# Patient Record
Sex: FEMALE | Race: WHITE | NOT HISPANIC OR LATINO | Employment: OTHER | URBAN - METROPOLITAN AREA
[De-identification: names, ages, dates, MRNs, and addresses within clinical notes are randomized per-mention and may not be internally consistent; named-entity substitution may affect disease eponyms.]

---

## 2017-03-29 ENCOUNTER — TELEPHONE (OUTPATIENT)
Dept: SMOKING CESSATION | Facility: CLINIC | Age: 57
End: 2017-03-29

## 2017-04-03 ENCOUNTER — TELEPHONE (OUTPATIENT)
Dept: SMOKING CESSATION | Facility: CLINIC | Age: 57
End: 2017-04-03

## 2017-04-05 ENCOUNTER — TELEPHONE (OUTPATIENT)
Dept: SMOKING CESSATION | Facility: CLINIC | Age: 57
End: 2017-04-05

## 2017-04-21 ENCOUNTER — TELEPHONE (OUTPATIENT)
Dept: SMOKING CESSATION | Facility: CLINIC | Age: 57
End: 2017-04-21

## 2017-10-04 ENCOUNTER — TELEPHONE (OUTPATIENT)
Dept: SMOKING CESSATION | Facility: CLINIC | Age: 57
End: 2017-10-04

## 2017-10-10 ENCOUNTER — TELEPHONE (OUTPATIENT)
Dept: SMOKING CESSATION | Facility: CLINIC | Age: 57
End: 2017-10-10

## 2017-10-11 ENCOUNTER — TELEPHONE (OUTPATIENT)
Dept: SMOKING CESSATION | Facility: CLINIC | Age: 57
End: 2017-10-11

## 2018-03-21 ENCOUNTER — TELEPHONE (OUTPATIENT)
Dept: SMOKING CESSATION | Facility: CLINIC | Age: 58
End: 2018-03-21

## 2018-03-28 ENCOUNTER — TELEPHONE (OUTPATIENT)
Dept: SMOKING CESSATION | Facility: CLINIC | Age: 58
End: 2018-03-28

## 2019-01-14 ENCOUNTER — TELEPHONE (OUTPATIENT)
Dept: ORTHOPEDICS | Facility: CLINIC | Age: 59
End: 2019-01-14

## 2019-01-14 NOTE — TELEPHONE ENCOUNTER
Patient stated she had been in touch with you about an appt with us and has heard nothing back yet. Please call her.

## 2019-01-29 ENCOUNTER — OFFICE VISIT (OUTPATIENT)
Dept: ORTHOPEDICS | Facility: CLINIC | Age: 59
End: 2019-01-29
Payer: MEDICAID

## 2019-01-29 VITALS
HEART RATE: 71 BPM | DIASTOLIC BLOOD PRESSURE: 68 MMHG | SYSTOLIC BLOOD PRESSURE: 104 MMHG | HEIGHT: 67 IN | BODY MASS INDEX: 27.47 KG/M2 | WEIGHT: 175 LBS

## 2019-01-29 DIAGNOSIS — M25.551 RIGHT HIP PAIN: ICD-10-CM

## 2019-01-29 DIAGNOSIS — M25.562 CHRONIC PAIN OF BOTH KNEES: ICD-10-CM

## 2019-01-29 DIAGNOSIS — G89.29 CHRONIC PAIN OF BOTH KNEES: ICD-10-CM

## 2019-01-29 DIAGNOSIS — M17.12 PRIMARY OSTEOARTHRITIS OF LEFT KNEE: ICD-10-CM

## 2019-01-29 DIAGNOSIS — M17.11 PRIMARY OSTEOARTHRITIS OF RIGHT KNEE: ICD-10-CM

## 2019-01-29 DIAGNOSIS — M25.561 CHRONIC PAIN OF BOTH KNEES: ICD-10-CM

## 2019-01-29 DIAGNOSIS — M16.11 PRIMARY OSTEOARTHRITIS OF RIGHT HIP: Primary | ICD-10-CM

## 2019-01-29 PROCEDURE — 73564 PR  X-RAY KNEE 4+ VIEW: ICD-10-PCS | Mod: 50,,, | Performed by: ORTHOPAEDIC SURGERY

## 2019-01-29 PROCEDURE — 73564 X-RAY EXAM KNEE 4 OR MORE: CPT | Mod: 50,,, | Performed by: ORTHOPAEDIC SURGERY

## 2019-01-29 PROCEDURE — 73502 PR X-RAY EXAM HIP UNI 2-3 VIEWS: ICD-10-PCS | Mod: RT,,, | Performed by: ORTHOPAEDIC SURGERY

## 2019-01-29 PROCEDURE — 99204 PR OFFICE/OUTPT VISIT, NEW, LEVL IV, 45-59 MIN: ICD-10-PCS | Mod: 25,,, | Performed by: ORTHOPAEDIC SURGERY

## 2019-01-29 PROCEDURE — 99204 OFFICE O/P NEW MOD 45 MIN: CPT | Mod: 25,,, | Performed by: ORTHOPAEDIC SURGERY

## 2019-01-29 PROCEDURE — 20610 LARGE JOINT ASPIRATION/INJECTION: R KNEE: ICD-10-PCS | Mod: 50,,, | Performed by: ORTHOPAEDIC SURGERY

## 2019-01-29 PROCEDURE — 73502 X-RAY EXAM HIP UNI 2-3 VIEWS: CPT | Mod: RT,,, | Performed by: ORTHOPAEDIC SURGERY

## 2019-01-29 PROCEDURE — 20610 DRAIN/INJ JOINT/BURSA W/O US: CPT | Mod: 50,,, | Performed by: ORTHOPAEDIC SURGERY

## 2019-01-29 RX ORDER — ALLOPURINOL 100 MG/1
100 TABLET ORAL DAILY
Refills: 0 | COMMUNITY
Start: 2019-01-21 | End: 2019-06-11

## 2019-01-29 RX ORDER — DULOXETIN HYDROCHLORIDE 60 MG/1
60 CAPSULE, DELAYED RELEASE ORAL DAILY
Refills: 0 | COMMUNITY
Start: 2019-01-22 | End: 2021-06-17

## 2019-01-29 RX ORDER — RABEPRAZOLE SODIUM 20 MG/1
20 TABLET, DELAYED RELEASE ORAL DAILY
Status: ON HOLD | COMMUNITY
End: 2019-02-26 | Stop reason: HOSPADM

## 2019-01-29 RX ORDER — TRAZODONE HYDROCHLORIDE 50 MG/1
TABLET ORAL
Refills: 5 | COMMUNITY
Start: 2018-12-31 | End: 2019-03-12

## 2019-01-29 RX ORDER — ONDANSETRON 4 MG/1
8 TABLET, FILM COATED ORAL 2 TIMES DAILY
Status: ON HOLD | COMMUNITY
End: 2019-08-21 | Stop reason: SDUPTHER

## 2019-01-29 RX ORDER — TRAMADOL HYDROCHLORIDE 50 MG/1
TABLET ORAL
COMMUNITY
Start: 2019-01-22 | End: 2019-06-11

## 2019-01-29 RX ORDER — ATENOLOL 50 MG/1
TABLET ORAL
Refills: 1 | COMMUNITY
Start: 2019-01-03 | End: 2021-06-17

## 2019-01-29 RX ORDER — METHYLPREDNISOLONE ACETATE 40 MG/ML
40 INJECTION, SUSPENSION INTRA-ARTICULAR; INTRALESIONAL; INTRAMUSCULAR; SOFT TISSUE
Status: DISCONTINUED | OUTPATIENT
Start: 2019-01-29 | End: 2019-01-29 | Stop reason: HOSPADM

## 2019-01-29 RX ORDER — GABAPENTIN 600 MG/1
TABLET ORAL
Refills: 2 | COMMUNITY
Start: 2019-01-21 | End: 2021-06-17

## 2019-01-29 RX ORDER — LISINOPRIL 20 MG/1
TABLET ORAL
Refills: 5 | COMMUNITY
Start: 2019-01-21 | End: 2021-06-18

## 2019-01-29 RX ORDER — CLONAZEPAM 1 MG/1
TABLET ORAL
Refills: 2 | COMMUNITY
Start: 2019-01-03 | End: 2022-10-27

## 2019-01-29 RX ORDER — DOCUSATE SODIUM 100 MG/1
CAPSULE, LIQUID FILLED ORAL
Refills: 0 | Status: ON HOLD | COMMUNITY
Start: 2019-01-16 | End: 2019-08-21 | Stop reason: SDUPTHER

## 2019-01-29 RX ORDER — ARIPIPRAZOLE 15 MG/1
TABLET ORAL
Refills: 5 | COMMUNITY
Start: 2019-01-08 | End: 2021-06-18

## 2019-01-29 RX ORDER — AMLODIPINE BESYLATE 10 MG/1
TABLET ORAL
Refills: 1 | COMMUNITY
Start: 2019-01-21 | End: 2020-03-05

## 2019-01-29 RX ORDER — OXYBUTYNIN CHLORIDE 5 MG/1
TABLET ORAL
Refills: 2 | COMMUNITY
Start: 2018-12-07 | End: 2021-06-18

## 2019-01-29 RX ORDER — PRAVASTATIN SODIUM 20 MG/1
TABLET ORAL
Refills: 1 | COMMUNITY
Start: 2019-01-21 | End: 2021-06-17

## 2019-01-29 RX ADMIN — METHYLPREDNISOLONE ACETATE 40 MG: 40 INJECTION, SUSPENSION INTRA-ARTICULAR; INTRALESIONAL; INTRAMUSCULAR; SOFT TISSUE at 03:01

## 2019-01-29 NOTE — H&P (VIEW-ONLY)
[facility]    Subjective:     Chief Complaint:   Chief Complaint   Patient presents with    Right Knee - Pain     Right knee pain x a while. States that her knee bothers her all the time and states that she had a fall a  Few years ago. Right knee pain is worse then left.     Right Hip - Pain     Right hip pain x a while. States that she does have pain that radiates to the groin and down the leg.     Left Knee - Pain     Left knee pain x a while.  States that she had surgery a while back on that knee and states that it bothers her all the time. States that the pain is worse at night.        Past Medical History:   Diagnosis Date    Cancer ovarian; uterine    GERD (gastroesophageal reflux disease)     Hyperlipidemia     Hypertension        Past Surgical History:   Procedure Laterality Date    COLON SURGERY      COLONOSCOPY      HERNIA REPAIR N/A 2016    HYSTERECTOMY  total    REVISION COLOSTOMY N/A 2012       Current Outpatient Medications   Medication Sig    ALBUTEROL INHL Inhale into the lungs.    allopurinol (ZYLOPRIM) 100 MG tablet Take 100 mg by mouth once daily.    amLODIPine (NORVASC) 10 MG tablet Take 1 tablet (10 mg) by mouth daily    ARIPiprazole (ABILIFY) 15 MG Tab Take 1 tablet (15 mg) by mouth daily    ASPIR-LOW 81 mg EC tablet     atenolol (TENORMIN) 50 MG tablet Take 1 tablet (50 mg) by mouth daily    clonazePAM (KLONOPIN) 1 MG tablet 1 tablet daily as needed for anxiery    docusate sodium (COLACE) 100 MG capsule Take 2 capsules (200 mg) by mouth 2 times per day as needed    DULoxetine (CYMBALTA) 60 MG capsule Take 60 mg by mouth once daily.    gabapentin (NEURONTIN) 600 MG tablet Take 1 tablet (600 mg) by mouth 3 times per day    lisinopril (PRINIVIL,ZESTRIL) 20 MG tablet Take 1 tablet (20 mg) by mouth daily    ondansetron (ZOFRAN) 4 MG tablet Take 8 mg by mouth 2 (two) times daily.    oxybutynin (DITROPAN) 5 MG Tab Take 1 tablet (5 mg) by mouth 2 times per day     pantoprazole (PROTONIX) 20 MG tablet Take 20 mg by mouth once daily.    pravastatin (PRAVACHOL) 20 MG tablet Take 1 tablet (20 mg) by mouth daily    RABEprazole (ACIPHEX) 20 mg tablet Take 20 mg by mouth once daily.    tiotropium Br/olodaterol HCl (STIOLTO RESPIMAT INHL) Inhale into the lungs.    traMADol (ULTRAM) 50 mg tablet     traZODone (DESYREL) 50 MG tablet Take 1 tablet (50 mg) by mouth daily at bedtime    polyethylene glycol (COLYTE) 240-22.72-6.72 gram SolR Take 4,000 mLs by mouth as directed.     No current facility-administered medications for this visit.        Review of patient's allergies indicates:  No Known Allergies    Family History   Problem Relation Age of Onset    Cancer Mother     Hypertension Father     Heart disease Father     Heart disease Sister     Hypertension Sister     Hypertension Brother     Depression Brother        Social History     Socioeconomic History    Marital status:      Spouse name: Not on file    Number of children: Not on file    Years of education: Not on file    Highest education level: Not on file   Social Needs    Financial resource strain: Not on file    Food insecurity - worry: Not on file    Food insecurity - inability: Not on file    Transportation needs - medical: Not on file    Transportation needs - non-medical: Not on file   Occupational History    Not on file   Tobacco Use    Smoking status: Current Every Day Smoker     Packs/day: 0.50     Types: Cigarettes   Substance and Sexual Activity    Alcohol use: No    Drug use: Not on file    Sexual activity: Not on file   Other Topics Concern    Not on file   Social History Narrative    Not on file       History of present illness: Patient comes in today for the right hip and her bilateral knees. Her left knee is much worse than the right. Both bother her. Her biggest complaint is her right hip. The right hip hurts her in the groin. She denies any trauma to the right hip. She has  had an arthroscopy of the left knee. That was about a year ago. The knee is swollen and agree. It really never got better from arthroscopy. She was told after arthroscopy that she needed a knee replacement.      Review of Systems:    Constitution: Negative for chills, fever, and sweats.  Negative for unexplained weight loss.    HENT:  Negative for headaches and blurry vision.    Cardiovascular:Negative for chest pain or irregular heart beat. Negative for hypertension.    Respiratory:  Negative for cough and shortness of breath.    Gastrointestinal: Negative for abdominal pain, heartburn, melena, nausea, and vomitting.    Genitourinary:  Negative bladder incontinence and dysuria.    Musculoskeletal:  See HPI for details.     Neurological: Negative for numbness.    Psychiatric/Behavioral: Negative for depression.  The patient is not nervous/anxious.      Endocrine: Negative for polyuria    Hematologic/Lymphatic: Negative for bleeding problem.  Does not bruise/bleed easily.    Skin: Negative for poor would healing and rash    Objective:      Physical Examination:    Vital Signs:    Vitals:    01/29/19 1329   BP: 104/68   Pulse: 71       Body mass index is 27.41 kg/m².    This a well-developed, well nourished patient in no acute distress.  They are alert and oriented and cooperative to examination.        Patient has severe pain with internal/external rotation of the right hip. She has a hip flexion contracture of 10°. She has range of motion of her bilateral knees from 5-100 degrees. Her knees are stable to varus valgus anterior posterior stresses.  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 The right knee demonstrates moderate arthritis with mild loss of the medial compartment.    AP of the pelvis and AP and lateral of the  right hip demonstrates bone-on-bone arthritis of the right hip.    Assessment/Plan:      Severe arthritis of the left knee. Arthritis of the  right knee. I injected both knees with Depo-Medrol and lidocaine. In terms of the hip she has bone-on-bone arthritis of the right hip. Because the hip is the most symptomatic I've offered her a right hip replacement. Risks and benefits including leg length discrepancy dislocation numbness and tingling and other complications are discussed with her in great detail. She understood and wished to proceed  This note was created using Dragon voice recognition software that occasionally misinterpreted phrases or words.

## 2019-01-29 NOTE — PROCEDURES
Large Joint Aspiration/Injection: R knee  Date/Time: 1/29/2019 3:10 PM  Performed by: Eliseo Vaca MD  Authorized by: Eliseo Vaca MD     Consent Done?:  Yes (Verbal)  Indications:  Pain  Procedure site marked: Yes    Timeout: Prior to procedure the correct patient, procedure, and site was verified      Location:  Knee  Site:  R knee  Prep: Patient was prepped and draped in usual sterile fashion    Needle size:  25 G  Medications:  40 mg methylPREDNISolone acetate 40 mg/mL; 40 mg methylPREDNISolone acetate 40 mg/mL  Patient tolerance:  Patient tolerated the procedure well with no immediate complications    Large Joint Aspiration/Injection: L knee  Date/Time: 1/29/2019 3:10 PM  Performed by: Eliseo Vaca MD  Authorized by: Eliseo Vaca MD     Consent Done?:  Yes (Verbal)  Indications:  Pain  Procedure site marked: Yes    Timeout: Prior to procedure the correct patient, procedure, and site was verified      Location:  Knee  Site:  L knee  Prep: Patient was prepped and draped in usual sterile fashion    Needle size:  25 G  Medications:  40 mg methylPREDNISolone acetate 40 mg/mL; 40 mg methylPREDNISolone acetate 40 mg/mL  Patient tolerance:  Patient tolerated the procedure well with no immediate complications

## 2019-01-29 NOTE — PROGRESS NOTES
[facility]    Subjective:     Chief Complaint:   Chief Complaint   Patient presents with    Right Knee - Pain     Right knee pain x a while. States that her knee bothers her all the time and states that she had a fall a  Few years ago. Right knee pain is worse then left.     Right Hip - Pain     Right hip pain x a while. States that she does have pain that radiates to the groin and down the leg.     Left Knee - Pain     Left knee pain x a while.  States that she had surgery a while back on that knee and states that it bothers her all the time. States that the pain is worse at night.        Past Medical History:   Diagnosis Date    Cancer ovarian; uterine    GERD (gastroesophageal reflux disease)     Hyperlipidemia     Hypertension        Past Surgical History:   Procedure Laterality Date    COLON SURGERY      COLONOSCOPY      HERNIA REPAIR N/A 2016    HYSTERECTOMY  total    REVISION COLOSTOMY N/A 2012       Current Outpatient Medications   Medication Sig    ALBUTEROL INHL Inhale into the lungs.    allopurinol (ZYLOPRIM) 100 MG tablet Take 100 mg by mouth once daily.    amLODIPine (NORVASC) 10 MG tablet Take 1 tablet (10 mg) by mouth daily    ARIPiprazole (ABILIFY) 15 MG Tab Take 1 tablet (15 mg) by mouth daily    ASPIR-LOW 81 mg EC tablet     atenolol (TENORMIN) 50 MG tablet Take 1 tablet (50 mg) by mouth daily    clonazePAM (KLONOPIN) 1 MG tablet 1 tablet daily as needed for anxiery    docusate sodium (COLACE) 100 MG capsule Take 2 capsules (200 mg) by mouth 2 times per day as needed    DULoxetine (CYMBALTA) 60 MG capsule Take 60 mg by mouth once daily.    gabapentin (NEURONTIN) 600 MG tablet Take 1 tablet (600 mg) by mouth 3 times per day    lisinopril (PRINIVIL,ZESTRIL) 20 MG tablet Take 1 tablet (20 mg) by mouth daily    ondansetron (ZOFRAN) 4 MG tablet Take 8 mg by mouth 2 (two) times daily.    oxybutynin (DITROPAN) 5 MG Tab Take 1 tablet (5 mg) by mouth 2 times per day     pantoprazole (PROTONIX) 20 MG tablet Take 20 mg by mouth once daily.    pravastatin (PRAVACHOL) 20 MG tablet Take 1 tablet (20 mg) by mouth daily    RABEprazole (ACIPHEX) 20 mg tablet Take 20 mg by mouth once daily.    tiotropium Br/olodaterol HCl (STIOLTO RESPIMAT INHL) Inhale into the lungs.    traMADol (ULTRAM) 50 mg tablet     traZODone (DESYREL) 50 MG tablet Take 1 tablet (50 mg) by mouth daily at bedtime    polyethylene glycol (COLYTE) 240-22.72-6.72 gram SolR Take 4,000 mLs by mouth as directed.     No current facility-administered medications for this visit.        Review of patient's allergies indicates:  No Known Allergies    Family History   Problem Relation Age of Onset    Cancer Mother     Hypertension Father     Heart disease Father     Heart disease Sister     Hypertension Sister     Hypertension Brother     Depression Brother        Social History     Socioeconomic History    Marital status:      Spouse name: Not on file    Number of children: Not on file    Years of education: Not on file    Highest education level: Not on file   Social Needs    Financial resource strain: Not on file    Food insecurity - worry: Not on file    Food insecurity - inability: Not on file    Transportation needs - medical: Not on file    Transportation needs - non-medical: Not on file   Occupational History    Not on file   Tobacco Use    Smoking status: Current Every Day Smoker     Packs/day: 0.50     Types: Cigarettes   Substance and Sexual Activity    Alcohol use: No    Drug use: Not on file    Sexual activity: Not on file   Other Topics Concern    Not on file   Social History Narrative    Not on file       History of present illness: Patient comes in today for the right hip and her bilateral knees. Her left knee is much worse than the right. Both bother her. Her biggest complaint is her right hip. The right hip hurts her in the groin. She denies any trauma to the right hip. She has  had an arthroscopy of the left knee. That was about a year ago. The knee is swollen and agree. It really never got better from arthroscopy. She was told after arthroscopy that she needed a knee replacement.      Review of Systems:    Constitution: Negative for chills, fever, and sweats.  Negative for unexplained weight loss.    HENT:  Negative for headaches and blurry vision.    Cardiovascular:Negative for chest pain or irregular heart beat. Negative for hypertension.    Respiratory:  Negative for cough and shortness of breath.    Gastrointestinal: Negative for abdominal pain, heartburn, melena, nausea, and vomitting.    Genitourinary:  Negative bladder incontinence and dysuria.    Musculoskeletal:  See HPI for details.     Neurological: Negative for numbness.    Psychiatric/Behavioral: Negative for depression.  The patient is not nervous/anxious.      Endocrine: Negative for polyuria    Hematologic/Lymphatic: Negative for bleeding problem.  Does not bruise/bleed easily.    Skin: Negative for poor would healing and rash    Objective:      Physical Examination:    Vital Signs:    Vitals:    01/29/19 1329   BP: 104/68   Pulse: 71       Body mass index is 27.41 kg/m².    This a well-developed, well nourished patient in no acute distress.  They are alert and oriented and cooperative to examination.        Patient has severe pain with internal/external rotation of the right hip. She has a hip flexion contracture of 10°. She has range of motion of her bilateral knees from 5-100 degrees. Her knees are stable to varus valgus anterior posterior stresses.  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 The right knee demonstrates moderate arthritis with mild loss of the medial compartment.    AP of the pelvis and AP and lateral of the  right hip demonstrates bone-on-bone arthritis of the right hip.    Assessment/Plan:      Severe arthritis of the left knee. Arthritis of the  right knee. I injected both knees with Depo-Medrol and lidocaine. In terms of the hip she has bone-on-bone arthritis of the right hip. Because the hip is the most symptomatic I've offered her a right hip replacement. Risks and benefits including leg length discrepancy dislocation numbness and tingling and other complications are discussed with her in great detail. She understood and wished to proceed  This note was created using Dragon voice recognition software that occasionally misinterpreted phrases or words.

## 2019-01-31 DIAGNOSIS — M16.11 PRIMARY OSTEOARTHRITIS OF RIGHT HIP: Primary | ICD-10-CM

## 2019-02-06 DIAGNOSIS — M16.11 DEGENERATIVE JOINT DISEASE OF RIGHT HIP: ICD-10-CM

## 2019-02-06 DIAGNOSIS — M16.11 PRIMARY OSTEOARTHRITIS OF RIGHT HIP: Primary | ICD-10-CM

## 2019-02-06 RX ORDER — SODIUM CHLORIDE 0.9 % (FLUSH) 0.9 %
3 SYRINGE (ML) INJECTION
Status: CANCELLED | OUTPATIENT
Start: 2019-02-06

## 2019-02-06 RX ORDER — MUPIROCIN 20 MG/G
OINTMENT TOPICAL
Status: CANCELLED | OUTPATIENT
Start: 2019-02-06

## 2019-02-11 ENCOUNTER — TELEPHONE (OUTPATIENT)
Dept: ORTHOPEDICS | Facility: CLINIC | Age: 59
End: 2019-02-11

## 2019-02-11 ENCOUNTER — HOSPITAL ENCOUNTER (OUTPATIENT)
Dept: RADIOLOGY | Facility: HOSPITAL | Age: 59
Discharge: HOME | End: 2019-02-11
Attending: ORTHOPAEDIC SURGERY
Payer: MEDICAID

## 2019-02-11 ENCOUNTER — HOSPITAL ENCOUNTER (OUTPATIENT)
Dept: PREADMISSION TESTING | Facility: HOSPITAL | Age: 59
Discharge: HOME | End: 2019-02-11
Attending: ORTHOPAEDIC SURGERY
Payer: MEDICAID

## 2019-02-11 VITALS — BODY MASS INDEX: 27.15 KG/M2 | WEIGHT: 173 LBS | HEIGHT: 67 IN

## 2019-02-11 DIAGNOSIS — M16.11 PRIMARY OSTEOARTHRITIS OF RIGHT HIP: Primary | ICD-10-CM

## 2019-02-11 LAB
ABO + RH BLD: NORMAL
ALBUMIN SERPL BCP-MCNC: 3.9 G/DL
ALP SERPL-CCNC: 99 U/L
ALT SERPL W/O P-5'-P-CCNC: 24 U/L
ANION GAP SERPL CALC-SCNC: 10 MMOL/L
AST SERPL-CCNC: 16 U/L
BASOPHILS # BLD AUTO: 0 K/UL
BASOPHILS NFR BLD: 0.2 %
BILIRUB SERPL-MCNC: 0.3 MG/DL
BILIRUB UR QL STRIP: NEGATIVE
BLD GP AB SCN CELLS X3 SERPL QL: NORMAL
BUN SERPL-MCNC: 14 MG/DL
CALCIUM SERPL-MCNC: 9.8 MG/DL
CHLORIDE SERPL-SCNC: 101 MMOL/L
CLARITY UR: CLEAR
CO2 SERPL-SCNC: 26 MMOL/L
COLOR UR: YELLOW
CREAT SERPL-MCNC: 0.7 MG/DL
DIFFERENTIAL METHOD: ABNORMAL
EOSINOPHIL # BLD AUTO: 0.1 K/UL
EOSINOPHIL NFR BLD: 1 %
ERYTHROCYTE [DISTWIDTH] IN BLOOD BY AUTOMATED COUNT: 14.3 %
EST. GFR  (AFRICAN AMERICAN): >60 ML/MIN/1.73 M^2
EST. GFR  (NON AFRICAN AMERICAN): >60 ML/MIN/1.73 M^2
GLUCOSE SERPL-MCNC: 92 MG/DL
GLUCOSE UR QL STRIP: NEGATIVE
HCT VFR BLD AUTO: 40.7 %
HGB BLD-MCNC: 13.2 G/DL
HGB UR QL STRIP: NEGATIVE
KETONES UR QL STRIP: NEGATIVE
LEUKOCYTE ESTERASE UR QL STRIP: NEGATIVE
LYMPHOCYTES # BLD AUTO: 2.5 K/UL
LYMPHOCYTES NFR BLD: 19.8 %
MCH RBC QN AUTO: 30.4 PG
MCHC RBC AUTO-ENTMCNC: 32.4 G/DL
MCV RBC AUTO: 94 FL
MONOCYTES # BLD AUTO: 0.8 K/UL
MONOCYTES NFR BLD: 6.1 %
NEUTROPHILS # BLD AUTO: 9.2 K/UL
NEUTROPHILS NFR BLD: 72.9 %
NITRITE UR QL STRIP: NEGATIVE
PH UR STRIP: 6 [PH] (ref 5–8)
PLATELET # BLD AUTO: 215 K/UL
PMV BLD AUTO: 8.4 FL
POTASSIUM SERPL-SCNC: 4 MMOL/L
PROT SERPL-MCNC: 7.4 G/DL
PROT UR QL STRIP: NEGATIVE
RBC # BLD AUTO: 4.34 M/UL
SODIUM SERPL-SCNC: 137 MMOL/L
SP GR UR STRIP: <=1.005 (ref 1–1.03)
URN SPEC COLLECT METH UR: ABNORMAL
UROBILINOGEN UR STRIP-ACNC: NEGATIVE EU/DL
WBC # BLD AUTO: 12.6 K/UL

## 2019-02-11 PROCEDURE — 99900103 DSU ONLY-NO CHARGE-INITIAL HR (STAT)

## 2019-02-11 PROCEDURE — 93010 EKG 12-LEAD: ICD-10-PCS | Mod: ,,, | Performed by: INTERNAL MEDICINE

## 2019-02-11 PROCEDURE — 36415 COLL VENOUS BLD VENIPUNCTURE: CPT

## 2019-02-11 PROCEDURE — 71046 X-RAY EXAM CHEST 2 VIEWS: CPT | Mod: TC,FY

## 2019-02-11 PROCEDURE — 99900104 DSU ONLY-NO CHARGE-EA ADD'L HR (STAT)

## 2019-02-11 PROCEDURE — 93010 ELECTROCARDIOGRAM REPORT: CPT | Mod: ,,, | Performed by: INTERNAL MEDICINE

## 2019-02-11 PROCEDURE — 85025 COMPLETE CBC W/AUTO DIFF WBC: CPT

## 2019-02-11 PROCEDURE — 86850 RBC ANTIBODY SCREEN: CPT

## 2019-02-11 PROCEDURE — 87081 CULTURE SCREEN ONLY: CPT

## 2019-02-11 PROCEDURE — 80053 COMPREHEN METABOLIC PANEL: CPT

## 2019-02-11 PROCEDURE — 93005 ELECTROCARDIOGRAM TRACING: CPT

## 2019-02-11 PROCEDURE — 81003 URINALYSIS AUTO W/O SCOPE: CPT

## 2019-02-11 PROCEDURE — 71046 XR CHEST PA AND LATERAL: ICD-10-PCS | Mod: 26,,, | Performed by: RADIOLOGY

## 2019-02-11 PROCEDURE — 71046 X-RAY EXAM CHEST 2 VIEWS: CPT | Mod: 26,,, | Performed by: RADIOLOGY

## 2019-02-11 NOTE — PRE ADMISSION SCREENING
"               [facility] Risk Assessment Scale    Patient Name: Nimco Caro  YOB: 1960  MRN: [id number]            RIsk Factor Measure Recommendation Patient Data Scale/Score   BMI >40 Reconsider surgery, weight loss   Estimated body mass index is 27.1 kg/m² as calculated from the following:    Height as of this encounter: 5' 7" (1.702 m).    Weight as of this encounter: 78.5 kg (173 lb).   [] 0 = 1 - 24.9  [x] 1 = 25-29.9  [] 2 = 30-34.9  [] 3 = 35-39.9  [] 4 = 40-44.9  [] 5 = 45-99.9   Hemoglobin AIC (if applicable) >9 Delay surgery until DM under control  Refer for:  · Nutrition Therapy  · Exercise   · Medication    No results found for: LABA1C, HGBA1C    Lab Results   Component Value Date    GLU 92 02/11/2019      [] 0 = 4.0-5.6  [] 1 = 5.7-6.4  [] 2 = 6.5-6.9  [] 3 = 7.0-7.9  [] 4 = 8.0-8.9  [] 5 = 9.0-12   Hemoglobin (Anemia) <9 Delay surgery   Correct anemia Lab Results   Component Value Date    HGB 13.2 02/11/2019    [] 20 - <9.0                    Albumin <3 Delay surgery &Workup Lab Results   Component Value Date    ALBUMIN 3.9 02/11/2019    [] 20 - <3.0   Smoking Cessation >4 Weeks Delay Surgery  Refer to OP Cessation Class     [x] 20 - current smoker                                __1___ PPD                    Hx of MI, PE, Arrhythmia, CVA, DVT <30 Days Delay Surgery    N/A [] 20      Infection Variable Delay surgery and re-evaluate   N/A [] 20 - recent/current infection     Depression (PHQ) >10 out of 27 Delay Surgery and re-evaluate  Medication  Counseling              [x] 0     []1     []2     []3      []4      [] 5                    (1-4)      (5-9)  (10-14)  (15-19)   (20-27)     Memory Impairment & Memory loss (Mini-Cog Screening Tool) Advanced dementia and/or Parkinson's Reconsider surgery     [x] 0     []1     []2     []3     []4     [] 5     Physical Conditioning (Modified AM-PAC Per Physical Therapy at [facility]) Unable to ambulate on day of surgery Delay surgery and " re-evaluate  Pre-Rehabilitation   (PT evaluation)       []  0   [x]4       []8     []12        []16     []20       (<20%)   (<40%)   (<60%)   (<80% )    (>80%)     Home Environment/Caregiver support  (Per /Navigator Interview)    Availability of basic services and/or approprate assistance during post-operative period Delay surgery and re-evaluate  Safe home environment  Health   1 week post-surgery  Transportation  availability  Ability to obtain DME/Medications post-op    [] 0     []1     [x]2     []3     []4     [] 5  [x] 0     []1     []2     []3     []4     [] 5  [x] 0     []1     []2     []3     []4     [] 5  [x] 0     []1     []2     []3     []4     [] 5         MD Contact: Dr. Vaca Comments:  Total Score:  27

## 2019-02-11 NOTE — DISCHARGE INSTRUCTIONS
To confirm, Your doctor has instructed you that surgery is scheduled for:     Please report to [facility], Registration the morning of surgery. You must check-in and receive a wristband before going to your procedure.    Pre-Op will call the afternoon prior to surgery between 1:00 and 6:00 PM with the final arrival time.  Phone number: [phone]    PLEASE NOTE:  The surgery schedule has many variables which may affect the time of your surgery case.  Family members should be available if your surgery time changes.  Plan to be here the day of your procedure between 4-6 hours.    MEDICATIONS:  TAKE ONLY THESE MEDICATIONS WITH A SMALL SIP OF WATER THE MORNING OF YOUR PROCEDURE:    Inhalers, Allopurinol, Amlodipine, Abilify, Atenolol, Klonopin, Cymbalta, Neurontin, Oxybutynin, Protonix. Provastatin, Tramadol-if needed  DO NOT TAKE THESE MEDICATIONS 5-7 DAYS PRIOR to your procedure or per your surgeon's request: ASPIRIN, ALEVE, ADVIL, IBUPROFEN, FISH OIL VITAMIN E, HERBALS  (May take Tylenol)                                    Aspirin - last dose 2/6/19                                    No Lisinopril AM of surgery  ONLY if you are prescribed any types of blood thinners such as:  Aspirin, Coumadin, Plavix, Pradaxa, Xarelto, Aggrenox, Effient, Eliquis, Savasya, Brilinta, or any other, ask your surgeon whether you should stop taking them and how long before surgery you should stop.  You may also need to verify with the prescribing physician if it is ok to stop your medication.      INSTRUCTIONS IMPORTANT!!  · Do not eat or drink anything between midnight and the time of your procedure- this includes gum, mints, and candy.  · Do not smoke or drink alcoholic beverages 24 hours prior to your procedure.  · Shower the night before AND the morning of your procedure with a Chlorhexidine wash such as Hibiclens or Dial antibacterial soap from the neck down.  Do not get it on your face or in your eyes.  You  may use your own shampoo and face wash. This helps your skin to be as bacteria free as possible.    · If you wear contact lenses, dentures, hearing aids or glasses, bring a container to put them in during surgery and give to a family member for safe keeping.  Please leave all jewelry, piercing's and valuables at home.   · DO NOT remove hair from the surgery site.  Do not shave the incision site unless you are given specific instructions to do so.    · ONLY if you have been diagnosed with sleep apnea please bring your C-PAP machine.  · ONLY if you wear home oxygen please bring your portable oxygen tank the day of your procedure.  · ONLY if you have a history of OPEN HEART SURGERY you will need a clearance from your Cardiologist per Anesthesia.      · ONLY for patients requiring bowel prep, written instructions will be given by your doctor's office.  · ONLY if you have a neuro stimulator, please bring the controller with you the morning of surgery  · ONLY if a type and screen test is needed before surgery, please return:  · If your doctor has scheduled you for an overnight stay, bring a small overnight bag with any personal items you need.  · Make arrangements in advance for transportation home by a responsible adult.  It is not safe to drive a vehicle during the 24 hours after anesthesia.      · Visiting hours are 10:00AM to 8:30PM.  For the safety of all patients, visitors under the age of 12 are not allowed above the first floor of the hospital.    · All [facility] facilities and properties are tobacco free.  Smoking is NOT allowed.       If you have any questions about these instructions, call Pre-Op Admit  Nursing at [phone] or the Pre-Op Day Surgery Unit at [phone].

## 2019-02-11 NOTE — PRE ADMISSION SCREENING
JOINT CAMP ASSESSMENT    Name Nimco Caro   MRN [id number]    Age/Sex 58 y.o. female    Surgeon Dr. Eliseo Vaca   Joint Camp Date 2/11/2019   Surgery Date 2/25/2019   Procedure Right Hip Arthroplasty   Insurance Payor: MEDICAID / Plan: [place] [facility] CONNECT / Product Type: Managed Medicaid /    Care Team Patient Care Team:  Katy Mason MD as PCP - General (Family Medicine)  Eliseo Vaca MD as Consulting Physician (Orthopedic Surgery)    Pharmacy   [facility] [address]  [place], [place] - [address] [facility]  [address] [place] [address]  Phone: [phone] Fax: [phone]    [address] [address]  Phone: [phone] Fax: [phone]    [facility] [employer] [place], [place] - [address] [place] [address]  Phone: [phone] Fax: [phone]     AM-PAC Score   20   Risk Assessment Score 27     Past Medical History:   Diagnosis Date    Cancer ovarian; uterine    1992    GERD (gastroesophageal reflux disease)     Hyperlipidemia     Hypertension        Past Surgical History:   Procedure Laterality Date    COLON SURGERY      COLONOSCOPY      HERNIA REPAIR N/A 2016    HYSTERECTOMY  total    REVISION COLOSTOMY N/A 2012         Home Enviroment     Living Arrangement: Lives alone  Home Environment: 1-story house/ trailer, number of outside stairs: 3, tub-shower  Home Safety Concerns: Pets in the home: dogs (4).    DISCHARGE CAREGIVER/SUPPORT SYSTEM     Identified post-op caregiver: Patient has lives alone and children / family / friends to help, friend.  Patient's caregiver(s) will be able to provide physical assistance. Patient will have someone to assist overnight.  Patient will be staying with friend after discharge. Shaunna Leonard @ [address] [id number] Ph: [phone]    Caregiver present at pre-op  interview:  Yes      PRE-OPERATIVE FUNCTIONAL STATUS     Employment: Unemployed    Pre-op Functional Status: Patient is independent with mobility/ambulation, transfers, ADL's, IADL's.    Use of assistive device for ambulation: quad cane  ADL: self care  ADL Limitations: difficulty with walking  Medical Restrictions: Decreased range of motions in extremities    POTENTIAL BARRIERS TO DISCHARGE/POTENTIAL POST-OP COMPLICATIONS     Patient is a current everyday smoker which could delay wound healing. Patient to stay with her friend for an unknown amount of time. Encouraged patient to stay until she fully recovers, as she has no other help in [place] where she lives.    DISCHARGE PLAN     Expected LOS of 2 days or less for joint replacement discussed with patient. We also discussed a discharge path of HH for approximately two weeks with a transition to outpatient PT on the third week given no post-op complications.      Patient in agreement with discharge plan: Yes    Discharge to: Discharge home with home health (PT/OT) x2 weeks with transition to outpatient PT     HH:  [facility]     OP PT: [facility]     Home DME: none    Needed DME at D/C: rolling walker, bedside commode, tub transfer bench and assistive device kit     Rx: Per Dr. Vaca at discharge     Meds to Beds: N/A  Patient expected to discharge on Aspirin 325mg by mouth twice daily for DVT prophylaxis.

## 2019-02-11 NOTE — PRE ADMISSION SCREENING
Patient Name: Nimco Caro  YOB: 1960   MRN: [id number]     [facility]   Basic Mobility Inpatient Short Form 6 Clicks         How much difficulty does the patient currently have  Unable  A Lot  A Little  None      1. Turning over in bed (including adjusting bedclothes, sheets and blankets)?     1 []    2 [x]    3 []    4 []        2. Sitting down on and standing up from a chair with arms (e.g., wheelchair, bedside commode, etc.)     1 []  2 []  3 []     4 [x]      3. Moving from lying on back to sitting on the side of the bed?     1 []  2 [x]  3 []    4 []    How much help from another person does the patient currently need  Total  A Lot  A Little  None      4. Moving to and from a bed to a chair (including a wheelchair)?    1 []  2 []  3 []    4 [x]      5. Need to walk in hospital room?    1 []  2 []  3 []    4 [x]      6. Climbing 3-5 steps with a railing?    1 []  2 []  3 []    4 [x]       Raw Score:      20            CMS 0-100% Score:    35.83        %   Standardized Score:    47.67           CMS Modifier:     CJ                                     [facility]   Basic Mobility Inpatient Short Form 6 Clicks Score Conversion Table*         *Use this form to convert -PAC Basic Mobility Inpatient Raw Scores.   -[facility] Inpatient Basic Mobility Short Form Scoring Example   1. Add the number values associated with the response to each item. For example, items totals yield a Raw Score of 21.   2. Match the raw score to the t-Scale scores (t-Scale score = 50.25, SE = 4.69).   3. Find the associated CMS % (CMS % = 28.97%).   4. Locate the correct CMS Functional Modifier Code, or G Code (G code = CJ)     NOTE: Each -PAC Short Form has a separate conversion table. Make sure that you use the correct conversion table.       Instruction Manual - page 45 contains conversion table

## 2019-02-11 NOTE — TELEPHONE ENCOUNTER
Patient stopped in to say she would like to see if you could order her a walker, cane, shower chair, for after surgery care.

## 2019-02-13 LAB — MRSA SPEC QL CULT: NORMAL

## 2019-02-23 ENCOUNTER — ANESTHESIA EVENT (OUTPATIENT)
Dept: SURGERY | Facility: HOSPITAL | Age: 59
DRG: 470 | End: 2019-02-23
Payer: MEDICAID

## 2019-02-25 ENCOUNTER — HOSPITAL ENCOUNTER (INPATIENT)
Facility: HOSPITAL | Age: 59
LOS: 1 days | Discharge: HOME | DRG: 470 | End: 2019-02-26
Attending: ORTHOPAEDIC SURGERY | Admitting: ORTHOPAEDIC SURGERY
Payer: MEDICAID

## 2019-02-25 ENCOUNTER — ANESTHESIA (OUTPATIENT)
Dept: SURGERY | Facility: HOSPITAL | Age: 59
DRG: 470 | End: 2019-02-25
Payer: MEDICAID

## 2019-02-25 DIAGNOSIS — I10 HYPERTENSION, UNSPECIFIED TYPE: ICD-10-CM

## 2019-02-25 DIAGNOSIS — M16.11 PRIMARY OSTEOARTHRITIS OF RIGHT HIP: ICD-10-CM

## 2019-02-25 DIAGNOSIS — M16.11 DEGENERATIVE JOINT DISEASE OF RIGHT HIP: ICD-10-CM

## 2019-02-25 DIAGNOSIS — E78.5 HYPERLIPIDEMIA, UNSPECIFIED HYPERLIPIDEMIA TYPE: ICD-10-CM

## 2019-02-25 DIAGNOSIS — K21.9 GASTROESOPHAGEAL REFLUX DISEASE, ESOPHAGITIS PRESENCE NOT SPECIFIED: Primary | ICD-10-CM

## 2019-02-25 LAB
BASOPHILS # BLD AUTO: 0.1 K/UL
BASOPHILS NFR BLD: 0.3 %
DIFFERENTIAL METHOD: ABNORMAL
EOSINOPHIL # BLD AUTO: 0.1 K/UL
EOSINOPHIL NFR BLD: 0.5 %
ERYTHROCYTE [DISTWIDTH] IN BLOOD BY AUTOMATED COUNT: 14.2 %
HCT VFR BLD AUTO: 37.4 %
HGB BLD-MCNC: 12.3 G/DL
LYMPHOCYTES # BLD AUTO: 1.8 K/UL
LYMPHOCYTES NFR BLD: 11.7 %
MCH RBC QN AUTO: 30.8 PG
MCHC RBC AUTO-ENTMCNC: 32.8 G/DL
MCV RBC AUTO: 94 FL
MONOCYTES # BLD AUTO: 0.6 K/UL
MONOCYTES NFR BLD: 3.9 %
NEUTROPHILS # BLD AUTO: 13.1 K/UL
NEUTROPHILS NFR BLD: 83.6 %
PLATELET # BLD AUTO: 215 K/UL
PMV BLD AUTO: 8.8 FL
RBC # BLD AUTO: 3.98 M/UL
WBC # BLD AUTO: 15.7 K/UL

## 2019-02-25 PROCEDURE — S5010 5% DEXTROSE AND 0.45% SALINE: HCPCS | Performed by: ORTHOPAEDIC SURGERY

## 2019-02-25 PROCEDURE — G8979 MOBILITY GOAL STATUS: HCPCS | Mod: CI | Performed by: PHYSICAL THERAPIST

## 2019-02-25 PROCEDURE — D9220A PRA ANESTHESIA: ICD-10-PCS | Mod: ANES,,, | Performed by: ANESTHESIOLOGY

## 2019-02-25 PROCEDURE — D9220A PRA ANESTHESIA: ICD-10-PCS | Mod: CRNA,,, | Performed by: NURSE ANESTHETIST, CERTIFIED REGISTERED

## 2019-02-25 PROCEDURE — 25000003 PHARM REV CODE 250: Performed by: ORTHOPAEDIC SURGERY

## 2019-02-25 PROCEDURE — 85025 COMPLETE CBC W/AUTO DIFF WBC: CPT

## 2019-02-25 PROCEDURE — 99900104 DSU ONLY-NO CHARGE-EA ADD'L HR (STAT): Performed by: ORTHOPAEDIC SURGERY

## 2019-02-25 PROCEDURE — 63600175 PHARM REV CODE 636 W HCPCS: Performed by: NURSE ANESTHETIST, CERTIFIED REGISTERED

## 2019-02-25 PROCEDURE — 25000003 PHARM REV CODE 250: Performed by: INTERNAL MEDICINE

## 2019-02-25 PROCEDURE — 36000711: Performed by: ORTHOPAEDIC SURGERY

## 2019-02-25 PROCEDURE — 25000003 PHARM REV CODE 250: Performed by: ANESTHESIOLOGY

## 2019-02-25 PROCEDURE — 99900103 DSU ONLY-NO CHARGE-INITIAL HR (STAT): Performed by: ORTHOPAEDIC SURGERY

## 2019-02-25 PROCEDURE — 63600175 PHARM REV CODE 636 W HCPCS: Performed by: ANESTHESIOLOGY

## 2019-02-25 PROCEDURE — 94761 N-INVAS EAR/PLS OXIMETRY MLT: CPT

## 2019-02-25 PROCEDURE — 97110 THERAPEUTIC EXERCISES: CPT | Performed by: PHYSICAL THERAPIST

## 2019-02-25 PROCEDURE — 97161 PT EVAL LOW COMPLEX 20 MIN: CPT | Performed by: PHYSICAL THERAPIST

## 2019-02-25 PROCEDURE — S0020 INJECTION, BUPIVICAINE HYDRO: HCPCS | Performed by: ANESTHESIOLOGY

## 2019-02-25 PROCEDURE — G8978 MOBILITY CURRENT STATUS: HCPCS | Mod: CL | Performed by: PHYSICAL THERAPIST

## 2019-02-25 PROCEDURE — 12000002 HC ACUTE/MED SURGE SEMI-PRIVATE ROOM

## 2019-02-25 PROCEDURE — 63600175 PHARM REV CODE 636 W HCPCS: Performed by: ORTHOPAEDIC SURGERY

## 2019-02-25 PROCEDURE — D9220A PRA ANESTHESIA: Mod: ANES,,, | Performed by: ANESTHESIOLOGY

## 2019-02-25 PROCEDURE — 63600175 PHARM REV CODE 636 W HCPCS: Performed by: INTERNAL MEDICINE

## 2019-02-25 PROCEDURE — 71000039 HC RECOVERY, EACH ADD'L HOUR: Performed by: ORTHOPAEDIC SURGERY

## 2019-02-25 PROCEDURE — 37000008 HC ANESTHESIA 1ST 15 MINUTES: Performed by: ORTHOPAEDIC SURGERY

## 2019-02-25 PROCEDURE — C1776 JOINT DEVICE (IMPLANTABLE): HCPCS | Performed by: ORTHOPAEDIC SURGERY

## 2019-02-25 PROCEDURE — 37000009 HC ANESTHESIA EA ADD 15 MINS: Performed by: ORTHOPAEDIC SURGERY

## 2019-02-25 PROCEDURE — 36415 COLL VENOUS BLD VENIPUNCTURE: CPT

## 2019-02-25 PROCEDURE — 25000003 PHARM REV CODE 250: Performed by: NURSE ANESTHETIST, CERTIFIED REGISTERED

## 2019-02-25 PROCEDURE — 71000033 HC RECOVERY, INTIAL HOUR: Performed by: ORTHOPAEDIC SURGERY

## 2019-02-25 PROCEDURE — 27000221 HC OXYGEN, UP TO 24 HOURS

## 2019-02-25 PROCEDURE — D9220A PRA ANESTHESIA: Mod: CRNA,,, | Performed by: NURSE ANESTHETIST, CERTIFIED REGISTERED

## 2019-02-25 PROCEDURE — 36000710: Performed by: ORTHOPAEDIC SURGERY

## 2019-02-25 PROCEDURE — 97530 THERAPEUTIC ACTIVITIES: CPT | Performed by: PHYSICAL THERAPIST

## 2019-02-25 DEVICE — LINER ACET PNACLE 10 +4 36X56: Type: IMPLANTABLE DEVICE | Site: HIP | Status: FUNCTIONAL

## 2019-02-25 DEVICE — STEM FEM TAPER HIP SZ 6: Type: IMPLANTABLE DEVICE | Site: HIP | Status: FUNCTIONAL

## 2019-02-25 DEVICE — HEAD FEM 12/14 TAPER 1.5X36: Type: IMPLANTABLE DEVICE | Site: HIP | Status: FUNCTIONAL

## 2019-02-25 DEVICE — CUP ACT PINNACLE SECTOR 56 TIT: Type: IMPLANTABLE DEVICE | Site: HIP | Status: FUNCTIONAL

## 2019-02-25 RX ORDER — MUPIROCIN 20 MG/G
1 OINTMENT TOPICAL 2 TIMES DAILY
Status: DISCONTINUED | OUTPATIENT
Start: 2019-02-25 | End: 2019-02-26 | Stop reason: HOSPADM

## 2019-02-25 RX ORDER — SODIUM CHLORIDE 0.9 % (FLUSH) 0.9 %
3 SYRINGE (ML) INJECTION
Status: DISCONTINUED | OUTPATIENT
Start: 2019-02-25 | End: 2019-02-25

## 2019-02-25 RX ORDER — LIDOCAINE HYDROCHLORIDE 10 MG/ML
0.5 INJECTION, SOLUTION EPIDURAL; INFILTRATION; INTRACAUDAL; PERINEURAL ONCE
Status: COMPLETED | OUTPATIENT
Start: 2019-02-25 | End: 2019-02-25

## 2019-02-25 RX ORDER — CEFAZOLIN SODIUM 2 G/50ML
2 SOLUTION INTRAVENOUS
Status: COMPLETED | OUTPATIENT
Start: 2019-02-25 | End: 2019-02-25

## 2019-02-25 RX ORDER — ARIPIPRAZOLE 5 MG/1
15 TABLET ORAL DAILY
Status: DISCONTINUED | OUTPATIENT
Start: 2019-02-25 | End: 2019-02-26 | Stop reason: HOSPADM

## 2019-02-25 RX ORDER — SODIUM CHLORIDE, SODIUM LACTATE, POTASSIUM CHLORIDE, CALCIUM CHLORIDE 600; 310; 30; 20 MG/100ML; MG/100ML; MG/100ML; MG/100ML
INJECTION, SOLUTION INTRAVENOUS CONTINUOUS
Status: DISCONTINUED | OUTPATIENT
Start: 2019-02-25 | End: 2019-02-25

## 2019-02-25 RX ORDER — LISINOPRIL 10 MG/1
20 TABLET ORAL DAILY
Status: DISCONTINUED | OUTPATIENT
Start: 2019-02-25 | End: 2019-02-26 | Stop reason: HOSPADM

## 2019-02-25 RX ORDER — SODIUM CHLORIDE 0.9 % (FLUSH) 0.9 %
3 SYRINGE (ML) INJECTION
Status: DISCONTINUED | OUTPATIENT
Start: 2019-02-25 | End: 2019-02-26 | Stop reason: HOSPADM

## 2019-02-25 RX ORDER — ASPIRIN 325 MG
325 TABLET ORAL 2 TIMES DAILY
Status: DISCONTINUED | OUTPATIENT
Start: 2019-02-25 | End: 2019-02-26 | Stop reason: HOSPADM

## 2019-02-25 RX ORDER — BISACODYL 10 MG
10 SUPPOSITORY, RECTAL RECTAL DAILY
Status: DISCONTINUED | OUTPATIENT
Start: 2019-02-25 | End: 2019-02-26 | Stop reason: HOSPADM

## 2019-02-25 RX ORDER — OXYBUTYNIN CHLORIDE 5 MG/1
5 TABLET ORAL 2 TIMES DAILY
Status: DISCONTINUED | OUTPATIENT
Start: 2019-02-25 | End: 2019-02-26 | Stop reason: HOSPADM

## 2019-02-25 RX ORDER — PROPOFOL 10 MG/ML
VIAL (ML) INTRAVENOUS CONTINUOUS PRN
Status: DISCONTINUED | OUTPATIENT
Start: 2019-02-25 | End: 2019-02-25

## 2019-02-25 RX ORDER — FENTANYL CITRATE 50 UG/ML
INJECTION, SOLUTION INTRAMUSCULAR; INTRAVENOUS
Status: DISCONTINUED | OUTPATIENT
Start: 2019-02-25 | End: 2019-02-25

## 2019-02-25 RX ORDER — PRAVASTATIN SODIUM 10 MG/1
20 TABLET ORAL DAILY
Status: DISCONTINUED | OUTPATIENT
Start: 2019-02-25 | End: 2019-02-26 | Stop reason: HOSPADM

## 2019-02-25 RX ORDER — HYDROCODONE BITARTRATE AND ACETAMINOPHEN 5; 325 MG/1; MG/1
1 TABLET ORAL EVERY 4 HOURS PRN
Status: DISCONTINUED | OUTPATIENT
Start: 2019-02-25 | End: 2019-02-26 | Stop reason: HOSPADM

## 2019-02-25 RX ORDER — HYDROMORPHONE HYDROCHLORIDE 2 MG/ML
0.2 INJECTION, SOLUTION INTRAMUSCULAR; INTRAVENOUS; SUBCUTANEOUS EVERY 5 MIN PRN
Status: DISCONTINUED | OUTPATIENT
Start: 2019-02-25 | End: 2019-02-25 | Stop reason: HOSPADM

## 2019-02-25 RX ORDER — DOCUSATE SODIUM 100 MG/1
100 CAPSULE, LIQUID FILLED ORAL DAILY
Status: DISCONTINUED | OUTPATIENT
Start: 2019-02-25 | End: 2019-02-26 | Stop reason: HOSPADM

## 2019-02-25 RX ORDER — BUPIVACAINE HYDROCHLORIDE 5 MG/ML
INJECTION, SOLUTION EPIDURAL; INTRACAUDAL
Status: DISCONTINUED | OUTPATIENT
Start: 2019-02-25 | End: 2019-02-25

## 2019-02-25 RX ORDER — MIDAZOLAM HYDROCHLORIDE 1 MG/ML
INJECTION, SOLUTION INTRAMUSCULAR; INTRAVENOUS
Status: DISCONTINUED | OUTPATIENT
Start: 2019-02-25 | End: 2019-02-25

## 2019-02-25 RX ORDER — ONDANSETRON 2 MG/ML
4 INJECTION INTRAMUSCULAR; INTRAVENOUS EVERY 6 HOURS PRN
Status: DISCONTINUED | OUTPATIENT
Start: 2019-02-25 | End: 2019-02-26 | Stop reason: HOSPADM

## 2019-02-25 RX ORDER — PHENYLEPHRINE HYDROCHLORIDE 10 MG/ML
INJECTION INTRAVENOUS
Status: DISCONTINUED | OUTPATIENT
Start: 2019-02-25 | End: 2019-02-25

## 2019-02-25 RX ORDER — TRANEXAMIC ACID 100 MG/ML
INJECTION, SOLUTION INTRAVENOUS
Status: DISCONTINUED | OUTPATIENT
Start: 2019-02-25 | End: 2019-02-25

## 2019-02-25 RX ORDER — DULOXETIN HYDROCHLORIDE 30 MG/1
60 CAPSULE, DELAYED RELEASE ORAL DAILY
Status: DISCONTINUED | OUTPATIENT
Start: 2019-02-25 | End: 2019-02-26 | Stop reason: HOSPADM

## 2019-02-25 RX ORDER — FAMOTIDINE 20 MG/1
20 TABLET, FILM COATED ORAL 2 TIMES DAILY
Status: DISCONTINUED | OUTPATIENT
Start: 2019-02-25 | End: 2019-02-25

## 2019-02-25 RX ORDER — PANTOPRAZOLE SODIUM 40 MG/1
40 TABLET, DELAYED RELEASE ORAL DAILY
Status: DISCONTINUED | OUTPATIENT
Start: 2019-02-25 | End: 2019-02-26 | Stop reason: HOSPADM

## 2019-02-25 RX ORDER — CEFAZOLIN SODIUM 1 G/50ML
1 SOLUTION INTRAVENOUS
Status: COMPLETED | OUTPATIENT
Start: 2019-02-25 | End: 2019-02-26

## 2019-02-25 RX ORDER — ONDANSETRON 2 MG/ML
4 INJECTION INTRAMUSCULAR; INTRAVENOUS DAILY PRN
Status: DISCONTINUED | OUTPATIENT
Start: 2019-02-25 | End: 2019-02-25 | Stop reason: HOSPADM

## 2019-02-25 RX ORDER — ALLOPURINOL 100 MG/1
100 TABLET ORAL DAILY
Status: DISCONTINUED | OUTPATIENT
Start: 2019-02-25 | End: 2019-02-26 | Stop reason: HOSPADM

## 2019-02-25 RX ORDER — ONDANSETRON 2 MG/ML
INJECTION INTRAMUSCULAR; INTRAVENOUS
Status: DISCONTINUED | OUTPATIENT
Start: 2019-02-25 | End: 2019-02-25

## 2019-02-25 RX ORDER — AMLODIPINE BESYLATE 5 MG/1
10 TABLET ORAL DAILY
Status: DISCONTINUED | OUTPATIENT
Start: 2019-02-25 | End: 2019-02-26 | Stop reason: HOSPADM

## 2019-02-25 RX ORDER — DEXTROSE MONOHYDRATE AND SODIUM CHLORIDE 5; .45 G/100ML; G/100ML
INJECTION, SOLUTION INTRAVENOUS CONTINUOUS
Status: DISCONTINUED | OUTPATIENT
Start: 2019-02-25 | End: 2019-02-26

## 2019-02-25 RX ORDER — MUPIROCIN 20 MG/G
OINTMENT TOPICAL
Status: DISCONTINUED | OUTPATIENT
Start: 2019-02-25 | End: 2019-02-25

## 2019-02-25 RX ORDER — ZOLPIDEM TARTRATE 5 MG/1
5 TABLET ORAL NIGHTLY PRN
Status: DISCONTINUED | OUTPATIENT
Start: 2019-02-25 | End: 2019-02-26 | Stop reason: HOSPADM

## 2019-02-25 RX ORDER — GABAPENTIN 300 MG/1
600 CAPSULE ORAL 3 TIMES DAILY
Status: DISCONTINUED | OUTPATIENT
Start: 2019-02-25 | End: 2019-02-26 | Stop reason: HOSPADM

## 2019-02-25 RX ORDER — SODIUM CHLORIDE 0.9 % (FLUSH) 0.9 %
5 SYRINGE (ML) INJECTION
Status: DISCONTINUED | OUTPATIENT
Start: 2019-02-25 | End: 2019-02-26 | Stop reason: HOSPADM

## 2019-02-25 RX ADMIN — ONDANSETRON 4 MG: 2 INJECTION, SOLUTION INTRAMUSCULAR; INTRAVENOUS at 08:02

## 2019-02-25 RX ADMIN — PHENYLEPHRINE HYDROCHLORIDE 100 MCG: 10 INJECTION INTRAVENOUS at 09:02

## 2019-02-25 RX ADMIN — FENTANYL CITRATE 25 MCG: 50 INJECTION, SOLUTION INTRAMUSCULAR; INTRAVENOUS at 10:02

## 2019-02-25 RX ADMIN — FENTANYL CITRATE 25 MCG: 50 INJECTION, SOLUTION INTRAMUSCULAR; INTRAVENOUS at 09:02

## 2019-02-25 RX ADMIN — PANTOPRAZOLE SODIUM 40 MG: 40 TABLET, DELAYED RELEASE ORAL at 04:02

## 2019-02-25 RX ADMIN — SODIUM CHLORIDE, SODIUM LACTATE, POTASSIUM CHLORIDE, AND CALCIUM CHLORIDE: .6; .31; .03; .02 INJECTION, SOLUTION INTRAVENOUS at 06:02

## 2019-02-25 RX ADMIN — LIDOCAINE HYDROCHLORIDE 5 MG: 10 INJECTION, SOLUTION EPIDURAL; INFILTRATION; INTRACAUDAL; PERINEURAL at 06:02

## 2019-02-25 RX ADMIN — ONDANSETRON 4 MG: 2 INJECTION INTRAMUSCULAR; INTRAVENOUS at 04:02

## 2019-02-25 RX ADMIN — HYDROMORPHONE HYDROCHLORIDE 0.2 MG: 2 INJECTION INTRAMUSCULAR; INTRAVENOUS; SUBCUTANEOUS at 11:02

## 2019-02-25 RX ADMIN — PHENYLEPHRINE HYDROCHLORIDE 100 MCG: 10 INJECTION INTRAVENOUS at 08:02

## 2019-02-25 RX ADMIN — GABAPENTIN 600 MG: 300 CAPSULE ORAL at 09:02

## 2019-02-25 RX ADMIN — ASPIRIN 325 MG ORAL TABLET 325 MG: 325 PILL ORAL at 09:02

## 2019-02-25 RX ADMIN — MUPIROCIN: 20 OINTMENT TOPICAL at 06:02

## 2019-02-25 RX ADMIN — FAMOTIDINE 20 MG: 20 TABLET, FILM COATED ORAL at 11:02

## 2019-02-25 RX ADMIN — ARIPIPRAZOLE 15 MG: 5 TABLET ORAL at 04:02

## 2019-02-25 RX ADMIN — DOCUSATE SODIUM 100 MG: 100 CAPSULE, LIQUID FILLED ORAL at 04:02

## 2019-02-25 RX ADMIN — HYDROCODONE BITARTRATE AND ACETAMINOPHEN 1 TABLET: 5; 325 TABLET ORAL at 03:02

## 2019-02-25 RX ADMIN — PROPOFOL 75 MCG/KG/MIN: 10 INJECTION, EMULSION INTRAVENOUS at 08:02

## 2019-02-25 RX ADMIN — HYDROCODONE BITARTRATE AND ACETAMINOPHEN 1 TABLET: 5; 325 TABLET ORAL at 07:02

## 2019-02-25 RX ADMIN — CEFAZOLIN SODIUM 2 G: 2 SOLUTION INTRAVENOUS at 09:02

## 2019-02-25 RX ADMIN — DEXTROSE AND SODIUM CHLORIDE: 5; .45 INJECTION, SOLUTION INTRAVENOUS at 11:02

## 2019-02-25 RX ADMIN — ALLOPURINOL 100 MG: 100 TABLET ORAL at 04:02

## 2019-02-25 RX ADMIN — FENTANYL CITRATE 50 MCG: 50 INJECTION, SOLUTION INTRAMUSCULAR; INTRAVENOUS at 08:02

## 2019-02-25 RX ADMIN — PRAVASTATIN SODIUM 20 MG: 10 TABLET ORAL at 04:02

## 2019-02-25 RX ADMIN — BUPIVACAINE HYDROCHLORIDE 2.6 ML: 5 INJECTION, SOLUTION EPIDURAL; INTRACAUDAL; PERINEURAL at 08:02

## 2019-02-25 RX ADMIN — ASPIRIN 325 MG ORAL TABLET 325 MG: 325 PILL ORAL at 12:02

## 2019-02-25 RX ADMIN — MIDAZOLAM 2 MG: 1 INJECTION INTRAMUSCULAR; INTRAVENOUS at 08:02

## 2019-02-25 RX ADMIN — TRANEXAMIC ACID 800 MG: 100 INJECTION, SOLUTION INTRAVENOUS at 08:02

## 2019-02-25 RX ADMIN — DEXTROSE AND SODIUM CHLORIDE: 5; .45 INJECTION, SOLUTION INTRAVENOUS at 09:02

## 2019-02-25 RX ADMIN — DULOXETINE 60 MG: 30 CAPSULE, DELAYED RELEASE ORAL at 04:02

## 2019-02-25 RX ADMIN — MUPIROCIN 1 G: 20 OINTMENT TOPICAL at 09:02

## 2019-02-25 RX ADMIN — OXYBUTYNIN CHLORIDE 5 MG: 5 TABLET ORAL at 09:02

## 2019-02-25 RX ADMIN — CEFAZOLIN SODIUM 1 G: 1 SOLUTION INTRAVENOUS at 06:02

## 2019-02-25 RX ADMIN — SODIUM CHLORIDE, SODIUM LACTATE, POTASSIUM CHLORIDE, AND CALCIUM CHLORIDE: .6; .31; .03; .02 INJECTION, SOLUTION INTRAVENOUS at 09:02

## 2019-02-25 NOTE — PLAN OF CARE
Pt had BLOCK AND SPINAL ABLE TO MOVE TOES AND LEGS A BIT DR Mirza RELEASED FROM PACU  VS WNL DR LOCK SAW PT IN PACU AWARE OF IZZY IVF D5.45 @75  SCD AND PEDI PULSE ON DSG ON R HIP DRY INTACT + PEDAL PULS E PRESENT FRANCE CATH CATH  INTACT DRAINING CLR URINE REPORT TO Glen RN

## 2019-02-25 NOTE — PLAN OF CARE
Problem: Physical Therapy Goal  Goal: Physical Therapy Goal  Goals to be met by: 3/11/19     Patient will increase functional independence with mobility by performin. Supine to sit with [place]  2. Rolling to Left with [place].  3. Sit to stand transfer with Modified [place] using Rolling Walker  4. Gait  x 250 feet with Modified [place] using Rolling Walker.   5. Ascend/Descend 3 inch curb step with Modified [place] using Rolling Walker.  6. Lower extremity exercise program x10-20 reps per handout, with independence    Outcome: Ongoing (interventions implemented as appropriate)  PT goals established, transfer training started

## 2019-02-25 NOTE — BRIEF OP NOTE
[facility]  Brief Operative Note    SUMMARY     Surgery Date: 2/25/2019     Surgeon(s) and Role:     * Eliseo Vaca MD - Primary    Assisting Surgeon: None    Pre-op Diagnosis:  Primary osteoarthritis of right hip [M16.11]    Post-op Diagnosis:  Post-Op Diagnosis Codes:     * Primary osteoarthritis of right hip [M16.11]    Procedure(s) (LRB):  ARTHROPLASTY, HIP (Right)    Anesthesia: General    Description of Procedure: R DANNY    Description of the findings of the procedure: dictated [id number]    Estimated Blood Loss: 100 mL         Specimens:   Specimen (12h ago, onward)    None

## 2019-02-25 NOTE — ANESTHESIA POSTPROCEDURE EVALUATION
"Anesthesia Post Evaluation    Patient: Nimco Caro    Procedure(s) Performed: Procedure(s) (LRB):  ARTHROPLASTY, HIP (Right)    Final Anesthesia Type: spinal  Patient location during evaluation: PACU  Patient participation: Yes- Able to Participate  Level of consciousness: awake and alert  Post-procedure vital signs: reviewed and stable  Pain management: adequate  Airway patency: patent  PONV status at discharge: No PONV  Anesthetic complications: no      Cardiovascular status: blood pressure returned to baseline  Respiratory status: unassisted  Hydration status: euvolemic  Follow-up not needed.        Visit Vitals  BP (!) 111/53   Pulse (!) 46   Temp 36.6 °C (97.9 °F) (Oral)   Resp 16   Ht 5' 7" (1.702 m)   Wt 78.5 kg (173 lb)   SpO2 96%   BMI 27.10 kg/m²       Pain/Cruz Score: Pain Rating Prior to Med Admin: 10 (2/25/2019  3:10 PM)  Pain Rating Post Med Admin: 4 (2/25/2019 11:34 AM)  Cruz Score: 10 (2/25/2019 11:34 AM)        "

## 2019-02-25 NOTE — ANESTHESIA PREPROCEDURE EVALUATION
02/25/2019  Nimco Caro is a 58 y.o., female.    Anesthesia Evaluation    I have reviewed the Patient Summary Reports.    I have reviewed the Nursing Notes.   I have reviewed the Medications.     Review of Systems  Anesthesia Hx:  Denies Family Hx of Anesthesia complications.   Denies Personal Hx of Anesthesia complications.   Social:  Smoker    Cardiovascular:   Hypertension    Hepatic/GI:   GERD    Musculoskeletal:   Arthritis         Physical Exam  General:  Well nourished    Airway/Jaw/Neck:  Airway Findings: Mouth Opening: Normal Tongue: Normal  General Airway Assessment: Adult  Mallampati: III  Improves to II with phonation.  TM Distance: Normal, at least 6 cm  Jaw/Neck Findings:  Neck ROM: Normal ROM      Dental:  Dental Findings: Periodontal disease, Severe   Chest/Lungs:  Chest/Lungs Clear    Heart/Vascular:  Heart Findings: Normal            Anesthesia Plan  Type of Anesthesia, risks & benefits discussed:  Anesthesia Type:  spinal  Patient's Preference:   Intra-op Monitoring Plan:   Intra-op Monitoring Plan Comments:   Post Op Pain Control Plan:   Post Op Pain Control Plan Comments:   Induction:    Beta Blocker:  Patient is on a Beta-Blocker and has received one dose within the past 24 hours (No further documentation required).       Informed Consent: Patient understands risks and agrees with Anesthesia plan.  Questions answered. Anesthesia consent signed with patient.  ASA Score: 2     Day of Surgery Review of History & Physical:    H&P update referred to the surgeon.         Ready For Surgery From Anesthesia Perspective.

## 2019-02-25 NOTE — INTERVAL H&P NOTE
The patient has been examined and the H&P has been reviewed:    I concur with the findings and no changes have occurred since H&P was written.    Anesthesia/Surgery risks, benefits and alternative options discussed and understood by patient/family.          Active Hospital Problems    Diagnosis  POA    Degenerative joint disease of right hip [M16.11]  Yes      Resolved Hospital Problems   No resolved problems to display.

## 2019-02-25 NOTE — OP NOTE
DATE OF PROCEDURE:  02/25/2019.    ATTENDING PHYSICIAN:  Eliseo Vaca M.D.    FIRST ASSISTANT:  Norbert Cedeño.    PREOPERATIVE DIAGNOSIS:  Bone-on-bone osteoarthritis of the right hip.    POSTOPERATIVE DIAGNOSIS:  Bone-on-bone osteoarthritis of the right hip.    PROCEDURE PERFORMED:  Right total hip arthroplasty.    COMPONENTS UTILIZED:  A J [employer] J Arroyo total hip arthroplasty system, size 56 mm   cup, size 36 mm ceramic head and size 6 stem.    ESTIMATED BLOOD LOSS:  250 mL.    INTRAVENOUS FLUID:  Crystalloid.    ANESTHESIA:  General anesthesia.    PROCEDURE IN DETAIL:  The patient was placed on the operating table in supine   position.  The patient was then turned to the lateral decubitus position.  She   was well padded and protected.  She was prepped and draped in the usual sterile   manner for surgery.  Posterolateral approach was undertaken to the hip and   carried down sharply through the skin.  The deep fascia was incised in line with   its fibers.  The sciatic nerve was visualized and protected and the Charnley   retractors were placed.  We now released the short external rotators.  The   capsule was visualized and divided.  The hip was dislocated.  A preliminary head   cut was made.  We now used the cookie cutter and then the canal finder and then   the lateralizer and then we began reaming.  We reamed up to a 6.  That gave us   good chatter when we broached to a 6.  That gave us excellent fit and fill.  We   copiously irrigated.  We turned our attention to the acetabulum.  Retractors   were placed anteriorly and posteriorly.  A blade was used and we took down the   labrum.  We now cleared the foveal contents with the Bovie.  We medialized just   to the fovea and then began expanding our reamers until we got to a 55 mm   reamer.  That gave us good bleeding bone.  We tapped our 56 mm cup into position   and we had an excellent pressfit.  We placed our liner into position.  We pulse   and irrigated and turned  our attention back to the stem.  Our stem was dropped   and a standard 1.5 mm trial was placed.  The construct trialled beautifully with   good leg length symmetry, excellent stability in both full flexion and full   extension.  We pulse and irrigated.  We tapped our actual stem into position and   the construct trialled beautifully.  We removed the Charnley retractors.  The   sciatic nerve was in good condition and position.  We closed the deep fascia   with a running #1 Vicryl.  We irrigated again and closed the subQ with 2-0   Vicryl and the skin with a 4-0 Monocryl.  Sterile dressings were applied and the   patient was noted to be in stable condition.      SF/IN  dd: 02/25/2019 10:07:50 (CST)  td: 02/25/2019 13:18:28 (CST)  Doc ID   #[id number]  Job ID #[id number]    CC:

## 2019-02-25 NOTE — TRANSFER OF CARE
"Anesthesia Transfer of Care Note    Patient: Nimco Caro    Procedure(s) Performed: Procedure(s) (LRB):  ARTHROPLASTY, HIP (Right)    Patient location: PACU    Anesthesia Type: spinal    Transport from OR: Transported from OR on room air with adequate spontaneous ventilation    Post pain: adequate analgesia    Post assessment: no apparent anesthetic complications and tolerated procedure well    Post vital signs: stable    Level of consciousness: awake, alert and oriented    Nausea/Vomiting: no nausea/vomiting    Complications: none    Transfer of care protocol was followed      Last vitals:   Visit Vitals  BP (!) 109/53 (BP Location: Left arm, Patient Position: Sitting)   Pulse 60   Temp 36.5 °C (97.7 °F) (Skin)   Resp 20   Ht 5' 7" (1.702 m)   Wt 78.5 kg (173 lb)   SpO2 96%   BMI 27.10 kg/m²     "

## 2019-02-25 NOTE — H&P
PCP: Katy Mason MD    History & Physical    Chief Complaint: s/p Right total hip arthroplasty    History of Present Illness:  Patient is a 58 y.o. female admitted to Hospitalist Service from Operation Room s/p right total hip arthroplasty performed by Dr. Vaca. Patient reportedly has past medical history significant for hypertension, hyperlipidemia, GERD and OA. Post-operatively, patient denied chest pain, shortness of breath, abdominal pain, nausea, vomiting, headache, vision changes, focal neuro-deficits, cough or fever.    Past Medical History:   Diagnosis Date    Cancer ovarian; uterine    1992    GERD (gastroesophageal reflux disease)     Hyperlipidemia     Hypertension      Past Surgical History:   Procedure Laterality Date    COLON SURGERY      COLONOSCOPY      HERNIA REPAIR N/A 2016    HYSTERECTOMY  total    REVISION COLOSTOMY N/A 2012     Family History   Problem Relation Age of Onset    Cancer Mother     Hypertension Father     Heart disease Father     Heart disease Sister     Hypertension Sister     Hypertension Brother     Depression Brother      Social History     Tobacco Use    Smoking status: Current Every Day Smoker     Packs/day: 1.00     Types: Cigarettes    Smokeless tobacco: Never Used   Substance Use Topics    Alcohol use: Yes     Alcohol/week: 0.6 oz     Types: 1 Glasses of wine per week     Comment: weekly    Drug use: No      Review of patient's allergies indicates:  No Known Allergies  PTA Medications   Medication Sig    ALBUTEROL INHL Inhale into the lungs 4 (four) times daily as needed.     allopurinol (ZYLOPRIM) 100 MG tablet Take 100 mg by mouth once daily.    amLODIPine (NORVASC) 10 MG tablet Take 1 tablet (10 mg) by mouth daily    ARIPiprazole (ABILIFY) 15 MG Tab Take 1 tablet (15 mg) by mouth daily    ASPIR-LOW 81 mg EC tablet     atenolol (TENORMIN) 50 MG tablet Take 1 tablet (50 mg) by mouth daily    clonazePAM (KLONOPIN) 1 MG tablet 1 tablet daily  as needed for anxiery    docusate sodium (COLACE) 100 MG capsule Take 2 capsules (200 mg) by mouth 2 times per day as needed    DULoxetine (CYMBALTA) 60 MG capsule Take 60 mg by mouth once daily.    gabapentin (NEURONTIN) 600 MG tablet Take 1 tablet (600 mg) by mouth 3 times per day    lisinopril (PRINIVIL,ZESTRIL) 20 MG tablet Take 1 tablet (20 mg) by mouth daily    ondansetron (ZOFRAN) 4 MG tablet Take 8 mg by mouth 2 (two) times daily.    oxybutynin (DITROPAN) 5 MG Tab Take 1 tablet (5 mg) by mouth 2 times per day    pantoprazole (PROTONIX) 20 MG tablet Take 20 mg by mouth once daily.    pravastatin (PRAVACHOL) 20 MG tablet Take 1 tablet (20 mg) by mouth daily    RABEprazole (ACIPHEX) 20 mg tablet Take 20 mg by mouth once daily.    tiotropium Br/olodaterol HCl (STIOLTO RESPIMAT INHL) Inhale into the lungs.    traMADol (ULTRAM) 50 mg tablet     traZODone (DESYREL) 50 MG tablet Take 1 tablet (100mg) by mouth daily at bedtime    polyethylene glycol (COLYTE) 240-22.72-6.72 gram SolR Take 4,000 mLs by mouth as directed.     Review of Systems:  Constitutional: no fever or chills  Eyes: no visual changes  Ears, nose, mouth, throat, and face: no nasal congestion or sore throat  Respiratory: no cough or shorness of breath  Cardiovascular: no chest pain or palpitations  Gastrointestinal: no nausea or vomiting, no abdominal pain or change in bowel habits  Genitourinary: no hematuria or dysuria  Integument/breast: no rash or pruritis  Hematologic/lymphatic: no easy bruising or lymphadenopathy  Musculoskeletal: see HPI  Neurological: no seizures or tremors.  Behavioral/Psych: no auditory or visual hallucinations  Endocrine: no heat or cold intolerance     OBJECTIVE:     Vital Signs (Most Recent)  Temp: 97.7 °F (36.5 °C) (02/25/19 0620)  Pulse: 60 (02/25/19 0620)  Resp: 20 (02/25/19 0620)  BP: (!) 109/53 (02/25/19 0620)  SpO2: 96 % (02/25/19 0620)    Physical Exam:  General appearance: well developed, appears  stated age  Head: normocephalic, atraumatic  Eyes:  conjunctivae/corneas clear. PERRL.  Nose: Nares normal. Septum midline.  Throat: lips, mucosa, and tongue normal; teeth and gums normal, no throat erythema.  Neck: supple, symmetrical, trachea midline, no JVD and thyroid not enlarged, symmetric, no tenderness/mass/nodules  Lungs:  clear to auscultation bilaterally and normal respiratory effort  Chest wall: no tenderness  Heart:  Bradycardic, S1, S2 normal, no murmur, click, rub or gallop  Abdomen: soft, non-tender non-distented; bowel sounds normal; no masses,  no organomegaly  Extremities: no cyanosis, clubbing or edema. Right hip dressing C/D/I.  Pulses: 2+ and symmetric  Skin: Skin color, texture, turgor normal. No rashes or lesions.  Lymph nodes: Cervical, supraclavicular, and axillary nodes normal.  Neurologic: Normal strength and tone. No focal numbness or weakness. CNII-XII intact.      Laboratory:   CBC: No results for input(s): WBC, RBC, HGB, HCT, PLT, MCV, MCH, MCHC in the last 168 hours.  CMP: No results for input(s): GLU, CALCIUM, ALBUMIN, PROT, NA, K, CO2, CL, BUN, CREATININE, ALKPHOS, ALT, AST, BILITOT in the last 168 hours.    No results found for: HGBA1C    Diagnostic Results: None    Assessment/Plan:     Active Hospital Problems    Diagnosis  POA    Degenerative joint disease of right hip [M16.11]  Continue to follow Orthopedic recommendations.  Needs aggressive incentive spirometry.  Follow hemoglobin and hematocrit closely.  Pain control with IV narcotics and antiemetics as needed.  Physical therapy as per Orthopedics protocol with fall precautions.    Yes    GERD (gastroesophageal reflux disease) [K21.9]  On Pepcid.    Yes    Hypertension [I10]  Chronic problem. Will continue chronic medications and monitor for any changes, adjusting as needed.    Bradycardia  Tele monitoring.  Hold atenolol.    Yes    Hyperlipidemia [E78.5]  Chronic problem. Will continue chronic medications and monitor for  any changes, adjusting as needed.  Yes     DVT prophylaxis: Use SCD and TEDs. On  mg PO BID as per Dr. Vaca.     Kendrick Diaz MD  Department of Hospital Medicine   [facility]

## 2019-02-25 NOTE — PT/OT/SLP EVAL
Physical Therapy Evaluation    Patient Name:  Nimco Caro   MRN:  [id number]    Recommendations:     Discharge Recommendations:  other (see comments)(HHPT)   Discharge Equipment Recommendations: none   Barriers to discharge: pain    Assessment:     Nimco Caro is a 58 y.o. female admitted with a medical diagnosis of <principal problem not specified>.  She presents with the following impairments/functional limitations:  weakness, impaired endurance, impaired self care skills, impaired functional mobilty, gait instability, impaired balance, decreased lower extremity function, pain. Pt was able to participate in skilled PT today even though she was in a large amount of pain. Pt was able to perform all exercises but had some difficulty due to pain with quad sets. She was able to transfer to EOB with Mj but took a longer time and had difficulty with scooting her hips to get feet on the floor. Pt was able to sit at EOB for ~15 minutes and only reported slight nausea. Pt needed 2 attempts to stand and was ModAx2. While standing, pt tried to sidestep towards HOB and needed constant reminders on how to correctly move her right leg in order to take the step. Pt became tired andshe was able to safely sit on the EOB. She needed assistance moving legs into the bed but was able to control her trunk with minimal assitance.    Rehab Prognosis: Good; patient would benefit from acute skilled PT services to address these deficits and reach maximum level of function.    Recent Surgery: Procedure(s) (LRB):  ARTHROPLASTY, HIP (Right) Day of Surgery    Plan:     During this hospitalization, patient to be seen BID to address the identified rehab impairments via gait training, therapeutic activities, therapeutic exercises and progress toward the following goals:    · Plan of Care Expires:  03/11/19    Subjective     Chief Complaint: pain s/p R DANNY  Patient/Family Comments/goals: Pt and caregiver are concerned about how some of  these activities are going to be performed at home  Pain/Comfort:  · Pain Rating 1: 10/10  · Location - Side 1: Right  · Location - Orientation 1: generalized  · Location 1: hip  · Pain Addressed 1: Pre-medicate for activity, Reposition, Distraction    Patients cultural, spiritual, Scientology conflicts given the current situation: no    Living Environment:  Pt lives in a one story home with 3 stairs to enter but will be staying at her friend's house after discharge who lives in a one story home with one step to get intot he house.  Prior to admission, patients level of function was independent.  Equipment used at home: walker, rolling, bedside commode, hip kit, shower chair.  DME owned (not currently used): rolling walker, bedside commode, shower chair and transfer tub bench.  Upon discharge, patient will have assistance from friend.    Pt stated that she was in a large amount of pain since surgery. Pt also stated her concerns as to how some of the activities and transfers were going to be performed once she was discharged from the hospital. Pt stated that at times she was nauseous during the treatment.    Objective:     Communicated with nurse Sukhwinder prior to session.  Patient found all lines intact, call button in reach and supine in bed with HOB elevated hip abduction pillow, telemetry, SCD, oxygen, crowe catheter, peripheral IV  upon PT entry to room.    General Precautions: Standard, fall   Orthopedic Precautions:RLE posterior precautions, RLE weight bearing as tolerated   Braces: N/A     Exams:  · Cognitive Exam:  Patient is oriented to Person, Place and Situation  · RLE ROM: Pt had ~60 degrees of hip flexion, ankle and knee ROM WFL  · RLE Strength: 3/5 strength for all hip motions. Knee and ankel strength 5/5    Functional Mobility:  · Bed Mobility:     · Rolling Left:  independence and supervision  · Scooting: minimum assistance  · Supine to Sit: minimum assistance  · Sit to Supine: minimum  assistance  · Transfers:     · Sit to Stand:  moderate assistance with rolling walker and x2 people  · Gait: Pt able to take 3 sidesteps to the left to get to HOB with RW and ModAx2      Therapeutic Activities and Exercises:   Pt performed bilateral ankle pumps, quad sets, glute sets, L hip abd/add, L heel slides all x10 reps.  Pt able to sit at EOB for ~15 with close supervision    AM-PAC 6 CLICK MOBILITY  Total Score:13     Patient left supine in bed with HOB elevated with all lines intact, call button in reach and friend and  in the room.    GOALS:   Multidisciplinary Problems     Physical Therapy Goals        Problem: Physical Therapy Goal    Goal Priority Disciplines Outcome Goal Variances Interventions   Physical Therapy Goal     PT, PT/OT Ongoing (interventions implemented as appropriate)     Description:  Goals to be met by: 3/11/19     Patient will increase functional independence with mobility by performin. Supine to sit with [place]  2. Rolling to Left with [place].  3. Sit to stand transfer with Modified [place] using Rolling Walker  4. Gait  x 250 feet with Modified [place] using Rolling Walker.   5. Ascend/Descend 3 inch curb step with Modified [place] using Rolling Walker.  6. Lower extremity exercise program x10-20 reps per handout, with independence                      History:     Past Medical History:   Diagnosis Date    Cancer ovarian; uterine    1992    GERD (gastroesophageal reflux disease)     Hyperlipidemia     Hypertension        Past Surgical History:   Procedure Laterality Date    COLON SURGERY      COLONOSCOPY      HERNIA REPAIR N/A     HYSTERECTOMY  total    REVISION COLOSTOMY N/A        Time Tracking:     PT Received On: 19  PT Start Time: 1521     PT Stop Time: 1602  PT Total Time (min): 41 min     Billable Minutes: Evaluation 12, Therapeutic Activity 15 and Therapeutic Exercise 14      DANY Chin  2019

## 2019-02-26 ENCOUNTER — CLINICAL SUPPORT (OUTPATIENT)
Dept: SMOKING CESSATION | Facility: CLINIC | Age: 59
End: 2019-02-26
Payer: COMMERCIAL

## 2019-02-26 VITALS
HEIGHT: 67 IN | DIASTOLIC BLOOD PRESSURE: 61 MMHG | BODY MASS INDEX: 27.28 KG/M2 | OXYGEN SATURATION: 97 % | HEART RATE: 90 BPM | SYSTOLIC BLOOD PRESSURE: 126 MMHG | RESPIRATION RATE: 18 BRPM | WEIGHT: 173.81 LBS | TEMPERATURE: 100 F

## 2019-02-26 DIAGNOSIS — F17.200 NICOTINE DEPENDENCE: Primary | ICD-10-CM

## 2019-02-26 LAB
ANION GAP SERPL CALC-SCNC: 8 MMOL/L
BASOPHILS # BLD AUTO: 0 K/UL
BASOPHILS NFR BLD: 0.3 %
BUN SERPL-MCNC: 7 MG/DL
CALCIUM SERPL-MCNC: 9 MG/DL
CHLORIDE SERPL-SCNC: 105 MMOL/L
CO2 SERPL-SCNC: 25 MMOL/L
CREAT SERPL-MCNC: 0.7 MG/DL
DIFFERENTIAL METHOD: ABNORMAL
EOSINOPHIL # BLD AUTO: 0.1 K/UL
EOSINOPHIL NFR BLD: 1.2 %
ERYTHROCYTE [DISTWIDTH] IN BLOOD BY AUTOMATED COUNT: 14.1 %
EST. GFR  (AFRICAN AMERICAN): >60 ML/MIN/1.73 M^2
EST. GFR  (NON AFRICAN AMERICAN): >60 ML/MIN/1.73 M^2
GLUCOSE SERPL-MCNC: 129 MG/DL
HCT VFR BLD AUTO: 34.8 %
HGB BLD-MCNC: 11.4 G/DL
LYMPHOCYTES # BLD AUTO: 1.9 K/UL
LYMPHOCYTES NFR BLD: 16.6 %
MCH RBC QN AUTO: 30.6 PG
MCHC RBC AUTO-ENTMCNC: 32.7 G/DL
MCV RBC AUTO: 94 FL
MONOCYTES # BLD AUTO: 0.9 K/UL
MONOCYTES NFR BLD: 7.6 %
NEUTROPHILS # BLD AUTO: 8.4 K/UL
NEUTROPHILS NFR BLD: 74.3 %
PLATELET # BLD AUTO: 190 K/UL
PMV BLD AUTO: 7.8 FL
POTASSIUM SERPL-SCNC: 4.1 MMOL/L
RBC # BLD AUTO: 3.73 M/UL
SODIUM SERPL-SCNC: 138 MMOL/L
WBC # BLD AUTO: 11.3 K/UL

## 2019-02-26 PROCEDURE — 36415 COLL VENOUS BLD VENIPUNCTURE: CPT

## 2019-02-26 PROCEDURE — 99407 PR TOBACCO USE CESSATION INTENSIVE >10 MINUTES: ICD-10-PCS | Mod: S$GLB,,, | Performed by: HOSPITALIST

## 2019-02-26 PROCEDURE — 97116 GAIT TRAINING THERAPY: CPT

## 2019-02-26 PROCEDURE — 97165 OT EVAL LOW COMPLEX 30 MIN: CPT

## 2019-02-26 PROCEDURE — 25000003 PHARM REV CODE 250: Performed by: INTERNAL MEDICINE

## 2019-02-26 PROCEDURE — G8988 SELF CARE GOAL STATUS: HCPCS | Mod: CI

## 2019-02-26 PROCEDURE — 97530 THERAPEUTIC ACTIVITIES: CPT

## 2019-02-26 PROCEDURE — G8987 SELF CARE CURRENT STATUS: HCPCS | Mod: CJ

## 2019-02-26 PROCEDURE — 85025 COMPLETE CBC W/AUTO DIFF WBC: CPT

## 2019-02-26 PROCEDURE — 80048 BASIC METABOLIC PNL TOTAL CA: CPT

## 2019-02-26 PROCEDURE — 99407 BEHAV CHNG SMOKING > 10 MIN: CPT | Mod: S$GLB,,, | Performed by: HOSPITALIST

## 2019-02-26 PROCEDURE — 63600175 PHARM REV CODE 636 W HCPCS: Performed by: ORTHOPAEDIC SURGERY

## 2019-02-26 PROCEDURE — 25000003 PHARM REV CODE 250: Performed by: ORTHOPAEDIC SURGERY

## 2019-02-26 PROCEDURE — 97535 SELF CARE MNGMENT TRAINING: CPT

## 2019-02-26 PROCEDURE — 94799 UNLISTED PULMONARY SVC/PX: CPT

## 2019-02-26 RX ORDER — ASPIRIN 325 MG
325 TABLET ORAL 2 TIMES DAILY
Refills: 0
Start: 2019-02-26 | End: 2019-09-03

## 2019-02-26 RX ADMIN — ARIPIPRAZOLE 15 MG: 5 TABLET ORAL at 09:02

## 2019-02-26 RX ADMIN — PANTOPRAZOLE SODIUM 40 MG: 40 TABLET, DELAYED RELEASE ORAL at 09:02

## 2019-02-26 RX ADMIN — GABAPENTIN 600 MG: 300 CAPSULE ORAL at 03:02

## 2019-02-26 RX ADMIN — HYDROCODONE BITARTRATE AND ACETAMINOPHEN 1 TABLET: 5; 325 TABLET ORAL at 03:02

## 2019-02-26 RX ADMIN — CEFAZOLIN SODIUM 1 G: 1 SOLUTION INTRAVENOUS at 09:02

## 2019-02-26 RX ADMIN — MUPIROCIN 1 G: 20 OINTMENT TOPICAL at 09:02

## 2019-02-26 RX ADMIN — ALLOPURINOL 100 MG: 100 TABLET ORAL at 09:02

## 2019-02-26 RX ADMIN — HYDROCODONE BITARTRATE AND ACETAMINOPHEN 1 TABLET: 5; 325 TABLET ORAL at 01:02

## 2019-02-26 RX ADMIN — OXYBUTYNIN CHLORIDE 5 MG: 5 TABLET ORAL at 09:02

## 2019-02-26 RX ADMIN — ASPIRIN 325 MG ORAL TABLET 325 MG: 325 PILL ORAL at 09:02

## 2019-02-26 RX ADMIN — GABAPENTIN 600 MG: 300 CAPSULE ORAL at 09:02

## 2019-02-26 RX ADMIN — HYDROCODONE BITARTRATE AND ACETAMINOPHEN 1 TABLET: 5; 325 TABLET ORAL at 05:02

## 2019-02-26 RX ADMIN — HYDROCODONE BITARTRATE AND ACETAMINOPHEN 1 TABLET: 5; 325 TABLET ORAL at 10:02

## 2019-02-26 RX ADMIN — DULOXETINE 60 MG: 30 CAPSULE, DELAYED RELEASE ORAL at 09:02

## 2019-02-26 RX ADMIN — CEFAZOLIN SODIUM 1 G: 1 SOLUTION INTRAVENOUS at 01:02

## 2019-02-26 RX ADMIN — PRAVASTATIN SODIUM 20 MG: 10 TABLET ORAL at 09:02

## 2019-02-26 NOTE — PLAN OF CARE
02/25/19 1950   Patient Assessment/Suction   Level of Consciousness (AVPU) alert   Respiratory Effort Unlabored   PRE-TX-O2   O2 Device (Oxygen Therapy) nasal cannula   Flow (L/min) 2   Oxygen Concentration (%) 28   SpO2 99 %   Pulse Oximetry Type Intermittent   Ready to Wean/Extubation Screen   FIO2<=50 (chart decimal) 0.28

## 2019-02-26 NOTE — DISCHARGE SUMMARY
Discharge Summary  Hospital Medicine    Admit Date: 2/25/2019    Date and Time: 2/26/20198:46 AM    Discharge Attending Physician: Kendrick Diaz MD    Primary Care Physician: Katy Mason MD    Diagnoses:  Active Hospital Problems    Diagnosis  POA    *s/p Right total hip arthroplasty  Degenerative joint disease of right hip [M16.11]  Yes    GERD (gastroesophageal reflux disease) [K21.9]  Yes    Hypertension [I10]  Yes    Hyperlipidemia [E78.5]  Yes     Discharged Condition: Good    Hospital Course:   Patient is a 58 y.o. female admitted to Hospitalist Service from Operation Room s/p right total hip arthroplasty performed by Dr. Vaca. Patient reportedly has past medical history significant for hypertension, hyperlipidemia, GERD and OA. Post-operatively, patient denied chest pain, shortness of breath, abdominal pain, nausea, vomiting, headache, vision changes, focal neuro-deficits, cough or fever. Patient was admitted to Hospitalist medicine service. Patient was followed by Orthopedics.Post-operative, patient did well. Pain adequately controlled. Patient participated with physical therapy. Home health and home physical therapy has been arranged. Fall precautions discussed with the patient. Patient to follow up with primary care physician next week and orthopedic doctor in 2 weeks. Post-operative anti-coagulation as per orthopedics recommendations advised. In case of chest pain, shortness of breath, stroke or stroke like symptoms, high grade fever or any signs or symptoms of surgical site wound infection symptoms, patient to return to nearest emergency room as soon as possible.  Patient was counseled to stop smoking and advised to continue maintenance inhaler therapy as prescribed by her pulmonologist.  Patient also advised to use aggressive incentive spirometry once she goes home. Patient was discharged home in stable condition with following discharge plan of care.  Total time with the patient was 30  minutes and greater than 50% was spent in counseling and coordination of care. The assessment and plan have been discussed at length. Physicians' notes reviewed. Labs and procedure reviewed.     Consults: Dr. Vaca    Significant Diagnostic Studies:   Right hip x-ray: Status post right total hip arthroplasty without complication.  Special Treatments/Procedures: as above  Disposition: Home or Self Care    Medications:  Reconciled Home Medications:   Current Discharge Medication List      START taking these medications    Details   aspirin 325 MG tablet Take 1 tablet (325 mg total) by mouth 2 (two) times daily.  Refills: 0         CONTINUE these medications which have NOT CHANGED    Details   ALBUTEROL INHL Inhale into the lungs 4 (four) times daily as needed.       allopurinol (ZYLOPRIM) 100 MG tablet Take 100 mg by mouth once daily.  Refills: 0      amLODIPine (NORVASC) 10 MG tablet Take 1 tablet (10 mg) by mouth daily  Refills: 1      ARIPiprazole (ABILIFY) 15 MG Tab Take 1 tablet (15 mg) by mouth daily  Refills: 5      atenolol (TENORMIN) 50 MG tablet Take 1 tablet (50 mg) by mouth daily  Refills: 1      clonazePAM (KLONOPIN) 1 MG tablet 1 tablet daily as needed for anxiery  Refills: 2      docusate sodium (COLACE) 100 MG capsule Take 2 capsules (200 mg) by mouth 2 times per day as needed  Refills: 0      DULoxetine (CYMBALTA) 60 MG capsule Take 60 mg by mouth once daily.  Refills: 0      gabapentin (NEURONTIN) 600 MG tablet Take 1 tablet (600 mg) by mouth 3 times per day  Refills: 2      lisinopril (PRINIVIL,ZESTRIL) 20 MG tablet Take 1 tablet (20 mg) by mouth daily  Refills: 5      ondansetron (ZOFRAN) 4 MG tablet Take 8 mg by mouth 2 (two) times daily.      oxybutynin (DITROPAN) 5 MG Tab Take 1 tablet (5 mg) by mouth 2 times per day  Refills: 2      pantoprazole (PROTONIX) 20 MG tablet Take 20 mg by mouth once daily.      pravastatin (PRAVACHOL) 20 MG tablet Take 1 tablet (20 mg) by mouth daily  Refills: 1       tiotropium Br/olodaterol HCl (STIOLTO RESPIMAT INHL) Inhale into the lungs.      traMADol (ULTRAM) 50 mg tablet       traZODone (DESYREL) 50 MG tablet Take 1 tablet (100mg) by mouth daily at bedtime  Refills: 5      polyethylene glycol (COLYTE) 240-22.72-6.72 gram SolR Take 4,000 mLs by mouth as directed.  Qty: 1 Bottle, Refills: 0         STOP taking these medications       ASPIR-LOW 81 mg EC tablet Comments:   Reason for Stopping:         RABEprazole (ACIPHEX) 20 mg tablet Comments:   Reason for Stopping:             Discharge Procedure Orders   Diet Cardiac     Other restrictions (specify):   Order Comments: PLEASE OBSERVE FALL PRECAUTIONS.  Hip precautions.     Call MD for:   Order Comments: For worsening symptoms, chest pain, shortness of breath, increased abdominal pain, high grade fever, stroke or stroke like symptoms, immediately go to the nearest Emergency Room or call 911 as soon as possible.     Follow-up Information     Eliseo Vaca MD On 3/12/2019.    Specialties:  Orthopedic Surgery, Surgery, Sports Medicine  Why:  post op followup 3/12 @ 215pm  Contact information:  [address] [address]  [phone]             Katy Mason MD In 1 week.    Specialty:  Family Medicine  Contact information:  [address] [facility]  [facility]  [facility] [address] [phone]             Please follow up.    Contact information:  Take aspirin 325 mg twice daily for 30 days as directed by Dr. Vaca.

## 2019-02-26 NOTE — PLAN OF CARE
Patient lives alone and does not have any living relatives. Patient will be staying with friend, Shaunna Leonard [phone] @ [address] [place] [address].  Patient denies living will or POA. Patient reports independence with ADL's but does admi that at times she is in a lot of pain and can not always do what he needed to do. Pharmacy is [facility], PCP is Dr. Mason. Patient reports having a BSC, rolling walker and shower chair at home. Post op followup added to AVS. Patient is aware that her insurance will not cover physical therapy at home, CM informed her that physical therapy can be set up at an outpatient facility. CM will followup.      02/25/19 5580   Discharge Assessment   Assessment Type Discharge Planning Assessment   Confirmed/corrected address and phone number on facesheet? Yes   Assessment information obtained from? Patient;Caregiver   Communicated expected length of stay with patient/caregiver yes   Prior to hospitilization cognitive status: Alert/Oriented   Prior to hospitalization functional status: Assistive Equipment   Current cognitive status: Alert/Oriented   Current Functional Status: Assistive Equipment   Lives With alone   Able to Return to Prior Arrangements no   Is patient able to care for self after discharge? No   Patient's perception of discharge disposition home or selfcare   Readmission Within the Last 30 Days no previous admission in last 30 days   Patient currently being followed by outpatient case management? No   Patient currently receives any other outside agency services? No   Equipment Currently Used at Home 3-in-1 commode;walker, rolling;shower chair   Do you have any problems affording any of your prescribed medications? No   Is the patient taking medications as prescribed? yes   Does the patient have transportation home? Yes   Transportation Anticipated family or friend will provide   Does the patient receive services at the [facility]? No   Discharge Plan A  Home   DME Needed Upon Discharge  none

## 2019-02-26 NOTE — PLAN OF CARE
Problem: Adult Inpatient Plan of Care  Goal: Plan of Care Review  Outcome: Ongoing (interventions implemented as appropriate)  Plan of care reviewed with patient at the beginning of the shift. Patient verbalized understanding. IV fluids infusing as ordered. IV antibiotics infused as ordered. Pain monitored and treated with PRN pain medication. Gray catheter flowing clear yellow urine. Surgical dressing to left knee in place, CDI. SCD to left leg, plexi pulse to right hip. Pedal pulses +2. Side rails up X2, bed in lowest, locked position, needs attended to , call light within reach will continue to monitor.

## 2019-02-26 NOTE — PLAN OF CARE
Problem: Occupational Therapy Goal  Goal: Occupational Therapy Goal  Goals to be met by: 3/5/2019    Patient will increase functional independence with ADLs by performing:    LE Dressing with Modified [place] and Assistive Devices as needed.  Grooming while standing at sink with Modified [place].  Toileting from toilet with Modified [place] for hygiene and clothing management.   Supine to sit with Supervision.  Toilet transfer to toilet with Supervision.    Outcome: Ongoing (interventions implemented as appropriate)  OT evaluation completed today. Goals & care plan established.    Ac Lu, OT  2/26/2019

## 2019-02-26 NOTE — PLAN OF CARE
02/26/19 1612   Final Note   Assessment Type Final Discharge Note   Anticipated Discharge Disposition Home   Patient has ambulatory referral to [facility] outpatient therapy for PT, added to AVS. Patient and friend aware that they will need to access therapy at an outpatient facility and are aware that the referral was sent to [facility] outpatient therapy and they should receive a call to set up therapy.

## 2019-02-26 NOTE — PROGRESS NOTES
"[facility]  Orthopedics  Progress Note    Patient Name: Nimco Caro  MRN: [id number]  Admission Date: 2/25/2019  Hospital Length of Stay: 1 days  Attending Provider: Kendrick Diaz MD  Primary Care Provider: Katy Mason MD  Follow-up For: Procedure(s) (LRB):  ARTHROPLASTY, HIP (Right)    Post-Operative Day: 1 Day Post-Op  Subjective:     Principal Problem:Degenerative joint disease of right hip    Principal Orthopedic Problem: S/P R DANNY    Interval History: none    Review of patient's allergies indicates:  No Known Allergies    Current Facility-Administered Medications   Medication    allopurinol tablet 100 mg    amLODIPine tablet 10 mg    ARIPiprazole tablet 15 mg    aspirin tablet 325 mg    bisacodyl suppository 10 mg    ceFAZolin (ANCEF) 1 gram in dextrose 5 % 50 mL IVPB (premix)    dextrose 5 % and 0.45 % NaCl infusion    docusate sodium capsule 100 mg    DULoxetine DR capsule 60 mg    gabapentin capsule 600 mg    HYDROcodone-acetaminophen 5-325 mg per tablet 1 tablet    lisinopril tablet 20 mg    mupirocin 2 % ointment 1 g    ondansetron injection 4 mg    oxybutynin tablet 5 mg    pantoprazole EC tablet 40 mg    pravastatin tablet 20 mg    sodium chloride 0.9% flush 3 mL    sodium chloride 0.9% flush 5 mL    zolpidem tablet 5 mg     Objective:     Vital Signs (Most Recent):  Temp: 97.6 °F (36.4 °C) (02/26/19 0520)  Pulse: 64 (02/26/19 0520)  Resp: 18 (02/26/19 0520)  BP: (!) 128/58 (02/26/19 0520)  SpO2: 95 % (02/26/19 0520) Vital Signs (24h Range):  Temp:  [97.5 °F (36.4 °C)-98.5 °F (36.9 °C)] 97.6 °F (36.4 °C)  Pulse:  [46-64] 64  Resp:  [11-20] 18  SpO2:  [93 %-99 %] 95 %  BP: ()/(45-63) 128/58     Weight: 78.5 kg (173 lb)  Height: 5' 7" (170.2 cm)  Body mass index is 27.1 kg/m².      Intake/Output Summary (Last 24 hours) at 2/26/2019 0648  Last data filed at 2/26/2019 0539  Gross per 24 hour   Intake 3412.5 ml   Output 2970 ml   Net 442.5 ml "       General    Nursing note and vitals reviewed.  Constitutional: She is oriented to person, place, and time. She appears well-developed and well-nourished.   Pulmonary/Chest: Effort normal.   Abdominal: Soft.   Neurological: She is alert and oriented to person, place, and time.   Psychiatric: She has a normal mood and affect.             Right Hip Exam     Comments:  Dressing C/D/I. RLE DNVI        Significant Labs:   CBC:   Recent Labs   Lab 02/25/19  1517 02/26/19  0402   WBC 15.70* 11.30   HGB 12.3 11.4*   HCT 37.4 34.8*    190     CMP:   Recent Labs   Lab 02/26/19  0402      K 4.1      CO2 25   *   BUN 7   CREATININE 0.7   CALCIUM 9.0   ANIONGAP 8   EGFRNONAA >60     All pertinent labs within the past 24 hours have been reviewed.    Significant Imaging: X-Ray: I have reviewed all pertinent results/findings and my personal findings are:  A non cemented right total hip arthroplasty is present.  There is no fracture.    Assessment/Plan:     * Degenerative joint disease of right hip    OOB with PT and nursing  Change dressing today @ 1500  Plan for DC home today with CARMELO HALL  Orthopedics  [facility]

## 2019-02-26 NOTE — SUBJECTIVE & OBJECTIVE
"Principal Problem:Degenerative joint disease of right hip    Principal Orthopedic Problem: S/P R DANNY    Interval History: none    Review of patient's allergies indicates:  No Known Allergies    Current Facility-Administered Medications   Medication    allopurinol tablet 100 mg    amLODIPine tablet 10 mg    ARIPiprazole tablet 15 mg    aspirin tablet 325 mg    bisacodyl suppository 10 mg    ceFAZolin (ANCEF) 1 gram in dextrose 5 % 50 mL IVPB (premix)    dextrose 5 % and 0.45 % NaCl infusion    docusate sodium capsule 100 mg    DULoxetine DR capsule 60 mg    gabapentin capsule 600 mg    HYDROcodone-acetaminophen 5-325 mg per tablet 1 tablet    lisinopril tablet 20 mg    mupirocin 2 % ointment 1 g    ondansetron injection 4 mg    oxybutynin tablet 5 mg    pantoprazole EC tablet 40 mg    pravastatin tablet 20 mg    sodium chloride 0.9% flush 3 mL    sodium chloride 0.9% flush 5 mL    zolpidem tablet 5 mg     Objective:     Vital Signs (Most Recent):  Temp: 97.6 °F (36.4 °C) (02/26/19 0520)  Pulse: 64 (02/26/19 0520)  Resp: 18 (02/26/19 0520)  BP: (!) 128/58 (02/26/19 0520)  SpO2: 95 % (02/26/19 0520) Vital Signs (24h Range):  Temp:  [97.5 °F (36.4 °C)-98.5 °F (36.9 °C)] 97.6 °F (36.4 °C)  Pulse:  [46-64] 64  Resp:  [11-20] 18  SpO2:  [93 %-99 %] 95 %  BP: ()/(45-63) 128/58     Weight: 78.5 kg (173 lb)  Height: 5' 7" (170.2 cm)  Body mass index is 27.1 kg/m².      Intake/Output Summary (Last 24 hours) at 2/26/2019 0648  Last data filed at 2/26/2019 0539  Gross per 24 hour   Intake 3412.5 ml   Output 2970 ml   Net 442.5 ml       General    Nursing note and vitals reviewed.  Constitutional: She is oriented to person, place, and time. She appears well-developed and well-nourished.   Pulmonary/Chest: Effort normal.   Abdominal: Soft.   Neurological: She is alert and oriented to person, place, and time.   Psychiatric: She has a normal mood and affect.             Right Hip Exam     Comments:  " Dressing C/D/I. RLE DNVI        Significant Labs:   CBC:   Recent Labs   Lab 02/25/19  1517 02/26/19  0402   WBC 15.70* 11.30   HGB 12.3 11.4*   HCT 37.4 34.8*    190     CMP:   Recent Labs   Lab 02/26/19  0402      K 4.1      CO2 25   *   BUN 7   CREATININE 0.7   CALCIUM 9.0   ANIONGAP 8   EGFRNONAA >60     All pertinent labs within the past 24 hours have been reviewed.    Significant Imaging: X-Ray: I have reviewed all pertinent results/findings and my personal findings are:  A non cemented right total hip arthroplasty is present.  There is no fracture.

## 2019-02-26 NOTE — PLAN OF CARE
Problem: Physical Therapy Goal  Goal: Physical Therapy Goal  Goals to be met by: 3/11/19     Patient will increase functional independence with mobility by performin. Supine to sit with [place]  2. Rolling to Left with [place].  3. Sit to stand transfer with Modified [place] using Rolling Walker  4. Gait  x 250 feet with Modified [place] using Rolling Walker.   5. Ascend/Descend 3 inch curb step with Modified [place] using Rolling Walker.  6. Lower extremity exercise program x10-20 reps per handout, with independence     Outcome: Ongoing (interventions implemented as appropriate)  Ambulated with rw and Min A, WBAT RLE. Reviewed hip precautions during session.

## 2019-02-26 NOTE — PT/OT/SLP PROGRESS
Physical Therapy Treatment    Patient Name:  Nimco Caro   MRN:  [id number]    Recommendations:     Discharge Recommendations:  other (see comments)(HHPT)   Discharge Equipment Recommendations: none   Barriers to discharge: None    Assessment:     Nimco Caro is a 58 y.o. female admitted with a medical diagnosis of Degenerative joint disease of right hip.  She presents with the following impairments/functional limitations:  weakness, impaired endurance, impaired self care skills, impaired functional mobilty, gait instability, pain, decreased lower extremity function .    Rehab Prognosis: Good; patient would benefit from acute skilled PT services to address these deficits and reach maximum level of function.    Recent Surgery: Procedure(s) (LRB):  ARTHROPLASTY, HIP (Right) 1 Day Post-Op    Plan:     During this hospitalization, patient to be seen BID to address the identified rehab impairments via gait training, therapeutic activities, therapeutic exercises and progress toward the following goals:    · Plan of Care Expires:  03/11/19    Subjective     Chief Complaint: pain R hip with movement  Patient/Family Comments/goals: to return home.  Pain/Comfort:  · Pain Rating 1: 6/10  · Location - Side 1: Right  · Location - Orientation 1: generalized  · Location 1: hip  · Pain Addressed 1: Pre-medicate for activity, Reposition, Nurse notified      Objective:     Communicated with nurse Bass prior to session.  Patient found supine in bed with SCD, peripheral IV, telemetry, hip abduction pillow, oxygen, crowe catheter  upon PT entry to room.     General Precautions: Standard, fall   Orthopedic Precautions:RLE weight bearing as tolerated, RLE posterior precautions   Braces:       Functional Mobility:  · Bed Mobility:     · Rolling Right: minimum assistance  · Supine to Sit: moderate assistance  · Transfers:     · Sit to Stand:  moderate assistance with rolling walker  · Gait: 80' with rw and Min A,WBAT RLE.      AM-PAC  6 CLICK MOBILITY          Therapeutic Activities and Exercises:   Transferred to sitting EOB with A. Stood with Mod A. Ambulated slowly in hallway with rw and Min A. WBAT RLE. RT present to assess O2 SATS with activity.     Patient left up in chair with all lines intact, call button in reach, nurse Shelia notified and caregiver present..    GOALS:   Multidisciplinary Problems     Physical Therapy Goals        Problem: Physical Therapy Goal    Goal Priority Disciplines Outcome Goal Variances Interventions   Physical Therapy Goal     PT, PT/OT Ongoing (interventions implemented as appropriate)     Description:  Goals to be met by: 3/11/19     Patient will increase functional independence with mobility by performin. Supine to sit with [place]  2. Rolling to Left with [place].  3. Sit to stand transfer with Modified [place] using Rolling Walker  4. Gait  x 250 feet with Modified [place] using Rolling Walker.   5. Ascend/Descend 3 inch curb step with Modified [place] using Rolling Walker.  6. Lower extremity exercise program x10-20 reps per handout, with independence                      Time Tracking:     PT Received On: 19  PT Start Time: 1000     PT Stop Time: 1020  PT Total Time (min): 20 min     Billable Minutes: Gait Training 12min and Therapeutic Activity 8min    Treatment Type: Treatment  PT/PTA: PTA     PTA Visit Number: 1     Patrizia Jay PTA  2019

## 2019-02-26 NOTE — PROGRESS NOTES
Patient interested in quit smoking patient educated on smoking cessation program patient smokes 1 pack a day states she used program  A couple years ago and wants to quit patient signed up at this time

## 2019-02-26 NOTE — PLAN OF CARE
Problem: Adult Inpatient Plan of Care  Goal: Plan of Care Review  Outcome: Ongoing (interventions implemented as appropriate)  02 saturation 84% on room air.  Patient placed on nc at 3lpm.  Patient started on IS and averaging 1500ml on IS.  Encouraged patient to use IS every hr. While awake.

## 2019-02-26 NOTE — PROGRESS NOTES
02/26/19 1022   Home Oxygen Qualification   Room Air SpO2 At Rest 95 %   Room Air SpO2 on Exertion 92 %

## 2019-02-26 NOTE — PT/OT/SLP PROGRESS
Physical Therapy Treatment    Patient Name:  Nimco Caro   MRN:  [id number]    Recommendations:     Discharge Recommendations:  other (see comments)(HHPT)   Discharge Equipment Recommendations: none   Barriers to discharge: None    Assessment:     Nimco Caro is a 58 y.o. female admitted with a medical diagnosis of Degenerative joint disease of right hip.  She presents with the following impairments/functional limitations:  weakness, impaired endurance, impaired self care skills, orthopedic precautions.    Rehab Prognosis: Good; patient would benefit from acute skilled PT services to address these deficits and reach maximum level of function.    Recent Surgery: Procedure(s) (LRB):  ARTHROPLASTY, HIP (Right) 1 Day Post-Op    Plan:     During this hospitalization, patient to be seen BID to address the identified rehab impairments via therapeutic activities, gait training, therapeutic exercises and progress toward the following goals:    · Plan of Care Expires:  03/11/19    Subjective     Chief Complaint: pain in hip withmovement  Patient/Family Comments/goals: to return home  Pain/Comfort:  · Pain Rating 1: 6/10  · Location - Side 1: Right  · Location - Orientation 1: generalized  · Location 1: hip  · Pain Addressed 1: Pre-medicate for activity, Reposition, Nurse notified      Objective:     Communicated with nurse Bass prior to session.  Patient found seated in chair with telemetry  upon PT entry to room.     General Precautions: Standard, fall   Orthopedic Precautions:RLE weight bearing as tolerated, RLE posterior precautions   Braces: N/A     Functional Mobility:  · Bed Mobility:     · Rolling Left:  minimum assistance  · Sit to Supine: minimum assistance  · Transfers:     · Sit to Stand:  minimum assistance with rolling walker  · Gait: 120' with rw and Min A,WBAT RLE      AM-PAC 6 CLICK MOBILITY          Therapeutic Activities and Exercises:   Transferred BTB with Min A for LE's onto bed and verbal cues for  technique.     Patient left supine with all lines intact, call button in reach, nurse Shelia notified and caregiver present..    GOALS:   Multidisciplinary Problems     Physical Therapy Goals        Problem: Physical Therapy Goal    Goal Priority Disciplines Outcome Goal Variances Interventions   Physical Therapy Goal     PT, PT/OT Ongoing (interventions implemented as appropriate)     Description:  Goals to be met by: 3/11/19     Patient will increase functional independence with mobility by performin. Supine to sit with [place]  2. Rolling to Left with [place].  3. Sit to stand transfer with Modified [place] using Rolling Walker  4. Gait  x 250 feet with Modified [place] using Rolling Walker.   5. Ascend/Descend 3 inch curb step with Modified [place] using Rolling Walker.  6. Lower extremity exercise program x10-20 reps per handout, with independence                      Time Tracking:     PT Received On: 19  PT Start Time: 1339     PT Stop Time: 1349  PT Total Time (min): 10 min     Billable Minutes: Gait Training 10min    Treatment Type: Treatment  PT/PTA: PTA     PTA Visit Number: 1     Patrizia Jay PTA  2019

## 2019-02-26 NOTE — PT/OT/SLP EVAL
"Occupational Therapy   Evaluation    Name: Nimco Caro  MRN: [id number]  Admitting Diagnosis:  Degenerative joint disease of right hip 1 Day Post-Op    Recommendations:     Discharge Recommendations: other (see comments)(HHPT)  Discharge Equipment Recommendations:  none  Barriers to discharge:  None    Assessment:     Nimco Caro is a 58 y.o. female with a medical diagnosis of Degenerative joint disease of right hip.  Performance deficits affecting function: weakness, impaired endurance, impaired self care skills, orthopedic precautions, pain, decreased lower extremity function, impaired functional mobilty, gait instability, decreased ROM.      Rehab Prognosis: Good; patient would benefit from acute skilled OT services to address these deficits and reach maximum level of function.       Plan:     Patient to be seen 3 x/week to address the above listed problems via self-care/home management, therapeutic activities, therapeutic exercises  · Plan of Care Expires: 03/05/19  · Plan of Care Reviewed with: patient, caregiver    Subjective     Chief Complaint: R hip pain  Patient/Family Comments/goals: "To get back to normal"    Occupational Profile:  Living Environment: Pt will be staying with a friend post d/c. Pt's friend home is a [facility] with [address].   Previous level of function: Pt was independent with ADLs and occasionally uses cane for ambulation.   Equipment Used at Home:  walker, rolling, bedside commode, hip kit, shower chair  Assistance upon Discharge: Pt will receive assistance from friend.    Pain/Comfort:  · Pain Rating 1: 7/10  · Location - Side 1: Right  · Location - Orientation 1: generalized  · Location 1: hip  · Pain Addressed 1: Distraction, Reposition, Pre-medicate for activity  · Pain Rating Post-Intervention 1: 7/10    Patients cultural, spiritual, Worship conflicts given the current situation:      Objective:     Communicated with: nurse Bass prior to session.  Patient found up in chair with " telemetry, peripheral IV upon OT entry to room.    General Precautions: Standard, fall   Orthopedic Precautions:RLE weight bearing as tolerated, RLE posterior precautions   Braces: N/A     Occupational Performance:    Bed Mobility:    · Not assessed; pt seated in chair upon arrival. See PT notes.    Functional Mobility/Transfers:  · Patient completed Sit <> Stand Transfer with minimum assistance  with  rolling walker   · Patient completed Toilet Transfer Stand Pivot technique with minimum assistance with  rolling walker  · Functional Mobility: Pt ambulated in room with CGA and RW from chair>sink>bathroom>chair again. No LOB.     Activities of Daily Living:  · Grooming: stand by assistance with oral and facial hygiene while standing at sink.  · Lower Body Dressing: maximal assistance to don/doff socks while seated in chair.    Cognitive/Visual Perceptual:  Cognitive/Psychosocial Skills:     -       Oriented to: x4   -       Follows Commands/attention:Follows multistep  commands  -       Communication: clear/fluent  -       Safety awareness/insight to disability: intact   -       Mood/Affect/Coping skills/emotional control: Appropriate to situation  Visual/Perceptual:      -Intact     Physical Exam:  Postural examination/scapula alignment:    -       Rounded shoulders  -       Forward head  Upper Extremity Range of Motion:     -       Right Upper Extremity: WFL  -       Left Upper Extremity: WFL  Upper Extremity Strength:    -       Right Upper Extremity: WFL  -       Left Upper Extremity: WFL   Strength:    -       Right Upper Extremity: WFL  -       Left Upper Extremity: WFL  Fine Motor Coordination:    -       Intact  Gross motor coordination:   WFL    AMPAC 6 Click ADL:  AMPAC Total Score: 20    Treatment & Education:  OT ed patient on safety with walker use for functional mobility with cues for hand placement & sequencing.   OT ed pt on use of adaptive equipment for LB dressing & safe item retrieval with  reacher with demonstration provided.  OT educated pt on posterior hip precautions with instruction sheet and demonstration provided.    Education:    Patient left up in chair with all lines intact, call button in reach and friend present    GOALS:   Multidisciplinary Problems     Occupational Therapy Goals        Problem: Occupational Therapy Goal    Goal Priority Disciplines Outcome Interventions   Occupational Therapy Goal     OT, PT/OT Ongoing (interventions implemented as appropriate)    Description:  Goals to be met by: 3/5/2019    Patient will increase functional independence with ADLs by performing:    LE Dressing with Modified [place] and Assistive Devices as needed.  Grooming while standing at sink with Modified [place].  Toileting from toilet with Modified [place] for hygiene and clothing management.   Supine to sit with Supervision.  Toilet transfer to toilet with Supervision.                      History:     Past Medical History:   Diagnosis Date    Cancer ovarian; uterine    1992    GERD (gastroesophageal reflux disease)     Hyperlipidemia     Hypertension        Past Surgical History:   Procedure Laterality Date    ARTHROPLASTY, HIP Right 2/25/2019    Performed by El[facility]o Vaca [facility] at [facility] OR    COLON SURGERY      COLONOSCOPY      HERNIA REPAIR N/A 2016    HYSTERECTOMY  total    REVISION COLOSTOMY N/A 2012       Time Tracking:     OT Date of Treatment: 02/26/19  OT Start Time: 1034  OT Stop Time: 1134  OT Total Time (min): 60 min    Billable Minutes:Evaluation 10  Self Care/Home Management 30  Therapeutic Activity 20    Ac Lu, OT  2/26/2019

## 2019-02-27 NOTE — NURSING
Bedside report received from Rebeca. Dressing CDI, ice in place to right hip. Patient denies any pain at this time. Family a bedside.   
Pt able to void post crowe removal.  PIV removed.  Pressure dressing applied.  Discharge instructions given to patient and friend.  Stated understanding.  All belongings, abduction pillow, and paper prescription given to patient.  D/C to personal vehicle via wheelchair.   
no

## 2019-03-03 ENCOUNTER — NURSE TRIAGE (OUTPATIENT)
Dept: ADMINISTRATIVE | Facility: CLINIC | Age: 59
End: 2019-03-03

## 2019-03-03 NOTE — TELEPHONE ENCOUNTER
Reason for Disposition   [1] Caller requesting NON-URGENT health information AND [2] PCP's office is the best resource    Protocols used: ST INFORMATION ONLY CALL-A-AH    Pt calling regarding Physical Therapy.  Pt states she has not heard from anyone to start her therapy.  Care advice given.  Will message Staff and Provider.

## 2019-03-06 ENCOUNTER — CLINICAL SUPPORT (OUTPATIENT)
Dept: URGENT CARE | Facility: CLINIC | Age: 59
End: 2019-03-06
Payer: MEDICAID

## 2019-03-06 VITALS
BODY MASS INDEX: 27.56 KG/M2 | TEMPERATURE: 99 F | SYSTOLIC BLOOD PRESSURE: 110 MMHG | RESPIRATION RATE: 16 BRPM | DIASTOLIC BLOOD PRESSURE: 58 MMHG | HEART RATE: 66 BPM | HEIGHT: 67 IN | OXYGEN SATURATION: 95 % | WEIGHT: 175.63 LBS

## 2019-03-06 DIAGNOSIS — Z20.828 EXPOSURE TO INFLUENZA: ICD-10-CM

## 2019-03-06 DIAGNOSIS — J02.9 SORE THROAT: Primary | ICD-10-CM

## 2019-03-06 DIAGNOSIS — J00 ACUTE NASOPHARYNGITIS: ICD-10-CM

## 2019-03-06 LAB
CTP QC/QA: YES
CTP QC/QA: YES
FLUAV AG NPH QL: NEGATIVE
FLUBV AG NPH QL: NEGATIVE
S PYO RRNA THROAT QL PROBE: NEGATIVE

## 2019-03-06 PROCEDURE — 87880 POCT RAPID STREP A: ICD-10-PCS | Mod: QW,,, | Performed by: NURSE PRACTITIONER

## 2019-03-06 PROCEDURE — 87804 POCT INFLUENZA A/B: ICD-10-PCS | Mod: QW,,, | Performed by: NURSE PRACTITIONER

## 2019-03-06 PROCEDURE — 99204 OFFICE O/P NEW MOD 45 MIN: CPT | Mod: S$GLB,,, | Performed by: NURSE PRACTITIONER

## 2019-03-06 PROCEDURE — 99204 PR OFFICE/OUTPT VISIT, NEW, LEVL IV, 45-59 MIN: ICD-10-PCS | Mod: S$GLB,,, | Performed by: NURSE PRACTITIONER

## 2019-03-06 PROCEDURE — 87804 INFLUENZA ASSAY W/OPTIC: CPT | Mod: QW,,, | Performed by: NURSE PRACTITIONER

## 2019-03-06 PROCEDURE — 87880 STREP A ASSAY W/OPTIC: CPT | Mod: QW,,, | Performed by: NURSE PRACTITIONER

## 2019-03-06 RX ORDER — OSELTAMIVIR PHOSPHATE 75 MG/1
75 CAPSULE ORAL 2 TIMES DAILY
Qty: 10 CAPSULE | Refills: 0 | Status: SHIPPED | OUTPATIENT
Start: 2019-03-06 | End: 2019-03-11

## 2019-03-06 RX ORDER — FLUTICASONE PROPIONATE 50 MCG
2 SPRAY, SUSPENSION (ML) NASAL DAILY
Qty: 15.8 ML | Refills: 0 | Status: SHIPPED | OUTPATIENT
Start: 2019-03-06 | End: 2022-01-29

## 2019-03-06 RX ORDER — CETIRIZINE HYDROCHLORIDE 10 MG/1
10 TABLET ORAL DAILY
Qty: 30 TABLET | Refills: 0 | Status: SHIPPED | OUTPATIENT
Start: 2019-03-06 | End: 2021-06-17

## 2019-03-06 NOTE — PROGRESS NOTES
"Subjective:       Patient ID: Nimco Caro is a 58 y.o. female.    Vitals:  height is 5' 7" (1.702 m) and weight is 79.7 kg (175 lb 9.6 oz). Her temperature is 98.9 °F (37.2 °C). Her blood pressure is 110/58 (abnormal) and her pulse is 66. Her respiration is 16 and oxygen saturation is 95%.     Chief Complaint: Sore Throat    Patient complains of sore throat, cough that began yesterday, worse at night and first thing in the morning. Patient is staying with her friend right now who was diagnosed with Influenza 2 days ago. Denies fever, sob, chest pain, nausea, vomiting, diarrhea. Patient states she had right hip surgery on 2/28/19.       Sore Throat    This is a new problem. The current episode started in the past 7 days. The problem has been unchanged. There has been no fever. Associated symptoms include congestion and coughing. Pertinent negatives include no abdominal pain, diarrhea, headaches, shortness of breath or vomiting.       Constitution: Negative for chills, fatigue and fever.   HENT: Positive for congestion and sore throat.    Neck: Negative for painful lymph nodes.   Cardiovascular: Negative for chest pain and leg swelling.   Eyes: Negative for double vision and blurred vision.   Respiratory: Positive for cough. Negative for sputum production, shortness of breath and wheezing.    Gastrointestinal: Negative for abdominal pain, nausea, vomiting and diarrhea.   Endocrine: negative.   Genitourinary: Negative for dysuria, frequency, urgency and history of kidney stones.   Musculoskeletal: Negative for joint pain, joint swelling, muscle cramps and muscle ache.   Skin: Negative for color change, pale, rash and bruising.   Allergic/Immunologic: Negative for seasonal allergies.   Neurological: Negative for dizziness, history of vertigo, light-headedness, passing out and headaches.   Hematologic/Lymphatic: Negative for swollen lymph nodes.   Psychiatric/Behavioral: Negative for nervous/anxious, sleep " disturbance and depression. The patient is not nervous/anxious.        Objective:      Physical Exam   Constitutional: She is oriented to person, place, and time. Vital signs are normal. She appears well-developed and well-nourished. She is cooperative.  Non-toxic appearance. She does not have a sickly appearance. She does not appear ill. No distress.   HENT:   Head: Normocephalic and atraumatic.   Right Ear: Hearing, tympanic membrane, external ear and ear canal normal.   Left Ear: Hearing, tympanic membrane, external ear and ear canal normal.   Nose: Mucosal edema and rhinorrhea present. No nasal deformity. No epistaxis. Right sinus exhibits maxillary sinus tenderness and frontal sinus tenderness. Left sinus exhibits maxillary sinus tenderness and frontal sinus tenderness.   Mouth/Throat: Uvula is midline and mucous membranes are normal. No trismus in the jaw. Normal dentition. No uvula swelling. Posterior oropharyngeal erythema present. No oropharyngeal exudate or posterior oropharyngeal edema.   Eyes: Conjunctivae and lids are normal. Right eye exhibits no discharge. Left eye exhibits no discharge. No scleral icterus.   Sclera clear bilat   Neck: Trachea normal, normal range of motion, full passive range of motion without pain and phonation normal. Neck supple.   Cardiovascular: Normal rate, regular rhythm, normal heart sounds, intact distal pulses and normal pulses.   Pulmonary/Chest: Effort normal and breath sounds normal. No respiratory distress.   Abdominal: Soft. Normal appearance and bowel sounds are normal. She exhibits no distension, no pulsatile midline mass and no mass. There is no tenderness.   Musculoskeletal: Normal range of motion. She exhibits no edema or deformity.   Neurological: She is alert and oriented to person, place, and time. She exhibits normal muscle tone. Coordination normal. GCS eye subscore is 4. GCS verbal subscore is 5. GCS motor subscore is 6.   Skin: Skin is warm, dry and intact.  No rash noted. She is not diaphoretic. No pallor.   Psychiatric: She has a normal mood and affect. Her speech is normal and behavior is normal. Judgment and thought content normal. Cognition and memory are normal.   Nursing note and vitals reviewed.      Assessment:       1. Sore throat    2. Acute nasopharyngitis    3. Exposure to influenza        Plan:         Rapid strep negative. Influenza negative.     After complete evaluation, including thorough history and physical exam, the patient's symptoms are most likely due to viral upper respiratory infection. There are no concerning features on physical exam to suggest bacterial otitis media/externa, sinusitis, pharyngitis, or peritonsillar abscess. Vital signs do not suggest sepsis. Lung sounds are clear and not consistent with pneumonia. There is no neck pain or limited ROM to suggest retropharyngeal abscess or meningitis. The patient will be treated with supportive care. Will provide RX for Zyrtec and flonase upon D/C.    Will treat as influenza based on history, physical exam, exposure to, and prevalence of influenza within the community. Advised to increase fluids, rest and take tylenol or motrin for fever. Follow up with PCP.         Sore throat  -     POCT rapid strep A  -     POCT Influenza A/B    Acute nasopharyngitis    Exposure to influenza    Other orders  -     cetirizine (ZYRTEC) 10 MG tablet; Take 1 tablet (10 mg total) by mouth once daily.  Dispense: 30 tablet; Refill: 0  -     fluticasone (FLONASE) 50 mcg/actuation nasal spray; 2 sprays (100 mcg total) by Each Nare route once daily.  Dispense: 15.8 mL; Refill: 0  -     dexchlorphen-phenylephrine-DM (POLYTUSSIN DM) 1-5-10 mg/5 mL Syrp; Take 5 mLs by mouth every 4 (four) hours as needed.  Dispense: 120 mL; Refill: 0  -     oseltamivir (TAMIFLU) 75 MG capsule; Take 1 capsule (75 mg total) by mouth 2 (two) times daily. for 5 days  Dispense: 10 capsule; Refill: 0

## 2019-03-08 ENCOUNTER — CLINICAL SUPPORT (OUTPATIENT)
Dept: REHABILITATION | Facility: HOSPITAL | Age: 59
End: 2019-03-08
Attending: INTERNAL MEDICINE
Payer: MEDICAID

## 2019-03-08 DIAGNOSIS — M16.11 OSTEOARTHRITIS OF RIGHT HIP, UNSPECIFIED OSTEOARTHRITIS TYPE: Primary | ICD-10-CM

## 2019-03-08 PROCEDURE — 97161 PT EVAL LOW COMPLEX 20 MIN: CPT | Mod: PN | Performed by: PHYSICAL THERAPIST

## 2019-03-08 PROCEDURE — 97110 THERAPEUTIC EXERCISES: CPT | Mod: PN | Performed by: PHYSICAL THERAPIST

## 2019-03-08 NOTE — PATIENT INSTRUCTIONS
Antiemboli: Isometric  Gluteal SETS        Pull toes toward left knee, tense muscles on front of thigh and simultaneously squeeze buttocks. Keep leg and buttock flat on floor. Hold __3__ seconds.  Repeat __10__ times per set. Do __3__ sets per session. Do __1__ sessions per day.     [email or web address]     Copyright © [employer]. All rights reserved.   Quad Set: Slight Flexion        Tense muscles on top of left thigh. Hold __3__ seconds.  Repeat __10__ times per set. Do __3__ sets per session. Do __1__ sessions per day.     [email or web address]     Copyright © [employer]. All rights reserved.   Strengthening: Hip Adduction - Isometric        With ball or folded pillow between knees, squeeze knees together. Hold __3__ seconds.  Repeat __10__ times per set. Do __3__ sets per session. Do __1__ sessions per day.     [email or web address]     Copyright © [employer]. All rights reserved.   Strengthening: Hip Abductor - Resisted        With band looped around both legs above knees, push thighs apart.  Repeat __10__ times per set. Do __3__ sets per session. Do __1__ sessions per day.     [email or web address]     Copyright © [employer]. All rights reserved.   Bridging        Slowly raise buttocks from floor, keeping stomach tight.  Repeat __10__ times per set. Do __3__ sets per session. Do __1__ sessions per day.     [email or web address]     Copyright © [employer]. All rights reserved.       HIP Precautions:    DO NOT FLEX HIP PAST 90*  DO NOT LET YOUR FOOT CROSS MIDLINE  DO NOT LET YOUR FOOT ROTATE TOWARD MID LINE

## 2019-03-08 NOTE — PLAN OF CARE
[facility]  Physical Therapy Initial Evaluation    Name: Nimco Caro  Clinic Number: [id number]    Therapy Diagnosis:   Encounter Diagnosis   Name Primary?    Osteoarthritis of right hip, unspecified osteoarthritis type Yes     Physician: Kendrick Diaz MD    Physician Orders: PT Eval and Treat   Medical Diagnosis from Referral: Osteoarthritis of right hip, unspecified osteoarthritis type  Evaluation Date: 3/8/2019  Authorization Period Expiration: 12/31/2019  Plan of Care Expiration: 5/3/2019  Visit # / Visits authorized: 1/ 1    Time In: 800  Time Out: 900  Total Billable Time: 60 minutes    Precautions: Hip precations    Subjective   Date of onset: DOS:2/28/2019  History of current condition - Nimco reports: The onset of hip pain occurred after a fall in 2017. She underwent a right DANNY on 2/28/2019. She currently complains of right hip pain and difficulty getting in bed and getting off of the toilet.     Medical History:   Past Medical History:   Diagnosis Date    Cancer ovarian; uterine    1992    GERD (gastroesophageal reflux disease)     Hyperlipidemia     Hypertension        Surgical History:   Nimco Caro  has a past surgical history that includes Hysterectomy (total); Revision Colostomy (N/A, 2012); Hernia repair (N/A, 2016); Colon surgery; Colonoscopy; and Hip Arthroplasty (Right, 2/25/2019).    Medications:   Nimco has a current medication list which includes the following prescription(s): albuterol, allopurinol, amlodipine, aripiprazole, aspirin, atenolol, cetirizine, clonazepam, dexchlorphen-phenylephrine-dm, docusate sodium, duloxetine, fluticasone, gabapentin, lisinopril, ondansetron, oseltamivir, oxybutynin, pantoprazole, polyethylene glycol, pravastatin, tiotropium br/olodaterol hcl, tramadol, and trazodone.    Allergies:   Review of patient's allergies indicates:  No Known Allergies     Imaging, X-rays:     Prior Therapy: PT following left knee  arthroscope   Social History: single lives with a friend  Occupation: not working  Prior Level of Function: Independent  Current Level of Function: Difficulty with performing sit to stand transfers and ambulation    Pain:  Current 7/10, worst 10/10, best 5/10   Location: right hip   Description: Aching  Aggravating Factors: Sitting, Standing, Walking and Getting out of bed/chair  Easing Factors: pain medication    Pts goals: Return to PLOF    Objective     Posture: Decreased lumbar lordosis and forward head in standing  Palpation: Severe point tenderness noted with palpation of the right posterior hip  Sensation: Intact  Range of Motion/Strength:     LE ROM  Left  Right  Hip flexion  112*  90*  Hip abduction  36*  18*  Knee extension 0*  0*  Knee flexion  126*  120*    LE MMT  Left  Right  Hip flexion  4+/5  3/5  Hip abduction  4+/5  3/5  Hip adduction  4+/5  3/5  Hip ER   4/5  3/5   Hip IR   4/5  3/5  Knee extension 4+/5  4/5  Knee flexion  4+/5  4/5    Gait: With AD.  Device Used -  Rolling walker    Other:   LEFS: 17/80:  78.8% impairment      TREATMENT   Treatment Time In: 830  Treatment Time Out: 900  Total Treatment time separate from Evaluation: 30 minutes    Nimco received therapeutic exercises to develop strength, ROM and flexibility for 30 minutes including:    Gluteal sets 3 x 10  Quad sets 3 x 10  Ankle pumps 3 x 10  Heel slides 3 x 10  Bridging 3 x 10  Adductor squeeze 3 x 10  Hook lying hip abduction 3 x 10 GTB     Home Exercises and Patient Education Provided    Education provided:   - Total hip arthroplasty post-operative precautions reviewed. Patient verbalized understanding    Written Home Exercises Provided: yes.  Exercises were reviewed and Nimco was able to demonstrate them prior to the end of the session.  Nimco demonstrated good  understanding of the education provided.     See EMR under Patient Instructions for exercises provided 3/8/2019.    Assessment   Nimco is a 58 y.o. female  referred to outpatient Physical Therapy with a medical diagnosis of Osteoarthritis of right hip, unspecified osteoarthritis type. Pt presents with     1. Right hip pain  2. Decreased right hip strength  3. Soft tissue tenderness  4. Gait abnormality    Pt prognosis is Good.   Pt will benefit from skilled outpatient Physical Therapy to address the deficits stated above and in the chart below, provide pt/family education, and to maximize pt's level of independence.     Plan of care discussed with patient: Yes  Pt's spiritual, cultural and educational needs considered and patient is agreeable to the plan of care and goals as stated below:     Anticipated Barriers for therapy: none    Medical Necessity is demonstrated by the following  History  Co-morbidities and personal factors that may impact the plan of care Co-morbidities:   history of cancer    Personal Factors:   no deficits     low   Examination  Body Structures and Functions, activity limitations and participation restrictions that may impact the plan of care Body Regions:   lower extremities    Body Systems:    ROM  strength  balance  gait  transfers    Participation Restrictions:   none    Activity limitations:   Learning and applying knowledge  no deficits    General Tasks and Commands  no deficits    Communication  no deficits    Mobility  no deficits    Self care  no deficits    Domestic Life  no deficits    Interactions/Relationships  no deficits    Life Areas  no deficits    Community and Social Life  no deficits         low   Clinical Presentation evolving clinical presentation with changing clinical characteristics moderate   Decision Making/ Complexity Score: low     Goals:  Short Term Goals: 3 weeks   1. The patient will begin a written HEP  2. Decrease soft tissue tenderness to moderate  3. Increase hip strength to 4-/5    Long Term Goals: 6 weeks   1. The patient will be independent with a HEP for maintenance  2. The patient will ambulate on a  community level with appropriate assistive device  3. Increase left hip strength to 4+/5  4. Decrease LEFS impairment to <40%   5. Decrease soft tissue tenderness to mild    Plan   Plan of care Certification: 3/8/2019 to 5/3/2019.    Outpatient Physical Therapy 2 times weekly for 6 weeks to include the following interventions: Gait Training, Moist Heat/ Ice, Patient Education, Therapeutic Activites and Therapeutic Exercise.    Thank you for this referral,       Irving Olivares, PT

## 2019-03-12 ENCOUNTER — OFFICE VISIT (OUTPATIENT)
Dept: ORTHOPEDICS | Facility: CLINIC | Age: 59
End: 2019-03-12
Payer: MEDICAID

## 2019-03-12 VITALS
DIASTOLIC BLOOD PRESSURE: 70 MMHG | BODY MASS INDEX: 27.47 KG/M2 | WEIGHT: 175 LBS | HEIGHT: 67 IN | HEART RATE: 53 BPM | SYSTOLIC BLOOD PRESSURE: 100 MMHG

## 2019-03-12 DIAGNOSIS — Z96.641 STATUS POST TOTAL REPLACEMENT OF RIGHT HIP: Primary | ICD-10-CM

## 2019-03-12 PROCEDURE — 99024 PR POST-OP FOLLOW-UP VISIT: ICD-10-PCS | Mod: ,,, | Performed by: ORTHOPAEDIC SURGERY

## 2019-03-12 PROCEDURE — 99024 POSTOP FOLLOW-UP VISIT: CPT | Mod: ,,, | Performed by: ORTHOPAEDIC SURGERY

## 2019-03-12 RX ORDER — PANTOPRAZOLE SODIUM 40 MG/1
TABLET, DELAYED RELEASE ORAL
Refills: 5 | COMMUNITY
Start: 2019-02-17 | End: 2021-06-17

## 2019-03-12 RX ORDER — RABEPRAZOLE SODIUM 20 MG/1
TABLET, DELAYED RELEASE ORAL
COMMUNITY
Start: 2019-03-06 | End: 2019-04-09

## 2019-03-12 RX ORDER — OXYCODONE AND ACETAMINOPHEN 7.5; 325 MG/1; MG/1
TABLET ORAL
Refills: 0 | COMMUNITY
Start: 2019-02-27 | End: 2019-06-11

## 2019-03-12 RX ORDER — OXYCODONE AND ACETAMINOPHEN 7.5; 325 MG/1; MG/1
1 TABLET ORAL EVERY 6 HOURS PRN
Qty: 28 TABLET | Refills: 0 | Status: SHIPPED | OUTPATIENT
Start: 2019-03-12 | End: 2019-03-19

## 2019-03-12 RX ORDER — ALBUTEROL SULFATE 90 UG/1
2 AEROSOL, METERED RESPIRATORY (INHALATION) EVERY 4 HOURS PRN
Refills: 6 | COMMUNITY
Start: 2019-02-14 | End: 2021-06-17

## 2019-03-12 RX ORDER — TRAZODONE HYDROCHLORIDE 100 MG/1
TABLET ORAL
Refills: 1 | COMMUNITY
Start: 2019-02-19 | End: 2021-06-17

## 2019-03-12 RX ORDER — SALMETEROL XINAFOATE 50 UG/1
POWDER, METERED ORAL; RESPIRATORY (INHALATION)
Refills: 2 | COMMUNITY
Start: 2019-02-14 | End: 2021-06-18

## 2019-03-12 NOTE — PROGRESS NOTES
[facility] POST-OP NOTE    Subjective:    Patient returns for the right hip. She is doing very well. She denies any significant discomfort. Her pain is well controlled with her pain medicines which she is taking twice a day. She is also taking her aspirin. She denies any shortness of breath or calf pain       Chief Complaint:   Chief Complaint   Patient presents with    Right Hip - Post-op Evaluation     sutures out: R-DANNY on 2/25/19. She has minor pain.       Past Medical History:   Diagnosis Date    Cancer ovarian; uterine    1992    GERD (gastroesophageal reflux disease)     Hyperlipidemia     Hypertension        Past Surgical History:   Procedure Laterality Date    ARTHROPLASTY, HIP Right 2/25/2019    Performed by [facility]iseo Vaca [facility] at [facility] OR    COLON SURGERY      COLONOSCOPY      HERNIA REPAIR N/A 2016    HYSTERECTOMY  total    REVISION COLOSTOMY N/A 2012       Current Outpatient Medications   Medication Sig    ALBUTEROL INHL Inhale into the lungs 4 (four) times daily as needed.     allopurinol (ZYLOPRIM) 100 MG tablet Take 100 mg by mouth once daily.    amLODIPine (NORVASC) 10 MG tablet Take 1 tablet (10 mg) by mouth daily    ARIPiprazole (ABILIFY) 15 MG Tab Take 1 tablet (15 mg) by mouth daily    aspirin 325 MG tablet Take 1 tablet (325 mg total) by mouth 2 (two) times daily.    atenolol (TENORMIN) 50 MG tablet Take 1 tablet (50 mg) by mouth daily    cetirizine (ZYRTEC) 10 MG tablet Take 1 tablet (10 mg total) by mouth once daily.    clonazePAM (KLONOPIN) 1 MG tablet 1 tablet daily as needed for anxiery    dexchlorphen-phenylephrine-DM (POLYTUSSIN DM) 1-5-10 mg/5 mL Syrp Take 5 mLs by mouth every 4 (four) hours as needed.    docusate sodium (COLACE) 100 MG capsule Take 2 capsules (200 mg) by mouth 2 times per day as needed    DULoxetine (CYMBALTA) 60 MG capsule Take 60 mg by mouth once daily.    fluticasone (FLONASE) 50 mcg/actuation nasal spray 2 sprays (100 mcg total) by  Each Nare route once daily.    gabapentin (NEURONTIN) 600 MG tablet Take 1 tablet (600 mg) by mouth 3 times per day    lisinopril (PRINIVIL,ZESTRIL) 20 MG tablet Take 1 tablet (20 mg) by mouth daily    ondansetron (ZOFRAN) 4 MG tablet Take 8 mg by mouth 2 (two) times daily.    oxybutynin (DITROPAN) 5 MG Tab Take 1 tablet (5 mg) by mouth 2 times per day    oxyCODONE-acetaminophen (PERCOCET) 7.5-325 mg per tablet TK 1 T PO  Q 4 H PRN P    pantoprazole (PROTONIX) 20 MG tablet Take 20 mg by mouth once daily.    pantoprazole (PROTONIX) 40 MG tablet Take 1 tablet (40 mg) by mouth daily    polyethylene glycol (COLYTE) 240-22.72-6.72 gram SolR Take 4,000 mLs by mouth as directed.    pravastatin (PRAVACHOL) 20 MG tablet Take 1 tablet (20 mg) by mouth daily    RABEprazole (ACIPHEX) 20 mg tablet     SEREVENT DISKUS 50 mcg/dose diskus inhaler INHALE 1 PUFF EVERY TWELVE HOURS. RINSE MOUTH AFTER USING    tiotropium Br/olodaterol HCl (STIOLTO RESPIMAT INHL) Inhale into the lungs.    traMADol (ULTRAM) 50 mg tablet     traZODone (DESYREL) 100 MG tablet Take 1 tablet (100 mg) by mouth daily as needed at bedtime    VENTOLIN HFA 90 mcg/actuation inhaler 2 puffs every 4 (four) hours as needed.     No current facility-administered medications for this visit.        Review of patient's allergies indicates:  No Known Allergies    Family History   Problem Relation Age of Onset    Cancer Mother     Hypertension Father     Heart disease Father     Heart disease Sister     Hypertension Sister     Hypertension Brother     Depression Brother        Social History     Socioeconomic History    Marital status:      Spouse name: Not on file    Number of children: Not on file    Years of education: Not on file    Highest education level: Not on file   Social Needs    Financial resource strain: Not on file    Food insecurity - worry: Not on file    Food insecurity - inability: Not on file    Transportation needs -  medical: Not on file    Transportation needs - non-medical: Not on file   Occupational History    Not on file   Tobacco Use    Smoking status: Current Every Day Smoker     Packs/day: 1.00     Types: Cigarettes    Smokeless tobacco: Never Used   Substance and Sexual Activity    Alcohol use: Yes     Alcohol/week: 0.6 oz     Types: 1 Glasses of wine per week     Comment: weekly    Drug use: No    Sexual activity: Not on file   Other Topics Concern    Not on file   Social History Narrative    Not on file       History of present illness: See above      Review of Systems:    Musculoskeletal:  See HPI      Objective:        Physical Examination:    Vital Signs:    Vitals:    03/12/19 1416   BP: 100/70   Pulse: (!) 53       Body mass index is 27.41 kg/m².    This a well-developed, well nourished patient in no acute distress.  They are alert and oriented and cooperative to examination.        Wounds are clean dry and intact. Calf is soft.  Pertinent New Results:    XRAY Report / Interpretation:   No new XRAYS Today.    Assessment/Plan:      Stable following total hip arthroplasty on the right side. Continue physical therapy. Start outpatient. Continue aspirin. Follow-up in 4 weeks    This note was created using Dragon voice recognition software that occasionally misinterpreted phrases or words.

## 2019-03-15 ENCOUNTER — CLINICAL SUPPORT (OUTPATIENT)
Dept: REHABILITATION | Facility: HOSPITAL | Age: 59
End: 2019-03-15
Attending: INTERNAL MEDICINE
Payer: MEDICAID

## 2019-03-15 DIAGNOSIS — M16.11 OSTEOARTHRITIS OF RIGHT HIP, UNSPECIFIED OSTEOARTHRITIS TYPE: ICD-10-CM

## 2019-03-15 PROCEDURE — 97110 THERAPEUTIC EXERCISES: CPT | Mod: PN

## 2019-03-15 NOTE — PROGRESS NOTES
Physical Therapy Daily Treatment Note     Name: Nimco Caro  Clinic Number: [id number]    Therapy Diagnosis:   Encounter Diagnosis   Name Primary?    Osteoarthritis of right hip, unspecified osteoarthritis type      Physician: Kendrick Diaz MD    Visit Date: 3/15/2019  Physician Orders: PT Eval and Treat   Medical Diagnosis from Referral: Osteoarthritis of right hip, unspecified osteoarthritis type  Evaluation Date: 3/8/2019  Authorization Period Expiration: 12/31/2019  Plan of Care Expiration: 5/3/2019  Visit # / Visits authorized: 1/ 12      Time In: 1100  Time Out: 1155  Total Billable Time: 55 minutes    Precautions: Standard, Fall and DANNY    Subjective     Pt reports: pain remains R hip.  She was compliant with home exercise program.  Response to previous treatment: no complaints, first visit after initial evaluation  Functional change:     Pain: 7/10  Location: right hip      Objective     Nimco received therapeutic exercises to develop strength, ROM and flexibility for 55 minutes including:      QS x 30  SLR w/assist x 30  SAQ x 30  Heel slides x 30  GS x 30  Bridge over bolster x 30  Ball squeeze x 30  LAQ x 30  Standing gastroc stretch 3 x 30 sec B  Standing HR x 30 B  Adjusted height of RW up two notches for correct height.    Home Exercises Provided and Patient Education Provided     Education provided:   - instructed pt to take shorter steps with turning, not to let toes turn inward, pt verbalized understanding    Written Home Exercises Provided: Patient instructed to cont prior HEP.  Exercises were reviewed and Nimco was able to demonstrate them prior to the end of the session.  Nimco demonstrated good understanding of the education provided.     See EMR under Patient Instructions for exercises provided prior visit.    Assessment     Pt reported pain 4/10 upon completion of therapy.  Good overall tolerance for activity.  Nimco is progressing well towards her goals.   Pt prognosis is Good.      Pt will continue to benefit from skilled outpatient physical therapy to address the deficits listed in the problem list box on initial evaluation, provide pt/family education and to maximize pt's level of independence in the home and community environment.     Pt's spiritual, cultural and educational needs considered and pt agreeable to plan of care and goals.    Anticipated barriers to physical therapy: none    Short Term Goals: 3 weeks   1. The patient will begin a written HEP  2. Decrease soft tissue tenderness to moderate  3. Increase hip strength to 4-/5     Long Term Goals: 6 weeks   1. The patient will be independent with a HEP for maintenance  2. The patient will ambulate on a community level with appropriate assistive device  3. Increase left hip strength to 4+/5  4. Decrease LEFS impairment to <40%   5. Decrease soft tissue tenderness to mild         Plan     Cont per POC    Nichole Méndez, PTA

## 2019-03-20 ENCOUNTER — CLINICAL SUPPORT (OUTPATIENT)
Dept: REHABILITATION | Facility: HOSPITAL | Age: 59
End: 2019-03-20
Attending: INTERNAL MEDICINE
Payer: MEDICAID

## 2019-03-20 DIAGNOSIS — M16.11 OSTEOARTHRITIS OF RIGHT HIP, UNSPECIFIED OSTEOARTHRITIS TYPE: ICD-10-CM

## 2019-03-20 PROCEDURE — 97110 THERAPEUTIC EXERCISES: CPT | Mod: PN

## 2019-03-20 NOTE — PROGRESS NOTES
"  Physical Therapy Daily Treatment Note     Name: Nimco Caro  Clinic Number: [id number]    Therapy Diagnosis:   Encounter Diagnosis   Name Primary?    Osteoarthritis of right hip, unspecified osteoarthritis type      Physician: Kendrick Diaz MD    Visit Date: 3/20/2019  Physician Orders: PT Eval and Treat   Medical Diagnosis from Referral: Osteoarthritis of right hip, unspecified osteoarthritis type  Evaluation Date: 3/8/2019  Authorization Period Expiration: 12/31/2019  Plan of Care Expiration: 5/3/2019  Visit # / Visits authorized: 3/12      Time In: 1403  Time Out: 1457  Total Billable Time: 54 minutes    Precautions: Standard, Fall and posterior hip precautions    Subjective     Pt reports: she was sore for two days after previous therapy.  Pt arrived utilizing RW.  She was compliant with home exercise program.  Response to previous treatment: soreness  Functional change: decreased assistance needed with SLR    Pain: 6/10  Location: right hip      Objective     Nimco received therapeutic exercises to develop strength and flexibility for 54 minutes including:    SLR 3 x 10  SAQ 3 x 10  Heel sides 3 x 10  QS x 30  GS x 30  Bridge with bolster under knees x 30  Supine hip abd x 30 w/assistance  Ball squeeze x 30  LAQ x 30  Standing gastroc stretch 3 x 30 sec B  HR x 30 B  Step up 4" x 30  Standing hamstring curls x 30     Home Exercises Provided and Patient Education Provided     Education provided:   - importance of HEP    Written Home Exercises Provided: Patient instructed to cont prior HEP.  Exercises were reviewed and Nimco was able to demonstrate them prior to the end of the session.  Nimco demonstrated good  understanding of the education provided.     See EMR under Patient Instructions for exercises provided prior visit.    Assessment     Improvement with SLR and overall strength.  Nimco is progressing well towards her goals.   Pt prognosis is Good.     Pt will continue to benefit from skilled " outpatient physical therapy to address the deficits listed in the problem list box on initial evaluation, provide pt/family education and to maximize pt's level of independence in the home and community environment.     Pt's spiritual, cultural and educational needs considered and pt agreeable to plan of care and goals.    Anticipated barriers to physical therapy: none    Short Term Goals: 3 weeks   1. The patient will begin a written HEP  2. Decrease soft tissue tenderness to moderate  3. Increase hip strength to 4-/5     Long Term Goals: 6 weeks   1. The patient will be independent with a HEP for maintenance  2. The patient will ambulate on a community level with appropriate assistive device  3. Increase left hip strength to 4+/5  4. Decrease LEFS impairment to <40%   5. Decrease soft tissue tenderness to mild         Plan     Cont per POC    Nichole Méndez, PTA

## 2019-03-21 ENCOUNTER — CLINICAL SUPPORT (OUTPATIENT)
Dept: REHABILITATION | Facility: HOSPITAL | Age: 59
End: 2019-03-21
Attending: INTERNAL MEDICINE
Payer: MEDICAID

## 2019-03-21 DIAGNOSIS — M16.11 OSTEOARTHRITIS OF RIGHT HIP, UNSPECIFIED OSTEOARTHRITIS TYPE: ICD-10-CM

## 2019-03-21 PROCEDURE — 97110 THERAPEUTIC EXERCISES: CPT | Mod: PN | Performed by: PHYSICAL THERAPIST

## 2019-03-21 NOTE — PROGRESS NOTES
Physical Therapy Daily Treatment Note     Name: Nimco Caro  Clinic Number: [id number]    Therapy Diagnosis:   Encounter Diagnosis   Name Primary?    Osteoarthritis of right hip, unspecified osteoarthritis type      Physician: Kendrick Diaz MD    Visit Date: 3/21/2019    Physician Orders: PT Eval and Treat   Medical Diagnosis from Referral: Osteoarthritis of right hip, unspecified osteoarthritis type  Evaluation Date: 3/8/2019  Authorization Period Expiration: 12/31/2019  Plan of Care Expiration: 5/3/2019  Visit # / Visits authorized: 4/ 12      Time In: 1600  Time Out: 1700  Total Billable Time: 60 minutes    Precautions: DANNY precautions    Subjective     Pt reports: she was sore for 2 days following a visit last week. .  She was compliant with home exercise program.  Response to previous treatment: Decreased soreness  Functional change: Improved ambulation    Pain: 5/10  Location: right hip      Objective     Nimco received therapeutic exercises to develop strength, endurance, ROM and flexibility for 60 minutes including:    Standing GSS 3 x 30 sec  Quad sets 3 x 10  Gluteal sets 3 x 10  AASLR 3 x 10  TKE 3 x 10 (large roll)  Bridging 3 x 10  Adductor squeeze 3 x 10   Seated LAQ 3 x 10  Heel slides 3 x 10  Hamstring curls 3 x 10  LSU  (4 inch) 3 x 10  HR/TR in //bars 3 x 10  Mini squats 1 x 10  Standing hip abduction 3 x 10    Home Exercises Provided and Patient Education Provided     Education provided:   - DANNY precautions    Written Home Exercises Provided: Patient instructed to cont prior HEP.  Exercises were reviewed and Nimco was able to demonstrate them prior to the end of the session.  Nimco demonstrated good  understanding of the education provided.     See EMR under Patient Instructions for exercises provided prior visit.    Assessment       Nimco is progressing well towards her goals.   Pt prognosis is Good.     Pt will continue to benefit from skilled outpatient physical therapy to address  the deficits listed in the problem list box on initial evaluation, provide pt/family education and to maximize pt's level of independence in the home and community environment.     Pt's spiritual, cultural and educational needs considered and pt agreeable to plan of care and goals.    Anticipated barriers to physical therapy: none    Goals:   Short Term Goals: 3 weeks   1. The patient will begin a written HEP  2. Decrease soft tissue tenderness to moderate  3. Increase hip strength to 4-/5     Long Term Goals: 6 weeks   1. The patient will be independent with a HEP for maintenance  2. The patient will ambulate on a community level with appropriate assistive device  3. Increase left hip strength to 4+/5  4. Decrease LEFS impairment to <40%   5. Decrease soft tissue tenderness to mild      Plan     Continue with physical therapy as per plan of care    Irving Olivares, PT

## 2019-03-25 ENCOUNTER — CLINICAL SUPPORT (OUTPATIENT)
Dept: REHABILITATION | Facility: HOSPITAL | Age: 59
End: 2019-03-25
Attending: INTERNAL MEDICINE
Payer: MEDICAID

## 2019-03-25 DIAGNOSIS — M16.11 OSTEOARTHRITIS OF RIGHT HIP, UNSPECIFIED OSTEOARTHRITIS TYPE: ICD-10-CM

## 2019-03-25 PROCEDURE — 97110 THERAPEUTIC EXERCISES: CPT | Mod: PN

## 2019-03-25 NOTE — PROGRESS NOTES
"  Physical Therapy Daily Treatment Note     Name: Nimco MOHAMUD [facility]  Clinic Number: [id number]    Therapy Diagnosis:   Encounter Diagnosis   Name Primary?    Osteoarthritis of right hip, unspecified osteoarthritis type      Physician: Kendrick Diaz MD    Visit Date: 3/25/2019  Physician Orders: PT Eval and Treat   Medical Diagnosis from Referral: Osteoarthritis of right hip, unspecified osteoarthritis type  Evaluation Date: 3/8/2019  Authorization Period Expiration: 12/31/2019  Plan of Care Expiration: 5/3/2019  Visit # / Visits authorized: 1/ 1      Time In: 1258  Time Out: 1457  Total Billable Time: 59 minutes    Precautions: Fall and DANNY protocol    Subjective     Pt reports: "I just took a pain pill, so I am ok".  She was compliant with home exercise program.  Response to previous treatment: no complaints  Functional change: strength improving, more equal weight distribution during ambuation    Pain: 0/10    Objective     Nimco received therapeutic exercises to develop strength and flexibility for 59 minutes including:    SLR 3 x 10  SAQ 1# 3 x 10  Heel sides 1# 3 x 10  QS x 30  GS x 30  Bridge with bolster under knees x 30  Supine hip abd 1# x 30  Ball squeeze x 30  Clamshell GTB x 30  LAQ 1# x 30  Standing gastroc stretch 3 x 30 sec B  HR/TR x 30 B  LSU 4" x 30  Standing hamstring curls x 30   Standing hip abd x 30    Home Exercises Provided and Patient Education Provided     Education provided:   - importance of HEP    Written Home Exercises Provided: Patient instructed to cont prior HEP.  Exercises were reviewed and Nimco was able to demonstrate them prior to the end of the session.  Nimco demonstrated good  understanding of the education provided.     See EMR under Patient Instructions for exercises provided prior visit.    Assessment     Decreased assistance needed with SLR.  Strength improving.  Nimco is progressing well towards her goals.   Pt prognosis is Good.     Pt will continue to benefit from " skilled outpatient physical therapy to address the deficits listed in the problem list box on initial evaluation, provide pt/family education and to maximize pt's level of independence in the home and community environment.     Pt's spiritual, cultural and educational needs considered and pt agreeable to plan of care and goals.    Anticipated barriers to physical therapy: none    Short Term Goals: 3 weeks   1. The patient will begin a written HEP  2. Decrease soft tissue tenderness to moderate  3. Increase hip strength to 4-/5     Long Term Goals: 6 weeks   1. The patient will be independent with a HEP for maintenance  2. The patient will ambulate on a community level with appropriate assistive device  3. Increase left hip strength to 4+/5  4. Decrease LEFS impairment to <40%   5. Decrease soft tissue tenderness to mild         Plan     Cont per POC    Nichole Méndez, PTA

## 2019-03-27 ENCOUNTER — CLINICAL SUPPORT (OUTPATIENT)
Dept: REHABILITATION | Facility: HOSPITAL | Age: 59
End: 2019-03-27
Attending: INTERNAL MEDICINE
Payer: MEDICAID

## 2019-03-27 DIAGNOSIS — M16.11 OSTEOARTHRITIS OF RIGHT HIP, UNSPECIFIED OSTEOARTHRITIS TYPE: ICD-10-CM

## 2019-03-27 PROCEDURE — 97110 THERAPEUTIC EXERCISES: CPT | Mod: PN

## 2019-03-27 NOTE — PROGRESS NOTES
"  Physical Therapy Daily Treatment Note     Name: Nimco MOHAMUD [facility]  Clinic Number: [id number]    Therapy Diagnosis:   Encounter Diagnosis   Name Primary?    Osteoarthritis of right hip, unspecified osteoarthritis type      Physician: Kendrick Diaz MD    Visit Date: 3/27/2019  Physician Orders: PT Eval and Treat   Medical Diagnosis from Referral: Osteoarthritis of right hip, unspecified osteoarthritis type  Evaluation Date: 3/8/2019  Authorization Period Expiration: 12/31/2019  Plan of Care Expiration: 5/3/2019  Visit # / Visits authorized: 6/ 12      Time In: 1300  Time Out: 1358  Total Billable Time: 58 minutes    Precautions: Standard, Fall and DANNY protocol    Subjective     Pt reports: she cooked red beans yesterday.  "I am ok because I just took a pain pill."  She was compliant with home exercise program.  Response to previous treatment: no complaints  Functional change: strength improving    Pain: 0/10    Objective     Nimco received therapeutic exercises to develop strength and flexibility for 58 minutes including:    SLR 1# 3 x 10  SAQ 1# 3 x 10  Heel sides 1# 3 x 10  QS x 30  Bridge x 30  Supine hip abd 1# x 30  Ball squeeze x 30  Clamshell GTB x 30  LAQ 1# x 30  Standing gastroc stretch 3 x 30 sec B  HR/TR x 30 B  LSU 4" x 30  Up/down 4 steps utilizing B handrails, CGA x 4 trials    Home Exercises Provided and Patient Education Provided     Education provided:   - importance of HEP    Written Home Exercises Provided: Patient instructed to cont prior HEP.  Exercises were reviewed and Nimco was able to demonstrate them prior to the end of the session.  Nimco demonstrated good  understanding of the education provided.     See EMR under Patient Instructions for exercises provided prior visit.    Assessment     Strength is improving.  Increased weight bearing RLE during stance phase.  Nimco is progressing well towards her goals.   Pt prognosis is Good.     Pt will continue to benefit from skilled " outpatient physical therapy to address the deficits listed in the problem list box on initial evaluation, provide pt/family education and to maximize pt's level of independence in the home and community environment.     Pt's spiritual, cultural and educational needs considered and pt agreeable to plan of care and goals.    Anticipated barriers to physical therapy: none    Short Term Goals: 3 weeks   1. The patient will begin a written HEP  2. Decrease soft tissue tenderness to moderate  3. Increase hip strength to 4-/5     Long Term Goals: 6 weeks   1. The patient will be independent with a HEP for maintenance  2. The patient will ambulate on a community level with appropriate assistive device  3. Increase left hip strength to 4+/5  4. Decrease LEFS impairment to <40%   5. Decrease soft tissue tenderness to mild         Plan     Cont per POC    Nichole Méndez, PTA

## 2019-04-01 ENCOUNTER — CLINICAL SUPPORT (OUTPATIENT)
Dept: REHABILITATION | Facility: HOSPITAL | Age: 59
End: 2019-04-01
Attending: INTERNAL MEDICINE
Payer: MEDICAID

## 2019-04-01 DIAGNOSIS — M16.11 OSTEOARTHRITIS OF RIGHT HIP, UNSPECIFIED OSTEOARTHRITIS TYPE: ICD-10-CM

## 2019-04-01 PROCEDURE — 97110 THERAPEUTIC EXERCISES: CPT | Mod: PN

## 2019-04-01 NOTE — PROGRESS NOTES
"  Physical Therapy Daily Treatment Note     Name: Nimco Caro  Clinic Number: [id number]    Therapy Diagnosis:   Encounter Diagnosis   Name Primary?    Osteoarthritis of right hip, unspecified osteoarthritis type      Physician: Kendrick Diaz MD    Visit Date: 4/1/2019  Physician Orders: PT Eval and Treat   Medical Diagnosis from Referral: Osteoarthritis of right hip, unspecified osteoarthritis type  Evaluation Date: 3/8/2019  Authorization Period Expiration: 12/31/2019  Plan of Care Expiration: 5/3/2019  Visit # / Visits authorized: 7/12      Time In: 1255  Time Out: 1355  Total Billable Time: 30 minutes    Precautions: Standard, Fall and DANNY protocol    Subjective     Pt reports: "That thing pulls me down"  Re: RW.  She was compliant with home exercise program.  Response to previous treatment: no complaints  Functional change: strength and gait pattern improved    Pain: 0/10  Location: right leg      Objective     Nimco received therapeutic exercises to develop strength for 60 minutes including:    SLR 1# 3 x 10  SAQ 2# 3 x 10  QS x 30  Bridge x 30  Supine hip abd 1# x 30  Ball squeeze x 30  Clamshell GTB x 30  LAQ 1# x 30  Standing gastroc stretch 3 x 30 sec B  HR/TR on airex x 30 B  Standing HSC x 30  LSU 6" x 30  Up/down 4 steps utilizing L handrails, CGA x 3trials    Home Exercises Provided and Patient Education Provided     Education provided:   - cont HEP    Written Home Exercises Provided: Patient instructed to cont prior HEP.  Exercises were reviewed and Nimco was able to demonstrate them prior to the end of the session.  Nimco demonstrated good  understanding of the education provided.     See EMR under Patient Instructions for exercises provided prior visit.    Assessment     Good technique with ambulation with SBQC.    Nimco is progressing well towards her goals.   Pt prognosis is Good.     Pt will continue to benefit from skilled outpatient physical therapy to address the deficits listed in " the problem list box on initial evaluation, provide pt/family education and to maximize pt's level of independence in the home and community environment.     Pt's spiritual, cultural and educational needs considered and pt agreeable to plan of care and goals.    Anticipated barriers to physical therapy: none    Short Term Goals: 3 weeks   1. The patient will begin a written HEP  2. Decrease soft tissue tenderness to moderate  3. Increase hip strength to 4-/5     Long Term Goals: 6 weeks   1. The patient will be independent with a HEP for maintenance  2. The patient will ambulate on a community level with appropriate assistive device  3. Increase left hip strength to 4+/5  4. Decrease LEFS impairment to <40%   5. Decrease soft tissue tenderness to mild         Plan     Cont per POC    Nichole Méndez, PTA

## 2019-04-03 ENCOUNTER — CLINICAL SUPPORT (OUTPATIENT)
Dept: REHABILITATION | Facility: HOSPITAL | Age: 59
End: 2019-04-03
Attending: INTERNAL MEDICINE
Payer: MEDICAID

## 2019-04-03 DIAGNOSIS — M16.11 OSTEOARTHRITIS OF RIGHT HIP, UNSPECIFIED OSTEOARTHRITIS TYPE: ICD-10-CM

## 2019-04-03 PROCEDURE — 97110 THERAPEUTIC EXERCISES: CPT | Mod: PN

## 2019-04-03 NOTE — PROGRESS NOTES
"  Physical Therapy Daily Treatment Note     Name: Nimco MOHAMUD Caro  Clinic Number: [id number]    Therapy Diagnosis:   Encounter Diagnosis   Name Primary?    Osteoarthritis of right hip, unspecified osteoarthritis type      Physician: Kendrick Diaz MD    Visit Date: 4/3/2019  Physician Orders: PT Eval and Treat   Medical Diagnosis from Referral: Osteoarthritis of right hip, unspecified osteoarthritis type  Evaluation Date: 3/8/2019  Authorization Period Expiration: 12/31/2019  Plan of Care Expiration: 5/3/2019  Visit # / Visits authorized: 8/12      Time In: 1300  Time Out: 1355  Total Billable Time: 3 minutes    Precautions: Standard, Fall and DANNY precautions    Subjective     Pt reports: she went to her house in [place] yesterday.  She was compliant with home exercise program.  Response to previous treatment: no complaints  Functional change: pt ambulating with SBQC    Pain: 0/10    Objective     Nimco received therapeutic exercises to develop strength, ROM and flexibility for 55 minutes including:    SLR 0# 3 x 10  SAQ 2# 3 x 10  QS x 30  Bridge x 30  Supine hip abd 1# x 30  Ball squeeze x 30  Clamshell GTB x 30  LAQ 1# x 30  Standing gastroc stretch 3 x 30 sec B  HR on airex x 30 B  LSU 6" x 30  SLS 3 x 30 sec B  TKE with ball behind knee against wall x 30 B      Home Exercises Provided and Patient Education Provided     Education provided:   - cont HEP    Written Home Exercises Provided: Patient instructed to cont prior HEP.  Exercises were reviewed and Nimco was able to demonstrate them prior to the end of the session.  Nimco demonstrated good  understanding of the education provided.     See EMR under Patient Instructions for exercises provided prior visit.    Assessment     Pt arrived ambulating with SBQC with proper step technique.  Unable to perform SLR with 1# today due to R anterior hip discomfort.  She reported she "took a long car ride" the day before.  Nimco is progressing well towards her " goals.   Pt prognosis is Good.     Pt will continue to benefit from skilled outpatient physical therapy to address the deficits listed in the problem list box on initial evaluation, provide pt/family education and to maximize pt's level of independence in the home and community environment.     Pt's spiritual, cultural and educational needs considered and pt agreeable to plan of care and goals.    Anticipated barriers to physical therapy: none    Short Term Goals: 3 weeks   1. The patient will begin a written HEP  2. Decrease soft tissue tenderness to moderate  3. Increase hip strength to 4-/5     Long Term Goals: 6 weeks   1. The patient will be independent with a HEP for maintenance  2. The patient will ambulate on a community level with appropriate assistive device  3. Increase left hip strength to 4+/5  4. Decrease LEFS impairment to <40%   5. Decrease soft tissue tenderness to mild         Plan     Cont per POC    Nichole Méndez, PTA

## 2019-04-08 ENCOUNTER — CLINICAL SUPPORT (OUTPATIENT)
Dept: REHABILITATION | Facility: HOSPITAL | Age: 59
End: 2019-04-08
Attending: INTERNAL MEDICINE
Payer: MEDICAID

## 2019-04-08 DIAGNOSIS — M16.11 OSTEOARTHRITIS OF RIGHT HIP, UNSPECIFIED OSTEOARTHRITIS TYPE: ICD-10-CM

## 2019-04-08 PROCEDURE — 97110 THERAPEUTIC EXERCISES: CPT | Mod: PN

## 2019-04-08 NOTE — PROGRESS NOTES
"  Physical Therapy Daily Treatment Note     Name: Nimco MOHAMUD [facility]  Clinic Number: [id number]    Therapy Diagnosis:   Encounter Diagnosis   Name Primary?    Osteoarthritis of right hip, unspecified osteoarthritis type      Physician: Kendrick Diaz MD    Physician Orders: PT Eval and Treat   Medical Diagnosis from Referral: Osteoarthritis of right hip, unspecified osteoarthritis type  Evaluation Date: 3/8/2019  Authorization Period Expiration: 12/31/2019  Plan of Care Expiration: 5/3/2019  Visit # / Visits authorized: 8/12           Time In: 1300  Time Out: 1357  Total Billable Time: 30 minutes    Precautions: Standard, Fall and posterior hip precautions    Subjective     Pt reports: increased soreness/discomfort.  She reports she took a long car ride.  She was compliant with home exercise program.  Response to previous treatment: no complaints  Functional change: gait pattern improved with SBQC    Pain: 5/10  Location: left leg      Objective     Nimco received therapeutic exercises to develop strength for 57 minutes including:    Hip flexor stretch 3 x 30 sec L, small ROM  SLR 0# 3 x 10  SAQ 2# 3 x 10  Bridge x 30  Supine hip abd 0# x 30  Ball squeeze x 30  Clamshell GTB x 30  LAQ 2# x 30  Standing gastroc stretch 3 x 30 sec B  HR/TR on airex x 30 B  LSU 6" x 30  SLS 3 x 30 sec B  TKE with ball behind knee against wall x 30 R    Home Exercises Provided and Patient Education Provided     Education provided:   - cont HEP    Written Home Exercises Provided: Patient instructed to cont prior HEP.  Exercises were reviewed and Nimco was able to demonstrate them prior to the end of the session.  Nimco demonstrated good  understanding of the education provided.     See EMR under Patient Instructions for exercises provided prior visit.    Assessment     Good overall tolerance for activity.  Strength and gait pattern improving.  Nimco is progressing well towards her goals.   Pt prognosis is Good.     Pt will continue to " benefit from skilled outpatient physical therapy to address the deficits listed in the problem list box on initial evaluation, provide pt/family education and to maximize pt's level of independence in the home and community environment.     Pt's spiritual, cultural and educational needs considered and pt agreeable to plan of care and goals.    Short Term Goals: 3 weeks   1. The patient will begin a written HEP  2. Decrease soft tissue tenderness to moderate  3. Increase hip strength to 4-/5     Long Term Goals: 6 weeks   1. The patient will be independent with a HEP for maintenance  2. The patient will ambulate on a community level with appropriate assistive device  3. Increase left hip strength to 4+/5  4. Decrease LEFS impairment to <40%   5. Decrease soft tissue tenderness to mild      Plan     Cont per POC    Nichole Méndez, PTA

## 2019-04-09 ENCOUNTER — OFFICE VISIT (OUTPATIENT)
Dept: ORTHOPEDICS | Facility: CLINIC | Age: 59
End: 2019-04-09
Payer: MEDICAID

## 2019-04-09 VITALS
BODY MASS INDEX: 27.47 KG/M2 | WEIGHT: 175 LBS | HEART RATE: 62 BPM | DIASTOLIC BLOOD PRESSURE: 60 MMHG | SYSTOLIC BLOOD PRESSURE: 120 MMHG | HEIGHT: 67 IN

## 2019-04-09 DIAGNOSIS — Z96.641 STATUS POST TOTAL REPLACEMENT OF RIGHT HIP: Primary | ICD-10-CM

## 2019-04-09 PROCEDURE — 99024 POSTOP FOLLOW-UP VISIT: CPT | Mod: ,,, | Performed by: ORTHOPAEDIC SURGERY

## 2019-04-09 PROCEDURE — 72170 X-RAY EXAM OF PELVIS: CPT | Mod: ,,, | Performed by: ORTHOPAEDIC SURGERY

## 2019-04-09 PROCEDURE — 72170 PR  X-RAY PELVIS 1/2 VW: ICD-10-PCS | Mod: ,,, | Performed by: ORTHOPAEDIC SURGERY

## 2019-04-09 PROCEDURE — 99024 PR POST-OP FOLLOW-UP VISIT: ICD-10-PCS | Mod: ,,, | Performed by: ORTHOPAEDIC SURGERY

## 2019-04-09 NOTE — PROGRESS NOTES
[facility] POST-OP NOTE    Subjective:           Chief Complaint:   Chief Complaint   Patient presents with    Right Hip - Post-op Evaluation     Right DANNY  2.25.19. States that she still has pain but is doing good.        Past Medical History:   Diagnosis Date    Cancer ovarian; uterine    1992    GERD (gastroesophageal reflux disease)     Hyperlipidemia     Hypertension        Past Surgical History:   Procedure Laterality Date    ARTHROPLASTY, HIP Right 2/25/2019    [facility]aca [facility]    COLON SURGERY      COLONOSCOPY      HERNIA REPAIR N/A 2016    HYSTERECTOMY  total    REVISION COLOSTOMY N/A 2012       Current Outpatient Medications   Medication Sig    ALBUTEROL INHL Inhale into the lungs 4 (four) times daily as needed.     allopurinol (ZYLOPRIM) 100 MG tablet Take 100 mg by mouth once daily.    amLODIPine (NORVASC) 10 MG tablet Take 1 tablet (10 mg) by mouth daily    ARIPiprazole (ABILIFY) 15 MG Tab Take 1 tablet (15 mg) by mouth daily    atenolol (TENORMIN) 50 MG tablet Take 1 tablet (50 mg) by mouth daily    clonazePAM (KLONOPIN) 1 MG tablet 1 tablet daily as needed for anxiery    docusate sodium (COLACE) 100 MG capsule Take 2 capsules (200 mg) by mouth 2 times per day as needed    DULoxetine (CYMBALTA) 60 MG capsule Take 60 mg by mouth once daily.    fluticasone (FLONASE) 50 mcg/actuation nasal spray 2 sprays (100 mcg total) by Each Nare route once daily.    gabapentin (NEURONTIN) 600 MG tablet Take 1 tablet (600 mg) by mouth 3 times per day    lisinopril (PRINIVIL,ZESTRIL) 20 MG tablet Take 1 tablet (20 mg) by mouth daily    ondansetron (ZOFRAN) 4 MG tablet Take 8 mg by mouth 2 (two) times daily.    oxybutynin (DITROPAN) 5 MG Tab Take 1 tablet (5 mg) by mouth 2 times per day    oxyCODONE-acetaminophen (PERCOCET) 7.5-325 mg per tablet TK 1 T PO  Q 4 H PRN P    pantoprazole (PROTONIX) 40 MG tablet Take 1 tablet (40 mg) by mouth daily    pravastatin  (PRAVACHOL) 20 MG tablet Take 1 tablet (20 mg) by mouth daily    SEREVENT DISKUS 50 mcg/dose diskus inhaler INHALE 1 PUFF EVERY TWELVE HOURS. RINSE MOUTH AFTER USING    tiotropium Br/olodaterol HCl (STIOLTO RESPIMAT INHL) Inhale into the lungs.    traMADol (ULTRAM) 50 mg tablet     traZODone (DESYREL) 100 MG tablet Take 1 tablet (100 mg) by mouth daily as needed at bedtime    VENTOLIN HFA 90 mcg/actuation inhaler 2 puffs every 4 (four) hours as needed.    aspirin 325 MG tablet Take 1 tablet (325 mg total) by mouth 2 (two) times daily.    cetirizine (ZYRTEC) 10 MG tablet Take 1 tablet (10 mg total) by mouth once daily.    polyethylene glycol (COLYTE) 240-22.72-6.72 gram SolR Take 4,000 mLs by mouth as directed.     No current facility-administered medications for this visit.        Review of patient's allergies indicates:  No Known Allergies    Family History   Problem Relation Age of Onset    Cancer Mother     Hypertension Father     Heart disease Father     Heart disease Sister     Hypertension Sister     Hypertension Brother     Depression Brother        Social History     Socioeconomic History    Marital status:      Spouse name: Not on file    Number of children: Not on file    Years of education: Not on file    Highest education level: Not on file   Occupational History    Not on file   Social Needs    Financial resource strain: Not on file    Food insecurity:     Worry: Not on file     Inability: Not on file    Transportation needs:     Medical: Not on file     Non-medical: Not on file   Tobacco Use    Smoking status: Current Every Day Smoker     Packs/day: 1.00     Types: Cigarettes    Smokeless tobacco: Never Used   Substance and Sexual Activity    Alcohol use: Yes     Alcohol/week: 0.6 oz     Types: 1 Glasses of wine per week     Comment: weekly    Drug use: No    Sexual activity: Not on file   Lifestyle    Physical activity:     Days per week: Not on file     Minutes  per session: Not on file    Stress: Not on file   Relationships    Social connections:     Talks on phone: Not on file     Gets together: Not on file     Attends Yazidism service: Not on file     Active member of club or organization: Not on file     Attends meetings of clubs or organizations: Not on file     Relationship status: Not on file   Other Topics Concern    Not on file   Social History Narrative    Not on file       History of present illness: Patient comes in today for the right hip. She is doing very well.Review of Systems:    Musculoskeletal:  See HPI      Objective:        Physical Examination:    Vital Signs:    Vitals:    04/09/19 1300   BP: 120/60   Pulse: 62       Body mass index is 27.41 kg/m².    This a well-developed, well nourished patient in no acute distress.  They are alert and oriented and cooperative to examination.         Patient has symmetric leg lengths. She has no pain even with rigorous motion of the hip.        Pertinent New Results:    XRAY Report / Interpretation:   AP of the right hip demonstrates her right total hip arthroplasty be in acceptable position. No evidence of loosening subsidence or change in position    Assessment/Plan:      Stable following total hip arthroplasty. Follow-up in 2 months.    This note was created using Dragon voice recognition software that occasionally misinterpreted phrases or words.

## 2019-06-11 ENCOUNTER — OFFICE VISIT (OUTPATIENT)
Dept: ORTHOPEDICS | Facility: CLINIC | Age: 59
End: 2019-06-11
Payer: MEDICAID

## 2019-06-11 VITALS
DIASTOLIC BLOOD PRESSURE: 60 MMHG | WEIGHT: 165 LBS | BODY MASS INDEX: 25.9 KG/M2 | SYSTOLIC BLOOD PRESSURE: 96 MMHG | HEIGHT: 67 IN | HEART RATE: 61 BPM

## 2019-06-11 DIAGNOSIS — M17.12 PRIMARY OSTEOARTHRITIS OF LEFT KNEE: ICD-10-CM

## 2019-06-11 DIAGNOSIS — Z96.641 HISTORY OF TOTAL HIP ARTHROPLASTY, RIGHT: Primary | ICD-10-CM

## 2019-06-11 PROCEDURE — 99214 PR OFFICE/OUTPT VISIT, EST, LEVL IV, 30-39 MIN: ICD-10-PCS | Mod: 25,,, | Performed by: ORTHOPAEDIC SURGERY

## 2019-06-11 PROCEDURE — 99214 OFFICE O/P EST MOD 30 MIN: CPT | Mod: 25,,, | Performed by: ORTHOPAEDIC SURGERY

## 2019-06-11 PROCEDURE — 20610 LARGE JOINT ASPIRATION/INJECTION: L KNEE: ICD-10-PCS | Mod: LT,,, | Performed by: ORTHOPAEDIC SURGERY

## 2019-06-11 PROCEDURE — 20610 DRAIN/INJ JOINT/BURSA W/O US: CPT | Mod: LT,,, | Performed by: ORTHOPAEDIC SURGERY

## 2019-06-11 PROCEDURE — 73501 X-RAY EXAM HIP UNI 1 VIEW: CPT | Mod: RT,,, | Performed by: ORTHOPAEDIC SURGERY

## 2019-06-11 PROCEDURE — 73501 PR X-RAY EXAM HIP UNI 1 VIEW: ICD-10-PCS | Mod: RT,,, | Performed by: ORTHOPAEDIC SURGERY

## 2019-06-11 RX ORDER — LISINOPRIL 10 MG/1
TABLET ORAL
Refills: 1 | COMMUNITY
Start: 2019-05-29 | End: 2019-08-05

## 2019-06-11 RX ORDER — METHYLPREDNISOLONE ACETATE 40 MG/ML
40 INJECTION, SUSPENSION INTRA-ARTICULAR; INTRALESIONAL; INTRAMUSCULAR; SOFT TISSUE
Status: DISCONTINUED | OUTPATIENT
Start: 2019-06-11 | End: 2019-06-11 | Stop reason: HOSPADM

## 2019-06-11 RX ORDER — TRAMADOL HYDROCHLORIDE 50 MG/1
50 TABLET ORAL EVERY 6 HOURS PRN
Qty: 28 TABLET | Refills: 0 | Status: SHIPPED | OUTPATIENT
Start: 2019-06-11 | End: 2019-06-18

## 2019-06-11 RX ADMIN — METHYLPREDNISOLONE ACETATE 40 MG: 40 INJECTION, SUSPENSION INTRA-ARTICULAR; INTRALESIONAL; INTRAMUSCULAR; SOFT TISSUE at 01:06

## 2019-06-11 NOTE — PROGRESS NOTES
[facility]    Subjective:     Chief Complaint:   Chief Complaint   Patient presents with    Right Hip - Post-op Evaluation     Right DANNY 2-25-19. She has minor pain.    Left Knee - Pain     Left knee hurts a lot with swelling.        Past Medical History:   Diagnosis Date    Cancer ovarian; uterine    1992    GERD (gastroesophageal reflux disease)     Hyperlipidemia     Hypertension        Past Surgical History:   Procedure Laterality Date    ARTHROPLASTY, HIP Right 2/25/2019    Per[facility] by [facility]aca [facility] at [facility] OR    COLON SURGERY      COLONOSCOPY      HERNIA REPAIR N/A 2016    HYSTERECTOMY  total    REVISION COLOSTOMY N/A 2012       Current Outpatient Medications   Medication Sig    ALBUTEROL INHL Inhale into the lungs 4 (four) times daily as needed.     amLODIPine (NORVASC) 10 MG tablet Take 1 tablet (10 mg) by mouth daily    ARIPiprazole (ABILIFY) 15 MG Tab Take 1 tablet (15 mg) by mouth daily    atenolol (TENORMIN) 50 MG tablet Take 1 tablet (50 mg) by mouth daily    clonazePAM (KLONOPIN) 1 MG tablet 1 tablet daily as needed for anxiery    docusate sodium (COLACE) 100 MG capsule Take 2 capsules (200 mg) by mouth 2 times per day as needed    DULoxetine (CYMBALTA) 60 MG capsule Take 60 mg by mouth once daily.    fluticasone (FLONASE) 50 mcg/actuation nasal spray 2 sprays (100 mcg total) by Each Nare route once daily.    gabapentin (NEURONTIN) 600 MG tablet Take 1 tablet (600 mg) by mouth 3 times per day    lisinopril (PRINIVIL,ZESTRIL) 20 MG tablet Take 1 tablet (20 mg) by mouth daily    lisinopril 10 MG tablet Take 1 tablet (10 mg) by mouth daily    ondansetron (ZOFRAN) 4 MG tablet Take 8 mg by mouth 2 (two) times daily.    oxybutynin (DITROPAN) 5 MG Tab Take 1 tablet (5 mg) by mouth 2 times per day    pantoprazole (PROTONIX) 40 MG tablet Take 1 tablet (40 mg) by mouth daily    polyethylene glycol (COLYTE) 240-22.72-6.72 gram SolR Take 4,000 mLs by mouth as directed.     pravastatin (PRAVACHOL) 20 MG tablet Take 1 tablet (20 mg) by mouth daily    SEREVENT DISKUS 50 mcg/dose diskus inhaler INHALE 1 PUFF EVERY TWELVE HOURS. RINSE MOUTH AFTER USING    tiotropium Br/olodaterol HCl (STIOLTO RESPIMAT INHL) Inhale into the lungs.    traZODone (DESYREL) 100 MG tablet Take 1 tablet (100 mg) by mouth daily as needed at bedtime    VENTOLIN HFA 90 mcg/actuation inhaler 2 puffs every 4 (four) hours as needed.    aspirin 325 MG tablet Take 1 tablet (325 mg total) by mouth 2 (two) times daily.    cetirizine (ZYRTEC) 10 MG tablet Take 1 tablet (10 mg total) by mouth once daily.     No current facility-administered medications for this visit.        Review of patient's allergies indicates:  No Known Allergies    Family History   Problem Relation Age of Onset    Cancer Mother     Hypertension Father     Heart disease Father     Heart disease Sister     Hypertension Sister     Hypertension Brother     Depression Brother        Social History     Socioeconomic History    Marital status:      Spouse name: Not on file    Number of children: Not on file    Years of education: Not on file    Highest education level: Not on file   Occupational History    Not on file   Social Needs    Financial resource strain: Not on file    Food insecurity:     Worry: Not on file     Inability: Not on file    Transportation needs:     Medical: Not on file     Non-medical: Not on file   Tobacco Use    Smoking status: Current Every Day Smoker     Packs/day: 1.00     Types: Cigarettes    Smokeless tobacco: Never Used   Substance and Sexual Activity    Alcohol use: Yes     Alcohol/week: 0.6 oz     Types: 1 Glasses of wine per week     Comment: weekly    Drug use: No    Sexual activity: Not on file   Lifestyle    Physical activity:     Days per week: Not on file     Minutes per session: Not on file    Stress: Not on file   Relationships    Social connections:     Talks on phone: Not on  file     Gets together: Not on file     Attends Restorationist service: Not on file     Active member of club or organization: Not on file     Attends meetings of clubs or organizations: Not on file     Relationship status: Not on file   Other Topics Concern    Not on file   Social History Narrative    Not on file       History of present illness: Patient comes in for the right hip and the left knee. In terms of her right hip she's doing very well she's not having any issues. In terms of her left knee she's having severe discomfort. She is ready to have her left knee replaced. She's had arthroscopy and multiple injections in that knee      Review of Systems:    Constitution: Negative for chills, fever, and sweats.  Negative for unexplained weight loss.    HENT:  Negative for headaches and blurry vision.    Cardiovascular:Negative for chest pain or irregular heart beat. Negative for hypertension.    Respiratory:  Negative for cough and shortness of breath.    Gastrointestinal: Negative for abdominal pain, heartburn, melena, nausea, and vomitting.    Genitourinary:  Negative bladder incontinence and dysuria.    Musculoskeletal:  See HPI for details.     Neurological: Negative for numbness.    Psychiatric/Behavioral: Negative for depression.  The patient is not nervous/anxious.      Endocrine: Negative for polyuria    Hematologic/Lymphatic: Negative for bleeding problem.  Does not bruise/bleed easily.    Skin: Negative for poor would healing and rash    Objective:      Physical Examination:    Vital Signs:    Vitals:    06/11/19 1255   BP: 96/60   Pulse: 61       Body mass index is 25.84 kg/m².    This a well-developed, well nourished patient in no acute distress.  They are alert and oriented and cooperative to examination.        Patient has significant crepitus of the left knee she has a 2+ effusion. Her knee is stable to varus valgus stresses. She has symmetric leg lengths. No pain with motion of the right hip. No pain  with motion of the left hip.  Pertinent New Results:    XRAY Report / Interpretation:   AP of the right hip demonstrates her total hip to be in ideal position    Assessment/Plan:      Stable following right total hip. No further intervention. Left knee I injected her with Depo-Medrol and lidocaine. I've scheduled her for left total knee. Risks and benefits of discussed in great detail. She understood and wished to proceed      This note was created using Dragon voice recognition software that occasionally misinterpreted phrases or words.

## 2019-06-11 NOTE — PROCEDURES
Large Joint Aspiration/Injection: L knee  Date/Time: 6/11/2019 1:17 PM  Performed by: Eliseo Vaca MD  Authorized by: Eliseo Vaca MD     Consent Done?:  Yes (Verbal)  Indications:  Pain  Procedure site marked: Yes    Timeout: Prior to procedure the correct patient, procedure, and site was verified      Location:  Knee  Site:  L knee  Prep: Patient was prepped and draped in usual sterile fashion    Needle size:  25 G  Medications:  40 mg methylPREDNISolone acetate 40 mg/mL  Patient tolerance:  Patient tolerated the procedure well with no immediate complications

## 2019-06-25 DIAGNOSIS — M17.12 DEGENERATIVE ARTHRITIS OF LEFT KNEE: ICD-10-CM

## 2019-06-25 DIAGNOSIS — M17.12 PRIMARY OSTEOARTHRITIS OF LEFT KNEE: Primary | ICD-10-CM

## 2019-06-25 RX ORDER — SODIUM CHLORIDE 9 MG/ML
INJECTION, SOLUTION INTRAVENOUS CONTINUOUS
Status: CANCELLED | OUTPATIENT
Start: 2019-06-25

## 2019-06-25 RX ORDER — MUPIROCIN 20 MG/G
OINTMENT TOPICAL
Status: CANCELLED | OUTPATIENT
Start: 2019-06-25

## 2019-07-15 ENCOUNTER — TELEPHONE (OUTPATIENT)
Dept: ORTHOPEDICS | Facility: CLINIC | Age: 59
End: 2019-07-15

## 2019-07-29 ENCOUNTER — TELEPHONE (OUTPATIENT)
Dept: ORTHOPEDICS | Facility: CLINIC | Age: 59
End: 2019-07-29

## 2019-07-29 NOTE — TELEPHONE ENCOUNTER
----- Message from Kelly Knott sent at 7/29/2019  8:56 AM CDT -----  Contact: patient  She was calling to cancel and possibly r/s surgery, the person that takes care of her is no longer available at that time for her surgery, call her at [phone].

## 2019-08-05 ENCOUNTER — HOSPITAL ENCOUNTER (OUTPATIENT)
Dept: PREADMISSION TESTING | Facility: HOSPITAL | Age: 59
Discharge: HOME | End: 2019-08-05
Attending: ORTHOPAEDIC SURGERY
Payer: MEDICAID

## 2019-08-05 ENCOUNTER — HOSPITAL ENCOUNTER (OUTPATIENT)
Dept: RADIOLOGY | Facility: HOSPITAL | Age: 59
Discharge: HOME | End: 2019-08-05
Attending: ORTHOPAEDIC SURGERY
Payer: MEDICAID

## 2019-08-05 VITALS — BODY MASS INDEX: 26.37 KG/M2 | WEIGHT: 168 LBS | HEIGHT: 67 IN

## 2019-08-05 DIAGNOSIS — M17.12 OSTEOARTHRITIS OF LEFT KNEE, UNSPECIFIED OSTEOARTHRITIS TYPE: Primary | ICD-10-CM

## 2019-08-05 DIAGNOSIS — M17.12 PRIMARY OSTEOARTHRITIS OF LEFT KNEE: ICD-10-CM

## 2019-08-05 DIAGNOSIS — M16.11 OSTEOARTHRITIS OF RIGHT HIP, UNSPECIFIED OSTEOARTHRITIS TYPE: ICD-10-CM

## 2019-08-05 LAB
ABO + RH BLD: NORMAL
ALBUMIN SERPL BCP-MCNC: 3.8 G/DL (ref 3.5–5.2)
ALP SERPL-CCNC: 94 U/L (ref 55–135)
ALT SERPL W/O P-5'-P-CCNC: 16 U/L (ref 10–44)
ANION GAP SERPL CALC-SCNC: 7 MMOL/L (ref 8–16)
AST SERPL-CCNC: 17 U/L (ref 10–40)
BASOPHILS # BLD AUTO: 0.03 K/UL (ref 0–0.2)
BASOPHILS NFR BLD: 0.3 % (ref 0–1.9)
BILIRUB SERPL-MCNC: 0.2 MG/DL (ref 0.1–1)
BILIRUB UR QL STRIP: NEGATIVE
BLD GP AB SCN CELLS X3 SERPL QL: NORMAL
BUN SERPL-MCNC: 6 MG/DL (ref 6–20)
CALCIUM SERPL-MCNC: 9.5 MG/DL (ref 8.7–10.5)
CHLORIDE SERPL-SCNC: 105 MMOL/L (ref 95–110)
CLARITY UR: CLEAR
CO2 SERPL-SCNC: 27 MMOL/L (ref 23–29)
COLOR UR: YELLOW
CREAT SERPL-MCNC: 0.7 MG/DL (ref 0.5–1.4)
DIFFERENTIAL METHOD: ABNORMAL
EOSINOPHIL # BLD AUTO: 0 K/UL (ref 0–0.5)
EOSINOPHIL NFR BLD: 0.3 % (ref 0–8)
ERYTHROCYTE [DISTWIDTH] IN BLOOD BY AUTOMATED COUNT: 14.1 % (ref 11.5–14.5)
EST. GFR  (AFRICAN AMERICAN): >60 ML/MIN/1.73 M^2
EST. GFR  (NON AFRICAN AMERICAN): >60 ML/MIN/1.73 M^2
GLUCOSE SERPL-MCNC: 82 MG/DL (ref 70–110)
GLUCOSE UR QL STRIP: NEGATIVE
HCT VFR BLD AUTO: 40.3 % (ref 37–48.5)
HGB BLD-MCNC: 12.7 G/DL (ref 12–16)
HGB UR QL STRIP: NEGATIVE
IMM GRANULOCYTES # BLD AUTO: 0.03 K/UL (ref 0–0.04)
KETONES UR QL STRIP: NEGATIVE
LEUKOCYTE ESTERASE UR QL STRIP: NEGATIVE
LYMPHOCYTES # BLD AUTO: 1.7 K/UL (ref 1–4.8)
LYMPHOCYTES NFR BLD: 17 % (ref 18–48)
MCH RBC QN AUTO: 28.6 PG (ref 27–31)
MCHC RBC AUTO-ENTMCNC: 31.5 G/DL (ref 32–36)
MCV RBC AUTO: 91 FL (ref 82–98)
MONOCYTES # BLD AUTO: 0.6 K/UL (ref 0.3–1)
MONOCYTES NFR BLD: 5.8 % (ref 4–15)
NEUTROPHILS # BLD AUTO: 7.7 K/UL (ref 1.8–7.7)
NEUTROPHILS NFR BLD: 76.3 % (ref 38–73)
NITRITE UR QL STRIP: NEGATIVE
NRBC BLD-RTO: 0 /100 WBC
PH UR STRIP: 7 [PH] (ref 5–8)
PLATELET # BLD AUTO: 220 K/UL (ref 150–350)
PMV BLD AUTO: 11 FL (ref 9.2–12.9)
POTASSIUM SERPL-SCNC: 4.3 MMOL/L (ref 3.5–5.1)
PROT SERPL-MCNC: 7.1 G/DL (ref 6–8.4)
PROT UR QL STRIP: NEGATIVE
RBC # BLD AUTO: 4.44 M/UL (ref 4–5.4)
SODIUM SERPL-SCNC: 139 MMOL/L (ref 136–145)
SP GR UR STRIP: <=1.005 (ref 1–1.03)
URN SPEC COLLECT METH UR: ABNORMAL
UROBILINOGEN UR STRIP-ACNC: NEGATIVE EU/DL
WBC # BLD AUTO: 10.03 K/UL (ref 3.9–12.7)

## 2019-08-05 PROCEDURE — 86850 RBC ANTIBODY SCREEN: CPT

## 2019-08-05 PROCEDURE — 71046 XR CHEST PA AND LATERAL: ICD-10-PCS | Mod: 26,,, | Performed by: RADIOLOGY

## 2019-08-05 PROCEDURE — 36415 COLL VENOUS BLD VENIPUNCTURE: CPT

## 2019-08-05 PROCEDURE — 71046 X-RAY EXAM CHEST 2 VIEWS: CPT | Mod: 26,,, | Performed by: RADIOLOGY

## 2019-08-05 PROCEDURE — 81003 URINALYSIS AUTO W/O SCOPE: CPT

## 2019-08-05 PROCEDURE — 71046 X-RAY EXAM CHEST 2 VIEWS: CPT | Mod: TC,FY

## 2019-08-05 PROCEDURE — 99900104 DSU ONLY-NO CHARGE-EA ADD'L HR (STAT)

## 2019-08-05 PROCEDURE — 85025 COMPLETE CBC W/AUTO DIFF WBC: CPT

## 2019-08-05 PROCEDURE — 99900103 DSU ONLY-NO CHARGE-INITIAL HR (STAT)

## 2019-08-05 PROCEDURE — 80053 COMPREHEN METABOLIC PANEL: CPT

## 2019-08-05 PROCEDURE — 87081 CULTURE SCREEN ONLY: CPT

## 2019-08-05 PROCEDURE — 27201204 HC TRAY CATH URET (IN & OUT)

## 2019-08-05 NOTE — PRE ADMISSION SCREENING
JOINT CAMP ASSESSMENT    Name Nimco Caro   MRN [id number]    Age/Sex 58 y.o. female    Surgeon Dr. Eliseo Vaca   Joint Camp Date 8/5/2019   Surgery Date 8/19/2019   Procedure Left Knee Arthroplasty   Insurance Payor: MEDICAID / [facility] / Product Type: Managed Medicaid /    Care Team Patient Care Team:  Kayt Mason MD as PCP - General (Family Medicine)  Eliseo Vaca MD as Consulting Physician (Orthopedic Surgery)    Pharmacy   [address] [address]  Phone: [phone] Fax: [phone]    [facility] [address] [address]  Phone: [phone] Fax: [phone]    [id number] - [place], [place] - [address] [facility] [address]  [address] [facility] [address]  Phone: [phone] Fax: [phone]     AM-PAC Score   24   Risk Assessment Score 21     Past Medical History:   Diagnosis Date    Anxiety     Bipolar affect, depressed     Cancer ovarian; uterine    1992    COPD (chronic obstructive pulmonary disease)     GERD (gastroesophageal reflux disease)     Hyperlipidemia     Hypertension     OAB (overactive bladder)        Past Surgical History:   Procedure Laterality Date    ARTHROPLASTY, HIP Right 2/25/2019    [facility] at [facility] OR    COLON SURGERY      COLONOSCOPY      HERNIA REPAIR N/A 2016    HYSTERECTOMY  total    JOINT REPLACEMENT      REVISION COLOSTOMY N/A 2012         Home Enviroment     Living Arrangement: Lives alone  Home Environment: 1-story house/ trailer, number of outside stairs: 3, tub-shower  Home Safety Concerns: Pets in the home: dogs (4).    DISCHARGE CAREGIVER/SUPPORT SYSTEM     Identified post-op caregiver: Patient has lives alone and children / family / friends to help, friend.   Patient's caregiver(s) will be able to provide physical assistance. Patient will have someone to assist overnight.  Patient will be staying with friend after discharge. Shaunna Leonard @ [address] [place] [address] Ph: [phone]    Caregiver present at pre-op interview:  No      PRE-OPERATIVE FUNCTIONAL STATUS     Employment: Unemployed    Pre-op Functional Status: Patient is independent with mobility/ambulation, transfers, ADL's, IADL's.    Use of assistive device for ambulation: quad cane  ADL: self care  ADL Limitations: difficulty with walking  Medical Restrictions: Decreased range of motions in extremities    POTENTIAL BARRIERS TO DISCHARGE/POTENTIAL POST-OP COMPLICATIONS     Patient is a current everyday smoker which could delay wound healing. Patient to stay with her friend for an unknown amount of time. Encouraged patient to stay until she fully recovers, as she has no other help in [place] where she lives. Patient had right hip arthroplasty on 02/25/2019 without complication and next day discharge.    DISCHARGE PLAN     Expected LOS of 2 days or less for joint replacement discussed with patient.     Patient in agreement with discharge plan: Yes    Discharge to: Outpatient PT and Home with 24 hour assistance     HH:  None per insurance.      [facility]     Home DME: rolling walker, quad cane, bedside commode, tub transfer bench and assistive device kit    Needed DME at D/C: none     Rx: Per Dr. Vaca at discharge     Meds to Beds: N/A  Patient expected to discharge on Aspirin 325mg by mouth twice daily for DVT prophylaxis.

## 2019-08-05 NOTE — PRE ADMISSION SCREENING
Patient Name: Nimco Caro  YOB: 1960   MRN: [id number]     [facility]   Basic Mobility Inpatient Short Form 6 Clicks         How much difficulty does the patient currently have  Unable  A Lot  A Little  None      1. Turning over in bed (including adjusting bedclothes, sheets and blankets)?     1 []    2 []    3 []    4 [x]        2. Sitting down on and standing up from a chair with arms (e.g., wheelchair, bedside commode, etc.)     1 []  2 []  3 []     4 [x]      3. Moving from lying on back to sitting on the side of the bed?     1 []  2 []  3 []    4 [x]    How much help from another person does the patient currently need  Total  A Lot  A Little  None      4. Moving to and from a bed to a chair (including a wheelchair)?    1 []  2 []  3 []    4 [x]      5. Need to walk in hospital room?    1 []  2 []  3 []    4 [x]      6. Climbing 3-5 steps with a railing?    1 []  2 []  3 []    4 [x]       Raw Score:       24           CMS 0-100% Score:     0       %   Standardized Score:    61.14           CMS Modifier:       [facility] AMPAC   Basic Mobility Inpatient Short Form 6 Clicks Score Conversion Table*         *Use this form to convert -PAC Basic Mobility Inpatient Raw Scores.   [facility] Inpatient Basic Mobility Short Form Scoring Example   1. Add the number values associated with the response to each item. For example, items totals yield a Raw Score of 21.   2. Match the raw score to the t-Scale scores (t-Scale score = 50.25, SE = 4.69).   3. Find the associated CMS % (CMS % = 28.97%).   4. Locate the correct CMS Functional Modifier Code, or G Code (G code = CJ)     NOTE: Each -PAC Short Form has a separate conversion table. Make sure that you use the correct conversion table.       Instruction Manual - page 45 contains conversion table

## 2019-08-05 NOTE — PRE ADMISSION SCREENING
"               [facility] Risk Assessment Scale    Patient Name: Nimco Caro  YOB: 1960  MRN: [id number]            RIsk Factor Measure Recommendation Patient Data Scale/Score   BMI >40 Reconsider surgery, weight loss   Estimated body mass index is 26.31 kg/m² as calculated from the following:    Height as of this encounter: 5' 7" (1.702 m).    Weight as of this encounter: 76.2 kg (168 lb).   [] 0 = 1 - 24.9  [x] 1 = 25-29.9  [] 2 = 30-34.9  [] 3 = 35-39.9  [] 4 = 40-44.9  [] 5 = 45-99.9   Hemoglobin AIC (if applicable) >9 Delay surgery until DM under control  Refer for:  · Nutrition Therapy  · Exercise   · Medication    No results found for: LABA1C, HGBA1C    Lab Results   Component Value Date     (H) 02/26/2019      [] 0 = 4.0-5.6  [] 1 = 5.7-6.4  [] 2 = 6.5-6.9  [] 3 = 7.0-7.9  [] 4 = 8.0-8.9  [] 5 = 9.0-12   Hemoglobin (Anemia) <9 Delay surgery   Correct anemia Lab Results   Component Value Date    HGB 12.7 08/05/2019    [] 20 - <9.0                    Albumin <3 Delay surgery &Workup Lab Results   Component Value Date    ALBUMIN 3.9 02/11/2019    [] 20 - <3.0   Smoking Cessation >4 Weeks Delay Surgery  Refer to OP Cessation Class     [x] 20 - current smoker                                __0.5_ PPD                    Hx of MI, PE, Arrhythmia, CVA, DVT <30 Days Delay Surgery    N/A [] 20      Infection Variable Delay surgery and re-evaluate   N/A [] 20 - recent/current infection     Depression (PHQ) >10 out of 27 Delay Surgery and re-evaluate  Medication  Counseling              [x] 0     []1     []2     []3      []4      [] 5                    (1-4)      (5-9)  (10-14)  (15-19)   (20-27)     Memory Impairment & Memory loss (Mini-Cog Screening Tool) Advanced dementia and/or Parkinson's Reconsider surgery     [x] 0     []1     []2     []3     []4     [] 5     Physical Conditioning (Modified AM-PAC Per Physical Therapy at [facility]) Unable to ambulate on day of surgery Delay surgery and " re-evaluate  Pre-Rehabilitation   (PT evaluation)       [x]  0   []4       []8     []12        []16     []20       (<20%)   (<40%)   (<60%)   (<80% )    (>80%)     Home Environment/Caregiver support  (Per /Navigator Interview)    Availability of basic services and/or approprate assistance during post-operative period Delay surgery and re-evaluate  Safe home environment  Health   1 week post-surgery  Transportation  availability  Ability to obtain DME/Medications post-op    [x] 0     []1     []2     []3     []4     [] 5  [x] 0     []1     []2     []3     []4     [] 5  [x] 0     []1     []2     []3     []4     [] 5  [x] 0     []1     []2     []3     []4     [] 5         MD Contact: Dr. Vaca Comments:  Total Score:  21

## 2019-08-05 NOTE — DISCHARGE INSTRUCTIONS

## 2019-08-07 LAB — MRSA SPEC QL CULT: NORMAL

## 2019-08-18 ENCOUNTER — ANESTHESIA EVENT (OUTPATIENT)
Dept: SURGERY | Facility: HOSPITAL | Age: 59
End: 2019-08-18
Payer: MEDICAID

## 2019-08-19 ENCOUNTER — ANESTHESIA (OUTPATIENT)
Dept: SURGERY | Facility: HOSPITAL | Age: 59
End: 2019-08-19
Payer: MEDICAID

## 2019-08-19 ENCOUNTER — HOSPITAL ENCOUNTER (OUTPATIENT)
Facility: HOSPITAL | Age: 59
Discharge: HOME | End: 2019-08-21
Attending: ORTHOPAEDIC SURGERY | Admitting: ORTHOPAEDIC SURGERY
Payer: MEDICAID

## 2019-08-19 DIAGNOSIS — M17.12 DEGENERATIVE ARTHRITIS OF LEFT KNEE: Primary | ICD-10-CM

## 2019-08-19 DIAGNOSIS — J44.9 CHRONIC OBSTRUCTIVE PULMONARY DISEASE, UNSPECIFIED COPD TYPE: ICD-10-CM

## 2019-08-19 DIAGNOSIS — M17.12 PRIMARY OSTEOARTHRITIS OF LEFT KNEE: ICD-10-CM

## 2019-08-19 PROBLEM — F17.200 TOBACCO DEPENDENCY: Status: ACTIVE | Noted: 2019-08-19

## 2019-08-19 PROBLEM — F33.0 MILD EPISODE OF RECURRENT MAJOR DEPRESSIVE DISORDER: Status: ACTIVE | Noted: 2019-08-19

## 2019-08-19 PROBLEM — J42 CHRONIC BRONCHITIS: Status: ACTIVE | Noted: 2019-08-19

## 2019-08-19 PROCEDURE — 25000003 PHARM REV CODE 250: Performed by: ORTHOPAEDIC SURGERY

## 2019-08-19 PROCEDURE — 27000221 HC OXYGEN, UP TO 24 HOURS

## 2019-08-19 PROCEDURE — 64447 NJX AA&/STRD FEMORAL NRV IMG: CPT | Mod: 59,LT,, | Performed by: ANESTHESIOLOGY

## 2019-08-19 PROCEDURE — 97116 GAIT TRAINING THERAPY: CPT

## 2019-08-19 PROCEDURE — D9220A PRA ANESTHESIA: Mod: CRNA,,, | Performed by: NURSE ANESTHETIST, CERTIFIED REGISTERED

## 2019-08-19 PROCEDURE — C1776 JOINT DEVICE (IMPLANTABLE): HCPCS | Performed by: ORTHOPAEDIC SURGERY

## 2019-08-19 PROCEDURE — 25000003 PHARM REV CODE 250: Performed by: ANESTHESIOLOGY

## 2019-08-19 PROCEDURE — 99900103 DSU ONLY-NO CHARGE-INITIAL HR (STAT): Performed by: ORTHOPAEDIC SURGERY

## 2019-08-19 PROCEDURE — D9220A PRA ANESTHESIA: ICD-10-PCS | Mod: ANES,,, | Performed by: ANESTHESIOLOGY

## 2019-08-19 PROCEDURE — 71000033 HC RECOVERY, INTIAL HOUR: Performed by: ORTHOPAEDIC SURGERY

## 2019-08-19 PROCEDURE — 01402 ANES OPN/ARTH TOT KNE ARTHRP: CPT | Performed by: ORTHOPAEDIC SURGERY

## 2019-08-19 PROCEDURE — 37000008 HC ANESTHESIA 1ST 15 MINUTES: Performed by: ORTHOPAEDIC SURGERY

## 2019-08-19 PROCEDURE — 63600175 PHARM REV CODE 636 W HCPCS: Performed by: ORTHOPAEDIC SURGERY

## 2019-08-19 PROCEDURE — 63600175 PHARM REV CODE 636 W HCPCS: Performed by: NURSE ANESTHETIST, CERTIFIED REGISTERED

## 2019-08-19 PROCEDURE — S4991 NICOTINE PATCH NONLEGEND: HCPCS | Performed by: ORTHOPAEDIC SURGERY

## 2019-08-19 PROCEDURE — 76942 ECHO GUIDE FOR BIOPSY: CPT | Mod: 26,,, | Performed by: ANESTHESIOLOGY

## 2019-08-19 PROCEDURE — 94761 N-INVAS EAR/PLS OXIMETRY MLT: CPT

## 2019-08-19 PROCEDURE — 88311 TISSUE SPECIMEN TO PATHOLOGY - SURGERY: ICD-10-PCS | Mod: 26,,, | Performed by: PATHOLOGY

## 2019-08-19 PROCEDURE — 76942 PR U/S GUIDANCE FOR NEEDLE GUIDANCE: ICD-10-PCS | Mod: 26,,, | Performed by: ANESTHESIOLOGY

## 2019-08-19 PROCEDURE — 63600175 PHARM REV CODE 636 W HCPCS: Performed by: ANESTHESIOLOGY

## 2019-08-19 PROCEDURE — D9220A PRA ANESTHESIA: ICD-10-PCS | Mod: CRNA,,, | Performed by: NURSE ANESTHETIST, CERTIFIED REGISTERED

## 2019-08-19 PROCEDURE — S5010 5% DEXTROSE AND 0.45% SALINE: HCPCS | Performed by: ORTHOPAEDIC SURGERY

## 2019-08-19 PROCEDURE — 71000039 HC RECOVERY, EACH ADD'L HOUR: Performed by: ORTHOPAEDIC SURGERY

## 2019-08-19 PROCEDURE — 64447 PR NERVE BLOCK INJ, ANES/STEROID, FEMORAL, INCL IMAG GUIDANCE: ICD-10-PCS | Mod: 59,LT,, | Performed by: ANESTHESIOLOGY

## 2019-08-19 PROCEDURE — 27201423 OPTIME MED/SURG SUP & DEVICES STERILE SUPPLY: Performed by: ORTHOPAEDIC SURGERY

## 2019-08-19 PROCEDURE — 25000003 PHARM REV CODE 250: Performed by: NURSE ANESTHETIST, CERTIFIED REGISTERED

## 2019-08-19 PROCEDURE — C1713 ANCHOR/SCREW BN/BN,TIS/BN: HCPCS | Performed by: ORTHOPAEDIC SURGERY

## 2019-08-19 PROCEDURE — 36000710: Performed by: ORTHOPAEDIC SURGERY

## 2019-08-19 PROCEDURE — 37000009 HC ANESTHESIA EA ADD 15 MINS: Performed by: ORTHOPAEDIC SURGERY

## 2019-08-19 PROCEDURE — 63600175 PHARM REV CODE 636 W HCPCS

## 2019-08-19 PROCEDURE — 64447 NJX AA&/STRD FEMORAL NRV IMG: CPT | Performed by: ANESTHESIOLOGY

## 2019-08-19 PROCEDURE — S0020 INJECTION, BUPIVICAINE HYDRO: HCPCS | Performed by: ANESTHESIOLOGY

## 2019-08-19 PROCEDURE — 88305 TISSUE EXAM BY PATHOLOGIST: CPT | Performed by: PATHOLOGY

## 2019-08-19 PROCEDURE — 88311 DECALCIFY TISSUE: CPT | Mod: 26,,, | Performed by: PATHOLOGY

## 2019-08-19 PROCEDURE — D9220A PRA ANESTHESIA: Mod: ANES,,, | Performed by: ANESTHESIOLOGY

## 2019-08-19 PROCEDURE — 99900035 HC TECH TIME PER 15 MIN (STAT)

## 2019-08-19 PROCEDURE — 36000711: Performed by: ORTHOPAEDIC SURGERY

## 2019-08-19 PROCEDURE — 25000003 PHARM REV CODE 250: Performed by: NURSE PRACTITIONER

## 2019-08-19 PROCEDURE — 88305 TISSUE SPECIMEN TO PATHOLOGY - SURGERY: ICD-10-PCS | Mod: 26,,, | Performed by: PATHOLOGY

## 2019-08-19 PROCEDURE — 97161 PT EVAL LOW COMPLEX 20 MIN: CPT

## 2019-08-19 PROCEDURE — 99900104 DSU ONLY-NO CHARGE-EA ADD'L HR (STAT): Performed by: ORTHOPAEDIC SURGERY

## 2019-08-19 PROCEDURE — 94799 UNLISTED PULMONARY SVC/PX: CPT

## 2019-08-19 DEVICE — IMPLANTABLE DEVICE: Type: IMPLANTABLE DEVICE | Site: KNEE | Status: FUNCTIONAL

## 2019-08-19 DEVICE — TIBIAL EVOLUTION SZ 5 LEFT: Type: IMPLANTABLE DEVICE | Site: KNEE | Status: FUNCTIONAL

## 2019-08-19 DEVICE — PATELLA ONLAY 32MM TRI PEG: Type: IMPLANTABLE DEVICE | Site: KNEE | Status: FUNCTIONAL

## 2019-08-19 DEVICE — CEMENT BONE ORTHOSET 1 40 GRAM: Type: IMPLANTABLE DEVICE | Site: KNEE | Status: FUNCTIONAL

## 2019-08-19 RX ORDER — MORPHINE SULFATE 2 MG/ML
2 INJECTION, SOLUTION INTRAMUSCULAR; INTRAVENOUS EVERY 4 HOURS PRN
Status: DISCONTINUED | OUTPATIENT
Start: 2019-08-19 | End: 2019-08-20

## 2019-08-19 RX ORDER — BUPIVACAINE HYDROCHLORIDE 5 MG/ML
INJECTION, SOLUTION EPIDURAL; INTRACAUDAL
Status: COMPLETED | OUTPATIENT
Start: 2019-08-19 | End: 2019-08-19

## 2019-08-19 RX ORDER — SODIUM CHLORIDE, SODIUM LACTATE, POTASSIUM CHLORIDE, CALCIUM CHLORIDE 600; 310; 30; 20 MG/100ML; MG/100ML; MG/100ML; MG/100ML
INJECTION, SOLUTION INTRAVENOUS CONTINUOUS
Status: DISCONTINUED | OUTPATIENT
Start: 2019-08-19 | End: 2019-08-19

## 2019-08-19 RX ORDER — ACETAMINOPHEN 10 MG/ML
1000 INJECTION, SOLUTION INTRAVENOUS EVERY 6 HOURS
Status: COMPLETED | OUTPATIENT
Start: 2019-08-19 | End: 2019-08-20

## 2019-08-19 RX ORDER — ACETAMINOPHEN 500 MG
1000 TABLET ORAL EVERY 6 HOURS
Status: COMPLETED | OUTPATIENT
Start: 2019-08-20 | End: 2019-08-21

## 2019-08-19 RX ORDER — FAMOTIDINE 20 MG/1
20 TABLET, FILM COATED ORAL 2 TIMES DAILY
Status: DISCONTINUED | OUTPATIENT
Start: 2019-08-19 | End: 2019-08-19

## 2019-08-19 RX ORDER — LISINOPRIL 10 MG/1
20 TABLET ORAL DAILY
Status: DISCONTINUED | OUTPATIENT
Start: 2019-08-20 | End: 2019-08-21 | Stop reason: HOSPADM

## 2019-08-19 RX ORDER — SODIUM CHLORIDE 0.9 % (FLUSH) 0.9 %
3 SYRINGE (ML) INJECTION
Status: DISCONTINUED | OUTPATIENT
Start: 2019-08-19 | End: 2019-08-21 | Stop reason: HOSPADM

## 2019-08-19 RX ORDER — AMLODIPINE BESYLATE 5 MG/1
10 TABLET ORAL DAILY
Status: DISCONTINUED | OUTPATIENT
Start: 2019-08-20 | End: 2019-08-21 | Stop reason: HOSPADM

## 2019-08-19 RX ORDER — CETIRIZINE HYDROCHLORIDE 10 MG/1
10 TABLET ORAL DAILY
Status: DISCONTINUED | OUTPATIENT
Start: 2019-08-20 | End: 2019-08-21 | Stop reason: HOSPADM

## 2019-08-19 RX ORDER — HYDROMORPHONE HYDROCHLORIDE 2 MG/ML
0.2 INJECTION, SOLUTION INTRAMUSCULAR; INTRAVENOUS; SUBCUTANEOUS EVERY 5 MIN PRN
Status: DISCONTINUED | OUTPATIENT
Start: 2019-08-19 | End: 2019-08-19 | Stop reason: HOSPADM

## 2019-08-19 RX ORDER — LIDOCAINE HCL/PF 100 MG/5ML
SYRINGE (ML) INTRAVENOUS
Status: DISCONTINUED | OUTPATIENT
Start: 2019-08-19 | End: 2019-08-19

## 2019-08-19 RX ORDER — ONDANSETRON 2 MG/ML
4 INJECTION INTRAMUSCULAR; INTRAVENOUS DAILY PRN
Status: DISCONTINUED | OUTPATIENT
Start: 2019-08-19 | End: 2019-08-19 | Stop reason: HOSPADM

## 2019-08-19 RX ORDER — ARIPIPRAZOLE 5 MG/1
15 TABLET ORAL DAILY
Status: DISCONTINUED | OUTPATIENT
Start: 2019-08-20 | End: 2019-08-21 | Stop reason: HOSPADM

## 2019-08-19 RX ORDER — SODIUM CHLORIDE 0.9 % (FLUSH) 0.9 %
5 SYRINGE (ML) INJECTION
Status: DISCONTINUED | OUTPATIENT
Start: 2019-08-19 | End: 2019-08-21 | Stop reason: HOSPADM

## 2019-08-19 RX ORDER — ACETAMINOPHEN 10 MG/ML
1000 INJECTION, SOLUTION INTRAVENOUS EVERY 6 HOURS
Status: DISCONTINUED | OUTPATIENT
Start: 2019-08-19 | End: 2019-08-19

## 2019-08-19 RX ORDER — OXYCODONE HYDROCHLORIDE 10 MG/1
10 TABLET ORAL
Status: DISCONTINUED | OUTPATIENT
Start: 2019-08-19 | End: 2019-08-21 | Stop reason: HOSPADM

## 2019-08-19 RX ORDER — TRANEXAMIC ACID 100 MG/ML
INJECTION, SOLUTION INTRAVENOUS
Status: DISCONTINUED | OUTPATIENT
Start: 2019-08-19 | End: 2019-08-19

## 2019-08-19 RX ORDER — IPRATROPIUM BROMIDE AND ALBUTEROL SULFATE 2.5; .5 MG/3ML; MG/3ML
3 SOLUTION RESPIRATORY (INHALATION) EVERY 4 HOURS PRN
Status: DISCONTINUED | OUTPATIENT
Start: 2019-08-19 | End: 2019-08-21 | Stop reason: HOSPADM

## 2019-08-19 RX ORDER — ONDANSETRON 2 MG/ML
4 INJECTION INTRAMUSCULAR; INTRAVENOUS EVERY 12 HOURS PRN
Status: DISCONTINUED | OUTPATIENT
Start: 2019-08-19 | End: 2019-08-21 | Stop reason: HOSPADM

## 2019-08-19 RX ORDER — PHENYLEPHRINE HYDROCHLORIDE 10 MG/ML
INJECTION INTRAVENOUS
Status: DISCONTINUED | OUTPATIENT
Start: 2019-08-19 | End: 2019-08-19

## 2019-08-19 RX ORDER — BISACODYL 10 MG
10 SUPPOSITORY, RECTAL RECTAL DAILY
Status: DISCONTINUED | OUTPATIENT
Start: 2019-08-19 | End: 2019-08-21 | Stop reason: HOSPADM

## 2019-08-19 RX ORDER — DEXTROSE MONOHYDRATE AND SODIUM CHLORIDE 5; .45 G/100ML; G/100ML
INJECTION, SOLUTION INTRAVENOUS CONTINUOUS
Status: DISCONTINUED | OUTPATIENT
Start: 2019-08-19 | End: 2019-08-20

## 2019-08-19 RX ORDER — ONDANSETRON 2 MG/ML
4 INJECTION INTRAMUSCULAR; INTRAVENOUS EVERY 12 HOURS PRN
Status: DISCONTINUED | OUTPATIENT
Start: 2019-08-19 | End: 2019-08-19

## 2019-08-19 RX ORDER — PANTOPRAZOLE SODIUM 40 MG/1
40 TABLET, DELAYED RELEASE ORAL DAILY
Status: DISCONTINUED | OUTPATIENT
Start: 2019-08-20 | End: 2019-08-21 | Stop reason: HOSPADM

## 2019-08-19 RX ORDER — LORAZEPAM 2 MG/ML
0.25 INJECTION INTRAMUSCULAR
Status: DISCONTINUED | OUTPATIENT
Start: 2019-08-19 | End: 2019-08-19 | Stop reason: HOSPADM

## 2019-08-19 RX ORDER — LIDOCAINE HYDROCHLORIDE 10 MG/ML
1 INJECTION, SOLUTION EPIDURAL; INFILTRATION; INTRACAUDAL; PERINEURAL ONCE
Status: DISCONTINUED | OUTPATIENT
Start: 2019-08-19 | End: 2019-08-19

## 2019-08-19 RX ORDER — FENTANYL CITRATE 50 UG/ML
INJECTION, SOLUTION INTRAMUSCULAR; INTRAVENOUS
Status: DISCONTINUED | OUTPATIENT
Start: 2019-08-19 | End: 2019-08-19

## 2019-08-19 RX ORDER — CELECOXIB 100 MG/1
400 CAPSULE ORAL ONCE
Status: COMPLETED | OUTPATIENT
Start: 2019-08-19 | End: 2019-08-19

## 2019-08-19 RX ORDER — PREGABALIN 75 MG/1
75 CAPSULE ORAL ONCE
Status: COMPLETED | OUTPATIENT
Start: 2019-08-19 | End: 2019-08-19

## 2019-08-19 RX ORDER — CELECOXIB 100 MG/1
100 CAPSULE ORAL 2 TIMES DAILY
Status: DISCONTINUED | OUTPATIENT
Start: 2019-08-20 | End: 2019-08-21 | Stop reason: HOSPADM

## 2019-08-19 RX ORDER — VANCOMYCIN HCL IN 5 % DEXTROSE 1G/250ML
1000 PLASTIC BAG, INJECTION (ML) INTRAVENOUS
Status: COMPLETED | OUTPATIENT
Start: 2019-08-19 | End: 2019-08-19

## 2019-08-19 RX ORDER — MUPIROCIN 20 MG/G
OINTMENT TOPICAL
Status: DISCONTINUED | OUTPATIENT
Start: 2019-08-19 | End: 2019-08-19

## 2019-08-19 RX ORDER — CEFAZOLIN SODIUM 2 G/50ML
2 SOLUTION INTRAVENOUS
Status: COMPLETED | OUTPATIENT
Start: 2019-08-19 | End: 2019-08-20

## 2019-08-19 RX ORDER — OXYCODONE HCL 10 MG/1
10 TABLET, FILM COATED, EXTENDED RELEASE ORAL ONCE
Status: COMPLETED | OUTPATIENT
Start: 2019-08-19 | End: 2019-08-19

## 2019-08-19 RX ORDER — ONDANSETRON 8 MG/1
8 TABLET, ORALLY DISINTEGRATING ORAL EVERY 8 HOURS PRN
Status: DISCONTINUED | OUTPATIENT
Start: 2019-08-19 | End: 2019-08-21 | Stop reason: HOSPADM

## 2019-08-19 RX ORDER — OXYBUTYNIN CHLORIDE 5 MG/1
5 TABLET ORAL 2 TIMES DAILY
Status: DISCONTINUED | OUTPATIENT
Start: 2019-08-19 | End: 2019-08-21 | Stop reason: HOSPADM

## 2019-08-19 RX ORDER — PREGABALIN 75 MG/1
75 CAPSULE ORAL 2 TIMES DAILY
Status: DISCONTINUED | OUTPATIENT
Start: 2019-08-19 | End: 2019-08-21 | Stop reason: HOSPADM

## 2019-08-19 RX ORDER — DULOXETIN HYDROCHLORIDE 30 MG/1
60 CAPSULE, DELAYED RELEASE ORAL DAILY
Status: DISCONTINUED | OUTPATIENT
Start: 2019-08-20 | End: 2019-08-21 | Stop reason: HOSPADM

## 2019-08-19 RX ORDER — MIDAZOLAM HYDROCHLORIDE 1 MG/ML
INJECTION, SOLUTION INTRAMUSCULAR; INTRAVENOUS
Status: DISCONTINUED | OUTPATIENT
Start: 2019-08-19 | End: 2019-08-19

## 2019-08-19 RX ORDER — OXYCODONE HYDROCHLORIDE 5 MG/1
5 TABLET ORAL
Status: DISCONTINUED | OUTPATIENT
Start: 2019-08-19 | End: 2019-08-21 | Stop reason: HOSPADM

## 2019-08-19 RX ORDER — ASPIRIN 325 MG
325 TABLET ORAL 2 TIMES DAILY
Status: DISCONTINUED | OUTPATIENT
Start: 2019-08-19 | End: 2019-08-21 | Stop reason: HOSPADM

## 2019-08-19 RX ORDER — ACETAMINOPHEN 500 MG
1000 TABLET ORAL EVERY 6 HOURS
Status: DISCONTINUED | OUTPATIENT
Start: 2019-08-20 | End: 2019-08-19

## 2019-08-19 RX ORDER — MEPERIDINE HYDROCHLORIDE 50 MG/ML
12.5 INJECTION INTRAMUSCULAR; INTRAVENOUS; SUBCUTANEOUS ONCE AS NEEDED
Status: DISCONTINUED | OUTPATIENT
Start: 2019-08-19 | End: 2019-08-19 | Stop reason: HOSPADM

## 2019-08-19 RX ORDER — PROPOFOL 10 MG/ML
VIAL (ML) INTRAVENOUS CONTINUOUS PRN
Status: DISCONTINUED | OUTPATIENT
Start: 2019-08-19 | End: 2019-08-19

## 2019-08-19 RX ORDER — ACETAMINOPHEN 10 MG/ML
INJECTION, SOLUTION INTRAVENOUS
Status: DISCONTINUED | OUTPATIENT
Start: 2019-08-19 | End: 2019-08-19

## 2019-08-19 RX ORDER — ONDANSETRON 2 MG/ML
INJECTION INTRAMUSCULAR; INTRAVENOUS
Status: DISCONTINUED | OUTPATIENT
Start: 2019-08-19 | End: 2019-08-19

## 2019-08-19 RX ORDER — ZOLPIDEM TARTRATE 5 MG/1
5 TABLET ORAL NIGHTLY PRN
Status: DISCONTINUED | OUTPATIENT
Start: 2019-08-19 | End: 2019-08-21 | Stop reason: HOSPADM

## 2019-08-19 RX ORDER — IBUPROFEN 200 MG
1 TABLET ORAL DAILY
Status: DISCONTINUED | OUTPATIENT
Start: 2019-08-19 | End: 2019-08-21 | Stop reason: HOSPADM

## 2019-08-19 RX ORDER — IBUPROFEN 200 MG
1 TABLET ORAL DAILY
Status: DISCONTINUED | OUTPATIENT
Start: 2019-08-20 | End: 2019-08-19

## 2019-08-19 RX ORDER — TRAZODONE HYDROCHLORIDE 50 MG/1
100 TABLET ORAL NIGHTLY PRN
Status: DISCONTINUED | OUTPATIENT
Start: 2019-08-19 | End: 2019-08-21 | Stop reason: HOSPADM

## 2019-08-19 RX ORDER — POLYETHYLENE GLYCOL 3350 17 G/17G
17 POWDER, FOR SOLUTION ORAL DAILY
Status: DISCONTINUED | OUTPATIENT
Start: 2019-08-19 | End: 2019-08-21 | Stop reason: HOSPADM

## 2019-08-19 RX ORDER — ATENOLOL 25 MG/1
50 TABLET ORAL DAILY
Status: DISCONTINUED | OUTPATIENT
Start: 2019-08-20 | End: 2019-08-21 | Stop reason: HOSPADM

## 2019-08-19 RX ADMIN — PREGABALIN 75 MG: 75 CAPSULE ORAL at 08:08

## 2019-08-19 RX ADMIN — VANCOMYCIN HYDROCHLORIDE 1000 MG: 1 INJECTION, POWDER, LYOPHILIZED, FOR SOLUTION INTRAVENOUS at 07:08

## 2019-08-19 RX ADMIN — SODIUM CHLORIDE, SODIUM LACTATE, POTASSIUM CHLORIDE, AND CALCIUM CHLORIDE: .6; .31; .03; .02 INJECTION, SOLUTION INTRAVENOUS at 08:08

## 2019-08-19 RX ADMIN — TRANEXAMIC ACID 1000 MG: 100 INJECTION, SOLUTION INTRAVENOUS at 08:08

## 2019-08-19 RX ADMIN — ASPIRIN 325 MG ORAL TABLET 325 MG: 325 PILL ORAL at 08:08

## 2019-08-19 RX ADMIN — OXYCODONE HYDROCHLORIDE 15 MG: 5 TABLET ORAL at 09:08

## 2019-08-19 RX ADMIN — SODIUM CHLORIDE, SODIUM LACTATE, POTASSIUM CHLORIDE, AND CALCIUM CHLORIDE: .6; .31; .03; .02 INJECTION, SOLUTION INTRAVENOUS at 06:08

## 2019-08-19 RX ADMIN — ONDANSETRON 4 MG: 2 INJECTION, SOLUTION INTRAMUSCULAR; INTRAVENOUS at 08:08

## 2019-08-19 RX ADMIN — ASPIRIN 325 MG ORAL TABLET 325 MG: 325 PILL ORAL at 11:08

## 2019-08-19 RX ADMIN — DEXTROSE AND SODIUM CHLORIDE: 5; .45 INJECTION, SOLUTION INTRAVENOUS at 10:08

## 2019-08-19 RX ADMIN — TRAZODONE HYDROCHLORIDE 100 MG: 50 TABLET ORAL at 08:08

## 2019-08-19 RX ADMIN — FENTANYL CITRATE 50 MCG: 50 INJECTION, SOLUTION INTRAMUSCULAR; INTRAVENOUS at 07:08

## 2019-08-19 RX ADMIN — CEFAZOLIN SODIUM 2 G: 2 SOLUTION INTRAVENOUS at 02:08

## 2019-08-19 RX ADMIN — ACETAMINOPHEN 1000 MG: 10 INJECTION, SOLUTION INTRAVENOUS at 09:08

## 2019-08-19 RX ADMIN — BUPIVACAINE HYDROCHLORIDE 30 ML: 5 INJECTION, SOLUTION EPIDURAL; INTRACAUDAL; PERINEURAL at 07:08

## 2019-08-19 RX ADMIN — FAMOTIDINE 20 MG: 20 TABLET, FILM COATED ORAL at 10:08

## 2019-08-19 RX ADMIN — PREGABALIN 75 MG: 75 CAPSULE ORAL at 06:08

## 2019-08-19 RX ADMIN — LIDOCAINE HYDROCHLORIDE 50 MG: 20 INJECTION, SOLUTION INTRAVENOUS at 07:08

## 2019-08-19 RX ADMIN — PHENYLEPHRINE HYDROCHLORIDE 100 MCG: 10 INJECTION INTRAVENOUS at 08:08

## 2019-08-19 RX ADMIN — OXYBUTYNIN CHLORIDE 5 MG: 5 TABLET ORAL at 08:08

## 2019-08-19 RX ADMIN — ACETAMINOPHEN 1000 MG: 10 INJECTION, SOLUTION INTRAVENOUS at 08:08

## 2019-08-19 RX ADMIN — MUPIROCIN: 20 OINTMENT TOPICAL at 06:08

## 2019-08-19 RX ADMIN — MIDAZOLAM 2 MG: 1 INJECTION INTRAMUSCULAR; INTRAVENOUS at 07:08

## 2019-08-19 RX ADMIN — CELECOXIB 400 MG: 100 CAPSULE ORAL at 06:08

## 2019-08-19 RX ADMIN — OXYCODONE HYDROCHLORIDE 15 MG: 5 TABLET ORAL at 02:08

## 2019-08-19 RX ADMIN — ACETAMINOPHEN 1000 MG: 10 INJECTION, SOLUTION INTRAVENOUS at 02:08

## 2019-08-19 RX ADMIN — OXYCODONE HYDROCHLORIDE 15 MG: 5 TABLET ORAL at 06:08

## 2019-08-19 RX ADMIN — NICOTINE 1 PATCH: 21 PATCH, EXTENDED RELEASE TRANSDERMAL at 04:08

## 2019-08-19 RX ADMIN — BUPIVACAINE HYDROCHLORIDE 2.5 ML: 5 INJECTION, SOLUTION EPIDURAL; INTRACAUDAL; PERINEURAL at 07:08

## 2019-08-19 RX ADMIN — PROPOFOL 50 MCG/KG/MIN: 10 INJECTION, EMULSION INTRAVENOUS at 07:08

## 2019-08-19 RX ADMIN — CEFAZOLIN SODIUM 2 G: 2 SOLUTION INTRAVENOUS at 11:08

## 2019-08-19 RX ADMIN — OXYCODONE HYDROCHLORIDE 10 MG: 10 TABLET, FILM COATED, EXTENDED RELEASE ORAL at 06:08

## 2019-08-19 NOTE — PLAN OF CARE
Dr Christianson released from pacu to room able to move leg a bit still has a block on board  Skin w+d  + pedal pulse on L foot cap refill < 3 sec  Able to move legs a bit coming down a dermatome  A+ox 4  Had coffee mc sips and chips no Nausea No emesis

## 2019-08-19 NOTE — PT/OT/SLP EVAL
Physical Therapy Evaluation    Patient Name:  Nimco Caro   MRN:  [id number]    Recommendations:     Discharge Recommendations:  home health PT, home   Discharge Equipment Recommendations: none   Barriers to discharge: None    Assessment:     Nimco Caro is a 58 y.o. female admitted with a medical diagnosis of S/P left TKA.  She presents with the following impairments/functional limitations:  weakness, impaired endurance, impaired functional mobilty, gait instability, impaired balance, pain.  Patient ambulated 100 feet today with RW with max assist due to left knee buckling x 2 times and patient requiring assist to prevent fall.  Other wise gait was min assist.      Rehab Prognosis: Good; patient would benefit from acute skilled PT services to address these deficits and reach maximum level of function.    Recent Surgery: Procedure(s) (LRB):  ARTHROPLASTY, KNEE (Left) Day of Surgery    Plan:     During this hospitalization, patient to be seen BID to address the identified rehab impairments via gait training, therapeutic activities, therapeutic exercises and progress toward the following goals:    · Plan of Care Expires:       Subjective     Chief Complaint: leg being asleep  Patient/Family Comments/goals: go home  Pain/Comfort:  · Pain Rating 1: 0/10    Patients cultural, spiritual, Synagogue conflicts given the current situation: no    Living Environment:  Patient did live alone but is going home from hospital to friends house with 1 small step entry.  Prior to admission, patients level of function was modified independent.  Equipment used at home: 3-in-1 commode, walker, rolling, wheelchair, cane, straight. .  Upon discharge, patient will have assistance from family friend and would benefit from home ila PT progressing to outpatient.    Objective:     Communicated with nurse and patient prior to session.  Patient found supine with cryotherapy, crowe catheter  upon PT entry to room.    General  Precautions: Standard, fall   Orthopedic Precautions:Full weight bearing   Braces:       Exams:  · Sensation:    · -       Impaired  light/touch distal left LE  · RLE ROM: WFL  · RLE Strength: WFL  · LLE ROM: Deficits: AROM knee -10 degrees to 95 degrees takebn is sitting  · LLE Strength: Deficits: 3-/5 overall    Functional Mobility:  · Bed Mobility:     · Supine to Sit: minimum assistance  · Transfers:     · Sit to Stand:  minimum assistance with rolling walker  · Gait: x 100 feet with RW with max assist to prevent fall as left knee buckled x 2 times      Therapeutic Activities and Exercises:   gait training x 100 feet with RW with max assist to prevent fall as left knee buckled x 2 times    AM-PAC 6 CLICK MOBILITY  Total Score:15     Patient left up in chair with call button in reach and nurse notified.    GOALS:   Multidisciplinary Problems     Physical Therapy Goals        Problem: Physical Therapy Goal    Goal Priority Disciplines Outcome Goal Variances Interventions   Physical Therapy Goal     PT, PT/OT Ongoing (interventions implemented as appropriate)     Description:  Goals to be met by: 2019    Patient will increase functional independence with mobility by performin. Supine to sit with Stand-by Assistance  2. Sit to supine with Stand-by Assistance  3. Sit to stand transfer with Stand-by Assistance  4. Bed to chair transfer with Stand-by Assistance using Rolling Walker  4. Gait  x 250 feet with Supervision using Rolling Walker.                       History:     Past Medical History:   Diagnosis Date    Anxiety     Bipolar affect, depressed     Cancer ovarian; uterine    1992    COPD (chronic obstructive pulmonary disease)     GERD (gastroesophageal reflux disease)     Hyperlipidemia     Hypertension     OAB (overactive bladder)        Past Surgical History:   Procedure Laterality Date    ARTHROPLASTY, HIP Right 2019    [facility]    COLON SURGERY       COLONOSCOPY      HERNIA REPAIR N/A 2016    HYSTERECTOMY  total    JOINT REPLACEMENT Right     hip replacemnt    REVISION COLOSTOMY N/A 2012       Time Tracking:     PT Received On: 08/19/19  PT Start Time: 1300     PT Stop Time: 1340  PT Total Time (min): 40 min     Billable Minutes: Eval 20 and Gait Training 20      Chris Megilligan, PT  08/19/2019

## 2019-08-19 NOTE — H&P
CC/Indication for Procedure: 58 y.o. female with Primary osteoarthritis of left knee [M17.12]  Degenerative arthritis of left knee [M17.12].    Patient scheduled for ARTHROPLASTY, KNEE  NM TOTAL KNEE ARTHROPLASTY [[id number]].    Past Medical History:   Diagnosis Date    Anxiety     Bipolar affect, depressed     Cancer ovarian; uterine    1992    COPD (chronic obstructive pulmonary disease)     GERD (gastroesophageal reflux disease)     Hyperlipidemia     Hypertension     OAB (overactive bladder)      Past Surgical History:   Procedure Laterality Date    ARTHROPLASTY, HIP Right 2/25/2019    [facility] OR    COLON SURGERY      COLONOSCOPY      HERNIA REPAIR N/A 2016    HYSTERECTOMY  total    JOINT REPLACEMENT Right     hip replacemnt    REVISION COLOSTOMY N/A 2012     Family History   Problem Relation Age of Onset    Cancer Mother     Hypertension Father     Heart disease Father     Heart disease Sister     Hypertension Sister     Hypertension Brother     Depression Brother      Social History     Socioeconomic History    Marital status:      Spouse name: Not on file    Number of children: Not on file    Years of education: Not on file    Highest education level: Not on file   Occupational History    Not on file   Social Needs    Financial resource strain: Not on file    Food insecurity:     Worry: Not on file     Inability: Not on file    Transportation needs:     Medical: Not on file     Non-medical: Not on file   Tobacco Use    Smoking status: Current Every Day Smoker     Packs/day: 0.50     Years: 40.00     Pack years: 20.00     Types: Cigarettes    Smokeless tobacco: Never Used   Substance and Sexual Activity    Alcohol use: Yes     Alcohol/week: 0.6 oz     Types: 1 Glasses of wine per week     Comment: weekly    Drug use: No    Sexual activity: Not on file   Lifestyle    Physical activity:     Days per week: Not on file     Minutes per session:  Not on file    Stress: Not on file   Relationships    Social connections:     Talks on phone: Not on file     Gets together: Not on file     Attends Faith service: Not on file     Active member of club or organization: Not on file     Attends meetings of clubs or organizations: Not on file     Relationship status: Not on file   Other Topics Concern    Not on file   Social History Narrative    Not on file       Review of patient's allergies indicates:  No Known Allergies      Current Facility-Administered Medications:     lactated ringers infusion, , Intravenous, Continuous, Lenore Stevens MD, Last Rate: 10 mL/hr at 08/19/19 0648    lidocaine (PF) 10 mg/ml (1%) injection 10 mg, 1 mL, Intradermal, Once, Lenore Stevens MD    mupirocin 2 % ointment, , Nasal, On Call Procedure, Eliseo Vaca MD    sodium chloride 0.9% 49.3 mL with ropivacaine (PF) (NAROPIN) 49.3 mL, ketorolac (TORADOL) 30 mg, EPINEPHrine (ADRENALINE) 0.5 mg injection, , Other, Once, Eliseo Vaca MD    vancomycin 1 g in dextrose 5 % 250 mL IVPB (ready to mix system), 1,000 mg, Intravenous, Once Pre-Op, Eliseo Vaca MD    ROS:    Denies chest pain or palpitations  Denies shortness of breath  Denies fevers or chills  Denies chest pain  Denies abdominal pain    PE:    General Appearance: Well nourished  Orientation: Oriented to time, place, person  Mental Status: Alert  Heart: RRR  Lungs: CTA  Abdomen: Soft and non-tender    Anesthesia/Surgery risks, benefits and alternative options discussed and understood by patient/family.    This note was created using Dragon voice recognition software that occasionally misinterpreted phrases or words.

## 2019-08-19 NOTE — TRANSFER OF CARE
"Anesthesia Transfer of Care Note    Patient: Nimco Caro    Procedure(s) Performed: Procedure(s) (LRB):  ARTHROPLASTY, KNEE (Left)    Patient location: PACU    Anesthesia Type: spinal and MAC    Transport from OR: Transported from OR on 2-3 L/min O2 by NC with adequate spontaneous ventilation    Post pain: adequate analgesia    Post assessment: no apparent anesthetic complications    Post vital signs: stable    Level of consciousness: awake    Nausea/Vomiting: no nausea/vomiting    Complications: none    Transfer of care protocol was followed      Last vitals:   Visit Vitals  BP (!) 99/51   Pulse 62   Temp 36.7 °C (98.1 °F) (Skin)   Resp 18   Ht 5' 7" (1.702 m)   Wt 73 kg (161 lb)   SpO2 95%   Breastfeeding? No   BMI 25.22 kg/m²     "

## 2019-08-19 NOTE — PLAN OF CARE
Problem: Physical Therapy Goal  Goal: Physical Therapy Goal  Goals to be met by: 2019    Patient will increase functional independence with mobility by performin. Supine to sit with Stand-by Assistance  2. Sit to supine with Stand-by Assistance  3. Sit to stand transfer with Stand-by Assistance  4. Bed to chair transfer with Stand-by Assistance using Rolling Walker  4. Gait  x 250 feet with Supervision using Rolling Walker.     Outcome: Ongoing (interventions implemented as appropriate)  Patient plans on discharge home with retired friend who will help patient.

## 2019-08-19 NOTE — ANESTHESIA PROCEDURE NOTES
Spinal    Diagnosis: DJD knee left  Patient location during procedure: OR  Start time: 8/19/2019 7:52 AM  Timeout: 8/19/2019 7:50 AM  End time: 8/19/2019 7:58 AM    Staffing  Authorizing Provider: Jacek Valdez MD  Performing Provider: Jacek Valdez MD    Preanesthetic Checklist  Completed: patient identified, site marked, surgical consent, pre-op evaluation, timeout performed, IV checked, risks and benefits discussed and monitors and equipment checked  Spinal Block  Patient position: sitting  Prep: ChloraPrep  Patient monitoring: cardiac monitor, continuous pulse ox, frequent blood pressure checks and continuous capnometry  Approach: right paramedian  Location: L3-4  Injection technique: single shot  CSF Fluid: clear free-flowing CSF  Needle  Needle type: pencil-tip   Needle gauge: 25 G  Needle length: 3.5 in  Additional Documentation: incremental injection, negative aspiration for heme and no paresthesia on injection  Needle localization: anatomical landmarks  Assessment  Sensory level: T7   Dermatomal levels determined by alcohol wipe  Ease of block: easy  Patient's tolerance of the procedure: comfortable throughout block and no complaints  Additional Notes  Isobaric SAB

## 2019-08-19 NOTE — PLAN OF CARE
Pt is a smoker currently 1/2 ppd  02 sats 89-91 on room air left on 02 at 2l for now sat 94 encouraged deep breaths and to stop smoking    And triflow  onl up to 750 cc encouraged use every 1 hr

## 2019-08-19 NOTE — BRIEF OP NOTE
[facility]  Brief Operative Note    SUMMARY     Surgery Date: 8/19/2019     Surgeon(s) and Role:     * Eliseo Vaca MD - Primary    Assisting Surgeon: None    Pre-op Diagnosis:  Primary osteoarthritis of left knee [M17.12]    Post-op Diagnosis:  Post-Op Diagnosis Codes:     * Primary osteoarthritis of left knee [M17.12]    Procedure(s) (LRB):  ARTHROPLASTY, KNEE (Left)    Anesthesia: General    Description of Procedure: R tka    Description of the findings of the procedure: dictated 806815    Estimated Blood Loss: * No values recorded between 8/19/2019  8:24 AM and 8/19/2019  9:19 AM *         Specimens:   Specimen (12h ago, onward)    Start     Ordered    08/19/19 0833  Specimen to Pathology - Surgery  Once     Comments:  Pre-op Diagnosis: Primary osteoarthritis of left knee [M17.12]Post-op Diagnosis: SAME Procedure(s):ARTHROPLASTY, KNEE Number of specimens: 1Name of specimens: MEDIAL FEMORAL CONDYLE     Start Status     08/19/19 0833 Collected (08/19/19 0914) Order ID: [id number]       08/19/19 0885

## 2019-08-19 NOTE — ANESTHESIA PROCEDURE NOTES
Peripheral Block    Patient location during procedure: pre-op   Block not for primary anesthetic.  Reason for block: at surgeon's request and post-op pain management   Post-op Pain Location: knee left  Start time: 8/19/2019 7:30 AM  Timeout: 8/19/2019 7:30 AM   End time: 8/19/2019 7:33 AM    Staffing  Authorizing Provider: Jacek Valdez MD  Performing Provider: Jacek Valdez MD    Preanesthetic Checklist  Completed: patient identified, site marked, surgical consent, pre-op evaluation, timeout performed, IV checked, risks and benefits discussed and monitors and equipment checked  Peripheral Block  Patient position: supine  Prep: ChloraPrep  Patient monitoring: heart rate, cardiac monitor, continuous pulse ox, continuous capnometry and frequent blood pressure checks  Block type: adductor canal  Laterality: left  Injection technique: single shot  Needle  Needle type: Stimuplex   Needle gauge: 21 G  Needle length: 4 in  Needle localization: anatomical landmarks and ultrasound guidance   -ultrasound image captured on disc.  Assessment  Injection assessment: negative aspiration, negative parasthesia and local visualized surrounding nerve  Paresthesia pain: none  Heart rate change: no  Slow fractionated injection: yes  Additional Notes  VSS.  [facility] RN monitoring vitals throughout procedure.  Patient tolerated procedure well.     Exparel 20 cc in Bup. 0.5% 10 cc mixture

## 2019-08-19 NOTE — OP NOTE
DATE OF PROCEDURE:  08/19/2019.    ATTENDING PHYSICIAN:  Eliseo Vaca M.D.    FIRST ASSISTANT:  Norbert Cedeño.    PREOPERATIVE DIAGNOSIS:  Bone-on-bone osteoarthritis, left knee.    POSTOPERATIVE DIAGNOSES:  1.  Bone-on-bone osteoarthritis, left knee.  2.  Avascular necrosis, left medial femoral condyle.    PROCEDURES PERFORMED:  1.  Left total knee arthroplasty.  2.  Autograft bone grafting to 4 cm x 4 cm defect in the medial femoral condyle.    ESTIMATED BLOOD LOSS:  Minimal.    IV FLUID:  Crystalloid.    ANESTHESIA:  Spinal anesthesia.    PROCEDURE IN DETAIL:  The patient was placed on the operating table in the   supine position.  The left knee was prepped and draped in the usual sterile   manner for surgery.  An anterior approach was undertaken to the knee and carried   down sharply through the skin.  The medial parapatellar tissues were released   and the patella was taken laterally.  The medial tibial plateau was taken down   and the knee was flexed to 90 degrees.  The patient was noted to have   full-thickness cartilaginous loss throughout the medial compartment.    Additionally, there was sloughing of the medial femoral condyle.  A curette was   used and the dead loose bone was removed until we had healthy bone.  This left a large defect in the medial femoral condyle.  The defect, however, was well   contained.  I therefore elected to proceed on with the operation and bone graft   of the medial femoral condyle.  With this in mind, a drill was used to gain   access to the femur and an intramedullary prema was inserted up the femoral canal.    A cutting jig was pinned into position such that it would make a 5 degree   valgus cut and take 9 mm of distal bone.  The cut was checked secondarily and   then made.  We now sized the femur.  It sized to a size 4.  Size 4 cutting jig   was pinned into position and the cuts were made.  All good bone from the chamfer   cuts were saved for bone grafting later.  All curettings  from the defect was   saved to be sent for specimen.  The tibia was now subluxed anteriorly.  A   cutting jig was pinned into position such that it would make a neutral cut and   take 2 mm of medial bone.  It was checked secondarily and the cut was made.  We   now placed a femoral trial and a tibial trial.  We had symmetric flexion and   extension gaps and full range of motion.  We broached the tibia.  The patella   was calipered and cut and a button was placed.  The construct was trialed   perfectly with no liftoff.  We pulse and irrigated.  We now used a good bone   graft material obtained locally and packed the defect until it was flush with   the cuts.  We then mixed our bone cement and cemented first the tibia, next the   femur and finally the patella.  All excess cement was removed at the time of   cementation.  The construct was held in full extension until the cement was   hardened.  We copiously irrigated again.  When all the cement had hardened, we   tapped the actual spacer into position.  The construct had full extension, full   flexion, symmetric flexion and extension gaps.  We closed the extensor mechanism   with a combination of #2 FiberWire and a running Quill stitch.  We irrigated   again and closed the subcutaneous with 2-0 Vicryl and the skin with a running   4-0 Monocryl.  Sterile dressings were applied and the patient was noted to be in   stable condition.      SERA  dd: 08/19/2019 09:16:44 (CDT)  td: 08/19/2019 10:12:37 (CDT)  Doc ID   #[id number]  Job ID #[id number]    CC:

## 2019-08-19 NOTE — PLAN OF CARE
08/19/19 1141   Patient Assessment/Suction   Level of Consciousness (AVPU) alert   Respiratory Effort Normal;Unlabored   PRE-TX-O2   O2 Device (Oxygen Therapy) nasal cannula   $ Is the patient on Low Flow Oxygen? Yes   Flow (L/min) 3   SpO2 99 %   Pulse Oximetry Type Intermittent   $ Pulse Oximetry - Multiple Charge Pulse Oximetry - Multiple   Incentive Spirometer   $ Incentive Spirometer Charges done with encouragement   Incentive Spirometer Predicted Level (mL) 2500   Administration (IS) mouthpiece;proper technique demonstrated   Number of Repetitions (IS) 10   Level Incentive Spirometer (mL) 750   Patient Tolerance (IS) good

## 2019-08-19 NOTE — ANESTHESIA PREPROCEDURE EVALUATION
08/19/2019  Nimco Caro is a 58 y.o., female.    Pre-op Assessment    I have reviewed the Patient Summary Reports.     I have reviewed the Nursing Notes.   I have reviewed the Medications.     Review of Systems  Anesthesia Hx:  Denies Family Hx of Anesthesia complications.   Denies Personal Hx of Anesthesia complications.   Social:  Smoker    Cardiovascular:   Hypertension    Hepatic/GI:   GERD    Musculoskeletal:   Arthritis         Physical Exam  General:  Well nourished    Airway/Jaw/Neck:  Airway Findings: Mouth Opening: Normal Tongue: Normal  General Airway Assessment: Adult  Mallampati: III  Improves to II with phonation.  TM Distance: Normal, at least 6 cm  Jaw/Neck Findings:  Neck ROM: Normal ROM      Dental:  Dental Findings: Periodontal disease, Severe   Chest/Lungs:  Chest/Lungs Clear    Heart/Vascular:  Heart Findings: Normal            Anesthesia Plan  Type of Anesthesia, risks & benefits discussed:  Anesthesia Type:  spinal  Patient's Preference:   Intra-op Monitoring Plan:   Intra-op Monitoring Plan Comments:   Post Op Pain Control Plan: multimodal analgesia and peripheral nerve block  Post Op Pain Control Plan Comments:   Induction:    Beta Blocker:  Patient is on a Beta-Blocker and has received one dose within the past 24 hours (No further documentation required).       Informed Consent: Patient understands risks and agrees with Anesthesia plan.  Questions answered. Anesthesia consent signed with patient.  ASA Score: 2     Day of Surgery Review of History & Physical:    H&P update referred to the surgeon.         Ready For Surgery From Anesthesia Perspective.

## 2019-08-19 NOTE — PLAN OF CARE
Problem: Adult Inpatient Plan of Care  Goal: Plan of Care Review  Outcome: Ongoing (interventions implemented as appropriate)  Plan of care reviewed with patient, patient verbalized understanding. Safety maintained throughout shift.   Pt remained free of fall/trauma. Vs stable. IV fluids and IV antibiotics infusing per  Gray draining clear yellow urine by gravity. SCD and Foot pump in use. Surgical dressing to left knee CDI. Cryo ice to left knee. Patient tolerating diet. Bed low, call light in reach. Will continue to monitor patient.

## 2019-08-20 DIAGNOSIS — Z96.652 STATUS POST TOTAL KNEE REPLACEMENT, LEFT: Primary | ICD-10-CM

## 2019-08-20 PROBLEM — J95.2 ACUTE POSTOPERATIVE PULMONARY INSUFFICIENCY: Status: ACTIVE | Noted: 2019-08-20

## 2019-08-20 PROBLEM — J44.9 COPD (CHRONIC OBSTRUCTIVE PULMONARY DISEASE): Status: ACTIVE | Noted: 2019-08-20

## 2019-08-20 LAB
BASOPHILS # BLD AUTO: 0.03 K/UL (ref 0–0.2)
BASOPHILS NFR BLD: 0.2 % (ref 0–1.9)
DIFFERENTIAL METHOD: ABNORMAL
EOSINOPHIL # BLD AUTO: 0 K/UL (ref 0–0.5)
EOSINOPHIL NFR BLD: 0.1 % (ref 0–8)
ERYTHROCYTE [DISTWIDTH] IN BLOOD BY AUTOMATED COUNT: 14.2 % (ref 11.5–14.5)
HCT VFR BLD AUTO: 37.1 % (ref 37–48.5)
HGB BLD-MCNC: 11.5 G/DL (ref 12–16)
IMM GRANULOCYTES # BLD AUTO: 0.07 K/UL (ref 0–0.04)
LYMPHOCYTES # BLD AUTO: 2 K/UL (ref 1–4.8)
LYMPHOCYTES NFR BLD: 14.6 % (ref 18–48)
MCH RBC QN AUTO: 28.8 PG (ref 27–31)
MCHC RBC AUTO-ENTMCNC: 31 G/DL (ref 32–36)
MCV RBC AUTO: 93 FL (ref 82–98)
MONOCYTES # BLD AUTO: 1.1 K/UL (ref 0.3–1)
MONOCYTES NFR BLD: 8 % (ref 4–15)
NEUTROPHILS # BLD AUTO: 10.7 K/UL (ref 1.8–7.7)
NEUTROPHILS NFR BLD: 76.6 % (ref 38–73)
NRBC BLD-RTO: 0 /100 WBC
PLATELET # BLD AUTO: 192 K/UL (ref 150–350)
PMV BLD AUTO: 10.7 FL (ref 9.2–12.9)
RBC # BLD AUTO: 4 M/UL (ref 4–5.4)
WBC # BLD AUTO: 13.94 K/UL (ref 3.9–12.7)

## 2019-08-20 PROCEDURE — 97116 GAIT TRAINING THERAPY: CPT

## 2019-08-20 PROCEDURE — 97535 SELF CARE MNGMENT TRAINING: CPT

## 2019-08-20 PROCEDURE — G8987 SELF CARE CURRENT STATUS: HCPCS | Mod: CI

## 2019-08-20 PROCEDURE — 94761 N-INVAS EAR/PLS OXIMETRY MLT: CPT

## 2019-08-20 PROCEDURE — 97165 OT EVAL LOW COMPLEX 30 MIN: CPT

## 2019-08-20 PROCEDURE — 99900035 HC TECH TIME PER 15 MIN (STAT)

## 2019-08-20 PROCEDURE — 25000003 PHARM REV CODE 250: Performed by: NURSE PRACTITIONER

## 2019-08-20 PROCEDURE — 25000003 PHARM REV CODE 250: Performed by: ORTHOPAEDIC SURGERY

## 2019-08-20 PROCEDURE — 25000242 PHARM REV CODE 250 ALT 637 W/ HCPCS: Performed by: NURSE PRACTITIONER

## 2019-08-20 PROCEDURE — 25000003 PHARM REV CODE 250: Performed by: ANESTHESIOLOGY

## 2019-08-20 PROCEDURE — 94799 UNLISTED PULMONARY SVC/PX: CPT

## 2019-08-20 PROCEDURE — 27000221 HC OXYGEN, UP TO 24 HOURS

## 2019-08-20 PROCEDURE — 97110 THERAPEUTIC EXERCISES: CPT

## 2019-08-20 PROCEDURE — 94640 AIRWAY INHALATION TREATMENT: CPT

## 2019-08-20 PROCEDURE — 63600175 PHARM REV CODE 636 W HCPCS: Performed by: ORTHOPAEDIC SURGERY

## 2019-08-20 PROCEDURE — 85025 COMPLETE CBC W/AUTO DIFF WBC: CPT

## 2019-08-20 PROCEDURE — S4991 NICOTINE PATCH NONLEGEND: HCPCS | Performed by: ORTHOPAEDIC SURGERY

## 2019-08-20 PROCEDURE — G8988 SELF CARE GOAL STATUS: HCPCS | Mod: CI

## 2019-08-20 PROCEDURE — 36415 COLL VENOUS BLD VENIPUNCTURE: CPT

## 2019-08-20 RX ORDER — CEFTRIAXONE 1 G/50ML
1 INJECTION, SOLUTION INTRAVENOUS
Status: DISCONTINUED | OUTPATIENT
Start: 2019-08-20 | End: 2019-08-20

## 2019-08-20 RX ORDER — AMOXICILLIN AND CLAVULANATE POTASSIUM 875; 125 MG/1; MG/1
1 TABLET, FILM COATED ORAL EVERY 12 HOURS
Status: DISCONTINUED | OUTPATIENT
Start: 2019-08-20 | End: 2019-08-21 | Stop reason: HOSPADM

## 2019-08-20 RX ORDER — IPRATROPIUM BROMIDE AND ALBUTEROL SULFATE 2.5; .5 MG/3ML; MG/3ML
3 SOLUTION RESPIRATORY (INHALATION) EVERY 4 HOURS
Status: DISCONTINUED | OUTPATIENT
Start: 2019-08-20 | End: 2019-08-21 | Stop reason: HOSPADM

## 2019-08-20 RX ADMIN — AMLODIPINE BESYLATE 10 MG: 5 TABLET ORAL at 08:08

## 2019-08-20 RX ADMIN — OXYCODONE HYDROCHLORIDE 15 MG: 5 TABLET ORAL at 05:08

## 2019-08-20 RX ADMIN — PANTOPRAZOLE SODIUM 40 MG: 40 TABLET, DELAYED RELEASE ORAL at 08:08

## 2019-08-20 RX ADMIN — OXYBUTYNIN CHLORIDE 5 MG: 5 TABLET ORAL at 08:08

## 2019-08-20 RX ADMIN — CELECOXIB 100 MG: 100 CAPSULE ORAL at 09:08

## 2019-08-20 RX ADMIN — OXYBUTYNIN CHLORIDE 5 MG: 5 TABLET ORAL at 09:08

## 2019-08-20 RX ADMIN — OXYCODONE HYDROCHLORIDE 15 MG: 5 TABLET ORAL at 02:08

## 2019-08-20 RX ADMIN — OXYCODONE HYDROCHLORIDE 15 MG: 5 TABLET ORAL at 11:08

## 2019-08-20 RX ADMIN — CETIRIZINE HYDROCHLORIDE 10 MG: 10 TABLET, FILM COATED ORAL at 08:08

## 2019-08-20 RX ADMIN — ACETAMINOPHEN 1000 MG: 10 INJECTION, SOLUTION INTRAVENOUS at 08:08

## 2019-08-20 RX ADMIN — DULOXETINE 60 MG: 30 CAPSULE, DELAYED RELEASE ORAL at 08:08

## 2019-08-20 RX ADMIN — ACETAMINOPHEN 1000 MG: 10 INJECTION, SOLUTION INTRAVENOUS at 03:08

## 2019-08-20 RX ADMIN — OXYCODONE HYDROCHLORIDE 15 MG: 5 TABLET ORAL at 07:08

## 2019-08-20 RX ADMIN — ACETAMINOPHEN 1000 MG: 500 TABLET ORAL at 02:08

## 2019-08-20 RX ADMIN — ARIPIPRAZOLE 15 MG: 5 TABLET ORAL at 08:08

## 2019-08-20 RX ADMIN — OXYCODONE HYDROCHLORIDE 15 MG: 5 TABLET ORAL at 08:08

## 2019-08-20 RX ADMIN — ATENOLOL 50 MG: 25 TABLET ORAL at 08:08

## 2019-08-20 RX ADMIN — IPRATROPIUM BROMIDE AND ALBUTEROL SULFATE 3 ML: .5; 3 SOLUTION RESPIRATORY (INHALATION) at 04:08

## 2019-08-20 RX ADMIN — OXYCODONE HYDROCHLORIDE 15 MG: 5 TABLET ORAL at 12:08

## 2019-08-20 RX ADMIN — PREGABALIN 75 MG: 75 CAPSULE ORAL at 08:08

## 2019-08-20 RX ADMIN — IPRATROPIUM BROMIDE AND ALBUTEROL SULFATE 3 ML: .5; 3 SOLUTION RESPIRATORY (INHALATION) at 08:08

## 2019-08-20 RX ADMIN — AMOXICILLIN AND CLAVULANATE POTASSIUM 1 TABLET: 875; 125 TABLET, FILM COATED ORAL at 09:08

## 2019-08-20 RX ADMIN — ASPIRIN 325 MG ORAL TABLET 325 MG: 325 PILL ORAL at 09:08

## 2019-08-20 RX ADMIN — PREGABALIN 75 MG: 75 CAPSULE ORAL at 09:08

## 2019-08-20 RX ADMIN — ASPIRIN 325 MG ORAL TABLET 325 MG: 325 PILL ORAL at 08:08

## 2019-08-20 RX ADMIN — ACETAMINOPHEN 1000 MG: 500 TABLET ORAL at 11:08

## 2019-08-20 RX ADMIN — NICOTINE 1 PATCH: 21 PATCH, EXTENDED RELEASE TRANSDERMAL at 08:08

## 2019-08-20 RX ADMIN — IPRATROPIUM BROMIDE AND ALBUTEROL SULFATE 3 ML: .5; 3 SOLUTION RESPIRATORY (INHALATION) at 07:08

## 2019-08-20 RX ADMIN — CELECOXIB 100 MG: 100 CAPSULE ORAL at 08:08

## 2019-08-20 NOTE — HPI
POD #2 S/P L TKA  - Doing well  - Stayed over night 2/2 low O2 sat - Atelectasis vs pneumonia (no fever)

## 2019-08-20 NOTE — ASSESSMENT & PLAN NOTE
Assistance with smoking cessation was offered, including:  [x]  Medications  []  Counseling  []  Printed Information on Smoking Cessation  []  Referral to a Smoking Cessation Program    Patient was counseled regarding smoking for 3-10 minutes.  Add nicotine patch

## 2019-08-20 NOTE — H&P
[facility] Medicine  History & Physical    Patient Name: Nimco Caro  MRN: [id number]  Admission Date: 8/19/2019  Attending Physician: Madie Thomas NP  Primary Care Provider: Katy Mason MD         Patient information was obtained from patient and ER records.     Subjective:     Principal Problem:Degenerative arthritis of left knee    Chief Complaint:   Chief Complaint   Patient presents with    Knee Pain        HPI: Nimco Caro is a 58 year old female with PMH of depression, HTN, HLP, and arthritis who is status post left knee arthoplasty per Dr. Vaca. She states 4/10 left knee pain which increases with movement. Pain is controlled with narcotics. She states she ambulated PT using walker without difficultly. She is doing well post operatively. Denies nausea. Family at bedside.    Past Medical History:   Diagnosis Date    Anxiety     Bipolar affect, depressed     Cancer ovarian; uterine    1992    COPD (chronic obstructive pulmonary disease)     GERD (gastroesophageal reflux disease)     Hyperlipidemia     Hypertension     OAB (overactive bladder)        Past Surgical History:   Procedure Laterality Date    ARTHROPLASTY, HIP Right 2/25/2019    Performed by [facility]o [facility]aca [facility] at [facility] OR    COLON SURGERY      COLONOSCOPY      HERNIA REPAIR N/A 2016    HYSTERECTOMY  total    JOINT REPLACEMENT Right     hip replacemnt    REVISION COLOSTOMY N/A 2012       Review of patient's allergies indicates:  No Known Allergies    No current facility-administered medications on file prior to encounter.      Current Outpatient Medications on File Prior to Encounter   Medication Sig    ALBUTEROL INHL Inhale into the lungs 4 (four) times daily as needed.     amLODIPine (NORVASC) 10 MG tablet Take 1 tablet (10 mg) by mouth daily    ARIPiprazole (ABILIFY) 15 MG Tab Take 1 tablet (15 mg) by mouth daily    atenolol (TENORMIN) 50 MG tablet Take 1 tablet (50 mg) by mouth  daily    cetirizine (ZYRTEC) 10 MG tablet Take 1 tablet (10 mg total) by mouth once daily.    clonazePAM (KLONOPIN) 1 MG tablet 1 tablet daily as needed for anxiery    DULoxetine (CYMBALTA) 60 MG capsule Take 60 mg by mouth once daily.    fluticasone (FLONASE) 50 mcg/actuation nasal spray 2 sprays (100 mcg total) by Each Nare route once daily.    gabapentin (NEURONTIN) 600 MG tablet Take 1 tablet (600 mg) by mouth 3 times per day    lisinopril (PRINIVIL,ZESTRIL) 20 MG tablet Take 1 tablet (20 mg) by mouth daily    ondansetron (ZOFRAN) 4 MG tablet Take 8 mg by mouth 2 (two) times daily.    oxybutynin (DITROPAN) 5 MG Tab Take 1 tablet (5 mg) by mouth 2 times per day    pantoprazole (PROTONIX) 40 MG tablet Take 1 tablet (40 mg) by mouth daily    polyethylene glycol (COLYTE) 240-22.72-6.72 gram SolR Take 4,000 mLs by mouth as directed.    pravastatin (PRAVACHOL) 20 MG tablet Take 1 tablet (20 mg) by mouth daily    SEREVENT DISKUS 50 mcg/dose diskus inhaler INHALE 1 PUFF EVERY TWELVE HOURS. RINSE MOUTH AFTER USING    tiotropium Br/olodaterol HCl (STIOLTO RESPIMAT INHL) Inhale into the lungs.    traZODone (DESYREL) 100 MG tablet Take 1 tablet (100 mg) by mouth daily as needed at bedtime    VENTOLIN HFA 90 mcg/actuation inhaler 2 puffs every 4 (four) hours as needed.    aspirin 325 MG tablet Take 1 tablet (325 mg total) by mouth 2 (two) times daily.    docusate sodium (COLACE) 100 MG capsule Take 2 capsules (200 mg) by mouth 2 times per day as needed     Family History     Problem Relation (Age of Onset)    Cancer Mother    Depression Brother    Heart disease Father, Sister    Hypertension Father, Sister, Brother        Tobacco Use    Smoking status: Current Every Day Smoker     Packs/day: 0.50     Years: 40.00     Pack years: 20.00     Types: Cigarettes    Smokeless tobacco: Never Used   Substance and Sexual Activity    Alcohol use: Yes     Alcohol/week: 0.6 oz     Types: 1 Glasses of wine per week      Comment: weekly    Drug use: No    Sexual activity: Not on file     Review of Systems   Constitutional: Negative for diaphoresis, fatigue and fever.   HENT: Negative for dental problem and hearing loss.    Eyes: Negative for pain and redness.   Respiratory: Negative for cough and shortness of breath.    Cardiovascular: Negative for chest pain and leg swelling.   Gastrointestinal: Negative for abdominal distention and abdominal pain.   Endocrine: Negative for polyphagia and polyuria.   Genitourinary: Negative for dysuria, flank pain and pelvic pain.   Musculoskeletal: Positive for arthralgias, gait problem and joint swelling.   Skin: Negative for pallor and rash.        Surgical dressing to knee   Neurological: Negative for speech difficulty and numbness.   Psychiatric/Behavioral: Negative for agitation and behavioral problems. The patient is not nervous/anxious.      Objective:     Vital Signs (Most Recent):  Temp: 97.7 °F (36.5 °C) (08/19/19 1619)  Pulse: (!) 54 (08/19/19 1619)  Resp: 16 (08/19/19 1619)  BP: 109/63 (08/19/19 1619)  SpO2: (!) 93 % (08/19/19 1619) Vital Signs (24h Range):  Temp:  [97.7 °F (36.5 °C)-98.1 °F (36.7 °C)] 97.7 °F (36.5 °C)  Pulse:  [54-64] 54  Resp:  [11-20] 16  SpO2:  [93 %-99 %] 93 %  BP: ()/(51-63) 109/63     Weight: 73 kg (161 lb)  Body mass index is 25.22 kg/m².    Physical Exam   Constitutional: She is oriented to person, place, and time. She appears well-developed and well-nourished.   HENT:   Head: Normocephalic and atraumatic.   Right Ear: External ear normal.   Left Ear: External ear normal.   Mouth/Throat: Oropharynx is clear and moist.   Eyes: Pupils are equal, round, and reactive to light. Conjunctivae and EOM are normal.   Neck: Normal range of motion. Neck supple. No JVD present.   Cardiovascular: Normal rate, regular rhythm, normal heart sounds and intact distal pulses.   Pulmonary/Chest: Effort normal and breath sounds normal. She has no wheezes.   Abdominal:  Soft. Bowel sounds are normal. There is no tenderness.   Musculoskeletal: Normal range of motion. She exhibits edema and tenderness.   Left knee surgical dressing intact with ice pack. Mild edema   Neurological: She is alert and oriented to person, place, and time. No sensory deficit. Coordination normal.   Skin: Skin is warm and dry.   Psychiatric: She has a normal mood and affect. Her behavior is normal. Judgment normal.   Nursing note and vitals reviewed.        CRANIAL NERVES     CN III, IV, VI   Pupils are equal, round, and reactive to light.  Extraocular motions are normal.        Significant Labs: BMP: No results for input(s): GLU, NA, K, CL, CO2, BUN, CREATININE, CALCIUM, MG in the last 48 hours.  CBC: No results for input(s): WBC, HGB, HCT, PLT in the last 48 hours.    Significant Imaging: I have reviewed and interpreted all pertinent imaging results/findings within the past 24 hours.    Assessment/Plan:     * Degenerative arthritis of left knee  Status post left knee arthoplasty. Post op orders per ortho. Pain and nausea control. PT consulted.       Chronic bronchitis    Due to long hx of smoking. Add duo nebs prn wheezing    Tobacco dependency  Assistance with smoking cessation was offered, including:  [x]  Medications  []  Counseling  []  Printed Information on Smoking Cessation  []  Referral to a Smoking Cessation Program    Patient was counseled regarding smoking for 3-10 minutes.  Add nicotine patch      Mild episode of recurrent major depressive disorder  Chronic. Resume home antidepressants      Hyperlipidemia  Chronic. Resume statin      Hypertension  Chronic. Resume home antihypertensives. Trend BP. Cardiac diet      GERD (gastroesophageal reflux disease)  Chronic resume PPI        VTE Risk Mitigation (From admission, onward)        Ordered     IP VTE HIGH RISK PATIENT  Once      08/19/19 1100     Place sequential compression device  Until discontinued      08/19/19 1100             Madie NOLASCO  THEE Thomas  Department of Hospital Medicine   [facility]

## 2019-08-20 NOTE — HPI
This is a 58 year old female with PMHx of depression, HTN, HLP, and arthritis who is status post left knee arthoplasty per Dr. Vaca.  Patient placed in the hospital for further evaluation treatment including PT and OT.

## 2019-08-20 NOTE — PROGRESS NOTES
08/20/19 1500   Home Oxygen Qualification   Room Air SpO2 At Rest 90 %   Room Air SpO2 on Exertion (!) 86 %   SpO2 During Exertion on O2 93 %   Heart Rate on O2 72 bpm   Exertion O2 LPM 2 LPM   SpO2 on Recovery 94 %   Recovery Heart Rate 64 bpm   Recovery O2 LPM 2 LPM

## 2019-08-20 NOTE — PLAN OF CARE
Problem: Adult Inpatient Plan of Care  Goal: Plan of Care Review  Outcome: Ongoing (interventions implemented as appropriate)  POC discussed with patient, verbalized understanding. Patient with uneventful night, slept well between care. VS stable, continued with bradycardia in the 40-50's, asymptomatic. Surgical dressing L knee CDI, NV wnl, with cyro in use. Receiving IV fluids, tolerating diet well. Adequate urine in crowe. Pain managed with oxycodone q 3 h. Call light at bedside, bed alarm on for patient safety.

## 2019-08-20 NOTE — SUBJECTIVE & OBJECTIVE
Past Medical History:   Diagnosis Date    Anxiety     Bipolar affect, depressed     Cancer ovarian; uterine    1992    COPD (chronic obstructive pulmonary disease)     GERD (gastroesophageal reflux disease)     Hyperlipidemia     Hypertension     OAB (overactive bladder)        Past Surgical History:   Procedure Laterality Date    ARTHROPLASTY, HIP Right 2/25/2019    [facility]    COLON SURGERY      COLONOSCOPY      HERNIA REPAIR N/A 2016    HYSTERECTOMY  total    JOINT REPLACEMENT Right     hip replacemnt    REVISION COLOSTOMY N/A 2012       Review of patient's allergies indicates:  No Known Allergies    No current facility-administered medications on file prior to encounter.      Current Outpatient Medications on File Prior to Encounter   Medication Sig    ALBUTEROL INHL Inhale into the lungs 4 (four) times daily as needed.     amLODIPine (NORVASC) 10 MG tablet Take 1 tablet (10 mg) by mouth daily    ARIPiprazole (ABILIFY) 15 MG Tab Take 1 tablet (15 mg) by mouth daily    atenolol (TENORMIN) 50 MG tablet Take 1 tablet (50 mg) by mouth daily    cetirizine (ZYRTEC) 10 MG tablet Take 1 tablet (10 mg total) by mouth once daily.    clonazePAM (KLONOPIN) 1 MG tablet 1 tablet daily as needed for anxiery    DULoxetine (CYMBALTA) 60 MG capsule Take 60 mg by mouth once daily.    fluticasone (FLONASE) 50 mcg/actuation nasal spray 2 sprays (100 mcg total) by Each Nare route once daily.    gabapentin (NEURONTIN) 600 MG tablet Take 1 tablet (600 mg) by mouth 3 times per day    lisinopril (PRINIVIL,ZESTRIL) 20 MG tablet Take 1 tablet (20 mg) by mouth daily    ondansetron (ZOFRAN) 4 MG tablet Take 8 mg by mouth 2 (two) times daily.    oxybutynin (DITROPAN) 5 MG Tab Take 1 tablet (5 mg) by mouth 2 times per day    pantoprazole (PROTONIX) 40 MG tablet Take 1 tablet (40 mg) by mouth daily    polyethylene glycol (COLYTE) 240-22.72-6.72 gram SolR Take 4,000 mLs by mouth as  directed.    pravastatin (PRAVACHOL) 20 MG tablet Take 1 tablet (20 mg) by mouth daily    SEREVENT DISKUS 50 mcg/dose diskus inhaler INHALE 1 PUFF EVERY TWELVE HOURS. RINSE MOUTH AFTER USING    tiotropium Br/olodaterol HCl (STIOLTO RESPIMAT INHL) Inhale into the lungs.    traZODone (DESYREL) 100 MG tablet Take 1 tablet (100 mg) by mouth daily as needed at bedtime    VENTOLIN HFA 90 mcg/actuation inhaler 2 puffs every 4 (four) hours as needed.    aspirin 325 MG tablet Take 1 tablet (325 mg total) by mouth 2 (two) times daily.    docusate sodium (COLACE) 100 MG capsule Take 2 capsules (200 mg) by mouth 2 times per day as needed     Family History     Problem Relation (Age of Onset)    Cancer Mother    Depression Brother    Heart disease Father, Sister    Hypertension Father, Sister, Brother        Tobacco Use    Smoking status: Current Every Day Smoker     Packs/day: 0.50     Years: 40.00     Pack years: 20.00     Types: Cigarettes    Smokeless tobacco: Never Used   Substance and Sexual Activity    Alcohol use: Yes     Alcohol/week: 0.6 oz     Types: 1 Glasses of wine per week     Comment: weekly    Drug use: No    Sexual activity: Not on file     Review of Systems   Constitutional: Negative for diaphoresis, fatigue and fever.   HENT: Negative for dental problem and hearing loss.    Eyes: Negative for pain and redness.   Respiratory: Negative for cough and shortness of breath.    Cardiovascular: Negative for chest pain and leg swelling.   Gastrointestinal: Negative for abdominal distention and abdominal pain.   Endocrine: Negative for polyphagia and polyuria.   Genitourinary: Negative for dysuria, flank pain and pelvic pain.   Musculoskeletal: Positive for arthralgias, gait problem and joint swelling.   Skin: Negative for pallor and rash.        Surgical dressing to knee   Neurological: Negative for speech difficulty and numbness.   Psychiatric/Behavioral: Negative for agitation and behavioral problems.  The patient is not nervous/anxious.      Objective:     Vital Signs (Most Recent):  Temp: 97.7 °F (36.5 °C) (08/19/19 1619)  Pulse: (!) 54 (08/19/19 1619)  Resp: 16 (08/19/19 1619)  BP: 109/63 (08/19/19 1619)  SpO2: (!) 93 % (08/19/19 1619) Vital Signs (24h Range):  Temp:  [97.7 °F (36.5 °C)-98.1 °F (36.7 °C)] 97.7 °F (36.5 °C)  Pulse:  [54-64] 54  Resp:  [11-20] 16  SpO2:  [93 %-99 %] 93 %  BP: ()/(51-63) 109/63     Weight: 73 kg (161 lb)  Body mass index is 25.22 kg/m².    Physical Exam   Constitutional: She is oriented to person, place, and time. She appears well-developed and well-nourished.   HENT:   Head: Normocephalic and atraumatic.   Right Ear: External ear normal.   Left Ear: External ear normal.   Mouth/Throat: Oropharynx is clear and moist.   Eyes: Pupils are equal, round, and reactive to light. Conjunctivae and EOM are normal.   Neck: Normal range of motion. Neck supple. No JVD present.   Cardiovascular: Normal rate, regular rhythm, normal heart sounds and intact distal pulses.   Pulmonary/Chest: Effort normal and breath sounds normal. She has no wheezes.   Abdominal: Soft. Bowel sounds are normal. There is no tenderness.   Musculoskeletal: Normal range of motion. She exhibits edema and tenderness.   Left knee surgical dressing intact with ice pack. Mild edema   Neurological: She is alert and oriented to person, place, and time. No sensory deficit. Coordination normal.   Skin: Skin is warm and dry.   Psychiatric: She has a normal mood and affect. Her behavior is normal. Judgment normal.   Nursing note and vitals reviewed.        CRANIAL NERVES     CN III, IV, VI   Pupils are equal, round, and reactive to light.  Extraocular motions are normal.        Significant Labs: BMP: No results for input(s): GLU, NA, K, CL, CO2, BUN, CREATININE, CALCIUM, MG in the last 48 hours.  CBC: No results for input(s): WBC, HGB, HCT, PLT in the last 48 hours.    Significant Imaging: I have reviewed and interpreted all  pertinent imaging results/findings within the past 24 hours.

## 2019-08-20 NOTE — ANESTHESIA POSTPROCEDURE EVALUATION
Anesthesia Post Evaluation    Patient: Nimco Caro    Procedure(s) Performed: Procedure(s) (LRB):  ARTHROPLASTY, KNEE (Left)    Final Anesthesia Type: spinal  Patient location during evaluation: PACU  Patient participation: Yes- Able to Participate  Level of consciousness: awake and alert  Post-procedure vital signs: reviewed and stable  Pain management: adequate  Airway patency: patent  PONV status at discharge: No PONV  Anesthetic complications: no      Cardiovascular status: blood pressure returned to baseline  Respiratory status: unassisted  Hydration status: euvolemic  Follow-up not needed.          Vitals Value Taken Time   /53 8/20/2019 11:48 AM   Temp 36.3 °C (97.3 °F) 8/20/2019 11:48 AM   Pulse 46 8/20/2019  4:34 PM   Resp 16 8/20/2019  4:34 PM   SpO2 98 % 8/20/2019  4:34 PM         Event Time     Out of Recovery 11:10:00          Pain/Cruz Score: Pain Rating Prior to Med Admin: 6 (8/20/2019  2:59 PM)  Pain Rating Post Med Admin: 2 (8/20/2019  6:10 AM)  Cruz Score: 9 (8/19/2019 11:00 AM)

## 2019-08-20 NOTE — ASSESSMENT & PLAN NOTE
Status post left knee arthoplasty. Post op orders per ortho. Pain and nausea control. PT consulted.

## 2019-08-20 NOTE — PT/OT/SLP PROGRESS
Physical Therapy Treatment    Patient Name:  Nimco Caro   MRN:  [id number]    Recommendations:     Discharge Recommendations:  home health PT, home   Discharge Equipment Recommendations: none   Barriers to discharge: None    Assessment:     Nimco Caro is a 58 y.o. female admitted with a medical diagnosis of Degenerative arthritis of left knee.  She presents with the following impairments/functional limitations:  weakness, impaired endurance, gait instability, impaired functional mobilty and increased pain in left knee.  Patient improve well since eval yesterday and was able to ambulate x 250 feet today.    Rehab Prognosis: Good; patient would benefit from acute skilled PT services to address these deficits and reach maximum level of function.    Recent Surgery: Procedure(s) (LRB):  ARTHROPLASTY, KNEE (Left) 1 Day Post-Op    Plan:     During this hospitalization, patient to be seen BID to address the identified rehab impairments via gait training, therapeutic activities, therapeutic exercises and progress toward the following goals:    · Plan of Care Expires:       Subjective     Chief Complaint: pain  Patient/Family Comments/goals: go home  Pain/Comfort:  · Pain Rating 1: 7/10  · Location - Side 1: Left  · Location - Orientation 1: lower  · Location 1: knee  · Pain Addressed 1: Reposition, Cessation of Activity, Nurse notified  · Pain Rating Post-Intervention 1: 4/10      Objective:     Communicated with nurse and patient prior to session.  Patient found supine with cryotherapy, crowe catheter upon PT entry to room.     General Precautions: Standard, fall   Orthopedic Precautions:Full weight bearing   Braces:       Functional Mobility:  · Bed Mobility:     · Supine to Sit: supervision  · Transfers:     · Sit to Stand:  contact guard assistance with rolling walker  · Gait: x 250 feet with RW with CGA      AM-PAC 6 CLICK MOBILITY  Turning over in bed (including adjusting bedclothes, sheets and blankets)?:  4  Sitting down on and standing up from a chair with arms (e.g., wheelchair, bedside commode, etc.): 3  Moving from lying on back to sitting on the side of the bed?: 3  Moving to and from a bed to a chair (including a wheelchair)?: 3  Need to walk in hospital room?: 3  Climbing 3-5 steps with a railing?: 1  Basic Mobility Total Score: 17       Therapeutic Activities and Exercises:   exercise to include ankle pumps, heel slides, quad sets, LAQ and seated hip flexion.  All done x 12 reps.  Gait training x 250 feet with RW with CGA    Patient left up in chair with call button in reach..    GOALS:   Multidisciplinary Problems     Physical Therapy Goals        Problem: Physical Therapy Goal    Goal Priority Disciplines Outcome Goal Variances Interventions   Physical Therapy Goal     PT, PT/OT Ongoing (interventions implemented as appropriate)     Description:  Goals to be met by: 2019    Patient will increase functional independence with mobility by performin. Supine to sit with Stand-by Assistance  2. Sit to supine with Stand-by Assistance  3. Sit to stand transfer with Stand-by Assistance  4. Bed to chair transfer with Stand-by Assistance using Rolling Walker  4. Gait  x 250 feet with Supervision using Rolling Walker.                       Time Tracking:     PT Received On: 19  PT Start Time: 0850     PT Stop Time: 913  PT Total Time (min): 23 min     Billable Minutes: Gait Training 12 and Therapeutic Exercise 11    Treatment Type: Treatment  PT/PTA: PT     PTA Visit Number: 0     Moses Vyilligan, PT  2019

## 2019-08-20 NOTE — PLAN OF CARE
08/20/19 1148   Final Note   Assessment Type Final Discharge Note   Anticipated Discharge Disposition Home   Hospital Follow Up  Appt(s) scheduled? Yes

## 2019-08-20 NOTE — PROGRESS NOTES
[facility]  Orthopedics  Progress Note    Patient Name: Nimco Caro  MRN: [id number]  Admission Date: 8/19/2019  Hospital Length of Stay: 0 days  Attending Provider: Eliseo Vaca MD  Primary Care Provider: Katy Mason MD  Follow-up For: Procedure(s) (LRB):  ARTHROPLASTY, KNEE (Left)    Post-Operative Day: 1 Day Post-Op  Subjective:     Principal Problem:Degenerative arthritis of left knee    Principal Orthopedic Problem: S/P L TKA    Interval History: none    Review of patient's allergies indicates:  No Known Allergies    Current Facility-Administered Medications   Medication    acetaminophen (10 mg/mL) injection 1,000 mg    Followed by    acetaminophen tablet 1,000 mg    albuterol-ipratropium 2.5 mg-0.5 mg/3 mL nebulizer solution 3 mL    amLODIPine tablet 10 mg    ARIPiprazole tablet 15 mg    aspirin tablet 325 mg    atenolol tablet 50 mg    bisacodyl suppository 10 mg    celecoxib capsule 100 mg    cetirizine tablet 10 mg    dextrose 5 % and 0.45 % NaCl infusion    DULoxetine DR capsule 60 mg    lisinopril tablet 20 mg    morphine injection 2 mg    nicotine 21 mg/24 hr 1 patch    ondansetron disintegrating tablet 8 mg    ondansetron injection 4 mg    oxybutynin tablet 5 mg    oxyCODONE immediate release tablet 15 mg    oxyCODONE immediate release tablet 5 mg    oxyCODONE immediate release tablet Tab 10 mg    pantoprazole EC tablet 40 mg    polyethylene glycol packet 17 g    pregabalin capsule 75 mg    promethazine (PHENERGAN) 6.25 mg in dextrose 5 % 50 mL IVPB    sodium chloride 0.9% flush 3 mL    sodium chloride 0.9% flush 5 mL    traZODone tablet 100 mg    zolpidem tablet 5 mg     Objective:     Vital Signs (Most Recent):  Temp: 98.2 °F (36.8 °C) (08/20/19 0510)  Pulse: (!) 52 (08/20/19 0510)  Resp: 18 (08/20/19 0510)  BP: (!) 110/55 (08/20/19 0510)  SpO2: 97 % (08/20/19 0510) Vital Signs (24h Range):  Temp:  [96.5 °F (35.8 °C)-98.2 °F (36.8 °C)] 98.2 °F  "(36.8 °C)  Pulse:  [46-64] 52  Resp:  [11-20] 18  SpO2:  [93 %-99 %] 97 %  BP: ()/(51-63) 110/55     Weight: 73 kg (161 lb)  Height: 5' 7" (170.2 cm)  Body mass index is 25.22 kg/m².      Intake/Output Summary (Last 24 hours) at 8/20/2019 0705  Last data filed at 8/20/2019 0500  Gross per 24 hour   Intake 4245 ml   Output 2700 ml   Net 1545 ml       General    Nursing note and vitals reviewed.  Constitutional: She is oriented to person, place, and time. She appears well-developed and well-nourished.   Pulmonary/Chest: Effort normal.   Neurological: She is alert and oriented to person, place, and time.   Psychiatric: She has a normal mood and affect. Her behavior is normal.             Left Knee Exam     Comments:  LLE DNVI. Dressing C/D/I      Significant Labs:   CBC:   Recent Labs   Lab 08/20/19  0508   WBC 13.94*   HGB 11.5*   HCT 37.1        CMP: No results for input(s): NA, K, CL, CO2, GLU, BUN, CREATININE, CALCIUM, PROT, ALBUMIN, BILITOT, ALKPHOS, AST, ALT, ANIONGAP, EGFRNONAA in the last 48 hours.    Invalid input(s): ESTGFAFRICA  All pertinent labs within the past 24 hours have been reviewed.    Significant Imaging: X-Ray: I have reviewed all pertinent results/findings and my personal findings are:  The patient has undergone a left knee joint replacement with metallic femoral and tibial components in good position. Fracture or lucency around the prosthesis is not seen.    Assessment/Plan:     * Degenerative arthritis of left knee  Increase OOB with PT and nursing  Change dressing today @ 1500  Plan for DC home with  today          CARMELO PFEIFFER  Orthopedics  [facility]  "

## 2019-08-20 NOTE — PLAN OF CARE
Per Isaiah, patient is scheduled for outpatient PT for Friday 8/23 at 10am; placed on AVS.       08/20/19 1305   Discharge Reassessment   Assessment Type Discharge Planning Reassessment

## 2019-08-20 NOTE — PLAN OF CARE
08/20/19 1404   Final Note   Assessment Type Final Discharge Note   Anticipated Discharge Disposition Home

## 2019-08-20 NOTE — SUBJECTIVE & OBJECTIVE
"Principal Problem:Degenerative arthritis of left knee    Principal Orthopedic Problem: S/P L TKA    Interval History: none    Review of patient's allergies indicates:  No Known Allergies    Current Facility-Administered Medications   Medication    acetaminophen (10 mg/mL) injection 1,000 mg    Followed by    acetaminophen tablet 1,000 mg    albuterol-ipratropium 2.5 mg-0.5 mg/3 mL nebulizer solution 3 mL    amLODIPine tablet 10 mg    ARIPiprazole tablet 15 mg    aspirin tablet 325 mg    atenolol tablet 50 mg    bisacodyl suppository 10 mg    celecoxib capsule 100 mg    cetirizine tablet 10 mg    dextrose 5 % and 0.45 % NaCl infusion    DULoxetine DR capsule 60 mg    lisinopril tablet 20 mg    morphine injection 2 mg    nicotine 21 mg/24 hr 1 patch    ondansetron disintegrating tablet 8 mg    ondansetron injection 4 mg    oxybutynin tablet 5 mg    oxyCODONE immediate release tablet 15 mg    oxyCODONE immediate release tablet 5 mg    oxyCODONE immediate release tablet Tab 10 mg    pantoprazole EC tablet 40 mg    polyethylene glycol packet 17 g    pregabalin capsule 75 mg    promethazine (PHENERGAN) 6.25 mg in dextrose 5 % 50 mL IVPB    sodium chloride 0.9% flush 3 mL    sodium chloride 0.9% flush 5 mL    traZODone tablet 100 mg    zolpidem tablet 5 mg     Objective:     Vital Signs (Most Recent):  Temp: 98.2 °F (36.8 °C) (08/20/19 0510)  Pulse: (!) 52 (08/20/19 0510)  Resp: 18 (08/20/19 0510)  BP: (!) 110/55 (08/20/19 0510)  SpO2: 97 % (08/20/19 0510) Vital Signs (24h Range):  Temp:  [96.5 °F (35.8 °C)-98.2 °F (36.8 °C)] 98.2 °F (36.8 °C)  Pulse:  [46-64] 52  Resp:  [11-20] 18  SpO2:  [93 %-99 %] 97 %  BP: ()/(51-63) 110/55     Weight: 73 kg (161 lb)  Height: 5' 7" (170.2 cm)  Body mass index is 25.22 kg/m².      Intake/Output Summary (Last 24 hours) at 8/20/2019 0705  Last data filed at 8/20/2019 0500  Gross per 24 hour   Intake 4245 ml   Output 2700 ml   Net 1545 ml "       General    Nursing note and vitals reviewed.  Constitutional: She is oriented to person, place, and time. She appears well-developed and well-nourished.   Pulmonary/Chest: Effort normal.   Neurological: She is alert and oriented to person, place, and time.   Psychiatric: She has a normal mood and affect. Her behavior is normal.             Left Knee Exam     Comments:  LLE DNVI. Dressing C/D/I      Significant Labs:   CBC:   Recent Labs   Lab 08/20/19  0508   WBC 13.94*   HGB 11.5*   HCT 37.1        CMP: No results for input(s): NA, K, CL, CO2, GLU, BUN, CREATININE, CALCIUM, PROT, ALBUMIN, BILITOT, ALKPHOS, AST, ALT, ANIONGAP, EGFRNONAA in the last 48 hours.    Invalid input(s): ESTGFAFRICA  All pertinent labs within the past 24 hours have been reviewed.    Significant Imaging: X-Ray: I have reviewed all pertinent results/findings and my personal findings are:  The patient has undergone a left knee joint replacement with metallic femoral and tibial components in good position. Fracture or lucency around the prosthesis is not seen.

## 2019-08-20 NOTE — PT/OT/SLP EVAL
Occupational Therapy   Evaluation    Name: Nimco Caro  MRN: [id number]  Admitting Diagnosis:  Degenerative arthritis of left knee 1 Day Post-Op    Recommendations:     Discharge Recommendations: home health PT  Discharge Equipment Recommendations:  none  Barriers to discharge:  None    Assessment:     Nimco Caro is a 58 y.o. female with a medical diagnosis of Degenerative arthritis of left knee.  Performance deficits affecting function: weakness, impaired endurance, impaired self care skills, impaired functional mobilty, gait instability, impaired cardiopulmonary response to activity, decreased lower extremity function, pain.      Rehab Prognosis: Good; patient would benefit from acute skilled OT services to address these deficits and reach maximum level of function.       Plan:     Patient to be seen 2 x/week to address the above listed problems via self-care/home management, therapeutic activities, therapeutic exercises  · Plan of Care Expires:    · Plan of Care Reviewed with: patient    Subjective     Chief Complaint: L knee pain  Patient/Family Comments/goals: none stated    Occupational Profile:  Living Environment: Pt lives alone but her friend will be staying with her post d/c.   Previous level of function: Pt was mod I with ADLs and ambulatory with RW.  Equipment Used at Home:  walker, rolling, cane, quad, bedside commode, bath bench  Assistance upon Discharge: Pt will receive assistance from friend.    Pain/Comfort:  · Pain Rating 1: 4/10  · Location - Side 1: Left  · Location - Orientation 1: generalized  · Location 1: knee  · Pain Addressed 1: Reposition, Distraction  · Pain Rating Post-Intervention 1: 4/10    Patients cultural, spiritual, Jew conflicts given the current situation:      Objective:     Communicated with: nurse Austin prior to session.  Patient found up in chair with cryotherapy, oxygen upon OT entry to room.    General Precautions: Standard, fall   Orthopedic  Precautions:Full weight bearing   Braces: N/A     Occupational Performance:    Bed Mobility:    · Not assessed; pt seated in chair upon arrival. See PT notes.     Functional Mobility/Transfers:  · Patient completed Sit <> Stand Transfer with contact guard assistance  with  rolling walker   · Patient completed Toilet Transfer Stand Pivot technique with contact guard assistance with  rolling walker    Activities of Daily Living:  · Grooming: stand by assistance with oral hygiene while standing at sink.   · Lower Body Dressing: moderate assistance to don/doff socks while seated in chair.    Cognitive/Visual Perceptual:  Cognitive/Psychosocial Skills:     -       Oriented to: x4   -       Follows Commands/attention:Follows multistep  commands  -       Communication: clear/fluent  -       Safety awareness/insight to disability: intact   -       Mood/Affect/Coping skills/emotional control: Appropriate to situation and Cooperative  Visual/Perceptual:      -Intact     Physical Exam:  Postural examination/scapula alignment:    -       Rounded shoulders  -       Forward head  Upper Extremity Range of Motion:     -       Right Upper Extremity: WFL  -       Left Upper Extremity: WFL  Upper Extremity Strength:    -       Right Upper Extremity: WFL  -       Left Upper Extremity: WFL   Strength:    -       Right Upper Extremity: WFL  -       Left Upper Extremity: WFL  Fine Motor Coordination:    -       Intact  Gross motor coordination:   WFL in B UE    AMPAC 6 Click ADL:  AMPAC Total Score: 22    Treatment & Education:  OT ed patient on safety with walker use for functional mobility with cues for hand placement & sequencing.   Pt stated she already has assistive devices for LB dressing from a prior sx.  Education:    Patient left up in chair with all lines intact and call button in reach    GOALS:   Multidisciplinary Problems     Occupational Therapy Goals        Problem: Occupational Therapy Goal    Goal Priority Disciplines  Outcome Interventions   Occupational Therapy Goal     OT, PT/OT Ongoing (interventions implemented as appropriate)    Description:  Goals to be met by: 8/27/2019    Patient will increase functional independence with ADLs by performing:    LE Dressing with Supervision and Assistive Devices as needed.  Grooming while standing at sink with Supervision.  Toileting from toilet with Supervision for hygiene and clothing management.   Supine to sit with Supervision.  Toilet transfer to toilet with Supervision.                      History:     Past Medical History:   Diagnosis Date    Anxiety     Bipolar affect, depressed     Cancer ovarian; uterine    1992    COPD (chronic obstructive pulmonary disease)     GERD (gastroesophageal reflux disease)     Hyperlipidemia     Hypertension     OAB (overactive bladder)        Past Surgical History:   Procedure Laterality Date    ARTHROPLASTY, HIP Right 2/25/2019    [facility]    ARTHROPLASTY, KNEE Left 8/19/2019    [facility] at [facility] OR    COLON SURGERY      COLONOSCOPY      HERNIA REPAIR N/A 2016    HYSTERECTOMY  total    JOINT REPLACEMENT Right     hip replacemnt    REVISION COLOSTOMY N/A 2012       Time Tracking:     OT Date of Treatment: 08/20/19  OT Start Time: 0950  OT Stop Time: 1011  OT Total Time (min): 21 min    Billable Minutes:Evaluation 10  Self Care/Home Management 11    Ac Lu, OT  8/20/2019

## 2019-08-20 NOTE — PLAN OF CARE
08/19/19 2045   Patient Assessment/Suction   Level of Consciousness (AVPU) alert   Respiratory Effort Normal;Unlabored   Expansion/Accessory Muscles/Retractions no use of accessory muscles   Rhythm/Pattern, Respiratory depth regular;pattern regular;unlabored   PRE-TX-O2   O2 Device (Oxygen Therapy) nasal cannula   $ Is the patient on Low Flow Oxygen? Yes   Flow (L/min) 2   SpO2 95 %   Pulse Oximetry Type Intermittent   $ Pulse Oximetry - Multiple Charge Pulse Oximetry - Multiple   Pulse (!) 48   Resp 16   Aerosol Therapy   $ Aerosol Therapy Charges PRN treatment not required   Respiratory Treatment Status (SVN) PRN treatment not required   Incentive Spirometer   $ Incentive Spirometer Charges done with encouragement   Administration (IS) instruction provided, follow-up;mouthpiece   Number of Repetitions (IS) 10   Level Incentive Spirometer (mL) 1000   Patient Tolerance (IS) good   Pt tolerates NC well, and has great effort with IS.

## 2019-08-20 NOTE — PLAN OF CARE
Problem: Occupational Therapy Goal  Goal: Occupational Therapy Goal  Goals to be met by: 8/27/2019    Patient will increase functional independence with ADLs by performing:    LE Dressing with Supervision and Assistive Devices as needed.  Grooming while standing at sink with Supervision.  Toileting from toilet with Supervision for hygiene and clothing management.   Supine to sit with Supervision.  Toilet transfer to toilet with Supervision.    Outcome: Ongoing (interventions implemented as appropriate)  OT evaluation completed today. Goals & care plan established.    Ac Lu, OT  8/20/2019

## 2019-08-20 NOTE — HOSPITAL COURSE
Patient monitor closely during her stay.  She received pain control antiemetics as needed post surgery.  PT consulted and she ambulated well with therapy.  Patient developed hypoxemia postop and was difficult to wean from oxygen.  Chest x-ray obtained and demonstrated bilateral atelectasis and questionable pneumonia.  She was initiated on oral Augmentin and scheduled respiratory treatments.  The patient's overall condition remained stable and improved.  She did not qualify for home O2.  Her pulmonary status remained stable.  She was discharged to home once cleared by Orthopedics.

## 2019-08-20 NOTE — PT/OT/SLP PROGRESS
Physical Therapy Treatment    Patient Name:  Nimco Caro   MRN:  [id number]     Recommendations:     Discharge Recommendations:  home health PT, home   Discharge Equipment Recommendations: none   Barriers to discharge: None    Assessment:     Nimco Caro is a 58 y.o. female admitted with a medical diagnosis of Degenerative arthritis of left knee.  She presents with the following impairments/functional limitations:  weakness, impaired endurance, impaired functional mobilty, gait instability .  Patient ambulated 250 feet with RW with supervision and is modified independent with all transfers.  Patient is scheduled for discharge home today and is ready so discharge therapy at this time    Rehab Prognosis: Good; patient would benefit from acute skilled PT services to address these deficits and reach maximum level of function.    Recent Surgery: Procedure(s) (LRB):  ARTHROPLASTY, KNEE (Left) 1 Day Post-Op    Plan:     During this hospitalization, patient to be seen BID to address the identified rehab impairments via gait training, therapeutic activities, therapeutic exercises and progress toward the following goals:    · Plan of Care Expires:       Subjective     Chief Complaint: pain  Patient/Family Comments/goals: go home  Pain/Comfort:  · Pain Rating 1: 7/10  · Location - Side 1: Left  · Location - Orientation 1: lower  · Location 1: knee  · Pain Addressed 1: Reposition, Cessation of Activity  · Pain Rating Post-Intervention 1: 4/10      Objective:     Communicated with nurse prior to session.  Patient found up in chair with cryotherapy, crowe catheter upon PT entry to room.     General Precautions: Standard, fall   Orthopedic Precautions:Full weight bearing   Braces:       Functional Mobility:  · Bed Mobility:     · Supine to Sit: modified independence  · Transfers:     · Bed to Chair: modified independence with  rolling walker  using  Stand Pivot  · Gait: 250 feet with RW with supervision      AM-PAC 6 CLICK  MOBILITY  Turning over in bed (including adjusting bedclothes, sheets and blankets)?: 4  Sitting down on and standing up from a chair with arms (e.g., wheelchair, bedside commode, etc.): 4  Moving from lying on back to sitting on the side of the bed?: 4  Moving to and from a bed to a chair (including a wheelchair)?: 4  Need to walk in hospital room?: 4  Climbing 3-5 steps with a railing?: 1  Basic Mobility Total Score: 21       Therapeutic Activities and Exercises:   gait training x 250 feet with RW with supervision    Patient left supine with call button in reach and friend present.    GOALS:   Multidisciplinary Problems     Physical Therapy Goals     Not on file          Multidisciplinary Problems (Resolved)        Problem: Physical Therapy Goal    Goal Priority Disciplines Outcome Goal Variances Interventions   Physical Therapy Goal   (Resolved)     PT, PT/OT Outcome(s) achieved     Description:  Goals to be met by: 2019    Patient will increase functional independence with mobility by performin. Supine to sit with Stand-by Assistance  2. Sit to supine with Stand-by Assistance  3. Sit to stand transfer with Stand-by Assistance  4. Bed to chair transfer with Stand-by Assistance using Rolling Walker  4. Gait  x 250 feet with Supervision using Rolling Walker.                       Time Tracking:     PT Received On: 19  PT Start Time: 1330     PT Stop Time: 1342  PT Total Time (min): 12 min     Billable Minutes: Gait Training 12    Treatment Type: Treatment  PT/PTA: PT     PTA Visit Number: 0     Moses Mcclellanilligan, PT  2019

## 2019-08-20 NOTE — PLAN OF CARE
Patient lives alone at the address on facesheet, however patient is going to stay with her friend Shaunna for 2 weeks at: [address] [facility] [place] [address].  Verified insurance as [facility].  Patient is aware that she will not have HH on discharge, rather she will be doing outpatient PT with [facility]; Shaunna will drive patient.  Patient has a walker, quad cane, commode, and tub bench.  Discharge plan is home; no needs.       08/20/19 0943   Discharge Assessment   Assessment Type Discharge Planning Assessment   Confirmed/corrected address and phone number on facesheet? Yes   Assessment information obtained from? Patient   Prior to hospitilization cognitive status: Alert/Oriented   Prior to hospitalization functional status: Independent;Assistive Equipment   Current cognitive status: Alert/Oriented   Current Functional Status: Independent;Assistive Equipment   Lives With alone   Able to Return to Prior Arrangements yes   Is patient able to care for self after discharge? Yes   Patient's perception of discharge disposition home or selfcare   Readmission Within the Last 30 Days no previous admission in last 30 days   Patient currently being followed by outpatient case management? No   Patient currently receives any other outside agency services? No   Equipment Currently Used at Home walker, rolling;cane, quad;bedside commode;bath bench   Do you have any problems affording any of your prescribed medications? No   Is the patient taking medications as prescribed? yes   Does the patient have transportation home? Yes   Transportation Anticipated family or friend will provide   Dialysis Name and Scheduled days none   Does the patient receive services at the [facility]? No   Discharge Plan A Home   DME Needed Upon Discharge  none   Patient/Family in Agreement with Plan yes

## 2019-08-20 NOTE — PLAN OF CARE
Problem: Physical Therapy Goal  Goal: Physical Therapy Goal  Goals to be met by: 2019    Patient will increase functional independence with mobility by performin. Supine to sit with Stand-by Assistance  2. Sit to supine with Stand-by Assistance  3. Sit to stand transfer with Stand-by Assistance  4. Bed to chair transfer with Stand-by Assistance using Rolling Walker  4. Gait  x 250 feet with Supervision using Rolling Walker.      Outcome: Ongoing (interventions implemented as appropriate)  Patient has met goals 1-2 but still requires CGA for transfers and gait.

## 2019-08-20 NOTE — PLAN OF CARE
1520  Order placed for home oxygen.  Pending [facility] review.    1532  Call received from Paola with [facility] stating that she needs a note from today explaining why the patient needs home oxygen.  Informed Paola that COPD is in the H&P, however she said that will not be enough.  Paola also informed that they will need to obtain an auth from pt's medicaid.    1534  Informed Madie KINGSTON of above, in regards to needing documentation today.  Madie stated that she will go see the patient.    1559  Spoke to Geovanna, Charge Nurse, who stated that Madie KINGSTON ordered a chest xray.    1602  Call received from [facility] asking for a f/u on the note.  Updated Paola that a chest xray has been ordered, and Madie KINGSTON has not yet entered her note.       08/20/19 1521   Post-Acute Status   Post-Acute Authorization [facility] Status Pending Medical Review

## 2019-08-21 ENCOUNTER — TELEPHONE (OUTPATIENT)
Dept: ORTHOPEDICS | Facility: CLINIC | Age: 59
End: 2019-08-21

## 2019-08-21 VITALS
HEART RATE: 60 BPM | RESPIRATION RATE: 14 BRPM | SYSTOLIC BLOOD PRESSURE: 96 MMHG | DIASTOLIC BLOOD PRESSURE: 51 MMHG | BODY MASS INDEX: 25.27 KG/M2 | TEMPERATURE: 99 F | OXYGEN SATURATION: 98 % | WEIGHT: 161 LBS | HEIGHT: 67 IN

## 2019-08-21 PROCEDURE — S4991 NICOTINE PATCH NONLEGEND: HCPCS | Performed by: ORTHOPAEDIC SURGERY

## 2019-08-21 PROCEDURE — 25000003 PHARM REV CODE 250: Performed by: ANESTHESIOLOGY

## 2019-08-21 PROCEDURE — 97535 SELF CARE MNGMENT TRAINING: CPT

## 2019-08-21 PROCEDURE — 97116 GAIT TRAINING THERAPY: CPT

## 2019-08-21 PROCEDURE — 27000221 HC OXYGEN, UP TO 24 HOURS

## 2019-08-21 PROCEDURE — 25000242 PHARM REV CODE 250 ALT 637 W/ HCPCS: Performed by: NURSE PRACTITIONER

## 2019-08-21 PROCEDURE — 25000003 PHARM REV CODE 250: Performed by: ORTHOPAEDIC SURGERY

## 2019-08-21 PROCEDURE — 94640 AIRWAY INHALATION TREATMENT: CPT

## 2019-08-21 PROCEDURE — 97530 THERAPEUTIC ACTIVITIES: CPT

## 2019-08-21 PROCEDURE — 94761 N-INVAS EAR/PLS OXIMETRY MLT: CPT

## 2019-08-21 PROCEDURE — 25000003 PHARM REV CODE 250: Performed by: NURSE PRACTITIONER

## 2019-08-21 RX ORDER — DOCUSATE SODIUM 100 MG/1
100 CAPSULE, LIQUID FILLED ORAL 2 TIMES DAILY
Refills: 0 | COMMUNITY
Start: 2019-08-21 | End: 2020-03-05

## 2019-08-21 RX ORDER — AMOXICILLIN AND CLAVULANATE POTASSIUM 875; 125 MG/1; MG/1
1 TABLET, FILM COATED ORAL EVERY 12 HOURS
Qty: 14 TABLET | Refills: 0 | Status: SHIPPED | OUTPATIENT
Start: 2019-08-21 | End: 2019-08-28

## 2019-08-21 RX ORDER — OXYCODONE AND ACETAMINOPHEN 7.5; 325 MG/1; MG/1
1 TABLET ORAL EVERY 4 HOURS PRN
Qty: 30 TABLET | Refills: 0
Start: 2019-08-21 | End: 2019-10-01

## 2019-08-21 RX ORDER — ONDANSETRON 4 MG/1
8 TABLET, FILM COATED ORAL 2 TIMES DAILY PRN
Start: 2019-08-21 | End: 2023-12-09 | Stop reason: SDUPTHER

## 2019-08-21 RX ORDER — IBUPROFEN 200 MG
1 TABLET ORAL DAILY
Refills: 0 | COMMUNITY
Start: 2019-08-22 | End: 2020-03-05

## 2019-08-21 RX ADMIN — IPRATROPIUM BROMIDE AND ALBUTEROL SULFATE 3 ML: .5; 3 SOLUTION RESPIRATORY (INHALATION) at 12:08

## 2019-08-21 RX ADMIN — IPRATROPIUM BROMIDE AND ALBUTEROL SULFATE 3 ML: .5; 3 SOLUTION RESPIRATORY (INHALATION) at 03:08

## 2019-08-21 RX ADMIN — CETIRIZINE HYDROCHLORIDE 10 MG: 10 TABLET, FILM COATED ORAL at 08:08

## 2019-08-21 RX ADMIN — ATENOLOL 50 MG: 25 TABLET ORAL at 08:08

## 2019-08-21 RX ADMIN — ARIPIPRAZOLE 15 MG: 5 TABLET ORAL at 08:08

## 2019-08-21 RX ADMIN — PREGABALIN 75 MG: 75 CAPSULE ORAL at 08:08

## 2019-08-21 RX ADMIN — OXYBUTYNIN CHLORIDE 5 MG: 5 TABLET ORAL at 08:08

## 2019-08-21 RX ADMIN — DULOXETINE 60 MG: 30 CAPSULE, DELAYED RELEASE ORAL at 08:08

## 2019-08-21 RX ADMIN — ACETAMINOPHEN 1000 MG: 500 TABLET ORAL at 06:08

## 2019-08-21 RX ADMIN — OXYCODONE HYDROCHLORIDE 15 MG: 5 TABLET ORAL at 11:08

## 2019-08-21 RX ADMIN — CELECOXIB 100 MG: 100 CAPSULE ORAL at 08:08

## 2019-08-21 RX ADMIN — PANTOPRAZOLE SODIUM 40 MG: 40 TABLET, DELAYED RELEASE ORAL at 08:08

## 2019-08-21 RX ADMIN — LISINOPRIL 20 MG: 10 TABLET ORAL at 08:08

## 2019-08-21 RX ADMIN — IPRATROPIUM BROMIDE AND ALBUTEROL SULFATE 3 ML: .5; 3 SOLUTION RESPIRATORY (INHALATION) at 11:08

## 2019-08-21 RX ADMIN — AMOXICILLIN AND CLAVULANATE POTASSIUM 1 TABLET: 875; 125 TABLET, FILM COATED ORAL at 08:08

## 2019-08-21 RX ADMIN — IPRATROPIUM BROMIDE AND ALBUTEROL SULFATE 3 ML: .5; 3 SOLUTION RESPIRATORY (INHALATION) at 06:08

## 2019-08-21 RX ADMIN — NICOTINE 1 PATCH: 21 PATCH, EXTENDED RELEASE TRANSDERMAL at 07:08

## 2019-08-21 RX ADMIN — ACETAMINOPHEN 1000 MG: 500 TABLET ORAL at 11:08

## 2019-08-21 RX ADMIN — AMLODIPINE BESYLATE 10 MG: 5 TABLET ORAL at 07:08

## 2019-08-21 RX ADMIN — OXYCODONE HYDROCHLORIDE 15 MG: 5 TABLET ORAL at 07:08

## 2019-08-21 RX ADMIN — ASPIRIN 325 MG ORAL TABLET 325 MG: 325 PILL ORAL at 08:08

## 2019-08-21 RX ADMIN — OXYCODONE HYDROCHLORIDE 15 MG: 5 TABLET ORAL at 03:08

## 2019-08-21 NOTE — PLAN OF CARE
Problem: Adult Inpatient Plan of Care  Goal: Plan of Care Review  Outcome: Ongoing (interventions implemented as appropriate)  POC discussed with patient, verbalized understanding. Patient with uneventful night, slept off and on between care. Medicated q 4-5 hours for complaints of pain to L surgical knee. Dressing CDI. VS stable. Tolerating diet well. NV wnl. 02@ 2L/NC in use, receiving respiratory tx. Call light @ bs.

## 2019-08-21 NOTE — ASSESSMENT & PLAN NOTE
Increase OOB with PT and nursing  Stable from an ortho stand point and may be DC'd home with HH per hospitalist pending O2 issues

## 2019-08-21 NOTE — PT/OT/SLP PROGRESS
Physical Therapy Treatment    Patient Name:  Nimco Caro   MRN:  [id number]    Recommendations:     Discharge Recommendations:  home health PT       Assessment:     Nimco Caro is a 58 y.o. female admitted with a medical diagnosis of Degenerative arthritis of left knee.  She presents with the following impairments/functional limitations:  impaired endurance, impaired functional mobilty, gait instability, decreased lower extremity function, decreased ROM, orthopedic precautions .    Rehab Prognosis: Good; patient would benefit from acute skilled PT services to address these deficits and reach maximum level of function.    Recent Surgery: Procedure(s) (LRB):  ARTHROPLASTY, KNEE (Left) 2 Days Post-Op    Plan:     During this hospitalization, patient to be seen BID to address the identified rehab impairments via gait training, therapeutic activities, therapeutic exercises and progress toward the following goals:    · Plan of Care Expires:       Subjective     Chief Complaint: pain/stiffness in knee  Patient/Family Comments/goals: eager to walk, wanting to take a nap after PT  Pain/Comfort:  · Pain Rating 1: 8/10  · Location - Side 1: Left  · Location 1: knee  · Pain Addressed 1: Reposition, Pre-medicate for activity, Nurse notified      Objective:     Communicated with nurse Iverson prior to session.  Patient found supine with cryotherapy, oxygen upon PT entry to room.     General Precautions: Standard, fall   Orthopedic Precautions:Full weight bearing   Braces:       Functional Mobility:  · Bed Mobility:     · Scooting: modified independence  · Supine to Sit: modified independence  · Sit to Supine: modified independence    · Transfers:     · Sit to Stand:  supervision with rolling walker  · Toilet transfer: SBA with RW    · Gait: 300' with SBA using RW        Therapeutic Activities and Exercises:   pt ambulated approx 12' to restroom with SBA using RW. Pt mod I with toileting. Pt then ambulated to sink with was  hands with SBA using RW. (O2 sat 88-89% on RA)   pt proceeded to gait training. (O2 sat 90% post gait on RA)    Patient left supine with all lines intact, call button in reach and nurse Zayra notified..    GOALS:   Multidisciplinary Problems     Physical Therapy Goals     Not on file          Multidisciplinary Problems (Resolved)        Problem: Physical Therapy Goal    Goal Priority Disciplines Outcome Goal Variances Interventions   Physical Therapy Goal   (Resolved)     PT, PT/OT Outcome(s) achieved     Description:  Goals to be met by: 2019    Patient will increase functional independence with mobility by performin. Supine to sit with Stand-by Assistance  2. Sit to supine with Stand-by Assistance  3. Sit to stand transfer with Stand-by Assistance  4. Bed to chair transfer with Stand-by Assistance using Rolling Walker  4. Gait  x 250 feet with Supervision using Rolling Walker.                       Time Tracking:     PT Received On: 19  PT Start Time: 905     PT Stop Time: 933  PT Total Time (min): 28 min     Billable Minutes: Gait Training 15 and Therapeutic Activity 10    Treatment Type: Treatment  PT/PTA: PTA     PTA Visit Number: 1     Christiana Maurer, PTA  2019

## 2019-08-21 NOTE — PLAN OF CARE
1008  Message sent to [facility] with [facility] to f/u on auth status of home O2.    1023  Per Paola the auth is still pending    1155  Per Paola auth is still pending       08/21/19 1008   Post-Acute Status   Post-Acute Authorization [facility] Status Pending Payor Review

## 2019-08-21 NOTE — TELEPHONE ENCOUNTER
----- Message from Mary Ellen Mcknight sent at 8/21/2019  4:26 PM CDT -----  Contact: patient   Pt called and she wants to see if she can get her post op appt moved to a later time on 9-3-19. The person who is brining her has an appt that day as well.    PTS # [phone]

## 2019-08-21 NOTE — PLAN OF CARE
08/21/19 1000   Home Oxygen Qualification   Room Air SpO2 At Rest 92 %   Room Air SpO2 on Exertion 90 %   Heart Rate on O2 68 bpm

## 2019-08-21 NOTE — SUBJECTIVE & OBJECTIVE
Interval History: patient with hypoxia post op. desats on room air to 85%. On 2 liters NC. + MCGARRY.      Review of Systems   Constitutional: Positive for activity change. Negative for diaphoresis, fatigue and fever.   Respiratory: Positive for shortness of breath and wheezing. Negative for cough (MCGARRY).    Cardiovascular: Negative for chest pain and leg swelling.   Gastrointestinal: Positive for nausea. Negative for abdominal distention and abdominal pain.   Musculoskeletal: Positive for arthralgias and gait problem.   Skin: Negative for rash.   Neurological: Negative for speech difficulty and numbness.     Objective:     Vital Signs (Most Recent):  Temp: 97.9 °F (36.6 °C) (08/20/19 1913)  Pulse: (!) 42 (08/20/19 1926)  Resp: 18 (08/20/19 1926)  BP: (!) 109/52 (08/20/19 1913)  SpO2: (!) 94 % (08/20/19 1926) Vital Signs (24h Range):  Temp:  [96.5 °F (35.8 °C)-98.2 °F (36.8 °C)] 97.9 °F (36.6 °C)  Pulse:  [42-58] 42  Resp:  [14-18] 18  SpO2:  [94 %-98 %] 94 %  BP: (103-140)/(52-60) 109/52     Weight: 73 kg (161 lb)  Body mass index is 25.22 kg/m².    Intake/Output Summary (Last 24 hours) at 8/20/2019 2042  Last data filed at 8/20/2019 1700  Gross per 24 hour   Intake 3030 ml   Output 1500 ml   Net 1530 ml      Physical Exam   Constitutional: She is oriented to person, place, and time. She appears well-developed and well-nourished.   HENT:   Head: Normocephalic and atraumatic.   Right Ear: External ear normal.   Left Ear: External ear normal.   Mouth/Throat: Oropharynx is clear and moist.   Eyes: Pupils are equal, round, and reactive to light. Conjunctivae and EOM are normal.   Neck: Normal range of motion. Neck supple.   Cardiovascular: Normal rate, regular rhythm, normal heart sounds and intact distal pulses.   Pulmonary/Chest: Effort normal. She has wheezes.   Abdominal: Soft. Bowel sounds are normal.   Musculoskeletal: Normal range of motion. She exhibits tenderness.   Left knee tenderness with palpation. Surgical  dressing intact. Ice pack in place.    Neurological: She is alert and oriented to person, place, and time. Coordination normal.   Skin: Skin is warm and dry.   Psychiatric: She has a normal mood and affect. Her behavior is normal. Judgment normal.   Nursing note and vitals reviewed.      Significant Labs:   BMP: No results for input(s): GLU, NA, K, CL, CO2, BUN, CREATININE, CALCIUM, MG in the last 48 hours.  CBC:   Recent Labs   Lab 08/20/19  0508   WBC 13.94*   HGB 11.5*   HCT 37.1          Significant Imaging: I have reviewed and interpreted all pertinent imaging results/findings within the past 24 hours.

## 2019-08-21 NOTE — PLAN OF CARE
08/20/19 1926   Patient Assessment/Suction   Level of Consciousness (AVPU) alert   Respiratory Effort Normal;Unlabored   Expansion/Accessory Muscles/Retractions no use of accessory muscles   All Lung Fields Breath Sounds diminished   Rhythm/Pattern, Respiratory no shortness of breath reported;pattern regular;unlabored   PRE-TX-O2   O2 Device (Oxygen Therapy) nasal cannula   $ Is the patient on Low Flow Oxygen? Yes   Flow (L/min) 2   SpO2 (!) 94 %   Pulse Oximetry Type Intermittent   $ Pulse Oximetry - Multiple Charge Pulse Oximetry - Multiple   Pulse (!) 42   Resp 18   Aerosol Therapy   $ Aerosol Therapy Charges Aerosol Treatment   Respiratory Treatment Status (SVN) given   Treatment Route (SVN) mouthpiece;oxygen   Patient Position (SVN) semi-Machado's   Post Treatment Assessment (SVN) breath sounds unchanged   Signs of Intolerance (SVN) none   Breath Sounds Post-Respiratory Treatment   Throughout All Fields Post-Treatment All Fields   Throughout All Fields Post-Treatment no change   Post-treatment Heart Rate (beats/min) 49   Post-treatment Resp Rate (breaths/min) 16   Incentive Spirometer   $ Incentive Spirometer Charges done independently per patient   Pt tolerates NC and treatments well.

## 2019-08-21 NOTE — PROGRESS NOTES
[facility] Hospital Medicine  Progress Note    Patient Name: Nimco Caro  MRN: [id number]  Patient Class: OP- Outpatient Recovery   Admission Date: 8/19/2019  Length of Stay: 0 days  Attending Physician: Eliseo Vaca MD  Primary Care Provider: Katy Mason MD        Subjective:     Principal Problem:Degenerative arthritis of left knee        HPI:  Nimco Caro is a 58 year old female with PMH of depression, HTN, HLP, and arthritis who is status post left knee arthoplasty per Dr. Vaca. She states 4/10 left knee pain which increases with movement. Pain is controlled with narcotics. She states she ambulated PT using walker without difficultly. She is doing well post operatively. Denies nausea. Family at bedside.    Overview/Hospital Course:  Patient monitor closely during observation stay.  She received pain control antiemetics as needed post surgery.  PT consulted she ambulated well with therapy.  Patient developed hypoxemia postop and was difficult to wean from oxygen.  Chest x-ray obtained and demonstrated bilateral atelectasis and questionable pneumonia.  She was initiated on oral Augmentin and scheduled respiratory treatments.      Interval History: patient with hypoxia post op. desats on room air to 85%. On 2 liters NC. + MCGARRY.      Review of Systems   Constitutional: Positive for activity change. Negative for diaphoresis, fatigue and fever.   Respiratory: Positive for shortness of breath and wheezing. Negative for cough (MCGARRY).    Cardiovascular: Negative for chest pain and leg swelling.   Gastrointestinal: Positive for nausea. Negative for abdominal distention and abdominal pain.   Musculoskeletal: Positive for arthralgias and gait problem.   Skin: Negative for rash.   Neurological: Negative for speech difficulty and numbness.     Objective:     Vital Signs (Most Recent):  Temp: 97.9 °F (36.6 °C) (08/20/19 1913)  Pulse: (!) 42 (08/20/19 1926)  Resp: 18 (08/20/19 1926)  BP: (!)  109/52 (08/20/19 1913)  SpO2: (!) 94 % (08/20/19 1926) Vital Signs (24h Range):  Temp:  [96.5 °F (35.8 °C)-98.2 °F (36.8 °C)] 97.9 °F (36.6 °C)  Pulse:  [42-58] 42  Resp:  [14-18] 18  SpO2:  [94 %-98 %] 94 %  BP: (103-140)/(52-60) 109/52     Weight: 73 kg (161 lb)  Body mass index is 25.22 kg/m².    Intake/Output Summary (Last 24 hours) at 8/20/2019 2042  Last data filed at 8/20/2019 1700  Gross per 24 hour   Intake 3030 ml   Output 1500 ml   Net 1530 ml      Physical Exam   Constitutional: She is oriented to person, place, and time. She appears well-developed and well-nourished.   HENT:   Head: Normocephalic and atraumatic.   Right Ear: External ear normal.   Left Ear: External ear normal.   Mouth/Throat: Oropharynx is clear and moist.   Eyes: Pupils are equal, round, and reactive to light. Conjunctivae and EOM are normal.   Neck: Normal range of motion. Neck supple.   Cardiovascular: Normal rate, regular rhythm, normal heart sounds and intact distal pulses.   Pulmonary/Chest: Effort normal. She has wheezes.   Abdominal: Soft. Bowel sounds are normal.   Musculoskeletal: Normal range of motion. She exhibits tenderness.   Left knee tenderness with palpation. Surgical dressing intact. Ice pack in place.    Neurological: She is alert and oriented to person, place, and time. Coordination normal.   Skin: Skin is warm and dry.   Psychiatric: She has a normal mood and affect. Her behavior is normal. Judgment normal.   Nursing note and vitals reviewed.      Significant Labs:   BMP: No results for input(s): GLU, NA, K, CL, CO2, BUN, CREATININE, CALCIUM, MG in the last 48 hours.  CBC:   Recent Labs   Lab 08/20/19  0508   WBC 13.94*   HGB 11.5*   HCT 37.1          Significant Imaging: I have reviewed and interpreted all pertinent imaging results/findings within the past 24 hours.      Assessment/Plan:      * Degenerative arthritis of left knee  Status post left knee arthoplasty. Post op orders per ortho. Pain and nausea  control. PT consulted.       COPD (chronic obstructive pulmonary disease)  Resume home bronchodilators. Duo nebs every 4 hours. Oxygen as needed.       Acute postoperative pulmonary insufficiency  Check CXR. Encourage IS. Likely due to atelectasis.       Chronic bronchitis    Due to long hx of smoking. Add duo nebs prn wheezing    Tobacco dependency  Assistance with smoking cessation was offered, including:  [x]  Medications  []  Counseling  []  Printed Information on Smoking Cessation  []  Referral to a Smoking Cessation Program    Patient was counseled regarding smoking for 3-10 minutes.  Add nicotine patch      Mild episode of recurrent major depressive disorder  Chronic. Resume home antidepressants      Hyperlipidemia  Chronic. Resume statin      Hypertension  Chronic. Resume home antihypertensives. Trend BP. Cardiac diet      GERD (gastroesophageal reflux disease)  Chronic resume PPI        VTE Risk Mitigation (From admission, onward)        Ordered     IP VTE HIGH RISK PATIENT  Once      08/19/19 1100     Place sequential compression device  Until discontinued      08/19/19 1100                Madie Thomas NP  Department of Hospital Medicine   [facility]

## 2019-08-21 NOTE — SUBJECTIVE & OBJECTIVE
"Principal Problem:Degenerative arthritis of left knee    Principal Orthopedic Problem: S/P L TKA    Interval History: low O2 sat - Atelectasis    Review of patient's allergies indicates:  No Known Allergies    Current Facility-Administered Medications   Medication    acetaminophen tablet 1,000 mg    albuterol-ipratropium 2.5 mg-0.5 mg/3 mL nebulizer solution 3 mL    albuterol-ipratropium 2.5 mg-0.5 mg/3 mL nebulizer solution 3 mL    amLODIPine tablet 10 mg    amoxicillin-clavulanate 875-125mg per tablet 1 tablet    ARIPiprazole tablet 15 mg    aspirin tablet 325 mg    atenolol tablet 50 mg    bisacodyl suppository 10 mg    celecoxib capsule 100 mg    cetirizine tablet 10 mg    DULoxetine DR capsule 60 mg    lisinopril tablet 20 mg    nicotine 21 mg/24 hr 1 patch    ondansetron disintegrating tablet 8 mg    ondansetron injection 4 mg    oxybutynin tablet 5 mg    oxyCODONE immediate release tablet 15 mg    oxyCODONE immediate release tablet 5 mg    oxyCODONE immediate release tablet Tab 10 mg    pantoprazole EC tablet 40 mg    polyethylene glycol packet 17 g    pregabalin capsule 75 mg    promethazine (PHENERGAN) 6.25 mg in dextrose 5 % 50 mL IVPB    sodium chloride 0.9% flush 3 mL    sodium chloride 0.9% flush 5 mL    traZODone tablet 100 mg    zolpidem tablet 5 mg     Objective:     Vital Signs (Most Recent):  Temp: 98.2 °F (36.8 °C) (08/21/19 0349)  Pulse: (!) 59 (08/21/19 0646)  Resp: 16 (08/21/19 0646)  BP: 131/63 (08/21/19 0349)  SpO2: (!) 94 % (08/21/19 0646) Vital Signs (24h Range):  Temp:  [97.3 °F (36.3 °C)-98.2 °F (36.8 °C)] 98.2 °F (36.8 °C)  Pulse:  [42-63] 59  Resp:  [14-18] 16  SpO2:  [92 %-98 %] 94 %  BP: ()/(52-63) 131/63     Weight: 73 kg (161 lb)  Height: 5' 7" (170.2 cm)  Body mass index is 25.22 kg/m².      Intake/Output Summary (Last 24 hours) at 8/21/2019 0708  Last data filed at 8/21/2019 0500  Gross per 24 hour   Intake 1360 ml   Output --   Net 1360 ml "       General    Nursing note and vitals reviewed.  Constitutional: She is oriented to person, place, and time. She appears well-developed and well-nourished.   Pulmonary/Chest: Effort normal.   Neurological: She is alert and oriented to person, place, and time.   Psychiatric: She has a normal mood and affect. Her behavior is normal.             Left Knee Exam     Comments:  LLE DNVI. Incision looks good.      Significant Labs:   CBC:   Recent Labs   Lab 08/20/19  0508   WBC 13.94*   HGB 11.5*   HCT 37.1        CMP: No results for input(s): NA, K, CL, CO2, GLU, BUN, CREATININE, CALCIUM, PROT, ALBUMIN, BILITOT, ALKPHOS, AST, ALT, ANIONGAP, EGFRNONAA in the last 48 hours.    Invalid input(s): ESTGFAFRICA  All pertinent labs within the past 24 hours have been reviewed.    Significant Imaging: X-Ray: I have reviewed all pertinent results/findings and my personal findings are:  The mediastinal and cardiac size and contours are normal.  Two strands of atelectasis are noted in the left lower lobe.  The right lung is clear.  There is no pneumothorax or pleural effusion.

## 2019-08-21 NOTE — PLAN OF CARE
Problem: Occupational Therapy Goal  Goal: Occupational Therapy Goal  Goals to be met by: 8/27/2019    Patient will increase functional independence with ADLs by performing:    LE Dressing with Supervision and Assistive Devices as needed.  Grooming while standing at sink with Supervision.  Toileting from toilet with Supervision for hygiene and clothing management.   Supine to sit with Supervision.  Toilet transfer to toilet with Supervision.     Outcome: Ongoing (interventions implemented as appropriate)  Pt completed today oral hygiene while standing at sink with supervision, supine to sit w/ supervision. Goals remain appropriate.

## 2019-08-21 NOTE — PT/OT/SLP PROGRESS
Occupational Therapy   Treatment    Name: Nimco Caro  MRN: [id number]  Admitting Diagnosis:  Degenerative arthritis of left knee  2 Days Post-Op    Recommendations:     Discharge Recommendations: home health PT  Discharge Equipment Recommendations:  none  Barriers to discharge:       Assessment:     Nimco Caro is a 58 y.o. female with a medical diagnosis of Degenerative arthritis of left knee.  She presents with good safety awareness and a healthy insight to her care and full awareness of her orthopedic precautions. Performance deficits affecting function are impaired endurance, impaired functional mobilty, decreased lower extremity function, gait instability, decreased ROM, orthopedic precautions.     Rehab Prognosis:  Good; patient would benefit from acute skilled OT services to address these deficits and reach maximum level of function.       Plan:     Patient to be seen 2 x/week to address the above listed problems via self-care/home management, therapeutic activities, therapeutic exercises  · Plan of Care Expires:    · Plan of Care Reviewed with: patient    Subjective     Pain/Comfort:  · Pain Rating 1: 8/10  · Location - Side 1: Left  · Location - Orientation 1: generalized  · Location 1: knee  · Pain Addressed 1: Reposition    Objective:     Communicated with: RN prior to session.  Patient found HOB elevated with cryotherapy, oxygen upon OT entry to room.    General Precautions: Standard, fall   Orthopedic Precautions:Full weight bearing   Braces:       Occupational Performance:     Bed Mobility:    · Patient completed Supine to Sit with supervision and with side rail     Functional Mobility/Transfers:  · Patient completed Sit <> Stand Transfer with modified independence  with  rolling walker   · Functional Mobility: GOOD    Activities of Daily Living:  · Grooming: supervision w/ RW at sink- oral hygiene and face care.      [facility] 6 Click ADL:      Treatment & Education:  Pt is able to direct  self-care and is cautious during ambulation. She adheres to orthopedic precautions, but required verbal cueing upon descent to the bed side chair to feel the chair at the back of her knees before reaching back to the arm rest for descent.    Patient left up in chair with all lines intact, call button in reach, chair alarm on and RN & respiratory presentEducation:      GOALS:   Multidisciplinary Problems     Occupational Therapy Goals        Problem: Occupational Therapy Goal    Goal Priority Disciplines Outcome Interventions   Occupational Therapy Goal     OT, PT/OT Ongoing (interventions implemented as appropriate)    Description:  Goals to be met by: 8/27/2019    Patient will increase functional independence with ADLs by performing:    LE Dressing with Supervision and Assistive Devices as needed.  Grooming while standing at sink with Supervision.  Toileting from toilet with Supervision for hygiene and clothing management.   Supine to sit with Supervision.  Toilet transfer to toilet with Supervision.                      Time Tracking:     OT Date of Treatment: 08/21/19  OT Start Time: 1100  OT Stop Time: 1125  OT Total Time (min): 25 min    Billable Minutes:Self Care/Home Management 25 min    CARLOTA Sarabia  8/21/2019

## 2019-08-21 NOTE — PLAN OF CARE
Informed Paola with [facility] that the home oxygen order has been canceled.       08/21/19 1231   Post-Acute Status   Post-Acute Authorization [facility] Status Discharge Plan Changed

## 2019-08-21 NOTE — PLAN OF CARE
08/21/19 1232   Final Note   Assessment Type Final Discharge Note   Anticipated Discharge Disposition Home   Hospital Follow Up  Appt(s) scheduled? Yes

## 2019-08-21 NOTE — PLAN OF CARE
08/21/19 0646   Patient Assessment/Suction   Level of Consciousness (AVPU) alert   Expansion/Accessory Muscles/Retractions no use of accessory muscles   All Lung Fields Breath Sounds clear;diminished   Cough Frequency no cough   PRE-TX-O2   O2 Device (Oxygen Therapy) nasal cannula   $ Is the patient on Low Flow Oxygen? Yes   Flow (L/min) 2   Oxygen Concentration (%) 28   SpO2 (!) 94 %   Pulse Oximetry Type Intermittent   $ Pulse Oximetry - Multiple Charge Pulse Oximetry - Multiple   Pulse (!) 59   Resp 16   Aerosol Therapy   $ Aerosol Therapy Charges Aerosol Treatment   Respiratory Treatment Status (SVN) given   Treatment Route (SVN) mouthpiece   Patient Position (SVN) HOB elevated   Post Treatment Assessment (SVN) breath sounds unchanged   Signs of Intolerance (SVN) none   Breath Sounds Post-Respiratory Treatment   Throughout All Fields Post-Treatment All Fields   Throughout All Fields Post-Treatment no change   Post-treatment Heart Rate (beats/min) 58   Post-treatment Resp Rate (breaths/min) 16

## 2019-08-21 NOTE — PT/OT/SLP PROGRESS
Physical Therapy Treatment    Patient Name:  Nimco Caro   MRN:  [id number]    Recommendations:     Discharge Recommendations:  home health PT     Assessment:     Nimco Caro is a 58 y.o. female admitted with a medical diagnosis of Degenerative arthritis of left knee.  She presents with the following impairments/functional limitations:  weakness, impaired endurance, impaired functional mobilty, gait instability, decreased lower extremity function, pain, orthopedic precautions, decreased ROM .    Rehab Prognosis: Good; patient would benefit from acute skilled PT services to address these deficits and reach maximum level of function.    Recent Surgery: Procedure(s) (LRB):  ARTHROPLASTY, KNEE (Left) 2 Days Post-Op    Plan:     During this hospitalization, patient to be seen BID to address the identified rehab impairments via gait training, therapeutic activities, therapeutic exercises and progress toward the following goals:    · Plan of Care Expires:       Subjective     Chief Complaint: L knee pain  Patient/Family Comments/goals: eager to ambulate, needing to use restroom first  Pain/Comfort:  · Pain Rating 1: (did not rate)  · Location - Side 1: Left  · Location 1: knee  · Pain Addressed 1: Reposition, Nurse notified      Objective:     Communicated with nurse nurses Ivonne and Zayra prior to session.  Patient found up in chair with cryotherapy upon PT entry to room.     General Precautions: Standard, fall   Orthopedic Precautions:Full weight bearing   Braces: N/A     Functional Mobility:  · Transfers:     · Sit to Stand:  stand by assistance with rolling walker  · Toilet Transfer: stand by assistance with  rolling walker  using  Stand Pivot    · Gait: 250' with SBA using RW. brief standing break taken      Therapeutic Activities and Exercises:   pt ambulated approx 12' from bed to restroom with SBA. Pt mod I with toileting.    pt proceeded to gait training and assisted to bed    Patient left supine with all  lines intact, call button in reach and friend present and nurse Zayra notified..    GOALS:   Multidisciplinary Problems     Physical Therapy Goals     Not on file          Multidisciplinary Problems (Resolved)        Problem: Physical Therapy Goal    Goal Priority Disciplines Outcome Goal Variances Interventions   Physical Therapy Goal   (Resolved)     PT, PT/OT Outcome(s) achieved     Description:  Goals to be met by: 2019    Patient will increase functional independence with mobility by performin. Supine to sit with Stand-by Assistance  2. Sit to supine with Stand-by Assistance  3. Sit to stand transfer with Stand-by Assistance  4. Bed to chair transfer with Stand-by Assistance using Rolling Walker  4. Gait  x 250 feet with Supervision using Rolling Walker.                       Time Tracking:     PT Received On: 19  PT Start Time: 1305     PT Stop Time: 1326  PT Total Time (min): 21 min     Billable Minutes: Gait Training 12 and Therapeutic Activity 8    Treatment Type: Treatment  PT/PTA: PTA     PTA Visit Number: 1     Christiana Maurer, PTA  2019

## 2019-08-21 NOTE — PLAN OF CARE
08/21/19 1118   Patient Assessment/Suction   Level of Consciousness (AVPU) alert   All Lung Fields Breath Sounds diminished   PRE-TX-O2   O2 Device (Oxygen Therapy) nasal cannula   $ Is the patient on Low Flow Oxygen? Yes   Flow (L/min) 1   Oxygen Concentration (%) 24   SpO2 95 %   Pulse Oximetry Type Intermittent   $ Pulse Oximetry - Multiple Charge Pulse Oximetry - Multiple   Pulse (!) 58   Resp 18   Aerosol Therapy   $ Aerosol Therapy Charges Aerosol Treatment   Respiratory Treatment Status (SVN) given   Treatment Route (SVN) mouthpiece   Patient Position (SVN) HOB elevated   Post Treatment Assessment (SVN) breath sounds unchanged   Signs of Intolerance (SVN) none   Breath Sounds Post-Respiratory Treatment   Throughout All Fields Post-Treatment All Fields   Throughout All Fields Post-Treatment no change   Post-treatment Heart Rate (beats/min) 59   Post-treatment Resp Rate (breaths/min) 17   Ready to Wean/Extubation Screen   FIO2<=50 (chart decimal) 0.24

## 2019-08-21 NOTE — NURSING
All discharge instructions given to patient and family. Wound care performed. IV removed. Patient tolerated well.  Verbalized understanding of plan of care. All questions and concerns answered. Patient left with all belongings via wheelchair into the care of family. Relinquished care.

## 2019-08-21 NOTE — PROGRESS NOTES
[facility]  Orthopedics  Progress Note    Patient Name: Nimco Caro  MRN: [id number]  Admission Date: 8/19/2019  Hospital Length of Stay: 0 days  Attending Provider: Eliseo Vaca MD  Primary Care Provider: Katy Mason MD  Follow-up For: Procedure(s) (LRB):  ARTHROPLASTY, KNEE (Left)    Post-Operative Day: 2 Days Post-Op  Subjective:     Principal Problem:Degenerative arthritis of left knee    Principal Orthopedic Problem: S/P L TKA    Interval History: low O2 sat - Atelectasis    Review of patient's allergies indicates:  No Known Allergies    Current Facility-Administered Medications   Medication    acetaminophen tablet 1,000 mg    albuterol-ipratropium 2.5 mg-0.5 mg/3 mL nebulizer solution 3 mL    albuterol-ipratropium 2.5 mg-0.5 mg/3 mL nebulizer solution 3 mL    amLODIPine tablet 10 mg    amoxicillin-clavulanate 875-125mg per tablet 1 tablet    ARIPiprazole tablet 15 mg    aspirin tablet 325 mg    atenolol tablet 50 mg    bisacodyl suppository 10 mg    celecoxib capsule 100 mg    cetirizine tablet 10 mg    DULoxetine DR capsule 60 mg    lisinopril tablet 20 mg    nicotine 21 mg/24 hr 1 patch    ondansetron disintegrating tablet 8 mg    ondansetron injection 4 mg    oxybutynin tablet 5 mg    oxyCODONE immediate release tablet 15 mg    oxyCODONE immediate release tablet 5 mg    oxyCODONE immediate release tablet Tab 10 mg    pantoprazole EC tablet 40 mg    polyethylene glycol packet 17 g    pregabalin capsule 75 mg    promethazine (PHENERGAN) 6.25 mg in dextrose 5 % 50 mL IVPB    sodium chloride 0.9% flush 3 mL    sodium chloride 0.9% flush 5 mL    traZODone tablet 100 mg    zolpidem tablet 5 mg     Objective:     Vital Signs (Most Recent):  Temp: 98.2 °F (36.8 °C) (08/21/19 0349)  Pulse: (!) 59 (08/21/19 0646)  Resp: 16 (08/21/19 0646)  BP: 131/63 (08/21/19 0349)  SpO2: (!) 94 % (08/21/19 0646) Vital Signs (24h Range):  Temp:  [97.3 °F (36.3 °C)-98.2 °F (36.8  "°C)] 98.2 °F (36.8 °C)  Pulse:  [42-63] 59  Resp:  [14-18] 16  SpO2:  [92 %-98 %] 94 %  BP: ()/(52-63) 131/63     Weight: 73 kg (161 lb)  Height: 5' 7" (170.2 cm)  Body mass index is 25.22 kg/m².      Intake/Output Summary (Last 24 hours) at 8/21/2019 0708  Last data filed at 8/21/2019 0500  Gross per 24 hour   Intake 1360 ml   Output --   Net 1360 ml       General    Nursing note and vitals reviewed.  Constitutional: She is oriented to person, place, and time. She appears well-developed and well-nourished.   Pulmonary/Chest: Effort normal.   Neurological: She is alert and oriented to person, place, and time.   Psychiatric: She has a normal mood and affect. Her behavior is normal.             Left Knee Exam     Comments:  LLE DNVI. Incision looks good.      Significant Labs:   CBC:   Recent Labs   Lab 08/20/19  0508   WBC 13.94*   HGB 11.5*   HCT 37.1        CMP: No results for input(s): NA, K, CL, CO2, GLU, BUN, CREATININE, CALCIUM, PROT, ALBUMIN, BILITOT, ALKPHOS, AST, ALT, ANIONGAP, EGFRNONAA in the last 48 hours.    Invalid input(s): ESTGFAFRICA  All pertinent labs within the past 24 hours have been reviewed.    Significant Imaging: X-Ray: I have reviewed all pertinent results/findings and my personal findings are:  The mediastinal and cardiac size and contours are normal.  Two strands of atelectasis are noted in the left lower lobe.  The right lung is clear.  There is no pneumothorax or pleural effusion.    Assessment/Plan:     * Degenerative arthritis of left knee  Increase OOB with PT and nursing  Stable from an ortho stand point and may be DC'd home with HH per hospitalist pending O2 issues            CARMELO PFEIFFER  Orthopedics  [facility]  "

## 2019-08-22 PROBLEM — J95.2 ACUTE POSTOPERATIVE PULMONARY INSUFFICIENCY: Status: RESOLVED | Noted: 2019-08-20 | Resolved: 2019-08-22

## 2019-08-22 PROBLEM — J18.9 PNEUMONIA: Status: ACTIVE | Noted: 2019-08-22

## 2019-08-22 NOTE — DISCHARGE SUMMARY
[facility] Hospital Medicine  Discharge Summary    Patient Name: Nimco Caro  MRN: [id number]  Admission Date: 8/19/2019  Hospital Length of Stay: 0 days  Discharge Date and Time: 8/21/2019  4:22 PM  Attending Physician: No att. providers found   Discharging Provider: GUILLE Vanegas  Primary Care Provider: Katy Mason MD      HPI:    This is a 58 year old female with PMHx of depression, HTN, HLP, and arthritis who is status post left knee arthoplasty per Dr. Vaca.  Patient placed in the hospital for further evaluation treatment including PT and OT.    Procedure(s) (LRB):  ARTHROPLASTY, KNEE (Left)      Hospital Course:   Patient monitor closely during her stay.  She received pain control antiemetics as needed post surgery.  PT consulted and she ambulated well with therapy.  Patient developed hypoxemia postop and was difficult to wean from oxygen.  Chest x-ray obtained and demonstrated bilateral atelectasis and questionable pneumonia.  She was initiated on oral Augmentin and scheduled respiratory treatments.  The patient's overall condition remained stable and improved.  She did not qualify for home O2.  Her pulmonary status remained stable.  She was discharged to home once cleared by Orthopedics.     Consults:     No new Assessment & Plan notes have been filed under this hospital service since the last note was generated.  Service: Hospital Medicine    Final Active Diagnoses:    Diagnosis Date Noted POA    PRINCIPAL PROBLEM:  Degenerative arthritis of left knee [M17.12] 08/19/2019 Yes    Pneumonia [J18.9] 08/22/2019 Clinically Undetermined    COPD (chronic obstructive pulmonary disease) [J44.9] 08/20/2019 Yes    Mild episode of recurrent major depressive disorder [F33.0] 08/19/2019 Yes    Tobacco dependency [F17.200] 08/19/2019 Yes    Chronic bronchitis [J42] 08/19/2019 Yes    Hypertension [I10] 02/25/2019 Yes    Hyperlipidemia [E78.5] 02/25/2019 Yes    GERD  (gastroesophageal reflux disease) [K21.9] 02/25/2019 Yes      Problems Resolved During this Admission:    Diagnosis Date Noted Date Resolved POA    Acute postoperative pulmonary insufficiency [J95.2] 08/20/2019 08/22/2019 No       Discharged Condition: stable    Disposition: Home or Self Care    Follow Up:  Follow-up Information     Katy Mason MD.    Specialty:  Family Medicine  Contact information:  [address] [facility]  [place] [facility] [address]  [phone]             Eliseo Vaca MD In 1 week.    Specialties:  Orthopedic Surgery, Surgery, Sports Medicine  Contact information:  [address]  [facility] [address]  [phone]             Outpatient therapy On 8/23/2019.    Why:  @ 10:00               Patient Instructions:      Diet Cardiac     Diet Cardiac     Notify your health care provider if you experience any of the following:  worsening rash     Notify your health care provider if you experience any of the following:  severe persistent headache     Notify your health care provider if you experience any of the following:  difficulty breathing or increased cough     Notify your health care provider if you experience any of the following:  redness, tenderness, or signs of infection (pain, swelling, redness, odor or green/yellow discharge around incision site)     Notify your health care provider if you experience any of the following:  severe uncontrolled pain     Notify your health care provider if you experience any of the following:  persistent nausea and vomiting or diarrhea     Notify your health care provider if you experience any of the following:  temperature >100.4     Other restrictions (specify):   Order Comments: No heavy lifting or strenuous activity.  No driving until cleared by surgeon.     Notify your health care provider if you experience any of the following:  severe uncontrolled pain     Notify your health care provider if you experience any of the following:   redness, tenderness, or signs of infection (pain, swelling, redness, odor or green/yellow discharge around incision site)     Notify your health care provider if you experience any of the following:  difficulty breathing or increased cough     Notify your health care provider if you experience any of the following:  persistent nausea and vomiting or diarrhea     Notify your health care provider if you experience any of the following:   Order Comments: Any decline in clinical status     Change dressing (specify)   Order Comments: Wound care per surgeon     Activity as tolerated     Activity as tolerated       Significant Diagnostic Studies: Labs: CMP No results for input(s): NA, K, CL, CO2, GLU, BUN, CREATININE, CALCIUM, PROT, ALBUMIN, BILITOT, ALKPHOS, AST, ALT, ANIONGAP, ESTGFRAFRICA, EGFRNONAA in the last 48 hours. and CBC No results for input(s): WBC, HGB, HCT, PLT in the last 48 hours.    Pending Diagnostic Studies:     None         Medications:  Reconciled Home Medications:      Medication List      START taking these medications    amoxicillin-clavulanate 875-125mg 875-125 mg per tablet  Commonly known as:  AUGMENTIN  Take 1 tablet by mouth every 12 (twelve) hours. for 7 days     nicotine 21 mg/24 hr  Commonly known as:  NICODERM CQ  Place 1 patch onto the skin once daily.     oxyCODONE-acetaminophen 7.5-325 mg per tablet  Commonly known as:  PERCOCET  Take 1 tablet by mouth every 4 (four) hours as needed for Pain (For moderate or severe pain).        CHANGE how you take these medications    docusate sodium 100 MG capsule  Commonly known as:  COLACE  Take 1 capsule (100 mg total) by mouth 2 (two) times daily.  What changed:  See the new instructions.     ondansetron 4 MG tablet  Commonly known as:  ZOFRAN  Take 2 tablets (8 mg total) by mouth 2 (two) times daily as needed for Nausea.  What changed:    · when to take this  · reasons to take this        CONTINUE taking these medications    ALBUTEROL  INHL  Inhale into the lungs 4 (four) times daily as needed.     amLODIPine 10 MG tablet  Commonly known as:  NORVASC  Take 1 tablet (10 mg) by mouth daily     ARIPiprazole 15 MG Tab  Commonly known as:  ABILIFY  Take 1 tablet (15 mg) by mouth daily     aspirin 325 MG tablet  Take 1 tablet (325 mg total) by mouth 2 (two) times daily.     atenolol 50 MG tablet  Commonly known as:  TENORMIN  Take 1 tablet (50 mg) by mouth daily     cetirizine 10 MG tablet  Commonly known as:  ZYRTEC  Take 1 tablet (10 mg total) by mouth once daily.     clonazePAM 1 MG tablet  Commonly known as:  KLONOPIN  1 tablet daily as needed for anxiery     DULoxetine 60 MG capsule  Commonly known as:  CYMBALTA  Take 60 mg by mouth once daily.     fluticasone propionate 50 mcg/actuation nasal spray  Commonly known as:  FLONASE  2 sprays (100 mcg total) by Each Nare route once daily.     gabapentin 600 MG tablet  Commonly known as:  NEURONTIN  Take 1 tablet (600 mg) by mouth 3 times per day     lisinopril 20 MG tablet  Commonly known as:  PRINIVIL,ZESTRIL  Take 1 tablet (20 mg) by mouth daily     oxybutynin 5 MG Tab  Commonly known as:  DITROPAN  Take 1 tablet (5 mg) by mouth 2 times per day     pantoprazole 40 MG tablet  Commonly known as:  PROTONIX  Take 1 tablet (40 mg) by mouth daily     pravastatin 20 MG tablet  Commonly known as:  PRAVACHOL  Take 1 tablet (20 mg) by mouth daily     SEREVENT DISKUS 50 mcg/dose diskus inhaler  Generic drug:  salmeterol  INHALE 1 PUFF EVERY TWELVE HOURS. RINSE MOUTH AFTER USING     STIOLTO RESPIMAT INHL  Inhale into the lungs.     traZODone 100 MG tablet  Commonly known as:  DESYREL  Take 1 tablet (100 mg) by mouth daily as needed at bedtime     VENTOLIN HFA 90 mcg/actuation inhaler  Generic drug:  albuterol  2 puffs every 4 (four) hours as needed.        STOP taking these medications    polyethylene glycol [id number] -5.84 gram Solr  Commonly known as:  COLYTE            Indwelling Lines/Drains at time of  discharge:   Lines/Drains/Airways          None          Time spent on the discharge of patient: 57 minutes  Patient was seen and examined on the date of discharge and determined to be suitable for discharge.         GUILLE Vanegas  Department of Hospital Medicine  [facility]

## 2019-08-23 ENCOUNTER — CLINICAL SUPPORT (OUTPATIENT)
Dept: REHABILITATION | Facility: HOSPITAL | Age: 59
End: 2019-08-23
Attending: ORTHOPAEDIC SURGERY
Payer: MEDICAID

## 2019-08-23 DIAGNOSIS — Z96.652 STATUS POST TOTAL KNEE REPLACEMENT, LEFT: ICD-10-CM

## 2019-08-23 DIAGNOSIS — R26.9 GAIT ABNORMALITY: ICD-10-CM

## 2019-08-23 PROCEDURE — 97110 THERAPEUTIC EXERCISES: CPT | Mod: PN | Performed by: PHYSICAL THERAPIST

## 2019-08-23 PROCEDURE — 97161 PT EVAL LOW COMPLEX 20 MIN: CPT | Mod: PN | Performed by: PHYSICAL THERAPIST

## 2019-08-23 NOTE — PATIENT INSTRUCTIONS
ROM: Plantar / Dorsiflexion        With left leg relaxed, gently flex and extend ankle. Move through full range of motion. Avoid pain.  Repeat __10__ times per set. Do __3__ sets per session. Do __2__ sessions per day.     [email or web address]     Copyright © [employer]. All rights reserved.   Stretching: Hamstring (Sitting)        With right leg straight, tuck other foot near groin. Reach down until stretch is felt in back of thigh. Keep back straight. Hold __30__ seconds.  Repeat __3__ times per set. Do __1__ sets per session. Do __2__ sessions per day.     [email or web address]     Copyright © [employer]. All rights reserved.   Stretching: Calf - Towel        Sit with knee straight and towel looped around left foot. Gently pull on towel until stretch is felt in calf. Hold __30__ seconds.  Repeat __3__ times per set. Do __1__ sets per session. Do __2__ sessions per day.     [email or web address]://[email or web address]     Copyright © [employer]. All rights reserved.     Knee Extension Mobilization: Towel Prop        With rolled towel under right ankle, place __0-2__ pound weight across knee. Hold __3-5__ minutes.  Repeat ____ times per set. Do ____ sets per session. Do ____ sessions per day.     [email or web address]     Copyright © [employer]. All rights reserved.       Quad Set: Slight Flexion        Tense muscles on top of left thigh. Hold __2__ seconds.  Repeat __10__ times per set. Do __3__ sets per session. Do __2__ sessions per day.     [email or web address]://[email or web address]     Copyright © [employer]. All rights reserved.       Strengthening: Straight Leg Raise (Phase 1)   GET SOME ASSISTANCE WITH EXERCISE AT FIRST.        Tighten muscles on front of right thigh, then lift leg __6__ inches from surface, keeping knee locked.   Repeat __10__ times per set. Do __3__ sets per session. Do __2__ sessions per day.     [email or web address]     Copyright © [employer]. All rights reserved.   PROM: Knee Flexion        With towel around left heel, gently pull knee up with towel  until stretch is felt. Hold __3__ seconds.  Repeat __10__ times per set. Do __3__ sets per session. Do __2__ sessions per day.     [email or web address]://[email or web address]     Copyright © [employer]. All rights reserved.     Strengthening: Hip Adduction - Isometric        With ball or folded pillow between knees, squeeze knees together. Hold __3__ seconds.  Repeat __10__ times per set. Do __3__ sets per session. Do __2__ sessions per day.     [email or web address]     Copyright © [employer]. All rights reserved.   Strengthening: Hip Abductor - Resisted        With band looped around both legs above knees, push thighs apart.  Repeat __10__ times per set. Do __3__ sets per session. Do __2__ sessions per day.     [email or web address]     Copyright © [employer]. All rights reserved.

## 2019-08-23 NOTE — PLAN OF CARE
[facility]  Physical Therapy Initial Evaluation    Name: Nimco Caro  Clinic Number: [id number]    Therapy Diagnosis:   Encounter Diagnoses   Name Primary?    Status post total knee replacement, left     Gait abnormality      Physician: Eliseo Vaca MD    Physician Orders: PT Eval and Treat   Medical Diagnosis from Referral: Status post total knee replacement, left  Evaluation Date: 8/23/2019  Authorization Period Expiration: 12/31/2019  Plan of Care Expiration: 9/27/2019  Visit # / Visits authorized: 1/ 12    Time In: 1000  Time Out: 1100  Total Billable Time: 60 minutes    Precautions: Standard    Subjective   Date of onset: DOS: 8/19/2019  History of current condition - Nimco reports: a three year h/o chronic left knee pain. She reports she had difficulty standing and ambulating for prolonged periods. She currently presents to PT 4 days post left TKA. Patient reports significant left knee pain and difficulty getting out of low chairs.     Medical History:   Past Medical History:   Diagnosis Date    Anxiety     Bipolar affect, depressed     Cancer ovarian; uterine    1992    COPD (chronic obstructive pulmonary disease)     GERD (gastroesophageal reflux disease)     Hyperlipidemia     Hypertension     OAB (overactive bladder)        Surgical History:   Nimco Caro  has a past surgical history that includes Hysterectomy (total); Revision Colostomy (N/A, 2012); Hernia repair (N/A, 2016); Colon surgery; Colonoscopy; Hip Arthroplasty (Right, 2/25/2019); Joint replacement (Right); and Knee Arthroplasty (Left, 8/19/2019).    Medications:   Nimco has a current medication list which includes the following prescription(s): albuterol, amlodipine, amoxicillin-clavulanate 875-125mg, aripiprazole, aspirin, atenolol, cetirizine, clonazepam, docusate sodium, duloxetine, fluticasone propionate, gabapentin, lisinopril, nicotine, ondansetron, oxybutynin, oxycodone-acetaminophen,  "pantoprazole, pravastatin, serevent diskus, tiotropium br/olodaterol hcl, trazodone, and ventolin hfa.    Allergies:   Review of patient's allergies indicates:  No Known Allergies     Imaging, X-rays: "The patient has undergone a left knee joint replacement with metallic femoral and tibial components in good position. Fracture or lucency around the prosthesis is not seen."    Prior Therapy: PT for right hip replacement  Social History: single lives with a friend   Occupation: Not working  Prior Level of Function: Independent  Current Level of Function: Decreased ability to ambulate and perform ADL with the left LE    Pain:  Current 9/10, worst 10/10, best 7/10   Location: left knee   Description: Aching and Throbbing  Aggravating Factors: Standing, Walking and Getting out of bed/chair  Easing Factors: meditation and ice    Pts goals: Return to PLOF    Objective     Posture: Decreased lumbar lordosis and forward head in standing  Palpation: Severe point tenderness noted with palpation of the left knee  Sensation: Intact  Range of Motion/Strength:     Knee ROM Left Right Pain/Dysfunction with Movement   Knee flexion -10 degrees 0 degress    Knee extension 74 degrees 128 degrees      Knee MMT Left Right   Knee flexion 3+/5 5/5   Knee extension 3+/5 5/5       Flexibility Left Right   Achilles 0 degrees 0 degrees     Girth measurements Left Right   5 cm above MJL  42.4 cm  39.0 cm    At MJL  41.1 cm  35.9 cm    5 cm below MJL 35.3 cm  32.2 cm        Gait With AD.  Device Used -  Rolling walker   Analysis Decreased stance time on the left LE       Other:   LEFS: 9/80:  88% impairment       TREATMENT   Treatment Time In: 1030  Treatment Time Out: 1100  Total Treatment time separate from Evaluation: 30 minutes    Nimco received therapeutic exercises to develop strength, ROM and flexibility for 30 minutes including:    Long sitting HSS 3 x 30 sec  Long sitting GSS 3 x 30 sec  Quad sets  3 x 10  AASLR 3 x 10  Heel slides 3 " x 10  Adductor squeeze 3 x 10  Hook lying abduction 3 x 10 with GTB    Home Exercises and Patient Education Provided    Education provided:   -     Written Home Exercises Provided: yes.  Exercises were reviewed and Nimco was able to demonstrate them prior to the end of the session.  Nimco demonstrated good  understanding of the education provided.     See EMR under Patient Instructions for exercises provided 8/23/2019.    Assessment   Nimco is a 58 y.o. female referred to outpatient Physical Therapy with a medical diagnosis of Status post total knee replacement, left. Pt presents with     1. Left knee pain  2. Decreased left knee ROM  3. Decreased left knee strength  4. Impaired ambulatory status    Pt prognosis is Good.   Pt will benefit from skilled outpatient Physical Therapy to address the deficits stated above and in the chart below, provide pt/family education, and to maximize pt's level of independence.     Plan of care discussed with patient: Yes  Pt's spiritual, cultural and educational needs considered and patient is agreeable to the plan of care and goals as stated below:     Anticipated Barriers for therapy: none     Medical Necessity is demonstrated by the following  History  Co-morbidities and personal factors that may impact the plan of care Co-morbidities:   high BMI    Personal Factors:   no deficits     low   Examination  Body Structures and Functions, activity limitations and participation restrictions that may impact the plan of care Body Regions:   lower extremities    Body Systems:    ROM  strength  gait    Participation Restrictions:   none    Activity limitations:   Learning and applying knowledge  no deficits    General Tasks and Commands  no deficits    Communication  no deficits    Mobility  no deficits    Self care  no deficits    Domestic Life  no deficits    Interactions/Relationships  no deficits    Life Areas  no deficits    Community and Social Life  no deficits          moderate   Clinical Presentation stable and uncomplicated low   Decision Making/ Complexity Score: low     Goals:  Short Term Goals: 2 weeks   1. The patient will begin a written HEP  2. Increase knee ROM to 0* - 105*  3. Decrease soft tissue tenderness to moderate    Long Term Goals: 4 weeks   1. Increase knee flexion to 120*  2. Increase knee strength to 5/5  3. The patient will ambulate on a community  level without  AD .  4. Decrease LEFS impairment to < 40%.     Plan   Plan of care Certification: 8/23/2019 to 9/27/2019.    Outpatient Physical Therapy 3 times weekly for 4 weeks to include the following interventions: Gait Training, Manual Therapy, Patient Education, Therapeutic Activites and Therapeutic Exercise.     Irving Olivares, PT

## 2019-08-26 ENCOUNTER — CLINICAL SUPPORT (OUTPATIENT)
Dept: REHABILITATION | Facility: HOSPITAL | Age: 59
End: 2019-08-26
Attending: ORTHOPAEDIC SURGERY
Payer: MEDICAID

## 2019-08-26 DIAGNOSIS — Z96.652 STATUS POST TOTAL KNEE REPLACEMENT, LEFT: ICD-10-CM

## 2019-08-26 DIAGNOSIS — R26.9 GAIT ABNORMALITY: ICD-10-CM

## 2019-08-26 PROCEDURE — 97110 THERAPEUTIC EXERCISES: CPT | Mod: PN | Performed by: PHYSICAL THERAPIST

## 2019-08-28 ENCOUNTER — CLINICAL SUPPORT (OUTPATIENT)
Dept: REHABILITATION | Facility: HOSPITAL | Age: 59
End: 2019-08-28
Attending: ORTHOPAEDIC SURGERY
Payer: MEDICAID

## 2019-08-28 DIAGNOSIS — Z96.652 STATUS POST TOTAL KNEE REPLACEMENT, LEFT: ICD-10-CM

## 2019-08-28 DIAGNOSIS — R26.9 GAIT ABNORMALITY: ICD-10-CM

## 2019-08-28 PROCEDURE — 97110 THERAPEUTIC EXERCISES: CPT | Mod: PN | Performed by: PHYSICAL THERAPIST

## 2019-08-28 NOTE — PROGRESS NOTES
Physical Therapy Daily Treatment Note     Name: Nimco Caro  Clinic Number: [id number]    Therapy Diagnosis:   Encounter Diagnoses   Name Primary?    Status post total knee replacement, left     Gait abnormality      Physician: Eliseo Vaca MD    Visit Date: 8/28/2019    Physician Orders: PT Eval and Treat   Medical Diagnosis from Referral: Status post total knee replacement, left  Evaluation Date: 8/23/2019  Authorization Period Expiration: 12/31/2019  Plan of Care Expiration: 9/27/2019  Visit # / Visits authorized: 3/ 12      Time In: 1400  Time Out: 1500  Total Billable Time: 60 minutes    Precautions: Standard    Subjective     Pt reports: Patient reports increased knee pain today.   She was compliant with home exercise program.  Response to previous treatment: no complaints  Functional change:     Pain: 5/10  Location: left knee      Objective     Prior to therapeutic exercises the patient's right calf was noted to have a glossy appearance. No increase in temperature or redness noted. Saqib's sign was negative. Patient was instructed to monitor calf color and pain level and seek immediate medical assistance      Nimco received therapeutic exercises to develop strength, endurance, ROM and flexibility for 60 minutes including:    Long sitting HSS 3 x 30 sec  Long sitting GSS 3 x 30 sec  Quad sets 3 x 10   AASLR 3 x 10  TKE 3 x 10  Adductor squeeze 3 x 10  Hook lying abduction 3 x 10  Heel slides 3 x 10  Seated heel slides 3 x 10  LSU 3 x 10  Heel raises 3 x 10  Mini squats in //bars 3 x 10  HR/TR in //bars 3 x 10  Hip abduction 3 x 10 // bars   Stationary bike 10 min          Home Exercises Provided and Patient Education Provided     Education provided:   -     Written Home Exercises Provided: Patient instructed to cont prior HEP.  Exercises were reviewed and Nimco was able to demonstrate them prior to the end of the session.  Nimco demonstrated good  understanding of the education provided.     See  EMR under Patient Instructions for exercises provided prior visit.    Assessment       Nimco is progressing well towards her goals.   Pt prognosis is Good.     Pt will continue to benefit from skilled outpatient physical therapy to address the deficits listed in the problem list box on initial evaluation, provide pt/family education and to maximize pt's level of independence in the home and community environment.     Pt's spiritual, cultural and educational needs considered and pt agreeable to plan of care and goals.    Anticipated barriers to physical therapy: none    Goals:   Short Term Goals: 2 weeks   1. The patient will begin a written HEP  2. Increase knee ROM to 0* - 105*  3. Decrease soft tissue tenderness to moderate     Long Term Goals: 4 weeks   1. Increase knee flexion to 120*  2. Increase knee strength to 5/5  3. The patient will ambulate on a community  level without  AD .  4. Decrease LEFS impairment to < 40%.       Plan     Continue with physical therapy as per plan of care    Irving Olivares, PT

## 2019-08-30 ENCOUNTER — CLINICAL SUPPORT (OUTPATIENT)
Dept: REHABILITATION | Facility: HOSPITAL | Age: 59
End: 2019-08-30
Attending: ORTHOPAEDIC SURGERY
Payer: MEDICAID

## 2019-08-30 DIAGNOSIS — R26.9 GAIT ABNORMALITY: ICD-10-CM

## 2019-08-30 DIAGNOSIS — Z96.652 STATUS POST TOTAL KNEE REPLACEMENT, LEFT: ICD-10-CM

## 2019-08-30 PROCEDURE — 97110 THERAPEUTIC EXERCISES: CPT | Mod: PN | Performed by: PHYSICAL THERAPIST

## 2019-08-30 NOTE — PROGRESS NOTES
Physical Therapy Daily Treatment Note     Name: Nimco Caro  Clinic Number: [id number]    Therapy Diagnosis:   Encounter Diagnoses   Name Primary?    Status post total knee replacement, left     Gait abnormality      Physician: Eliseo Vaca MD    Visit Date: 8/30/2019    Physician Orders: PT Eval and Treat   Medical Diagnosis from Referral: Status post total knee replacement, left  Evaluation Date: 8/23/2019  Authorization Period Expiration: 12/31/2019  Plan of Care Expiration: 9/27/2019  Visit # / Visits authorized: 4/ 12      Time In: 1100  Time Out: 1200  Total Billable Time: 60 minutes    Precautions: Standard    Subjective     Pt reports: Patient reports decreased pain and swelling in the left knee today  She was compliant with home exercise program.  Response to previous treatment: no complaints  Functional change:     Pain: 4/10  Location: left knee      Objective       Nimco received therapeutic exercises to develop strength, endurance, ROM and flexibility for 60 minutes including:    Long sitting HSS 3 x 30 sec  Long sitting GSS 3 x 30 sec  Quad sets 3 x 10   AASLR 3 x 10  TKE 3 x 10  Adductor squeeze 3 x 10  Hook lying abduction 3 x 10  Heel slides 3 x 10  Seated heel slides 3 x 10  LSU 3 x 10  Heel raises 3 x 10  Mini squats in //bars 3 x 10  HR/TR in //bars 3 x 10  Hip abduction 3 x 10 // bars   Stationary bike 10 min          Home Exercises Provided and Patient Education Provided     Education provided:   -     Written Home Exercises Provided: Patient instructed to cont prior HEP.  Exercises were reviewed and Nimco was able to demonstrate them prior to the end of the session.  Nimco demonstrated good  understanding of the education provided.     See EMR under Patient Instructions for exercises provided prior visit.    Assessment       Nimco is progressing well towards her goals.   Pt prognosis is Good.     Pt will continue to benefit from skilled outpatient physical therapy to address  the deficits listed in the problem list box on initial evaluation, provide pt/family education and to maximize pt's level of independence in the home and community environment.     Pt's spiritual, cultural and educational needs considered and pt agreeable to plan of care and goals.    Anticipated barriers to physical therapy: none    Goals:   Short Term Goals: 2 weeks   1. The patient will begin a written HEP  2. Increase knee ROM to 0* - 105*  3. Decrease soft tissue tenderness to moderate     Long Term Goals: 4 weeks   1. Increase knee flexion to 120*  2. Increase knee strength to 5/5  3. The patient will ambulate on a community  level without  AD .  4. Decrease LEFS impairment to < 40%.       Plan     Continue with physical therapy as per plan of care    Irving Olivares, PT

## 2019-09-03 ENCOUNTER — HOSPITAL ENCOUNTER (OUTPATIENT)
Dept: RADIOLOGY | Facility: HOSPITAL | Age: 59
Discharge: HOME | End: 2019-09-03
Attending: ORTHOPAEDIC SURGERY
Payer: MEDICAID

## 2019-09-03 ENCOUNTER — OFFICE VISIT (OUTPATIENT)
Dept: ORTHOPEDICS | Facility: CLINIC | Age: 59
End: 2019-09-03
Payer: MEDICAID

## 2019-09-03 VITALS
BODY MASS INDEX: 25.27 KG/M2 | HEART RATE: 68 BPM | HEIGHT: 67 IN | SYSTOLIC BLOOD PRESSURE: 110 MMHG | DIASTOLIC BLOOD PRESSURE: 60 MMHG | WEIGHT: 161 LBS

## 2019-09-03 DIAGNOSIS — M79.661 PAIN AND SWELLING OF LOWER LEG, RIGHT: ICD-10-CM

## 2019-09-03 DIAGNOSIS — Z96.652 STATUS POST TOTAL KNEE REPLACEMENT, LEFT: ICD-10-CM

## 2019-09-03 DIAGNOSIS — Z96.652 STATUS POST TOTAL KNEE REPLACEMENT, LEFT: Primary | ICD-10-CM

## 2019-09-03 DIAGNOSIS — M79.89 PAIN AND SWELLING OF LOWER LEG, RIGHT: ICD-10-CM

## 2019-09-03 PROCEDURE — 93971 EXTREMITY STUDY: CPT | Mod: TC,LT

## 2019-09-03 PROCEDURE — 99024 PR POST-OP FOLLOW-UP VISIT: ICD-10-PCS | Mod: S$GLB,,, | Performed by: ORTHOPAEDIC SURGERY

## 2019-09-03 PROCEDURE — 99024 POSTOP FOLLOW-UP VISIT: CPT | Mod: S$GLB,,, | Performed by: ORTHOPAEDIC SURGERY

## 2019-09-03 RX ORDER — OXYCODONE AND ACETAMINOPHEN 7.5; 325 MG/1; MG/1
1 TABLET ORAL EVERY 6 HOURS PRN
Qty: 28 TABLET | Refills: 0 | Status: SHIPPED | OUTPATIENT
Start: 2019-09-03 | End: 2019-09-10

## 2019-09-03 NOTE — PROGRESS NOTES
[facility] POST-OP NOTE    Subjective:           Chief Complaint:   Chief Complaint   Patient presents with    Left Knee - Post-op Evaluation     Left TKA 8.19.19. Suture removal. States that her knee is doing ok. States that she is still having  pain in the knee.        Past Medical History:   Diagnosis Date    Anxiety     Bipolar affect, depressed     Cancer ovarian; uterine    1992    COPD (chronic obstructive pulmonary disease)     GERD (gastroesophageal reflux disease)     Hyperlipidemia     Hypertension     OAB (overactive bladder)        Past Surgical History:   Procedure Laterality Date    ARTHROPLASTY, HIP Right 2/25/2019    [facility]    ARTHROPLASTY, KNEE Left 8/19/2019    [facility] at [facility] OR    COLON SURGERY      COLONOSCOPY      HERNIA REPAIR N/A 2016    HYSTERECTOMY  total    JOINT REPLACEMENT Right     hip replacemnt    REVISION COLOSTOMY N/A 2012       Current Outpatient Medications   Medication Sig    ALBUTEROL INHL Inhale into the lungs 4 (four) times daily as needed.     amLODIPine (NORVASC) 10 MG tablet Take 1 tablet (10 mg) by mouth daily    ARIPiprazole (ABILIFY) 15 MG Tab Take 1 tablet (15 mg) by mouth daily    aspirin 325 MG tablet Take 1 tablet (325 mg total) by mouth 2 (two) times daily.    atenolol (TENORMIN) 50 MG tablet Take 1 tablet (50 mg) by mouth daily    clonazePAM (KLONOPIN) 1 MG tablet 1 tablet daily as needed for anxiery    DULoxetine (CYMBALTA) 60 MG capsule Take 60 mg by mouth once daily.    fluticasone (FLONASE) 50 mcg/actuation nasal spray 2 sprays (100 mcg total) by Each Nare route once daily.    gabapentin (NEURONTIN) 600 MG tablet Take 1 tablet (600 mg) by mouth 3 times per day    lisinopril (PRINIVIL,ZESTRIL) 20 MG tablet Take 1 tablet (20 mg) by mouth daily    ondansetron (ZOFRAN) 4 MG tablet Take 2 tablets (8 mg total) by mouth 2 (two) times daily as needed for Nausea.     oxybutynin (DITROPAN) 5 MG Tab Take 1 tablet (5 mg) by mouth 2 times per day    oxyCODONE-acetaminophen (PERCOCET) 7.5-325 mg per tablet Take 1 tablet by mouth every 4 (four) hours as needed for Pain (For moderate or severe pain).    pantoprazole (PROTONIX) 40 MG tablet Take 1 tablet (40 mg) by mouth daily    pravastatin (PRAVACHOL) 20 MG tablet Take 1 tablet (20 mg) by mouth daily    SEREVENT DISKUS 50 mcg/dose diskus inhaler INHALE 1 PUFF EVERY TWELVE HOURS. RINSE MOUTH AFTER USING    tiotropium Br/olodaterol HCl (STIOLTO RESPIMAT INHL) Inhale into the lungs.    traZODone (DESYREL) 100 MG tablet Take 1 tablet (100 mg) by mouth daily as needed at bedtime    VENTOLIN HFA 90 mcg/actuation inhaler 2 puffs every 4 (four) hours as needed.    cetirizine (ZYRTEC) 10 MG tablet Take 1 tablet (10 mg total) by mouth once daily.    docusate sodium (COLACE) 100 MG capsule Take 1 capsule (100 mg total) by mouth 2 (two) times daily.    nicotine (NICODERM CQ) 21 mg/24 hr Place 1 patch onto the skin once daily.     No current facility-administered medications for this visit.        Review of patient's allergies indicates:   Allergen Reactions    Adhesive Rash and Blisters       Family History   Problem Relation Age of Onset    Cancer Mother     Hypertension Father     Heart disease Father     Heart disease Sister     Hypertension Sister     Hypertension Brother     Depression Brother        Social History     Socioeconomic History    Marital status:      Spouse name: Not on file    Number of children: Not on file    Years of education: Not on file    Highest education level: Not on file   Occupational History    Not on file   Social Needs    Financial resource strain: Not on file    Food insecurity:     Worry: Not on file     Inability: Not on file    Transportation needs:     Medical: Not on file     Non-medical: Not on file   Tobacco Use    Smoking status: Current Every Day Smoker     Packs/day:  0.50     Years: 40.00     Pack years: 20.00     Types: Cigarettes    Smokeless tobacco: Never Used   Substance and Sexual Activity    Alcohol use: Yes     Alcohol/week: 0.6 oz     Types: 1 Glasses of wine per week     Comment: weekly    Drug use: No    Sexual activity: Not on file   Lifestyle    Physical activity:     Days per week: Not on file     Minutes per session: Not on file    Stress: Not on file   Relationships    Social connections:     Talks on phone: Not on file     Gets together: Not on file     Attends Scientologist service: Not on file     Active member of club or organization: Not on file     Attends meetings of clubs or organizations: Not on file     Relationship status: Not on file   Other Topics Concern    Not on file   Social History Narrative    Not on file       History of present illness: Patient comes in today status post left total knee. She is generally doing very well. She does not complain of calf pain or shortness of breath.      Review of Systems:    Musculoskeletal:  See HPI      Objective:        Physical Examination:    Vital Signs:    Vitals:    09/03/19 1423   BP: 110/60   Pulse: 68       Body mass index is 25.22 kg/m².    This a well-developed, well nourished patient in no acute distress.  They are alert and oriented and cooperative to examination.        Wounds are clean dry and intact. Some dry blistering consistent with Steri-Strips is noted. Her calf is quite firm. Range of motion is 0-110 degrees  Pertinent New Results:    XRAY Report / Interpretation:       Assessment/Plan:      Stable following left total knee. Because her calf is firm and mildly tender I've ordered a stat ultrasound to evaluate her for a DVT. We will see her back as soon as that is done    This note was created using Dragon voice recognition software that occasionally misinterpreted phrases or words.

## 2019-09-04 ENCOUNTER — CLINICAL SUPPORT (OUTPATIENT)
Dept: REHABILITATION | Facility: HOSPITAL | Age: 59
End: 2019-09-04
Attending: ORTHOPAEDIC SURGERY
Payer: MEDICAID

## 2019-09-04 DIAGNOSIS — R26.9 GAIT ABNORMALITY: ICD-10-CM

## 2019-09-04 DIAGNOSIS — Z96.652 STATUS POST TOTAL KNEE REPLACEMENT, LEFT: ICD-10-CM

## 2019-09-04 PROCEDURE — 97110 THERAPEUTIC EXERCISES: CPT | Mod: PN

## 2019-09-04 NOTE — PROGRESS NOTES
"  Physical Therapy Daily Treatment Note     Name: Nimco Caro  Clinic Number: [id number]    Therapy Diagnosis:   Encounter Diagnoses   Name Primary?    Status post total knee replacement, left     Gait abnormality      Physician: Eliseo Vaca MD    Visit Date: 9/4/2019  Physician Orders: PT Eval and Treat   Medical Diagnosis from Referral: Status post total knee replacement, left  Evaluation Date: 8/23/2019  Authorization Period Expiration: 12/31/2019  Plan of Care Expiration: 9/27/2019  Visit # / Visits authorized: 4/ 12        Time In: 154p  Time Out: 254p  Total Billable Time: 60 minutes    Precautions: Standard and Fall    Subjective     Pt reports: "I got the stitches taken out."  Pt reports she had an ultrasound to check for DVT, it was negative.  She was compliant with home exercise program.  Response to previous treatment:   Functional change:     Pain: 7/10  Location: left knee      Objective     Nimco received therapeutic exercises to develop strength, ROM and flexibility for 60 minutes including:    Bike x 10 minutes    Mini squats x 30  HR/TR x 30  LSU 6" x 30  TKE ball behind knee against wall x 30    SLR x 30  SAQ x 30  QS x 30  Hip abd GTB x 30  Hip adduction x 30    Seated heel slides x 30  Seated gastroc stretch with strap 3 x 30 sec    Home Exercises Provided and Patient Education Provided     Education provided:   - cont HEP, correct walking pattern, heel strike, knee extension    Written Home Exercises Provided: Patient instructed to cont prior HEP.  Exercises were reviewed and Nimco was able to demonstrate them prior to the end of the session.  Nimco demonstrated good  understanding of the education provided.     See EMR under Patient Instructions for exercises provided prior visit.    Assessment     Pt ambulates with decreased heel strike, knees flexed.  Slow movements.    Nimco is progressing well towards her goals.   Pt prognosis is Good.     Pt will continue to benefit from " skilled outpatient physical therapy to address the deficits listed in the problem list box on initial evaluation, provide pt/family education and to maximize pt's level of independence in the home and community environment.     Pt's spiritual, cultural and educational needs considered and pt agreeable to plan of care and goals.    Anticipated barriers to physical therapy: none    Goals:   Short Term Goals: 2 weeks   1. The patient will begin a written HEP  2. Increase knee ROM to 0* - 105*  3. Decrease soft tissue tenderness to moderate     Long Term Goals: 4 weeks   1. Increase knee flexion to 120*  2. Increase knee strength to 5/5  3. The patient will ambulate on a community  level without  AD .  4. Decrease LEFS impairment to < 40%.       Plan     Cont per POC    Nichole Méndez, PTA

## 2019-09-10 ENCOUNTER — CLINICAL SUPPORT (OUTPATIENT)
Dept: REHABILITATION | Facility: HOSPITAL | Age: 59
End: 2019-09-10
Attending: ORTHOPAEDIC SURGERY
Payer: MEDICAID

## 2019-09-10 DIAGNOSIS — Z96.652 STATUS POST TOTAL KNEE REPLACEMENT, LEFT: ICD-10-CM

## 2019-09-10 DIAGNOSIS — R26.9 GAIT ABNORMALITY: ICD-10-CM

## 2019-09-10 PROCEDURE — 97110 THERAPEUTIC EXERCISES: CPT | Mod: PN | Performed by: PHYSICAL THERAPIST

## 2019-09-10 NOTE — PROGRESS NOTES
Physical Therapy Daily Treatment Note     Name: Nimco Caro  Clinic Number: [id number]    Therapy Diagnosis:   Encounter Diagnoses   Name Primary?    Status post total knee replacement, left     Gait abnormality      Physician: Eliseo Vaca MD    Visit Date: 9/10/2019    Physician Orders: PT Eval and Treat   Medical Diagnosis from Referral: Status post total knee replacement, left  Evaluation Date: 8/23/2019  Authorization Period Expiration: 12/31/2019  Plan of Care Expiration: 9/27/2019  Visit # / Visits authorized: 5/ 12      Time In: 1300  Time Out: 1400  Total Billable Time: 60 minutes    Precautions: Standard    Subjective     Pt reports: MD pleased with progress. Stated she had an US performed and it was   She was compliant with home exercise program.  Response to previous treatment: no complaints  Functional change:     Pain: 4/10  Location: left knee      Objective       Nimoc received therapeutic exercises to develop strength, endurance, ROM and flexibility for 60 minutes including:    Long sitting HSS 3 x 30 sec  Long sitting GSS 3 x 30 sec  Quad sets 3 x 10   AASLR 3 x 10  TKE 3 x 10  Adductor squeeze 3 x 10  Hook lying abduction 3 x 10  Heel slides 3 x 10  Seated heel slides 3 x 10  LSU 3 x 10  Heel raises 3 x 10  Mini squats in //bars 3 x 10  HR/TR in //bars 3 x 10  Hip abduction 3 x 10 // bars   Stationary bike 5 min          Home Exercises Provided and Patient Education Provided     Education provided:   -     Written Home Exercises Provided: Patient instructed to cont prior HEP.  Exercises were reviewed and Nimco was able to demonstrate them prior to the end of the session.  Nimco demonstrated good  understanding of the education provided.     See EMR under Patient Instructions for exercises provided prior visit.    Assessment       Nimco is progressing well towards her goals.   Pt prognosis is Good.     Pt will continue to benefit from skilled outpatient physical therapy to address  the deficits listed in the problem list box on initial evaluation, provide pt/family education and to maximize pt's level of independence in the home and community environment.     Pt's spiritual, cultural and educational needs considered and pt agreeable to plan of care and goals.    Anticipated barriers to physical therapy: none    Goals:   Short Term Goals: 2 weeks   1. The patient will begin a written HEP  2. Increase knee ROM to 0* - 105*  3. Decrease soft tissue tenderness to moderate     Long Term Goals: 4 weeks   1. Increase knee flexion to 120*  2. Increase knee strength to 5/5  3. The patient will ambulate on a community  level without  AD .  4. Decrease LEFS impairment to < 40%.       Plan     Continue with physical therapy as per plan of care    Irving Olivares, PT

## 2019-09-12 ENCOUNTER — CLINICAL SUPPORT (OUTPATIENT)
Dept: REHABILITATION | Facility: HOSPITAL | Age: 59
End: 2019-09-12
Attending: ORTHOPAEDIC SURGERY
Payer: MEDICAID

## 2019-09-12 DIAGNOSIS — R26.9 GAIT ABNORMALITY: ICD-10-CM

## 2019-09-12 DIAGNOSIS — Z96.652 STATUS POST TOTAL KNEE REPLACEMENT, LEFT: ICD-10-CM

## 2019-09-12 PROCEDURE — 97110 THERAPEUTIC EXERCISES: CPT | Mod: PN

## 2019-09-12 NOTE — PROGRESS NOTES
Physical Therapy Daily Treatment Note     Name: Nimco MOHAMUD [place]  Clinic Number: [id number]    Therapy Diagnosis:   Encounter Diagnoses   Name Primary?    Status post total knee replacement, left     Gait abnormality      Physician: Eliseo Vaca MD    Visit Date: 9/12/2019    Physician Orders: PT Eval and Treat   Medical Diagnosis from Referral: Status post total knee replacement, left  Evaluation Date: 8/23/2019  Authorization Period Expiration: 12/31/2019  Plan of Care Expiration: 9/27/2019  Visit # / Visits authorized: 6/12      Time In: 1:55 PM  Time Out: 2:57 PM  Total Billable Time: 60 minutes    Precautions: Standard    Subjective     Pt reports: she is hurting today. Patient reports she has been elevating her leg often to help decrease swelling.   She was compliant with home exercise program.  Response to previous treatment: no complaints  Functional change: NA     Pain: 6/10  Location: left knee      Objective       Nimco received therapeutic exercises to develop strength, endurance, ROM and flexibility for 60 minutes including:    Long sitting HSS 3 x 30 sec  Long sitting GSS 3 x 30 sec  Quad sets 3 x 10   AASLR 3 x 10  TKE 3 x 10  Adductor squeeze 3 x 10  Hook lying abduction 3 x 10  Heel slides 3 x 10  Seated heel slides 3 x 10  LSU 3 x 10  Heel raises 3 x 10  Mini squats in //bars 3 x 10  HR/TR in //bars 3 x 10  Hip abduction 3 x 10 // bars   Stationary bike 5 min      Home Exercises Provided and Patient Education Provided     Education provided:   - HEP     Written Home Exercises Provided: Patient instructed to cont prior HEP.  Exercises were reviewed and Nimco was able to demonstrate them prior to the end of the session.  Nimco demonstrated good  understanding of the education provided.     See EMR under Patient Instructions for exercises provided prior visit.    Assessment     Patient tolerated all exercises well today. Patient with verbal and tactile cues required during SLR to maintain TKE  and to perform quad set prior to SLR. Post treatment patient rates pain to be 1/10. Will cont to progress per patient's tolerance.   Nimco is progressing well towards her goals.   Pt prognosis is Good.     Pt will continue to benefit from skilled outpatient physical therapy to address the deficits listed in the problem list box on initial evaluation, provide pt/family education and to maximize pt's level of independence in the home and community environment.     Pt's spiritual, cultural and educational needs considered and pt agreeable to plan of care and goals.    Anticipated barriers to physical therapy: none    Goals:   Short Term Goals: 2 weeks   1. The patient will begin a written HEP  2. Increase knee ROM to 0* - 105*  3. Decrease soft tissue tenderness to moderate     Long Term Goals: 4 weeks   1. Increase knee flexion to 120*  2. Increase knee strength to 5/5  3. The patient will ambulate on a community  level without  AD .  4. Decrease LEFS impairment to < 40%.       Plan     Continue with physical therapy as per plan of care    Ibis Srivastava, PTA

## 2019-09-16 ENCOUNTER — CLINICAL SUPPORT (OUTPATIENT)
Dept: REHABILITATION | Facility: HOSPITAL | Age: 59
End: 2019-09-16
Attending: ORTHOPAEDIC SURGERY
Payer: MEDICAID

## 2019-09-16 DIAGNOSIS — Z96.652 STATUS POST TOTAL KNEE REPLACEMENT, LEFT: ICD-10-CM

## 2019-09-16 DIAGNOSIS — R26.9 GAIT ABNORMALITY: ICD-10-CM

## 2019-09-16 PROCEDURE — 97110 THERAPEUTIC EXERCISES: CPT | Mod: PN | Performed by: PHYSICAL THERAPIST

## 2019-09-16 NOTE — PROGRESS NOTES
Physical Therapy Daily Treatment Note     Name: Nimco Caro  Clinic Number: [id number]    Therapy Diagnosis:   Encounter Diagnoses   Name Primary?    Status post total knee replacement, left     Gait abnormality      Physician: Eliseo Vaca MD    Visit Date: 9/16/2019    Physician Orders: PT Eval and Treat   Medical Diagnosis from Referral: Status post total knee replacement, left  Evaluation Date: 8/23/2019  Authorization Period Expiration: 12/31/2019  Plan of Care Expiration: 9/27/2019  Visit # / Visits authorized: 7/ 12      Time In: 1400  Time Out: 1500  Total Billable Time: 60 minutes    Precautions: Standard    Subjective     Pt reports: MD pleased with progress. Stated she had an US performed and it was   She was compliant with home exercise program.  Response to previous treatment: no complaints  Functional change:     Pain: 4/10  Location: left knee      Objective       Nimco received therapeutic exercises to develop strength, endurance, ROM and flexibility for 60 minutes including:    Long sitting HSS 3 x 30 sec  Long sitting GSS 3 x 30 sec  Quad sets 3 x 10   SLR 3 x 10  TKE 3 x 10  Adductor squeeze 3 x 10  Hook lying abduction 3 x 10  Heel slides 3 x 10  Seated heel slides 3 x 10  LSU 3 x 10  Heel raises 3 x 10  Mini squats in //bars 3 x 10  HR/TR in //bars 3 x 10  Hip abduction 3 x 10 // bars   Stationary bike 5 min          Home Exercises Provided and Patient Education Provided     Education provided:   -     Written Home Exercises Provided: Patient instructed to cont prior HEP.  Exercises were reviewed and Nimco was able to demonstrate them prior to the end of the session.  Nimco demonstrated good  understanding of the education provided.     See EMR under Patient Instructions for exercises provided prior visit.    Assessment       Nimco is progressing well towards her goals.   Pt prognosis is Good.     Pt will continue to benefit from skilled outpatient physical therapy to address the  deficits listed in the problem list box on initial evaluation, provide pt/family education and to maximize pt's level of independence in the home and community environment.     Pt's spiritual, cultural and educational needs considered and pt agreeable to plan of care and goals.    Anticipated barriers to physical therapy: none    Goals:   Short Term Goals: 2 weeks   1. The patient will begin a written HEP  2. Increase knee ROM to 0* - 105*  3. Decrease soft tissue tenderness to moderate     Long Term Goals: 4 weeks   1. Increase knee flexion to 120*  2. Increase knee strength to 5/5  3. The patient will ambulate on a community  level without  AD .  4. Decrease LEFS impairment to < 40%.       Plan     Continue with physical therapy as per plan of care    Irving Olivares, PT

## 2019-09-18 ENCOUNTER — CLINICAL SUPPORT (OUTPATIENT)
Dept: REHABILITATION | Facility: HOSPITAL | Age: 59
End: 2019-09-18
Attending: ORTHOPAEDIC SURGERY
Payer: MEDICAID

## 2019-09-18 DIAGNOSIS — R26.9 GAIT ABNORMALITY: ICD-10-CM

## 2019-09-18 DIAGNOSIS — Z96.652 STATUS POST TOTAL KNEE REPLACEMENT, LEFT: ICD-10-CM

## 2019-09-18 PROCEDURE — 97110 THERAPEUTIC EXERCISES: CPT | Mod: PN | Performed by: PHYSICAL THERAPIST

## 2019-09-18 NOTE — PROGRESS NOTES
Physical Therapy Daily Treatment Note     Name: Nimco Caro  Clinic Number: [id number]    Therapy Diagnosis:   Encounter Diagnoses   Name Primary?    Status post total knee replacement, left     Gait abnormality      Physician: Eliseo Vaca MD    Visit Date: 9/18/2019    Physician Orders: PT Eval and Treat   Medical Diagnosis from Referral: Status post total knee replacement, left  Evaluation Date: 8/23/2019  Authorization Period Expiration: 12/31/2019  Plan of Care Expiration: 9/27/2019  Visit # / Visits authorized: 11/ 12      Time In: 1400  Time Out: 1500  Total Billable Time: 60 minutes    Precautions: Standard    Subjective     Pt reports: MD pleased with progress. Stated she had an US performed and it was   She was compliant with home exercise program.  Response to previous treatment: no complaints  Functional change:     Pain: 3/10  Location: left knee      Objective          Previous                                 Current  ROM                                 Left                  Right                Left                  Right  Knee extension               -10*                  0*                     0*                     0*           Knee flexion                          74*                   128*                 117*                 128*     MMT:                           Left                  Right                Left                  Right  Knee extension                       3+/5                 5/5                   4/5                   5/5  Knee flexion                     3+/5                 5/5                   4/5                   5/5     Girth measurements:      Left                  Right                Left                  Right  5 cm above MJL                42.4 cm           39.0 cm           39.3 cm           39.0 cm  At MJL                                 41.1 cm           35.9 cm           37.1 cm           35.9 cm  5 cm below MJL              35.2 cm           32.2 cm            33.5 cm           32.2 cm     Gait: The patient is currently ambulating on a limited community level with a small base quad cane     LEFS Impairment                  88%                                         38.8%       Nimco received therapeutic exercises to develop strength, endurance, ROM and flexibility for 60 minutes including:    Long sitting HSS 3 x 30 sec  Long sitting GSS 3 x 30 sec  Quad sets 3 x 10   SLR 3 x 10  TKE 3 x 10  Adductor squeeze 3 x 10  Hook lying abduction 3 x 10  Heel slides 3 x 10  Seated heel slides 3 x 10  LSU 3 x 10  Heel raises 3 x 10  Mini squats in //bars 3 x 10  HR/TR in //bars 3 x 10  Hip abduction 3 x 10 // bars   Stationary bike 5 min          Home Exercises Provided and Patient Education Provided     Education provided:   -     Written Home Exercises Provided: Patient instructed to cont prior HEP.  Exercises were reviewed and Nimco was able to demonstrate them prior to the end of the session.  Nimco demonstrated good  understanding of the education provided.     See EMR under Patient Instructions for exercises provided prior visit.    Assessment       Nimco is progressing well towards her goals.   Pt prognosis is Good.     Pt will continue to benefit from skilled outpatient physical therapy to address the deficits listed in the problem list box on initial evaluation, provide pt/family education and to maximize pt's level of independence in the home and community environment.     Pt's spiritual, cultural and educational needs considered and pt agreeable to plan of care and goals.    Anticipated barriers to physical therapy: none    Goals:   Short Term Goals: 2 weeks   1. The patient will begin a written HEP  2. Increase knee ROM to 0* - 105*  3. Decrease soft tissue tenderness to moderate     Long Term Goals: 4 weeks   1. Increase knee flexion to 120*  2. Increase knee strength to 5/5  3. The patient will ambulate on a community  level without  AD .  4. Decrease  LEFS impairment to < 40%.       Plan     Continue with physical therapy as per plan of care    Irving Olivares, PT

## 2019-09-18 NOTE — PLAN OF CARE
TIME RECORD    Date: 09/18/2019    Start Time:  1500  Stop Time:  1600    PROCEDURES:    TIMED  Procedure Time Min.    Start:  Stop:     Start:  Stop:     Start:  Stop:     Start:  Stop:          UNTIMED  Procedure Time Min.    Start:  Stop:     Start:  Stop:      Total Timed Minutes:  60  Total Timed Units:  4  Total Untimed Units:  0  Charges Billed/# of units:  4    PHYSICAL THERAPY UPDATED PLAN OF TREATMENT    Patient name: Nimco Caro  Onset Date: DOS: 8/19/2019  SOC Date:  8/23/2019  Primary Diagnosis:    1. Status post total knee replacement, left     2. Gait abnormality       Treatment Diagnosis:  Gait abnormality  Certification Period:  8/23/2019 to 9/27/2019  Precautions:  Standard  Visits from SOC:  11  Functional Level Prior to SOC:  Decreased ability to ambulate and perform  ADL with the left LE    Updated Assessment:    S: The patient reports she is improving but still notes weakness after prolonged ambulation.    O:    Previous   Current  ROM    Left  Right  Left  Right  Knee extension  -10*  0*  0*  0*   Knee flexion   74*  128*  117*  128*    MMT:    Left  Right  Left  Right  Knee extension  3+/5  5/5  4/5  5/5  Knee flexion   3+/5  5/5  4/5  5/5    Girth measurements: Left  Right  Left  Right  5 cm above MJL  42.4 cm 39.0 cm 39.3 cm 39.0 cm  At MJL    41.1 cm 35.9 cm 37.1 cm 35.9 cm  5 cm below MJL  35.2 cm 32.2 cm 33.5 cm 32.2 cm    Gait: The patient is currently ambulating on a limited community level with a small base quad cane    LEFS Impairment  88%    38.8%    A: The patient is progressing with increased ROM and strength. She has progressed to ambulation with a quad cane but currently displays decreased stride length secondary to     Previous Short Term Goals Status:     1. The patient will begin a written HEP  2. Increase knee ROM to 0* - 105*  3. Decrease soft tissue tenderness to moderate    New Short Term Goals Status:   1. Progress HEP as tolerated      Long Term Goal Status:    continue per initial plan of care.  1. Increase knee flexion to 120*  2. Increase knee strength to 5/5  3. The patient will ambulate on a community  level without  AD .  4. Decrease LEFS impairment to < 40%.     Reasons for Recertification of Therapy:   Progress LE ROM, strength and ambulatory status    Certification Period: 9/23/2019 to 10/18/2019  Recommended Treatment Plan: 3 times per week for 4 weeks: Manual Therapy, Patient Education, Therapeutic Activites and Therapeutic Exercise  Other Recommendations: Progress as tolerated        Therapist's Name: Irving Olivares, PT   Date: 09/18/2019    I CERTIFY THE NEED FOR THESE SERVICES FURNISHED UNDER THIS PLAN OF TREATMENT AND WHILE UNDER MY CARE    Physician's comments: ________________________________________________________________________________________________________________________________________________      Physician's Name: ___________________________________

## 2019-09-20 ENCOUNTER — CLINICAL SUPPORT (OUTPATIENT)
Dept: REHABILITATION | Facility: HOSPITAL | Age: 59
End: 2019-09-20
Attending: ORTHOPAEDIC SURGERY
Payer: MEDICAID

## 2019-09-20 DIAGNOSIS — Z96.652 STATUS POST TOTAL KNEE REPLACEMENT, LEFT: ICD-10-CM

## 2019-09-20 DIAGNOSIS — R26.9 GAIT ABNORMALITY: ICD-10-CM

## 2019-09-20 PROCEDURE — 97110 THERAPEUTIC EXERCISES: CPT | Mod: PN | Performed by: PHYSICAL THERAPIST

## 2019-09-20 NOTE — PROGRESS NOTES
Physical Therapy Daily Treatment Note     Name: Nimco Caro  Clinic Number: [id number]    Therapy Diagnosis:   Encounter Diagnoses   Name Primary?    Status post total knee replacement, left     Gait abnormality      Physician: Eliseo Vaca MD    Visit Date: 9/20/2019    Physician Orders: PT Eval and Treat   Medical Diagnosis from Referral: Status post total knee replacement, left  Evaluation Date: 8/23/2019  Authorization Period Expiration: 12/31/2019  Plan of Care Expiration: 9/27/2019  Visit # / Visits authorized: 12/ 12      Time In: 1000  Time Out: 1100  Total Billable Time: 60 minutes    Precautions: Standard    Subjective     Pt reports: No new complaints  She was compliant with home exercise program.  Response to previous treatment: no complaints  Functional change:     Pain: 3/10  Location: left knee      Objective        Nimco received therapeutic exercises to develop strength, endurance, ROM and flexibility for 60 minutes including:    Long sitting HSS 3 x 30 sec  Long sitting GSS 3 x 30 sec  Quad sets 3 x 10   SLR 3 x 10  TKE 3 x 10  Adductor squeeze 3 x 10  Hook lying abduction 3 x 10  Heel slides 3 x 10  Seated heel slides 3 x 10  LSU 3 x 10  Heel raises 3 x 10  Mini squats in //bars 3 x 10  HR/TR in //bars 3 x 10  Hip abduction 3 x 10 // bars   Stationary bike 10 min        Home Exercises Provided and Patient Education Provided     Education provided:   -     Written Home Exercises Provided: Patient instructed to cont prior HEP.  Exercises were reviewed and Nimco was able to demonstrate them prior to the end of the session.  Nimco demonstrated good  understanding of the education provided.     See EMR under Patient Instructions for exercises provided prior visit.    Assessment       Nimco is progressing well towards her goals.   Pt prognosis is Good.     Pt will continue to benefit from skilled outpatient physical therapy to address the deficits listed in the problem list box on  initial evaluation, provide pt/family education and to maximize pt's level of independence in the home and community environment.     Pt's spiritual, cultural and educational needs considered and pt agreeable to plan of care and goals.    Anticipated barriers to physical therapy: none    Goals:   Short Term Goals: 2 weeks   1. The patient will begin a written HEP  2. Increase knee ROM to 0* - 105*  3. Decrease soft tissue tenderness to moderate     Long Term Goals: 4 weeks   1. Increase knee flexion to 120*  2. Increase knee strength to 5/5  3. The patient will ambulate on a community  level without  AD .  4. Decrease LEFS impairment to < 40%.       Plan     Continue with physical therapy as per plan of care    Irving Olivares, PT

## 2019-09-25 ENCOUNTER — CLINICAL SUPPORT (OUTPATIENT)
Dept: REHABILITATION | Facility: HOSPITAL | Age: 59
End: 2019-09-25
Attending: ORTHOPAEDIC SURGERY
Payer: MEDICAID

## 2019-09-25 DIAGNOSIS — Z96.652 STATUS POST TOTAL KNEE REPLACEMENT, LEFT: ICD-10-CM

## 2019-09-25 DIAGNOSIS — R26.9 GAIT ABNORMALITY: ICD-10-CM

## 2019-09-25 PROCEDURE — 97110 THERAPEUTIC EXERCISES: CPT | Mod: PN

## 2019-09-25 NOTE — PROGRESS NOTES
"  Physical Therapy Daily Treatment Note     Name: Nimco MOHAMUD Caro  Clinic Number: [id number]    Therapy Diagnosis:   Encounter Diagnoses   Name Primary?    Status post total knee replacement, left     Gait abnormality      Physician: Eliseo Vaca MD    Visit Date: 9/25/2019  Physician Orders: PT Eval and Treat   Medical Diagnosis from Referral: Status post total knee replacement, left  Evaluation Date: 8/23/2019  Authorization Period Expiration: 12/31/2019  Plan of Care Expiration: 9/27/2019  Visit # / Visits authorized: 2/ 12    Time In: 1000  Time Out: 1102  Total Billable Time: 62 minutes    Precautions: Standard and Fall    Subjective     Pt reports: no complaints.  She was compliant with home exercise program.  Response to previous treatment:   Functional change:     Pain: 3/10  Location: left knee      Objective     Nimco received therapeutic exercises to develop strength, ROM and flexibility for 52 minutes including:    Bike x 10 minutes    Standing gastroc stretch 3 x 30 sec B  Mini squats x 30  LSU x 20 B, 6" step  HR/TR x 30  Hip abd x 30  TKE against wall x 30    SLR 2# x 30  SAQ 2# x 30  Ottoman stretch 5# x 5 minutes    Seated heel slides x 30  Hamstring stretch 3 x 30 sec    Nimco received the following manual therapy techniques: scar massage were applied to the: L incision site for 10 minutes.    Home Exercises Provided and Patient Education Provided     Education provided:   - cont HEP, arm swing with ambulation    Written Home Exercises Provided: Patient instructed to cont prior HEP.  Exercises were reviewed and Nimco was able to demonstrate them prior to the end of the session.  Nimco demonstrated good  understanding of the education provided.     See EMR under Patient Instructions for exercises provided prior visit.    Assessment     Pt with decreased arm swing.  VC to increase arm swing, heel strike.  Pt able to do this after VC, but does not carry over more than a few steps.    Nimco " is progressing well towards her goals.   Pt prognosis is Good.     Pt will continue to benefit from skilled outpatient physical therapy to address the deficits listed in the problem list box on initial evaluation, provide pt/family education and to maximize pt's level of independence in the home and community environment.     Pt's spiritual, cultural and educational needs considered and pt agreeable to plan of care and goals.    Anticipated barriers to physical therapy: none    Goals:   Short Term Goals: 2 weeks   1. The patient will begin a written HEP  2. Increase knee ROM to 0* - 105*  3. Decrease soft tissue tenderness to moderate     Long Term Goals: 4 weeks   1. Increase knee flexion to 120*  2. Increase knee strength to 5/5  3. The patient will ambulate on a community  level without  AD .  4. Decrease LEFS impairment to < 40%.       Plan     Cont per POC    Nichole Méndez, PTA

## 2019-09-27 ENCOUNTER — CLINICAL SUPPORT (OUTPATIENT)
Dept: REHABILITATION | Facility: HOSPITAL | Age: 59
End: 2019-09-27
Attending: ORTHOPAEDIC SURGERY
Payer: MEDICAID

## 2019-09-27 DIAGNOSIS — Z96.652 STATUS POST TOTAL KNEE REPLACEMENT, LEFT: ICD-10-CM

## 2019-09-27 DIAGNOSIS — R26.9 GAIT ABNORMALITY: ICD-10-CM

## 2019-09-27 PROCEDURE — 97110 THERAPEUTIC EXERCISES: CPT | Mod: PN

## 2019-09-27 NOTE — PROGRESS NOTES
"  Physical Therapy Daily Treatment Note     Name: Nimco Caro  Clinic Number: [id number]    Therapy Diagnosis:   Encounter Diagnoses   Name Primary?    Status post total knee replacement, left     Gait abnormality      Physician: Eliseo Vaca MD    Visit Date: 9/27/2019  Physician Orders: PT Eval and Treat   Medical Diagnosis from Referral: Status post total knee replacement, left  Evaluation Date: 8/23/2019  Authorization Period Expiration: 12/31/2019  Plan of Care Expiration: 9/27/2019  Visit # / Visits authorized: 3/ 12    Time In: 1000  Time Out: 1105  Total Billable Time: 65 minutes    Precautions: Standard and Fall    Subjective     Pt reports: decreased pain after MT last visit  She was compliant with home exercise program.  Response to previous treatment:   Functional change:     Pain: 3/10  Location: left knee      Objective     Nimco received therapeutic exercises to develop strength, ROM and flexibility for 60 minutes including:    Bike x 10 minutes    Mini squats x 30  LSU x 20 B, 6" step  HR/TR x 30  Hip abd x 30  TKE against wall x 30    SLR 1# x 30  SAQ 2# x 30  Ottoman stretch 5# x 5 minutes  LAQ 2# x 30    Seated heel slides x 30    Nicmo received the following manual therapy techniques: scar massage were applied to the: L incision site for 5 minutes.    Home Exercises Provided and Patient Education Provided     Education provided:   - cont HEP    Written Home Exercises Provided: Patient instructed to cont prior HEP.  Exercises were reviewed and Nimco was able to demonstrate them prior to the end of the session.  Nimco demonstrated good  understanding of the education provided.     See EMR under Patient Instructions for exercises provided prior visit.    Assessment     Pt slow with exercises and movements.  Minimal increase in arm swing with ambulation today.    Nimco is progressing well towards her goals.   Pt prognosis is Good.     Pt will continue to benefit from skilled " outpatient physical therapy to address the deficits listed in the problem list box on initial evaluation, provide pt/family education and to maximize pt's level of independence in the home and community environment.     Pt's spiritual, cultural and educational needs considered and pt agreeable to plan of care and goals.    Anticipated barriers to physical therapy: none    Goals:   Short Term Goals: 2 weeks   1. The patient will begin a written HEP  2. Increase knee ROM to 0* - 105*  3. Decrease soft tissue tenderness to moderate     Long Term Goals: 4 weeks   1. Increase knee flexion to 120*  2. Increase knee strength to 5/5  3. The patient will ambulate on a community  level without  AD .  4. Decrease LEFS impairment to < 40%.       Plan     Cont per POC    Nichole Méndez, PTA

## 2019-10-01 ENCOUNTER — OFFICE VISIT (OUTPATIENT)
Dept: ORTHOPEDICS | Facility: CLINIC | Age: 59
End: 2019-10-01
Payer: MEDICAID

## 2019-10-01 VITALS
WEIGHT: 155 LBS | HEIGHT: 67 IN | SYSTOLIC BLOOD PRESSURE: 80 MMHG | BODY MASS INDEX: 24.33 KG/M2 | HEART RATE: 51 BPM | DIASTOLIC BLOOD PRESSURE: 51 MMHG

## 2019-10-01 DIAGNOSIS — M17.11 PRIMARY OSTEOARTHRITIS OF RIGHT KNEE: ICD-10-CM

## 2019-10-01 DIAGNOSIS — Z96.652 STATUS POST TOTAL KNEE REPLACEMENT, LEFT: Primary | ICD-10-CM

## 2019-10-01 PROCEDURE — 20610 LARGE JOINT ASPIRATION/INJECTION: R KNEE: ICD-10-PCS | Mod: 58,RT,S$GLB, | Performed by: ORTHOPAEDIC SURGERY

## 2019-10-01 PROCEDURE — 99214 PR OFFICE/OUTPT VISIT, EST, LEVL IV, 30-39 MIN: ICD-10-PCS | Mod: 24,25,S$GLB, | Performed by: ORTHOPAEDIC SURGERY

## 2019-10-01 PROCEDURE — 99214 OFFICE O/P EST MOD 30 MIN: CPT | Mod: 24,25,S$GLB, | Performed by: ORTHOPAEDIC SURGERY

## 2019-10-01 PROCEDURE — 99024 POSTOP FOLLOW-UP VISIT: CPT | Mod: 24,S$GLB,, | Performed by: ORTHOPAEDIC SURGERY

## 2019-10-01 PROCEDURE — 99024 PR POST-OP FOLLOW-UP VISIT: ICD-10-PCS | Mod: 24,S$GLB,, | Performed by: ORTHOPAEDIC SURGERY

## 2019-10-01 PROCEDURE — 20610 DRAIN/INJ JOINT/BURSA W/O US: CPT | Mod: 58,RT,S$GLB, | Performed by: ORTHOPAEDIC SURGERY

## 2019-10-01 RX ORDER — OXYCODONE AND ACETAMINOPHEN 5; 325 MG/1; MG/1
1 TABLET ORAL EVERY 6 HOURS PRN
Qty: 28 TABLET | Refills: 0 | Status: SHIPPED | OUTPATIENT
Start: 2019-10-01 | End: 2019-10-08

## 2019-10-01 RX ORDER — METHYLPREDNISOLONE ACETATE 40 MG/ML
40 INJECTION, SUSPENSION INTRA-ARTICULAR; INTRALESIONAL; INTRAMUSCULAR; SOFT TISSUE
Status: DISCONTINUED | OUTPATIENT
Start: 2019-10-01 | End: 2019-10-01 | Stop reason: HOSPADM

## 2019-10-01 RX ADMIN — METHYLPREDNISOLONE ACETATE 40 MG: 40 INJECTION, SUSPENSION INTRA-ARTICULAR; INTRALESIONAL; INTRAMUSCULAR; SOFT TISSUE at 01:10

## 2019-10-01 NOTE — PROGRESS NOTES
[facility] ORTHOPEDICS POST-OP NOTE    Subjective:           Chief Complaint:   Chief Complaint   Patient presents with    Left Knee - Post-op Evaluation     S/p Left TKA 08/19/19, still having pain, more at night, PT at [facility]       Past Medical History:   Diagnosis Date    Anxiety     Bipolar affect, depressed     Cancer ovarian; uterine    1992    COPD (chronic obstructive pulmonary disease)     GERD (gastroesophageal reflux disease)     Hyperlipidemia     Hypertension     OAB (overactive bladder)        Past Surgical History:   Procedure Laterality Date    COLON SURGERY      COLONOSCOPY      HERNIA REPAIR N/A 2016    HIP ARTHROPLASTY Right 2/25/2019    Procedure: ARTHROPLASTY, HIP;  Surgeon: Eliseo Vaca MD;  Location: [facility] OR;  Service: Orthopedics;  Laterality: Right;  Daniel Herrera    HYSTERECTOMY  total    JOINT REPLACEMENT Right     hip replacemnt    KNEE ARTHROPLASTY Left 8/19/2019    Procedure: ARTHROPLASTY, KNEE;  Surgeon: Eliseo Vaca MD;  Location: [facility] OR;  Service: Orthopedics;  Laterality: Left;    REVISION COLOSTOMY N/A 2012       Current Outpatient Medications   Medication Sig    ALBUTEROL INHL Inhale into the lungs 4 (four) times daily as needed.     amLODIPine (NORVASC) 10 MG tablet Take 1 tablet (10 mg) by mouth daily    ARIPiprazole (ABILIFY) 15 MG Tab Take 1 tablet (15 mg) by mouth daily    atenolol (TENORMIN) 50 MG tablet Take 1 tablet (50 mg) by mouth daily    clonazePAM (KLONOPIN) 1 MG tablet 1 tablet daily as needed for anxiery    docusate sodium (COLACE) 100 MG capsule Take 1 capsule (100 mg total) by mouth 2 (two) times daily.    DULoxetine (CYMBALTA) 60 MG capsule Take 60 mg by mouth once daily.    fluticasone (FLONASE) 50 mcg/actuation nasal spray 2 sprays (100 mcg total) by Each Nare route once daily.    gabapentin (NEURONTIN) 600 MG tablet Take 1 tablet (600 mg) by mouth 3 times per day    lisinopril (PRINIVIL,ZESTRIL) 20 MG tablet Take 1 tablet (20  mg) by mouth daily    nicotine (NICODERM CQ) 21 mg/24 hr Place 1 patch onto the skin once daily.    ondansetron (ZOFRAN) 4 MG tablet Take 2 tablets (8 mg total) by mouth 2 (two) times daily as needed for Nausea.    oxybutynin (DITROPAN) 5 MG Tab Take 1 tablet (5 mg) by mouth 2 times per day    oxyCODONE-acetaminophen (PERCOCET) 7.5-325 mg per tablet Take 1 tablet by mouth every 4 (four) hours as needed for Pain (For moderate or severe pain).    pantoprazole (PROTONIX) 40 MG tablet Take 1 tablet (40 mg) by mouth daily    pravastatin (PRAVACHOL) 20 MG tablet Take 1 tablet (20 mg) by mouth daily    SEREVENT DISKUS 50 mcg/dose diskus inhaler INHALE 1 PUFF EVERY TWELVE HOURS. RINSE MOUTH AFTER USING    tiotropium Br/olodaterol HCl (STIOLTO RESPIMAT INHL) Inhale into the lungs.    traZODone (DESYREL) 100 MG tablet Take 1 tablet (100 mg) by mouth daily as needed at bedtime    VENTOLIN HFA 90 mcg/actuation inhaler 2 puffs every 4 (four) hours as needed.    aspirin 325 MG tablet Take 1 tablet (325 mg total) by mouth 2 (two) times daily.    cetirizine (ZYRTEC) 10 MG tablet Take 1 tablet (10 mg total) by mouth once daily.     No current facility-administered medications for this visit.        Review of patient's allergies indicates:   Allergen Reactions    Adhesive Rash and Blisters       Family History   Problem Relation Age of Onset    Cancer Mother     Hypertension Father     Heart disease Father     Heart disease Sister     Hypertension Sister     Hypertension Brother     Depression Brother        Social History     Socioeconomic History    Marital status:      Spouse name: Not on file    Number of children: Not on file    Years of education: Not on file    Highest education level: Not on file   Occupational History    Not on file   Social Needs    Financial resource strain: Not on file    Food insecurity:     Worry: Not on file     Inability: Not on file    Transportation needs:      Medical: Not on file     Non-medical: Not on file   Tobacco Use    Smoking status: Current Every Day Smoker     Packs/day: 0.50     Years: 40.00     Pack years: 20.00     Types: Cigarettes    Smokeless tobacco: Never Used   Substance and Sexual Activity    Alcohol use: Yes     Alcohol/week: 1.0 standard drinks     Types: 1 Glasses of wine per week     Comment: weekly    Drug use: No    Sexual activity: Not on file   Lifestyle    Physical activity:     Days per week: Not on file     Minutes per session: Not on file    Stress: Not on file   Relationships    Social connections:     Talks on phone: Not on file     Gets together: Not on file     Attends Protestant service: Not on file     Active member of club or organization: Not on file     Attends meetings of clubs or organizations: Not on file     Relationship status: Not on file   Other Topics Concern    Not on file   Social History Narrative    Not on file       History of present illness: Patient comes in today for follow up on her left knee. She is doing very well with that. Not having any problems. Her right knee is bothering her today.      Review of Systems:    Musculoskeletal:  See HPI      Objective:        Physical Examination:    Vital Signs:    Vitals:    10/01/19 1356   BP: (!) 80/51   Pulse: (!) 51       Body mass index is 24.28 kg/m².    This a well-developed, well nourished patient in no acute distress.  They are alert and oriented and cooperative to examination.        Wounds are clean dry and intact. Range of motion 0-110 degrees. Her knee is stable. Right knee has symmetric range of motion. Varus deformity. Crepitus  Pertinent New Results:    XRAY Report / Interpretation:   AP lateral and sunrise views of the left knee demonstrate her left total knee to be in ideal position    Assessment/Plan:      Stable following left total knee. Follow-up in 6 weeks. Arthritis right knee. I did inject the right knee with Depo-Medrol and lidocaine today.  Follow up prn.     This note was created using Dragon voice recognition software that occasionally misinterpreted phrases or words.

## 2019-10-02 ENCOUNTER — CLINICAL SUPPORT (OUTPATIENT)
Dept: REHABILITATION | Facility: HOSPITAL | Age: 59
End: 2019-10-02
Attending: ORTHOPAEDIC SURGERY
Payer: MEDICAID

## 2019-10-02 DIAGNOSIS — R26.9 GAIT ABNORMALITY: ICD-10-CM

## 2019-10-02 DIAGNOSIS — Z96.652 STATUS POST TOTAL KNEE REPLACEMENT, LEFT: ICD-10-CM

## 2019-10-02 PROCEDURE — 97110 THERAPEUTIC EXERCISES: CPT | Mod: PN

## 2019-10-02 NOTE — PROGRESS NOTES
"  Physical Therapy Daily Treatment Note     Name: Nimco MOHAMUD [facility]  Clinic Number: [id number]    Therapy Diagnosis:   Encounter Diagnoses   Name Primary?    Status post total knee replacement, left     Gait abnormality      Physician: Eliseo Vaca MD    Visit Date: 10/2/2019  Physician Orders: PT Eval and Treat   Medical Diagnosis from Referral: Status post total knee replacement, left  Evaluation Date: 8/23/2019  Authorization Period Expiration: 12/31/2019  Plan of Care Expiration: 9/27/2019  Visit # / Visits authorized: 3/ 12    Time In: 0957  Time Out: 1100  Total Billable Time: 41 minutes    Precautions: Standard and Fall    Subjective     Pt reports: no complaints  She was compliant with home exercise program.  Response to previous treatment:   Functional change:     Pain: 3/10  Location: left knee      Objective     Nimco received therapeutic exercises to develop strength, ROM and flexibility for 63 minutes including:    Bike x 10 minutes, seat @ 9,8,7    Gastroc stretch 3 x 30 sec B  Mini squats x 30  TKE 4" x 30  LSU x 20 B, 6" step  HR/TR x 30  Hip abd x 30  TKE against wall x 30    SLR 2# x 30  SAQ 2# x 30  Ottoman stretch 5# x 5 minutes  LAQ 2# x 30    Seated heel slides x 30  Hamstring stretch 3 x 30 sec     Nimco received the following manual therapy techniques: scar massage were applied to the: L incision site for 5 minutes.    Home Exercises Provided and Patient Education Provided     Education provided:   - cont HEP    Written Home Exercises Provided: Patient instructed to cont prior HEP.  Exercises were reviewed and Nimco was able to demonstrate them prior to the end of the session.  Nimco demonstrated good  understanding of the education provided.     See EMR under Patient Instructions for exercises provided prior visit.    Assessment     Pt slow with exercises and movements.  Good tolerance for activity.  No increase in s/s reported.    Nimco is progressing well towards her goals.   Pt " prognosis is Good.     Pt will continue to benefit from skilled outpatient physical therapy to address the deficits listed in the problem list box on initial evaluation, provide pt/family education and to maximize pt's level of independence in the home and community environment.     Pt's spiritual, cultural and educational needs considered and pt agreeable to plan of care and goals.    Anticipated barriers to physical therapy: none    Goals:   Short Term Goals: 2 weeks   1. The patient will begin a written HEP  2. Increase knee ROM to 0* - 105*  3. Decrease soft tissue tenderness to moderate     Long Term Goals: 4 weeks   1. Increase knee flexion to 120*  2. Increase knee strength to 5/5  3. The patient will ambulate on a community  level without  AD .  4. Decrease LEFS impairment to < 40%.       Plan     Cont per POC    Nichole Méndez, PTA

## 2019-10-04 ENCOUNTER — CLINICAL SUPPORT (OUTPATIENT)
Dept: REHABILITATION | Facility: HOSPITAL | Age: 59
End: 2019-10-04
Attending: ORTHOPAEDIC SURGERY
Payer: MEDICAID

## 2019-10-04 DIAGNOSIS — Z96.652 STATUS POST TOTAL KNEE REPLACEMENT, LEFT: ICD-10-CM

## 2019-10-04 DIAGNOSIS — R26.9 GAIT ABNORMALITY: ICD-10-CM

## 2019-10-04 PROCEDURE — 97110 THERAPEUTIC EXERCISES: CPT | Mod: PN

## 2019-10-04 NOTE — PROGRESS NOTES
"  Physical Therapy Daily Treatment Note     Name: Nimco Caro  Clinic Number: [id number]    Therapy Diagnosis:   Encounter Diagnoses   Name Primary?    Status post total knee replacement, left     Gait abnormality      Physician: Eliseo Vaca MD    Visit Date: 10/4/2019  Physician Orders: PT Eval and Treat   Medical Diagnosis from Referral: Status post total knee replacement, left  Evaluation Date: 8/23/2019  Authorization Period Expiration: 12/31/2019  Plan of Care Expiration: 9/27/2019  Visit # / Visits authorized: 4/ 12    Time In: 1000  Time Out: 1100  Total Billable Time: 60 minutes    Precautions: Standard and Fall    Subjective     Pt reports: no complaints  She was compliant with home exercise program.  Response to previous treatment:   Functional change:     Pain: 1/10  Location: left knee      Objective     Nimco received therapeutic exercises to develop strength, ROM and flexibility for 63 minutes including:    Bike x 5 minutes    Gastroc stretch 3 x 30 sec B  PB mini squats x 30  LSU x 20 B, 6" step  HR/TR x 30  Hip abd x 30  TKE against wall x 30    SLR 2# x 30  SAQ 2# x 30  Ottoman stretch 5# x 5 minutes  LAQ 2# x 30    Seated heel slides x 30  Hamstring stretch 3 x 30 sec     Home Exercises Provided and Patient Education Provided     Education provided:   - cont HEP    Written Home Exercises Provided: Patient instructed to cont prior HEP.  Exercises were reviewed and Nimco was able to demonstrate them prior to the end of the session.  Nimco demonstrated good  understanding of the education provided.     See EMR under Patient Instructions for exercises provided prior visit.    Assessment     Increased scar mobility.  ROM increasing.  Good tolerance for activity.  No increase in s/s reported.    Nicmo is progressing well towards her goals.   Pt prognosis is Good.     Pt will continue to benefit from skilled outpatient physical therapy to address the deficits listed in the problem list box " on initial evaluation, provide pt/family education and to maximize pt's level of independence in the home and community environment.     Pt's spiritual, cultural and educational needs considered and pt agreeable to plan of care and goals.    Anticipated barriers to physical therapy: none    Goals:   Short Term Goals: 2 weeks   1. The patient will begin a written HEP  2. Increase knee ROM to 0* - 105*  3. Decrease soft tissue tenderness to moderate     Long Term Goals: 4 weeks   1. Increase knee flexion to 120*  2. Increase knee strength to 5/5  3. The patient will ambulate on a community  level without  AD .  4. Decrease LEFS impairment to < 40%.       Plan     Cont per POC    Nichole Méndez, PTA

## 2019-10-08 ENCOUNTER — CLINICAL SUPPORT (OUTPATIENT)
Dept: REHABILITATION | Facility: HOSPITAL | Age: 59
End: 2019-10-08
Attending: ORTHOPAEDIC SURGERY
Payer: MEDICAID

## 2019-10-08 DIAGNOSIS — Z96.652 STATUS POST TOTAL KNEE REPLACEMENT, LEFT: ICD-10-CM

## 2019-10-08 DIAGNOSIS — R26.9 GAIT ABNORMALITY: ICD-10-CM

## 2019-10-08 PROCEDURE — 97110 THERAPEUTIC EXERCISES: CPT | Mod: PN | Performed by: PHYSICAL THERAPIST

## 2019-10-08 NOTE — PROGRESS NOTES
Physical Therapy Daily Treatment Note     Name: Nimco Caro  Clinic Number: [id number]    Therapy Diagnosis:   Encounter Diagnoses   Name Primary?    Status post total knee replacement, left     Gait abnormality      Physician: Eliseo Vaca MD    Visit Date: 10/8/2019    Physician Orders: PT Eval and Treat   Medical Diagnosis from Referral: Status post total knee replacement, left  Evaluation Date: 8/23/2019  Authorization Period Expiration: 12/31/2019  Plan of Care Expiration: 9/27/2019  Visit # / Visits authorized: 12/ 12      Time In: 1000  Time Out: 1100  Total Billable Time: 60 minutes    Precautions: Standard    Subjective     Pt reports: No new complaints  She was compliant with home exercise program.  Response to previous treatment: no complaints  Functional change:     Pain: 3/10  Location: left knee      Objective        Nimco received therapeutic exercises to develop strength, endurance, ROM and flexibility for 60 minutes including:    Long sitting HSS 3 x 30 sec  Long sitting GSS 3 x 30 sec  Quad sets 3 x 10   SLR 3 x 10 2#  TKE 3 x 10 2#  Adductor squeeze 3 x 10  Hook lying abduction 3 x 10  Heel slides 3 x 10  Seated heel slides 3 x 10  LSU 3 x 10  Heel raises 3 x 10  Mini squats in //bars 3 x 10  HR/TR in //bars 3 x 10  Hip abduction 3 x 10 // bars   Stationary bike 10 min        Home Exercises Provided and Patient Education Provided     Education provided:   -     Written Home Exercises Provided: Patient instructed to cont prior HEP.  Exercises were reviewed and Nimco was able to demonstrate them prior to the end of the session.  Nimco demonstrated good  understanding of the education provided.     See EMR under Patient Instructions for exercises provided prior visit.    Assessment       Nimco is progressing well towards her goals.   Pt prognosis is Good.     Pt will continue to benefit from skilled outpatient physical therapy to address the deficits listed in the problem list box on  initial evaluation, provide pt/family education and to maximize pt's level of independence in the home and community environment.     Pt's spiritual, cultural and educational needs considered and pt agreeable to plan of care and goals.    Anticipated barriers to physical therapy: none    Goals:   Short Term Goals: 2 weeks   1. The patient will begin a written HEP  2. Increase knee ROM to 0* - 105*  3. Decrease soft tissue tenderness to moderate     Long Term Goals: 4 weeks   1. Increase knee flexion to 120*  2. Increase knee strength to 5/5  3. The patient will ambulate on a community  level without  AD .  4. Decrease LEFS impairment to < 40%.       Plan     Continue with physical therapy as per plan of care    Irving Olivares, PT

## 2019-10-11 ENCOUNTER — CLINICAL SUPPORT (OUTPATIENT)
Dept: REHABILITATION | Facility: HOSPITAL | Age: 59
End: 2019-10-11
Attending: ORTHOPAEDIC SURGERY
Payer: MEDICAID

## 2019-10-11 DIAGNOSIS — Z96.652 STATUS POST TOTAL KNEE REPLACEMENT, LEFT: ICD-10-CM

## 2019-10-11 DIAGNOSIS — R26.9 GAIT ABNORMALITY: ICD-10-CM

## 2019-10-11 PROCEDURE — 97110 THERAPEUTIC EXERCISES: CPT | Mod: PN | Performed by: PHYSICAL THERAPIST

## 2019-10-11 NOTE — PROGRESS NOTES
Physical Therapy Daily Treatment Note     Name: Nimco Caro  Clinic Number: [id number]    Therapy Diagnosis:   Encounter Diagnoses   Name Primary?    Status post total knee replacement, left     Gait abnormality      Physician: Eliseo Vaca MD    Visit Date: 10/11/2019    Physician Orders: PT Eval and Treat   Medical Diagnosis from Referral: Status post total knee replacement, left  Evaluation Date: 8/23/2019  Authorization Period Expiration: 12/31/2019  Plan of Care Expiration: 9/27/2019  Visit # / Visits authorized: 5/ 12      Time In: 1000  Time Out: 1100  Total Billable Time: 60 minutes    Precautions: Standard    Subjective     Pt reports: No new complaints  She was compliant with home exercise program.  Response to previous treatment: no complaints  Functional change:     Pain: 3/10  Location: left knee      Objective        Nimco received therapeutic exercises to develop strength, endurance, ROM and flexibility for 60 minutes including:    Long sitting HSS 3 x 30 sec  Long sitting GSS 3 x 30 sec  Quad sets 3 x 10   SLR 3 x 10 2#  TKE 3 x 10 2#  Adductor squeeze 3 x 10  Hook lying abduction 3 x 10  Heel slides 3 x 10  Seated heel slides 3 x 10  LSU 3 x 10  Heel raises 3 x 10  Mini squats in //bars 3 x 10  HR/TR in //bars 3 x 10  Hip abduction 3 x 10 // bars   Stationary bike 10 min        Home Exercises Provided and Patient Education Provided     Education provided:   -     Written Home Exercises Provided: Patient instructed to cont prior HEP.  Exercises were reviewed and Nimco was able to demonstrate them prior to the end of the session.  Nimco demonstrated good  understanding of the education provided.     See EMR under Patient Instructions for exercises provided prior visit.    Assessment       Nimco is progressing well towards her goals.   Pt prognosis is Good.     Pt will continue to benefit from skilled outpatient physical therapy to address the deficits listed in the problem list box on  initial evaluation, provide pt/family education and to maximize pt's level of independence in the home and community environment.     Pt's spiritual, cultural and educational needs considered and pt agreeable to plan of care and goals.    Anticipated barriers to physical therapy: none    Goals:   Short Term Goals: 2 weeks   1. The patient will begin a written HEP  2. Increase knee ROM to 0* - 105*  3. Decrease soft tissue tenderness to moderate     Long Term Goals: 4 weeks   1. Increase knee flexion to 120*  2. Increase knee strength to 5/5  3. The patient will ambulate on a community  level without  AD .  4. Decrease LEFS impairment to < 40%.       Plan     Continue with physical therapy as per plan of care    Irving Olivares, PT

## 2019-10-16 ENCOUNTER — CLINICAL SUPPORT (OUTPATIENT)
Dept: REHABILITATION | Facility: HOSPITAL | Age: 59
End: 2019-10-16
Attending: ORTHOPAEDIC SURGERY
Payer: MEDICAID

## 2019-10-16 DIAGNOSIS — R26.9 GAIT ABNORMALITY: ICD-10-CM

## 2019-10-16 DIAGNOSIS — Z96.652 STATUS POST TOTAL KNEE REPLACEMENT, LEFT: ICD-10-CM

## 2019-10-16 PROCEDURE — 97110 THERAPEUTIC EXERCISES: CPT | Mod: PN

## 2019-10-16 NOTE — PROGRESS NOTES
"  Physical Therapy Daily Treatment Note     Name: Nimco MOHAMUD Caro  Clinic Number: [id number]    Therapy Diagnosis:   Encounter Diagnoses   Name Primary?    Status post total knee replacement, left     Gait abnormality      Physician: Eliseo Vaca MD    Visit Date: 10/16/2019  Physician Orders: PT Eval and Treat   Medical Diagnosis from Referral: Status post total knee replacement, left  Evaluation Date: 8/23/2019  Authorization Period Expiration: 12/31/2019  Plan of Care Expiration: 9/27/2019  Visit # / Visits authorized: 7/ 12    Time In: 1003  Time Out: 1058  Total Billable Time: 55 minutes    Precautions: Standard and Fall    Subjective     Pt reports: no complaints  She was compliant with home exercise program.  Response to previous treatment:   Functional change:     Pain: 1/10  Location: left knee      Objective     Nimco received therapeutic exercises to develop strength, ROM and flexibility for 63 minutes including:    Bike x 10 minutes, level 2    Shuttle: 50# B, heel dips; 37# L x 30 each    Gastroc stretch 3 x 30 sec B  PB mini squats x 30  LSU x 30 B, 6" step  HR/TR x 30  Hip abd x 30  TKE against wall x 30    SLR 2# x 30  SAQ 2# x 30  LAQ 2# x 30    Seated heel slides x 30     Home Exercises Provided and Patient Education Provided     Education provided:   - cont HEP    Written Home Exercises Provided: Patient instructed to cont prior HEP.  Exercises were reviewed and Nimco was able to demonstrate them prior to the end of the session.  Nimco demonstrated good  understanding of the education provided.     See EMR under Patient Instructions for exercises provided prior visit.    Assessment     Overall improved gait pattern, however, pt continues to ambulate with decreased arm swing and terminal knee extension.  Good tolerance for activity.  No increase in s/s reported.    Nimco is progressing well towards her goals.   Pt prognosis is Good.     Pt will continue to benefit from skilled outpatient " physical therapy to address the deficits listed in the problem list box on initial evaluation, provide pt/family education and to maximize pt's level of independence in the home and community environment.     Pt's spiritual, cultural and educational needs considered and pt agreeable to plan of care and goals.    Anticipated barriers to physical therapy: none    Goals:   Short Term Goals: 2 weeks   1. The patient will begin a written HEP  2. Increase knee ROM to 0* - 105*  3. Decrease soft tissue tenderness to moderate     Long Term Goals: 4 weeks   1. Increase knee flexion to 120*  2. Increase knee strength to 5/5  3. The patient will ambulate on a community  level without  AD .  4. Decrease LEFS impairment to < 40%.       Plan     Cont per POC    Nichole Méndez, PTA

## 2019-10-18 ENCOUNTER — CLINICAL SUPPORT (OUTPATIENT)
Dept: REHABILITATION | Facility: HOSPITAL | Age: 59
End: 2019-10-18
Attending: ORTHOPAEDIC SURGERY
Payer: MEDICAID

## 2019-10-18 DIAGNOSIS — R26.9 GAIT ABNORMALITY: ICD-10-CM

## 2019-10-18 DIAGNOSIS — Z96.652 STATUS POST TOTAL KNEE REPLACEMENT, LEFT: ICD-10-CM

## 2019-10-18 PROCEDURE — 97110 THERAPEUTIC EXERCISES: CPT | Mod: PN

## 2019-10-18 NOTE — PROGRESS NOTES
"  Physical Therapy Daily Treatment Note     Name: Nimco Caro  Clinic Number: [id number]    Therapy Diagnosis:   Encounter Diagnoses   Name Primary?    Status post total knee replacement, left     Gait abnormality      Physician: Eliseo Vaca MD    Visit Date: 10/18/2019  Physician Orders: PT Eval and Treat   Medical Diagnosis from Referral: Status post total knee replacement, left  Evaluation Date: 8/23/2019  Authorization Period Expiration: 12/31/2019  Plan of Care Expiration: 9/27/2019  Visit # / Visits authorized: 12/ 12    Time In: 1000  Time Out: 1055  Total Billable Time: 30 minutes    Precautions: Standard and Fall    Subjective     Pt reports: no complaints  She was compliant with home exercise program.  Response to previous treatment:   Functional change:     Pain: 1/10  Location: left knee      Objective     Nimco received therapeutic exercises to develop strength, ROM and flexibility for 55 minutes including:    Bike x 10 minutes, level 2    Shuttle: 50# B, heel dips; 37# L x 30 each    Gastroc stretch 3 x 30 sec B  PB mini squats x 30  LSU x 30 B, 6" step  HR/TR x 30  Hip abd x 30  TKE against wall x 30    SLR 2# x 30  SAQ 2# x 30  LAQ 2# x 30    Seated heel slides x 30     Home Exercises Provided and Patient Education Provided     Education provided:   - cont HEP    Written Home Exercises Provided: Patient instructed to cont prior HEP.  Exercises were reviewed and Nimco was able to demonstrate them prior to the end of the session.  Nimco demonstrated good  understanding of the education provided.     See EMR under Patient Instructions for exercises provided prior visit.    Assessment     Good tolerance for activity.  No increase in s/s reported.    Nimco is progressing well towards her goals.   Pt prognosis is Good.     Pt will continue to benefit from skilled outpatient physical therapy to address the deficits listed in the problem list box on initial evaluation, provide pt/family " education and to maximize pt's level of independence in the home and community environment.     Pt's spiritual, cultural and educational needs considered and pt agreeable to plan of care and goals.    Anticipated barriers to physical therapy: none    Goals:   Short Term Goals: 2 weeks   1. The patient will begin a written HEP  2. Increase knee ROM to 0* - 105*  3. Decrease soft tissue tenderness to moderate     Long Term Goals: 4 weeks   1. Increase knee flexion to 120*  2. Increase knee strength to 5/5  3. The patient will ambulate on a community  level without  AD .  4. Decrease LEFS impairment to < 40%.       Plan     D/C per PT    Nichole Méndez, PTA

## 2019-12-10 ENCOUNTER — OFFICE VISIT (OUTPATIENT)
Dept: ORTHOPEDICS | Facility: CLINIC | Age: 59
End: 2019-12-10
Payer: MEDICAID

## 2019-12-10 VITALS
BODY MASS INDEX: 24.33 KG/M2 | HEIGHT: 67 IN | WEIGHT: 155 LBS | SYSTOLIC BLOOD PRESSURE: 116 MMHG | DIASTOLIC BLOOD PRESSURE: 64 MMHG | HEART RATE: 57 BPM

## 2019-12-10 DIAGNOSIS — M17.11 PRIMARY OSTEOARTHRITIS OF RIGHT KNEE: ICD-10-CM

## 2019-12-10 DIAGNOSIS — M75.102 NONTRAUMATIC TEAR OF LEFT ROTATOR CUFF, UNSPECIFIED TEAR EXTENT: ICD-10-CM

## 2019-12-10 DIAGNOSIS — M75.42 SUBACROMIAL IMPINGEMENT, LEFT: ICD-10-CM

## 2019-12-10 DIAGNOSIS — Z96.652 HISTORY OF TOTAL KNEE REPLACEMENT, LEFT: Primary | ICD-10-CM

## 2019-12-10 PROCEDURE — 99214 OFFICE O/P EST MOD 30 MIN: CPT | Mod: 25,S$GLB,, | Performed by: ORTHOPAEDIC SURGERY

## 2019-12-10 PROCEDURE — 99214 PR OFFICE/OUTPT VISIT, EST, LEVL IV, 30-39 MIN: ICD-10-PCS | Mod: 25,S$GLB,, | Performed by: ORTHOPAEDIC SURGERY

## 2019-12-10 PROCEDURE — 20610 LARGE JOINT ASPIRATION/INJECTION: R KNEE: ICD-10-PCS | Mod: RT,S$GLB,, | Performed by: ORTHOPAEDIC SURGERY

## 2019-12-10 PROCEDURE — 20610 DRAIN/INJ JOINT/BURSA W/O US: CPT | Mod: RT,S$GLB,, | Performed by: ORTHOPAEDIC SURGERY

## 2019-12-10 PROCEDURE — 20610 DRAIN/INJ JOINT/BURSA W/O US: CPT | Mod: 59,LT,S$GLB, | Performed by: ORTHOPAEDIC SURGERY

## 2019-12-10 RX ORDER — METHYLPREDNISOLONE ACETATE 40 MG/ML
40 INJECTION, SUSPENSION INTRA-ARTICULAR; INTRALESIONAL; INTRAMUSCULAR; SOFT TISSUE
Status: DISCONTINUED | OUTPATIENT
Start: 2019-12-10 | End: 2019-12-10 | Stop reason: HOSPADM

## 2019-12-10 RX ORDER — ASPIRIN 81 MG/1
81 TABLET ORAL DAILY
Status: ON HOLD | COMMUNITY
End: 2023-12-11 | Stop reason: HOSPADM

## 2019-12-10 RX ORDER — VARENICLINE TARTRATE 1 MG/1
TABLET, FILM COATED ORAL
Refills: 0 | COMMUNITY
Start: 2019-12-05 | End: 2020-03-05

## 2019-12-10 RX ADMIN — METHYLPREDNISOLONE ACETATE 40 MG: 40 INJECTION, SUSPENSION INTRA-ARTICULAR; INTRALESIONAL; INTRAMUSCULAR; SOFT TISSUE at 01:12

## 2019-12-10 NOTE — PROCEDURES
Large Joint Aspiration/Injection: R knee  Date/Time: 12/10/2019 1:15 PM  Performed by: Eliseo Vaca MD  Authorized by: Eliseo Vaca MD     Consent Done?:  Yes (Verbal)  Indications:  Pain  Procedure site marked: Yes    Timeout: Prior to procedure the correct patient, procedure, and site was verified    Anesthesia  Local anesthesia used  Anesthetic: lidocaine 1% without epinephrine    Location:  Knee  Site:  R knee  Prep: Patient was prepped and draped in usual sterile fashion    Needle size:  25 G  Medications:  40 mg methylPREDNISolone acetate 40 mg/mL; 40 mg methylPREDNISolone acetate 40 mg/mL  Patient tolerance:  Patient tolerated the procedure well with no immediate complications  Large Joint Aspiration/Injection: L subacromial bursa  Date/Time: 12/10/2019 1:15 PM  Performed by: Eliseo Vaca MD  Authorized by: Eliseo Vaca MD     Consent Done?:  Yes (Verbal)  Indications:  Pain  Procedure site marked: Yes    Timeout: Prior to procedure the correct patient, procedure, and site was verified    Anesthesia  Local anesthesia used  Anesthetic: lidocaine 1% without epinephrine    Location:  Shoulder  Site:  L subacromial bursa  Prep: Patient was prepped and draped in usual sterile fashion    Needle size:  25 G  Medications:  40 mg methylPREDNISolone acetate 40 mg/mL; 40 mg methylPREDNISolone acetate 40 mg/mL  Patient tolerance:  Patient tolerated the procedure well with no immediate complications

## 2019-12-10 NOTE — PROGRESS NOTES
[facility]    Subjective:     Chief Complaint:   Chief Complaint   Patient presents with    Left Knee - Pain     Left TKA 8.19.19. States that her knee is bothering her today.     Left Shoulder - Pain     Left shoulder pain x few  Months. States that she has good days and bad days and bothers her to raise her hand above her head.        Past Medical History:   Diagnosis Date    Anxiety     Bipolar affect, depressed     Cancer ovarian; uterine    1992    COPD (chronic obstructive pulmonary disease)     GERD (gastroesophageal reflux disease)     Hyperlipidemia     Hypertension     OAB (overactive bladder)        Past Surgical History:   Procedure Laterality Date    COLON SURGERY      COLONOSCOPY      HERNIA REPAIR N/A 2016    HIP ARTHROPLASTY Right 2/25/2019    Procedure: ARTHROPLASTY, HIP;  Surgeon: Eliseo Vaca MD;  Location: [facility] OR;  Service: Orthopedics;  Laterality: Right;  Daniel Herrera    HYSTERECTOMY  total    JOINT REPLACEMENT Right     hip replacemnt    KNEE ARTHROPLASTY Left 8/19/2019    Procedure: ARTHROPLASTY, KNEE;  Surgeon: Eliseo Vaca MD;  Location: [facility] OR;  Service: Orthopedics;  Laterality: Left;    REVISION COLOSTOMY N/A 2012       Current Outpatient Medications   Medication Sig    ARIPiprazole (ABILIFY) 15 MG Tab Take 1 tablet (15 mg) by mouth daily    aspirin (ECOTRIN) 81 MG EC tablet Take 81 mg by mouth once daily.    CHANTIX 1 mg Tab     clonazePAM (KLONOPIN) 1 MG tablet 1 tablet daily as needed for anxiery    DULoxetine (CYMBALTA) 60 MG capsule Take 60 mg by mouth once daily.    fluticasone (FLONASE) 50 mcg/actuation nasal spray 2 sprays (100 mcg total) by Each Nare route once daily.    gabapentin (NEURONTIN) 600 MG tablet Take 1 tablet (600 mg) by mouth 3 times per day    lisinopril (PRINIVIL,ZESTRIL) 20 MG tablet Take 1 tablet (20 mg) by mouth daily    ondansetron (ZOFRAN) 4 MG tablet Take 2 tablets (8 mg total) by mouth 2 (two) times daily as needed  for Nausea.    oxybutynin (DITROPAN) 5 MG Tab Take 1 tablet (5 mg) by mouth 2 times per day    pantoprazole (PROTONIX) 40 MG tablet Take 1 tablet (40 mg) by mouth daily    pravastatin (PRAVACHOL) 20 MG tablet Take 1 tablet (20 mg) by mouth daily    SEREVENT DISKUS 50 mcg/dose diskus inhaler INHALE 1 PUFF EVERY TWELVE HOURS. RINSE MOUTH AFTER USING    tiotropium Br/olodaterol HCl (STIOLTO RESPIMAT INHL) Inhale into the lungs.    traZODone (DESYREL) 100 MG tablet Take 1 tablet (100 mg) by mouth daily as needed at bedtime    VENTOLIN HFA 90 mcg/actuation inhaler 2 puffs every 4 (four) hours as needed.    ALBUTEROL INHL Inhale into the lungs 4 (four) times daily as needed.     amLODIPine (NORVASC) 10 MG tablet Take 1 tablet (10 mg) by mouth daily    atenolol (TENORMIN) 50 MG tablet Take 1 tablet (50 mg) by mouth daily    cetirizine (ZYRTEC) 10 MG tablet Take 1 tablet (10 mg total) by mouth once daily.    docusate sodium (COLACE) 100 MG capsule Take 1 capsule (100 mg total) by mouth 2 (two) times daily. (Patient not taking: Reported on 12/10/2019)    nicotine (NICODERM CQ) 21 mg/24 hr Place 1 patch onto the skin once daily. (Patient not taking: Reported on 12/10/2019)     No current facility-administered medications for this visit.        Review of patient's allergies indicates:   Allergen Reactions    Adhesive Rash and Blisters       Family History   Problem Relation Age of Onset    Cancer Mother     Hypertension Father     Heart disease Father     Heart disease Sister     Hypertension Sister     Hypertension Brother     Depression Brother        Social History     Socioeconomic History    Marital status:      Spouse name: Not on file    Number of children: Not on file    Years of education: Not on file    Highest education level: Not on file   Occupational History    Not on file   Social Needs    Financial resource strain: Not on file    Food insecurity:     Worry: Not on file      Inability: Not on file    Transportation needs:     Medical: Not on file     Non-medical: Not on file   Tobacco Use    Smoking status: Current Every Day Smoker     Packs/day: 0.50     Years: 40.00     Pack years: 20.00     Types: Cigarettes    Smokeless tobacco: Never Used   Substance and Sexual Activity    Alcohol use: Yes     Alcohol/week: 1.0 standard drinks     Types: 1 Glasses of wine per week     Comment: weekly    Drug use: No    Sexual activity: Not on file   Lifestyle    Physical activity:     Days per week: Not on file     Minutes per session: Not on file    Stress: Not on file   Relationships    Social connections:     Talks on phone: Not on file     Gets together: Not on file     Attends Hindu service: Not on file     Active member of club or organization: Not on file     Attends meetings of clubs or organizations: Not on file     Relationship status: Not on file   Other Topics Concern    Not on file   Social History Narrative    Not on file       History of present illness:    Patient comes in today for the left knee, the right knee, and the left shoulder.  Her left knee is doing very well.  Really not having any issues.  Her right knee aches and bothers her primarily on the medial aspect.  The left shoulder has been bothering her for a couple of months.  She is having difficulty with overhead activity.  Difficulty sleeping at night.      Review of Systems:    Constitution: Negative for chills, fever, and sweats.  Negative for unexplained weight loss.    HENT:  Negative for headaches and blurry vision.    Cardiovascular:Negative for chest pain or irregular heart beat. Negative for hypertension.    Respiratory:  Negative for cough and shortness of breath.    Gastrointestinal: Negative for abdominal pain, heartburn, melena, nausea, and vomitting.    Genitourinary:  Negative bladder incontinence and dysuria.    Musculoskeletal:  See HPI for details.     Neurological: Negative for  numbness.    Psychiatric/Behavioral: Negative for depression.  The patient is not nervous/anxious.      Endocrine: Negative for polyuria    Hematologic/Lymphatic: Negative for bleeding problem.  Does not bruise/bleed easily.    Skin: Negative for poor would healing and rash    Objective:      Physical Examination:    Vital Signs:    Vitals:    12/10/19 1248   BP: 116/64   Pulse: (!) 57       Body mass index is 24.28 kg/m².    This a well-developed, well nourished patient in no acute distress.  They are alert and oriented and cooperative to examination.        Patient has range of motion of the right knee 0-120 degrees.  Symmetric range of motion of the left knee.  Well-healed incision on the left.  Her wounds are clean dry and intact. Her calf is soft.  Her calf is soft on the right.  She has medial joint line tenderness. She has 1+ effusion.  She has crepitus.  Full range of motion of the right and left shoulder.  Positive impingement on the left side.  Her rotator cuff is weak on the left side.  Spurling sign is negative.  Full range of motion of the cervical spine.  Pertinent New Results:    XRAY Report / Interpretation:   AP lateral and sunrise views of the right knee demonstrate mild arthritis of the right knee with some mild loss of the medial compartment.    AP and lateral of the left shoulder demonstrates a type 2 acromion no fracture subluxations or dislocations    Assessment/Plan:      Impingement syndrome, rotator cuff tear left shoulder.  I injected the left subacromial space with Depo-Medrol and lidocaine.  In terms of the right knee she has arthritis of the right knee.  I injected the right knee with Depo-Medrol and lidocaine.  No intervention for the left total knee which is doing very well        This note was created using Dragon voice recognition software that occasionally misinterpreted phrases or words.

## 2020-03-05 ENCOUNTER — OFFICE VISIT (OUTPATIENT)
Dept: ORTHOPEDICS | Facility: CLINIC | Age: 60
End: 2020-03-05
Payer: MEDICAID

## 2020-03-05 VITALS — BODY MASS INDEX: 24.28 KG/M2 | SYSTOLIC BLOOD PRESSURE: 104 MMHG | DIASTOLIC BLOOD PRESSURE: 60 MMHG | WEIGHT: 155 LBS

## 2020-03-05 DIAGNOSIS — M75.42 SUBACROMIAL IMPINGEMENT, LEFT: Primary | ICD-10-CM

## 2020-03-05 DIAGNOSIS — M75.102 NONTRAUMATIC TEAR OF LEFT ROTATOR CUFF, UNSPECIFIED TEAR EXTENT: ICD-10-CM

## 2020-03-05 PROCEDURE — 99213 OFFICE O/P EST LOW 20 MIN: CPT | Mod: S$GLB,,, | Performed by: ORTHOPAEDIC SURGERY

## 2020-03-05 PROCEDURE — 99213 PR OFFICE/OUTPT VISIT, EST, LEVL III, 20-29 MIN: ICD-10-PCS | Mod: S$GLB,,, | Performed by: ORTHOPAEDIC SURGERY

## 2020-03-05 RX ORDER — DONEPEZIL HYDROCHLORIDE 5 MG/1
TABLET, FILM COATED ORAL
COMMUNITY
Start: 2020-02-13 | End: 2021-06-17

## 2020-03-05 NOTE — PROGRESS NOTES
[facility]    Subjective:     Chief Complaint:   Chief Complaint   Patient presents with    Left Shoulder - Pain     Left Shoulder Pain ( Injection done 12.10.19) Injection helped       Past Medical History:   Diagnosis Date    Anxiety     Bipolar affect, depressed     Cancer ovarian; uterine    1992    COPD (chronic obstructive pulmonary disease)     GERD (gastroesophageal reflux disease)     Hyperlipidemia     Hypertension     OAB (overactive bladder)        Past Surgical History:   Procedure Laterality Date    COLON SURGERY      COLONOSCOPY      HERNIA REPAIR N/A 2016    HIP ARTHROPLASTY Right 2/25/2019    Procedure: ARTHROPLASTY, HIP;  Surgeon: Eliseo Vaca MD;  Location: [facility] OR;  Service: Orthopedics;  Laterality: Right;  Daniel Herrera    HYSTERECTOMY  total    JOINT REPLACEMENT Right     hip replacemnt    KNEE ARTHROPLASTY Left 8/19/2019    Procedure: ARTHROPLASTY, KNEE;  Surgeon: Eliseo Vaca MD;  Location: [facility] OR;  Service: Orthopedics;  Laterality: Left;    REVISION COLOSTOMY N/A 2012       Current Outpatient Medications   Medication Sig    ALBUTEROL INHL Inhale into the lungs 4 (four) times daily as needed.     ARIPiprazole (ABILIFY) 15 MG Tab Take 1 tablet (15 mg) by mouth daily    aspirin (ECOTRIN) 81 MG EC tablet Take 81 mg by mouth once daily.    atenolol (TENORMIN) 50 MG tablet Take 1 tablet (50 mg) by mouth daily    clonazePAM (KLONOPIN) 1 MG tablet 1 tablet daily as needed for anxiery    donepezil (ARICEPT) 5 MG tablet     DULoxetine (CYMBALTA) 60 MG capsule Take 60 mg by mouth once daily.    fluticasone (FLONASE) 50 mcg/actuation nasal spray 2 sprays (100 mcg total) by Each Nare route once daily.    gabapentin (NEURONTIN) 600 MG tablet Take 1 tablet (600 mg) by mouth 3 times per day    lisinopril (PRINIVIL,ZESTRIL) 20 MG tablet Take 1 tablet (20 mg) by mouth daily    ondansetron (ZOFRAN) 4 MG tablet Take 2 tablets (8 mg total) by mouth 2 (two) times daily  as needed for Nausea.    oxybutynin (DITROPAN) 5 MG Tab Take 1 tablet (5 mg) by mouth 2 times per day    pantoprazole (PROTONIX) 40 MG tablet Take 1 tablet (40 mg) by mouth daily    pravastatin (PRAVACHOL) 20 MG tablet Take 1 tablet (20 mg) by mouth daily    SEREVENT DISKUS 50 mcg/dose diskus inhaler INHALE 1 PUFF EVERY TWELVE HOURS. RINSE MOUTH AFTER USING    tiotropium Br/olodaterol HCl (STIOLTO RESPIMAT INHL) Inhale into the lungs.    traZODone (DESYREL) 100 MG tablet Take 1 tablet (100 mg) by mouth daily as needed at bedtime    VENTOLIN HFA 90 mcg/actuation inhaler 2 puffs every 4 (four) hours as needed.    cetirizine (ZYRTEC) 10 MG tablet Take 1 tablet (10 mg total) by mouth once daily.     No current facility-administered medications for this visit.        Review of patient's allergies indicates:   Allergen Reactions    Adhesive Rash and Blisters       Family History   Problem Relation Age of Onset    Cancer Mother     Hypertension Father     Heart disease Father     Heart disease Sister     Hypertension Sister     Hypertension Brother     Depression Brother        Social History     Socioeconomic History    Marital status:      Spouse name: Not on file    Number of children: Not on file    Years of education: Not on file    Highest education level: Not on file   Occupational History    Not on file   Social Needs    Financial resource strain: Not on file    Food insecurity:     Worry: Not on file     Inability: Not on file    Transportation needs:     Medical: Not on file     Non-medical: Not on file   Tobacco Use    Smoking status: Current Every Day Smoker     Packs/day: 0.50     Years: 40.00     Pack years: 20.00     Types: Cigarettes    Smokeless tobacco: Never Used   Substance and Sexual Activity    Alcohol use: Yes     Alcohol/week: 1.0 standard drinks     Types: 1 Glasses of wine per week     Comment: weekly    Drug use: No    Sexual activity: Not on file   Lifestyle     Physical activity:     Days per week: Not on file     Minutes per session: Not on file    Stress: Not on file   Relationships    Social connections:     Talks on phone: Not on file     Gets together: Not on file     Attends Anabaptism service: Not on file     Active member of club or organization: Not on file     Attends meetings of clubs or organizations: Not on file     Relationship status: Not on file   Other Topics Concern    Not on file   Social History Narrative    Not on file       History of present illness:  Patient returns for the left shoulder.  Unfortunately she continues to have left shoulder pain. The Depo-Medrol injection work for small amount of time but the pain has returned.  She is having difficulty sleeping and difficulty with overhead activity.      Review of Systems:    Constitution: Negative for chills, fever, and sweats.  Negative for unexplained weight loss.    HENT:  Negative for headaches and blurry vision.    Cardiovascular:Negative for chest pain or irregular heart beat. Negative for hypertension.    Respiratory:  Negative for cough and shortness of breath.    Gastrointestinal: Negative for abdominal pain, heartburn, melena, nausea, and vomitting.    Genitourinary:  Negative bladder incontinence and dysuria.    Musculoskeletal:  See HPI for details.     Neurological: Negative for numbness.    Psychiatric/Behavioral: Negative for depression.  The patient is not nervous/anxious.      Endocrine: Negative for polyuria    Hematologic/Lymphatic: Negative for bleeding problem.  Does not bruise/bleed easily.    Skin: Negative for poor would healing and rash    Objective:      Physical Examination:    Vital Signs:    Vitals:    03/05/20 1338   BP: 104/60       Body mass index is 24.28 kg/m².    This a well-developed, well nourished patient in no acute distress.  They are alert and oriented and cooperative to examination.        Patient has full range of motion in left shoulder.  Positive  impingement.  Positive crossover her rotator cuff is much weaker than the right shoulder Spurling sign is negative  Pertinent New Results:    XRAY Report / Interpretation:       Assessment/Plan:      Rotator cuff tear.  I have ordered a left shoulder MRI to better look at the rotator cuff.  I will see her back with that information      This note was created using Dragon voice recognition software that occasionally misinterpreted phrases or words.

## 2020-06-18 ENCOUNTER — HOSPITAL ENCOUNTER (EMERGENCY)
Facility: HOSPITAL | Age: 60
Discharge: HOME | End: 2020-06-18
Attending: EMERGENCY MEDICINE
Payer: MEDICAID

## 2020-06-18 VITALS
WEIGHT: 154 LBS | RESPIRATION RATE: 20 BRPM | DIASTOLIC BLOOD PRESSURE: 69 MMHG | HEIGHT: 67 IN | BODY MASS INDEX: 24.17 KG/M2 | SYSTOLIC BLOOD PRESSURE: 149 MMHG | OXYGEN SATURATION: 97 % | HEART RATE: 56 BPM | TEMPERATURE: 99 F

## 2020-06-18 DIAGNOSIS — K43.9 ABDOMINAL WALL HERNIA: Primary | ICD-10-CM

## 2020-06-18 LAB
ALBUMIN SERPL BCP-MCNC: 4.2 G/DL (ref 3.5–5.2)
ALP SERPL-CCNC: 88 U/L (ref 55–135)
ALT SERPL W/O P-5'-P-CCNC: 15 U/L (ref 10–44)
ANION GAP SERPL CALC-SCNC: 7 MMOL/L (ref 8–16)
AST SERPL-CCNC: 19 U/L (ref 10–40)
BASOPHILS # BLD AUTO: 0.04 K/UL (ref 0–0.2)
BASOPHILS NFR BLD: 0.4 % (ref 0–1.9)
BILIRUB SERPL-MCNC: 0.4 MG/DL (ref 0.1–1)
BUN SERPL-MCNC: 13 MG/DL (ref 6–20)
CALCIUM SERPL-MCNC: 9 MG/DL (ref 8.7–10.5)
CHLORIDE SERPL-SCNC: 107 MMOL/L (ref 95–110)
CO2 SERPL-SCNC: 23 MMOL/L (ref 23–29)
CREAT SERPL-MCNC: 0.9 MG/DL (ref 0.5–1.4)
DIFFERENTIAL METHOD: ABNORMAL
EOSINOPHIL # BLD AUTO: 0 K/UL (ref 0–0.5)
EOSINOPHIL NFR BLD: 0.1 % (ref 0–8)
ERYTHROCYTE [DISTWIDTH] IN BLOOD BY AUTOMATED COUNT: 13.2 % (ref 11.5–14.5)
EST. GFR  (AFRICAN AMERICAN): >60 ML/MIN/1.73 M^2
EST. GFR  (NON AFRICAN AMERICAN): >60 ML/MIN/1.73 M^2
GLUCOSE SERPL-MCNC: 115 MG/DL (ref 70–110)
HCT VFR BLD AUTO: 44.1 % (ref 37–48.5)
HGB BLD-MCNC: 14.5 G/DL (ref 12–16)
IMM GRANULOCYTES # BLD AUTO: 0.03 K/UL (ref 0–0.04)
IMM GRANULOCYTES NFR BLD AUTO: 0.3 % (ref 0–0.5)
LIPASE SERPL-CCNC: 33 U/L (ref 4–60)
LYMPHOCYTES # BLD AUTO: 1.6 K/UL (ref 1–4.8)
LYMPHOCYTES NFR BLD: 16.5 % (ref 18–48)
MCH RBC QN AUTO: 30.8 PG (ref 27–31)
MCHC RBC AUTO-ENTMCNC: 32.9 G/DL (ref 32–36)
MCV RBC AUTO: 94 FL (ref 82–98)
MONOCYTES # BLD AUTO: 0.9 K/UL (ref 0.3–1)
MONOCYTES NFR BLD: 8.7 % (ref 4–15)
NEUTROPHILS # BLD AUTO: 7.3 K/UL (ref 1.8–7.7)
NEUTROPHILS NFR BLD: 74 % (ref 38–73)
NRBC BLD-RTO: 0 /100 WBC
PLATELET # BLD AUTO: 195 K/UL (ref 150–350)
PMV BLD AUTO: 10.3 FL (ref 9.2–12.9)
POTASSIUM SERPL-SCNC: 4.2 MMOL/L (ref 3.5–5.1)
PROT SERPL-MCNC: 7.9 G/DL (ref 6–8.4)
RBC # BLD AUTO: 4.71 M/UL (ref 4–5.4)
SODIUM SERPL-SCNC: 137 MMOL/L (ref 136–145)
WBC # BLD AUTO: 9.84 K/UL (ref 3.9–12.7)

## 2020-06-18 PROCEDURE — 36415 COLL VENOUS BLD VENIPUNCTURE: CPT

## 2020-06-18 PROCEDURE — 80053 COMPREHEN METABOLIC PANEL: CPT

## 2020-06-18 PROCEDURE — 25500020 PHARM REV CODE 255

## 2020-06-18 PROCEDURE — 99285 EMERGENCY DEPT VISIT HI MDM: CPT | Mod: 25

## 2020-06-18 PROCEDURE — 85025 COMPLETE CBC W/AUTO DIFF WBC: CPT

## 2020-06-18 PROCEDURE — 83690 ASSAY OF LIPASE: CPT

## 2020-06-18 RX ORDER — HYDROCODONE BITARTRATE AND ACETAMINOPHEN 5; 325 MG/1; MG/1
1 TABLET ORAL EVERY 6 HOURS PRN
Qty: 12 TABLET | Refills: 0 | Status: SHIPPED | OUTPATIENT
Start: 2020-06-18 | End: 2020-06-28

## 2020-06-18 RX ADMIN — IOHEXOL 75 ML: 350 INJECTION, SOLUTION INTRAVENOUS at 02:06

## 2020-06-18 NOTE — ED PROVIDER NOTES
"Encounter Date: 6/18/2020    SCRIBE #1 NOTE: [place]ly THOMPSON, skinny scribing for, and in the presence of, Dr. Ashby.       History     Chief Complaint   Patient presents with    Abdominal Pain     epigastric, atraumatic - reports "hurts to eat"; Hx of GERD/ulcer in past    Fatigue     s/s x 3 days (in assoc with decreased appetite & intake)       Time seen by provider: 11:43 PM on 06/18/2020    Nimco Caro is a 59 y.o. female with PMHx of GERD, HTN, HLD, COPD, OAB and cancer who presents to the ED with an onset of upper abdominal pain for 2 days.  Patient states having decreased appetite and PO intake because the pain worsens with eating.  She specifies that this happened last week but has since worsened.   Patient confirms being a smoker and taking OTC medication (Protonix 2x daily). She reports having an appendectomy. She also confirms "getting hot and cold" in addition to having a cough.  Patient denies a fever, CP, back pain, vomiting, or nausea. The patient also denies taking any other OTC medications, seeing a GI doctor within the past year or any other symptoms at this time.  PSHx includes revision colostomy, hernia repair, and colon surgery.      The history is provided by the patient.     Review of patient's allergies indicates:   Allergen Reactions    Adhesive Rash and Blisters     Past Medical History:   Diagnosis Date    Anxiety     Bipolar affect, depressed     Cancer ovarian; uterine    1992    COPD (chronic obstructive pulmonary disease)     GERD (gastroesophageal reflux disease)     Hyperlipidemia     Hypertension     OAB (overactive bladder)      Past Surgical History:   Procedure Laterality Date    COLON SURGERY      COLONOSCOPY      HERNIA REPAIR N/A 2016    HIP ARTHROPLASTY Right 2/25/2019    Procedure: ARTHROPLASTY, HIP;  Surgeon: Eliseo Vaca MD;  Location: [facility];  Service: Orthopedics;  Laterality: Right;  Daniel Estevezon    HYSTERECTOMY  total    JOINT REPLACEMENT Right  "    hip replacemnt    KNEE ARTHROPLASTY Left 8/19/2019    Procedure: ARTHROPLASTY, KNEE;  Surgeon: Eliseo Vaca MD;  Location: [facility];  Service: Orthopedics;  Laterality: Left;    REVISION COLOSTOMY N/A 2012     Family History   Problem Relation Age of Onset    Cancer Mother     Hypertension Father     Heart disease Father     Heart disease Sister     Hypertension Sister     Hypertension Brother     Depression Brother      Social History     Tobacco Use    Smoking status: Current Every Day Smoker     Packs/day: 0.50     Years: 40.00     Pack years: 20.00     Types: Cigarettes    Smokeless tobacco: Never Used   Substance Use Topics    Alcohol use: Yes     Alcohol/week: 1.0 standard drinks     Types: 1 Glasses of wine per week     Comment: weekly    Drug use: No     Review of Systems   Constitutional: Positive for appetite change and chills. Negative for fever.   Respiratory: Positive for cough.    Cardiovascular: Negative for chest pain.   Gastrointestinal: Positive for abdominal pain. Negative for nausea and vomiting.   Genitourinary: Negative for dysuria.   Musculoskeletal: Negative for back pain.   All other systems reviewed and are negative.      Physical Exam     Initial Vitals [06/18/20 1129]   BP Pulse Resp Temp SpO2   (!) 149/69 (!) 56 20 99 °F (37.2 °C) 97 %      MAP       --         Physical Exam    Nursing note and vitals reviewed.  Constitutional: She appears well-developed and well-nourished. She is not diaphoretic. No distress.   No distress, pleasant well-appearing   HENT:   Head: Normocephalic and atraumatic.   Eyes: EOM are normal.   Neck: Normal range of motion. Neck supple.   Cardiovascular: Normal rate, regular rhythm and normal heart sounds. Exam reveals no gallop and no friction rub.    No murmur heard.  Pulmonary/Chest: Breath sounds normal. No respiratory distress. She has no wheezes. She has no rhonchi. She has no rales.   Abdominal: She exhibits no distension. There is  abdominal tenderness in the epigastric area. There is no rebound.       Large vertical midline scar.  Left lower abdominal horizontal scar.  Epigastric tenderness.  Mild  Mid abdominal tenderness.  Mild     Musculoskeletal: Normal range of motion.   Neurological: She is alert and oriented to person, place, and time.   Skin: Skin is warm and dry.   Psychiatric: She has a normal mood and affect. Her behavior is normal. Judgment and thought content normal.         ED Course   Procedures  Labs Reviewed   CBC W/ AUTO DIFFERENTIAL - Abnormal; Notable for the following components:       Result Value    Gran% 74.0 (*)     Lymph% 16.5 (*)     All other components within normal limits   COMPREHENSIVE METABOLIC PANEL - Abnormal; Notable for the following components:    Glucose 115 (*)     Anion Gap 7 (*)     All other components within normal limits   LIPASE          Imaging Results          CT Abdomen Pelvis With Contrast (Final result)  Result time 06/18/20 14:34:54    Final result by Fred Stevens Jr., MD (06/18/20 14:34:54)                 Impression:      There are 2 abdominal wall hernias.  In the upper midline there is a wide bulge containing a a bulge of the transverse colon without obstruction.  In the left lower quadrant a 2nd hernia contains multiple loops of small bowel without evidence of obstruction.  Diverticulosis coli is noted.  Prior right hip replacement.  Prior hysterectomy.  Several cysts of the left kidney are stable.      Electronically signed by: Fred Stevens MD  Date:    06/18/2020  Time:    14:34             Narrative:    EXAMINATION:  CT ABDOMEN PELVIS WITH CONTRAST    CLINICAL HISTORY:  Bowel obstruction high-grade suspected;    TECHNIQUE:  Low dose axial images, sagittal and coronal reformations were obtained from the lung bases to the pubic symphysis following the IV administration of 75 mL of Omnipaque 350 and 1 L omni 9 oral contrast.    COMPARISON:  CT of January 5,  2017.    FINDINGS:  The liver is of normal size contour and CT density without focal defect.  The gallbladder is of normal size without CT evidence of stone.  The pancreas is of normal contour and CT density without edema or mass.  The spleen is of normal size and CT density.    The adrenal glands are not enlarged.  The kidneys are of normal size contour and contrast enhancement.  There are 4 cyst of the left kidney unchanged in size from the prior study.  A stone or hydronephrosis is not seen.  There is heavy atherosclerotic calcification in the abdominal aorta and extending into the pelvic vessels.  Retroperitoneal adenopathy is not noted.    The patient is seen to have a ventral hernia in the upper abdomen with a bulge of large colon.  This is relatively wide however.  There is a 2nd left lower quadrant abdominal wall hernia containing multiple loops of small bowel.  The stomach is of normal configuration.  On this study bowel obstruction with dilation or air-fluid levels is not seen.  Obstruction of the colon is not noted.  There is diverticulosis of the sigmoid colon without CT evidence of diverticulitis.  Free fluid or free air in the pelvis is not seen.    The bladder is of normal contour without mass or asymmetry.  A uterus is not seen.  The patient has had a right hip replacement with metallic artifact through the lower pelvis.                                 Medical Decision Making:   History:   Old Medical Records: I decided to obtain old medical records.  Clinical Tests:   Lab Tests: Ordered and Reviewed  Radiological Study: Ordered and Reviewed            Scribe Attestation:   Scribe #1: I performed the above scribed service and the documentation accurately describes the services I performed. I attest to the accuracy of the note.    I, Dr. Ashby, personally performed the services described in this documentation. All medical record entries made by the scribe were at my direction and in my presence.  I  have reviewed the chart and agree that the record reflects my personal performance and is accurate and complete.3:12 PM 06/18/2020            ED Course as of Jun 18 1510   Thu Jun 18, 2020   1135 SpO2: 97 % [EF]   1135 Resp: 20 [EF]   1135 Pulse(!): 56 [EF]   1135 Temp src: Oral [EF]   1135 Temp: 99 °F (37.2 °C) [EF]   1135 BP(!): 149/69 [EF]   1306 MetabolicPanel is normal    [EF]   1307 CBC is also normal    [EF]   1439 CT Abdomen Pelvis With Contrast [EF]   1506 59-year-old female previous history of abdominal hernias presents to the ER with some epigastric discomfort worsened with eating.  Decreased appetite but no nausea no vomiting.  No chest pain no shortness of breath.  No diaphoresis.  CT demonstrates 2 separate abdominal hernias.  One is in the left lower quadrant 1 is an upper ventral hernia.  The patient is mildly tender here but there is no strangulation or obstruction.  Patient is already on a PPI twice a day.  She will be discharged home with a small amount of pain medication, also advised to take Tums or Maalox over-the-counter.  She will be referred to primary care General surgery and GI since she does not have any established providers here in town.  Return to the ER if symptoms worsen or change.  I do not suspect bowel ischemia or angina.    [EF]      ED Course User Index  [EF] Joel Ashby MD                Clinical Impression:       ICD-10-CM ICD-9-CM   1. Abdominal wall hernia  K43.9 553.20         Disposition:   Disposition: Discharged  Condition: Stable     ED Disposition Condition    Discharge Stable        ED Prescriptions     Medication Sig Dispense Start Date End Date Auth. Provider    HYDROcodone-acetaminophen (NORCO) 5-325 mg per tablet Take 1 tablet by mouth every 6 (six) hours as needed. 12 tablet 6/18/2020 6/28/2020 Joel Ashby MD        Follow-up Information     Follow up With Specialties Details Why Contact Info    [facility] Emergency Medicine  As  needed, If symptoms worsen [address] [place] [id number]  [phone]    [facility]  Schedule an appointment as soon as possible for a visit   [address] [facility] [address]  [phone]      Christos Cortes MD General Surgery, Bariatrics, Surgery Schedule an appointment as soon as possible for a visit   [address] [facility]  [address]  [facility] [address]  [phone]      Nadia Bo MD Gastroenterology, Internal Medicine Schedule an appointment as soon as possible for a visit  GI [address] [facility]  [address]  [facility] [address]  [phone]                                       Joel Ashby MD  06/18/20 4562

## 2020-07-07 DIAGNOSIS — Z12.31 ENCOUNTER FOR SCREENING MAMMOGRAM FOR MALIGNANT NEOPLASM OF BREAST: Primary | ICD-10-CM

## 2020-07-21 ENCOUNTER — HOSPITAL ENCOUNTER (OUTPATIENT)
Dept: RADIOLOGY | Facility: HOSPITAL | Age: 60
Discharge: HOME | End: 2020-07-21
Attending: FAMILY MEDICINE
Payer: MEDICAID

## 2020-07-21 DIAGNOSIS — Z12.31 ENCOUNTER FOR SCREENING MAMMOGRAM FOR MALIGNANT NEOPLASM OF BREAST: ICD-10-CM

## 2020-07-21 PROCEDURE — 77067 SCR MAMMO BI INCL CAD: CPT | Mod: TC,PO

## 2020-08-13 ENCOUNTER — OFFICE VISIT (OUTPATIENT)
Dept: URGENT CARE | Facility: CLINIC | Age: 60
End: 2020-08-13
Payer: MEDICAID

## 2020-08-13 VITALS
TEMPERATURE: 98 F | DIASTOLIC BLOOD PRESSURE: 61 MMHG | SYSTOLIC BLOOD PRESSURE: 111 MMHG | BODY MASS INDEX: 26.68 KG/M2 | WEIGHT: 170 LBS | OXYGEN SATURATION: 95 % | HEART RATE: 60 BPM | RESPIRATION RATE: 16 BRPM | HEIGHT: 67 IN

## 2020-08-13 DIAGNOSIS — S63.502A LEFT WRIST SPRAIN, INITIAL ENCOUNTER: Primary | ICD-10-CM

## 2020-08-13 PROCEDURE — 99214 OFFICE O/P EST MOD 30 MIN: CPT | Mod: S$GLB,,, | Performed by: NURSE PRACTITIONER

## 2020-08-13 PROCEDURE — 99214 PR OFFICE/OUTPT VISIT, EST, LEVL IV, 30-39 MIN: ICD-10-PCS | Mod: S$GLB,,, | Performed by: NURSE PRACTITIONER

## 2020-08-13 PROCEDURE — 73110 PR  X-RAY WRIST 3+ VW: ICD-10-PCS | Mod: LT,S$GLB,, | Performed by: NURSE PRACTITIONER

## 2020-08-13 PROCEDURE — 73110 X-RAY EXAM OF WRIST: CPT | Mod: LT,S$GLB,, | Performed by: NURSE PRACTITIONER

## 2020-08-13 RX ORDER — NAPROXEN 500 MG/1
500 TABLET ORAL 2 TIMES DAILY
Qty: 60 TABLET | Refills: 0 | Status: SHIPPED | OUTPATIENT
Start: 2020-08-14 | End: 2021-06-18

## 2020-08-13 RX ORDER — KETOROLAC TROMETHAMINE 30 MG/ML
30 INJECTION, SOLUTION INTRAMUSCULAR; INTRAVENOUS
Status: COMPLETED | OUTPATIENT
Start: 2020-08-13 | End: 2020-08-13

## 2020-08-13 RX ORDER — PREDNISONE 20 MG/1
40 TABLET ORAL DAILY
Qty: 6 TABLET | Refills: 0 | Status: SHIPPED | OUTPATIENT
Start: 2020-08-13 | End: 2020-08-16

## 2020-08-13 RX ADMIN — KETOROLAC TROMETHAMINE 30 MG: 30 INJECTION, SOLUTION INTRAMUSCULAR; INTRAVENOUS at 03:08

## 2020-08-13 NOTE — PATIENT INSTRUCTIONS
R.I.C.E.    R.I.C.E. stands for Rest, Ice, Compression, and Elevation. Doing these things helps limit pain and swelling after an injury. R.I.C.E. also helps injuries heal faster. Use R.I.C.E. for sprains, strains, and severe bruises or bumps. Follow the tips on this handout and begin R.I.C.E. as soon as possible after an injury.  ? Rest  Pain is your bodys way of telling you to rest an injured area. Whether you have hurt an elbow, hand, foot, or knee, limiting its use will prevent further injury and help you heal.  ? Ice  Applying ice right after an injury helps prevent swelling and reduce pain. Dont place ice directly on your skin.  · Wrap a cold pack or bag of ice in a thin cloth. Place it over the injured area.  · Ice for 10 minutes every 3 hours. Dont ice for more than 20 minutes at a time.  ? Compression  Putting pressure (compression) on an injury helps prevent swelling and provides support.  · Wrap the injured area firmly with an elastic bandage. If your hand or foot tingles, becomes discolored, or feels cold to the touch, the bandage may be too tight. Rewrap it more loosely.  · If your bandage becomes too loose, rewrap it.  · Do not wear an elastic bandage overnight.  ? Elevation  Keeping an injury elevated helps reduce swelling, pain, and throbbing. Elevation is most effective when the injury is kept elevated higher than the heart.     Call your healthcare provider if you notice any of the following:  · Fingers or toes feel numb, are cold to the touch, or change color  · Skin looks shiny or tight  · Pain, swelling, or bruising worsens and is not improved with elevation   Date Last Reviewed: 9/3/2015  © 2922-0976 [employer]. [address] [place] [address], [place] [address]. All rights reserved. This information is not intended as a substitute for professional medical care. Always follow your healthcare professional's instructions.        RICE     Rest an injury, elevate it, and use ice and  compression as directed.   RICE stands for rest, ice, compression, and elevation. These can limit pain and swelling after an injury. RICE may be recommended to help treat fractures, sprains, strains, and bruises or bumps.   Home care  The following explain the details of RICE:  · Rest. Limit the use of the injured body part. This helps prevent further damage to the body part and gives it time to heal. In some cases, you may need a sling, brace, splint, or cast to help keep the body part still until it has healed.  · Ice. Applying ice right after an injury helps relieve pain and swelling. Wrap a bag of ice in a thin towel. Then, place it over the injured area. Do this for 10 to 15 minutes every 3 to 4 hours. Continue for the next 1 to 3 days or until your symptoms improve. Never put ice directly on your skin or ice an area longer than 15 minutes at a time.  · Compression. Putting pressure on an injury helps reduce swelling and provides support. Wrap the injured area firmly with an elastic bandage/wrap. Make sure not to wrap the bandage too tightly or you will cut off blood flow to the injured area. If your bandage loosens, rewrap it.  · Elevation. Keeping an injury raised above the level of your heart reduces swelling, pain, and throbbing. For instance, if you have a broken leg, it may help to rest your leg on several pillows when sitting or lying down. Try to keep the injured area elevated for at least 2 to 3 hours per day.  Follow-up care  Follow up with your healthcare provider, or as advised.  When to seek medical advice  Call your healthcare provider right away if any of these occur:  · Fever of 100.4°F (38°C) or higher, or as directed by your healthcare provider  · Increased pain or swelling in the injured body part  · Injured body part becomes cold, blue, numb, or tingly  · Signs of infection. These include warmth in the skin, redness, drainage, or bad smell coming from the injured body part.  Date Last  Reviewed: 1/18/2016  © 9223-2421 [employer]. [address] [place] [address], [place] [address]. All rights reserved. This information is not intended as a substitute for professional medical care. Always follow your healthcare professional's instructions.

## 2020-08-13 NOTE — PROGRESS NOTES
"Subjective:       Patient ID: Nimco Caro is a 59 y.o. female.    Vitals:  height is 5' 7" (1.702 m) and weight is 77.1 kg (170 lb). Her oral temperature is 97.7 °F (36.5 °C). Her blood pressure is 111/61 and her pulse is 60. Her respiration is 16 and oxygen saturation is 95%.     Chief Complaint: Hand Injury    Patient presents today with complaints of left wrist pain. Patient reports that she fell earlier today. She fell backward and caught herself with her left hand. She did not trip. She reports that she "just fell". She did check her BP earlier prior to the fall and it was 80s/60s. She reports having BPs that range 100-120/ 60-80. She does take medications for htn. She does not recall feeling lightheaded or "passing out" She did not hit her head and does not complain of any other injury or pain.     Hand Injury   Her dominant hand is their left hand. The incident occurred 1 to 3 hours ago. The incident occurred at home. The injury mechanism was a fall. The pain is present in the left hand. The quality of the pain is described as stabbing. The pain radiates to the left arm. The pain is at a severity of 9/10. The pain is severe. The pain has been constant since the incident. Pertinent negatives include no chest pain, muscle weakness, numbness or tingling. The symptoms are aggravated by lifting and movement. She has tried nothing for the symptoms.       Constitution: Negative for chills, sweating, fatigue, fever and generalized weakness.   HENT: Negative.    Cardiovascular: Negative for chest pain, palpitations and sob on exertion.   Respiratory: Negative.    Gastrointestinal: Negative.    Musculoskeletal: Positive for pain, trauma, joint pain and joint swelling.   Neurological: Negative for dizziness, light-headedness, passing out, speech difficulty, coordination disturbances, loss of balance, headaches, disorientation, altered mental status, loss of consciousness and numbness.   Psychiatric/Behavioral: " Negative for altered mental status and disorientation.       Objective:      Physical Exam   Constitutional: She is oriented to person, place, and time. She does not appear ill. No distress. normal  HENT:   Head: Normocephalic and atraumatic.   Eyes: No scleral icterus.   Pulmonary/Chest: Effort normal.   Abdominal: Normal appearance.   Musculoskeletal:         General: Swelling and tenderness present.      Left wrist: She exhibits decreased range of motion, tenderness and swelling.        Arms:    Neurological: She is alert and oriented to person, place, and time.   Skin: Capillary refill takes less than 2 seconds. Psychiatric: Her behavior is normal. Mood, judgment and thought content normal.   Vitals reviewed.        Assessment:       1. Left wrist sprain, initial encounter        Plan:       Xray reviewed. No fracture or dislocation noted.   RICE  NSAIDs for pain relief.   Left wrist sprain, initial encounter  -     ketorolac injection 30 mg  -     naproxen (NAPROSYN) 500 MG tablet; Take 1 tablet (500 mg total) by mouth 2 (two) times daily.  Dispense: 60 tablet; Refill: 0  -     predniSONE (DELTASONE) 20 MG tablet; Take 2 tablets (40 mg total) by mouth once daily. for 3 days  Dispense: 6 tablet; Refill: 0

## 2021-05-11 ENCOUNTER — NURSE TRIAGE (OUTPATIENT)
Dept: ADMINISTRATIVE | Facility: CLINIC | Age: 61
End: 2021-05-11

## 2021-06-17 ENCOUNTER — OFFICE VISIT (OUTPATIENT)
Dept: ORTHOPEDICS | Facility: CLINIC | Age: 61
End: 2021-06-17
Payer: MEDICAID

## 2021-06-17 VITALS — BODY MASS INDEX: 27.47 KG/M2 | WEIGHT: 175 LBS | HEIGHT: 67 IN

## 2021-06-17 DIAGNOSIS — M25.552 LEFT HIP PAIN: ICD-10-CM

## 2021-06-17 DIAGNOSIS — M16.12 PRIMARY OSTEOARTHRITIS OF LEFT HIP: Primary | ICD-10-CM

## 2021-06-17 PROCEDURE — 99213 PR OFFICE/OUTPT VISIT, EST, LEVL III, 20-29 MIN: ICD-10-PCS | Mod: S$GLB,,, | Performed by: ORTHOPAEDIC SURGERY

## 2021-06-17 PROCEDURE — 99213 OFFICE O/P EST LOW 20 MIN: CPT | Mod: S$GLB,,, | Performed by: ORTHOPAEDIC SURGERY

## 2021-06-17 RX ORDER — DULOXETIN HYDROCHLORIDE 60 MG/1
60 CAPSULE, DELAYED RELEASE ORAL 2 TIMES DAILY
COMMUNITY
Start: 2021-05-26 | End: 2022-10-27

## 2021-06-17 RX ORDER — GABAPENTIN 800 MG/1
800 TABLET ORAL 3 TIMES DAILY
COMMUNITY
Start: 2021-03-19

## 2021-06-17 RX ORDER — SUMATRIPTAN SUCCINATE 25 MG/1
25 TABLET ORAL
COMMUNITY
Start: 2021-03-26

## 2021-06-17 RX ORDER — OXYCODONE AND ACETAMINOPHEN 7.5; 325 MG/1; MG/1
TABLET ORAL
COMMUNITY
Start: 2021-04-01 | End: 2021-06-18

## 2021-06-17 RX ORDER — DONEPEZIL HYDROCHLORIDE 10 MG/1
10 TABLET, FILM COATED ORAL DAILY
COMMUNITY
Start: 2021-03-09

## 2021-06-17 RX ORDER — CYANOCOBALAMIN 1000 UG/ML
1000 INJECTION, SOLUTION INTRAMUSCULAR; SUBCUTANEOUS
COMMUNITY
Start: 2021-06-09

## 2021-06-17 RX ORDER — VARENICLINE TARTRATE 0.5 (11)-1
KIT ORAL
COMMUNITY
Start: 2021-06-10 | End: 2022-10-27

## 2021-06-17 RX ORDER — SUCRALFATE 1 G/1
1 TABLET ORAL 4 TIMES DAILY
COMMUNITY
Start: 2021-06-07

## 2021-06-17 RX ORDER — PRAVASTATIN SODIUM 20 MG/1
20 TABLET ORAL DAILY
COMMUNITY
Start: 2020-08-21

## 2021-06-17 RX ORDER — TOLTERODINE 2 MG/1
2 CAPSULE, EXTENDED RELEASE ORAL DAILY
COMMUNITY
Start: 2021-03-09 | End: 2022-10-27

## 2021-06-17 RX ORDER — CLINDAMYCIN HYDROCHLORIDE 300 MG/1
CAPSULE ORAL
COMMUNITY
Start: 2021-03-25 | End: 2021-06-18

## 2021-06-17 RX ORDER — BUSPIRONE HYDROCHLORIDE 7.5 MG/1
TABLET ORAL
COMMUNITY
Start: 2021-06-01 | End: 2021-06-18

## 2021-06-17 RX ORDER — DICYCLOMINE HYDROCHLORIDE 10 MG/1
10 CAPSULE ORAL 2 TIMES DAILY
COMMUNITY
Start: 2021-06-07

## 2021-06-17 RX ORDER — VILAZODONE HYDROCHLORIDE 20 MG/1
TABLET ORAL
COMMUNITY
Start: 2021-05-26 | End: 2021-06-18

## 2021-06-17 RX ORDER — CETIRIZINE HYDROCHLORIDE 10 MG/1
10 TABLET ORAL
COMMUNITY
Start: 2021-05-19 | End: 2022-01-29

## 2021-06-17 RX ORDER — OLOPATADINE HYDROCHLORIDE 2 MG/ML
SOLUTION/ DROPS OPHTHALMIC
COMMUNITY
Start: 2021-06-04 | End: 2022-10-27

## 2021-06-17 RX ORDER — PANTOPRAZOLE SODIUM 40 MG/1
TABLET, DELAYED RELEASE ORAL
COMMUNITY
Start: 2021-06-04 | End: 2021-06-18

## 2021-06-17 RX ORDER — ONDANSETRON 4 MG/1
4 TABLET, ORALLY DISINTEGRATING ORAL EVERY 8 HOURS PRN
COMMUNITY
Start: 2021-06-09

## 2021-06-17 RX ORDER — TRAZODONE HYDROCHLORIDE 100 MG/1
250 TABLET ORAL NIGHTLY
COMMUNITY
Start: 2021-06-04

## 2021-06-17 RX ORDER — TOPIRAMATE 25 MG/1
25 TABLET ORAL
COMMUNITY
Start: 2021-03-26 | End: 2023-09-20 | Stop reason: DRUGHIGH

## 2021-06-17 RX ORDER — RABEPRAZOLE SODIUM 20 MG/1
40 TABLET, DELAYED RELEASE ORAL
COMMUNITY
Start: 2020-06-23 | End: 2021-06-18

## 2021-06-17 RX ORDER — POLYETHYLENE GLYCOL 3350 17 G/17G
POWDER, FOR SOLUTION ORAL
COMMUNITY
Start: 2020-12-22 | End: 2022-10-27

## 2021-06-17 RX ORDER — ATENOLOL 50 MG/1
25 TABLET ORAL
COMMUNITY
Start: 2021-04-20 | End: 2023-09-20 | Stop reason: DRUGHIGH

## 2021-06-17 RX ORDER — CLONAZEPAM 2 MG/1
TABLET ORAL
COMMUNITY
Start: 2021-03-19 | End: 2022-10-27

## 2021-06-17 RX ORDER — IBUPROFEN 200 MG
TABLET ORAL
COMMUNITY
Start: 2021-01-20 | End: 2021-06-18

## 2021-06-17 RX ORDER — ALBUTEROL SULFATE 90 UG/1
AEROSOL, METERED RESPIRATORY (INHALATION)
COMMUNITY
Start: 2020-09-03 | End: 2022-12-27 | Stop reason: SDUPTHER

## 2021-06-18 ENCOUNTER — TELEPHONE (OUTPATIENT)
Dept: RADIOLOGY | Facility: HOSPITAL | Age: 61
End: 2021-06-18

## 2021-06-18 RX ORDER — MULTIVIT WITH MINERALS/HERBS
1 TABLET ORAL DAILY
COMMUNITY

## 2021-06-18 RX ORDER — IBUPROFEN 100 MG/5ML
1000 SUSPENSION, ORAL (FINAL DOSE FORM) ORAL DAILY
COMMUNITY

## 2021-06-18 RX ORDER — CHOLECALCIFEROL (VITAMIN D3) 25 MCG
1000 TABLET ORAL DAILY
COMMUNITY

## 2021-06-18 RX ORDER — BIOTIN 1 MG
1000 TABLET ORAL DAILY
COMMUNITY

## 2021-07-09 ENCOUNTER — HOSPITAL ENCOUNTER (OUTPATIENT)
Dept: RADIOLOGY | Facility: HOSPITAL | Age: 61
Discharge: HOME | End: 2021-07-09
Attending: ORTHOPAEDIC SURGERY
Payer: MEDICAID

## 2021-07-09 DIAGNOSIS — M16.12 PRIMARY OSTEOARTHRITIS OF LEFT HIP: ICD-10-CM

## 2021-07-09 PROCEDURE — 25500020 PHARM REV CODE 255: Performed by: ORTHOPAEDIC SURGERY

## 2021-07-09 PROCEDURE — 63600175 PHARM REV CODE 636 W HCPCS: Performed by: ORTHOPAEDIC SURGERY

## 2021-07-09 PROCEDURE — 20610 DRAIN/INJ JOINT/BURSA W/O US: CPT | Mod: TC,LT

## 2021-07-09 PROCEDURE — 25000003 PHARM REV CODE 250: Performed by: ORTHOPAEDIC SURGERY

## 2021-07-09 RX ORDER — METHYLPREDNISOLONE ACETATE 80 MG/ML
80 INJECTION, SUSPENSION INTRA-ARTICULAR; INTRALESIONAL; INTRAMUSCULAR; SOFT TISSUE ONCE
Status: COMPLETED | OUTPATIENT
Start: 2021-07-09 | End: 2021-07-09

## 2021-07-09 RX ORDER — BUPIVACAINE HYDROCHLORIDE 2.5 MG/ML
8 INJECTION, SOLUTION EPIDURAL; INFILTRATION; INTRACAUDAL ONCE
Status: COMPLETED | OUTPATIENT
Start: 2021-07-09 | End: 2021-07-09

## 2021-07-09 RX ORDER — LIDOCAINE HYDROCHLORIDE 10 MG/ML
10 INJECTION INFILTRATION; PERINEURAL ONCE
Status: COMPLETED | OUTPATIENT
Start: 2021-07-09 | End: 2021-07-09

## 2021-07-09 RX ADMIN — BUPIVACAINE HYDROCHLORIDE 20 MG: 2.5 INJECTION, SOLUTION EPIDURAL; INFILTRATION; INTRACAUDAL; PERINEURAL at 01:07

## 2021-07-09 RX ADMIN — LIDOCAINE HYDROCHLORIDE 10 ML: 10 INJECTION, SOLUTION INFILTRATION; PERINEURAL at 01:07

## 2021-07-09 RX ADMIN — IOHEXOL 3 ML: 300 INJECTION, SOLUTION INTRAVENOUS at 01:07

## 2021-07-09 RX ADMIN — METHYLPREDNISOLONE ACETATE 80 MG: 80 INJECTION, SUSPENSION INTRA-ARTICULAR; INTRALESIONAL; INTRAMUSCULAR; SOFT TISSUE at 01:07

## 2021-08-17 ENCOUNTER — OFFICE VISIT (OUTPATIENT)
Dept: ORTHOPEDICS | Facility: CLINIC | Age: 61
End: 2021-08-17
Payer: MEDICAID

## 2021-08-17 VITALS — HEIGHT: 67 IN | WEIGHT: 175 LBS | BODY MASS INDEX: 27.47 KG/M2

## 2021-08-17 DIAGNOSIS — M16.12 PRIMARY OSTEOARTHRITIS OF LEFT HIP: Primary | ICD-10-CM

## 2021-08-17 PROCEDURE — 99213 PR OFFICE/OUTPT VISIT, EST, LEVL III, 20-29 MIN: ICD-10-PCS | Mod: S$GLB,,, | Performed by: ORTHOPAEDIC SURGERY

## 2021-08-17 PROCEDURE — 99213 OFFICE O/P EST LOW 20 MIN: CPT | Mod: S$GLB,,, | Performed by: ORTHOPAEDIC SURGERY

## 2021-08-17 RX ORDER — PANTOPRAZOLE SODIUM 40 MG/1
TABLET, DELAYED RELEASE ORAL
COMMUNITY
Start: 2021-07-12 | End: 2022-10-27

## 2021-08-17 RX ORDER — SALMETEROL XINAFOATE 50 UG/1
POWDER, METERED ORAL; RESPIRATORY (INHALATION)
COMMUNITY
End: 2022-10-27 | Stop reason: ALTCHOICE

## 2021-08-17 RX ORDER — ARIPIPRAZOLE 15 MG/1
TABLET ORAL
COMMUNITY
End: 2022-10-27

## 2021-08-17 RX ORDER — OXYBUTYNIN CHLORIDE 5 MG/1
TABLET ORAL
COMMUNITY
End: 2022-10-27

## 2021-08-17 RX ORDER — TIOTROPIUM BROMIDE AND OLODATEROL 3.124; 2.736 UG/1; UG/1
SPRAY, METERED RESPIRATORY (INHALATION)
COMMUNITY
End: 2022-10-27

## 2021-08-17 RX ORDER — LISINOPRIL 20 MG/1
TABLET ORAL
COMMUNITY
End: 2022-10-27

## 2021-08-17 RX ORDER — AMLODIPINE BESYLATE 10 MG/1
TABLET ORAL
COMMUNITY
End: 2023-09-20 | Stop reason: DRUGHIGH

## 2021-08-17 RX ORDER — NEOMYCIN SULFATE, POLYMYXIN B SULFATE AND DEXAMETHASONE 3.5; 10000; 1 MG/ML; [USP'U]/ML; MG/ML
SUSPENSION/ DROPS OPHTHALMIC
COMMUNITY
Start: 2021-08-07 | End: 2022-10-27

## 2021-08-17 RX ORDER — METRONIDAZOLE 500 MG/1
TABLET ORAL
COMMUNITY
Start: 2021-06-24 | End: 2021-09-25

## 2021-08-17 RX ORDER — VILAZODONE HYDROCHLORIDE 20 MG/1
TABLET ORAL
COMMUNITY
End: 2022-10-27

## 2021-08-26 ENCOUNTER — CLINICAL SUPPORT (OUTPATIENT)
Dept: REHABILITATION | Facility: HOSPITAL | Age: 61
End: 2021-08-26
Attending: ORTHOPAEDIC SURGERY
Payer: MEDICAID

## 2021-08-26 DIAGNOSIS — M16.12 PRIMARY OSTEOARTHRITIS OF LEFT HIP: ICD-10-CM

## 2021-08-26 DIAGNOSIS — M25.552 LEFT HIP PAIN: ICD-10-CM

## 2021-08-26 PROCEDURE — 97161 PT EVAL LOW COMPLEX 20 MIN: CPT | Mod: PN

## 2021-08-26 PROCEDURE — 97110 THERAPEUTIC EXERCISES: CPT | Mod: PN

## 2021-09-13 ENCOUNTER — CLINICAL SUPPORT (OUTPATIENT)
Dept: REHABILITATION | Facility: HOSPITAL | Age: 61
End: 2021-09-13
Attending: ORTHOPAEDIC SURGERY
Payer: MEDICAID

## 2021-09-13 DIAGNOSIS — M25.552 LEFT HIP PAIN: ICD-10-CM

## 2021-09-13 PROCEDURE — 97110 THERAPEUTIC EXERCISES: CPT | Mod: PN

## 2021-09-14 DIAGNOSIS — Z12.31 ENCOUNTER FOR SCREENING MAMMOGRAM FOR MALIGNANT NEOPLASM OF BREAST: Primary | ICD-10-CM

## 2021-09-17 ENCOUNTER — CLINICAL SUPPORT (OUTPATIENT)
Dept: REHABILITATION | Facility: HOSPITAL | Age: 61
End: 2021-09-17
Attending: ORTHOPAEDIC SURGERY
Payer: MEDICAID

## 2021-09-17 DIAGNOSIS — M25.552 LEFT HIP PAIN: ICD-10-CM

## 2021-09-17 PROCEDURE — 97110 THERAPEUTIC EXERCISES: CPT | Mod: PN

## 2021-09-20 ENCOUNTER — CLINICAL SUPPORT (OUTPATIENT)
Dept: REHABILITATION | Facility: HOSPITAL | Age: 61
End: 2021-09-20
Attending: ORTHOPAEDIC SURGERY
Payer: MEDICAID

## 2021-09-20 DIAGNOSIS — M25.552 LEFT HIP PAIN: ICD-10-CM

## 2021-09-20 PROCEDURE — 97110 THERAPEUTIC EXERCISES: CPT | Mod: PN,CQ

## 2021-09-20 PROCEDURE — 97112 NEUROMUSCULAR REEDUCATION: CPT | Mod: PN,CQ

## 2021-09-22 ENCOUNTER — CLINICAL SUPPORT (OUTPATIENT)
Dept: REHABILITATION | Facility: HOSPITAL | Age: 61
End: 2021-09-22
Attending: ORTHOPAEDIC SURGERY
Payer: MEDICAID

## 2021-09-22 ENCOUNTER — DOCUMENTATION ONLY (OUTPATIENT)
Dept: REHABILITATION | Facility: HOSPITAL | Age: 61
End: 2021-09-22

## 2021-09-22 DIAGNOSIS — M25.552 LEFT HIP PAIN: ICD-10-CM

## 2021-09-22 PROCEDURE — 97110 THERAPEUTIC EXERCISES: CPT | Mod: PN,CQ

## 2021-09-22 PROCEDURE — 97112 NEUROMUSCULAR REEDUCATION: CPT | Mod: PN,CQ

## 2021-09-23 ENCOUNTER — OFFICE VISIT (OUTPATIENT)
Dept: ORTHOPEDICS | Facility: CLINIC | Age: 61
End: 2021-09-23
Payer: MEDICAID

## 2021-09-23 VITALS — HEIGHT: 67 IN | BODY MASS INDEX: 27.47 KG/M2 | WEIGHT: 175 LBS

## 2021-09-23 DIAGNOSIS — M16.12 PRIMARY OSTEOARTHRITIS OF LEFT HIP: Primary | ICD-10-CM

## 2021-09-23 DIAGNOSIS — M17.11 PRIMARY OSTEOARTHRITIS OF RIGHT KNEE: ICD-10-CM

## 2021-09-23 DIAGNOSIS — M70.62 GREATER TROCHANTERIC BURSITIS OF LEFT HIP: ICD-10-CM

## 2021-09-23 PROCEDURE — 99213 OFFICE O/P EST LOW 20 MIN: CPT | Mod: 25,S$GLB,, | Performed by: ORTHOPAEDIC SURGERY

## 2021-09-23 PROCEDURE — 20610 DRAIN/INJ JOINT/BURSA W/O US: CPT | Mod: 50,S$GLB,, | Performed by: ORTHOPAEDIC SURGERY

## 2021-09-23 PROCEDURE — 99213 PR OFFICE/OUTPT VISIT, EST, LEVL III, 20-29 MIN: ICD-10-PCS | Mod: 25,S$GLB,, | Performed by: ORTHOPAEDIC SURGERY

## 2021-09-23 PROCEDURE — 20610 LARGE JOINT ASPIRATION/INJECTION: R KNEE: ICD-10-PCS | Mod: 50,S$GLB,, | Performed by: ORTHOPAEDIC SURGERY

## 2021-09-23 RX ORDER — METHYLPREDNISOLONE ACETATE 40 MG/ML
40 INJECTION, SUSPENSION INTRA-ARTICULAR; INTRALESIONAL; INTRAMUSCULAR; SOFT TISSUE
Status: DISCONTINUED | OUTPATIENT
Start: 2021-09-23 | End: 2021-09-23 | Stop reason: HOSPADM

## 2021-09-23 RX ADMIN — METHYLPREDNISOLONE ACETATE 40 MG: 40 INJECTION, SUSPENSION INTRA-ARTICULAR; INTRALESIONAL; INTRAMUSCULAR; SOFT TISSUE at 10:09

## 2021-09-25 ENCOUNTER — OFFICE VISIT (OUTPATIENT)
Dept: URGENT CARE | Facility: CLINIC | Age: 61
End: 2021-09-25
Payer: MEDICAID

## 2021-09-25 VITALS
RESPIRATION RATE: 16 BRPM | HEART RATE: 76 BPM | OXYGEN SATURATION: 93 % | SYSTOLIC BLOOD PRESSURE: 142 MMHG | WEIGHT: 190 LBS | BODY MASS INDEX: 29.82 KG/M2 | HEIGHT: 67 IN | TEMPERATURE: 98 F | DIASTOLIC BLOOD PRESSURE: 80 MMHG

## 2021-09-25 DIAGNOSIS — K04.7 DENTAL ABSCESS: ICD-10-CM

## 2021-09-25 DIAGNOSIS — J32.9 SINUSITIS, UNSPECIFIED CHRONICITY, UNSPECIFIED LOCATION: Primary | ICD-10-CM

## 2021-09-25 LAB
CTP QC/QA: YES
SARS-COV-2 RDRP RESP QL NAA+PROBE: NEGATIVE

## 2021-09-25 PROCEDURE — 99214 PR OFFICE/OUTPT VISIT, EST, LEVL IV, 30-39 MIN: ICD-10-PCS | Mod: S$GLB,,, | Performed by: NURSE PRACTITIONER

## 2021-09-25 PROCEDURE — 71046 X-RAY EXAM CHEST 2 VIEWS: CPT | Mod: S$GLB,,, | Performed by: RADIOLOGY

## 2021-09-25 PROCEDURE — 87635 PR SARS-COV-2 COVID-19 AMPLIFIED PROBE: ICD-10-PCS | Mod: QW,S$GLB,, | Performed by: NURSE PRACTITIONER

## 2021-09-25 PROCEDURE — 87635 SARS-COV-2 COVID-19 AMP PRB: CPT | Mod: QW,S$GLB,, | Performed by: NURSE PRACTITIONER

## 2021-09-25 PROCEDURE — 99214 OFFICE O/P EST MOD 30 MIN: CPT | Mod: S$GLB,,, | Performed by: NURSE PRACTITIONER

## 2021-09-25 PROCEDURE — 71046 XR CHEST PA AND LATERAL: ICD-10-PCS | Mod: S$GLB,,, | Performed by: RADIOLOGY

## 2021-09-25 RX ORDER — PREDNISONE 20 MG/1
20 TABLET ORAL DAILY
Qty: 3 TABLET | Refills: 0 | Status: SHIPPED | OUTPATIENT
Start: 2021-09-25 | End: 2021-09-28

## 2021-09-25 RX ORDER — AMOXICILLIN AND CLAVULANATE POTASSIUM 875; 125 MG/1; MG/1
1 TABLET, FILM COATED ORAL EVERY 12 HOURS
Qty: 20 TABLET | Refills: 0 | Status: SHIPPED | OUTPATIENT
Start: 2021-09-25 | End: 2021-10-05

## 2021-09-25 RX ORDER — AZELASTINE 1 MG/ML
1 SPRAY, METERED NASAL 2 TIMES DAILY
Qty: 30 ML | Refills: 0 | Status: SHIPPED | OUTPATIENT
Start: 2021-09-25 | End: 2021-10-16

## 2021-09-25 RX ORDER — PROMETHAZINE HYDROCHLORIDE AND DEXTROMETHORPHAN HYDROBROMIDE 6.25; 15 MG/5ML; MG/5ML
5 SYRUP ORAL 3 TIMES DAILY PRN
Qty: 240 ML | Refills: 0 | Status: SHIPPED | OUTPATIENT
Start: 2021-09-25 | End: 2021-10-05

## 2021-09-27 ENCOUNTER — CLINICAL SUPPORT (OUTPATIENT)
Dept: REHABILITATION | Facility: HOSPITAL | Age: 61
End: 2021-09-27
Attending: ORTHOPAEDIC SURGERY
Payer: MEDICAID

## 2021-09-27 DIAGNOSIS — M25.552 LEFT HIP PAIN: ICD-10-CM

## 2021-09-27 PROCEDURE — 97110 THERAPEUTIC EXERCISES: CPT | Mod: PN

## 2021-09-30 ENCOUNTER — CLINICAL SUPPORT (OUTPATIENT)
Dept: REHABILITATION | Facility: HOSPITAL | Age: 61
End: 2021-09-30
Attending: ORTHOPAEDIC SURGERY
Payer: MEDICAID

## 2021-09-30 DIAGNOSIS — M25.552 LEFT HIP PAIN: ICD-10-CM

## 2021-09-30 PROCEDURE — 97112 NEUROMUSCULAR REEDUCATION: CPT | Mod: PN,CQ

## 2021-09-30 PROCEDURE — 97110 THERAPEUTIC EXERCISES: CPT | Mod: PN,CQ

## 2021-10-04 ENCOUNTER — IMMUNIZATION (OUTPATIENT)
Dept: PRIMARY CARE CLINIC | Facility: CLINIC | Age: 61
End: 2021-10-04
Payer: MEDICAID

## 2021-10-04 DIAGNOSIS — Z23 NEED FOR VACCINATION: Primary | ICD-10-CM

## 2021-10-04 PROCEDURE — 91300 COVID-19, MRNA, LNP-S, PF, 30 MCG/0.3 ML DOSE VACCINE: CPT | Mod: S$GLB,,, | Performed by: FAMILY MEDICINE

## 2021-10-04 PROCEDURE — 91300 COVID-19, MRNA, LNP-S, PF, 30 MCG/0.3 ML DOSE VACCINE: ICD-10-PCS | Mod: S$GLB,,, | Performed by: FAMILY MEDICINE

## 2021-10-06 ENCOUNTER — CLINICAL SUPPORT (OUTPATIENT)
Dept: REHABILITATION | Facility: HOSPITAL | Age: 61
End: 2021-10-06
Attending: ORTHOPAEDIC SURGERY
Payer: MEDICAID

## 2021-10-06 DIAGNOSIS — M25.552 LEFT HIP PAIN: ICD-10-CM

## 2021-10-06 PROCEDURE — 97110 THERAPEUTIC EXERCISES: CPT | Mod: PN

## 2021-10-08 ENCOUNTER — CLINICAL SUPPORT (OUTPATIENT)
Dept: REHABILITATION | Facility: HOSPITAL | Age: 61
End: 2021-10-08
Attending: ORTHOPAEDIC SURGERY
Payer: MEDICAID

## 2021-10-08 DIAGNOSIS — M25.552 LEFT HIP PAIN: ICD-10-CM

## 2021-10-08 PROCEDURE — 97110 THERAPEUTIC EXERCISES: CPT | Mod: PN

## 2021-10-11 ENCOUNTER — OFFICE VISIT (OUTPATIENT)
Dept: ORTHOPEDICS | Facility: CLINIC | Age: 61
End: 2021-10-11
Payer: MEDICAID

## 2021-10-11 VITALS
BODY MASS INDEX: 29.82 KG/M2 | HEIGHT: 67 IN | DIASTOLIC BLOOD PRESSURE: 80 MMHG | OXYGEN SATURATION: 97 % | SYSTOLIC BLOOD PRESSURE: 124 MMHG | WEIGHT: 190 LBS | HEART RATE: 64 BPM

## 2021-10-11 DIAGNOSIS — M47.816 LUMBAR FACET ARTHROPATHY: ICD-10-CM

## 2021-10-11 DIAGNOSIS — M48.07 LUMBOSACRAL STENOSIS: Primary | ICD-10-CM

## 2021-10-11 DIAGNOSIS — M47.22 CERVICAL SPONDYLOSIS WITH RADICULOPATHY: ICD-10-CM

## 2021-10-11 PROCEDURE — 99213 OFFICE O/P EST LOW 20 MIN: CPT | Mod: S$GLB,,, | Performed by: ORTHOPAEDIC SURGERY

## 2021-10-11 PROCEDURE — 99213 PR OFFICE/OUTPT VISIT, EST, LEVL III, 20-29 MIN: ICD-10-PCS | Mod: S$GLB,,, | Performed by: ORTHOPAEDIC SURGERY

## 2021-10-12 DIAGNOSIS — M48.07 LUMBOSACRAL STENOSIS: Primary | ICD-10-CM

## 2021-10-12 DIAGNOSIS — M70.62 GREATER TROCHANTERIC BURSITIS OF LEFT HIP: ICD-10-CM

## 2021-10-12 DIAGNOSIS — M47.816 LUMBAR FACET ARTHROPATHY: ICD-10-CM

## 2021-10-13 RX ORDER — MELOXICAM 7.5 MG/1
7.5 TABLET ORAL DAILY
Qty: 30 TABLET | Refills: 0 | Status: SHIPPED | OUTPATIENT
Start: 2021-10-13 | End: 2021-11-12

## 2021-10-21 ENCOUNTER — HOSPITAL ENCOUNTER (OUTPATIENT)
Dept: RADIOLOGY | Facility: HOSPITAL | Age: 61
Discharge: HOME | End: 2021-10-21
Attending: FAMILY MEDICINE
Payer: MEDICAID

## 2021-10-21 DIAGNOSIS — Z12.31 ENCOUNTER FOR SCREENING MAMMOGRAM FOR MALIGNANT NEOPLASM OF BREAST: ICD-10-CM

## 2021-10-21 PROCEDURE — 77067 SCR MAMMO BI INCL CAD: CPT | Mod: TC,PO

## 2021-11-30 ENCOUNTER — CLINICAL SUPPORT (OUTPATIENT)
Dept: REHABILITATION | Facility: HOSPITAL | Age: 61
End: 2021-11-30
Attending: ORTHOPAEDIC SURGERY
Payer: MEDICAID

## 2021-11-30 DIAGNOSIS — M54.2 CERVICAL PAIN: ICD-10-CM

## 2021-11-30 DIAGNOSIS — M54.50 LUMBOSACRAL PAIN, CHRONIC: ICD-10-CM

## 2021-11-30 DIAGNOSIS — G89.29 LUMBOSACRAL PAIN, CHRONIC: ICD-10-CM

## 2021-11-30 PROCEDURE — 97110 THERAPEUTIC EXERCISES: CPT | Mod: PN

## 2021-11-30 PROCEDURE — 97161 PT EVAL LOW COMPLEX 20 MIN: CPT | Mod: PN

## 2021-12-01 PROBLEM — M54.50 LUMBOSACRAL PAIN, CHRONIC: Status: ACTIVE | Noted: 2021-12-01

## 2021-12-01 PROBLEM — G89.29 LUMBOSACRAL PAIN, CHRONIC: Status: ACTIVE | Noted: 2021-12-01

## 2021-12-10 ENCOUNTER — CLINICAL SUPPORT (OUTPATIENT)
Dept: REHABILITATION | Facility: HOSPITAL | Age: 61
End: 2021-12-10
Payer: MEDICAID

## 2021-12-10 DIAGNOSIS — G89.29 LUMBOSACRAL PAIN, CHRONIC: ICD-10-CM

## 2021-12-10 DIAGNOSIS — M54.2 CERVICAL PAIN: ICD-10-CM

## 2021-12-10 DIAGNOSIS — M54.50 LUMBOSACRAL PAIN, CHRONIC: ICD-10-CM

## 2021-12-10 PROCEDURE — 97110 THERAPEUTIC EXERCISES: CPT | Mod: PN

## 2021-12-14 ENCOUNTER — OFFICE VISIT (OUTPATIENT)
Dept: ORTHOPEDICS | Facility: CLINIC | Age: 61
End: 2021-12-14
Payer: MEDICAID

## 2021-12-14 VITALS — BODY MASS INDEX: 29.82 KG/M2 | HEIGHT: 67 IN | WEIGHT: 190 LBS

## 2021-12-14 DIAGNOSIS — M16.12 PRIMARY OSTEOARTHRITIS OF LEFT HIP: Primary | ICD-10-CM

## 2021-12-14 DIAGNOSIS — M70.62 GREATER TROCHANTERIC BURSITIS OF LEFT HIP: ICD-10-CM

## 2021-12-14 PROCEDURE — 4010F ACE/ARB THERAPY RXD/TAKEN: CPT | Mod: S$GLB,,, | Performed by: ORTHOPAEDIC SURGERY

## 2021-12-14 PROCEDURE — 20610 LARGE JOINT ASPIRATION/INJECTION: L GREATER TROCHANTERIC BURSA: ICD-10-PCS | Mod: LT,S$GLB,, | Performed by: ORTHOPAEDIC SURGERY

## 2021-12-14 PROCEDURE — 4010F PR ACE/ARB THEARPY RXD/TAKEN: ICD-10-PCS | Mod: S$GLB,,, | Performed by: ORTHOPAEDIC SURGERY

## 2021-12-14 PROCEDURE — 20610 DRAIN/INJ JOINT/BURSA W/O US: CPT | Mod: LT,S$GLB,, | Performed by: ORTHOPAEDIC SURGERY

## 2021-12-14 PROCEDURE — 99214 PR OFFICE/OUTPT VISIT, EST, LEVL IV, 30-39 MIN: ICD-10-PCS | Mod: 25,S$GLB,, | Performed by: ORTHOPAEDIC SURGERY

## 2021-12-14 PROCEDURE — 99214 OFFICE O/P EST MOD 30 MIN: CPT | Mod: 25,S$GLB,, | Performed by: ORTHOPAEDIC SURGERY

## 2021-12-14 RX ORDER — FLUOXETINE HYDROCHLORIDE 20 MG/1
CAPSULE ORAL
COMMUNITY
Start: 2021-11-15 | End: 2023-09-20 | Stop reason: DRUGHIGH

## 2021-12-14 RX ORDER — POLYETHYLENE GLYCOL 1000
POWDER (GRAM) MISCELLANEOUS
COMMUNITY
Start: 2021-10-12 | End: 2022-10-27

## 2021-12-14 RX ORDER — CETIRIZINE HYDROCHLORIDE 10 MG/1
TABLET ORAL
COMMUNITY
End: 2022-01-29

## 2021-12-14 RX ORDER — TRIAMCINOLONE ACETONIDE 40 MG/ML
40 INJECTION, SUSPENSION INTRA-ARTICULAR; INTRAMUSCULAR
Status: DISCONTINUED | OUTPATIENT
Start: 2021-12-14 | End: 2021-12-14 | Stop reason: HOSPADM

## 2021-12-14 RX ORDER — TROSPIUM CHLORIDE 20 MG/1
TABLET, FILM COATED ORAL
COMMUNITY
Start: 2021-11-26 | End: 2023-09-20 | Stop reason: DRUGHIGH

## 2021-12-14 RX ORDER — OXCARBAZEPINE 300 MG/1
300 TABLET, FILM COATED ORAL DAILY
COMMUNITY
Start: 2021-11-01

## 2021-12-14 RX ADMIN — TRIAMCINOLONE ACETONIDE 40 MG: 40 INJECTION, SUSPENSION INTRA-ARTICULAR; INTRAMUSCULAR at 10:12

## 2021-12-17 ENCOUNTER — CLINICAL SUPPORT (OUTPATIENT)
Dept: REHABILITATION | Facility: HOSPITAL | Age: 61
End: 2021-12-17
Payer: MEDICAID

## 2021-12-17 DIAGNOSIS — M54.2 CERVICAL PAIN: ICD-10-CM

## 2021-12-17 DIAGNOSIS — G89.29 LUMBOSACRAL PAIN, CHRONIC: ICD-10-CM

## 2021-12-17 DIAGNOSIS — M54.50 LUMBOSACRAL PAIN, CHRONIC: ICD-10-CM

## 2021-12-17 PROCEDURE — 97110 THERAPEUTIC EXERCISES: CPT | Mod: PN

## 2021-12-21 ENCOUNTER — CLINICAL SUPPORT (OUTPATIENT)
Dept: REHABILITATION | Facility: HOSPITAL | Age: 61
End: 2021-12-21
Payer: MEDICAID

## 2021-12-21 DIAGNOSIS — M54.2 CERVICAL PAIN: ICD-10-CM

## 2021-12-21 DIAGNOSIS — M54.50 LUMBOSACRAL PAIN, CHRONIC: ICD-10-CM

## 2021-12-21 DIAGNOSIS — G89.29 LUMBOSACRAL PAIN, CHRONIC: ICD-10-CM

## 2021-12-21 PROCEDURE — 97110 THERAPEUTIC EXERCISES: CPT | Mod: PN

## 2021-12-29 ENCOUNTER — HOSPITAL ENCOUNTER (OUTPATIENT)
Dept: RADIOLOGY | Facility: HOSPITAL | Age: 61
Discharge: HOME | End: 2021-12-29
Attending: ORTHOPAEDIC SURGERY
Payer: MEDICAID

## 2021-12-29 DIAGNOSIS — M16.12 PRIMARY OSTEOARTHRITIS OF LEFT HIP: ICD-10-CM

## 2021-12-29 PROCEDURE — 73721 MRI JNT OF LWR EXTRE W/O DYE: CPT | Mod: TC,PO,LT

## 2022-01-29 ENCOUNTER — OFFICE VISIT (OUTPATIENT)
Dept: URGENT CARE | Facility: CLINIC | Age: 62
End: 2022-01-29
Payer: MEDICAID

## 2022-01-29 ENCOUNTER — PATIENT MESSAGE (OUTPATIENT)
Dept: ADMINISTRATIVE | Facility: CLINIC | Age: 62
End: 2022-01-29
Payer: MEDICAID

## 2022-01-29 VITALS
HEIGHT: 67 IN | BODY MASS INDEX: 27.02 KG/M2 | DIASTOLIC BLOOD PRESSURE: 69 MMHG | OXYGEN SATURATION: 95 % | WEIGHT: 172.19 LBS | TEMPERATURE: 98 F | SYSTOLIC BLOOD PRESSURE: 123 MMHG | HEART RATE: 67 BPM | RESPIRATION RATE: 18 BRPM

## 2022-01-29 DIAGNOSIS — Z87.09 HISTORY OF COPD: ICD-10-CM

## 2022-01-29 DIAGNOSIS — R05.9 COUGH: Primary | ICD-10-CM

## 2022-01-29 DIAGNOSIS — U07.1 COVID-19 VIRUS DETECTED: ICD-10-CM

## 2022-01-29 DIAGNOSIS — Z86.79 HISTORY OF HYPERTENSION: ICD-10-CM

## 2022-01-29 LAB
CTP QC/QA: YES
SARS-COV-2 AG RESP QL IA.RAPID: POSITIVE

## 2022-01-29 PROCEDURE — 1159F MED LIST DOCD IN RCRD: CPT | Mod: CPTII,S$GLB,, | Performed by: NURSE PRACTITIONER

## 2022-01-29 PROCEDURE — 3078F DIAST BP <80 MM HG: CPT | Mod: CPTII,S$GLB,, | Performed by: NURSE PRACTITIONER

## 2022-01-29 PROCEDURE — 3074F PR MOST RECENT SYSTOLIC BLOOD PRESSURE < 130 MM HG: ICD-10-PCS | Mod: CPTII,S$GLB,, | Performed by: NURSE PRACTITIONER

## 2022-01-29 PROCEDURE — 1160F RVW MEDS BY RX/DR IN RCRD: CPT | Mod: CPTII,S$GLB,, | Performed by: NURSE PRACTITIONER

## 2022-01-29 PROCEDURE — 99214 OFFICE O/P EST MOD 30 MIN: CPT | Mod: S$GLB,,, | Performed by: NURSE PRACTITIONER

## 2022-01-29 PROCEDURE — 87811 SARS CORONAVIRUS 2 ANTIGEN POCT, MANUAL READ: ICD-10-PCS | Mod: S$GLB,,, | Performed by: NURSE PRACTITIONER

## 2022-01-29 PROCEDURE — 1160F PR REVIEW ALL MEDS BY PRESCRIBER/CLIN PHARMACIST DOCUMENTED: ICD-10-PCS | Mod: CPTII,S$GLB,, | Performed by: NURSE PRACTITIONER

## 2022-01-29 PROCEDURE — 3078F PR MOST RECENT DIASTOLIC BLOOD PRESSURE < 80 MM HG: ICD-10-PCS | Mod: CPTII,S$GLB,, | Performed by: NURSE PRACTITIONER

## 2022-01-29 PROCEDURE — 3074F SYST BP LT 130 MM HG: CPT | Mod: CPTII,S$GLB,, | Performed by: NURSE PRACTITIONER

## 2022-01-29 PROCEDURE — 3008F BODY MASS INDEX DOCD: CPT | Mod: CPTII,S$GLB,, | Performed by: NURSE PRACTITIONER

## 2022-01-29 PROCEDURE — 1159F PR MEDICATION LIST DOCUMENTED IN MEDICAL RECORD: ICD-10-PCS | Mod: CPTII,S$GLB,, | Performed by: NURSE PRACTITIONER

## 2022-01-29 PROCEDURE — 99214 PR OFFICE/OUTPT VISIT, EST, LEVL IV, 30-39 MIN: ICD-10-PCS | Mod: S$GLB,,, | Performed by: NURSE PRACTITIONER

## 2022-01-29 PROCEDURE — 3008F PR BODY MASS INDEX (BMI) DOCUMENTED: ICD-10-PCS | Mod: CPTII,S$GLB,, | Performed by: NURSE PRACTITIONER

## 2022-01-29 PROCEDURE — 87811 SARS-COV-2 COVID19 W/OPTIC: CPT | Mod: S$GLB,,, | Performed by: NURSE PRACTITIONER

## 2022-01-29 RX ORDER — FLUTICASONE PROPIONATE 50 MCG
1 SPRAY, SUSPENSION (ML) NASAL DAILY
Qty: 15.8 ML | Refills: 0 | Status: SHIPPED | OUTPATIENT
Start: 2022-01-29 | End: 2022-12-27 | Stop reason: SDUPTHER

## 2022-01-29 RX ORDER — CETIRIZINE HYDROCHLORIDE 10 MG/1
10 TABLET ORAL DAILY
Qty: 30 TABLET | Refills: 0 | Status: SHIPPED | OUTPATIENT
Start: 2022-01-29 | End: 2024-03-14

## 2022-01-29 RX ORDER — BENZONATATE 100 MG/1
100 CAPSULE ORAL 3 TIMES DAILY PRN
Qty: 30 CAPSULE | Refills: 0 | Status: SHIPPED | OUTPATIENT
Start: 2022-01-29 | End: 2022-02-08

## 2022-01-29 RX ORDER — PROMETHAZINE HYDROCHLORIDE AND DEXTROMETHORPHAN HYDROBROMIDE 6.25; 15 MG/5ML; MG/5ML
5 SYRUP ORAL EVERY 4 HOURS PRN
Qty: 118 ML | Refills: 0 | Status: SHIPPED | OUTPATIENT
Start: 2022-01-29 | End: 2022-02-08

## 2022-01-29 NOTE — PATIENT INSTRUCTIONS
Symptomatic treatment to include:    Rest, increase fluid intake to include electrolyte replacement  Ibuprofen/Tylenol as directed for fever, sore throat, body aches  Zrytec and flonase for sinus symptoms  Phenergan cough syrup at night for cough  Tessalon perles cough pills as needed day or night  Coricidin HBP if you have high blood pressure.  Warm, salt water gargles, over the counter throat lozenges or sprays as desires.   Imodium over the counter as directed for diarrhea.  ER for difficulty breathing not relieved by rest, excessive lethargy and/or change in mental status  Follow CDC isolation guidelines as provided    Patient Instructions   POSITIVE COVID TEST      You have tested positive for COVID-19 today.  Please note that patients who test positive for COVID-19 are required by the CDC to undergo isolation for 5 days, then wearing a mask around others for an additional 5 days, after their symptoms first began following the new updated guidelines of 12/27/2021. This isolation starts from the day you first developed symptoms, not the day of your positive test. For example, if your symptoms began on a Monday but tested positive on the following Wednesday, your 5-day isolation begins from that Monday, not the Wednesday you tested positive.  However, if you are asymptomatic (a person who does not have any symptoms) and COVID-19 positive, your 5-day isolation begins on the day you tested positive, regardless of exposure date.  Also, per the CDC guidelines, once your 5 days have passed, symptoms have resolved or are improving, and you have not had fever greater than 100.4F in the last 24 hours without taking any fever reducers such as Tylenol (Acetaminophen) or Motrin (Ibuprofen), you may return to your normal activities including social distancing, wearing masks, and frequent handwashing - YOU DO NOT NEED ANOTHER TEST IN ORDER TO END YOUR QUARANTINE.    Patient Education       COVID-19 Discharge Instructions    About this topic   Coronavirus disease 2019 is also known as COVID-19. It is a viral illness that infects the lungs. It is caused by a virus called SARS-associated coronavirus (SARS-CoV-2).  The signs of COVID-19 most often start a few days after you have been infected. In some people, it takes longer to show signs. Others never show signs of the infection. You may have a cough, fever, shaking chills and it may be hard to breathe. You may be very tired, have muscle aches, a headache or sore throat. Some people have an upset stomach or loose stools. Others lose their sense of smell or taste. You may not have these signs all the time and they may come and go while you are sick.  The virus spreads easily through droplets when you talk, sneeze, or cough. You can pass the virus to others when you are talking close together, singing, hugging, sharing food, or shaking hands. Doctors believe the germs also survive on surfaces like tables, door handles, and telephones. However, this is not a common way that COVID-19 spreads. Doctors believe you can also spread the infection even if you dont have any symptoms, but they do not know how that happens. This is why getting vaccinated is one of the best ways to keep you healthy and slow the spread of the virus.  Some people have a mild case of COVID-19 and are able to stay at home and away from others until they feel better. Others may need to be in the hospital if they are very sick. Some people with COVID-19 can have some symptoms for weeks or months. People with COVID-19 must isolate themselves. You can start to be around others when your doctor says it is safe to do so.       What care is needed at home?   · Ask your doctor what you need to do when you go home. Make sure you ask questions if you do not understand what the doctor says.  · Drink lots of water, juice, or broth to replace fluids lost from a fever.  · You may use cool mist humidifiers to help ease congestion and  coughing.  · Use 2 to 3 pillows to prop yourself up when you lie down to make it easier to breathe and sleep.  · Do not smoke and do not drink beer, wine, and mixed drinks (alcohol).  · To lower the chance of passing the infection to others, get a COVID-19 vaccine after your infection has resolved.  · If you have not been fully vaccinated:  ? Wear a mask over your mouth and nose if you are around others who are not sick. Cloth masks work best if they have more than one layer of fabric.  ? Wash your hands often.  ? Stay home in a separate room, if possible, away from others. Only go out to get medical care.  ? Use a separate bathroom if possible.  ? Do not make food for others.  What follow-up care is needed?   · Your doctor may ask you to make visits to the office to check on your progress. Be sure to keep these visits. Make sure you wear a mask at these visits.  · If you can, tell the staff you have COVID-19 ahead of time so they can take extra care to stop the disease from spreading.  · It may take a few weeks before your health returns to normal.  What drugs may be needed?   The doctor may order drugs to:  · Help with breathing  · Help with fever  · Help with swelling in your airways and lungs  · Control coughing  · Ease a sore throat  · Help a runny or stuffy nose  Will physical activity be limited?   You may have to limit your physical activity. Talk to your doctor about the right amount of activity for you. If you have been very sick with COVID-19, it can take some time to get your strength back.  Will there be any other care needed?   Doctors do not know how long you can pass the virus on to others after you are sick. This is why it is important to stay in a separate room, if possible, when you are sick. For now, doctors are giving general guidelines for you to follow after you have been sick. Before you go around other people, you should:  · Be fever free for 24 hours without taking any drugs to lower the  fever  · Have no symptoms of cough or shortness of breath  · Wait at least 10 days after first having symptoms or your first positive test, and you need to be symptom free as above. Some experts suggest waiting 20 days if you have had a more severe infection.  Talk with your doctor about getting a COVID-19 vaccine.  What problems could happen?   · Fluid loss. This is dehydration.  · Short-term or long-term lung damage  · Heart problems  · Death  When do I need to call the doctor?   · You are having so much trouble breathing that you can only say one or two words at a time.  · You need to sit upright at all times to be able to breathe and/or cannot lie down.  · You are very confused or cannot stay awake.  · Your lips or skin start to turn blue or grey.  · You think you might be having a medical emergency. Some examples of medical emergencies are:  ? Severe chest pain.  ? Not able to speak or move normally.  · You have trouble breathing when talking or sitting still.  · You have new shortness of breath.  · You become weak or dizzy.  · You have very dark urine or do not pass urine for more than 8 hours.  · You have new or worsening COVID-19 symptoms like:  ? Fever  ? Cough  ? Feeling very tired  ? Shaking chills  ? Headache  ? Trouble swallowing  ? Throwing up  ? Loose stools  ? Reddish purple spots on your fingers or toes  Teach Back: Helping You Understand   The Teach Back Method helps you understand the information we are giving you. After you talk with the staff, tell them in your own words what you learned. This helps to make sure the staff has described each thing clearly. It also helps to explain things that may have been confusing. Before going home, make sure you can do these:  · I can tell you about my condition.  · I can tell you what may help ease my breathing.  · I can tell you what I can do to help avoid passing the infection to others.  · I can tell you what I will do if I have trouble breathing; feel  "sleepy or confused; or my fingertips, fingernails, skin, or lips are blue.  Where can I learn more?   Centers for Disease Control and Prevention  https://www.cdc.gov/coronavirus/2019-ncov/about/index.html   Centers for Disease Control and Prevention  https://www.cdc.gov/coronavirus/2019-ncov/hcp/disposition-in-home-patients.html   World Health Organization  https://www.who.int/news-room/q-a-detail/b-s-zqurhidvlyvmv   Last Reviewed Date   2021-10-05  Consumer Information Use and Disclaimer   This information is not specific medical advice and does not replace information you receive from your health care provider. This is only a brief summary of general information. It does NOT include all information about conditions, illnesses, injuries, tests, procedures, treatments, therapies, discharge instructions or life-style choices that may apply to you. You must talk with your health care provider for complete information about your health and treatment options. This information should not be used to decide whether or not to accept your health care providers advice, instructions or recommendations. Only your health care provider has the knowledge and training to provide advice that is right for you.  Copyright   Copyright © 2021 UpTo[employer], Inc. and its affiliates and/or licensors. All rights reserved.  Patient Education       Recovery After COVID-19   The Basics   Written by the doctors and editors at [facility]   What is COVID-19?   COVID-19 stands for "coronavirus disease 2019." It is caused by a virus called SARS-CoV-2. The virus first appeared in late 2019 and quickly spread around the world.  People with COVID-19 can have fever, cough, trouble breathing (when the virus infects the lungs), and other symptoms.  Since COVID-19 is a fairly new disease, experts are still studying how people recover from it. They are also studying the possible long-term effects. This article has information about recovery after COVID-19, " "including the ongoing symptoms some people have. More general information about COVID-19 is available separately. (See "Patient education: COVID-19 overview (The Basics)".)  When will I get better after having COVID-19?   For most people who get COVID-19, symptoms get better within a few weeks. But some people, especially those who got sick enough to need to go to the hospital, continue to have symptoms for longer. These can be mild or more serious.  Doctors are still learning about COVID-19. But they generally describe 2 stages of illness and recovery:  · "Acute COVID-19" - This refers to symptoms lasting up to 4 weeks after a person is infected. Most people with mild COVID-19 do not have symptoms beyond this stage, but some do.  · "Post-COVID conditions" - This refers to symptoms that continue beyond 4 weeks after being infected. This is more common in people who were critically ill, meaning they needed to stay in the intensive care unit ("ICU"), be put on a ventilator (breathing machine), or have other types of breathing support.  Different terms have been used when people have persistent symptoms, meaning symptoms that last longer than a few months. These include "long-COVID," "chronic COVID-19," and "post-COVID syndrome." Doctors also use the term "post-acute sequelae of SARS-CoV-2 infection," or "PASC."  What symptoms are most likely to persist?   This is not the same for everyone. But symptoms that are more likely to last beyond a few weeks include:  · Feeling very tired (fatigue)  · Trouble breathing  · Chest discomfort  · Cough  Other physical symptoms can also continue beyond a few weeks. These include problems with sense of smell or taste, headache, runny nose, joint or muscle pain, trouble sleeping or eating, sweating, and diarrhea.  Some people have ongoing psychological symptoms, too. These might include:  · Trouble thinking clearly, focusing, or remembering  · Depression, anxiety, or a related " "condition called post-traumatic stress disorder ("PTSD")  It's hard for doctors to predict when symptoms will improve, since this is different for different people. Your recovery will depend on your age, your overall health, and how severe your COVID-19 symptoms are. Some symptoms, like fatigue, might continue even while others improve or go away.  How long will I be contagious?   It's hard to know for sure. In general, most people are no longer contagious by 10 days after their symptoms started. But this depends on several things, including how severe the infection was and what symptoms they continue to have. It's important to talk to your doctor or nurse to figure out when you are no longer considered contagious.  When should I call my doctor or nurse?   Some fatigue is common, and can persist for a few weeks into your recovery. But if you had COVID-19 and continue to have bothersome symptoms (such as severe fatigue, or chest discomfort or shortness of breath) after 2 to 3 weeks, call your doctor or nurse. You should also call if you start to feel worse or develop any new symptoms. They will tell you what to do and if you need to be seen.  Depending on your symptoms, you might need tests. This will help your doctor or nurse better understand what is causing your symptoms and whether you need treatment.  How are persistent COVID-19 symptoms treated?   In general, treatment involves addressing whichever symptoms you have. Often that means combining a few different treatments.  If you are tired, try to get plenty of rest. You can also try the following things to help with fatigue:  · Plan to do important tasks when you expect to have the most energy, typically in the morning  · Pace yourself so you do not do too much at once, and take breaks throughout the day if you feel tired  · Think about what tasks and activities are most important each day, so you dont use more energy than you need to  If you are not sleeping " "well, improving your "sleep hygiene" can help. This involves things like going to bed and getting up at the same time each day, avoiding caffeine and alcohol late in the day, and not looking at screens before bed.  Depending on your situation, you might also need:  · Medicines to relieve symptoms like cough or pain  · Cardiac rehabilitation - This involves improving your heart health through things like exercise, dietary changes, and quitting smoking (if you smoke).  · Pulmonary rehabilitation - This includes breathing exercises to help strengthen your lungs.  · Physical and occupational therapy - This involves learning exercises, movements, and ways of doing everyday tasks.  · Treatments for anxiety or depression - This can involve medicine and/or counseling.  · Exercises and strategies to help with memory and focus  Is there any way to avoid persistent COVID-19 symptoms?   The only way to avoid this for sure is to avoid getting COVID-19. It's true that most people who are infected will not get very sick. But it's impossible to know who will recover quickly and who will have persistent symptoms.  The best way to prevent COVID-19 is to get vaccinated. In addition to protecting yourself, getting the vaccine will also help protect other people, including those who are at higher risk of getting very sick or dying. People who are not vaccinated can lower their risk by social distancing, wearing face masks in public, and washing their hands often.  All topics are updated as new evidence becomes available and our peer review process is complete.  This topic retrieved from [employer] on: Sep 30, 2021.  Topic 124609 Version 6.0  Release: 29.4.2 - C29.263  © 2021 UpToDate, Inc. and/or its affiliates. All rights reserved.  Consumer Information Use and Disclaimer   This information is not specific medical advice and does not replace information you receive from your health care provider. This is only a brief summary of general " information. It does NOT include all information about conditions, illnesses, injuries, tests, procedures, treatments, therapies, discharge instructions or life-style choices that may apply to you. You must talk with your health care provider for complete information about your health and treatment options. This information should not be used to decide whether or not to accept your health care provider's advice, instructions or recommendations. Only your health care provider has the knowledge and training to provide advice that is right for you. The use of this information is governed by the [employer] End User License Agreement, available at https://www.[employer]/en/solutions/[employer]/about/abdirahman.The use of [employer] content is governed by the [employer] Terms of Use. ©2021 [employer] All rights reserved.  Copyright   © 2021 UpToDate, Inc. and/or its affiliates. All rights reserved.

## 2022-01-29 NOTE — PROGRESS NOTES
"Subjective:       Patient ID: Nimco Caro is a 61 y.o. female.    Vitals:  height is 5' 7" (1.702 m) and weight is 78.1 kg (172 lb 3.2 oz). Her temperature is 98 °F (36.7 °C). Her blood pressure is 123/69 and her pulse is 67. Her respiration is 18 and oxygen saturation is 95%.     Chief Complaint: Cough (Pt had oral surgery on Friday and has felt ill since Saturday with body aches, cough, chest congestion, diarrhea, loss of appetite, and weight loss. Pt has been taking cough medicine which has helped. )    Vaccinated.  Onset of symptoms 7 days ago. Symptoms significantly improved.  Cough continues and desires something for cough      Constitution: Positive for fatigue. Negative for chills and fever.   HENT: Positive for congestion and sore throat.    Cardiovascular: Negative for chest pain.   Respiratory: Positive for cough. Negative for chest tightness and shortness of breath.    Gastrointestinal: Positive for diarrhea. Negative for nausea and vomiting.       Objective:      Physical Exam   Constitutional: She is oriented to person, place, and time. She appears well-developed.  Non-toxic appearance. She does not appear ill. No distress.   HENT:   Head: Normocephalic and atraumatic.   Ears:   Right Ear: Tympanic membrane normal.   Left Ear: Tympanic membrane normal.   Nose: Congestion present.   Mouth/Throat: Mucous membranes are moist. Posterior oropharyngeal erythema present. No oropharyngeal exudate.   Eyes: Conjunctivae and EOM are normal. Right eye exhibits no discharge. Left eye exhibits no discharge.   Neck: Neck supple. No neck rigidity present.   Cardiovascular: Normal rate, regular rhythm and normal heart sounds.   Pulmonary/Chest: Effort normal and breath sounds normal. No respiratory distress.   Abdominal: Normal appearance.   Musculoskeletal:      Cervical back: She exhibits no tenderness.   Lymphadenopathy:     She has no cervical adenopathy.   Neurological: no focal deficit. She is alert and " oriented to person, place, and time.   Skin: Skin is warm, dry and not diaphoretic. Capillary refill takes 2 to 3 seconds.   Psychiatric: Her behavior is normal. Mood normal.   Nursing note and vitals reviewed.        Assessment:       1. Cough    2. COVID-19 virus detected    3. History of hypertension    4. History of COPD        covid risk score of 3  Plan:       Will not treat with antiviral as onset sx's 7 days  Cough  -     SARS Coronavirus 2 Antigen, POCT Manual Read    COVID-19 virus detected  -     COVID-19 Surveillance Program  -     pulse oximeter (PULSE OXIMETER) device; by Apply Externally route 2 (two) times a day. Use twice daily at 8 AM and 3 PM and record the value in [employer] as directed.  Dispense: 1 each; Refill: 0  -     fluticasone propionate (FLONASE) 50 mcg/actuation nasal spray; 1 spray (50 mcg total) by Each Nostril route once daily.  Dispense: 15.8 mL; Refill: 0  -     cetirizine (ZYRTEC) 10 MG tablet; Take 1 tablet (10 mg total) by mouth once daily.  Dispense: 30 tablet; Refill: 0  -     benzonatate (TESSALON) 100 MG capsule; Take 1 capsule (100 mg total) by mouth 3 (three) times daily as needed for Cough.  Dispense: 30 capsule; Refill: 0  -     promethazine-dextromethorphan (PROMETHAZINE-DM) 6.25-15 mg/5 mL Syrp; Take 5 mLs by mouth every 4 (four) hours as needed (cough).  Dispense: 118 mL; Refill: 0    History of hypertension    History of COPD    Symptomatic treatment to include:    Rest, increase fluid intake to include electrolyte replacement  Ibuprofen/Tylenol as directed for fever, sore throat, body aches  Zrytec and flonase for sinus symptoms  Phenergan cough syrup at night for cough  Tessalon perles cough pills as needed day or night  Coricidin HBP if you have high blood pressure.  Warm, salt water gargles, over the counter throat lozenges or sprays as desires.   Imodium over the counter as directed for diarrhea.  ER for difficulty breathing not relieved by rest, excessive  lethargy and/or change in mental status  Follow CDC isolation guidelines as provided    Patient Instructions   POSITIVE COVID TEST      You have tested positive for COVID-19 today.  Please note that patients who test positive for COVID-19 are required by the CDC to undergo isolation for 5 days, then wearing a mask around others for an additional 5 days, after their symptoms first began following the new updated guidelines of 12/27/2021. This isolation starts from the day you first developed symptoms, not the day of your positive test. For example, if your symptoms began on a Monday but tested positive on the following Wednesday, your 5-day isolation begins from that Monday, not the Wednesday you tested positive.  However, if you are asymptomatic (a person who does not have any symptoms) and COVID-19 positive, your 5-day isolation begins on the day you tested positive, regardless of exposure date.  Also, per the CDC guidelines, once your 5 days have passed, symptoms have resolved or are improving, and you have not had fever greater than 100.4F in the last 24 hours without taking any fever reducers such as Tylenol (Acetaminophen) or Motrin (Ibuprofen), you may return to your normal activities including social distancing, wearing masks, and frequent handwashing - YOU DO NOT NEED ANOTHER TEST IN ORDER TO END YOUR QUARANTINE.

## 2022-01-31 ENCOUNTER — TELEPHONE (OUTPATIENT)
Dept: ADMINISTRATIVE | Facility: OTHER | Age: 62
End: 2022-01-31
Payer: MEDICAID

## 2022-03-16 PROBLEM — M54.2 CERVICAL PAIN: Status: RESOLVED | Noted: 2021-11-30 | Resolved: 2022-03-16

## 2022-03-16 PROBLEM — M16.11 DEGENERATIVE JOINT DISEASE OF RIGHT HIP: Status: RESOLVED | Noted: 2019-02-25 | Resolved: 2022-03-16

## 2022-03-16 PROBLEM — M54.50 LUMBOSACRAL PAIN, CHRONIC: Status: RESOLVED | Noted: 2021-12-01 | Resolved: 2022-03-16

## 2022-03-16 PROBLEM — G89.29 LUMBOSACRAL PAIN, CHRONIC: Status: RESOLVED | Noted: 2021-12-01 | Resolved: 2022-03-16

## 2022-03-16 PROBLEM — R26.9 GAIT ABNORMALITY: Status: RESOLVED | Noted: 2019-08-23 | Resolved: 2022-03-16

## 2022-03-16 PROBLEM — Z96.652 STATUS POST TOTAL KNEE REPLACEMENT, LEFT: Status: RESOLVED | Noted: 2019-08-23 | Resolved: 2022-03-16

## 2022-03-22 ENCOUNTER — OFFICE VISIT (OUTPATIENT)
Dept: ORTHOPEDICS | Facility: CLINIC | Age: 62
End: 2022-03-22
Payer: MEDICAID

## 2022-03-22 VITALS — HEIGHT: 67 IN | WEIGHT: 172 LBS | BODY MASS INDEX: 27 KG/M2

## 2022-03-22 DIAGNOSIS — M70.62 GREATER TROCHANTERIC BURSITIS OF LEFT HIP: ICD-10-CM

## 2022-03-22 DIAGNOSIS — M16.12 PRIMARY OSTEOARTHRITIS OF LEFT HIP: Primary | ICD-10-CM

## 2022-03-22 DIAGNOSIS — M87.052 AVASCULAR NECROSIS OF HIP, LEFT: ICD-10-CM

## 2022-03-22 PROCEDURE — 1160F PR REVIEW ALL MEDS BY PRESCRIBER/CLIN PHARMACIST DOCUMENTED: ICD-10-PCS | Mod: S$GLB,,, | Performed by: ORTHOPAEDIC SURGERY

## 2022-03-22 PROCEDURE — 3008F BODY MASS INDEX DOCD: CPT | Mod: S$GLB,,, | Performed by: ORTHOPAEDIC SURGERY

## 2022-03-22 PROCEDURE — 99213 PR OFFICE/OUTPT VISIT, EST, LEVL III, 20-29 MIN: ICD-10-PCS | Mod: S$GLB,,, | Performed by: ORTHOPAEDIC SURGERY

## 2022-03-22 PROCEDURE — 1160F RVW MEDS BY RX/DR IN RCRD: CPT | Mod: S$GLB,,, | Performed by: ORTHOPAEDIC SURGERY

## 2022-03-22 PROCEDURE — 99213 OFFICE O/P EST LOW 20 MIN: CPT | Mod: S$GLB,,, | Performed by: ORTHOPAEDIC SURGERY

## 2022-03-22 PROCEDURE — 3008F PR BODY MASS INDEX (BMI) DOCUMENTED: ICD-10-PCS | Mod: S$GLB,,, | Performed by: ORTHOPAEDIC SURGERY

## 2022-03-22 NOTE — PROGRESS NOTES
[facility]    Subjective:     Chief Complaint:   Chief Complaint   Patient presents with    Left Hip - Pain     Pt is here for MRI results of Left Hip done on 12/29/21. States her hip is still hurting and popping. Would like an injection.        Past Medical History:   Diagnosis Date    Anxiety     Bipolar affect, depressed     Cancer ovarian; uterine    1992    COPD (chronic obstructive pulmonary disease)     GERD (gastroesophageal reflux disease)     Hyperlipidemia     Hypertension     OAB (overactive bladder)        Past Surgical History:   Procedure Laterality Date    BREAST CYST ASPIRATION      COLON SURGERY      COLONOSCOPY      HERNIA REPAIR N/A 2016    HIP ARTHROPLASTY Right 2/25/2019    Procedure: ARTHROPLASTY, HIP;  Surgeon: Eliseo Vaca MD;  Location: [facility] OR;  Service: Orthopedics;  Laterality: Right;  Daniel Herrera    HYSTERECTOMY  total    JOINT REPLACEMENT Right     hip replacemnt    KNEE ARTHROPLASTY Left 8/19/2019    Procedure: ARTHROPLASTY, KNEE;  Surgeon: Eliseo Vaca MD;  Location: [facility] OR;  Service: Orthopedics;  Laterality: Left;    REVISION COLOSTOMY N/A 2012       Current Outpatient Medications   Medication Sig    albuterol (PROVENTIL/VENTOLIN HFA) 90 mcg/actuation inhaler INHALE 2 PUFFS BY MOUTH INTO THE LUNGS EVERY 6 HOURS    ALBUTEROL INHL Inhale into the lungs 4 (four) times daily as needed.     amLODIPine (NORVASC) 10 MG tablet 1 tablet    ascorbic acid, vitamin C, (VITAMIN C) 1000 MG tablet Take 1,000 mg by mouth once daily.    aspirin (ECOTRIN) 81 MG EC tablet Take 81 mg by mouth once daily.    atenoloL (TENORMIN) 50 MG tablet Take 25 mg by mouth.    azelastine (ASTELIN) 137 mcg (0.1 %) nasal spray USE 1 SPRAY IN EACH NOSTRIL 2 TIMES DAILY    b complex vitamins tablet Take 1 tablet by mouth once daily.    biotin 1 mg tablet Take 1,000 mcg by mouth once daily.    CHANTIX STARTING MONTH BOX 0.5 mg (11)- 1 mg (42) tablet FOLLOW PACKAGE DIRECTIONS     clonazePAM (KLONOPIN) 1 MG tablet 1 tablet daily as needed for anxiery    clonazePAM (KLONOPIN) 2 MG Tab     cyanocobalamin 1,000 mcg/mL injection     dicyclomine (BENTYL) 10 MG capsule     donepeziL (ARICEPT) 10 MG tablet Take 10 mg by mouth.    FLUoxetine 20 MG capsule     fluticasone propionate (FLONASE) 50 mcg/actuation nasal spray 1 spray (50 mcg total) by Each Nostril route once daily.    gabapentin (NEURONTIN) 800 MG tablet 800 mg 3 (three) times daily.     linaCLOtide (LINZESS) 72 mcg Cap capsule Take 1 capsule by mouth once daily.    lisinopriL (PRINIVIL,ZESTRIL) 20 MG tablet 1 tablet    multivitamin with minerals tablet Take 1 tablet by mouth once daily.    ondansetron (ZOFRAN) 4 MG tablet Take 2 tablets (8 mg total) by mouth 2 (two) times daily as needed for Nausea.    ondansetron (ZOFRAN-ODT) 4 MG TbDL PLACE 1 TABLET ON THE TONGUE AND ALLOW TO DISSOLVE EVERY 8 HOURS    OXcarbazepine (TRILEPTAL) 300 MG Tab     OXYGEN-AIR DELIVERY SYSTEMS MISC 6 L by Misc.(Non-Drug; Combo Route) route continuous.    pantoprazole (PROTONIX) 40 MG tablet 1 tablet    polyethylene glycol (GLYCOLAX) 17 gram/dose powder MIX ONE PACKET WITH 8 OUNCES OF FLUID AND DRINK DAILY    polyethylene glycol 1000,bulk, Powd     pravastatin (PRAVACHOL) 20 MG tablet 20 mg once daily.     pulse oximeter (PULSE OXIMETER) device by Apply Externally route 2 (two) times a day. Use twice daily at 8 AM and 3 PM and [facility].    salmeteroL (SEREVENT DISKUS) 50 mcg/dose diskus inhaler 1 puff    sucralfate (CARAFATE) 1 gram tablet Take 1 g by mouth 4 (four) times daily.    sumatriptan (IMITREX) 25 MG Tab TAKE 1 TABLET BY MOUTH AT LEAST 2 HOURS BETWEEN DOSES AS NEEDED    tolterodine (DETROL LA) 2 MG Cp24 2 mg once daily.     topiramate (TOPAMAX) 25 MG tablet Take 25 mg by mouth.    traZODone (DESYREL) 100 MG tablet TAKE 2 AND 1/2 TABLETS BY MOUTH EVERY NIGHT AT BEDTIME    vitamin D (VITAMIN D3)  1000 units Tab Take 1,000 Units by mouth once daily.    ARIPiprazole (ABILIFY) 15 MG Tab 1 tablet    cetirizine (ZYRTEC) 10 MG tablet Take 1 tablet (10 mg total) by mouth once daily.    DULoxetine (CYMBALTA) 60 MG capsule 60 mg 2 (two) times daily.     neomycin-polymyxin-dexamethasone (MAXITROL) 3.5mg/mL-10,000 unit/mL-0.1 % DrpS SHAKE LIQUID AND INSTILL 1 DROP IN BOTH EYES TWICE DAILY    olopatadine (PATADAY) 0.2 % Drop INSTILL 1 DROP IN BOTH EYES TWICE DAILY FOR 30 DAYS    oxybutynin (DITROPAN) 5 MG Tab 1 tablet    tiotropium-olodateroL (STIOLTO RESPIMAT) 2.5-2.5 mcg/actuation Mist 2 puffs    trospium (SANCTURA) 20 mg Tab tablet     vilazodone (VIIBRYD) 20 mg Tab 1 tablet with food     No current facility-administered medications for this visit.       Review of patient's allergies indicates:   Allergen Reactions    Narcof [hydrocodone-guaifenesin]     Ciprofloxacin Diarrhea    Ibuprofen Nausea Only    Naproxen Nausea Only       Family History   Problem Relation Age of Onset    Cancer Mother     Hypertension Father     Heart disease Father     Heart disease Sister     Hypertension Sister     Hypertension Brother     Depression Brother        Social History     Socioeconomic History    Marital status:    Tobacco Use    Smoking status: Current Every Day Smoker     Packs/day: 0.50     Years: 40.00     Pack years: 20.00     Types: Cigarettes    Smokeless tobacco: Never Used   Substance and Sexual Activity    Alcohol use: Yes     Alcohol/week: 1.0 standard drink     Types: 1 Glasses of wine per week     Comment: weekly    Drug use: No       History of present illness:  Patient returns to clinic today for follow-up of her left hip.  We saw her late last year, we ordered an MRI which was completed late December of 2021.  She was experiencing both greater trochanter pain as well as groin pain.  She has had intra-articular hip injections as well as injections of the trochanteric bursa.  She  was noted to have significant arthritis and left hip on x-ray, MRI was for surgical planning.    She continues to have significant left groin pain.  The left greater trochanteric does not bother her as much today.      Review of Systems:    Constitution: Negative for chills, fever, and sweats.  Negative for unexplained weight loss.    HENT:  Negative for headaches and blurry vision.    Cardiovascular:Negative for chest pain or irregular heart beat. Negative for hypertension.    Respiratory:  Negative for cough and shortness of breath.    Gastrointestinal: Negative for abdominal pain, heartburn, melena, nausea, and vomitting.    Genitourinary:  Negative bladder incontinence and dysuria.    Musculoskeletal:  See HPI for details.     Neurological: Negative for numbness.    Psychiatric/Behavioral: Negative for depression.  The patient is not nervous/anxious.      Endocrine: Negative for polyuria    Hematologic/Lymphatic: Negative for bleeding problem.  Does not bruise/bleed easily.    Skin: Negative for poor would healing and rash    Objective:      Physical Examination:    Vital Signs:  There were no vitals filed for this visit.    Body mass index is 26.94 kg/m².    This a well-developed, well nourished patient in no acute distress.  They are alert and oriented and cooperative to examination.        Examination of the left hip, patient has significant pain with flexion extension internal external rotation of the left hip.  She also has some tenderness over the left greater trochanter.  She has prior left knee replacement, she has good range of motion of the left knee.     Pertinent New Results:  IMPRESSION:  Edema within the soft tissues adjacent to the greater trochanter on the left as can be associated with greater trochanteric pain syndrome.     Small area of probable avascular necrosis superiorly within the left femoral head.     Left hip joint osteoarthritis.     Electronically signed by:  Kishor Hart MD   "12/30/2021 7:30 AM [facility] Workstation: [id number]  XRAY Report / Interpretation:       Assessment/Plan:      Chronic left hip pain, AVN of the left femoral head, osteoarthritis of the left hip, left trochanteric bursitis.  Based on patient's x-rays reviewed from June of 2021 showed significant femoral acetabular joint space collapse with findings suggestive of AVN of the femoral head.  MRI with similar findings suggestive of trochanteric bursitis pain syndrome, AVN left femoral head and left hip arthritis.    The patient has undergone prior intra-articular injections of the left hip with only mild to moderate improvement of her pain. I have offered her a repeat intra-articular injection however I do not think this will be of much benefit and if she decides she wishes to have surgery this will prolong her weight to have that done.  However, at this time she is not ready to have surgery.  We will see her back in the office in 2 months.  She has some social issues to iron out prior to undergoing surgery.  Follow-up sooner if she wishes to schedule surgery before then.    Edmond Hirsch PA-C served as a scribe for this patient encounter. A "face to face" encounter occured with Dr. Eliseo Vaca on this date. The treatment plan and medical decision making are outlined as above:      This note was created using Dragon voice recognition software that occasionally misinterpreted phrases or words.          "

## 2022-03-23 ENCOUNTER — OFFICE VISIT (OUTPATIENT)
Dept: ORTHOPEDICS | Facility: CLINIC | Age: 62
End: 2022-03-23
Payer: MEDICAID

## 2022-03-23 VITALS — WEIGHT: 172 LBS | BODY MASS INDEX: 27 KG/M2 | HEIGHT: 67 IN

## 2022-03-23 DIAGNOSIS — M50.30 DEGENERATIVE DISC DISEASE, CERVICAL: ICD-10-CM

## 2022-03-23 DIAGNOSIS — M54.12 CERVICAL RADICULITIS: ICD-10-CM

## 2022-03-23 DIAGNOSIS — M47.22 CERVICAL SPONDYLOSIS WITH RADICULOPATHY: ICD-10-CM

## 2022-03-23 DIAGNOSIS — M48.07 LUMBOSACRAL STENOSIS: ICD-10-CM

## 2022-03-23 DIAGNOSIS — M47.816 LUMBAR FACET ARTHROPATHY: Primary | ICD-10-CM

## 2022-03-23 PROCEDURE — 1160F PR REVIEW ALL MEDS BY PRESCRIBER/CLIN PHARMACIST DOCUMENTED: ICD-10-PCS | Mod: S$GLB,,, | Performed by: ORTHOPAEDIC SURGERY

## 2022-03-23 PROCEDURE — 1160F RVW MEDS BY RX/DR IN RCRD: CPT | Mod: S$GLB,,, | Performed by: ORTHOPAEDIC SURGERY

## 2022-03-23 PROCEDURE — 3008F PR BODY MASS INDEX (BMI) DOCUMENTED: ICD-10-PCS | Mod: S$GLB,,, | Performed by: ORTHOPAEDIC SURGERY

## 2022-03-23 PROCEDURE — 99213 PR OFFICE/OUTPT VISIT, EST, LEVL III, 20-29 MIN: ICD-10-PCS | Mod: S$GLB,,, | Performed by: ORTHOPAEDIC SURGERY

## 2022-03-23 PROCEDURE — 99213 OFFICE O/P EST LOW 20 MIN: CPT | Mod: S$GLB,,, | Performed by: ORTHOPAEDIC SURGERY

## 2022-03-23 PROCEDURE — 3008F BODY MASS INDEX DOCD: CPT | Mod: S$GLB,,, | Performed by: ORTHOPAEDIC SURGERY

## 2022-03-23 NOTE — PROGRESS NOTES
Subjective:       Patient ID: Nimco Caro is a 61 y.o. female.    Chief Complaint: Pain of the Spine (Pt is here to f/up on cervical & lumbar PT, states PT has helped. ) and Pain of the Neck      History of Present Illness    Prior to meeting with the patient I reviewed the medical chart in [facility]. This included reviewing the previous progress notes from our office, review of the patient's last appointment with their primary care provider, review of any visits to the emergency room, and review of any pain management appointments or procedures.   Patient is here follow-up for chronic low back pain with bilateral lower extremity numbness and dysesthesia.  Present at rest but worse with activity.  Denies any bowel bladder incontinence.  Onset of lumbar symptoms correlates to a fall about 4 years ago.  She is also here follow-up for recent onset of neck pain with bilateral upper extremity numbness and dysesthesia.  Cervical pain limits her cervical range of motion.  Denies any history of injury or incident that may have instigated her symptoms regarding her neck.    At the time of her initial evaluation she was referred to physical therapy for treatment.  She attended physical therapy for approximately 8 weeks for both her neck and her lumbar spine and unfortunately it did not make much of a difference in her symptoms.  In fact, her low back pain has become severe and limits her ability to participate in activities of daily life.    Current Medications  Current Outpatient Medications   Medication Sig Dispense Refill    albuterol (PROVENTIL/VENTOLIN HFA) 90 mcg/actuation inhaler INHALE 2 PUFFS BY MOUTH INTO THE LUNGS EVERY 6 HOURS      ALBUTEROL INHL Inhale into the lungs 4 (four) times daily as needed.       amLODIPine (NORVASC) 10 MG tablet 1 tablet      ARIPiprazole (ABILIFY) 15 MG Tab 1 tablet      ascorbic acid, vitamin C, (VITAMIN C) 1000 MG tablet Take 1,000 mg by mouth once daily.      atenoloL (TENORMIN)  50 MG tablet Take 25 mg by mouth.      azelastine (ASTELIN) 137 mcg (0.1 %) nasal spray USE 1 SPRAY IN EACH NOSTRIL 2 TIMES DAILY 30 mL 0    b complex vitamins tablet Take 1 tablet by mouth once daily.      biotin 1 mg tablet Take 1,000 mcg by mouth once daily.      CHANTIX STARTING MONTH BOX 0.5 mg (11)- 1 mg (42) tablet FOLLOW PACKAGE DIRECTIONS      clonazePAM (KLONOPIN) 1 MG tablet 1 tablet daily as needed for anxiery  2    clonazePAM (KLONOPIN) 2 MG Tab       cyanocobalamin 1,000 mcg/mL injection       dicyclomine (BENTYL) 10 MG capsule       donepeziL (ARICEPT) 10 MG tablet Take 10 mg by mouth.      DULoxetine (CYMBALTA) 60 MG capsule 60 mg 2 (two) times daily.       FLUoxetine 20 MG capsule       fluticasone propionate (FLONASE) 50 mcg/actuation nasal spray 1 spray (50 mcg total) by Each Nostril route once daily. 15.8 mL 0    gabapentin (NEURONTIN) 800 MG tablet 800 mg 3 (three) times daily.       linaCLOtide (LINZESS) 72 mcg Cap capsule Take 1 capsule by mouth once daily.      lisinopriL (PRINIVIL,ZESTRIL) 20 MG tablet 1 tablet      multivitamin with minerals tablet Take 1 tablet by mouth once daily.      neomycin-polymyxin-dexamethasone (MAXITROL) 3.5mg/mL-10,000 unit/mL-0.1 % DrpS SHAKE LIQUID AND INSTILL 1 DROP IN BOTH EYES TWICE DAILY      olopatadine (PATADAY) 0.2 % Drop INSTILL 1 DROP IN BOTH EYES TWICE DAILY FOR 30 DAYS      ondansetron (ZOFRAN) 4 MG tablet Take 2 tablets (8 mg total) by mouth 2 (two) times daily as needed for Nausea.      ondansetron (ZOFRAN-ODT) 4 MG TbDL PLACE 1 TABLET ON THE TONGUE AND ALLOW TO DISSOLVE EVERY 8 HOURS      OXcarbazepine (TRILEPTAL) 300 MG Tab       oxybutynin (DITROPAN) 5 MG Tab 1 tablet      OXYGEN-AIR DELIVERY SYSTEMS MISC 6 L by Misc.(Non-Drug; Combo Route) route continuous.      pantoprazole (PROTONIX) 40 MG tablet 1 tablet      polyethylene glycol (GLYCOLAX) 17 gram/dose powder MIX ONE PACKET WITH 8 OUNCES OF FLUID AND DRINK DAILY       polyethylene glycol 1000,bulk, Powd       pravastatin (PRAVACHOL) 20 MG tablet 20 mg once daily.       pulse oximeter (PULSE OXIMETER) device by Apply Externally route 2 (two) times a day. Use twice daily at 8 AM and 3 PM and record the value in MyChart as directed. 1 each 0    salmeteroL (SEREVENT DISKUS) 50 mcg/dose diskus inhaler 1 puff      sucralfate (CARAFATE) 1 gram tablet Take 1 g by mouth 4 (four) times daily.      sumatriptan (IMITREX) 25 MG Tab TAKE 1 TABLET BY MOUTH AT LEAST 2 HOURS BETWEEN DOSES AS NEEDED      tiotropium-olodateroL (STIOLTO RESPIMAT) 2.5-2.5 mcg/actuation Mist 2 puffs      tolterodine (DETROL LA) 2 MG Cp24 2 mg once daily.       topiramate (TOPAMAX) 25 MG tablet Take 25 mg by mouth.      traZODone (DESYREL) 100 MG tablet TAKE 2 AND 1/2 TABLETS BY MOUTH EVERY NIGHT AT BEDTIME      trospium (SANCTURA) 20 mg Tab tablet       vilazodone (VIIBRYD) 20 mg Tab 1 tablet with food      vitamin D (VITAMIN D3) 1000 units Tab Take 1,000 Units by mouth once daily.      aspirin (ECOTRIN) 81 MG EC tablet Take 81 mg by mouth once daily.      cetirizine (ZYRTEC) 10 MG tablet Take 1 tablet (10 mg total) by mouth once daily. 30 tablet 0     No current facility-administered medications for this visit.       Allergies  Review of patient's allergies indicates:   Allergen Reactions    Narcof [hydrocodone-guaifenesin]     Ciprofloxacin Diarrhea    Ibuprofen Nausea Only    Naproxen Nausea Only       Past Medical History  Past Medical History:   Diagnosis Date    Anxiety     Bipolar affect, depressed     Cancer ovarian; uterine    1992    COPD (chronic obstructive pulmonary disease)     GERD (gastroesophageal reflux disease)     Hyperlipidemia     Hypertension     OAB (overactive bladder)        Surgical History  Past Surgical History:   Procedure Laterality Date    BREAST CYST ASPIRATION      COLON SURGERY      COLONOSCOPY      HERNIA REPAIR N/A 2016    HIP ARTHROPLASTY Right  2/25/2019    Procedure: ARTHROPLASTY, HIP;  Surgeon: Eliseo Vaca MD;  Location: [facility] OR;  Service: Orthopedics;  Laterality: Right;  Daniel Herrera    HYSTERECTOMY  total    JOINT REPLACEMENT Right     hip replacemnt    KNEE ARTHROPLASTY Left 8/19/2019    Procedure: ARTHROPLASTY, KNEE;  Surgeon: Eliseo Vaca MD;  Location: [facility] OR;  Service: Orthopedics;  Laterality: Left;    REVISION COLOSTOMY N/A 2012       Family History:   Family History   Problem Relation Age of Onset    Cancer Mother     Hypertension Father     Heart disease Father     Heart disease Sister     Hypertension Sister     Hypertension Brother     Depression Brother        Social History:   Social History     Socioeconomic History    Marital status:    Tobacco Use    Smoking status: Current Every Day Smoker     Packs/day: 0.50     Years: 40.00     Pack years: 20.00     Types: Cigarettes    Smokeless tobacco: Never Used   Substance and Sexual Activity    Alcohol use: Yes     Alcohol/week: 1.0 standard drink     Types: 1 Glasses of wine per week     Comment: weekly    Drug use: No       Hospitalization/Major Diagnostic Procedure:     Review of Systems     General/Constitutional:  Chills denies. Fatigue denies. Fever denies. Weight gain denies. Weight loss denies.    Respiratory:  Shortness of breath denies.    Cardiovascular:  Chest pain denies.    Gastrointestinal:  Constipation denies. Diarrhea denies. Nausea denies. Vomiting denies.     Hematology:  Easy bruising denies. Prolonged bleeding denies.     Genitourinary:  Frequent urination denies. Pain in lower back denies. Painful urination denies.     Musculoskeletal:  See HPI for details    Skin:  Rash denies.    Neurologic:  Dizziness denies. Gait abnormalities denies. Seizures denies. Tingling/Numbess denies.    Psychiatric:  Anxiety denies. Depressed mood denies.     Objective:   Vital Signs: There were no vitals filed for this visit.     Physical Exam      General  Examination:     Constitutional: The patient is alert and oriented to lace person and time. Mood is pleasant.     Head/Face: Normal facial features normal eyebrows    Eyes: Normal extraocular motion bilaterally    Lungs: Respirations are equal and unlabored    Gait is coordinated.    Cardiovascular: There are no swelling or varicosities present.    Lymphatic: Negative for adenopathy    Skin: Normal    Neurological: Level of consciousness normal. Oriented to place person and time and situation    Psychiatric: Oriented to time place person and situation    Tenderness posterior cervical paraspinous muscles no spasm noted range of motion restricted 20% Spurling's maneuver causes neck pain only Phalen's and Tinel's test negative reflex symmetrical with hypoactive motor exam grossly intact.  Lumbar exam shows tenderness both posterior iliac spine regions no spasm pain with extension and bending reproduces pain range of motion limited 50% hip and knee range of motion normal.  Straight leg raising maneuver weakly positive left side motor exam normal both lower extremities.  No edema adenopathy varicosities noted either lower extremity motor exam intact.    XRAY Report/ Interpretation:  No new studies today      Assessment:       1. Lumbar facet arthropathy    2. Lumbosacral stenosis    3. Cervical spondylosis with radiculopathy    4. Cervical radiculitis    5. Degenerative disc disease, cervical        Plan:       Nimco was seen today for pain and pain.    Diagnoses and all orders for this visit:    Lumbar facet arthropathy  -     MRI Cervical Spine Without Contrast; Future  -     MRI Lumbar Spine Without Contrast; Future    Lumbosacral stenosis  -     MRI Cervical Spine Without Contrast; Future  -     MRI Lumbar Spine Without Contrast; Future    Cervical spondylosis with radiculopathy    Cervical radiculitis    Degenerative disc disease, cervical         No follow-ups on file.  Fede Jeter, physician's assistant  "served in the capacity as a "scribe" for this patient encounter  A "face to face" encounter occurred with Dr. Jaimes on this date  The treatment plan and medical decision making is outlined below:  At this time secondary to the fact that her back and her neck issues are chronic and really of exacerbated significantly as of late we recommends a cervical MRI without contrast and a lumbar MRI without contrast.  Follow up after the studies are complete so we can review them.  The 1st treatment option would be to refer her to Pain Management for interventional treatment options and injections.  If this fails and her symptoms remain severe and debilitating then we will discuss the option of surgery either for her neck or her back or both.    Treatment options were discussed with regards to the nature of the medical condition. Conservative pain intervention and surgical options were discussed in detail. The probability of success of each separate treatment option was discussed. The patient expressed a clear understanding of the treatment options. With regards to surgery, the procedure risk, benefits, complications, and outcomes were discussed. No guarantees were given with regards to surgical outcome.   The risk of complications, morbidity, and mortality of patient management decisions have been made at the time of this visit. These are associated with the patient's problems, diagnostic procedures and treatment options. This includes the possible management options selected and those considered but not selected by the patient after shared medical decision making we discussed with the patient.     This note was created using Dragon voice recognition software that occasionally misinterpreted phrases or words.    "

## 2022-03-31 ENCOUNTER — TELEPHONE (OUTPATIENT)
Dept: ORTHOPEDICS | Facility: CLINIC | Age: 62
End: 2022-03-31
Payer: MEDICAID

## 2022-03-31 DIAGNOSIS — M47.22 CERVICAL SPONDYLOSIS WITH RADICULOPATHY: Primary | ICD-10-CM

## 2022-03-31 DIAGNOSIS — M47.22 CERVICAL SPONDYLOSIS WITH RADICULOPATHY: ICD-10-CM

## 2022-03-31 DIAGNOSIS — M47.816 LUMBAR FACET ARTHROPATHY: Primary | ICD-10-CM

## 2022-03-31 DIAGNOSIS — M47.816 LUMBAR FACET ARTHROPATHY: ICD-10-CM

## 2022-03-31 NOTE — TELEPHONE ENCOUNTER
----- Message from Salome Dooley sent at 3/31/2022  1:41 PM CDT -----  Patients MRI for Cervical & Lumbar were denied.  Please send a referral for 6w of P.T.  Thank you

## 2022-04-26 ENCOUNTER — CLINICAL SUPPORT (OUTPATIENT)
Dept: REHABILITATION | Facility: HOSPITAL | Age: 62
End: 2022-04-26
Attending: ORTHOPAEDIC SURGERY
Payer: MEDICAID

## 2022-04-26 DIAGNOSIS — M54.16 CHRONIC RADICULAR LUMBAR PAIN: ICD-10-CM

## 2022-04-26 DIAGNOSIS — M47.22 CERVICAL SPONDYLOSIS WITH RADICULOPATHY: ICD-10-CM

## 2022-04-26 DIAGNOSIS — M47.816 LUMBAR FACET ARTHROPATHY: ICD-10-CM

## 2022-04-26 DIAGNOSIS — M54.2 CHRONIC CERVICAL PAIN: ICD-10-CM

## 2022-04-26 DIAGNOSIS — G89.29 CHRONIC CERVICAL PAIN: ICD-10-CM

## 2022-04-26 DIAGNOSIS — G89.29 CHRONIC RADICULAR LUMBAR PAIN: ICD-10-CM

## 2022-04-26 PROCEDURE — 97161 PT EVAL LOW COMPLEX 20 MIN: CPT | Mod: PN

## 2022-04-26 NOTE — PLAN OF CARE
[facility]  Physical Therapy Initial Evaluation    Name: Nimco Caro  [facility] Number: [id number]    Therapy Diagnosis:   Encounter Diagnoses   Name Primary?    Lumbar facet arthropathy     Cervical spondylosis with radiculopathy     Chronic cervical pain     Chronic radicular lumbar pain      Physician: Joby Jaimes MD    Physician Orders: PT Eval and Treat   Medical Diagnosis from Referral:   M47.816 (ICD-10-CM) - Lumbar facet arthropathy   M47.22 (ICD-10-CM) - Cervical spondylosis with radiculopathy   Evaluation Date: 4/26/2022  Authorization Period Expiration: 12/31/22  Plan of Care Expiration: 7/10/2022 or 12v post eval  Visit # (total)/ Visits authorized: 1/ eval (POC 0 / 12 )  NO FOTO  Progress Note Due: 5/26/2022    Time In: 915  Time Out: 959  Total Billable Time: 44 minutes    Precautions: Diverticulitis current; HTN; COPD; Anxiety  Insurance: Payor: MEDICAID / Plan: [employer] / Product Type: Managed Medicaid /     Subjective   Date of onset: 1993 work injury; 4 years ago   History of current condition - Nimco reports: she was unable to return to therapy due to Covid, teeth issues, and a family tragedy. She has to return to tx in order to get MRI for her back. Pt continues to have low back pain since a work injury in 1993 and a fall 4 years ago. Pain in her neck feels like it is burning while she is washing dishes; occasional n/t down her LUE. Reports her low back pain gets so bad it feels like somebody is stabbing her; can get a numbness down the left leg although has not gotten it lately. Reports she has memory issues and it is difficult to keep things straight.     Medical History:   Past Medical History:   Diagnosis Date    Anxiety     Bipolar affect, depressed     Cancer ovarian; uterine    1992    COPD (chronic obstructive pulmonary disease)     GERD (gastroesophageal reflux disease)     Hyperlipidemia     Hypertension     OAB (overactive  bladder)        Surgical History:   Nimco Caro  has a past surgical history that includes Hysterectomy (total); Revision Colostomy (N/A, 2012); Hernia repair (N/A, 2016); Colon surgery; Colonoscopy; Hip Arthroplasty (Right, 2/25/2019); Joint replacement (Right); Knee Arthroplasty (Left, 8/19/2019); and Breast cyst aspiration.    Medications:   Nimco has a current medication list which includes the following prescription(s): albuterol, albuterol, amlodipine, aripiprazole, ascorbic acid (vitamin c), aspirin, atenolol, azelastine, b complex vitamins, biotin, cetirizine, chantix starting month box, clonazepam, clonazepam, cyanocobalamin, dicyclomine, donepezil, duloxetine, fluoxetine, fluticasone propionate, gabapentin, linaclotide, lisinopril, multivitamin with minerals, neomycin-polymyxin-dexamethasone, olopatadine, ondansetron, ondansetron, oxcarbazepine, oxybutynin, oxygen-air delivery systems, pantoprazole, polyethylene glycol, polyethylene glycol 1000(bulk), pravastatin, pulse oximeter, serevent diskus, sucralfate, sumatriptan, stiolto respimat, tolterodine, topiramate, trazodone, trospium, viibryd, and vitamin d.    Allergies:   Review of patient's allergies indicates:   Allergen Reactions    Narcof [hydrocodone-guaifenesin]     Ciprofloxacin Diarrhea    Ibuprofen Nausea Only    Naproxen Nausea Only        Imaging, see in Epic under MD office visit:  Lumbar Findings:  Slight disc space narrowing L4-5 L5-S1 with facet joint sclerosis multiple levels lower lumbar spine indicative of facet arthropathy and degenerative disc disease  Cervical Findings:  Mild facet joint sclerosis some mild disc space narrowing C5-6 and C6-7 indicative of cervical degenerative disc disease  No MRI in EPIC     Prior Therapy: yes  Social History: lives with friend; 1-story home  Current Smoker: yes; 4 cigarettes a day  Cancer Hx: yes  Recent Weight Loss: no  Limb dominance: left handed  Bowel or Bladder Issues: incontinence and  constipation  Falls in last 6 months: 3 months ago; tripped and fell after tooth sx  Occupation: unemployed  Prior Level of Function: independent  Current Level of Function: difficult/pain with dressing; bathing; driving long distances; chores - bending over; pain at night / neck hurts to turn head     Pain:  Current 3/10, worst 10/10, best 3/10   Location: Sacrum and inferior midline lumbar spine; posterior neck  Description: stabbing; burning; occasional n/t down LUE or Left Lower Extremity - this has stopped  Aggravating Factors: difficult/pain with dressing; bathing; driving long distances; chores - bending over, pain at night / neck hurts to turn head  Easing Factors: heating pad     Pts goals: get better; get MRI imaging for back    Objective     Posture / IC / ASIS / PSIS: Right trunk lean; Bilateral knee genu valgum; decreased l/s lordosis; moderate t/s kyphosis and FHP  Palpation: TTP Bilateral UT and MT; midline and QL lumbosacral regions  Sensation: NT     Range of Motion/Strength:      CERVICAL AROM  In degrees Pain/Dysfunction with Movement   Flexion (45 norm) 20     Extension (75 norm) 30     Right side bending  (35 norm) 40     Left side bending  (35 norm) 40     Right rotation  (65 norm) 45     Left rotation  (65 norm) 40        Seated Thoracic Spine Screen & Observation:  Limited thoracic mobility in extension     Lumbar AROM: ROM-   Response to repeated movements   Flexion 20 cm from floor - stab pn   Extension (15 norm) 0 degrees - scared to fall (arc of motion range: 5 deg flexed to 0 deg ext)   Right side bending  (20 norm) 18 degrees    Left side bending  (20 norm) 20 degrees   Hip extension: 5 degrees FF in standing     UE MMT:  5/5 norm Left Right   Shoulder Flexion 3+/5 3+/5           Shoulder Abduction 4/5 4/5           Shoulder IR 4+/5 4+/5   Shoulder ER  @ 0* Abduction 4/5 4/5   Elbow Flexion  4+/5 4+/5   Elbow Extension 4/5 4/5   Mid Traps 3-/5 3-/5   Low Traps 2/5 2/5         LE  "MMT:  5/5 norm Left Right   Hip Flexion 4/5 4/5   Hip Extension 3+/5 3+/5   Hip Abduction 3+/5 3+/5           Hip IR 4/5 4/5   Hip ER 4/5 3+/5   Knee Flexion 4/5 4/5   Knee Extension 4+/5 4+/5   Ankle DF 5/5 5/5           Ankle Inversion 5/5 5/5   Ankle Eversion 5/5 5/5          Strength: WNL      Flexibility Left Right   Hamstrings @ 90/90 (-20 norm) -45 degrees -30 degrees   Cervical musculature flexibility: (restriction: mild, moderate, severe grading)   Muscle Flexibility   Upper trapezius Severe Bilateral    Pectoralis severe      Gait Without AD   Analysis Antalgic; Right trunk lean; dec WB on left         Special Tests:  Left Right   Slump negative negative.           FADDIR negative. negative.   DENISE positive. positive.         LLD (leg length discrepancy): neg and even today  Supine to Sit for innominate rotation: neg today      TREATMENT   Treatment Time In: 945  Treatment Time Out: 959  Total Treatment time separate from Evaluation: 14 minutes    Nimco received therapeutic exercises to develop flexibility and posture for 14 minutes including:    Reviewed and re-printed therex in red today    SEATED:   Upright Posture, Activate Abs, Sit tall, Shoulders relax, head on top of spine - 10x5s  HS stretch, 2x30s  Single UE Chicken wings, 2x10 (one at a time, trying to release tension in her shoulders) - or retro rolls or shrugs  Cervical Retraction with YTB, 10x5s, "like a chicken", she held band  Bilateral reuben ER, 20x5s (No band - flared up her carpal tunnel)  T/s extension with HBH, 5x10s  Trapezius stretch, Bilateral  3x10s     TABLE:  Seated RTB ER (clamshells) on table, 2x15 VC keep feet together (block if needed)  Lumbar rotations, 20x Bilateral  MINI Bridges, 2x10, (add RTB next)  Supine Bilateral Sciatic Nerve Glide, 20x ankle pumps Bilateral (utilize as a HS stretch)  Frog adductor stretch, 3x30s  Piriformis stretch, just cross ankle over knee, 2x30s  Sony stretch, 2x30s Bilateral   Open books, " 5x, 10sec hold, Bilateral      STAND:   Standing Doorway stretch, low V arms, 3x15s, relax UT        Add NEXT:   Sleeping position education  Abdominal strengthening  FUTURE:   Pre Gait training with core activation  Standing Left femoral nerve glide, 2x10 - if radic anterior  HR / TR, 20x ea   Standing marching, 1 minuteStand HF, 10x Bilateral   Stand HE with slight Forward trunk flexion, 10 x Bilateral   RTB below knees, Lateral steps x 4 R/L each, in //bars  SLS 2 x 30 sec Bilateral   FSU stair, 20x  Bike?  Balance training  Hip mobilization and distraction  Mechanical traction would have to be cleared by MD  Re-measure innominates 6th visit    Home Exercises and Patient Education Provided    Education provided:   - daily HEP    Written Home Exercises Provided: yes.  Exercises were reviewed and Nimco was able to demonstrate them prior to the end of the session.  Nimco demonstrated good  understanding of the education provided.     See EMR under Patient Instructions for exercises provided 4/26/2022.    Assessment   Nimco is a 61 y.o. female referred to outpatient Physical Therapy with a medical diagnosis of cervical and lumbar pain and radiculopathy. Pt presents with pain primarily in Right posterior side of neck and UT with occasional radiculopathy down LUE, across lumbosacral region with occasional radiating into left hip and thigh, core, BUE, BLE, and hip weakness, decreased hip, UT and pectoralis flexibility, poor thoracic mobility, and posture, gait and cognitive deficits.  Pt prognosis is Fair.   Pt will benefit from skilled outpatient Physical Therapy to address the deficits stated above and in the chart below, provide pt/family education, and to maximize pt's level of independence.     Plan of care discussed with patient: Yes  Pt's spiritual, cultural and educational needs considered and patient is agreeable to the plan of care and goals as stated below:     Anticipated Barriers for therapy:  compliance    Medical Necessity is demonstrated by the following  History  Co-morbidities and personal factors that may impact the plan of care Co-morbidities:   HTN; COPD; Anxiety; Difficulty sleeping; Cognitive deficits    Personal Factors:   coping style  social background  lifestyle     moderate   Examination  Body Structures and Functions, activity limitations and participation restrictions that may impact the plan of care Body Regions:   neck  back  trunk    Body Systems:    ROM  strength  balance  gait  transitions  motor control  motor learning  blood pressure  posture    Participation Restrictions:   ADLs, chores, and community    Activity limitations:   Learning and applying knowledge  no deficits    General Tasks and Commands  no deficits    Communication  no deficits    Mobility  lifting and carrying objects  walking  driving (bike, car, motorcycle)    Self care  washing oneself (bathing, drying, washing hands)  dressing    Domestic Life  shopping  cooking  doing house work (cleaning house, washing dishes, laundry)  assisting others    Interactions/Relationships  no deficits    Life Areas  no deficits    Community and Social Life  community life  recreation and leisure         moderate   Clinical Presentation evolving clinical presentation with changing clinical characteristics moderate   Decision Making/ Complexity Score: moderate     Goals:  Short Term GOALS: 3 weeks  1. Patient is independent with HEP.   2. Patient demonstrates independence with core activation and postural awareness while sitting and standing.   3. Patient will reduce pn to 2/10 while performing daily activities.  4. Patient will reduce LE radiculopathy frequency and intensity.     Long Term GOALS: 6 weeks.   1. Patient demonstrates increased l/s extension and hip extension Range of Motion to 0 degrees to improve tolerance to standing.  2. Patient demonstrates increased core, hip and BLE strength by 1/2 grade or greater to improve  tolerance to home chores.  3. Patient demonstrates independence with body mechanics while performing chores and helping roomate.  Plan   Plan of care Certification: 4/26/2022 to 7/10/2022.    Outpatient Physical Therapy 2 times weekly for 6 weeks to include the following interventions: Electrical Stimulation ,, Gait Training, Manual Therapy, Moist Heat/ Ice, Neuromuscular Re-ed, Patient Education, Self Care, Therapeutic Activities and Therapeutic Exercise.     Jennie Osorio, PT

## 2022-04-26 NOTE — PROGRESS NOTES
[facility]  Physical Therapy Initial Evaluation    Name: Nimco Caro  [facility] Number: [id number]    Therapy Diagnosis:   Encounter Diagnoses   Name Primary?    Lumbar facet arthropathy     Cervical spondylosis with radiculopathy     Chronic cervical pain     Chronic radicular lumbar pain      Physician: Joby Jaimes MD    Physician Orders: PT Eval and Treat   Medical Diagnosis from Referral:   M47.816 (ICD-10-CM) - Lumbar facet arthropathy   M47.22 (ICD-10-CM) - Cervical spondylosis with radiculopathy   Evaluation Date: 4/26/2022  Authorization Period Expiration: 12/31/22  Plan of Care Expiration: 7/10/2022 or 12v post eval  Visit # (total)/ Visits authorized: 1/ eval (POC 0 / 12 )  NO FOTO  Progress Note Due: 5/26/2022    Time In: 915  Time Out: 959  Total Billable Time: 44 minutes    Precautions: Diverticulitis current; HTN; COPD; Anxiety  Insurance: Payor: MEDICAID / Plan: [employer] / Product Type: Managed Medicaid /     Subjective   Date of onset: 1993 work injury; 4 years ago   History of current condition - Nimco reports: she was unable to return to therapy due to Covid, teeth issues, and a family tragedy. She has to return to tx in order to get MRI for her back. Pt continues to have low back pain since a work injury in 1993 and a fall 4 years ago. Pain in her neck feels like it is burning while she is washing dishes; occasional n/t down her LUE. Reports her low back pain gets so bad it feels like somebody is stabbing her; can get a numbness down the left leg although has not gotten it lately. Reports she has memory issues and it is difficult to keep things straight.     Medical History:   Past Medical History:   Diagnosis Date    Anxiety     Bipolar affect, depressed     Cancer ovarian; uterine    1992    COPD (chronic obstructive pulmonary disease)     GERD (gastroesophageal reflux disease)     Hyperlipidemia     Hypertension     OAB (overactive  bladder)        Surgical History:   Nimco Caro  has a past surgical history that includes Hysterectomy (total); Revision Colostomy (N/A, 2012); Hernia repair (N/A, 2016); Colon surgery; Colonoscopy; Hip Arthroplasty (Right, 2/25/2019); Joint replacement (Right); Knee Arthroplasty (Left, 8/19/2019); and Breast cyst aspiration.    Medications:   Nimco has a current medication list which includes the following prescription(s): albuterol, albuterol, amlodipine, aripiprazole, ascorbic acid (vitamin c), aspirin, atenolol, azelastine, b complex vitamins, biotin, cetirizine, chantix starting month box, clonazepam, clonazepam, cyanocobalamin, dicyclomine, donepezil, duloxetine, fluoxetine, fluticasone propionate, gabapentin, linaclotide, lisinopril, multivitamin with minerals, neomycin-polymyxin-dexamethasone, olopatadine, ondansetron, ondansetron, oxcarbazepine, oxybutynin, oxygen-air delivery systems, pantoprazole, polyethylene glycol, polyethylene glycol 1000(bulk), pravastatin, pulse oximeter, serevent diskus, sucralfate, sumatriptan, stiolto respimat, tolterodine, topiramate, trazodone, trospium, viibryd, and vitamin d.    Allergies:   Review of patient's allergies indicates:   Allergen Reactions    Narcof [hydrocodone-guaifenesin]     Ciprofloxacin Diarrhea    Ibuprofen Nausea Only    Naproxen Nausea Only        Imaging, see in Epic under MD office visit:  Lumbar Findings:  Slight disc space narrowing L4-5 L5-S1 with facet joint sclerosis multiple levels lower lumbar spine indicative of facet arthropathy and degenerative disc disease  Cervical Findings:  Mild facet joint sclerosis some mild disc space narrowing C5-6 and C6-7 indicative of cervical degenerative disc disease  No MRI in EPIC     Prior Therapy: yes  Social History: lives with friend; 1-story home  Current Smoker: yes; 4 cigarettes a day  Cancer Hx: yes  Recent Weight Loss: no  Limb dominance: left handed  Bowel or Bladder Issues: incontinence and  constipation  Falls in last 6 months: 3 months ago; tripped and fell after tooth sx  Occupation: unemployed  Prior Level of Function: independent  Current Level of Function: difficult/pain with dressing; bathing; driving long distances; chores - bending over; pain at night / neck hurts to turn head     Pain:  Current 3/10, worst 10/10, best 3/10   Location: Sacrum and inferior midline lumbar spine; posterior neck  Description: stabbing; burning; occasional n/t down LUE or Left Lower Extremity - this has stopped  Aggravating Factors: difficult/pain with dressing; bathing; driving long distances; chores - bending over, pain at night / neck hurts to turn head  Easing Factors: heating pad     Pts goals: get better; get MRI imaging for back    Objective     Posture / IC / ASIS / PSIS: Right trunk lean; Bilateral knee genu valgum; decreased l/s lordosis; moderate t/s kyphosis and FHP  Palpation: TTP Bilateral UT and MT; midline and QL lumbosacral regions  Sensation: NT     Range of Motion/Strength:      CERVICAL AROM  In degrees Pain/Dysfunction with Movement   Flexion (45 norm) 20     Extension (75 norm) 30     Right side bending  (35 norm) 40     Left side bending  (35 norm) 40     Right rotation  (65 norm) 45     Left rotation  (65 norm) 40        Seated Thoracic Spine Screen & Observation:  Limited thoracic mobility in extension     Lumbar AROM: ROM-   Response to repeated movements   Flexion 20 cm from floor - stab pn   Extension (15 norm) 0 degrees - scared to fall (arc of motion range: 5 deg flexed to 0 deg ext)   Right side bending  (20 norm) 18 degrees    Left side bending  (20 norm) 20 degrees   Hip extension: 5 degrees FF in standing     UE MMT:  5/5 norm Left Right   Shoulder Flexion 3+/5 3+/5           Shoulder Abduction 4/5 4/5           Shoulder IR 4+/5 4+/5   Shoulder ER  @ 0* Abduction 4/5 4/5   Elbow Flexion  4+/5 4+/5   Elbow Extension 4/5 4/5   Mid Traps 3-/5 3-/5   Low Traps 2/5 2/5         LE  "MMT:  5/5 norm Left Right   Hip Flexion 4/5 4/5   Hip Extension 3+/5 3+/5   Hip Abduction 3+/5 3+/5           Hip IR 4/5 4/5   Hip ER 4/5 3+/5   Knee Flexion 4/5 4/5   Knee Extension 4+/5 4+/5   Ankle DF 5/5 5/5           Ankle Inversion 5/5 5/5   Ankle Eversion 5/5 5/5          Strength: WNL      Flexibility Left Right   Hamstrings @ 90/90 (-20 norm) -45 degrees -30 degrees   Cervical musculature flexibility: (restriction: mild, moderate, severe grading)   Muscle Flexibility   Upper trapezius Severe Bilateral    Pectoralis severe      Gait Without AD   Analysis Antalgic; Right trunk lean; dec WB on left         Special Tests:  Left Right   Slump negative negative.           FADDIR negative. negative.   DENISE positive. positive.         LLD (leg length discrepancy): neg and even today  Supine to Sit for innominate rotation: neg today      TREATMENT   Treatment Time In: 945  Treatment Time Out: 959  Total Treatment time separate from Evaluation: 14 minutes    Nimco received therapeutic exercises to develop flexibility and posture for 14 minutes including:    Reviewed and re-printed therex in red today    SEATED:   Upright Posture, Activate Abs, Sit tall, Shoulders relax, head on top of spine - 10x5s  HS stretch, 2x30s  Single UE Chicken wings, 2x10 (one at a time, trying to release tension in her shoulders) - or retro rolls or shrugs  Cervical Retraction with YTB, 10x5s, "like a chicken", she held band  Bilateral reuben ER, 20x5s (No band - flared up her carpal tunnel)  T/s extension with HBH, 5x10s  Trapezius stretch, Bilateral  3x10s     TABLE:  Seated RTB ER (clamshells) on table, 2x15 VC keep feet together (block if needed)  Lumbar rotations, 20x Bilateral  MINI Bridges, 2x10, (add RTB next)  Supine Bilateral Sciatic Nerve Glide, 20x ankle pumps Bilateral (utilize as a HS stretch)  Frog adductor stretch, 3x30s  Piriformis stretch, just cross ankle over knee, 2x30s  Sony stretch, 2x30s Bilateral   Open books, " 5x, 10sec hold, Bilateral      STAND:   Standing Doorway stretch, low V arms, 3x15s, relax UT        Add NEXT:   Sleeping position education  Abdominal strengthening  FUTURE:   Pre Gait training with core activation  Standing Left femoral nerve glide, 2x10 - if radic anterior  HR / TR, 20x ea   Standing marching, 1 minuteStand HF, 10x Bilateral   Stand HE with slight Forward trunk flexion, 10 x Bilateral   RTB below knees, Lateral steps x 4 R/L each, in //bars  SLS 2 x 30 sec Bilateral   FSU stair, 20x  Bike?  Balance training  Hip mobilization and distraction  Mechanical traction would have to be cleared by MD  Re-measure innominates 6th visit    Home Exercises and Patient Education Provided    Education provided:   - daily HEP    Written Home Exercises Provided: yes.  Exercises were reviewed and Nimco was able to demonstrate them prior to the end of the session.  Nimco demonstrated good  understanding of the education provided.     See EMR under Patient Instructions for exercises provided 4/26/2022.    Assessment   Nimco is a 61 y.o. female referred to outpatient Physical Therapy with a medical diagnosis of cervical and lumbar pain and radiculopathy. Pt presents with pain primarily in Right posterior side of neck and UT with occasional radiculopathy down LUE, across lumbosacral region with occasional radiating into left hip and thigh, core, BUE, BLE, and hip weakness, decreased hip, UT and pectoralis flexibility, poor thoracic mobility, and posture, gait and cognitive deficits.  Pt prognosis is Fair.   Pt will benefit from skilled outpatient Physical Therapy to address the deficits stated above and in the chart below, provide pt/family education, and to maximize pt's level of independence.     Plan of care discussed with patient: Yes  Pt's spiritual, cultural and educational needs considered and patient is agreeable to the plan of care and goals as stated below:     Anticipated Barriers for therapy:  compliance    Medical Necessity is demonstrated by the following  History  Co-morbidities and personal factors that may impact the plan of care Co-morbidities:   HTN; COPD; Anxiety; Difficulty sleeping; Cognitive deficits    Personal Factors:   coping style  social background  lifestyle     moderate   Examination  Body Structures and Functions, activity limitations and participation restrictions that may impact the plan of care Body Regions:   neck  back  trunk    Body Systems:    ROM  strength  balance  gait  transitions  motor control  motor learning  blood pressure  posture    Participation Restrictions:   ADLs, chores, and community    Activity limitations:   Learning and applying knowledge  no deficits    General Tasks and Commands  no deficits    Communication  no deficits    Mobility  lifting and carrying objects  walking  driving (bike, car, motorcycle)    Self care  washing oneself (bathing, drying, washing hands)  dressing    Domestic Life  shopping  cooking  doing house work (cleaning house, washing dishes, laundry)  assisting others    Interactions/Relationships  no deficits    Life Areas  no deficits    Community and Social Life  community life  recreation and leisure         moderate   Clinical Presentation evolving clinical presentation with changing clinical characteristics moderate   Decision Making/ Complexity Score: moderate     Goals:  Short Term GOALS: 3 weeks  1. Patient is independent with HEP.   2. Patient demonstrates independence with core activation and postural awareness while sitting and standing.   3. Patient will reduce pn to 2/10 while performing daily activities.  4. Patient will reduce LE radiculopathy frequency and intensity.     Long Term GOALS: 6 weeks.   1. Patient demonstrates increased l/s extension and hip extension Range of Motion to 0 degrees to improve tolerance to standing.  2. Patient demonstrates increased core, hip and BLE strength by 1/2 grade or greater to improve  tolerance to home chores.  3. Patient demonstrates independence with body mechanics while performing chores and helping roomate.  Plan   Plan of care Certification: 4/26/2022 to 7/10/2022.    Outpatient Physical Therapy 2 times weekly for 6 weeks to include the following interventions: Electrical Stimulation ,, Gait Training, Manual Therapy, Moist Heat/ Ice, Neuromuscular Re-ed, Patient Education, Self Care, Therapeutic Activities and Therapeutic Exercise.     Jennie Osorio, PT

## 2022-05-03 ENCOUNTER — CLINICAL SUPPORT (OUTPATIENT)
Dept: REHABILITATION | Facility: HOSPITAL | Age: 62
End: 2022-05-03
Payer: MEDICAID

## 2022-05-03 DIAGNOSIS — M54.16 CHRONIC RADICULAR LUMBAR PAIN: ICD-10-CM

## 2022-05-03 DIAGNOSIS — G89.29 CHRONIC CERVICAL PAIN: Primary | ICD-10-CM

## 2022-05-03 DIAGNOSIS — M54.2 CHRONIC CERVICAL PAIN: Primary | ICD-10-CM

## 2022-05-03 DIAGNOSIS — G89.29 CHRONIC RADICULAR LUMBAR PAIN: ICD-10-CM

## 2022-05-03 PROCEDURE — 97110 THERAPEUTIC EXERCISES: CPT | Mod: PN,CQ

## 2022-05-03 NOTE — PROGRESS NOTES
"  Physical Therapy Daily Treatment Note     Name: Nimco Caro  Clinic Number: [id number]    Therapy Diagnosis:   Encounter Diagnoses   Name Primary?    Chronic cervical pain Yes    Chronic radicular lumbar pain      Physician: Joby Jaimes MD    Visit Date: 5/3/2022    Physician Orders: PT Eval and Treat   Medical Diagnosis from Referral:   M47.816 (ICD-10-CM) - Lumbar facet arthropathy   M47.22 (ICD-10-CM) - Cervical spondylosis with radiculopathy   Evaluation Date: 4/26/2022  Authorization Period Expiration: 12/31/22  Plan of Care Expiration: 7/10/2022 or 12v post eval  Visit # (total)/ Visits authorized: 2/ 20 (POC 1 / 12 )  NO FOTO  Progress Note Due: 5/26/2022      Time In: 1048  Time Out: 1130  Total Billable Time: 42 minutes    Precautions:  Diverticulitis current; HTN; COPD; Anxiety       Subjective     Pt reports: "My back always hurts".  She was compliant with home exercise program.  Response to previous treatment: first visit after eval  Functional change: none    Pain: 8/10  Location: bilateral LB      Objective     Nimco received therapeutic exercises to develop strength, flexibility, posture and core stabilization for 42 minutes (medicaid) including:    SEATED:   Upright Posture, Activate Abs, Sit tall, Shoulders relax, head on top of spine - 10x5s  HS stretch, 2x30s  Single UE Chicken wings, 2x10 (one at a time, trying to release tension in her shoulders) - or retro rolls or shrugs  Cervical Retraction with YTB, 20x5s, "like a chicken", she held band  Bilateral reuben ER, 20x5s (band on forearms)  T/s extension with HBH, 5x10s  Trapezius stretch, Bilateral  3x10s     TABLE:  Seated RTB ER (clamshells) on table, 20x VC keep feet together (block if needed)  Lumbar rotations, 20x Bilateral  MINI Bridges, 20x, (add RTB next)  Supine Bilateral Sciatic Nerve Glide, 20x ankle pumps Bilateral (utilize as a HS stretch)  Frog adductor stretch, 3x30s  Piriformis stretch, just cross ankle over knee, 2x30s " NOT PERFORMED time   Sony stretch, 2x30s Bilateral NOT PERFORMED time   Open books, 5x, 10sec hold, Bilateral NOT PERFORMED time   Dying bug 10x Bilateral     STAND:   Standing Doorway stretch, low V arms, 3x15s, relax UT NOT PERFORMED time         Add NEXT:   Sleeping position education    FUTURE:   Pre Gait training with core activation  Standing Left femoral nerve glide, 2x10 - if radic anterior  HR / TR, 20x ea   Standing marching, 1 minuteStand HF, 10x Bilateral   Stand HE with slight Forward trunk flexion, 10 x Bilateral   RTB below knees, Lateral steps x 4 R/L each, in //bars  SLS 2 x 30 sec Bilateral   FSU stair, 20x  Bike?  Balance training  Hip mobilization and distraction  Mechanical traction would have to be cleared by MD  Re-measure innominates 6th visit      Home Exercises Provided and Patient Education Provided     Education provided:   - cont HEP    Written Home Exercises Provided: Patient instructed to cont prior HEP.  Exercises were reviewed and Nimco was able to demonstrate them prior to the end of the session.  Nimco demonstrated fair  understanding of the education provided.     See EMR under Patient Instructions for exercises provided prior visit.    Assessment     Nimco presented with moderate pain in her cervical and lumbar regions. She required VC for correct exercise technique, and tends to lose focus easily.  Decreased core and LB strength contribute to her pain and dysfunction.  She will benefit from continued therapy to improve her posture, strength to improve her ADL's, community outings, and decrease her fall risk.  Nimco Is progressing well towards her goals.   Pt prognosis is Fair.     Pt will continue to benefit from skilled outpatient physical therapy to address the deficits listed in the problem list box on initial evaluation, provide pt/family education and to maximize pt's level of independence in the home and community environment.     Pt's spiritual, cultural and  educational needs considered and pt agreeable to plan of care and goals.    Anticipated barriers to physical therapy: compliance     Goals:  Short Term GOALS: 3 weeks  PROGRESSING  1. Patient is independent with HEP.   2. Patient demonstrates independence with core activation and postural awareness while sitting and standing.   3. Patient will reduce pn to 2/10 while performing daily activities.  4. Patient will reduce LE radiculopathy frequency and intensity.     Long Term GOALS: 6 weeks.   PROGRESSING  1. Patient demonstrates increased l/s extension and hip extension Range of Motion to 0 degrees to improve tolerance to standing.  2. Patient demonstrates increased core, hip and BLE strength by 1/2 grade or greater to improve tolerance to home chores.  3. Patient demonstrates independence with body mechanics while performing chores and helping roomate.          Plan     Cont per POC, posture, core and LB strengthening    I certify that I was present in the room directing the student in service delivery and guiding them using my skilled judgment. As the co-signing therapist I have reviewed the students documentation and am responsible for the treatment, assessment, and plan.   Joby Maldonado, SPTA    Nichole Méndez, PTA

## 2022-05-06 ENCOUNTER — CLINICAL SUPPORT (OUTPATIENT)
Dept: REHABILITATION | Facility: HOSPITAL | Age: 62
End: 2022-05-06
Payer: MEDICAID

## 2022-05-06 ENCOUNTER — DOCUMENTATION ONLY (OUTPATIENT)
Dept: REHABILITATION | Facility: HOSPITAL | Age: 62
End: 2022-05-06

## 2022-05-06 DIAGNOSIS — M54.2 CHRONIC CERVICAL PAIN: Primary | ICD-10-CM

## 2022-05-06 DIAGNOSIS — G89.29 CHRONIC RADICULAR LUMBAR PAIN: ICD-10-CM

## 2022-05-06 DIAGNOSIS — M54.16 CHRONIC RADICULAR LUMBAR PAIN: ICD-10-CM

## 2022-05-06 DIAGNOSIS — G89.29 CHRONIC CERVICAL PAIN: Primary | ICD-10-CM

## 2022-05-06 PROCEDURE — 97110 THERAPEUTIC EXERCISES: CPT | Mod: PN

## 2022-05-06 NOTE — PROGRESS NOTES
"  Physical Therapy Daily Treatment Note     Name: Nimco Caro  Clinic Number: [id number]    Therapy Diagnosis:   Encounter Diagnoses   Name Primary?    Chronic cervical pain Yes    Chronic radicular lumbar pain      Physician: Joby Jaimes MD    Visit Date: 5/6/2022    Physician Orders: PT Eval and Treat   Medical Diagnosis from Referral:   M47.816 (ICD-10-CM) - Lumbar facet arthropathy   M47.22 (ICD-10-CM) - Cervical spondylosis with radiculopathy   Evaluation Date: 4/26/2022  Authorization Period Expiration: 12/31/22  Plan of Care Expiration: 7/10/2022 or 12v post eval  Visit # (total)/ Visits authorized: 3 / 20 (POC 2 / 12 )  NO FOTO  Progress Note Due: 5/26/2022      Time In: 1000  Time Out: 1044  Total Billable Time: 44 minutes    Precautions:  Diverticulitis current; HTN; COPD; Anxiety       Subjective     Pt reports: something flared up her left hip at last tx; had to go to sleep after. Just had dental surgery yesterday.     She was compliant with home exercise program.  Response to previous treatment: first visit after eval  Functional change: none    Pain: 8/10  Location: bilateral LB; today left hip      Objective     Nimco received therapeutic exercises to develop strength, flexibility, posture and core stabilization for 44 minutes (medicaid) including:    SEATED: NOT PERFORMED time  Upright Posture, Activate Abs, Sit tall, Shoulders relax, head on top of spine - 10x5s  HS stretch, 2x30s  Single UE Chicken wings, 2x10 (one at a time, trying to release tension in her shoulders) - or retro rolls or shrugs  Cervical Retraction with YTB, 20x5s, "like a chicken", she held band - NOT PERFORMED   Bilateral reuben ER, 20x5s (Yellow Theraband band on forearms)  T/s extension with HBH, 5x10s  Trapezius stretch, Bilateral  3x10s     TABLE:  Seated RTB ER (clamshells) on table, 20x VC keep feet together (block if needed)  Lumbar rotations, 20x Bilateral  MINI Bridges, 20x, (add RTB next)  Supine Bilateral " Sciatic Nerve Glide, 20x ankle pumps Bilateral (utilize as a HS stretch)  Frog adductor stretch, 3x30s  Piriformis stretch, just cross ankle over knee, 2x30s   Sony stretch, 2x30s Bilateral    Open books, 5x, 10sec hold, Bilateral  NOT PERFORMED   Dying bug 10x Bilateral NOT PERFORMED  Abdominal bracing, 20x     STAND:   Standing Doorway stretch, low V arms, 3x15s, relax UT    3 laps ambulation, 72' x3  HR / TR, 20x ea   Standing marching, 1 minute     Add NEXT:   Sleeping position education  Pre Gait training with core activation  Standing Left femoral nerve glide, 2x10 - if radic anterior  Stand HF, 10x Bilateral   Stand HE with slight Forward trunk flexion, 10 x Bilateral   FUTURE:   RTB below knees, Lateral steps x 4 R/L each, in //bars  SLS 2 x 30 sec Bilateral   FSU stair, 20x  Bike?  Balance training  Hip mobilization and distraction  Mechanical traction would have to be cleared by MD  Re-measure innominates 6th visit      Home Exercises Provided and Patient Education Provided     Education provided:   - cont HEP    Written Home Exercises Provided: Patient instructed to cont prior HEP.  Exercises were reviewed and Nimco was able to demonstrate them prior to the end of the session.  Nimco demonstrated fair  understanding of the education provided.     See EMR under Patient Instructions for exercises provided prior visit.    Assessment     Nimco presented with moderate pain in her cervical and lumbar regions; increased pain in left hip. Pain decreased in hip after piriformis and hip flexor stretching. Requires mod to max VC for correct exercise technique, and tends to lose focus easily. She will benefit from continued therapy to improve her posture, strength to improve her ADL's, community outings, and decrease her fall risk.    Nimco Is progressing well towards her goals.   Pt prognosis is Fair.     Pt will continue to benefit from skilled outpatient physical therapy to address the deficits listed in  the problem list box on initial evaluation, provide pt/family education and to maximize pt's level of independence in the home and community environment.     Pt's spiritual, cultural and educational needs considered and pt agreeable to plan of care and goals.    Anticipated barriers to physical therapy: compliance     Goals:  Short Term GOALS: 3 weeks  PROGRESSING  1. Patient is independent with HEP.   2. Patient demonstrates independence with core activation and postural awareness while sitting and standing.   3. Patient will reduce pn to 2/10 while performing daily activities.  4. Patient will reduce LE radiculopathy frequency and intensity.     Long Term GOALS: 6 weeks.   PROGRESSING  1. Patient demonstrates increased l/s extension and hip extension Range of Motion to 0 degrees to improve tolerance to standing.  2. Patient demonstrates increased core, hip and BLE strength by 1/2 grade or greater to improve tolerance to home chores.  3. Patient demonstrates independence with body mechanics while performing chores and helping roomate.          Plan     Cont per POC, posture, core and LB strengthening    Jennie Osorio, PT

## 2022-05-06 NOTE — PROGRESS NOTES
30 day visit PT-PTA face-face discussion with NICKI Osorio re: patient status, POC, and plan for progression done 5/4/22.  Nichole Méndez, PTA

## 2022-05-17 ENCOUNTER — CLINICAL SUPPORT (OUTPATIENT)
Dept: REHABILITATION | Facility: HOSPITAL | Age: 62
End: 2022-05-17
Payer: MEDICAID

## 2022-05-17 DIAGNOSIS — G89.29 CHRONIC RADICULAR LUMBAR PAIN: ICD-10-CM

## 2022-05-17 DIAGNOSIS — G89.29 CHRONIC CERVICAL PAIN: Primary | ICD-10-CM

## 2022-05-17 DIAGNOSIS — M54.16 CHRONIC RADICULAR LUMBAR PAIN: ICD-10-CM

## 2022-05-17 DIAGNOSIS — M54.2 CHRONIC CERVICAL PAIN: Primary | ICD-10-CM

## 2022-05-17 PROCEDURE — 97110 THERAPEUTIC EXERCISES: CPT | Mod: PN,CQ

## 2022-05-17 NOTE — PROGRESS NOTES
"  Physical Therapy Daily Treatment Note     Name: Nimco Caro  Clinic Number: [id number]    Therapy Diagnosis:   Encounter Diagnoses   Name Primary?    Chronic cervical pain Yes    Chronic radicular lumbar pain      Physician: Joby Jaimes MD    Visit Date: 5/17/2022    Physician Orders: PT Eval and Treat   Medical Diagnosis from Referral:   M47.816 (ICD-10-CM) - Lumbar facet arthropathy   M47.22 (ICD-10-CM) - Cervical spondylosis with radiculopathy   Evaluation Date: 4/26/2022  Authorization Period Expiration: 12/31/22  Plan of Care Expiration: 7/10/2022 or 12v post eval  Visit # (total)/ Visits authorized: 4 / 20 (POC 3 / 12 )  NO FOTO  Progress Note Due: 5/26/2022      Time In: 1045  Time Out: 1133  Total Billable Time: 48 minutes    Precautions:  Diverticulitis current; HTN; COPD; Anxiety       Subjective     Pt reports: she is "feeling alright" today.    She was compliant with home exercise program.  Response to previous treatment: first visit after eval  Functional change: none    Pain: 5/10  Location: bilateral LB; today left hip      Objective     Nimco received therapeutic exercises to develop strength, flexibility, posture and core stabilization for 48 minutes (medicaid) including:    Bike - seat 10 - 5 minutes    SEATED: NOT PERFORMED time  Upright Posture, Activate Abs, Sit tall, Shoulders relax, head on top of spine - 10x5s  HS stretch, 2x30s  Single UE Chicken wings, 2x10 (one at a time, trying to release tension in her shoulders) - or retro rolls or shrugs  Cervical Retraction with YTB, 20x5s, "like a chicken", she held band - NOT PERFORMED   Bilateral reuben ER, 20x5s (Yellow Theraband band on forearms)  T/s extension with HBH, 5x10s  Trapezius stretch, Bilateral  3x10s     TABLE:  Seated RTB ER (clamshells) on table, 20x VC keep feet together (block if needed) NOT PERFORMED time   Lumbar rotations, 20x Bilateral   MINI Bridges, 20x, (add RTB next)  Supine Bilateral Sciatic Nerve Glide, 20x " "ankle pumps Bilateral (utilize as a HS stretch)   Frog adductor stretch, 3x30s  Piriformis stretch, just cross ankle over knee, 2x30s NOT PERFORMED time  Sony stretch, 2x30s Bilateral  NOT PERFORMED time  Open books, 5x, 10sec hold, Bilateral  Dying bug 10x Bilateral  Abdominal bracing, 20x NOT PERFORMED time      STAND:   Standing Doorway stretch, low V arms, 3x15s, relax UT  NOT PERFORMED time   3 laps ambulation, 72' x3 NOT PERFORMED time   HR / TR, 20x ea   Standing marching, 1 minute  +Hip 3 way on 2" step 2 x 10 B  +SLS R/L single arm support 2 x 30 sec each     Add NEXT:   Sleeping position education  Pre Gait training with core activation  Standing Left femoral nerve glide, 2x10 - if radic anterior  Stand HF, 10x Bilateral   Stand HE with slight Forward trunk flexion, 10 x Bilateral   FUTURE:   RTB below knees, Lateral steps x 4 R/L each, in //bars  SLS 2 x 30 sec Bilateral   FSU stair, 20x  Balance training  Hip mobilization and distraction  Mechanical traction would have to be cleared by MD  Re-measure innominates 6th visit      Home Exercises Provided and Patient Education Provided     Education provided:   - cont HEP    Written Home Exercises Provided: Patient instructed to cont prior HEP.  Exercises were reviewed and Nimco was able to demonstrate them prior to the end of the session.  Nimco demonstrated fair  understanding of the education provided.     See EMR under Patient Instructions for exercises provided prior visit.    Assessment     Nimco presents with moderate pain in cervical and lumbar spine, along with decreased cervical ROM. She performed several new exercises with no increase in s/s, but required mod to max VC for correct execution of exercises and for keeping track of repetitions. She was instructed on log roll to decrease her Low back pain. Continued balance training, LE and core strengthening, and increased flexibility to improve ADL's and decrease fall risk.    Nimco Is " progressing well towards her goals.   Pt prognosis is Fair.     Pt will continue to benefit from skilled outpatient physical therapy to address the deficits listed in the problem list box on initial evaluation, provide pt/family education and to maximize pt's level of independence in the home and community environment.     Pt's spiritual, cultural and educational needs considered and pt agreeable to plan of care and goals.    Anticipated barriers to physical therapy: compliance     Goals:  Short Term GOALS: 3 weeks  PROGRESSING  1. Patient is independent with HEP.   2. Patient demonstrates independence with core activation and postural awareness while sitting and standing.   3. Patient will reduce pn to 2/10 while performing daily activities.  4. Patient will reduce LE radiculopathy frequency and intensity.     Long Term GOALS: 6 weeks.   PROGRESSING  1. Patient demonstrates increased l/s extension and hip extension Range of Motion to 0 degrees to improve tolerance to standing.  2. Patient demonstrates increased core, hip and BLE strength by 1/2 grade or greater to improve tolerance to home chores.  3. Patient demonstrates independence with body mechanics while performing chores and helping roomate.          Plan     Cont per POC, posture, core and LB strengthening    I certify that I was present in the room directing the student in service delivery and guiding them using my skilled judgment. As the co-signing therapist I have reviewed the students documentation and am responsible for the treatment, assessment, and plan.     LC Alfredo, PTA

## 2022-05-20 ENCOUNTER — CLINICAL SUPPORT (OUTPATIENT)
Dept: REHABILITATION | Facility: HOSPITAL | Age: 62
End: 2022-05-20
Payer: MEDICAID

## 2022-05-20 DIAGNOSIS — G89.29 CHRONIC CERVICAL PAIN: Primary | ICD-10-CM

## 2022-05-20 DIAGNOSIS — G89.29 CHRONIC RADICULAR LUMBAR PAIN: ICD-10-CM

## 2022-05-20 DIAGNOSIS — M54.2 CHRONIC CERVICAL PAIN: Primary | ICD-10-CM

## 2022-05-20 DIAGNOSIS — M54.16 CHRONIC RADICULAR LUMBAR PAIN: ICD-10-CM

## 2022-05-20 PROCEDURE — 97110 THERAPEUTIC EXERCISES: CPT | Mod: PN,CQ

## 2022-05-20 NOTE — PROGRESS NOTES
"  Physical Therapy Daily Treatment Note     Name: Nimco Caro  Clinic Number: [id number]    Therapy Diagnosis:   Encounter Diagnoses   Name Primary?    Chronic cervical pain Yes    Chronic radicular lumbar pain      Physician: Joby Jaimes MD    Visit Date: 5/20/2022    Physician Orders: PT Eval and Treat   Medical Diagnosis from Referral:   M47.816 (ICD-10-CM) - Lumbar facet arthropathy   M47.22 (ICD-10-CM) - Cervical spondylosis with radiculopathy   Evaluation Date: 4/26/2022  Authorization Period Expiration: 12/31/22  Plan of Care Expiration: 7/10/2022 or 12v post eval  Visit # (total)/ Visits authorized: 5 / 20 (POC 4 / 12 )  NO FOTO  Progress Note Due: 5/26/2022      Time In:  1043  Time Out: 1130  Total Billable Time: 45 minutes    Precautions:  Diverticulitis current; HTN; COPD; Anxiety       Subjective     Pt reports:  Pain across B LB region    She was compliant with home exercise program.  Response to previous treatment:positive  Functional change: none today    Pain: 5/10  Location: bilateral LB; today left hip      Objective     Nimco received therapeutic exercises to develop strength, flexibility, posture and core stabilization for 45 minutes (medicaid) including:    Bike - seat 10 - 5 minutes    SEATED:   Upright Posture, Activate Abs, Sit tall, Shoulders relax, head on top of spine - 10x5s  NOT PERFORMED time   HS stretch, 2x30s NOT PERFORMED time   Single UE Chicken wings, 2x10 (one at a time, trying to release tension in her shoulders) - or retro rolls or shrugs NOT PERFORMED time   Cervical Retraction with YTB, 20x5s, "like a chicken", she held band - NOT PERFORMED   Bilateral shoulder ER, 20x5s (Red Theraband band on forearms)  T/s extension with HBH, 5x10s  Trapezius stretch, Bilateral  3x15s       TABLE:  Seated RTB ER (clamshells)  Supine  Lumbar rotations, 20x Bilateral   MINI Bridges, 20x, Red Theraband   Supine Bilateral Sciatic Nerve Glide, 20x ankle pumps Bilateral (utilize as a " "HS stretch)   Frog adductor stretch, 3x30s  Piriformis stretch, just cross ankle over knee, 2x30s NOT PERFORMED time  Sony stretch, 2x30s Bilateral    Open books, 5x, 10sec hold, Bilateral  Dying bug 2 x 10 Bilateral  Abdominal bracing, 20x NOT PERFORMED time      STAND:   Standing Doorway stretch, low V arms, 3x15s, relax UT  NOT PERFORMED time   3 laps ambulation, 72' x3 NOT PERFORMED time   HR / TR, 20x ea   Standing marching, 1 minute  +Hip 3 way on 2" step 2 x 10 B  +SLS R/L single arm support 2 x 30 sec each     Add NEXT:   Sleeping position education  Pre Gait training with core activation  Standing Left femoral nerve glide, 2x10 - if radic anterior  Stand HF, 10x Bilateral   Stand HE with slight Forward trunk flexion, 10 x Bilateral     FUTURE:   RTB below knees, Lateral steps x 4 R/L each, in //bars  FSU stair, 20x  Balance training  Hip mobilization and distraction  Mechanical traction would have to be cleared by MD  Re-measure innominates 6th visit      Home Exercises Provided and Patient Education Provided     Education provided:   - cont HEP    Written Home Exercises Provided: Patient instructed to cont prior HEP.  Exercises were reviewed and Nimco was able to demonstrate them prior to the end of the session.  Nimco demonstrated fair  understanding of the education provided.     See EMR under Patient Instructions for exercises provided prior visit.    Assessment     Nimco demonstrates slow, guarded gait pattern, with decreased arm swing and trunk rotation.  VC needed for correct exercise technique.  No increase in s/s reported with PRE's.  Continued core, hip, and LB strengthening to improve her ADL's, functional activities, decrease fall risk.    Nimco Is progressing well towards her goals.   Pt prognosis is Fair.     Pt will continue to benefit from skilled outpatient physical therapy to address the deficits listed in the problem list box on initial evaluation, provide pt/family education " and to maximize pt's level of independence in the home and community environment.     Pt's spiritual, cultural and educational needs considered and pt agreeable to plan of care and goals.    Anticipated barriers to physical therapy: compliance     Goals:  Short Term GOALS: 3 weeks  PROGRESSING  1. Patient is independent with HEP.   2. Patient demonstrates independence with core activation and postural awareness while sitting and standing.   3. Patient will reduce pn to 2/10 while performing daily activities.  4. Patient will reduce LE radiculopathy frequency and intensity.     Long Term GOALS: 6 weeks.   PROGRESSING  1. Patient demonstrates increased l/s extension and hip extension Range of Motion to 0 degrees to improve tolerance to standing.  2. Patient demonstrates increased core, hip and BLE strength by 1/2 grade or greater to improve tolerance to home chores.  3. Patient demonstrates independence with body mechanics while performing chores and helping roomate.          Plan     Cont per POC, posture, core and LB strengthening    I certify that I was present in the room directing the student in service delivery and guiding them using my skilled judgment. As the co-signing therapist I have reviewed the students documentation and am responsible for the treatment, assessment, and plan.     Joby Maldonado, SPTGAURAV Méndez, PTA

## 2022-05-23 ENCOUNTER — CLINICAL SUPPORT (OUTPATIENT)
Dept: REHABILITATION | Facility: HOSPITAL | Age: 62
End: 2022-05-23
Payer: MEDICAID

## 2022-05-23 DIAGNOSIS — G89.29 CHRONIC RADICULAR LUMBAR PAIN: ICD-10-CM

## 2022-05-23 DIAGNOSIS — M54.2 CHRONIC CERVICAL PAIN: Primary | ICD-10-CM

## 2022-05-23 DIAGNOSIS — M54.16 CHRONIC RADICULAR LUMBAR PAIN: ICD-10-CM

## 2022-05-23 DIAGNOSIS — G89.29 CHRONIC CERVICAL PAIN: Primary | ICD-10-CM

## 2022-05-23 PROCEDURE — 97110 THERAPEUTIC EXERCISES: CPT | Mod: PN,CQ

## 2022-05-23 NOTE — PROGRESS NOTES
"  Physical Therapy Daily Treatment Note     Name: Nimco Caro  Clinic Number: [id number]    Therapy Diagnosis:   Encounter Diagnoses   Name Primary?    Chronic cervical pain Yes    Chronic radicular lumbar pain      Physician: Joby Jaimes MD    Visit Date: 5/23/2022    Physician Orders: PT Eval and Treat   Medical Diagnosis from Referral:   M47.816 (ICD-10-CM) - Lumbar facet arthropathy   M47.22 (ICD-10-CM) - Cervical spondylosis with radiculopathy   Evaluation Date: 4/26/2022  Authorization Period Expiration: 12/31/22  Plan of Care Expiration: 7/10/2022 or 12v post eval  Visit # (total)/ Visits authorized: 6 / 20 (POC 5 / 12 )  NO FOTO  Progress Note Due: 5/26/2022      Time In:  1043  Time Out: 1130  Total Billable Time: 47 minutes    Precautions:  Diverticulitis current; HTN; COPD; Anxiety       Subjective     Pt reports:  She thinks one of the exercises is triggering her low back pain    She was compliant with home exercise program.  Response to previous treatment:positive  Functional change: none today    Pain: 3/10  Location: bilateral LB; today left hip      Objective     Nimco received therapeutic exercises to develop strength, flexibility, posture and core stabilization for 47 minutes (medicaid) including:    Bike - seat 9 - 5 minutes    SEATED:   Upright Posture, Activate Abs, Sit tall, Shoulders relax, head on top of spine - 10x5s  NOT PERFORMED time   HS stretch, 2x30s NOT PERFORMED time   Single UE Chicken wings, 2x10 (one at a time, trying to release tension in her shoulders) - or retro rolls or shrugs NOT PERFORMED time   Cervical Retraction with YTB, 20x5s, "like a chicken", she held band - NOT PERFORMED   Bilateral shoulder ER, 20x5s (Red Theraband band on forearms) NOT PERFORMED time   T/s extension with HBH, 5x10s NOT PERFORMED time   Trapezius stretch, Bilateral  3x15s  NOT PERFORMED time      TABLE:  Seated RTB ER (clamshells)  Supine NOT PERFORMED time   Lumbar rotations, 20x " "Bilateral   MINI Bridges, 20x, Red Theraband   Supine Bilateral Sciatic Nerve Glide, 20x ankle pumps Bilateral (utilize as a HS stretch)   Frog adductor stretch, 3x30s  Piriformis stretch, just cross ankle over knee, 2x30s  Sony stretch, 2x30s Bilateral  (stopped due to low back pain)  Open books, 5x, 10sec hold, Bilateral  Dying bug 2 x 10 Bilateral NOT PERFORMED  Abdominal bracing, 20x     STAND:   Standing Doorway stretch, low V arms, 3x15s, relax UT  NOT PERFORMED time   3 laps ambulation, 72' x3 NOT PERFORMED time   HR / TR, 20x ea   Standing marching, 1 minute  Hip 3 way on 2" step  x10 B  SLS R/L single arm support 2 x 30 sec each     Add NEXT:   Sleeping position education  Pre Gait training with core activation  Standing Left femoral nerve glide, 2x10 - if radic anterior  Stand HF, 10x Bilateral   Stand HE with slight Forward trunk flexion, 10 x Bilateral     FUTURE:   RTB below knees, Lateral steps x 4 R/L each, in //bars  FSU stair, 20x  Balance training  Hip mobilization and distraction  Mechanical traction would have to be cleared by MD  Re-measure innominates 6th visit      Home Exercises Provided and Patient Education Provided     Education provided:   - cont HEP    Written Home Exercises Provided: Patient instructed to cont prior HEP.  Exercises were reviewed and Nimco was able to demonstrate them prior to the end of the session.  Nimco demonstrated fair  understanding of the education provided.     See EMR under Patient Instructions for exercises provided prior visit.    Assessment     Nimco presents with low back pain and B hip flexor tightness. She ambulates with rounded shoulders and decreased arm swing, which increases her fall risk. She requires VC for correct execution of exercises and for keeping track of amount of reps performed. She reported that she thinks one of the exercises is causing increased low back pain, so dying bug exercise was not performed. She reported sony stretch " was causing an increase in low back pain so she was instructed to bring opposite knee to chest; she reported this slightly decreased pain but still painful, so stretch was discontinued. Continued LE and core strengthening and posture eduction to improve functional activities and decreased fall risk.    Nimco Is progressing well towards her goals.   Pt prognosis is Fair.     Pt will continue to benefit from skilled outpatient physical therapy to address the deficits listed in the problem list box on initial evaluation, provide pt/family education and to maximize pt's level of independence in the home and community environment.     Pt's spiritual, cultural and educational needs considered and pt agreeable to plan of care and goals.    Anticipated barriers to physical therapy: compliance     Goals:  Short Term GOALS: 3 weeks  PROGRESSING  1. Patient is independent with HEP.   2. Patient demonstrates independence with core activation and postural awareness while sitting and standing.   3. Patient will reduce pn to 2/10 while performing daily activities.  4. Patient will reduce LE radiculopathy frequency and intensity.     Long Term GOALS: 6 weeks.   PROGRESSING  1. Patient demonstrates increased l/s extension and hip extension Range of Motion to 0 degrees to improve tolerance to standing.  2. Patient demonstrates increased core, hip and BLE strength by 1/2 grade or greater to improve tolerance to home chores.  3. Patient demonstrates independence with body mechanics while performing chores and helping roomate.          Plan     Cont per POC, posture, core and LB strengthening    I certify that I was present in the room directing the student in service delivery and guiding them using my skilled judgment. As the co-signing therapist I have reviewed the students documentation and am responsible for the treatment, assessment, and plan.     Joby Maldonado, SPTGAURAV Méndez, PTA

## 2022-05-27 ENCOUNTER — CLINICAL SUPPORT (OUTPATIENT)
Dept: REHABILITATION | Facility: HOSPITAL | Age: 62
End: 2022-05-27
Payer: MEDICAID

## 2022-05-27 DIAGNOSIS — M54.2 CHRONIC CERVICAL PAIN: Primary | ICD-10-CM

## 2022-05-27 DIAGNOSIS — G89.29 CHRONIC RADICULAR LUMBAR PAIN: ICD-10-CM

## 2022-05-27 DIAGNOSIS — G89.29 CHRONIC CERVICAL PAIN: Primary | ICD-10-CM

## 2022-05-27 DIAGNOSIS — M54.16 CHRONIC RADICULAR LUMBAR PAIN: ICD-10-CM

## 2022-05-27 PROCEDURE — 97110 THERAPEUTIC EXERCISES: CPT | Mod: PN

## 2022-05-27 NOTE — PROGRESS NOTES
"  Physical Therapy Daily Treatment Note     Name: Nimco Caro  Clinic Number: [id number]    Therapy Diagnosis:   Encounter Diagnoses   Name Primary?    Chronic cervical pain Yes    Chronic radicular lumbar pain      Physician: Joby Jaimes MD    Visit Date: 5/27/2022    Physician Orders: PT Eval and Treat   Medical Diagnosis from Referral:   M47.816 (ICD-10-CM) - Lumbar facet arthropathy   M47.22 (ICD-10-CM) - Cervical spondylosis with radiculopathy   Evaluation Date: 4/26/2022  Authorization Period Expiration: 12/31/22  Plan of Care Expiration: 7/10/2022 or 12v post eval  Visit # (total)/ Visits authorized: 7 / 20 (POC 6 / 12 )  NO FOTO  Progress Note Due: 5/26/2022      Time In:  1134  Time Out: 1217  Total Billable Time: 45 minutes    Precautions:  Diverticulitis current; HTN; COPD; Anxiety       Subjective     Pt reports:  Her low back is killing her; from cleaning. Yesterday, she clipped some roses as well. Just took a pain pill so she could come.    She was compliant with home exercise program.  Response to previous treatment:positive  Functional change: none today    Pain: 3/10  Location: bilateral LB; today left hip      Objective     Nimco received therapeutic exercises to develop strength, flexibility, posture and core stabilization for 47 minutes (medicaid) including:    Bike - seat 9 - 5 minutes    TABLE: Head at end of table  Lumbar rotations, 20x Bilateral   MINI Bridges, 20x, Red Theraband   Supine Bilateral Sciatic Nerve Glide, 20x ankle pumps Bilateral (utilize as a HS stretch)   Frog adductor stretch, 2x30s  Supine Red Theraband  ER (clamshells), 20-30x    Piriformis stretch, just cross ankle over knee, 2x30s  Sony stretch, 2x30s Bilateral (Leg dangles, no manual pressure)  Open books, 5x, 10sec hold, Bilateral  Abdominal bracing, 20x, legs extended      SEATED: NOT PERFORMED time    Cervical Retraction with YTB, 20x5s, "like a chicken", she held band   T/s extension with HBH, 5x10s  " "  Trapezius stretch, Bilateral  3x15s       STAND:   Lumbar extension, 10x, 5 sec  Bilateral shoulder ER, 20x5s (Red Theraband band on forearms) NOT PERFORMED time   Red Theraband Shoulder extension, 20x  Standing Doorway stretch, low V arms, 3x15s, relax UT  NOT PERFORMED time   3 laps ambulation, 72' x3 NOT PERFORMED time   HR / TR, 20x ea   Standing marching, 1 minute, VC without leaning  Hip 3 way on 2" step  x10 B  SLS R/L single arm support 2 x 30 sec each  Cable Column: [employer],      Add NEXT:   Sleeping position education  Pre Gait training with core activation  Standing Left femoral nerve glide, 2x10 - if radic anterior  FUTURE:   RTB below knees, Lateral steps x 4 R/L each, in //bars  FSU stair, 20x  Balance training  Hip mobilization and distraction  Mechanical traction would have to be cleared by MD  Re-measure innominates 6th visit      Home Exercises Provided and Patient Education Provided     Education provided:   - cont HEP    Written Home Exercises Provided: Patient instructed to cont prior HEP.  Exercises were reviewed and Nimco was able to demonstrate them prior to the end of the session.  Nimco demonstrated fair  understanding of the education provided.     See EMR under Patient Instructions for exercises provided prior visit.    Assessment     Nimco presents with low back pain and B hip flexor tightness. Discussed stretching hip flexors post cleaning and outdoor chores that require bending forward. Returned and reviewed suzanne stretch; no difficulty today. Continued LE and core strengthening and posture eduction to improve functional activities and decreased fall risk.    Nimco Is progressing well towards her goals.   Pt prognosis is Fair.     Pt will continue to benefit from skilled outpatient physical therapy to address the deficits listed in the problem list box on initial evaluation, provide pt/family education and to maximize pt's level of independence in the home and community " environment.     Pt's spiritual, cultural and educational needs considered and pt agreeable to plan of care and goals.    Anticipated barriers to physical therapy: compliance     Goals:  Short Term GOALS: 3 weeks  PROGRESSING  1. Patient is independent with HEP.   2. Patient demonstrates independence with core activation and postural awareness while sitting and standing.   3. Patient will reduce pn to 2/10 while performing daily activities.  4. Patient will reduce LE radiculopathy frequency and intensity.     Long Term GOALS: 6 weeks.   PROGRESSING  1. Patient demonstrates increased l/s extension and hip extension Range of Motion to 0 degrees to improve tolerance to standing.  2. Patient demonstrates increased core, hip and BLE strength by 1/2 grade or greater to improve tolerance to home chores.  3. Patient demonstrates independence with body mechanics while performing chores and helping roomate.          Plan     Cont per POC, posture, core and LB strengthening    Jennie Osorio, PT

## 2022-05-30 ENCOUNTER — CLINICAL SUPPORT (OUTPATIENT)
Dept: REHABILITATION | Facility: HOSPITAL | Age: 62
End: 2022-05-30
Payer: MEDICAID

## 2022-05-30 DIAGNOSIS — M54.2 CHRONIC CERVICAL PAIN: Primary | ICD-10-CM

## 2022-05-30 DIAGNOSIS — M54.16 CHRONIC RADICULAR LUMBAR PAIN: ICD-10-CM

## 2022-05-30 DIAGNOSIS — G89.29 CHRONIC CERVICAL PAIN: Primary | ICD-10-CM

## 2022-05-30 DIAGNOSIS — G89.29 CHRONIC RADICULAR LUMBAR PAIN: ICD-10-CM

## 2022-05-30 PROCEDURE — 97110 THERAPEUTIC EXERCISES: CPT | Mod: PN,CQ

## 2022-05-30 NOTE — PROGRESS NOTES
"  Physical Therapy Daily Treatment Note     Name: Nimco Caro  Clinic Number: [id number]    Therapy Diagnosis:   Encounter Diagnoses   Name Primary?    Chronic cervical pain Yes    Chronic radicular lumbar pain      Physician: Joby Jaimes MD    Visit Date: 5/30/2022    Physician Orders: PT Eval and Treat   Medical Diagnosis from Referral:   M47.816 (ICD-10-CM) - Lumbar facet arthropathy   M47.22 (ICD-10-CM) - Cervical spondylosis with radiculopathy   Evaluation Date: 4/26/2022  Authorization Period Expiration: 12/31/22  Plan of Care Expiration: 7/10/2022 or 12v post eval  Visit # (total)/ Visits authorized: 8 / 20 (POC 7 / 12 )  NO FOTO  Progress Note Due: 5/26/2022      Time In:  1052 (late arrival)  Time Out: 1136  Total Billable Time: 38 minutes    Precautions:  Diverticulitis current; HTN; COPD; Anxiety       Subjective     Pt reports:  Has some pain today due to reported house work and yard work over the weekend.    She was compliant with home exercise program.  Response to previous treatment:positive  Functional change: none today    Pain: 6/10  Location: bilateral LB; today left hip      Objective     Nimco received therapeutic exercises to develop strength, flexibility, posture and core stabilization for 38 minutes (medicaid) including:    Bike - seat 9 - 5 minutes    TABLE: Head at end of table  Lumbar rotations, 20x Bilateral   MINI Bridges with Red Theraband x20  Supine Bilateral Sciatic Nerve Glide, 20x ankle pumps Bilateral (utilize as a HS stretch)   Frog adductor stretch, 3x30s  Supine Red Theraband  ER (clamshells), 20-30x    Piriformis stretch, just cross ankle over knee, 2x30s B  Sony stretch, 3x30s Bilateral (Leg dangles, no manual pressure)  Open books, 5x, 10sec hold, Bilateral  Abdominal bracing, 20x, legs extended      SEATED:     Cervical Retraction with YTB, 20x5s, "like a chicken", she held band NOT PERFORMED time   T/s extension with HBH, 5x10s  NOT PERFORMED time   Trapezius " "stretch, Bilateral  3x15s  NOT PERFORMED time      STAND:   Lumbar extension, 10x, 5 sec  Bilateral shoulder ER, 20x5s (Red Theraband band on forearms) NOT PERFORMED time   Red Theraband Shoulder extension, 20x NOT PERFORMED time   Standing Doorway stretch, low V arms, 3x15s, relax UT  NOT PERFORMED time   3 laps ambulation, 72' x3 NOT PERFORMED time   HR / TR, 20x ea NOT PERFORMED time   Standing marching, 1 minute, VC without leaning  Hip 3 way on 2" step  x10 B  SLS R/L single arm support 2 x 30 sec each NOT PERFORMED time   Cable Column: Paloff Press, NOT PERFORMED time      Add NEXT:   Sleeping position education  Pre Gait training with core activation  Standing Left femoral nerve glide, 2x10 - if radic anterior  FUTURE:   RTB below knees, Lateral steps x 4 R/L each, in //bars  FSU stair, 20x  Balance training  Hip mobilization and distraction  Mechanical traction would have to be cleared by MD  Re-measure innominates 6th visit      Home Exercises Provided and Patient Education Provided     Education provided:   - cont HEP    Written Home Exercises Provided: Patient instructed to cont prior HEP.  Exercises were reviewed and Nimco was able to demonstrate them prior to the end of the session.  Nimco demonstrated fair  understanding of the education provided.     See EMR under Patient Instructions for exercises provided prior visit.    Assessment     Nimco presents with low back pain and B hip flexor tightness. She reported increased low back pain compared to previous visit due to performing work around her house over the weekend. She demonstrated decreased arm swing today compared to previous visit, possibly due to reported increased low back pain. She reports she experiences the most low back pain while rolling side<>side on the mat. She will benefit from continued LE and core strengthening, increased LE flexibility, and posture education to decrease fall risk and improve ADL's.    Nimco Is progressing " well towards her goals.   Pt prognosis is Fair.     Pt will continue to benefit from skilled outpatient physical therapy to address the deficits listed in the problem list box on initial evaluation, provide pt/family education and to maximize pt's level of independence in the home and community environment.     Pt's spiritual, cultural and educational needs considered and pt agreeable to plan of care and goals.    Anticipated barriers to physical therapy: compliance     Goals:  Short Term GOALS: 3 weeks  PROGRESSING  1. Patient is independent with HEP.   2. Patient demonstrates independence with core activation and postural awareness while sitting and standing.   3. Patient will reduce pn to 2/10 while performing daily activities.  4. Patient will reduce LE radiculopathy frequency and intensity.     Long Term GOALS: 6 weeks.   PROGRESSING  1. Patient demonstrates increased l/s extension and hip extension Range of Motion to 0 degrees to improve tolerance to standing.  2. Patient demonstrates increased core, hip and BLE strength by 1/2 grade or greater to improve tolerance to home chores.  3. Patient demonstrates independence with body mechanics while performing chores and helping roomate.          Plan     Cont per POC, posture, core and LB strengthening    I certify that I was present in the room directing the student in service delivery and guiding them using my skilled judgment. As the co-signing therapist I have reviewed the students documentation and am responsible for the treatment, assessment, and plan.     LC Alrfedo, PTA

## 2022-06-02 ENCOUNTER — CLINICAL SUPPORT (OUTPATIENT)
Dept: REHABILITATION | Facility: HOSPITAL | Age: 62
End: 2022-06-02
Payer: MEDICAID

## 2022-06-02 DIAGNOSIS — M54.2 CHRONIC CERVICAL PAIN: Primary | ICD-10-CM

## 2022-06-02 DIAGNOSIS — M54.16 CHRONIC RADICULAR LUMBAR PAIN: ICD-10-CM

## 2022-06-02 DIAGNOSIS — G89.29 CHRONIC CERVICAL PAIN: Primary | ICD-10-CM

## 2022-06-02 DIAGNOSIS — G89.29 CHRONIC RADICULAR LUMBAR PAIN: ICD-10-CM

## 2022-06-02 PROCEDURE — 97110 THERAPEUTIC EXERCISES: CPT | Mod: PN,CQ

## 2022-06-02 NOTE — PROGRESS NOTES
"  Physical Therapy Daily Treatment Note     Name: Nimco Caro  Clinic Number: [id number]    Therapy Diagnosis:   Encounter Diagnoses   Name Primary?    Chronic cervical pain Yes    Chronic radicular lumbar pain      Physician: Joby Jaimes MD    Visit Date: 6/2/2022    Physician Orders: PT Eval and Treat   Medical Diagnosis from Referral:   M47.816 (ICD-10-CM) - Lumbar facet arthropathy   M47.22 (ICD-10-CM) - Cervical spondylosis with radiculopathy   Evaluation Date: 4/26/2022  Authorization Period Expiration: 12/31/22  Plan of Care Expiration: 7/10/2022 or 12v post eval  Visit # (total)/ Visits authorized: 9 / 20 (POC 8 / 12 )  NO FOTO  Progress Note Due: 5/26/2022      Time In:  1045  Time Out: 1132  Total Billable Time: 47 minutes    Precautions:  Diverticulitis current; HTN; COPD; Anxiety       Subjective     Pt reports:  She is getting better minimally    She was compliant with home exercise program.  Response to previous treatment:positive  Functional change: none today    Pain: 3/10  Location: bilateral LB; today left hip      Objective     Nimco received therapeutic exercises to develop strength, flexibility, posture and core stabilization for 47 minutes (medicaid) including:    Bike - seat 9 - 5 minutes    TABLE:  Lumbar rotations, 20x Bilateral   MINI Bridges with Green Theraband x20  Supine Bilateral Sciatic Nerve Glide, 20x ankle pumps Bilateral (utilize as a HS stretch)   Frog adductor stretch, 3x30s  Supine Green Theraband  ER (clamshells), 20-30x    Piriformis stretch, just cross ankle over knee, 3x30s B  Sony stretch, 3x30s Bilateral (Leg dangles, no manual pressure)  Open books, 5x, 10sec hold, Bilateral  Abdominal bracing, 20x, legs extended      SEATED:     Cervical Retraction with Red Theraband, 20x5s, "like a chicken", she held band    T/s extension with HBH, 5x10s    Trapezius stretch, Bilateral  3x15s       STAND:   Bilateral shoulder ER, 20x5s (Red Theraband band on forearms) " "    NOT PERFORMED time   Lumbar extension, 10x, 5 sec  Red Theraband Shoulder extension, 20x   Standing Doorway stretch, low V arms, 3x15s, relax UT     3 laps ambulation, 72' x3   HR / TR, 20x ea    Standing marching, 1 minute, VC without leaning  Hip 3 way on 2" step  x10 B  SLS R/L single arm support 2 x 30 sec each   Cable Column: Paloff Press      Add NEXT:   Sleeping position education  Pre Gait training with core activation  Standing Left femoral nerve glide, 2x10 - if radic anterior  FUTURE:   RTB below knees, Lateral steps x 4 R/L each, in //bars  FSU stair, 20x  Balance training  Hip mobilization and distraction  Mechanical traction would have to be cleared by MD  Re-measure innominates 6th visit      Home Exercises Provided and Patient Education Provided     Education provided:   - cont HEP    Written Home Exercises Provided: Patient instructed to cont prior HEP.  Exercises were reviewed and Nimco was able to demonstrate them prior to the end of the session.  Nimco demonstrated fair  understanding of the education provided.     See EMR under Patient Instructions for exercises provided prior visit.    Assessment     Nimco demonstrates improved flexibility with supine piriformis stretch.  She is able to perform more functional tasks, such as yard work.  She will benefit from continued core, LB, and hip strengthening to decrease her pain, improve ADL's, hobbies.  Nimco Is progressing well towards her goals.   Pt prognosis is Fair.     Pt will continue to benefit from skilled outpatient physical therapy to address the deficits listed in the problem list box on initial evaluation, provide pt/family education and to maximize pt's level of independence in the home and community environment.     Pt's spiritual, cultural and educational needs considered and pt agreeable to plan of care and goals.    Anticipated barriers to physical therapy: compliance     Goals:  Short Term GOALS: 3 weeks  PROGRESSING  1. " Patient is independent with HEP.   2. Patient demonstrates independence with core activation and postural awareness while sitting and standing.   3. Patient will reduce pn to 2/10 while performing daily activities.  4. Patient will reduce LE radiculopathy frequency and intensity.     Long Term GOALS: 6 weeks.   PROGRESSING  1. Patient demonstrates increased l/s extension and hip extension Range of Motion to 0 degrees to improve tolerance to standing.  2. Patient demonstrates increased core, hip and BLE strength by 1/2 grade or greater to improve tolerance to home chores.  3. Patient demonstrates independence with body mechanics while performing chores and helping roomate.          Plan     Cont per POC, posture, core and LB strengthening    I certify that I was present in the room directing the student in service delivery and guiding them using my skilled judgment. As the co-signing therapist I have reviewed the students documentation and am responsible for the treatment, assessment, and plan.     Joby Maldonado, SPTGAURAV Méndez, PTA

## 2022-06-07 ENCOUNTER — CLINICAL SUPPORT (OUTPATIENT)
Dept: REHABILITATION | Facility: HOSPITAL | Age: 62
End: 2022-06-07
Payer: MEDICAID

## 2022-06-07 DIAGNOSIS — G89.29 CHRONIC RADICULAR LUMBAR PAIN: ICD-10-CM

## 2022-06-07 DIAGNOSIS — M54.2 CHRONIC CERVICAL PAIN: Primary | ICD-10-CM

## 2022-06-07 DIAGNOSIS — M54.16 CHRONIC RADICULAR LUMBAR PAIN: ICD-10-CM

## 2022-06-07 DIAGNOSIS — G89.29 CHRONIC CERVICAL PAIN: Primary | ICD-10-CM

## 2022-06-07 PROCEDURE — 97110 THERAPEUTIC EXERCISES: CPT | Mod: PN

## 2022-06-07 NOTE — PROGRESS NOTES
"  Physical Therapy Daily Treatment Note     Name: Nimco Caro  Clinic Number: [id number]    Therapy Diagnosis:   Encounter Diagnoses   Name Primary?    Chronic cervical pain Yes    Chronic radicular lumbar pain      Physician: Joby Jaimes MD    Visit Date: 6/7/2022    Physician Orders: PT Eval and Treat   Medical Diagnosis from Referral:   M47.816 (ICD-10-CM) - Lumbar facet arthropathy   M47.22 (ICD-10-CM) - Cervical spondylosis with radiculopathy   Evaluation Date: 4/26/2022  Authorization Period Expiration: 12/31/22  Plan of Care Expiration: 7/10/2022 or 12v post eval  Visit # (total)/ Visits authorized: 10 / 20 (POC 9 / 12 )  NO FOTO  Progress Note Due: 5/26/2022      Time In:  1047  Time Out: 1132  Total Billable Time: 45 minutes    Precautions:  Diverticulitis current; HTN; COPD; Anxiety       Subjective     Pt reports:  She is having a good day. Nothing bothering her too much.     She was compliant with home exercise program.  Response to previous treatment:positive  Functional change: none today    Pain: 1/10  Location: bilateral LB; today left hip      Objective     Nimco received therapeutic exercises to develop strength, flexibility, posture and core stabilization for 45 minutes (medicaid) including:    Bike - seat 9 - 5 minutes    TABLE:  Lumbar rotations, 20x Bilateral   MINI Bridges with Green Theraband x20  Supine Bilateral Sciatic Nerve Glide, 20x ankle pumps Bilateral (utilize as a HS stretch)   Frog adductor stretch, 3x30s  Supine Green Theraband ER (clamshells), 30x    Piriformis stretch, just cross ankle over knee, 3x30s B  Sony stretch, 3x30s Bilateral (Leg dangles, no manual pressure)  Open books, 5x, 10sec hold, Bilateral  Abdominal bracing, 20x, legs extended    SEATED:     Cervical Retraction with Red Theraband, 20x5s, "like a chicken", she held band    T/s extension with HBH, 5x10s    Trapezius stretch, Bilateral  3x15s       STAND:   Bilateral shoulder ER, 20x5s (Red Theraband " "band on forearms)     NOT PERFORMED time   Standing Lumbar extension, 10x, 5 sec  Red Theraband Shoulder extension, 20x   Standing Doorway stretch, low V arms, 3x15s, relax UT     3 laps ambulation, 72' x3   HR / TR, 20x ea    Standing marching, 1 minute, VC without leaning  Hip 3 way on 2" step  x10 B  SLS R/L single arm support 2 x 30 sec each   Cable Column: Paloff Press      Add NEXT:   Sleeping position education  Pre Gait training with core activation  Standing Left femoral nerve glide, 2x10 - if radic anterior  FUTURE:   RTB below knees, Lateral steps x 4 R/L each, in //bars  FSU stair, 20x  Balance training  Hip mobilization and distraction  Mechanical traction would have to be cleared by MD  Re-measure innominates 6th visit      Home Exercises Provided and Patient Education Provided     Education provided:   - cont HEP    Written Home Exercises Provided: Patient instructed to cont prior HEP.  Exercises were reviewed and Nimco was able to demonstrate them prior to the end of the session.  Nimco demonstrated fair  understanding of the education provided.     See EMR under Patient Instructions for exercises provided prior visit.    Assessment     Nimco demonstrates decreased pain levels and improved flexibility in her hips. Pt able to perform all therex without pn provocation. She will benefit from continued core, LB, and hip strengthening to decrease her pain, improve ADL's, hobbies.    Nimco Is progressing well towards her goals.   Pt prognosis is Fair.     Pt will continue to benefit from skilled outpatient physical therapy to address the deficits listed in the problem list box on initial evaluation, provide pt/family education and to maximize pt's level of independence in the home and community environment.     Pt's spiritual, cultural and educational needs considered and pt agreeable to plan of care and goals.    Anticipated barriers to physical therapy: compliance     Goals:  Short Term GOALS: 3 " weeks  PROGRESSING  1. Patient is independent with HEP.   2. Patient demonstrates independence with core activation and postural awareness while sitting and standing.   3. Patient will reduce pn to 2/10 while performing daily activities.  4. Patient will reduce LE radiculopathy frequency and intensity.     Long Term GOALS: 6 weeks.   PROGRESSING  1. Patient demonstrates increased l/s extension and hip extension Range of Motion to 0 degrees to improve tolerance to standing.  2. Patient demonstrates increased core, hip and BLE strength by 1/2 grade or greater to improve tolerance to home chores.  3. Patient demonstrates independence with body mechanics while performing chores and helping roomate.          Plan     Cont per POC, posture, core and LB strengthening      Jennie Osorio, PT

## 2022-06-09 ENCOUNTER — DOCUMENTATION ONLY (OUTPATIENT)
Dept: REHABILITATION | Facility: HOSPITAL | Age: 62
End: 2022-06-09
Payer: MEDICAID

## 2022-06-10 ENCOUNTER — CLINICAL SUPPORT (OUTPATIENT)
Dept: REHABILITATION | Facility: HOSPITAL | Age: 62
End: 2022-06-10
Payer: MEDICAID

## 2022-06-10 DIAGNOSIS — M54.16 CHRONIC RADICULAR LUMBAR PAIN: ICD-10-CM

## 2022-06-10 DIAGNOSIS — G89.29 CHRONIC CERVICAL PAIN: Primary | ICD-10-CM

## 2022-06-10 DIAGNOSIS — M54.2 CHRONIC CERVICAL PAIN: Primary | ICD-10-CM

## 2022-06-10 DIAGNOSIS — G89.29 CHRONIC RADICULAR LUMBAR PAIN: ICD-10-CM

## 2022-06-10 PROCEDURE — 97110 THERAPEUTIC EXERCISES: CPT | Mod: PN,CQ

## 2022-06-10 NOTE — PROGRESS NOTES
"  Physical Therapy Daily Treatment Note     Name: Nimco Caro  Clinic Number: [id number]    Therapy Diagnosis:   Encounter Diagnoses   Name Primary?    Chronic cervical pain Yes    Chronic radicular lumbar pain      Physician: Joby Jaimes MD    Visit Date: 6/10/2022    Physician Orders: PT Eval and Treat   Medical Diagnosis from Referral:   M47.816 (ICD-10-CM) - Lumbar facet arthropathy   M47.22 (ICD-10-CM) - Cervical spondylosis with radiculopathy   Evaluation Date: 4/26/2022  Authorization Period Expiration: 12/31/22  Plan of Care Expiration: 7/10/2022 or 12v post eval  Visit # (total)/ Visits authorized: 11 / 20 (POC 10 / 12 )  NO FOTO  Progress Note Due: 5/26/2022      Time In:  1050  Time Out: 1130  Total Billable Time: 40 minutes    Precautions:  Diverticulitis current; HTN; COPD; Anxiety       Subjective     Pt reports:  "My back is always hurting"    She was compliant with home exercise program.  Response to previous treatment:positive  Functional change: none today    Pain: 4/10  Location: bilateral LB; today left hip      Objective     Nimco received therapeutic exercises to develop strength, flexibility, posture and core stabilization for 40 minutes (medicaid) including:    Bike - seat 9 - 8 minutes    TABLE:  Lumbar rotations, 20x Bilateral   MINI Bridges with Green Theraband x20  Supine Bilateral Sciatic Nerve Glide, 20x ankle pumps Bilateral (utilize as a HS stretch)   Frog adductor stretch, 3x30s  Supine Green Theraband ER (clamshells), 30x    Piriformis stretch, just cross ankle over knee, 3x30s B  NOT PERFORMED time   Sony stretch, 3x30s Bilateral (Leg dangles, no manual pressure)  Open books, 5x, 10sec hold, Bilateral  NOT PERFORMED time   Abdominal bracing, 20x, legs extended    NOT PERFORMED time     SEATED:     Cervical Retraction with Red Theraband, 20x5s, "like a chicken", she held band    T/s extension with HBH, 5x10s   NOT PERFORMED time   Trapezius stretch, Bilateral  3x15 sec   " "NOT PERFORMED time        STAND:   Bilateral shoulder ER, 20x5s (Red Theraband band on forearms)     NOT PERFORMED time   Standing Lumbar extension, 10x, 5 sec  Red Theraband Shoulder extension, 20x   Standing Doorway stretch, low V arms, 3x15s, relax UT     3 laps ambulation, 72' x3   HR / TR, 20x ea    Standing marching, 1 minute, VC without leaning  Hip 3 way on 2" step  x10 B  SLS R/L single arm support 2 x 30 sec each   Cable Column: Paloff Press      Add NEXT:   Sleeping position education  Pre Gait training with core activation  Standing Left femoral nerve glide, 2x10 - if radic anterior  FUTURE:   RTB below knees, Lateral steps x 4 R/L each, in //bars  FSU stair, 20x  Balance training  Hip mobilization and distraction  Mechanical traction would have to be cleared by MD  Re-measure innominates 6th visit      Home Exercises Provided and Patient Education Provided     Education provided:   - cont HEP    Written Home Exercises Provided: Patient instructed to cont prior HEP.  Exercises were reviewed and Nimco was able to demonstrate them prior to the end of the session.  Nimco demonstrated fair  understanding of the education provided.     See EMR under Patient Instructions for exercises provided prior visit.    Assessment     Nimco continues with reported pain along her "spine" from lower cervical to lower lumber region.  She has decreased lumber lordosis and decreased LE flexibility.  She is able to complete PRE's w/o difficulty.  Continued LB, core, and hip strengthening to improve community outings, functional activities.   Nimco Is progressing well towards her goals.   Pt prognosis is Fair.     Pt will continue to benefit from skilled outpatient physical therapy to address the deficits listed in the problem list box on initial evaluation, provide pt/family education and to maximize pt's level of independence in the home and community environment.     Pt's spiritual, cultural and educational needs " considered and pt agreeable to plan of care and goals.    Anticipated barriers to physical therapy: compliance     Goals:  Short Term GOALS: 3 weeks  PROGRESSING  1. Patient is independent with HEP.   2. Patient demonstrates independence with core activation and postural awareness while sitting and standing.   3. Patient will reduce pn to 2/10 while performing daily activities.  4. Patient will reduce LE radiculopathy frequency and intensity.     Long Term GOALS: 6 weeks.   PROGRESSING  1. Patient demonstrates increased l/s extension and hip extension Range of Motion to 0 degrees to improve tolerance to standing.  2. Patient demonstrates increased core, hip and BLE strength by 1/2 grade or greater to improve tolerance to home chores.  3. Patient demonstrates independence with body mechanics while performing chores and helping roomate.          Plan     Cont per POC, posture, core and LB strengthening      Nichole Méndez, PTA

## 2022-06-14 ENCOUNTER — CLINICAL SUPPORT (OUTPATIENT)
Dept: REHABILITATION | Facility: HOSPITAL | Age: 62
End: 2022-06-14
Payer: MEDICAID

## 2022-06-14 DIAGNOSIS — G89.29 CHRONIC CERVICAL PAIN: Primary | ICD-10-CM

## 2022-06-14 DIAGNOSIS — M54.16 CHRONIC RADICULAR LUMBAR PAIN: ICD-10-CM

## 2022-06-14 DIAGNOSIS — M54.2 CHRONIC CERVICAL PAIN: Primary | ICD-10-CM

## 2022-06-14 DIAGNOSIS — G89.29 CHRONIC RADICULAR LUMBAR PAIN: ICD-10-CM

## 2022-06-14 PROCEDURE — 97110 THERAPEUTIC EXERCISES: CPT | Mod: PN,CQ

## 2022-06-14 NOTE — PROGRESS NOTES
"  Physical Therapy Daily Treatment Note     Name: Nimco Caro  Clinic Number: [id number]    Therapy Diagnosis:   Encounter Diagnoses   Name Primary?    Chronic cervical pain Yes    Chronic radicular lumbar pain      Physician: Joby Jaimes MD    Visit Date: 6/14/2022    Physician Orders: PT Eval and Treat   Medical Diagnosis from Referral:   M47.816 (ICD-10-CM) - Lumbar facet arthropathy   M47.22 (ICD-10-CM) - Cervical spondylosis with radiculopathy   Evaluation Date: 4/26/2022  Authorization Period Expiration: 12/31/22  Plan of Care Expiration: 7/10/2022 or 12v post eval  Visit # (total)/ Visits authorized: 12 / 20 (POC 11 / 12 )  NO FOTO  Progress Note Due: 5/26/2022      Time In:  1047  Time Out: 1130  Total Billable Time: 42 minutes    Precautions:  Diverticulitis current; HTN; COPD; Anxiety       Subjective     Pt reports: she hurt her left knee yesterday standing up from a chair on her back porch yeserday    She was compliant with home exercise program.  Response to previous treatment:positive  Functional change: none today    Pain: 4/10  Location: bilateral LB; today left hip      Objective     Nimco received therapeutic exercises to develop strength, flexibility, posture and core stabilization for 42 minutes (medicaid) including:    Bike - seat 9 - 8 minutes    TABLE:  Lumbar rotations, 20x Bilateral   MINI Bridges with Green Theraband x20  Supine Bilateral Sciatic Nerve Glide, 20x ankle pumps Bilateral (utilize as a HS stretch)   Frog adductor stretch, 3x30s  Supine Green Theraband ER (clamshells), 30x    Piriformis stretch, just cross ankle over knee, 3x30s B     Sony stretch, 3x30s Bilateral (Leg dangles, no manual pressure)  Open books, 5x, 10sec hold, Bilateral    Abdominal bracing, 20x, legs extended        SEATED:     Cervical Retraction with Red Theraband, 20x5s, "like a chicken", she held band    T/s extension with HBH, 5x10s     Trapezius stretch, Bilateral  3x15 sec           STAND: " "  Bilateral shoulder ER, 20x5s (Red Theraband band on forearms)     NOT PERFORMED time    Standing Lumbar extension, 10x, 5 sec  Red Theraband Shoulder extension, 20x   Standing Doorway stretch, low V arms, 3x15s, relax UT     3 laps ambulation, 72' x3   HR / TR, 20x ea    Standing marching, 1 minute, VC without leaning  Hip 3 way on 2" step  x10 B  SLS R/L single arm support 2 x 30 sec each   Cable Column: [employer]      Add NEXT:   Sleeping position education  Pre Gait training with core activation  Standing Left femoral nerve glide, 2x10 - if radic anterior  FUTURE:   RTB below knees, Lateral steps x 4 R/L each, in //bars  FSU stair, 20x  Balance training  Hip mobilization and distraction  Mechanical traction would have to be cleared by MD  Re-measure innominates 6th visit      Home Exercises Provided and Patient Education Provided     Education provided:   - cont HEP    Written Home Exercises Provided: Patient instructed to cont prior HEP.  Exercises were reviewed and Nimco was able to demonstrate them prior to the end of the session.  Nimco demonstrated fair  understanding of the education provided.     See EMR under Patient Instructions for exercises provided prior visit.    Assessment     Nimco presents with continued Low back pain, reporting the same pain levels.  She has L knee pain today, but this did not affect her participation in therapy.  Continued core, LB, and hip strengthening to improve ADL's, community outings.  Nimco Is progressing well towards her goals.   Pt prognosis is Fair.     Pt will continue to benefit from skilled outpatient physical therapy to address the deficits listed in the problem list box on initial evaluation, provide pt/family education and to maximize pt's level of independence in the home and community environment.     Pt's spiritual, cultural and educational needs considered and pt agreeable to plan of care and goals.    Anticipated barriers to physical therapy: " compliance     Goals:  Short Term GOALS: 3 weeks  PROGRESSING  1. Patient is independent with HEP.   2. Patient demonstrates independence with core activation and postural awareness while sitting and standing.   3. Patient will reduce pn to 2/10 while performing daily activities.  4. Patient will reduce LE radiculopathy frequency and intensity.     Long Term GOALS: 6 weeks.   PROGRESSING  1. Patient demonstrates increased l/s extension and hip extension Range of Motion to 0 degrees to improve tolerance to standing.  2. Patient demonstrates increased core, hip and BLE strength by 1/2 grade or greater to improve tolerance to home chores.  3. Patient demonstrates independence with body mechanics while performing chores and helping roomate.          Plan     Cont per POC, posture, core and LB strengthening      Nichole Méndez, PTA

## 2022-06-17 ENCOUNTER — CLINICAL SUPPORT (OUTPATIENT)
Dept: REHABILITATION | Facility: HOSPITAL | Age: 62
End: 2022-06-17
Payer: MEDICAID

## 2022-06-17 DIAGNOSIS — G89.29 CHRONIC RADICULAR LUMBAR PAIN: ICD-10-CM

## 2022-06-17 DIAGNOSIS — G89.29 CHRONIC CERVICAL PAIN: Primary | ICD-10-CM

## 2022-06-17 DIAGNOSIS — M54.2 CHRONIC CERVICAL PAIN: Primary | ICD-10-CM

## 2022-06-17 DIAGNOSIS — M54.16 CHRONIC RADICULAR LUMBAR PAIN: ICD-10-CM

## 2022-06-17 PROCEDURE — 97110 THERAPEUTIC EXERCISES: CPT | Mod: PN

## 2022-06-17 NOTE — PLAN OF CARE
Physical Therapy Daily Treatment Note & Outpatient Discharge Summary     Name: Nimco Caro  Clinic Number: [id number]    Therapy Diagnosis:   Encounter Diagnoses   Name Primary?    Chronic cervical pain Yes    Chronic radicular lumbar pain      Physician: Joby Jaimes MD    Visit Date: 6/17/2022    Physician Orders: PT Eval and Treat   Medical Diagnosis from Referral:   M47.816 (ICD-10-CM) - Lumbar facet arthropathy   M47.22 (ICD-10-CM) - Cervical spondylosis with radiculopathy   Evaluation Date: 4/26/2022  Authorization Period Expiration: 12/31/22  Plan of Care Expiration: 7/10/2022 or 12v post eval  Visit # (total)/ Visits authorized: 13 / 20 (POC 12 / 12 )  NO FOTO  Progress Note Due: 5/26/2022      Time In:  1045  Time Out: 1130  Total Billable Time: 45 minutes    Precautions:  Diverticulitis current; HTN; COPD; Anxiety       Subjective     Pt reports: Needs support behind her back to sit without pain; using pillow. Reports she is functioning at about 40% normal function. Does not know what is going on with her neck and back. Reports PT has helped some; loosened up and decrease some pain in her back. Some activities she can do more than she used to but has to watch it b/c she overdoes easily and will put her down in the bed. Pt would like to be discharged from physical therapy at this time and return to [facility] for further imaging.     She was compliant with home exercise program.  Response to previous treatment:positive  Functional change: none today    Pain: 3-10/10 range; 3/10 today  Location: bilateral LB; today left hip; c/s-t/s junction    Objective     Range of Motion/Strength:      CERVICAL AROM  In degrees Pain/Dysfunction with Movement   Flexion (45 norm) 20->40     Extension (75 norm) 30->same     Right side bending  (35 norm) 40     Left side bending  (35 norm) 40     Right rotation  (65 norm) 45->55     Left rotation  (65 norm) 40->45          Lumbar AROM: ROM-   Response to repeated movements  "  Flexion 20 cm from floor - stab pn->16cm, stretch   Extension (15 norm) 0 degrees - scared to fall (arc of motion range: 5 deg flexed to 0 deg ext)->5 deg flex to 5 deg extension   Right side bending  (20 norm) 18 degrees    Left side bending  (20 norm) 20 degrees   Hip extension: 5 degrees FF in standing->same     UE MMT:  5/5 norm Left Right   Shoulder Flexion 3+/5->4 3+/5->4           Shoulder Abduction 4/5 4/5           Shoulder IR 4+/5 4+/5   Shoulder ER  @ 0* Abduction 4/5->4+ 4/5->4+   Elbow Flexion  4+/5 4+/5   Elbow Extension 4/5->4+ 4/5->4+   Mid Traps 3-/5 3-/5   Low Traps 2/5 2/5         LE MMT:  5/5 norm Left Right   Hip Flexion 4/5 4/5   Hip Extension 3+/5->4 3+/5->4   Hip Abduction 3+/5 3+/5           Hip IR 4/5 4/5   Hip ER 4/5 3+/5   Knee Flexion 4/5->4+ 4/5->4+   Knee Extension 4+/5 4+/5   Ankle DF 5/5 5/5           Ankle Inversion 5/5 5/5   Ankle Eversion 5/5 5/5         Flexibility Left Right   Hamstrings @ 90/90 (-20 norm) -45 degrees->-35 -30 degrees->same     Gait Without AD   Analysis Antalgic; Right trunk lean; dec WB on left       Nimco received therapeutic exercises to develop strength, flexibility, posture and core stabilization for 45 minutes (medicaid) including:    Bike - seat 9 - 5 minutes    TABLE:  Lumbar rotations, 20x Bilateral   MINI Bridges with Green Theraband x20  Supine Bilateral Sciatic Nerve Glide, 20x ankle pumps Bilateral (utilize as a HS stretch)   Frog adductor stretch, 3x30s  Supine Green Theraband ER (clamshells), 30x    Piriformis stretch, just cross ankle over knee, 3x30s B     Sony stretch, 3x30s Bilateral (Leg dangles, no manual pressure)  Open books, 5x, 10sec hold, Bilateral    Abdominal bracing, 20x, legs extended        SEATED:     Cervical Retraction with Red Theraband, 20x5s, "like a chicken", she held band    T/s extension with HBH, 5x10s     Trapezius stretch, Bilateral  3x15 sec           STAND:   Bilateral shoulder ER, 20x5s (Red Theraband band on " "forearms)     NOT PERFORMED time    Standing Lumbar extension, 10x, 5 sec  Red Theraband Shoulder extension, 20x   Standing Doorway stretch, low V arms, 3x15s, relax UT     3 laps ambulation, 72' x3   HR / TR, 20x ea    Standing marching, 1 minute, VC without leaning  Hip 3 way on 2" step  x10 B  SLS R/L single arm support 2 x 30 sec each   Cable Column: [employer]      Add NEXT:   Sleeping position education  Pre Gait training with core activation  Standing Left femoral nerve glide, 2x10 - if radic anterior  FUTURE:   RTB below knees, Lateral steps x 4 R/L each, in //bars  FSU stair, 20x  Balance training  Hip mobilization and distraction  Mechanical traction would have to be cleared by MD  Re-measure innominates 6th visit  Chore mechanics    Home Exercises Provided and Patient Education Provided     Education provided:   - cont HEP    Written Home Exercises Provided: Patient instructed to cont prior HEP.  Exercises were reviewed and Nimco was able to demonstrate them prior to the end of the session.  Nimco demonstrated fair  understanding of the education provided.     See EMR under Patient Instructions for exercises provided prior visit.    Assessment     Nimco presents with continued variable Low back and neck pain, reporting the same occasional high pain levels. She has made some clinical gains in her neck and back Range of Motion and overall strength but has met max rehabilitation potential at this time. Pt would like to return to [facility] at this time for further imaging.     Nimco Is progressing well towards her goals.   Pt prognosis is Fair.     Pt's spiritual, cultural and educational needs considered and pt agreeable to plan of care and goals.    Anticipated barriers to physical therapy: compliance     Goals:  Short Term GOALS: 3 weeks  PROGRESSING  1. Patient is independent with HEP.   2. Patient demonstrates independence with core activation and postural awareness while sitting and standing.   3. " Patient will reduce pn to 2/10 while performing daily activities.  4. Patient will reduce LE radiculopathy frequency and intensity.     Long Term GOALS: 6 weeks.   PROGRESSING  1. Patient demonstrates increased l/s extension and hip extension Range of Motion to 0 degrees to improve tolerance to standing. MET  2. Patient demonstrates increased core, hip and BLE strength by 1/2 grade or greater to improve tolerance to home chores.   3. Patient demonstrates independence with body mechanics while performing chores and helping roomate.          Plan     Discharge today. Return to [facility] for MRI imaging.      Jennie Osorio, PT

## 2022-06-17 NOTE — PROGRESS NOTES
Physical Therapy Daily Treatment Note & Outpatient Discharge Summary     Name: Nimco Caro  Clinic Number: [id number]    Therapy Diagnosis:   Encounter Diagnoses   Name Primary?    Chronic cervical pain Yes    Chronic radicular lumbar pain      Physician: Joby Jaimes MD    Visit Date: 6/17/2022    Physician Orders: PT Eval and Treat   Medical Diagnosis from Referral:   M47.816 (ICD-10-CM) - Lumbar facet arthropathy   M47.22 (ICD-10-CM) - Cervical spondylosis with radiculopathy   Evaluation Date: 4/26/2022  Authorization Period Expiration: 12/31/22  Plan of Care Expiration: 7/10/2022 or 12v post eval  Visit # (total)/ Visits authorized: 13 / 20 (POC 12 / 12 )  NO FOTO  Progress Note Due: 5/26/2022      Time In:  1045  Time Out: 1130  Total Billable Time: 45 minutes    Precautions:  Diverticulitis current; HTN; COPD; Anxiety       Subjective     Pt reports: Needs support behind her back to sit without pain; using pillow. Reports she is functioning at about 40% normal function. Does not know what is going on with her neck and back. Reports PT has helped some; loosened up and decrease some pain in her back. Some activities she can do more than she used to but has to watch it b/c she overdoes easily and will put her down in the bed. Pt would like to be discharged from physical therapy at this time and return to [facility] for further imaging.     She was compliant with home exercise program.  Response to previous treatment:positive  Functional change: none today    Pain: 3-10/10 range; 3/10 today  Location: bilateral LB; today left hip; c/s-t/s junction    Objective     Range of Motion/Strength:      CERVICAL AROM  In degrees Pain/Dysfunction with Movement   Flexion (45 norm) 20->40     Extension (75 norm) 30->same     Right side bending  (35 norm) 40     Left side bending  (35 norm) 40     Right rotation  (65 norm) 45->55     Left rotation  (65 norm) 40->45          Lumbar AROM: ROM-   Response to repeated movements  "  Flexion 20 cm from floor - stab pn->16cm, stretch   Extension (15 norm) 0 degrees - scared to fall (arc of motion range: 5 deg flexed to 0 deg ext)->5 deg flex to 5 deg extension   Right side bending  (20 norm) 18 degrees    Left side bending  (20 norm) 20 degrees   Hip extension: 5 degrees FF in standing->same     UE MMT:  5/5 norm Left Right   Shoulder Flexion 3+/5->4 3+/5->4           Shoulder Abduction 4/5 4/5           Shoulder IR 4+/5 4+/5   Shoulder ER  @ 0* Abduction 4/5->4+ 4/5->4+   Elbow Flexion  4+/5 4+/5   Elbow Extension 4/5->4+ 4/5->4+   Mid Traps 3-/5 3-/5   Low Traps 2/5 2/5         LE MMT:  5/5 norm Left Right   Hip Flexion 4/5 4/5   Hip Extension 3+/5->4 3+/5->4   Hip Abduction 3+/5 3+/5           Hip IR 4/5 4/5   Hip ER 4/5 3+/5   Knee Flexion 4/5->4+ 4/5->4+   Knee Extension 4+/5 4+/5   Ankle DF 5/5 5/5           Ankle Inversion 5/5 5/5   Ankle Eversion 5/5 5/5         Flexibility Left Right   Hamstrings @ 90/90 (-20 norm) -45 degrees->-35 -30 degrees->same     Gait Without AD   Analysis Antalgic; Right trunk lean; dec WB on left       Nimco received therapeutic exercises to develop strength, flexibility, posture and core stabilization for 45 minutes (medicaid) including:    Bike - seat 9 - 5 minutes    TABLE:  Lumbar rotations, 20x Bilateral   MINI Bridges with Green Theraband x20  Supine Bilateral Sciatic Nerve Glide, 20x ankle pumps Bilateral (utilize as a HS stretch)   Frog adductor stretch, 3x30s  Supine Green Theraband ER (clamshells), 30x    Piriformis stretch, just cross ankle over knee, 3x30s B     Sony stretch, 3x30s Bilateral (Leg dangles, no manual pressure)  Open books, 5x, 10sec hold, Bilateral    Abdominal bracing, 20x, legs extended        SEATED:     Cervical Retraction with Red Theraband, 20x5s, "like a chicken", she held band    T/s extension with HBH, 5x10s     Trapezius stretch, Bilateral  3x15 sec           STAND:   Bilateral shoulder ER, 20x5s (Red Theraband band on " "forearms)     NOT PERFORMED time    Standing Lumbar extension, 10x, 5 sec  Red Theraband Shoulder extension, 20x   Standing Doorway stretch, low V arms, 3x15s, relax UT     3 laps ambulation, 72' x3   HR / TR, 20x ea    Standing marching, 1 minute, VC without leaning  Hip 3 way on 2" step  x10 B  SLS R/L single arm support 2 x 30 sec each   Cable Column: [employer]      Add NEXT:   Sleeping position education  Pre Gait training with core activation  Standing Left femoral nerve glide, 2x10 - if radic anterior  FUTURE:   RTB below knees, Lateral steps x 4 R/L each, in //bars  FSU stair, 20x  Balance training  Hip mobilization and distraction  Mechanical traction would have to be cleared by MD  Re-measure innominates 6th visit  Chore mechanics    Home Exercises Provided and Patient Education Provided     Education provided:   - cont HEP    Written Home Exercises Provided: Patient instructed to cont prior HEP.  Exercises were reviewed and Nimco was able to demonstrate them prior to the end of the session.  Nimco demonstrated fair  understanding of the education provided.     See EMR under Patient Instructions for exercises provided prior visit.    Assessment     Nimco presents with continued variable Low back and neck pain, reporting the same occasional high pain levels. She has made some clinical gains in her neck and back Range of Motion and overall strength but has met max rehabilitation potential at this time. Pt would like to return to [facility] at this time for further imaging.     Nimco Is progressing well towards her goals.   Pt prognosis is Fair.     Pt's spiritual, cultural and educational needs considered and pt agreeable to plan of care and goals.    Anticipated barriers to physical therapy: compliance     Goals:  Short Term GOALS: 3 weeks  PROGRESSING  1. Patient is independent with HEP.   2. Patient demonstrates independence with core activation and postural awareness while sitting and standing.   3. " Patient will reduce pn to 2/10 while performing daily activities.  4. Patient will reduce LE radiculopathy frequency and intensity.     Long Term GOALS: 6 weeks.   PROGRESSING  1. Patient demonstrates increased l/s extension and hip extension Range of Motion to 0 degrees to improve tolerance to standing. MET  2. Patient demonstrates increased core, hip and BLE strength by 1/2 grade or greater to improve tolerance to home chores.   3. Patient demonstrates independence with body mechanics while performing chores and helping roomate.          Plan     Discharge today. Return to [facility] for MRI imaging.      Jennie Osorio, PT

## 2022-06-27 ENCOUNTER — OFFICE VISIT (OUTPATIENT)
Dept: ORTHOPEDICS | Facility: CLINIC | Age: 62
End: 2022-06-27
Payer: MEDICAID

## 2022-06-27 VITALS — HEIGHT: 67 IN | BODY MASS INDEX: 27 KG/M2 | WEIGHT: 172 LBS

## 2022-06-27 DIAGNOSIS — M47.816 LUMBAR FACET ARTHROPATHY: Primary | ICD-10-CM

## 2022-06-27 DIAGNOSIS — M54.12 CERVICAL RADICULITIS: ICD-10-CM

## 2022-06-27 DIAGNOSIS — M47.22 CERVICAL SPONDYLOSIS WITH RADICULOPATHY: ICD-10-CM

## 2022-06-27 DIAGNOSIS — M48.07 LUMBOSACRAL STENOSIS: ICD-10-CM

## 2022-06-27 PROCEDURE — 1160F RVW MEDS BY RX/DR IN RCRD: CPT | Mod: CPTII,S$GLB,, | Performed by: ORTHOPAEDIC SURGERY

## 2022-06-27 PROCEDURE — 1159F MED LIST DOCD IN RCRD: CPT | Mod: CPTII,S$GLB,, | Performed by: ORTHOPAEDIC SURGERY

## 2022-06-27 PROCEDURE — 99213 PR OFFICE/OUTPT VISIT, EST, LEVL III, 20-29 MIN: ICD-10-PCS | Mod: S$GLB,,, | Performed by: ORTHOPAEDIC SURGERY

## 2022-06-27 PROCEDURE — 3008F PR BODY MASS INDEX (BMI) DOCUMENTED: ICD-10-PCS | Mod: CPTII,S$GLB,, | Performed by: ORTHOPAEDIC SURGERY

## 2022-06-27 PROCEDURE — 3008F BODY MASS INDEX DOCD: CPT | Mod: CPTII,S$GLB,, | Performed by: ORTHOPAEDIC SURGERY

## 2022-06-27 PROCEDURE — 1159F PR MEDICATION LIST DOCUMENTED IN MEDICAL RECORD: ICD-10-PCS | Mod: CPTII,S$GLB,, | Performed by: ORTHOPAEDIC SURGERY

## 2022-06-27 PROCEDURE — 1160F PR REVIEW ALL MEDS BY PRESCRIBER/CLIN PHARMACIST DOCUMENTED: ICD-10-PCS | Mod: CPTII,S$GLB,, | Performed by: ORTHOPAEDIC SURGERY

## 2022-06-27 PROCEDURE — 99213 OFFICE O/P EST LOW 20 MIN: CPT | Mod: S$GLB,,, | Performed by: ORTHOPAEDIC SURGERY

## 2022-06-27 RX ORDER — LURASIDONE HYDROCHLORIDE 20 MG/1
20 TABLET, FILM COATED ORAL DAILY
COMMUNITY
End: 2023-09-20 | Stop reason: DRUGHIGH

## 2022-06-27 NOTE — PROGRESS NOTES
Subjective:       Patient ID: Nimco Caro is a 61 y.o. female.    Chief Complaint: Pain of the Spine (Pt is here to f/up on Lumbar & Cervical pain & PT Status, PT only helped a little, caused her to have more pain and having same sympoms) and Pain of the Neck      History of Present Illness    Prior to meeting with the patient I reviewed the medical chart in [facility]. This included reviewing the previous progress notes from our office, review of the patient's last appointment with their primary care provider, review of any visits to the emergency room, and review of any pain management appointments or procedures.   Patient is here follow-up for chronic low back pain; lower extremity radicular symptoms are minimal.  Present at rest but worse with activity.  Denies any bowel bladder incontinence.  Onset of lumbar symptoms correlates to a fall about 4-5 years ago.  She is also here follow-up for persistent neck pain with bilateral upper extremity numbness and dysesthesia with activity.  Cervical pain limits her cervical range of motion.  Denies any history of injury or incident that may have instigated her symptoms regarding her neck.    It seems that she has done and extended amount of physical therapy which unfortunately has not helped her symptoms.    Current Medications  Current Outpatient Medications   Medication Sig Dispense Refill    albuterol (PROVENTIL/VENTOLIN HFA) 90 mcg/actuation inhaler INHALE 2 PUFFS BY MOUTH INTO THE LUNGS EVERY 6 HOURS      ALBUTEROL INHL Inhale into the lungs 4 (four) times daily as needed.       amLODIPine (NORVASC) 10 MG tablet 1 tablet      ascorbic acid, vitamin C, (VITAMIN C) 1000 MG tablet Take 1,000 mg by mouth once daily.      aspirin (ECOTRIN) 81 MG EC tablet Take 81 mg by mouth once daily.      atenoloL (TENORMIN) 50 MG tablet Take 25 mg by mouth.      azelastine (ASTELIN) 137 mcg (0.1 %) nasal spray USE 1 SPRAY IN EACH NOSTRIL 2 TIMES DAILY 30 mL 1    b complex  vitamins tablet Take 1 tablet by mouth once daily.      biotin 1 mg tablet Take 1,000 mcg by mouth once daily.      CHANTIX STARTING MONTH BOX 0.5 mg (11)- 1 mg (42) tablet FOLLOW PACKAGE DIRECTIONS      clonazePAM (KLONOPIN) 1 MG tablet 1 tablet daily as needed for anxiery  2    clonazePAM (KLONOPIN) 2 MG Tab       cyanocobalamin 1,000 mcg/mL injection       dicyclomine (BENTYL) 10 MG capsule       donepeziL (ARICEPT) 10 MG tablet Take 10 mg by mouth.      DULoxetine (CYMBALTA) 60 MG capsule 60 mg 2 (two) times daily.       FLUoxetine 20 MG capsule       fluticasone propionate (FLONASE) 50 mcg/actuation nasal spray 1 spray (50 mcg total) by Each Nostril route once daily. 15.8 mL 0    gabapentin (NEURONTIN) 800 MG tablet 800 mg 3 (three) times daily.       linaCLOtide (LINZESS) 72 mcg Cap capsule Take 1 capsule by mouth once daily.      lisinopriL (PRINIVIL,ZESTRIL) 20 MG tablet 1 tablet      lurasidone (LATUDA) 20 mg Tab tablet Take 20 mg by mouth once daily.      multivitamin with minerals tablet Take 1 tablet by mouth once daily.      neomycin-polymyxin-dexamethasone (MAXITROL) 3.5mg/mL-10,000 unit/mL-0.1 % DrpS SHAKE LIQUID AND INSTILL 1 DROP IN BOTH EYES TWICE DAILY      olopatadine (PATADAY) 0.2 % Drop INSTILL 1 DROP IN BOTH EYES TWICE DAILY FOR 30 DAYS      ondansetron (ZOFRAN) 4 MG tablet Take 2 tablets (8 mg total) by mouth 2 (two) times daily as needed for Nausea.      ondansetron (ZOFRAN-ODT) 4 MG TbDL PLACE 1 TABLET ON THE TONGUE AND ALLOW TO DISSOLVE EVERY 8 HOURS      OXcarbazepine (TRILEPTAL) 300 MG Tab       oxybutynin (DITROPAN) 5 MG Tab 1 tablet      OXYGEN-AIR DELIVERY SYSTEMS MISC 6 L by Misc.(Non-Drug; Combo Route) route continuous.      pantoprazole (PROTONIX) 40 MG tablet 1 tablet      polyethylene glycol (GLYCOLAX) 17 gram/dose powder MIX ONE PACKET WITH 8 OUNCES OF FLUID AND DRINK DAILY      polyethylene glycol 1000,bulk, Powd       pravastatin (PRAVACHOL) 20 MG  tablet 20 mg once daily.       pulse oximeter (PULSE OXIMETER) device by Apply Externally route 2 (two) times a day. Use twice daily at 8 AM and 3 PM and [facility] as directed. 1 each 0    salmeteroL (SEREVENT DISKUS) 50 mcg/dose diskus inhaler 1 puff      sucralfate (CARAFATE) 1 gram tablet Take 1 g by mouth 4 (four) times daily.      sumatriptan (IMITREX) 25 MG Tab TAKE 1 TABLET BY MOUTH AT LEAST 2 HOURS BETWEEN DOSES AS NEEDED      tiotropium-olodateroL (STIOLTO RESPIMAT) 2.5-2.5 mcg/actuation Mist 2 puffs      tolterodine (DETROL LA) 2 MG Cp24 2 mg once daily.       topiramate (TOPAMAX) 25 MG tablet Take 25 mg by mouth.      traZODone (DESYREL) 100 MG tablet TAKE 2 AND 1/2 TABLETS BY MOUTH EVERY NIGHT AT BEDTIME      trospium (SANCTURA) 20 mg Tab tablet       vilazodone (VIIBRYD) 20 mg Tab 1 tablet with food      vitamin D (VITAMIN D3) 1000 units Tab Take 1,000 Units by mouth once daily.      ARIPiprazole (ABILIFY) 15 MG Tab 1 tablet      cetirizine (ZYRTEC) 10 MG tablet Take 1 tablet (10 mg total) by mouth once daily. 30 tablet 0     No current facility-administered medications for this visit.       Allergies  Review of patient's allergies indicates:   Allergen Reactions    Narcof [hydrocodone-guaifenesin]     Ciprofloxacin Diarrhea    Ibuprofen Nausea Only    Naproxen Nausea Only       Past Medical History  Past Medical History:   Diagnosis Date    Anxiety     Bipolar affect, depressed     Cancer ovarian; uterine    1992    COPD (chronic obstructive pulmonary disease)     GERD (gastroesophageal reflux disease)     Hyperlipidemia     Hypertension     OAB (overactive bladder)        Surgical History  Past Surgical History:   Procedure Laterality Date    BREAST CYST ASPIRATION      COLON SURGERY      COLONOSCOPY      HERNIA REPAIR N/A 2016    HIP ARTHROPLASTY Right 2/25/2019    Procedure: ARTHROPLASTY, HIP;  Surgeon: Eliseo Vaca MD;  Location: [facility];  Service:  Orthopedics;  Laterality: Right;  Daniel Herrera    HYSTERECTOMY  total    JOINT REPLACEMENT Right     hip replacemnt    KNEE ARTHROPLASTY Left 8/19/2019    Procedure: ARTHROPLASTY, KNEE;  Surgeon: Eliseo Vaca MD;  [facility];  Service: Orthopedics;  Laterality: Left;    REVISION COLOSTOMY N/A 2012       Family History:   Family History   Problem Relation Age of Onset    Cancer Mother     Hypertension Father     Heart disease Father     Heart disease Sister     Hypertension Sister     Hypertension Brother     Depression Brother        Social History:   Social History     Socioeconomic History    Marital status:    Tobacco Use    Smoking status: Current Every Day Smoker     Packs/day: 0.50     Years: 40.00     Pack years: 20.00     Types: Cigarettes    Smokeless tobacco: Never Used   Substance and Sexual Activity    Alcohol use: Yes     Alcohol/week: 1.0 standard drink     Types: 1 Glasses of wine per week     Comment: weekly    Drug use: No       Hospitalization/Major Diagnostic Procedure:     Review of Systems     General/Constitutional:  Chills denies. Fatigue denies. Fever denies. Weight gain denies. Weight loss denies.    Respiratory:  Shortness of breath denies.    Cardiovascular:  Chest pain denies.    Gastrointestinal:  Constipation denies. Diarrhea denies. Nausea denies. Vomiting denies.     Hematology:  Easy bruising denies. Prolonged bleeding denies.     Genitourinary:  Frequent urination denies. Pain in lower back denies. Painful urination denies.     Musculoskeletal:  See HPI for details    Skin:  Rash denies.    Neurologic:  Dizziness denies. Gait abnormalities denies. Seizures denies. Tingling/Numbess denies.    Psychiatric:  Anxiety denies. Depressed mood denies.     Objective:   Vital Signs: There were no vitals filed for this visit.     Physical Exam      General Examination:     Constitutional: The patient is alert and oriented to lace person and time. Mood is  pleasant.     Head/Face: Normal facial features normal eyebrows    Eyes: Normal extraocular motion bilaterally    Lungs: Respirations are equal and unlabored    Gait is coordinated.    Cardiovascular: There are no swelling or varicosities present.    Lymphatic: Negative for adenopathy    Skin: Normal    Neurological: Level of consciousness normal. Oriented to place person and time and situation    Psychiatric: Oriented to time place person and situation    Lumbar exam demonstrates a nonantalgic gait.  Range of motion moderately limited in all planes.  No significant tenderness to palpation.  Bilateral lower extremities demonstrate full active range of motion, equal symmetric DTRs, normal strength and negative straight leg raise maneuver.  Previous total knee arthroplasty incision on the left.    Cervical exam demonstrates range of motion mildly diminished in all planes.  Bilateral upper trapezius tenderness palpation with muscle spasm.  Bilateral upper extremities demonstrate full active range of motion, equal symmetric DTRs at 2+, normal strength and negative Delgadillo's.    XRAY Report/ Interpretation:  Lumbar AP and lateral x-rays taken in the office today reviewed the patient demonstrate advanced multilevel degenerative disc disease most prominent at L4-5 and L5-S1 with corresponding facet sclerosis indicative of facet arthropathy.    Cervical AP and lateral x-rays taken in the office today reviewed the patient demonstrate multilevel degenerative disc disease with anterior osteophytes.      Assessment:       1. Lumbar facet arthropathy    2. Cervical spondylosis with radiculopathy    3. Lumbosacral stenosis    4. Cervical radiculitis        Plan:       Nimco was seen today for pain and pain.    Diagnoses and all orders for this visit:    Lumbar facet arthropathy  -     X-Ray Lumbar Spine Ap And Lateral    Cervical spondylosis with radiculopathy  -     X-Ray Cervical Spine AP And Lateral    Lumbosacral  stenosis  -     X-Ray Lumbar Spine Ap And Lateral    Cervical radiculitis  -     X-Ray Cervical Spine AP And Lateral         No follow-ups on file.  This is to attest that the physician's assistant Fede Jeter served in the capacity as a scribe for this patient's encounter.  This is also to verify that I have reviewed the patient's history and helped formulate the treatment plan and discussed it with the physician's assistant.  I have actively participated  in the evaluation and treatment plan for this patient visit.  The treatment plan and medical decision-making is as outlined below:  Patient has been through an extensive course of physical therapy for both her lumbar and her cervical spines.  They have failed to provide any significant or long-term relief.  Therefore we continue to recommend both the lumbar MRI and the cervical MRI with the lumbar being the priority since this is her most painful region.  Follow-up in 3 weeks to review both MRI studies and discuss referral to Pain Management as a next step.    Treatment options were discussed with regards to the nature of the medical condition. Conservative pain intervention and surgical options were discussed in detail. The probability of success of each separate treatment option was discussed. The patient expressed a clear understanding of the treatment options. With regards to surgery, the procedure risk, benefits, complications, and outcomes were discussed. No guarantees were given with regards to surgical outcome.   The risk of complications, morbidity, and mortality of patient management decisions have been made at the time of this visit. These are associated with the patient's problems, diagnostic procedures and treatment options. This includes the possible management options selected and those considered but not selected by the patient after shared medical decision making we discussed with the patient.     This note was created using Dragon voice  recognition software that occasionally misinterpreted phrases or words.

## 2022-06-30 ENCOUNTER — HOSPITAL ENCOUNTER (OUTPATIENT)
Dept: RADIOLOGY | Facility: HOSPITAL | Age: 62
Discharge: HOME | End: 2022-06-30
Attending: ORTHOPAEDIC SURGERY
Payer: MEDICAID

## 2022-06-30 DIAGNOSIS — M47.22 CERVICAL SPONDYLOSIS WITH RADICULOPATHY: ICD-10-CM

## 2022-06-30 DIAGNOSIS — M48.07 LUMBOSACRAL STENOSIS: ICD-10-CM

## 2022-06-30 DIAGNOSIS — M54.12 CERVICAL RADICULITIS: ICD-10-CM

## 2022-06-30 DIAGNOSIS — M47.816 LUMBAR FACET ARTHROPATHY: ICD-10-CM

## 2022-06-30 PROCEDURE — 72141 MRI NECK SPINE W/O DYE: CPT | Mod: TC,PO

## 2022-06-30 PROCEDURE — 72148 MRI LUMBAR SPINE W/O DYE: CPT | Mod: TC,PO

## 2022-07-18 ENCOUNTER — OFFICE VISIT (OUTPATIENT)
Dept: ORTHOPEDICS | Facility: CLINIC | Age: 62
End: 2022-07-18
Payer: MEDICAID

## 2022-07-18 VITALS — BODY MASS INDEX: 25.9 KG/M2 | HEIGHT: 67 IN | WEIGHT: 165 LBS

## 2022-07-18 DIAGNOSIS — M47.22 CERVICAL SPONDYLOSIS WITH RADICULOPATHY: ICD-10-CM

## 2022-07-18 DIAGNOSIS — M48.07 LUMBOSACRAL STENOSIS: ICD-10-CM

## 2022-07-18 DIAGNOSIS — M48.02 FORAMINAL STENOSIS OF CERVICAL REGION: ICD-10-CM

## 2022-07-18 DIAGNOSIS — M43.16 SPONDYLOLISTHESIS OF LUMBAR REGION: Primary | ICD-10-CM

## 2022-07-18 DIAGNOSIS — M47.816 LUMBAR FACET ARTHROPATHY: ICD-10-CM

## 2022-07-18 DIAGNOSIS — M54.12 CERVICAL RADICULITIS: ICD-10-CM

## 2022-07-18 DIAGNOSIS — M50.30 DEGENERATIVE DISC DISEASE, CERVICAL: ICD-10-CM

## 2022-07-18 PROCEDURE — 3008F BODY MASS INDEX DOCD: CPT | Mod: CPTII,S$GLB,, | Performed by: ORTHOPAEDIC SURGERY

## 2022-07-18 PROCEDURE — 1160F PR REVIEW ALL MEDS BY PRESCRIBER/CLIN PHARMACIST DOCUMENTED: ICD-10-PCS | Mod: CPTII,S$GLB,, | Performed by: ORTHOPAEDIC SURGERY

## 2022-07-18 PROCEDURE — 1160F RVW MEDS BY RX/DR IN RCRD: CPT | Mod: CPTII,S$GLB,, | Performed by: ORTHOPAEDIC SURGERY

## 2022-07-18 PROCEDURE — 99213 OFFICE O/P EST LOW 20 MIN: CPT | Mod: S$GLB,,, | Performed by: ORTHOPAEDIC SURGERY

## 2022-07-18 PROCEDURE — 3008F PR BODY MASS INDEX (BMI) DOCUMENTED: ICD-10-PCS | Mod: CPTII,S$GLB,, | Performed by: ORTHOPAEDIC SURGERY

## 2022-07-18 PROCEDURE — 1159F MED LIST DOCD IN RCRD: CPT | Mod: CPTII,S$GLB,, | Performed by: ORTHOPAEDIC SURGERY

## 2022-07-18 PROCEDURE — 99213 PR OFFICE/OUTPT VISIT, EST, LEVL III, 20-29 MIN: ICD-10-PCS | Mod: S$GLB,,, | Performed by: ORTHOPAEDIC SURGERY

## 2022-07-18 PROCEDURE — 1159F PR MEDICATION LIST DOCUMENTED IN MEDICAL RECORD: ICD-10-PCS | Mod: CPTII,S$GLB,, | Performed by: ORTHOPAEDIC SURGERY

## 2022-07-18 RX ORDER — CYCLOSPORINE 0.5 MG/ML
EMULSION OPHTHALMIC
COMMUNITY
Start: 2022-07-17 | End: 2022-10-27

## 2022-07-18 RX ORDER — MEMANTINE HYDROCHLORIDE 10 MG/1
10 TABLET ORAL 2 TIMES DAILY
COMMUNITY
Start: 2022-07-14 | End: 2022-10-27

## 2022-07-18 NOTE — PROGRESS NOTES
4Subjective:       Patient ID: Nimco Caro is a 61 y.o. female.    Chief Complaint: Pain of the Neck (MRI Cervical and Lumbar results, today, pain fluctuates and alternates, ) and Pain of the Lumbar Spine      History of Present Illness    Prior to meeting with the patient I reviewed the medical chart in [facility]. This included reviewing the previous progress notes from our office, review of the patient's last appointment with their primary care provider, review of any visits to the emergency room, and review of any pain management appointments or procedures.   Continued lumbar and cervical pain low back pain worse.  Rarely radiation to her leg no bowel bladder incontinence as far as her neck is concerned some pain to the left upper arm no bowel or bladder incontinence.    Current Medications  Current Outpatient Medications   Medication Sig Dispense Refill    albuterol (PROVENTIL/VENTOLIN HFA) 90 mcg/actuation inhaler INHALE 2 PUFFS BY MOUTH INTO THE LUNGS EVERY 6 HOURS      ALBUTEROL INHL Inhale into the lungs 4 (four) times daily as needed.       amLODIPine (NORVASC) 10 MG tablet 1 tablet      ascorbic acid, vitamin C, (VITAMIN C) 1000 MG tablet Take 1,000 mg by mouth once daily.      aspirin (ECOTRIN) 81 MG EC tablet Take 81 mg by mouth once daily.      atenoloL (TENORMIN) 50 MG tablet Take 25 mg by mouth.      azelastine (ASTELIN) 137 mcg (0.1 %) nasal spray USE 1 SPRAY IN EACH NOSTRIL 2 TIMES DAILY 30 mL 1    b complex vitamins tablet Take 1 tablet by mouth once daily.      biotin 1 mg tablet Take 1,000 mcg by mouth once daily.      CHANTIX STARTING MONTH BOX 0.5 mg (11)- 1 mg (42) tablet FOLLOW PACKAGE DIRECTIONS      clonazePAM (KLONOPIN) 1 MG tablet 1 tablet daily as needed for anxiery  2    clonazePAM (KLONOPIN) 2 MG Tab       cyanocobalamin 1,000 mcg/mL injection       dicyclomine (BENTYL) 10 MG capsule       donepeziL (ARICEPT) 10 MG tablet Take 10 mg by mouth.      DULoxetine (CYMBALTA) 60  MG capsule 60 mg 2 (two) times daily.       FLUoxetine 20 MG capsule       fluticasone propionate (FLONASE) 50 mcg/actuation nasal spray 1 spray (50 mcg total) by Each Nostril route once daily. 15.8 mL 0    gabapentin (NEURONTIN) 800 MG tablet 800 mg 3 (three) times daily.       linaCLOtide (LINZESS) 72 mcg Cap capsule Take 1 capsule by mouth once daily.      lisinopriL (PRINIVIL,ZESTRIL) 20 MG tablet 1 tablet      lurasidone (LATUDA) 20 mg Tab tablet Take 20 mg by mouth once daily.      memantine (NAMENDA) 10 MG Tab Take 10 mg by mouth 2 (two) times daily.      multivitamin with minerals tablet Take 1 tablet by mouth once daily.      neomycin-polymyxin-dexamethasone (MAXITROL) 3.5mg/mL-10,000 unit/mL-0.1 % DrpS SHAKE LIQUID AND INSTILL 1 DROP IN BOTH EYES TWICE DAILY      olopatadine (PATADAY) 0.2 % Drop INSTILL 1 DROP IN BOTH EYES TWICE DAILY FOR 30 DAYS      ondansetron (ZOFRAN) 4 MG tablet Take 2 tablets (8 mg total) by mouth 2 (two) times daily as needed for Nausea.      ondansetron (ZOFRAN-ODT) 4 MG TbDL PLACE 1 TABLET ON THE TONGUE AND ALLOW TO DISSOLVE EVERY 8 HOURS      OXcarbazepine (TRILEPTAL) 300 MG Tab       oxybutynin (DITROPAN) 5 MG Tab 1 tablet      OXYGEN-AIR DELIVERY SYSTEMS MISC 6 L by Misc.(Non-Drug; Combo Route) route continuous.      pantoprazole (PROTONIX) 40 MG tablet 1 tablet      polyethylene glycol (GLYCOLAX) 17 gram/dose powder MIX ONE PACKET WITH 8 OUNCES OF FLUID AND DRINK DAILY      polyethylene glycol 1000,bulk, Powd       pravastatin (PRAVACHOL) 20 MG tablet 20 mg once daily.       pulse oximeter (PULSE OXIMETER) device by Apply Externally route 2 (two) times a day. Use twice daily at 8 AM and 3 PM and [facility] as directed. 1 each 0    RESTASIS 0.05 % ophthalmic emulsion       salmeteroL (SEREVENT DISKUS) 50 mcg/dose diskus inhaler 1 puff      sucralfate (CARAFATE) 1 gram tablet Take 1 g by mouth 4 (four) times daily.      sumatriptan  (IMITREX) 25 MG Tab TAKE 1 TABLET BY MOUTH AT LEAST 2 HOURS BETWEEN DOSES AS NEEDED      tiotropium-olodateroL (STIOLTO RESPIMAT) 2.5-2.5 mcg/actuation Mist 2 puffs      tolterodine (DETROL LA) 2 MG Cp24 2 mg once daily.       topiramate (TOPAMAX) 25 MG tablet Take 25 mg by mouth.      traZODone (DESYREL) 100 MG tablet TAKE 2 AND 1/2 TABLETS BY MOUTH EVERY NIGHT AT BEDTIME      trospium (SANCTURA) 20 mg Tab tablet       vilazodone (VIIBRYD) 20 mg Tab 1 tablet with food      vitamin D (VITAMIN D3) 1000 units Tab Take 1,000 Units by mouth once daily.      ARIPiprazole (ABILIFY) 15 MG Tab 1 tablet      cetirizine (ZYRTEC) 10 MG tablet Take 1 tablet (10 mg total) by mouth once daily. 30 tablet 0     No current facility-administered medications for this visit.       Allergies  Review of patient's allergies indicates:   Allergen Reactions    Narcof [hydrocodone-guaifenesin]     Ciprofloxacin Diarrhea    Ibuprofen Nausea Only    Naproxen Nausea Only       Past Medical History  Past Medical History:   Diagnosis Date    Anxiety     Bipolar affect, depressed     Cancer ovarian; uterine    1992    COPD (chronic obstructive pulmonary disease)     GERD (gastroesophageal reflux disease)     Hyperlipidemia     Hypertension     OAB (overactive bladder)        Surgical History  Past Surgical History:   Procedure Laterality Date    BREAST CYST ASPIRATION      COLON SURGERY      COLONOSCOPY      HERNIA REPAIR N/A 2016    HIP ARTHROPLASTY Right 2/25/2019    Procedure: ARTHROPLASTY, HIP;  Surgeon: Eliseo Vaca MD;  Location: [facility] OR;  Service: Orthopedics;  Laterality: Right;  Daniel Herrera    HYSTERECTOMY  total    JOINT REPLACEMENT Right     hip replacemnt    KNEE ARTHROPLASTY Left 8/19/2019    Procedure: ARTHROPLASTY, KNEE;  Surgeon: Eliseo Vaca MD;  Location: [facility] OR;  Service: Orthopedics;  Laterality: Left;    REVISION COLOSTOMY N/A 2012       Family History:   Family History   Problem Relation Age  of Onset    Cancer Mother     Hypertension Father     Heart disease Father     Heart disease Sister     Hypertension Sister     Hypertension Brother     Depression Brother        Social History:   Social History     Socioeconomic History    Marital status:    Tobacco Use    Smoking status: Current Every Day Smoker     Packs/day: 0.50     Years: 40.00     Pack years: 20.00     Types: Cigarettes    Smokeless tobacco: Never Used   Substance and Sexual Activity    Alcohol use: Yes     Alcohol/week: 1.0 standard drink     Types: 1 Glasses of wine per week     Comment: weekly    Drug use: No       Hospitalization/Major Diagnostic Procedure:     Review of Systems     General/Constitutional:  Chills denies. Fatigue denies. Fever denies. Weight gain denies. Weight loss denies.    Respiratory:  Shortness of breath denies.    Cardiovascular:  Chest pain denies.    Gastrointestinal:  Constipation denies. Diarrhea denies. Nausea denies. Vomiting denies.     Hematology:  Easy bruising denies. Prolonged bleeding denies.     Genitourinary:  Frequent urination denies. Pain in lower back denies. Painful urination denies.     Musculoskeletal:  See HPI for details    Skin:  Rash denies.    Neurologic:  Dizziness denies. Gait abnormalities denies. Seizures denies. Tingling/Numbess denies.    Psychiatric:  Anxiety denies. Depressed mood denies.     Objective:   Vital Signs: There were no vitals filed for this visit.     Physical Exam      General Examination:     Constitutional: The patient is alert and oriented to lace person and time. Mood is pleasant.     Head/Face: Normal facial features normal eyebrows    Eyes: Normal extraocular motion bilaterally    Lungs: Respirations are equal and unlabored    Gait is coordinated.    Cardiovascular: There are no swelling or varicosities present.    Lymphatic: Negative for adenopathy    Skin: Normal    Neurological: Level of consciousness normal. Oriented to place person and  time and situation    Psychiatric: Oriented to time place person and situation    Cervical exam left and right paraspinous muscle tenderness mild restriction of motion Spurling's maneuver causes neck pain only motor exam intact lumbar exam moderate tenderness left greater than right paraspinous muscles no spasm range of motion limited due to pain pain with extension and bending reproduces no pain with flexion straight leg raising causes back pain only    XRAY Report/ Interpretation :  Lumbar MRI performed June 30th was personally reviewed has multilevel degenerative disc disease and facet arthritis most pronounced at the L4-5 level with central and foraminal stenosis.  Please see report for details.  Cervical MRI performed June 30, 2022 was personally reviewed.  There is evidence of multilevel facet joint arthritis and also left C5-3 4 foraminal disc osteophyte narrowing the exiting foramen at C3-4.  At C5-6 as right foraminal stenosis noted.      Assessment:       1. Spondylolisthesis of lumbar region    2. Lumbar facet arthropathy    3. Lumbosacral stenosis    4. Cervical spondylosis with radiculopathy    5. Cervical radiculitis    6. Degenerative disc disease, cervical    7. Foraminal stenosis of cervical region        Plan:       Nimco was seen today for pain and pain.    Diagnoses and all orders for this visit:    Spondylolisthesis of lumbar region  Comments:  L4-5    Lumbar facet arthropathy    Lumbosacral stenosis    Cervical spondylosis with radiculopathy    Cervical radiculitis    Degenerative disc disease, cervical    Foraminal stenosis of cervical region  Comments:  left  C3-4         No follow-ups on file.    Treatment options were discussed cervical complaints most compatible with the foraminal stenosis left side C3-4 or multilevel facet joint arthritis.  I do not think the changes at C5-6 with clinically symptomatic.  Observation of cervical complaints will be done since lumbar complaints predominant  a referral to pain management to be evaluated for lumbar medial branch blocks and possible rhizotomy based on how axial back pain I do not believe her stenosis symptoms are severe.  Patient agrees with treatment plan  Treatment options were discussed with regards to the nature of the medical condition. Conservative pain intervention and surgical options were discussed in detail. The probability of success of each separate treatment option was discussed. The patient expressed a clear understanding of the treatment options. With regards to surgery, the procedure risk, benefits, complications, and outcomes were discussed. No guarantees were given with regards to surgical outcome.   The risk of complications, morbidity, and mortality of patient management decisions have been made at the time of this visit. These are associated with the patient's problems, diagnostic procedures and treatment options. This includes the possible management options selected and those considered but not selected by the patient after shared medical decision making we discussed with the patient.   This note was created using Dragon voice recognition software that occasionally misinterpreted phrases or words.

## 2022-07-19 ENCOUNTER — IMMUNIZATION (OUTPATIENT)
Dept: PRIMARY CARE CLINIC | Facility: CLINIC | Age: 62
End: 2022-07-19
Payer: MEDICAID

## 2022-07-19 DIAGNOSIS — Z23 NEED FOR VACCINATION: Primary | ICD-10-CM

## 2022-07-19 PROCEDURE — 0054A COVID-19, MRNA, LNP-S, PF, 30 MCG/0.3 ML DOSE VACCINE (PFIZER): CPT | Mod: S$GLB,,, | Performed by: FAMILY MEDICINE

## 2022-07-19 PROCEDURE — 91305 COVID-19, MRNA, LNP-S, PF, 30 MCG/0.3 ML DOSE VACCINE (PFIZER): ICD-10-PCS | Mod: S$GLB,,, | Performed by: FAMILY MEDICINE

## 2022-07-19 PROCEDURE — 91305 COVID-19, MRNA, LNP-S, PF, 30 MCG/0.3 ML DOSE VACCINE (PFIZER): CPT | Mod: S$GLB,,, | Performed by: FAMILY MEDICINE

## 2022-07-19 PROCEDURE — 0054A COVID-19, MRNA, LNP-S, PF, 30 MCG/0.3 ML DOSE VACCINE (PFIZER): ICD-10-PCS | Mod: S$GLB,,, | Performed by: FAMILY MEDICINE

## 2022-08-04 ENCOUNTER — OFFICE VISIT (OUTPATIENT)
Dept: ORTHOPEDICS | Facility: CLINIC | Age: 62
End: 2022-08-04
Payer: MEDICAID

## 2022-08-04 VITALS — BODY MASS INDEX: 25.9 KG/M2 | HEIGHT: 67 IN | WEIGHT: 165 LBS

## 2022-08-04 DIAGNOSIS — M87.052 AVASCULAR NECROSIS OF HIP, LEFT: ICD-10-CM

## 2022-08-04 DIAGNOSIS — M17.11 PRIMARY OSTEOARTHRITIS OF RIGHT KNEE: ICD-10-CM

## 2022-08-04 DIAGNOSIS — Z96.652 HISTORY OF TOTAL KNEE ARTHROPLASTY, LEFT: Primary | ICD-10-CM

## 2022-08-04 PROCEDURE — 3008F BODY MASS INDEX DOCD: CPT | Mod: CPTII,S$GLB,, | Performed by: ORTHOPAEDIC SURGERY

## 2022-08-04 PROCEDURE — 99214 PR OFFICE/OUTPT VISIT, EST, LEVL IV, 30-39 MIN: ICD-10-PCS | Mod: 25,S$GLB,, | Performed by: ORTHOPAEDIC SURGERY

## 2022-08-04 PROCEDURE — 1160F RVW MEDS BY RX/DR IN RCRD: CPT | Mod: CPTII,S$GLB,, | Performed by: ORTHOPAEDIC SURGERY

## 2022-08-04 PROCEDURE — 3008F PR BODY MASS INDEX (BMI) DOCUMENTED: ICD-10-PCS | Mod: CPTII,S$GLB,, | Performed by: ORTHOPAEDIC SURGERY

## 2022-08-04 PROCEDURE — 99214 OFFICE O/P EST MOD 30 MIN: CPT | Mod: 25,S$GLB,, | Performed by: ORTHOPAEDIC SURGERY

## 2022-08-04 PROCEDURE — 1159F PR MEDICATION LIST DOCUMENTED IN MEDICAL RECORD: ICD-10-PCS | Mod: CPTII,S$GLB,, | Performed by: ORTHOPAEDIC SURGERY

## 2022-08-04 PROCEDURE — 20610 LARGE JOINT ASPIRATION/INJECTION: R KNEE: ICD-10-PCS | Mod: RT,S$GLB,, | Performed by: ORTHOPAEDIC SURGERY

## 2022-08-04 PROCEDURE — 20610 DRAIN/INJ JOINT/BURSA W/O US: CPT | Mod: RT,S$GLB,, | Performed by: ORTHOPAEDIC SURGERY

## 2022-08-04 PROCEDURE — 1160F PR REVIEW ALL MEDS BY PRESCRIBER/CLIN PHARMACIST DOCUMENTED: ICD-10-PCS | Mod: CPTII,S$GLB,, | Performed by: ORTHOPAEDIC SURGERY

## 2022-08-04 PROCEDURE — 1159F MED LIST DOCD IN RCRD: CPT | Mod: CPTII,S$GLB,, | Performed by: ORTHOPAEDIC SURGERY

## 2022-08-04 RX ORDER — TRIAMCINOLONE ACETONIDE 40 MG/ML
40 INJECTION, SUSPENSION INTRA-ARTICULAR; INTRAMUSCULAR
Status: DISCONTINUED | OUTPATIENT
Start: 2022-08-04 | End: 2022-08-04 | Stop reason: HOSPADM

## 2022-08-04 RX ORDER — DEXLANSOPRAZOLE 60 MG/1
1 CAPSULE, DELAYED RELEASE ORAL DAILY
COMMUNITY
Start: 2022-07-19 | End: 2022-10-27

## 2022-08-04 RX ADMIN — TRIAMCINOLONE ACETONIDE 40 MG: 40 INJECTION, SUSPENSION INTRA-ARTICULAR; INTRAMUSCULAR at 09:08

## 2022-08-04 NOTE — PROGRESS NOTES
"[facility]    Subjective:     Chief Complaint:   Chief Complaint   Patient presents with    Right Knee - Pain     Right knee pain that has been going on for years. Complains of pain that makes walking painful at times.    Left Knee - Pain     Left knee pain that has been painful since her TKA in 08/19/19. States that it "kicks out" (like maybe a hyperflex) States that ankle swells when the knee pain becomes extremes.        Past Medical History:   Diagnosis Date    Anxiety     Bipolar affect, depressed     Cancer ovarian; uterine    1992    COPD (chronic obstructive pulmonary disease)     GERD (gastroesophageal reflux disease)     Hyperlipidemia     Hypertension     OAB (overactive bladder)        Past Surgical History:   Procedure Laterality Date    BREAST CYST ASPIRATION      COLON SURGERY      COLONOSCOPY      HERNIA REPAIR N/A 2016    HIP ARTHROPLASTY Right 2/25/2019    Procedure: ARTHROPLASTY, HIP;  Surgeon: Eliseo Vaca MD;  Location: [facility] OR;  Service: Orthopedics;  Laterality: Right;  Daniel Herrera    HYSTERECTOMY  total    JOINT REPLACEMENT Right     hip replacemnt    KNEE ARTHROPLASTY Left 8/19/2019    Procedure: ARTHROPLASTY, KNEE;  Surgeon: Eliseo Vaca MD;  Location: [facility] OR;  Service: Orthopedics;  Laterality: Left;    REVISION COLOSTOMY N/A 2012       Current Outpatient Medications   Medication Sig    albuterol (PROVENTIL/VENTOLIN HFA) 90 mcg/actuation inhaler INHALE 2 PUFFS BY MOUTH INTO THE LUNGS EVERY 6 HOURS    ALBUTEROL INHL Inhale into the lungs 4 (four) times daily as needed.     amLODIPine (NORVASC) 10 MG tablet 1 tablet    ARIPiprazole (ABILIFY) 15 MG Tab 1 tablet    ascorbic acid, vitamin C, (VITAMIN C) 1000 MG tablet Take 1,000 mg by mouth once daily.    aspirin (ECOTRIN) 81 MG EC tablet Take 81 mg by mouth once daily.    atenoloL (TENORMIN) 50 MG tablet Take 25 mg by mouth.    azelastine (ASTELIN) 137 mcg (0.1 %) nasal spray USE 1 SPRAY IN EACH " NOSTRIL 2 TIMES DAILY    b complex vitamins tablet Take 1 tablet by mouth once daily.    biotin 1 mg tablet Take 1,000 mcg by mouth once daily.    clonazePAM (KLONOPIN) 2 MG Tab     cyanocobalamin 1,000 mcg/mL injection     dexlansoprazole (DEXILANT) 60 mg capsule Take 1 capsule by mouth once daily.    dicyclomine (BENTYL) 10 MG capsule     donepeziL (ARICEPT) 10 MG tablet Take 10 mg by mouth.    FLUoxetine 20 MG capsule     fluticasone propionate (FLONASE) 50 mcg/actuation nasal spray 1 spray (50 mcg total) by Each Nostril route once daily.    gabapentin (NEURONTIN) 800 MG tablet 800 mg 3 (three) times daily.     linaCLOtide (LINZESS) 72 mcg Cap capsule Take 1 capsule by mouth once daily.    lisinopriL (PRINIVIL,ZESTRIL) 20 MG tablet 1 tablet    lurasidone (LATUDA) 20 mg Tab tablet Take 20 mg by mouth once daily.    memantine (NAMENDA) 10 MG Tab Take 10 mg by mouth 2 (two) times daily.    multivitamin with minerals tablet Take 1 tablet by mouth once daily.    neomycin-polymyxin-dexamethasone (MAXITROL) 3.5mg/mL-10,000 unit/mL-0.1 % DrpS SHAKE LIQUID AND INSTILL 1 DROP IN BOTH EYES TWICE DAILY    olopatadine (PATADAY) 0.2 % Drop INSTILL 1 DROP IN BOTH EYES TWICE DAILY FOR 30 DAYS    ondansetron (ZOFRAN) 4 MG tablet Take 2 tablets (8 mg total) by mouth 2 (two) times daily as needed for Nausea.    ondansetron (ZOFRAN-ODT) 4 MG TbDL PLACE 1 TABLET ON THE TONGUE AND ALLOW TO DISSOLVE EVERY 8 HOURS    OXcarbazepine (TRILEPTAL) 300 MG Tab     oxybutynin (DITROPAN) 5 MG Tab 1 tablet    OXYGEN-AIR DELIVERY SYSTEMS MISC 6 L by Misc.(Non-Drug; Combo Route) route continuous.    pantoprazole (PROTONIX) 40 MG tablet 1 tablet    polyethylene glycol (GLYCOLAX) 17 gram/dose powder MIX ONE PACKET WITH 8 OUNCES OF FLUID AND DRINK DAILY    polyethylene glycol 1000,bulk, Powd     pravastatin (PRAVACHOL) 20 MG tablet 20 mg once daily.     pulse oximeter (PULSE OXIMETER) device by Apply Externally route 2 (two)  times a day. Use twice daily at 8 AM and 3 PM and record the value in MyChart as directed.    RESTASIS 0.05 % ophthalmic emulsion     salmeteroL (SEREVENT DISKUS) 50 mcg/dose diskus inhaler 1 puff    sucralfate (CARAFATE) 1 gram tablet Take 1 g by mouth 4 (four) times daily.    sumatriptan (IMITREX) 25 MG Tab TAKE 1 TABLET BY MOUTH AT LEAST 2 HOURS BETWEEN DOSES AS NEEDED    tiotropium-olodateroL (STIOLTO RESPIMAT) 2.5-2.5 mcg/actuation Mist 2 puffs    tolterodine (DETROL LA) 2 MG Cp24 2 mg once daily.     topiramate (TOPAMAX) 25 MG tablet Take 25 mg by mouth.    traZODone (DESYREL) 100 MG tablet TAKE 2 AND 1/2 TABLETS BY MOUTH EVERY NIGHT AT BEDTIME    trospium (SANCTURA) 20 mg Tab tablet     vilazodone (VIIBRYD) 20 mg Tab 1 tablet with food    vitamin D (VITAMIN D3) 1000 units Tab Take 1,000 Units by mouth once daily.    cetirizine (ZYRTEC) 10 MG tablet Take 1 tablet (10 mg total) by mouth once daily.    CHANTIX STARTING MONTH BOX 0.5 mg (11)- 1 mg (42) tablet FOLLOW PACKAGE DIRECTIONS    clonazePAM (KLONOPIN) 1 MG tablet 1 tablet daily as needed for anxiery    DULoxetine (CYMBALTA) 60 MG capsule 60 mg 2 (two) times daily.      No current facility-administered medications for this visit.       Review of patient's allergies indicates:   Allergen Reactions    Narcof [hydrocodone-guaifenesin]     Ciprofloxacin Diarrhea    Ibuprofen Nausea Only    Naproxen Nausea Only       Family History   Problem Relation Age of Onset    Cancer Mother     Hypertension Father     Heart disease Father     Heart disease Sister     Hypertension Sister     Hypertension Brother     Depression Brother        Social History     Socioeconomic History    Marital status:    Tobacco Use    Smoking status: Current Every Day Smoker     Packs/day: 0.50     Years: 40.00     Pack years: 20.00     Types: Cigarettes    Smokeless tobacco: Never Used   Substance and Sexual Activity    Alcohol use: Yes      Alcohol/week: 1.0 standard drink     Types: 1 Glasses of wine per week     Comment: weekly    Drug use: No       History of present illness:  Patient comes in today for her right knee and her left knee in terms of her right knee she has a lot of medial sided pain.  We have given her injections in the past that have been quite successful.  The left knee gives her mild discomfort but generally does well.  She also has left hip and groin pain.  At 1 point she was considering hip replacement for avascular necrosis.      Review of Systems:    Constitution: Negative for chills, fever, and sweats.  Negative for unexplained weight loss.    HENT:  Negative for headaches and blurry vision.    Cardiovascular:Negative for chest pain or irregular heart beat. Negative for hypertension.    Respiratory:  Negative for cough and shortness of breath.    Gastrointestinal: Negative for abdominal pain, heartburn, melena, nausea, and vomitting.    Genitourinary:  Negative bladder incontinence and dysuria.    Musculoskeletal:  See HPI for details.     Neurological: Negative for numbness.    Psychiatric/Behavioral: Negative for depression.  The patient is not nervous/anxious.      Endocrine: Negative for polyuria    Hematologic/Lymphatic: Negative for bleeding problem.  Does not bruise/bleed easily.    Skin: Negative for poor would healing and rash    Objective:      Physical Examination:    Vital Signs:  There were no vitals filed for this visit.    Body mass index is 25.84 kg/m².    This a well-developed, well nourished patient in no acute distress.  They are alert and oriented and cooperative to examination.        Patient has full range of motion of the right knee.  Range of motion left knee 0-125 degrees.  The knee is stable in extension and flexion no effusions.  The right knee demonstrates medial joint line tenderness mild crepitus.  1+ effusion.  She has no pain with motion of the right hip.  The left hip demonstrates moderate  severe groin pain with motion of the left hip.  Negative straight leg raises  Pertinent New Results:    XRAY Report / Interpretation:   AP lateral and sunrise views of the left knee demonstrate a left total knee to be in ideal position no evidence of loosening or subsidence.    Assessment/Plan:      Osteoarthritis right knee.  I injected the right knee with Kenalog and lidocaine.  In terms of the left knee no intervention at this time.  She was scheduled for left hip replacement but I think she is doing well enough right now that if she wants to she can certainly put that off.  She will contact us as needed      This note was created using Dragon voice recognition software that occasionally misinterpreted phrases or words.

## 2022-08-04 NOTE — PROCEDURES
Large Joint Aspiration/Injection: R knee    Date/Time: 8/4/2022 9:30 AM  Performed by: Eliseo Vaca MD  Authorized by: Eliseo Vaca MD     Consent Done?:  Yes (Verbal)  Indications:  Pain  Site marked: the procedure site was marked    Timeout: prior to procedure the correct patient, procedure, and site was verified    Prep: patient was prepped and draped in usual sterile fashion      Local anesthesia used?: Yes    Local anesthetic:  Lidocaine 1% without epinephrine    Details:  Needle Size:  25 G  Ultrasonic Guidance for needle placement?: No    Location:  Knee  Site:  R knee  Medications:  40 mg triamcinolone acetonide 40 mg/mL  Patient tolerance:  Patient tolerated the procedure well with no immediate complications

## 2022-12-05 DIAGNOSIS — Z12.31 ENCOUNTER FOR SCREENING MAMMOGRAM FOR MALIGNANT NEOPLASM OF BREAST: Primary | ICD-10-CM

## 2022-12-21 ENCOUNTER — HOSPITAL ENCOUNTER (OUTPATIENT)
Dept: RADIOLOGY | Facility: HOSPITAL | Age: 62
Discharge: HOME | End: 2022-12-21
Attending: FAMILY MEDICINE
Payer: MEDICAID

## 2022-12-21 VITALS — BODY MASS INDEX: 26.68 KG/M2 | HEIGHT: 67 IN | WEIGHT: 170 LBS

## 2022-12-21 DIAGNOSIS — Z12.31 ENCOUNTER FOR SCREENING MAMMOGRAM FOR MALIGNANT NEOPLASM OF BREAST: ICD-10-CM

## 2022-12-21 PROCEDURE — 77063 BREAST TOMOSYNTHESIS BI: CPT | Mod: TC,PO

## 2023-06-01 ENCOUNTER — OFFICE VISIT (OUTPATIENT)
Dept: ORTHOPEDICS | Facility: CLINIC | Age: 63
End: 2023-06-01
Payer: MEDICAID

## 2023-06-01 VITALS — WEIGHT: 176 LBS | BODY MASS INDEX: 27.62 KG/M2 | HEIGHT: 67 IN

## 2023-06-01 DIAGNOSIS — Z96.652 HISTORY OF TOTAL KNEE ARTHROPLASTY, LEFT: Primary | ICD-10-CM

## 2023-06-01 DIAGNOSIS — M17.11 PRIMARY OSTEOARTHRITIS OF RIGHT KNEE: ICD-10-CM

## 2023-06-01 DIAGNOSIS — R29.6 FREQUENT FALLS: ICD-10-CM

## 2023-06-01 PROCEDURE — 1160F RVW MEDS BY RX/DR IN RCRD: CPT | Mod: CPTII,S$GLB,, | Performed by: ORTHOPAEDIC SURGERY

## 2023-06-01 PROCEDURE — 1159F MED LIST DOCD IN RCRD: CPT | Mod: CPTII,S$GLB,, | Performed by: ORTHOPAEDIC SURGERY

## 2023-06-01 PROCEDURE — 3008F BODY MASS INDEX DOCD: CPT | Mod: CPTII,S$GLB,, | Performed by: ORTHOPAEDIC SURGERY

## 2023-06-01 PROCEDURE — 20610 DRAIN/INJ JOINT/BURSA W/O US: CPT | Mod: RT,S$GLB,, | Performed by: ORTHOPAEDIC SURGERY

## 2023-06-01 PROCEDURE — 99213 OFFICE O/P EST LOW 20 MIN: CPT | Mod: 25,S$GLB,, | Performed by: ORTHOPAEDIC SURGERY

## 2023-06-01 PROCEDURE — 1159F PR MEDICATION LIST DOCUMENTED IN MEDICAL RECORD: ICD-10-PCS | Mod: CPTII,S$GLB,, | Performed by: ORTHOPAEDIC SURGERY

## 2023-06-01 PROCEDURE — 99213 PR OFFICE/OUTPT VISIT, EST, LEVL III, 20-29 MIN: ICD-10-PCS | Mod: 25,S$GLB,, | Performed by: ORTHOPAEDIC SURGERY

## 2023-06-01 PROCEDURE — 20610 LARGE JOINT ASPIRATION/INJECTION: R KNEE: ICD-10-PCS | Mod: RT,S$GLB,, | Performed by: ORTHOPAEDIC SURGERY

## 2023-06-01 PROCEDURE — 1160F PR REVIEW ALL MEDS BY PRESCRIBER/CLIN PHARMACIST DOCUMENTED: ICD-10-PCS | Mod: CPTII,S$GLB,, | Performed by: ORTHOPAEDIC SURGERY

## 2023-06-01 PROCEDURE — 3008F PR BODY MASS INDEX (BMI) DOCUMENTED: ICD-10-PCS | Mod: CPTII,S$GLB,, | Performed by: ORTHOPAEDIC SURGERY

## 2023-06-01 RX ORDER — TRIAMCINOLONE ACETONIDE 40 MG/ML
40 INJECTION, SUSPENSION INTRA-ARTICULAR; INTRAMUSCULAR
Status: DISCONTINUED | OUTPATIENT
Start: 2023-06-01 | End: 2023-06-01 | Stop reason: HOSPADM

## 2023-06-01 RX ADMIN — TRIAMCINOLONE ACETONIDE 40 MG: 40 INJECTION, SUSPENSION INTRA-ARTICULAR; INTRAMUSCULAR at 01:06

## 2023-06-01 NOTE — PROGRESS NOTES
[facility]    Subjective:     Chief Complaint:   Chief Complaint   Patient presents with    Left Knee - Pain     Patient is here with complaints of bilateral knee pain, fell out of the bed last night and landed on her knee. Had to crawl to a table to get off the floor. Fell 3 weeks ago as well and several times prior    Right Knee - Pain       Past Medical History:   Diagnosis Date    Anxiety     Bipolar affect, depressed     Cancer ovarian; uterine    1992    COPD (chronic obstructive pulmonary disease)     GERD (gastroesophageal reflux disease)     Hyperlipidemia     Hypertension     OAB (overactive bladder)        Past Surgical History:   Procedure Laterality Date    BREAST CYST ASPIRATION      COLON SURGERY      COLONOSCOPY      HERNIA REPAIR N/A 2016    HIP ARTHROPLASTY Right 2/25/2019    Procedure: ARTHROPLASTY, HIP;  Surgeon: Eliseo Vaca MD;  Location: [facility] OR;  Service: Orthopedics;  Laterality: Right;  Daniel Herrera    HYSTERECTOMY  total    JOINT REPLACEMENT Right     hip replacemnt    KNEE ARTHROPLASTY Left 8/19/2019    Procedure: ARTHROPLASTY, KNEE;  Surgeon: Eliseo Vaca MD;  Location: [facility] OR;  Service: Orthopedics;  Laterality: Left;    REVISION COLOSTOMY N/A 2012       Current Outpatient Medications   Medication Sig    albuterol (PROVENTIL/VENTOLIN HFA) 90 mcg/actuation inhaler INHALE 2 PUFFS BY MOUTH INTO THE LUNGS EVERY 6 HOURS    amLODIPine (NORVASC) 10 MG tablet 1 tablet    ascorbic acid, vitamin C, (VITAMIN C) 1000 MG tablet Take 1,000 mg by mouth once daily.    aspirin (ECOTRIN) 81 MG EC tablet Take 81 mg by mouth once daily.    atenoloL (TENORMIN) 50 MG tablet Take 25 mg by mouth.    azelastine (ASTELIN) 137 mcg (0.1 %) nasal spray 1 spray (137 mcg total) by Nasal route 2 (two) times daily.    b complex vitamins tablet Take 1 tablet by mouth once daily.    biotin 1 mg tablet Take 1,000 mcg by mouth once daily.    budesonide-formoterol 80-4.5 mcg (SYMBICORT) 80-4.5 mcg/actuation  HFAA Inhale 2 puffs into the lungs 2 (two) times a day. Controller    cetirizine (ZYRTEC) 10 MG tablet Take 1 tablet (10 mg total) by mouth once daily.    clonazePAM (KLONOPIN) 2 MG Tab Take 2 mg by mouth 2 (two) times daily.    cyanocobalamin 1,000 mcg/mL injection     cycloSPORINE (RESTASIS) 0.05 % ophthalmic emulsion Apply 1 drop to eye.    dexlansoprazole (DEXILANT) 60 mg capsule Take 60 mg by mouth.    dicyclomine (BENTYL) 10 MG capsule     donepeziL (ARICEPT) 10 MG tablet Take 10 mg by mouth.    FLUoxetine 20 MG capsule     fluticasone propionate (FLONASE) 50 mcg/actuation nasal spray 1 spray (50 mcg total) by Each Nostril route once daily.    gabapentin (NEURONTIN) 800 MG tablet 800 mg 3 (three) times daily.     LINZESS 145 mcg Cap capsule Take 145 mcg by mouth.    lurasidone (LATUDA) 20 mg Tab tablet Take 20 mg by mouth once daily.    multivitamin with minerals tablet Take 1 tablet by mouth once daily.    nicotine (NICODERM CQ) 21 mg/24 hr PLACE 1 PATCH ONTO THE SKIN ONCE DAILY.    ondansetron (ZOFRAN) 4 MG tablet Take 2 tablets (8 mg total) by mouth 2 (two) times daily as needed for Nausea.    ondansetron (ZOFRAN-ODT) 4 MG TbDL PLACE 1 TABLET ON THE TONGUE AND ALLOW TO DISSOLVE EVERY 8 HOURS    OXcarbazepine (TRILEPTAL) 300 MG Tab     OXYGEN-AIR DELIVERY SYSTEMS MISC 6 L by Misc.(Non-Drug; Combo Route) route continuous.    pravastatin (PRAVACHOL) 20 MG tablet 20 mg once daily.     pulse oximeter (PULSE OXIMETER) device by Apply Externally route 2 (two) times a day. Use twice daily at 8 AM and 3 PM and record the value in [facility] as directed.    sucralfate (CARAFATE) 1 gram tablet Take 1 g by mouth 4 (four) times daily.    sumatriptan (IMITREX) 25 MG Tab TAKE 1 TABLET BY MOUTH AT LEAST 2 HOURS BETWEEN DOSES AS NEEDED    tiotropium (SPIRIVA WITH HANDIHALER) 18 mcg inhalation capsule Inhale 1 capsule (18 mcg total) into the lungs once daily. Controller    topiramate (TOPAMAX) 25 MG tablet Take 25 mg by mouth.     traZODone (DESYREL) 100 MG tablet TAKE 2 AND 1/2 TABLETS BY MOUTH EVERY NIGHT AT BEDTIME    trospium (SANCTURA) 20 mg Tab tablet     vitamin D (VITAMIN D3) 1000 units Tab Take 1,000 Units by mouth once daily.     No current facility-administered medications for this visit.       Review of patient's allergies indicates:   Allergen Reactions    Narcof [hydrocodone-guaifenesin]     Ciprofloxacin Diarrhea    Ibuprofen Nausea Only    Naproxen Nausea Only    Quetiapine Nausea And Vomiting       Family History   Problem Relation Age of Onset    Cancer Mother     Hypertension Father     Heart disease Father     Heart disease Sister     Hypertension Sister     Hypertension Brother     Depression Brother        Social History     Socioeconomic History    Marital status:    Tobacco Use    Smoking status: Every Day     Packs/day: 0.50     Years: 40.00     Pack years: 20.00     Types: Cigarettes    Smokeless tobacco: Never   Substance and Sexual Activity    Alcohol use: Yes     Alcohol/week: 1.0 standard drink     Types: 1 Glasses of wine per week     Comment: weekly    Drug use: No       History of present illness:  62-year-old female, presents to clinic today for the bilateral knees.  She has a left total knee arthroplasty done by Dr. Vaca in August of 2019.  She rolled out of bed last night landed on the bilateral knees, she had crawl on her hands and knees in order to get a pick herself up.  She also has some right knee pain.  This was exacerbated last night but she had a fall few weeks ago as well where she had some pain in her right knee which has been persistent.    She has known right knee osteoarthritis, we last injected the right knee in September of 2021      Review of Systems:    Constitution: Negative for chills, fever, and sweats.  Negative for unexplained weight loss.    HENT:  Negative for headaches and blurry vision.    Cardiovascular:Negative for chest pain or irregular heart beat. Negative for  hypertension.    Respiratory:  Negative for cough and shortness of breath.    Gastrointestinal: Negative for abdominal pain, heartburn, melena, nausea, and vomitting.    Genitourinary:  Negative bladder incontinence and dysuria.    Musculoskeletal:  See HPI for details.     Neurological: Negative for numbness.    Psychiatric/Behavioral: Negative for depression.  The patient is not nervous/anxious.      Endocrine: Negative for polyuria    Hematologic/Lymphatic: Negative for bleeding problem.  Does not bruise/bleed easily.    Skin: Negative for poor would healing and rash    Objective:      Physical Examination:    Vital Signs:  There were no vitals filed for this visit.    Body mass index is 27.57 kg/m².    This a well-developed, well nourished patient in no acute distress.  They are alert and oriented and cooperative to examination.        Examination of the bilateral knees, the bilateral knees demonstrate dry and intact skin, no erythema or ecchymosis, no signs symptoms of infection.  Left knee surgical incisions well healed.  No effusions bilaterally.  Bilateral range of motion 0-100 degrees.  She has crepitus on the right knee.  Both knees feel stable anterior-posterior varus and valgus stress.  Calves are soft and nontender.    Pertinent New Results:    XRAY Report / Interpretation:   AP lateral sunrise views of bilateral knees taken today in the office, patient with a left total knee arthroplasty appears to be in appropriate position without evidence of hardware failure or loosening.  Right knee, she has some moderate to severe tricompartmental arthrosis.    Assessment/Plan:      Fall, left knee pain, history of left total knee arthroplasty, right knee osteoarthritis.  Left knee will observe, x-rays look fine in terms of her implant.  Right knee she has some arthritis we have injected her before in the past.  Will try repeat injection today, anterior lateral approach sterile technique lidocaine and  "triamcinolone see if this settles down.  Follow-up in 6 weeks.    Edmond Hirsch, Physician Assistant, served in the capacity as a "scribe" for this patient encounter.  A "face-to-face" encounter occurred with Dr. Eliseo Vaca on this date.  The treatment plan and medical decision-making is outlined above. Patient was seen and examined with a chaperone.       This note was created using Dragon voice recognition software that occasionally misinterpreted phrases or words.          "

## 2023-06-01 NOTE — PROCEDURES
Large Joint Aspiration/Injection: R knee    Date/Time: 6/1/2023 1:45 PM  Performed by: Eliseo Vaca MD  Authorized by: Eliseo Vaca MD     Consent Done?:  Yes (Verbal)  Indications:  Arthritis and pain  Site marked: the procedure site was marked    Timeout: prior to procedure the correct patient, procedure, and site was verified    Prep: patient was prepped and draped in usual sterile fashion      Local anesthesia used?: Yes    Local anesthetic:  Lidocaine 1% without epinephrine    Details:  Needle Size:  25 G  Ultrasonic Guidance for needle placement?: No    Location:  Knee  Site:  R knee  Medications:  40 mg triamcinolone acetonide 40 mg/mL  Patient tolerance:  Patient tolerated the procedure well with no immediate complications

## 2023-07-13 ENCOUNTER — OFFICE VISIT (OUTPATIENT)
Dept: ORTHOPEDICS | Facility: CLINIC | Age: 63
End: 2023-07-13
Payer: MEDICAID

## 2023-07-13 VITALS — WEIGHT: 176 LBS | HEIGHT: 67 IN | BODY MASS INDEX: 27.62 KG/M2

## 2023-07-13 DIAGNOSIS — Z96.652 HISTORY OF TOTAL KNEE ARTHROPLASTY, LEFT: Primary | ICD-10-CM

## 2023-07-13 DIAGNOSIS — M87.052 AVASCULAR NECROSIS OF HIP, LEFT: ICD-10-CM

## 2023-07-13 DIAGNOSIS — M17.11 PRIMARY OSTEOARTHRITIS OF RIGHT KNEE: ICD-10-CM

## 2023-07-13 PROCEDURE — 3008F BODY MASS INDEX DOCD: CPT | Mod: CPTII,S$GLB,, | Performed by: ORTHOPAEDIC SURGERY

## 2023-07-13 PROCEDURE — 99213 PR OFFICE/OUTPT VISIT, EST, LEVL III, 20-29 MIN: ICD-10-PCS | Mod: S$GLB,,, | Performed by: ORTHOPAEDIC SURGERY

## 2023-07-13 PROCEDURE — 3008F PR BODY MASS INDEX (BMI) DOCUMENTED: ICD-10-PCS | Mod: CPTII,S$GLB,, | Performed by: ORTHOPAEDIC SURGERY

## 2023-07-13 PROCEDURE — 1160F RVW MEDS BY RX/DR IN RCRD: CPT | Mod: CPTII,S$GLB,, | Performed by: ORTHOPAEDIC SURGERY

## 2023-07-13 PROCEDURE — 1159F PR MEDICATION LIST DOCUMENTED IN MEDICAL RECORD: ICD-10-PCS | Mod: CPTII,S$GLB,, | Performed by: ORTHOPAEDIC SURGERY

## 2023-07-13 PROCEDURE — 1160F PR REVIEW ALL MEDS BY PRESCRIBER/CLIN PHARMACIST DOCUMENTED: ICD-10-PCS | Mod: CPTII,S$GLB,, | Performed by: ORTHOPAEDIC SURGERY

## 2023-07-13 PROCEDURE — 1159F MED LIST DOCD IN RCRD: CPT | Mod: CPTII,S$GLB,, | Performed by: ORTHOPAEDIC SURGERY

## 2023-07-13 PROCEDURE — 99213 OFFICE O/P EST LOW 20 MIN: CPT | Mod: S$GLB,,, | Performed by: ORTHOPAEDIC SURGERY

## 2023-07-13 RX ORDER — POLYETHYLENE GLYCOL 3350 17 G/17G
17 POWDER, FOR SOLUTION ORAL DAILY
COMMUNITY
Start: 2023-07-03

## 2023-07-13 RX ORDER — MEMANTINE HYDROCHLORIDE 10 MG/1
10 TABLET ORAL 2 TIMES DAILY
Status: ON HOLD | COMMUNITY
Start: 2023-06-12 | End: 2023-12-09 | Stop reason: HOSPADM

## 2023-07-13 RX ORDER — PANTOPRAZOLE SODIUM 40 MG/1
1 TABLET, DELAYED RELEASE ORAL EVERY MORNING
COMMUNITY
Start: 2023-07-10

## 2023-07-13 NOTE — PROGRESS NOTES
[facility]    Subjective:     Chief Complaint:   Chief Complaint   Patient presents with    Right Knee - Pain     Right knee pain follow up. Received inj 06/01/23 which offered relief. States that the knee continues to throb       Past Medical History:   Diagnosis Date    Anxiety     Bipolar affect, depressed     Cancer ovarian; uterine    1992    COPD (chronic obstructive pulmonary disease)     GERD (gastroesophageal reflux disease)     Hyperlipidemia     Hypertension     OAB (overactive bladder)        Past Surgical History:   Procedure Laterality Date    BREAST CYST ASPIRATION      COLON SURGERY      COLONOSCOPY      HERNIA REPAIR N/A 2016    HIP ARTHROPLASTY Right 2/25/2019    Procedure: ARTHROPLASTY, HIP;  Surgeon: Eliseo Vaca MD;  Location: [facility] OR;  Service: Orthopedics;  Laterality: Right;  Daniel Herrera    HYSTERECTOMY  total    JOINT REPLACEMENT Right     hip replacemnt    KNEE ARTHROPLASTY Left 8/19/2019    Procedure: ARTHROPLASTY, KNEE;  Surgeon: Eliseo Vaca MD;  Location: [facility] OR;  Service: Orthopedics;  Laterality: Left;    REVISION COLOSTOMY N/A 2012       Current Outpatient Medications   Medication Sig    albuterol (PROVENTIL/VENTOLIN HFA) 90 mcg/actuation inhaler INHALE 2 PUFFS BY MOUTH INTO THE LUNGS EVERY 6 HOURS    ascorbic acid, vitamin C, (VITAMIN C) 1000 MG tablet Take 1,000 mg by mouth once daily.    aspirin (ECOTRIN) 81 MG EC tablet Take 81 mg by mouth once daily.    atenoloL (TENORMIN) 50 MG tablet Take 25 mg by mouth.    azelastine (ASTELIN) 137 mcg (0.1 %) nasal spray 1 spray (137 mcg total) by Nasal route 2 (two) times daily.    biotin 1 mg tablet Take 1,000 mcg by mouth once daily.    budesonide-formoterol 80-4.5 mcg (SYMBICORT) 80-4.5 mcg/actuation HFAA Inhale 2 puffs into the lungs 2 (two) times a day. Controller    clonazePAM (KLONOPIN) 2 MG Tab Take 2 mg by mouth 2 (two) times daily.    cyanocobalamin 1,000 mcg/mL injection     cycloSPORINE (RESTASIS) 0.05 %  ophthalmic emulsion Apply 1 drop to eye.    dicyclomine (BENTYL) 10 MG capsule     donepeziL (ARICEPT) 10 MG tablet Take 10 mg by mouth.    FLUoxetine 20 MG capsule     fluticasone propionate (FLONASE) 50 mcg/actuation nasal spray 1 spray (50 mcg total) by Each Nostril route once daily.    gabapentin (NEURONTIN) 800 MG tablet 800 mg 3 (three) times daily.     LINZESS 145 mcg Cap capsule Take 145 mcg by mouth.    lurasidone (LATUDA) 20 mg Tab tablet Take 20 mg by mouth once daily.    multivitamin with minerals tablet Take 1 tablet by mouth once daily.    nicotine (NICODERM CQ) 21 mg/24 hr PLACE 1 PATCH ONTO THE SKIN ONCE DAILY.    ondansetron (ZOFRAN) 4 MG tablet Take 2 tablets (8 mg total) by mouth 2 (two) times daily as needed for Nausea.    ondansetron (ZOFRAN-ODT) 4 MG TbDL PLACE 1 TABLET ON THE TONGUE AND ALLOW TO DISSOLVE EVERY 8 HOURS    OXcarbazepine (TRILEPTAL) 300 MG Tab     OXYGEN-AIR DELIVERY SYSTEMS MISC 6 L by Misc.(Non-Drug; Combo Route) route continuous.    pantoprazole (PROTONIX) 40 MG tablet Take 1 tablet by mouth every morning.    polyethylene glycol (GLYCOLAX) 17 gram/dose powder Take 17 g by mouth.    pravastatin (PRAVACHOL) 20 MG tablet 20 mg once daily.     pulse oximeter (PULSE OXIMETER) device by Apply Externally route 2 (two) times a day. Use twice daily at 8 AM and 3 PM and record the value in Health system as directed.    sucralfate (CARAFATE) 1 gram tablet Take 1 g by mouth 4 (four) times daily.    sumatriptan (IMITREX) 25 MG Tab TAKE 1 TABLET BY MOUTH AT LEAST 2 HOURS BETWEEN DOSES AS NEEDED    tiotropium (SPIRIVA WITH HANDIHALER) 18 mcg inhalation capsule Inhale 1 capsule (18 mcg total) into the lungs once daily. Controller    topiramate (TOPAMAX) 25 MG tablet Take 25 mg by mouth.    traZODone (DESYREL) 100 MG tablet TAKE 2 AND 1/2 TABLETS BY MOUTH EVERY NIGHT AT BEDTIME    trospium (SANCTURA) 20 mg Tab tablet     vitamin D (VITAMIN D3) 1000 units Tab Take 1,000 Units by mouth once daily.     amLODIPine (NORVASC) 10 MG tablet 1 tablet    b complex vitamins tablet Take 1 tablet by mouth once daily.    cetirizine (ZYRTEC) 10 MG tablet Take 1 tablet (10 mg total) by mouth once daily.    dexlansoprazole (DEXILANT) 60 mg capsule Take 60 mg by mouth.    memantine (NAMENDA) 10 MG Tab Take 10 mg by mouth 2 (two) times daily.     No current facility-administered medications for this visit.       Review of patient's allergies indicates:   Allergen Reactions    Narcof [hydrocodone-guaifenesin]     Ciprofloxacin Diarrhea    Ibuprofen Nausea Only    Naproxen Nausea Only    Quetiapine Nausea And Vomiting       Family History   Problem Relation Age of Onset    Cancer Mother     Hypertension Father     Heart disease Father     Heart disease Sister     Hypertension Sister     Hypertension Brother     Depression Brother        Social History     Socioeconomic History    Marital status:    Tobacco Use    Smoking status: Every Day     Packs/day: 0.50     Years: 40.00     Pack years: 20.00     Types: Cigarettes    Smokeless tobacco: Never   Substance and Sexual Activity    Alcohol use: Yes     Alcohol/week: 1.0 standard drink     Types: 1 Glasses of wine per week     Comment: weekly    Drug use: No       History of present illness:  Nimco comes in today for follow-up for her right knee injection done 6 weeks ago.  She reports she had great relief of her symptoms.  She still has mild residual discomfort but it is tolerable and much improved from where she was prior to that injection.    Review of Systems:    Constitution: Negative for chills, fever, and sweats.  Negative for unexplained weight loss.    HENT:  Negative for headaches and blurry vision.    Cardiovascular:Negative for chest pain or irregular heart beat. Negative for hypertension.    Respiratory:  Negative for cough and shortness of breath.    Gastrointestinal: Negative for abdominal pain, heartburn, melena, nausea, and vomitting.    Genitourinary:   Negative bladder incontinence and dysuria.    Musculoskeletal:  See HPI for details.     Neurological: Negative for numbness.    Psychiatric/Behavioral: Negative for depression.  The patient is not nervous/anxious.      Endocrine: Negative for polyuria    Hematologic/Lymphatic: Negative for bleeding problem.  Does not bruise/bleed easily.    Skin: Negative for poor would healing and rash    Objective:      Physical Examination:    Vital Signs:  There were no vitals filed for this visit.    Body mass index is 27.57 kg/m².    This a well-developed, well nourished patient in no acute distress.  They are alert and oriented and cooperative to examination.        Right knee exam:  Skin to the right knee is clean dry and intact.  There is no erythema or ecchymosis.  There are no signs or symptoms of infection.  She is neurovascularly intact throughout the right lower extremity.  Right calf is soft and nontender.  Right knee range of motion 0-110 degrees.  Right knee is stable to varus and valgus stresses.  She can weightbear as tolerated right lower extremity.  She is diffusely tender along the medial aspect of the right lower extremity.      Pertinent New Results:    XRAY Report / Interpretation:   No new radiographs were taken on today's clinic visit.    Assessment/Plan:      1. Right knee osteoarthritis.      She is doing quite well from her injection she received 6 weeks ago.  No further intervention is warranted at this time.  She does have previous history of left total knee arthroplasty done very well for her.  She will likely need right total knee arthroplasty at some point to address the osteoarthritis in this knee.  We will see her back in about 6 weeks see how she is responding to this most recent injection.  If she is having a recurrence of symptoms at that time we can repeat the injection.  She is continuing to do well, we would recommend she cancel the appointment and simply follow up whenever she does have a  "recurrence of symptoms.    Cristhian Manrique, Physician Assistant, served in the capacity as a "scribe" for this patient encounter.  A "face-to-face" encounter occurred with Dr. Eliseo Vaca on this date.  The treatment plan and medical decision-making is outlined above. Patient was seen and examined with a chaperone.       This note was created using Dragon voice recognition software that occasionally misinterpreted phrases or words.        "

## 2023-07-19 ENCOUNTER — OFFICE VISIT (OUTPATIENT)
Dept: ORTHOPEDICS | Facility: CLINIC | Age: 63
End: 2023-07-19
Payer: MEDICAID

## 2023-07-19 VITALS — WEIGHT: 178 LBS | BODY MASS INDEX: 27.94 KG/M2 | HEIGHT: 67 IN

## 2023-07-19 DIAGNOSIS — M48.062 SPINAL STENOSIS OF LUMBAR REGION WITH NEUROGENIC CLAUDICATION: Primary | ICD-10-CM

## 2023-07-19 DIAGNOSIS — M47.816 LUMBAR FACET ARTHROPATHY: ICD-10-CM

## 2023-07-19 PROCEDURE — 99213 PR OFFICE/OUTPT VISIT, EST, LEVL III, 20-29 MIN: ICD-10-PCS | Mod: S$GLB,,, | Performed by: ORTHOPAEDIC SURGERY

## 2023-07-19 PROCEDURE — 99213 OFFICE O/P EST LOW 20 MIN: CPT | Mod: S$GLB,,, | Performed by: ORTHOPAEDIC SURGERY

## 2023-07-19 PROCEDURE — 3008F PR BODY MASS INDEX (BMI) DOCUMENTED: ICD-10-PCS | Mod: CPTII,S$GLB,, | Performed by: ORTHOPAEDIC SURGERY

## 2023-07-19 PROCEDURE — 1159F MED LIST DOCD IN RCRD: CPT | Mod: CPTII,S$GLB,, | Performed by: ORTHOPAEDIC SURGERY

## 2023-07-19 PROCEDURE — 1160F PR REVIEW ALL MEDS BY PRESCRIBER/CLIN PHARMACIST DOCUMENTED: ICD-10-PCS | Mod: CPTII,S$GLB,, | Performed by: ORTHOPAEDIC SURGERY

## 2023-07-19 PROCEDURE — 1160F RVW MEDS BY RX/DR IN RCRD: CPT | Mod: CPTII,S$GLB,, | Performed by: ORTHOPAEDIC SURGERY

## 2023-07-19 PROCEDURE — 3008F BODY MASS INDEX DOCD: CPT | Mod: CPTII,S$GLB,, | Performed by: ORTHOPAEDIC SURGERY

## 2023-07-19 PROCEDURE — 1159F PR MEDICATION LIST DOCUMENTED IN MEDICAL RECORD: ICD-10-PCS | Mod: CPTII,S$GLB,, | Performed by: ORTHOPAEDIC SURGERY

## 2023-07-19 NOTE — PROGRESS NOTES
Subjective:       Patient ID: Nimco Caro is a 62 y.o. female.    Chief Complaint: Pain of the Spine (Patient is here for a f/up on Lumbar pain, had Rhizotomy that didn't help. Denies numbness & tingling)      History of Present Illness    Prior to meeting with the patient I reviewed the medical chart in [facility]. This included reviewing the previous progress notes from our office, review of the patient's last appointment with their primary care provider, review of any visits to the emergency room, and review of any pain management appointments or procedures.   History of lumbar radiofrequency ablation procedure performed in November 2022 and also bilateral sacroiliac joint injections in February of 2023 she has been the pain management however they informed her that there is nothing else they can offer her    Current Medications  Current Outpatient Medications   Medication Sig Dispense Refill    albuterol (PROVENTIL/VENTOLIN HFA) 90 mcg/actuation inhaler INHALE 2 PUFFS BY MOUTH INTO THE LUNGS EVERY 6 HOURS 18 g 11    amLODIPine (NORVASC) 10 MG tablet 1 tablet      ascorbic acid, vitamin C, (VITAMIN C) 1000 MG tablet Take 1,000 mg by mouth once daily.      aspirin (ECOTRIN) 81 MG EC tablet Take 81 mg by mouth once daily.      atenoloL (TENORMIN) 50 MG tablet Take 25 mg by mouth.      azelastine (ASTELIN) 137 mcg (0.1 %) nasal spray 1 spray (137 mcg total) by Nasal route 2 (two) times daily. 30 mL 11    b complex vitamins tablet Take 1 tablet by mouth once daily.      biotin 1 mg tablet Take 1,000 mcg by mouth once daily.      budesonide-formoterol 80-4.5 mcg (SYMBICORT) 80-4.5 mcg/actuation HFAA Inhale 2 puffs into the lungs 2 (two) times a day. Controller 10.2 g 11    cetirizine (ZYRTEC) 10 MG tablet Take 1 tablet (10 mg total) by mouth once daily. 30 tablet 0    clonazePAM (KLONOPIN) 2 MG Tab Take 2 mg by mouth 2 (two) times daily.      cyanocobalamin 1,000 mcg/mL injection       cycloSPORINE (RESTASIS) 0.05 %  ophthalmic emulsion Apply 1 drop to eye.      dexlansoprazole (DEXILANT) 60 mg capsule Take 60 mg by mouth.      dicyclomine (BENTYL) 10 MG capsule       donepeziL (ARICEPT) 10 MG tablet Take 10 mg by mouth.      FLUoxetine 20 MG capsule       fluticasone propionate (FLONASE) 50 mcg/actuation nasal spray 1 spray (50 mcg total) by Each Nostril route once daily. 15.8 mL 0    gabapentin (NEURONTIN) 800 MG tablet 800 mg 3 (three) times daily.       LINZESS 145 mcg Cap capsule Take 145 mcg by mouth.      lurasidone (LATUDA) 20 mg Tab tablet Take 20 mg by mouth once daily.      memantine (NAMENDA) 10 MG Tab Take 10 mg by mouth 2 (two) times daily.      multivitamin with minerals tablet Take 1 tablet by mouth once daily.      nicotine (NICODERM CQ) 21 mg/24 hr PLACE 1 PATCH ONTO THE SKIN ONCE DAILY. 28 patch 1    ondansetron (ZOFRAN) 4 MG tablet Take 2 tablets (8 mg total) by mouth 2 (two) times daily as needed for Nausea.      ondansetron (ZOFRAN-ODT) 4 MG TbDL PLACE 1 TABLET ON THE TONGUE AND ALLOW TO DISSOLVE EVERY 8 HOURS      OXcarbazepine (TRILEPTAL) 300 MG Tab       OXYGEN-AIR DELIVERY SYSTEMS MISC 6 L by Misc.(Non-Drug; Combo Route) route continuous.      pantoprazole (PROTONIX) 40 MG tablet Take 1 tablet by mouth every morning.      polyethylene glycol (GLYCOLAX) 17 gram/dose powder Take 17 g by mouth.      pravastatin (PRAVACHOL) 20 MG tablet 20 mg once daily.       pulse oximeter (PULSE OXIMETER) device by Apply Externally route 2 (two) times a day. Use twice daily at 8 AM and 3 PM and record the value in [employer] as directed. 1 each 0    sucralfate (CARAFATE) 1 gram tablet Take 1 g by mouth 4 (four) times daily.      sumatriptan (IMITREX) 25 MG Tab TAKE 1 TABLET BY MOUTH AT LEAST 2 HOURS BETWEEN DOSES AS NEEDED      tiotropium (SPIRIVA WITH HANDIHALER) 18 mcg inhalation capsule Inhale 1 capsule (18 mcg total) into the lungs once daily. Controller 30 capsule 3    topiramate (TOPAMAX) 25 MG tablet Take 25 mg by  mouth.      traZODone (DESYREL) 100 MG tablet TAKE 2 AND 1/2 TABLETS BY MOUTH EVERY NIGHT AT BEDTIME      trospium (SANCTURA) 20 mg Tab tablet       vitamin D (VITAMIN D3) 1000 units Tab Take 1,000 Units by mouth once daily.       No current facility-administered medications for this visit.       Allergies  Review of patient's allergies indicates:   Allergen Reactions    Narcof [hydrocodone-guaifenesin]     Ciprofloxacin Diarrhea    Hydrocodone-acetaminophen Nausea And Vomiting    Ibuprofen Nausea Only    Naproxen Nausea Only    Quetiapine Nausea And Vomiting       Past Medical History  Past Medical History:   Diagnosis Date    Anxiety     Bipolar affect, depressed     Cancer ovarian; uterine    1992    COPD (chronic obstructive pulmonary disease)     GERD (gastroesophageal reflux disease)     Hyperlipidemia     Hypertension     OAB (overactive bladder)        Surgical History  Past Surgical History:   Procedure Laterality Date    BREAST CYST ASPIRATION      COLON SURGERY      COLONOSCOPY      HERNIA REPAIR N/A 2016    HIP ARTHROPLASTY Right 2/25/2019    Procedure: ARTHROPLASTY, HIP;  Surgeon: Eliseo Vaca MD;  Location: [facility] OR;  Service: Orthopedics;  Laterality: Right;  Daniel Herrera    HYSTERECTOMY  total    JOINT REPLACEMENT Right     hip replacemnt    KNEE ARTHROPLASTY Left 8/19/2019    Procedure: ARTHROPLASTY, KNEE;  Surgeon: Eliseo Vaca MD;  Location: [facility] OR;  Service: Orthopedics;  Laterality: Left;    REVISION COLOSTOMY N/A 2012       Family History:   Family History   Problem Relation Age of Onset    Cancer Mother     Hypertension Father     Heart disease Father     Heart disease Sister     Hypertension Sister     Hypertension Brother     Depression Brother        Social History:   Social History     Socioeconomic History    Marital status:    Tobacco Use    Smoking status: Every Day     Packs/day: 0.50     Years: 40.00     Pack years: 20.00     Types: Cigarettes    Smokeless tobacco: Never    Substance and Sexual Activity    Alcohol use: Yes     Alcohol/week: 1.0 standard drink     Types: 1 Glasses of wine per week     Comment: weekly    Drug use: No       Hospitalization/Major Diagnostic Procedure:     Review of Systems     General/Constitutional:  Chills denies. Fatigue denies. Fever denies. Weight gain denies. Weight loss denies.    Respiratory:  Shortness of breath denies.    Cardiovascular:  Chest pain denies.    Gastrointestinal:  Constipation denies. Diarrhea denies. Nausea denies. Vomiting denies.     Hematology:  Easy bruising denies. Prolonged bleeding denies.     Genitourinary:  Frequent urination denies. Pain in lower back denies. Painful urination denies.     Musculoskeletal:  See HPI for details    Skin:  Rash denies.    Neurologic:  Dizziness denies. Gait abnormalities denies. Seizures denies. Tingling/Numbess denies.    Psychiatric:  Anxiety denies. Depressed mood denies.     Objective:   Vital Signs: There were no vitals filed for this visit.     Physical Exam      General Examination:     Constitutional: The patient is alert and oriented to lace person and time. Mood is pleasant.     Head/Face: Normal facial features normal eyebrows    Eyes: Normal extraocular motion bilaterally    Lungs: Respirations are equal and unlabored    Gait is coordinated.    Cardiovascular: There are no swelling or varicosities present.    Lymphatic: Negative for adenopathy    Skin: Normal    Neurological: Level of consciousness normal. Oriented to place person and time and situation    Psychiatric: Oriented to time place person and situation    Lumbar range of motion moderate restricted pain with range of motion and tenderness at the lumbosacral junction without spasm straight leg raising weakly positive left side motor exam completely normal  XRAY Report/ Interpretation:  AP pelvis x-rays show osteoarthritis left hip and right total hip arthroplasty.   AP and lateral x-rays of lumbar spine will performed  today. Indications low back pain. Findings:  Mild narrowing L4-5 disc space level.  Prior MRI performed 1 year ago was reviewed evidence of central and foraminal stenosis at L4-5.      Assessment:       1. Spinal stenosis of lumbar region with neurogenic claudication    2. Lumbar facet arthropathy        Plan:       Nimco was seen today for pain.    Diagnoses and all orders for this visit:    Spinal stenosis of lumbar region with neurogenic claudication  -     X-Ray Lumbar Spine Ap And Lateral  -     X-Ray Pelvis Routine AP    Lumbar facet arthropathy  -     X-Ray Lumbar Spine Ap And Lateral  -     X-Ray Pelvis Routine AP         No follow-ups on file.    Patient has failed conservative measures including pain management however has family obligations that prevent her from having surgery at this time.  She will be observed but is a candidate for an L4 laminectomy with transforaminal lumbar interbody fusion L4-5 if she so desires in the future.    The risk associated with the spinal condition and an worsening of the condition was discussed with the patient expressed a thorough understanding.  The patient was warned about the possible development of cauda equina syndrome and its symptoms which include but are not limited to bowel or bladder dysfunction sexual dysfunction increasing pain increasing extremity numbness or weakness and saddle anesthesia.  The patient was advised to either contact me immediately or to go to the nearest hospital emergency room if symptoms of cauda equina syndrome with to develop.  The patient expressed an understanding of these instructions.    Treatment options were discussed with regards to the nature of the medical condition. Conservative pain intervention and surgical options were discussed in detail. The probability of success of each separate treatment option was discussed. The patient expressed a clear understanding of the treatment options. With regards to surgery, the procedure  risk, benefits, complications, and outcomes were discussed. No guarantees were given with regards to surgical outcome.   The risk of complications, morbidity, and mortality of patient management decisions have been made at the time of this visit. These are associated with the patient's problems, diagnostic procedures and treatment options. This includes the possible management options selected and those considered but not selected by the patient after shared medical decision making we discussed with the patient.     This note was created using Dragon voice recognition software that occasionally misinterpreted phrases or words.

## 2023-09-20 ENCOUNTER — OFFICE VISIT (OUTPATIENT)
Dept: ORTHOPEDICS | Facility: CLINIC | Age: 63
End: 2023-09-20
Payer: MEDICAID

## 2023-09-20 VITALS — BODY MASS INDEX: 27.94 KG/M2 | HEIGHT: 67 IN | WEIGHT: 178 LBS

## 2023-09-20 DIAGNOSIS — M47.816 LUMBAR FACET ARTHROPATHY: ICD-10-CM

## 2023-09-20 DIAGNOSIS — M51.36 DEGENERATIVE LUMBAR DISC: ICD-10-CM

## 2023-09-20 DIAGNOSIS — M48.062 SPINAL STENOSIS OF LUMBAR REGION WITH NEUROGENIC CLAUDICATION: Primary | ICD-10-CM

## 2023-09-20 DIAGNOSIS — M48.061 LUMBAR FORAMINAL STENOSIS: ICD-10-CM

## 2023-09-20 PROCEDURE — 1160F PR REVIEW ALL MEDS BY PRESCRIBER/CLIN PHARMACIST DOCUMENTED: ICD-10-PCS | Mod: CPTII,S$GLB,, | Performed by: ORTHOPAEDIC SURGERY

## 2023-09-20 PROCEDURE — 1159F MED LIST DOCD IN RCRD: CPT | Mod: CPTII,S$GLB,, | Performed by: ORTHOPAEDIC SURGERY

## 2023-09-20 PROCEDURE — 1159F PR MEDICATION LIST DOCUMENTED IN MEDICAL RECORD: ICD-10-PCS | Mod: CPTII,S$GLB,, | Performed by: ORTHOPAEDIC SURGERY

## 2023-09-20 PROCEDURE — 3008F PR BODY MASS INDEX (BMI) DOCUMENTED: ICD-10-PCS | Mod: CPTII,S$GLB,, | Performed by: ORTHOPAEDIC SURGERY

## 2023-09-20 PROCEDURE — 99213 PR OFFICE/OUTPT VISIT, EST, LEVL III, 20-29 MIN: ICD-10-PCS | Mod: S$GLB,,, | Performed by: ORTHOPAEDIC SURGERY

## 2023-09-20 PROCEDURE — 3008F BODY MASS INDEX DOCD: CPT | Mod: CPTII,S$GLB,, | Performed by: ORTHOPAEDIC SURGERY

## 2023-09-20 PROCEDURE — 1160F RVW MEDS BY RX/DR IN RCRD: CPT | Mod: CPTII,S$GLB,, | Performed by: ORTHOPAEDIC SURGERY

## 2023-09-20 PROCEDURE — 99213 OFFICE O/P EST LOW 20 MIN: CPT | Mod: S$GLB,,, | Performed by: ORTHOPAEDIC SURGERY

## 2023-09-20 RX ORDER — LURASIDONE HYDROCHLORIDE 40 MG/1
40 TABLET, FILM COATED ORAL DAILY
COMMUNITY
Start: 2023-09-05

## 2023-09-20 RX ORDER — AMLODIPINE BESYLATE 5 MG/1
5 TABLET ORAL
Status: ON HOLD | COMMUNITY
Start: 2023-09-13 | End: 2023-12-09 | Stop reason: HOSPADM

## 2023-09-20 RX ORDER — FLUOXETINE HYDROCHLORIDE 40 MG/1
40 CAPSULE ORAL DAILY
COMMUNITY
Start: 2023-09-03

## 2023-09-20 RX ORDER — TOPIRAMATE 50 MG/1
50 TABLET, FILM COATED ORAL DAILY
COMMUNITY
Start: 2023-09-05

## 2023-09-20 RX ORDER — ATENOLOL 25 MG/1
25 TABLET ORAL DAILY
COMMUNITY
Start: 2023-09-09

## 2023-09-20 RX ORDER — TROSPIUM CHLORIDE ER 60 MG/1
60 CAPSULE ORAL DAILY
COMMUNITY
Start: 2023-09-01

## 2023-09-20 NOTE — PROGRESS NOTES
Subjective:       Patient ID: Nimco Caro is a 62 y.o. female.    Chief Complaint: Pain of the Lumbar Spine (Lumbar pain follow up. States that her pain has been increasing at any activity level. Continues to have pain that radiates down her right leg. Does have a new symptoms of burning.stabbing pain in her right leg)      History of Present Illness    Prior to meeting with the patient I reviewed the medical chart in [facility]. This included reviewing the previous progress notes from our office, review of the patient's last appointment with their primary care provider, review of any visits to the emergency room, and review of any pain management appointments or procedures.   Patient returns for follow-up still has persistent back pain with radiation to both legs left greater than right she is here to discuss possible surgical intervention symptoms have significantly limited her level of physical activities  She has failed conservative measures including sacroiliac joint injections and lumbar radiofrequency ablation.  Current Medications  Current Outpatient Medications   Medication Sig Dispense Refill    albuterol (PROVENTIL/VENTOLIN HFA) 90 mcg/actuation inhaler INHALE 2 PUFFS BY MOUTH INTO THE LUNGS EVERY 6 HOURS 18 g 11    amLODIPine (NORVASC) 5 MG tablet Take 5 mg by mouth.      ascorbic acid, vitamin C, (VITAMIN C) 1000 MG tablet Take 1,000 mg by mouth once daily.      aspirin (ECOTRIN) 81 MG EC tablet Take 81 mg by mouth once daily.      atenoloL (TENORMIN) 25 MG tablet Take 25 mg by mouth.      azelastine (ASTELIN) 137 mcg (0.1 %) nasal spray 1 spray (137 mcg total) by Nasal route 2 (two) times daily. 30 mL 11    b complex vitamins tablet Take 1 tablet by mouth once daily.      biotin 1 mg tablet Take 1,000 mcg by mouth once daily.      budesonide-formoterol 80-4.5 mcg (SYMBICORT) 80-4.5 mcg/actuation HFAA Inhale 2 puffs into the lungs 2 (two) times a day. Controller 10.2 g 11    clonazePAM (KLONOPIN) 2 MG Tab  Take 2 mg by mouth 2 (two) times daily.      cyanocobalamin 1,000 mcg/mL injection       cycloSPORINE (RESTASIS) 0.05 % ophthalmic emulsion Apply 1 drop to eye.      dexlansoprazole (DEXILANT) 60 mg capsule Take 60 mg by mouth.      dicyclomine (BENTYL) 10 MG capsule       FLUoxetine 40 MG capsule Take 40 mg by mouth.      fluticasone propionate (FLONASE) 50 mcg/actuation nasal spray 1 spray (50 mcg total) by Each Nostril route once daily. 15.8 mL 0    gabapentin (NEURONTIN) 800 MG tablet 800 mg 3 (three) times daily.       LATUDA 40 mg Tab tablet Take 40 mg by mouth.      LINZESS 145 mcg Cap capsule Take 145 mcg by mouth.      memantine (NAMENDA) 10 MG Tab Take 10 mg by mouth 2 (two) times daily.      multivitamin with minerals tablet Take 1 tablet by mouth once daily.      ondansetron (ZOFRAN) 4 MG tablet Take 2 tablets (8 mg total) by mouth 2 (two) times daily as needed for Nausea.      ondansetron (ZOFRAN-ODT) 4 MG TbDL PLACE 1 TABLET ON THE TONGUE AND ALLOW TO DISSOLVE EVERY 8 HOURS      OXcarbazepine (TRILEPTAL) 300 MG Tab       OXYGEN-AIR DELIVERY SYSTEMS MISC 6 L by Misc.(Non-Drug; Combo Route) route continuous.      pantoprazole (PROTONIX) 40 MG tablet Take 1 tablet by mouth every morning.      polyethylene glycol (GLYCOLAX) 17 gram/dose powder Take 17 g by mouth.      pravastatin (PRAVACHOL) 20 MG tablet 20 mg once daily.       sucralfate (CARAFATE) 1 gram tablet Take 1 g by mouth 4 (four) times daily.      sumatriptan (IMITREX) 25 MG Tab TAKE 1 TABLET BY MOUTH AT LEAST 2 HOURS BETWEEN DOSES AS NEEDED      tiotropium (SPIRIVA WITH HANDIHALER) 18 mcg inhalation capsule Inhale 1 capsule (18 mcg total) into the lungs once daily. Controller 30 capsule 3    topiramate (TOPAMAX) 50 MG tablet Take 50 mg by mouth.      traZODone (DESYREL) 100 MG tablet TAKE 2 AND 1/2 TABLETS BY MOUTH EVERY NIGHT AT BEDTIME      trospium (SANCTURA XR) 60 mg Cp24 capsule Take 60 mg by mouth.      vitamin D (VITAMIN D3) 1000 units  Tab Take 1,000 Units by mouth once daily.      cetirizine (ZYRTEC) 10 MG tablet Take 1 tablet (10 mg total) by mouth once daily. 30 tablet 0    donepeziL (ARICEPT) 10 MG tablet Take 10 mg by mouth.      nicotine (NICODERM CQ) 21 mg/24 hr PLACE 1 PATCH ONTO THE SKIN ONCE DAILY. (Patient not taking: Reported on 9/20/2023) 28 patch 1    pulse oximeter (PULSE OXIMETER) device by Apply Externally route 2 (two) times a day. Use twice daily at 8 AM and 3 PM and record the value in MyChart as directed. (Patient not taking: Reported on 9/20/2023) 1 each 0     No current facility-administered medications for this visit.       Allergies  Review of patient's allergies indicates:   Allergen Reactions    Narcof [hydrocodone-guaifenesin]     Ciprofloxacin Diarrhea    Hydrocodone-acetaminophen Nausea And Vomiting    Ibuprofen Nausea Only    Naproxen Nausea Only    Quetiapine Nausea And Vomiting       Past Medical History  Past Medical History:   Diagnosis Date    Anxiety     Bipolar affect, depressed     Cancer ovarian; uterine    1992    COPD (chronic obstructive pulmonary disease)     GERD (gastroesophageal reflux disease)     Hyperlipidemia     Hypertension     OAB (overactive bladder)        Surgical History  Past Surgical History:   Procedure Laterality Date    BREAST CYST ASPIRATION      COLON SURGERY      COLONOSCOPY      HERNIA REPAIR N/A 2016    HIP ARTHROPLASTY Right 2/25/2019    Procedure: ARTHROPLASTY, HIP;  Surgeon: Eliseo Vaca MD;  Location: [facility] OR;  Service: Orthopedics;  Laterality: Right;  Daniel Herrera    HYSTERECTOMY  total    JOINT REPLACEMENT Right     hip replacemnt    KNEE ARTHROPLASTY Left 8/19/2019    Procedure: ARTHROPLASTY, KNEE;  Surgeon: Eliseo Vaca MD;  Location: [facility] OR;  Service: Orthopedics;  Laterality: Left;    REVISION COLOSTOMY N/A 2012       Family History:   Family History   Problem Relation Age of Onset    Cancer Mother     Hypertension Father     Heart disease Father     Heart disease  Sister     Hypertension Sister     Hypertension Brother     Depression Brother        Social History:   Social History     Socioeconomic History    Marital status:    Tobacco Use    Smoking status: Every Day     Current packs/day: 0.50     Average packs/day: 0.5 packs/day for 40.0 years (20.0 ttl pk-yrs)     Types: Cigarettes    Smokeless tobacco: Never   Substance and Sexual Activity    Alcohol use: Yes     Alcohol/week: 1.0 standard drink of alcohol     Types: 1 Glasses of wine per week     Comment: weekly    Drug use: No       Hospitalization/Major Diagnostic Procedure:     Review of Systems     General/Constitutional:  Chills denies. Fatigue denies. Fever denies. Weight gain denies. Weight loss denies.    Respiratory:  Shortness of breath denies.    Cardiovascular:  Chest pain denies.    Gastrointestinal:  Constipation denies. Diarrhea denies. Nausea denies. Vomiting denies.     Hematology:  Easy bruising denies. Prolonged bleeding denies.     Genitourinary:  Frequent urination denies. Pain in lower back denies. Painful urination denies.     Musculoskeletal:  See HPI for details    Skin:  Rash denies.    Neurologic:  Dizziness denies. Gait abnormalities denies. Seizures denies. Tingling/Numbess denies.    Psychiatric:  Anxiety denies. Depressed mood denies.     Objective:   Vital Signs: There were no vitals filed for this visit.     Physical Exam      General Examination:     Constitutional: The patient is alert and oriented to lace person and time. Mood is pleasant.     Head/Face: Normal facial features normal eyebrows    Eyes: Normal extraocular motion bilaterally    Lungs: Respirations are equal and unlabored    Gait is coordinated.    Cardiovascular: There are no swelling or varicosities present.    Lymphatic: Negative for adenopathy    Skin: Normal    Neurological: Level of consciousness normal. Oriented to place person and time and situation    Psychiatric: Oriented to time place person and  situation    Patient stand erect has pain when doing so bilateral paraspinous muscle spasm left greater than right L4-S1 range of motion markedly restricted due to pain straight leg raising maneuver positive on the left side motor exam grade 4 5 weakness in the extensor hallucis longus both legs subjective numbness L4 nerve root distribution bilaterally  XRAY Report/ Interpretation:  Prior lumbar MRI personally reviewed broad-based disc herniation L4-5 with bilateral foraminal stenosis and significant facet hypertrophy causing foraminal and central stenosis.      Assessment:       1. Spinal stenosis of lumbar region with neurogenic claudication    2. Lumbar facet arthropathy    3. Lumbar foraminal stenosis    4. Degenerative lumbar disc        Plan:       Nimco was seen today for pain.    Diagnoses and all orders for this visit:    Spinal stenosis of lumbar region with neurogenic claudication  Comments:  L 4-5    Lumbar facet arthropathy    Lumbar foraminal stenosis    Degenerative lumbar disc         No follow-ups on file.    Patient has undergone pain management efforts then they are unsuccessful and wishes to proceed with surgery I believe the L4-5 level is symptomatic I think she has both L4 and L5 symptoms she will require a midline L4 laminectomy but significant foraminotomies bilaterally with removal of the pars interarticularis areas which were great iatrogenic instability.  Therefore we also recommend stabilization and fusion at the L4-5 level with interbody device and posterior nonsegmental instrumentation with posterolateral fusion    The technical aspects of the surgery were discussed in detail with the patient today. The patient was able to verbalize an understanding. The procedure risk benefits alternatives and possible complications of the surgical procedure was discussed including expected outcomes. No guarantees were given regards to outcomes. Consent forms were will be signed at a later date.  All questions regarding the surgery itself were answered. The patient wishes to proceed with surgery and will be scheduled. The patient may require preoperative medical clearance.  Treatment options were discussed with regards to the nature of the medical condition. Conservative pain intervention and surgical options were discussed in detail. The probability of success of each separate treatment option was discussed. The patient expressed a clear understanding of the treatment options. With regards to surgery, the procedure risk, benefits, complications, and outcomes were discussed. No guarantees were given with regards to surgical outcome.   The risk of complications, morbidity, and mortality of patient management decisions have been made at the time of this visit. These are associated with the patient's problems, diagnostic procedures and treatment options. This includes the possible management options selected and those considered but not selected by the patient after shared medical decision making we discussed with the patient.     This note was created using Dragon voice recognition software that occasionally misinterpreted phrases or words.

## 2023-09-25 ENCOUNTER — TELEPHONE (OUTPATIENT)
Dept: ORTHOPEDICS | Facility: CLINIC | Age: 63
End: 2023-09-25

## 2023-09-25 NOTE — TELEPHONE ENCOUNTER
----- Message from Bailey Galeana, Patient Care Assistant sent at 9/25/2023  9:20 AM CDT -----  R/S surgery from 10.24.23 to the next week if possible   She has to bring a friend for a procedure

## 2023-10-25 ENCOUNTER — HOSPITAL ENCOUNTER (OUTPATIENT)
Dept: PREADMISSION TESTING | Facility: HOSPITAL | Age: 63
Discharge: HOME | End: 2023-10-25
Attending: ORTHOPAEDIC SURGERY
Payer: MEDICAID

## 2023-10-25 ENCOUNTER — HOSPITAL ENCOUNTER (OUTPATIENT)
Dept: RADIOLOGY | Facility: HOSPITAL | Age: 63
Discharge: HOME | End: 2023-10-25
Attending: ORTHOPAEDIC SURGERY
Payer: MEDICAID

## 2023-10-25 DIAGNOSIS — M51.36 DEGENERATIVE LUMBAR DISC: ICD-10-CM

## 2023-10-25 DIAGNOSIS — M48.062 SPINAL STENOSIS OF LUMBAR REGION WITH NEUROGENIC CLAUDICATION: ICD-10-CM

## 2023-10-25 DIAGNOSIS — M48.061 LUMBAR FORAMINAL STENOSIS: ICD-10-CM

## 2023-10-25 DIAGNOSIS — Z01.818 PRE-OP TESTING: ICD-10-CM

## 2023-10-25 LAB
BILIRUB UR QL STRIP: NEGATIVE
CLARITY UR: CLEAR
COLOR UR: YELLOW
GLUCOSE UR QL STRIP: NEGATIVE
HGB UR QL STRIP: NEGATIVE
KETONES UR QL STRIP: NEGATIVE
LEUKOCYTE ESTERASE UR QL STRIP: NEGATIVE
NITRITE UR QL STRIP: NEGATIVE
PH UR STRIP: 7 [PH] (ref 5–8)
PROT UR QL STRIP: ABNORMAL
SP GR UR STRIP: 1 (ref 1–1.03)
URN SPEC COLLECT METH UR: ABNORMAL
UROBILINOGEN UR STRIP-ACNC: NEGATIVE EU/DL

## 2023-10-25 PROCEDURE — 71046 X-RAY EXAM CHEST 2 VIEWS: CPT | Mod: TC

## 2023-10-25 PROCEDURE — 71046 X-RAY EXAM CHEST 2 VIEWS: CPT | Mod: 26,,, | Performed by: RADIOLOGY

## 2023-10-25 PROCEDURE — 71046 XR CHEST PA AND LATERAL: ICD-10-PCS | Mod: 26,,, | Performed by: RADIOLOGY

## 2023-10-25 PROCEDURE — 81003 URINALYSIS AUTO W/O SCOPE: CPT | Performed by: ORTHOPAEDIC SURGERY

## 2023-10-25 NOTE — DISCHARGE INSTRUCTIONS
To confirm, Your doctor has instructed you that surgery is scheduled for: 10/31/23 with Dr. Jaimes    Please report to [facility], Registration the morning of surgery. You must check-in and receive a wristband before going to your procedure.  [address] [place] [address] [place] [address]    Pre-Op will call the afternoon prior to surgery between 1:00 and 6:00 PM with the final arrival time.  Phone number: [phone]    PLEASE NOTE:  The surgery schedule has many variables which may affect the time of your surgery case.  Family members should be available if your surgery time changes.  Plan to be here the day of your procedure between 4-6 hours.    MEDICATIONS:  TAKE ONLY THESE MEDICATIONS WITH A SMALL SIP OF WATER THE MORNING OF YOUR PROCEDURE:  See list      DO NOT TAKE THESE MEDICATIONS 5-7 DAYS PRIOR to your procedure or per your surgeon's request:   ASPIRIN, ALEVE, ADVIL, IBUPROFEN, FISH OIL VITAMIN E, HERBALS  (May take Tylenol)    ONLY if you are prescribed any types of blood thinners such as:  Aspirin, Coumadin, Plavix, Pradaxa, Xarelto, Aggrenox, Effient, Eliquis, Savasya, Brilinta, or any other, ask your surgeon whether you should stop taking them and how long before surgery you should stop.  You may also need to verify with the prescribing physician if it is ok to stop your medication.      INSTRUCTIONS IMPORTANT!!  Do not eat or drink anything between midnight and the time of your procedure- this includes gum, mints, and candy.  Do not smoke or drink alcoholic beverages 24 hours prior to your procedure.  Shower the night before AND the morning of your procedure with a Chlorhexidine wash such as Hibiclens or Dial antibacterial soap from the neck down.  Do not get it on your face or in your eyes.  You may use your own shampoo and face wash. This helps your skin to be as bacteria free as possible.    If you wear contact lenses, dentures, hearing aids or glasses, bring a container to put them  in during surgery and give to a family member for safe keeping.  Please leave all jewelry, piercing's and valuables at home. You must remove your false eyelashes prior to surgery.    DO NOT remove hair from the surgery site.  Do not shave the incision site unless you are given specific instructions to do so.    ONLY if you have been diagnosed with sleep apnea please bring your C-PAP machine.  ONLY if you wear home oxygen please bring your portable oxygen tank the day of your procedure.  ONLY if you have a history of OPEN HEART SURGERY you will need a clearance from your Cardiologist per Anesthesia.      ONLY for patients requiring bowel prep, written instructions will be given by your doctor's office.  ONLY if you have a neuro stimulator, please bring the controller with you the morning of surgery  ONLY if a type and screen test is needed before surgery, please return:  If your doctor has scheduled you for an overnight stay, bring a small overnight bag with any personal items you need.  Make arrangements in advance for transportation home by a responsible adult.  It is not safe to drive a vehicle during the 24 hours after anesthesia.          All  facilities and properties are tobacco free.  Smoking is NOT allowed.   If you have any questions about these instructions, call Pre-Op Admit  Nursing at [phone] or the Pre-Op Day Surgery Unit at [phone].

## 2023-10-25 NOTE — OR NURSING
Latest Reference Range & Units 10/25/23 13:29   Sodium 136 - 145 mmol/L 125 (L)   (L): Data is abnormally low      Informed Harriett with Dr. Jaimes and Watson RHODES.  He stated patient needs to go see PCP sharad.  Informed Patient.  She said she will call her PCP for clearance for surgery .

## 2023-10-26 ENCOUNTER — HOSPITAL ENCOUNTER (EMERGENCY)
Facility: HOSPITAL | Age: 63
Discharge: HOME | End: 2023-10-26
Attending: EMERGENCY MEDICINE
Payer: MEDICAID

## 2023-10-26 VITALS
RESPIRATION RATE: 18 BRPM | TEMPERATURE: 98 F | BODY MASS INDEX: 27.56 KG/M2 | HEIGHT: 67 IN | SYSTOLIC BLOOD PRESSURE: 171 MMHG | DIASTOLIC BLOOD PRESSURE: 85 MMHG | OXYGEN SATURATION: 96 % | WEIGHT: 175.63 LBS | HEART RATE: 66 BPM

## 2023-10-26 DIAGNOSIS — E87.1 HYPONATREMIA: ICD-10-CM

## 2023-10-26 DIAGNOSIS — R51.9 HEADACHE: ICD-10-CM

## 2023-10-26 DIAGNOSIS — R42 DIZZINESS: ICD-10-CM

## 2023-10-26 DIAGNOSIS — H81.10 BENIGN PAROXYSMAL POSITIONAL VERTIGO, UNSPECIFIED LATERALITY: Primary | ICD-10-CM

## 2023-10-26 DIAGNOSIS — G44.209 TENSION HEADACHE: ICD-10-CM

## 2023-10-26 LAB
ALBUMIN SERPL BCP-MCNC: 4.1 G/DL (ref 3.5–5.2)
ALP SERPL-CCNC: 110 U/L (ref 55–135)
ALT SERPL W/O P-5'-P-CCNC: 21 U/L (ref 10–44)
ANION GAP SERPL CALC-SCNC: 12 MMOL/L (ref 8–16)
AST SERPL-CCNC: 21 U/L (ref 10–40)
BASOPHILS # BLD AUTO: 0.03 K/UL (ref 0–0.2)
BASOPHILS NFR BLD: 0.5 % (ref 0–1.9)
BILIRUB SERPL-MCNC: 0.2 MG/DL (ref 0.1–1)
BILIRUB UR QL STRIP: NEGATIVE
BNP SERPL-MCNC: 49 PG/ML (ref 0–99)
BUN SERPL-MCNC: 3 MG/DL (ref 8–23)
CALCIUM SERPL-MCNC: 9.8 MG/DL (ref 8.7–10.5)
CHLORIDE SERPL-SCNC: 93 MMOL/L (ref 95–110)
CLARITY UR: CLEAR
CO2 SERPL-SCNC: 23 MMOL/L (ref 23–29)
COLOR UR: YELLOW
CREAT SERPL-MCNC: 0.7 MG/DL (ref 0.5–1.4)
DIFFERENTIAL METHOD: ABNORMAL
EOSINOPHIL # BLD AUTO: 0 K/UL (ref 0–0.5)
EOSINOPHIL NFR BLD: 0.4 % (ref 0–8)
ERYTHROCYTE [DISTWIDTH] IN BLOOD BY AUTOMATED COUNT: 15.1 % (ref 11.5–14.5)
EST. GFR  (NO RACE VARIABLE): >60 ML/MIN/1.73 M^2
GLUCOSE SERPL-MCNC: 90 MG/DL (ref 70–110)
GLUCOSE UR QL STRIP: NEGATIVE
HCT VFR BLD AUTO: 40.8 % (ref 37–48.5)
HGB BLD-MCNC: 13.5 G/DL (ref 12–16)
HGB UR QL STRIP: NEGATIVE
IMM GRANULOCYTES # BLD AUTO: 0.02 K/UL (ref 0–0.04)
IMM GRANULOCYTES NFR BLD AUTO: 0.4 % (ref 0–0.5)
KETONES UR QL STRIP: NEGATIVE
LEUKOCYTE ESTERASE UR QL STRIP: NEGATIVE
LYMPHOCYTES # BLD AUTO: 1 K/UL (ref 1–4.8)
LYMPHOCYTES NFR BLD: 17.3 % (ref 18–48)
MAGNESIUM SERPL-MCNC: 2 MG/DL (ref 1.6–2.6)
MCH RBC QN AUTO: 28.5 PG (ref 27–31)
MCHC RBC AUTO-ENTMCNC: 33.1 G/DL (ref 32–36)
MCV RBC AUTO: 86 FL (ref 82–98)
MONOCYTES # BLD AUTO: 0.6 K/UL (ref 0.3–1)
MONOCYTES NFR BLD: 9.9 % (ref 4–15)
NEUTROPHILS # BLD AUTO: 4.1 K/UL (ref 1.8–7.7)
NEUTROPHILS NFR BLD: 71.5 % (ref 38–73)
NITRITE UR QL STRIP: NEGATIVE
NRBC BLD-RTO: 0 /100 WBC
PH UR STRIP: 7 [PH] (ref 5–8)
PLATELET # BLD AUTO: 274 K/UL (ref 150–450)
PMV BLD AUTO: 8.4 FL (ref 9.2–12.9)
POCT GLUCOSE: 98 MG/DL (ref 70–110)
POTASSIUM SERPL-SCNC: 4.1 MMOL/L (ref 3.5–5.1)
PROT SERPL-MCNC: 7.9 G/DL (ref 6–8.4)
PROT UR QL STRIP: NEGATIVE
RBC # BLD AUTO: 4.74 M/UL (ref 4–5.4)
SODIUM SERPL-SCNC: 128 MMOL/L (ref 136–145)
SP GR UR STRIP: 1.01 (ref 1–1.03)
URN SPEC COLLECT METH UR: NORMAL
UROBILINOGEN UR STRIP-ACNC: NEGATIVE EU/DL
WBC # BLD AUTO: 5.66 K/UL (ref 3.9–12.7)

## 2023-10-26 PROCEDURE — 82962 GLUCOSE BLOOD TEST: CPT

## 2023-10-26 PROCEDURE — 93010 EKG 12-LEAD: ICD-10-PCS | Mod: ,,, | Performed by: GENERAL PRACTICE

## 2023-10-26 PROCEDURE — 25000003 PHARM REV CODE 250: Performed by: EMERGENCY MEDICINE

## 2023-10-26 PROCEDURE — 93005 ELECTROCARDIOGRAM TRACING: CPT

## 2023-10-26 PROCEDURE — 81003 URINALYSIS AUTO W/O SCOPE: CPT | Performed by: NURSE PRACTITIONER

## 2023-10-26 PROCEDURE — 83735 ASSAY OF MAGNESIUM: CPT | Performed by: EMERGENCY MEDICINE

## 2023-10-26 PROCEDURE — 83880 ASSAY OF NATRIURETIC PEPTIDE: CPT | Performed by: EMERGENCY MEDICINE

## 2023-10-26 PROCEDURE — 99285 EMERGENCY DEPT VISIT HI MDM: CPT | Mod: 25

## 2023-10-26 PROCEDURE — 80053 COMPREHEN METABOLIC PANEL: CPT | Performed by: NURSE PRACTITIONER

## 2023-10-26 PROCEDURE — 85025 COMPLETE CBC W/AUTO DIFF WBC: CPT | Performed by: NURSE PRACTITIONER

## 2023-10-26 PROCEDURE — 36415 COLL VENOUS BLD VENIPUNCTURE: CPT | Performed by: NURSE PRACTITIONER

## 2023-10-26 PROCEDURE — 93010 ELECTROCARDIOGRAM REPORT: CPT | Mod: ,,, | Performed by: GENERAL PRACTICE

## 2023-10-26 RX ORDER — BUTALBITAL, ACETAMINOPHEN AND CAFFEINE 50; 325; 40 MG/1; MG/1; MG/1
1 TABLET ORAL EVERY 4 HOURS PRN
Qty: 15 TABLET | Refills: 1 | Status: SHIPPED | OUTPATIENT
Start: 2023-10-26 | End: 2023-11-25

## 2023-10-26 RX ORDER — MECLIZINE HYDROCHLORIDE 25 MG/1
25 TABLET ORAL 3 TIMES DAILY PRN
Qty: 20 TABLET | Refills: 0 | Status: SHIPPED | OUTPATIENT
Start: 2023-10-26

## 2023-10-26 RX ORDER — SCOLOPAMINE TRANSDERMAL SYSTEM 1 MG/1
1 PATCH, EXTENDED RELEASE TRANSDERMAL ONCE
Status: DISCONTINUED | OUTPATIENT
Start: 2023-10-26 | End: 2023-10-26 | Stop reason: HOSPADM

## 2023-10-26 RX ADMIN — SCOPALAMINE 1 PATCH: 1 PATCH, EXTENDED RELEASE TRANSDERMAL at 04:10

## 2023-10-26 NOTE — FIRST PROVIDER EVALUATION
Emergency Department TeleTriage Encounter Note      CHIEF COMPLAINT    Chief Complaint   Patient presents with    NA+ 125-      Having headaches, dizzyspells, confusion and weakness.        VITAL SIGNS   Initial Vitals [10/26/23 1313]   BP Pulse Resp Temp SpO2   (!) 177/75 64 14 98.2 °F (36.8 °C) 96 %      MAP       --            ALLERGIES    Review of patient's allergies indicates:   Allergen Reactions    Meloxicam Other (See Comments)    Ciprofloxacin Diarrhea    Hydrocodone-acetaminophen Nausea And Vomiting, Nausea Only and Other (See Comments)    Ibuprofen Nausea Only    Naproxen Nausea Only and Other (See Comments)    Narcof [hydrocodone-guaifenesin] Nausea And Vomiting    Quetiapine Nausea And Vomiting and Other (See Comments)       PROVIDER TRIAGE NOTE  Verbal consent for the teletriage evaluation was given by the patient at the start of the evaluation.  All efforts will be made to maintain patient's privacy during the evaluation.      This is a teletriage evaluation of a 63 y.o. female presenting to the ED with c/o sodium level 125, had labs for pre-op and was called to come to the ED.  Repots dizziness, HAs, and confusion for for a while. Limited physical exam via telehealth: The patient is awake, alert, answering questions appropriately and is not in respiratory distress.  As the Teletriage provider, I performed an initial assessment and ordered appropriate labs and imaging studies, if any, to facilitate the patient's care once placed in the ED. Once a room is available, care and a full evaluation will be completed by an alternate ED provider.  Any additional orders and the final disposition will be determined by that provider.  All imaging and labs will not be followed-up by the Teletriage Team, including myself.          ORDERS  Labs Reviewed - No data to display    ED Orders (720h ago, onward)      Start Ordered     Status Ordering Provider    10/26/23 1327 10/26/23 1326  Pulse Oximetry Continuous   Continuous         Ordered DILLAN HUDSON    10/26/23 1327 10/26/23 1326  Cardiac Monitoring - Adult  Continuous        Comments: Notify Physician If:    Ordered DILLAN HUDSON    10/26/23 1327 10/26/23 1326  EKG 12-lead  Once         Ordered DILLAN HUDSON    10/26/23 1327 10/26/23 1326  CBC auto differential  STAT         Ordered DILLAN HUDSON    10/26/23 1327 10/26/23 1326  Comprehensive metabolic panel  STAT         Ordered DILLAN HUDSON    10/26/23 1327 10/26/23 1326  POCT glucose  Once         Ordered DILLAN HUDSON    10/26/23 1327 10/26/23 1326  Urinalysis, Reflex to Urine Culture Urine, Clean Catch  STAT         Ordered DILLAN HUDSON    10/26/23 1326 10/26/23 1326  Saline lock IV  Once         Ordered DILLAN HUDSON    10/26/23 1326 10/26/23 1326  Orthostatic blood pressure  Once         Ordered DILLAN HUDSON              Virtual Visit Note: The provider triage portion of this emergency department evaluation and documentation was performed via [employer], a HIPAA-compliant telemedicine application, in concert with a tele-presenter in the room. A face to face patient evaluation with one of my colleagues will occur once the patient is placed in an emergency department room.      DISCLAIMER: This note was prepared with triptap voice recognition transcription software. Garbled syntax, mangled pronouns, and other bizarre constructions may be attributed to that software system.

## 2023-10-26 NOTE — ED PROVIDER NOTES
Encounter Date: 10/26/2023       History     Chief Complaint   Patient presents with    NA+ 125-      Having headaches, dizzyspells, confusion and weakness.      Chief complaint: Hyponatremia    HPI:  63-year-old female is referred to the emergency department after preoperative labs revealed a sodium of 125 yesterday.  She reports that she has had positional dizziness for 2 months.  She is had a diffuse headache for 1-2 weeks.  She reports generalized weakness but denies any focal weakness.  She has no visual or speech impairment.  She denies any chest pain, palpitations or shortness of breath and has no history of CHF.  She reports that she drinks 4 bottles of water per day and a lot of ice tea.  Past medical history is significant for COPD, hypertension, hyperlipidemia, bipolar disorder, and disc disease.      Review of patient's allergies indicates:   Allergen Reactions    Meloxicam Other (See Comments)    Ciprofloxacin Diarrhea    Hydrocodone-acetaminophen Nausea And Vomiting, Nausea Only and Other (See Comments)    Ibuprofen Nausea Only    Naproxen Nausea Only and Other (See Comments)    Narcof [hydrocodone-guaifenesin] Nausea And Vomiting    Quetiapine Nausea And Vomiting and Other (See Comments)     Past Medical History:   Diagnosis Date    Anxiety     Bipolar affect, depressed     Cancer ovarian; uterine    1992    COPD (chronic obstructive pulmonary disease)     GERD (gastroesophageal reflux disease)     Hyperlipidemia     Hypertension     OAB (overactive bladder)     On home oxygen therapy     per patient uses PRN     Past Surgical History:   Procedure Laterality Date    BREAST CYST ASPIRATION      COLON SURGERY      COLONOSCOPY      HERNIA REPAIR N/A 2016    HIP ARTHROPLASTY Right 2/25/2019    Procedure: ARTHROPLASTY, HIP;  Surgeon: Eliseo Vaca MD;  Location: [facility];  Service: Orthopedics;  Laterality: Right;  Daniel Herrera    HYSTERECTOMY  total    JOINT REPLACEMENT Right     hip replacemnt    KNEE  ARTHROPLASTY Left 8/19/2019    Procedure: ARTHROPLASTY, KNEE;  Surgeon: Eliseo Vaca MD;  [facility];  Service: Orthopedics;  Laterality: Left;    REVISION COLOSTOMY N/A 2012     Family History   Problem Relation Age of Onset    Cancer Mother     Hypertension Father     Heart disease Father     Heart disease Sister     Hypertension Sister     Hypertension Brother     Depression Brother      Social History     Tobacco Use    Smoking status: Every Day     Current packs/day: 0.50     Average packs/day: 0.5 packs/day for 40.0 years (20.0 ttl pk-yrs)     Types: Cigarettes    Smokeless tobacco: Never   Substance Use Topics    Alcohol use: Yes     Alcohol/week: 1.0 standard drink of alcohol     Types: 1 Glasses of wine per week     Comment: weekly    Drug use: No     Review of Systems   Constitutional:  Negative for activity change, appetite change, chills, fatigue and fever.   Eyes:  Negative for visual disturbance.   Respiratory:  Negative for apnea and shortness of breath.    Cardiovascular:  Negative for chest pain and palpitations.   Gastrointestinal:  Negative for abdominal distention and abdominal pain.   Genitourinary:  Negative for difficulty urinating.   Musculoskeletal:  Negative for neck pain.   Skin:  Negative for pallor and rash.   Neurological:  Positive for dizziness and headaches. Negative for syncope, speech difficulty, weakness and numbness.   Hematological:  Does not bruise/bleed easily.   Psychiatric/Behavioral:  Negative for agitation.        Physical Exam     Initial Vitals [10/26/23 1313]   BP Pulse Resp Temp SpO2   (!) 177/75 64 14 98.2 °F (36.8 °C) 96 %      MAP       --         Physical Exam    Nursing note and vitals reviewed.  Constitutional: She appears well-developed and well-nourished.   HENT:   Head: Normocephalic and atraumatic.   Eyes: Conjunctivae are normal.   Neck: Neck supple.   Normal range of motion.  Cardiovascular:  Normal rate, regular rhythm and normal heart sounds.      Exam reveals no gallop and no friction rub.       No murmur heard.  Pulmonary/Chest: Effort normal and breath sounds normal. No respiratory distress. She has no wheezes. She has no rhonchi. She has no rales.   Abdominal: Abdomen is soft. She exhibits no distension. There is no abdominal tenderness.   Musculoskeletal:         General: Normal range of motion.      Cervical back: Normal range of motion and neck supple.     Neurological: She is alert and oriented to person, place, and time. She has normal strength. No cranial nerve deficit or sensory deficit. GCS score is 15. GCS eye subscore is 4. GCS verbal subscore is 5. GCS motor subscore is 6.   Skin: Skin is warm and dry. No erythema.   Psychiatric: She has a normal mood and affect.         ED Course   Procedures  Labs Reviewed   CBC W/ AUTO DIFFERENTIAL - Abnormal; Notable for the following components:       Result Value    RDW 15.1 (*)     MPV 8.4 (*)     Lymph % 17.3 (*)     All other components within normal limits   COMPREHENSIVE METABOLIC PANEL - Abnormal; Notable for the following components:    Sodium 128 (*)     Chloride 93 (*)     BUN 3 (*)     All other components within normal limits   URINALYSIS, REFLEX TO URINE CULTURE    Narrative:     Specimen Source->Urine   B-TYPE NATRIURETIC PEPTIDE   MAGNESIUM   POCT GLUCOSE     EKG Readings: (Independently Interpreted)   Rhythm: Normal Sinus Rhythm. Heart Rate: 61. Ectopy: No Ectopy. Conduction: Normal. ST Segments: Normal ST Segments. T Waves: Normal. Axis: Normal. Clinical Impression: Normal Sinus Rhythm       Imaging Results              CT Head Without Contrast (Final result)  Result time 10/26/23 15:53:21      Final result by Sage Hahn MD (10/26/23 15:53:21)                   Impression:      1. No acute intracranial CT findings.      Electronically signed by: Sage Hahn  Date:    10/26/2023  Time:    15:53               Narrative:    EXAMINATION:  CT HEAD WITHOUT CONTRAST    CLINICAL  HISTORY:  Headache, new or worsening (Age >= 50y); Headache, unspecified    TECHNIQUE:  Low dose axial CT images obtained throughout the head without intravenous contrast. Sagittal and coronal reconstructions were performed.    COMPARISON:  Head CT 08/11/2016.    FINDINGS:  Brain: There is no evidence of a mass, edema, midline shift, or intracranial hemorrhage. No extra-axial fluid collection.  Grossly stable mild age-related chronic small vessel ischemic changes.  No CT evidence of an acute major vascular territorial infarct.    Ventricles: The ventricles, sulci, and cisterns are within normal limits.    Skull: The osseous structures are unremarkable in appearance.    Extracranial soft tissues: Limited imaging is within normal limits.    Other: The visualized portions of the sinuses, orbits, and mastoid air cells are unremarkable.                                       Medications   scopolamine 1.3-1.5 mg (1 mg over 3 days) 1 patch (has no administration in time range)     Medical Decision Making  63-year-old female is referred to the emergency department for hyponatremia.  Sodium has increased from 125 to 128.  She readily admits that she consumes large quantities of hypotonic liquids which almost certainly is responsible for her hyponatremia.  She is encouraged to restrict water intake and is to follow up with primary care.  CT of the head is obtained to exclude intracranial mass with no evidence of same.  The headaches most likely are secondary to a tension headache.  She has positional dizziness with benign positional vertigo most likely.  She is prescribed meclizine.    Amount and/or Complexity of Data Reviewed  Labs: ordered.  Radiology: ordered.    Risk  Prescription drug management.                               Clinical Impression:   Final diagnoses:  [R42] Dizziness  [R51.9] Headache  [H81.10] Benign paroxysmal positional vertigo, unspecified laterality (Primary)  [E87.1] Hyponatremia  [G44.209] Tension  headache        ED Disposition Condition    Discharge Stable          ED Prescriptions       Medication Sig Dispense Start Date End Date Auth. Provider    meclizine (ANTIVERT) 25 mg tablet Take 1 tablet (25 mg total) by mouth 3 (three) times daily as needed for Dizziness. 20 tablet 10/26/2023 -- Azael Rodarte III, MD    butalbital-acetaminophen-caffeine -40 mg (FIORICET, ESGIC) -40 mg per tablet Take 1 tablet by mouth every 4 (four) hours as needed for Headaches. 15 tablet 10/26/2023 11/25/2023 Azael Rodarte III, MD          Follow-up Information       Follow up With Specialties Details Why Contact Info    Katy Mason MD Family Medicine In 1 day  [facility]  [facility]  [facility] [address]  [phone]               Azael Rodarte III, MD  10/26/23 1684

## 2023-10-27 ENCOUNTER — TELEPHONE (OUTPATIENT)
Dept: ORTHOPEDICS | Facility: CLINIC | Age: 63
End: 2023-10-27

## 2023-10-27 NOTE — TELEPHONE ENCOUNTER
----- Message from Miriam Rivera sent at 10/26/2023 11:53 AM CDT -----  Regarding: Sx Canceled @ Pre Op  Pt called regarding her Pre Op appt. Sodium is extremely low and was told she can't have Sx. Pt phone # [phone]

## 2023-10-27 NOTE — TELEPHONE ENCOUNTER
----- Message from Caity Morales sent at 10/27/2023 12:28 PM CDT -----  [phone]-please call her about her surgery next week

## 2023-11-22 ENCOUNTER — TELEPHONE (OUTPATIENT)
Dept: ORTHOPEDICS | Facility: CLINIC | Age: 63
End: 2023-11-22

## 2023-11-22 NOTE — TELEPHONE ENCOUNTER
----- Message from Alli Perdomo sent at 11/22/2023 10:16 AM CST -----  PT WAS RETURNING YOUR CALL,WANTS A CALL BACK

## 2023-12-05 ENCOUNTER — HOSPITAL ENCOUNTER (OUTPATIENT)
Dept: PREADMISSION TESTING | Facility: HOSPITAL | Age: 63
Discharge: HOME | End: 2023-12-05
Attending: ORTHOPAEDIC SURGERY
Payer: MEDICAID

## 2023-12-06 ENCOUNTER — ANESTHESIA EVENT (OUTPATIENT)
Dept: SURGERY | Facility: HOSPITAL | Age: 63
DRG: 194 | End: 2023-12-06
Payer: MEDICAID

## 2023-12-07 ENCOUNTER — ANESTHESIA (OUTPATIENT)
Dept: SURGERY | Facility: HOSPITAL | Age: 63
DRG: 194 | End: 2023-12-07
Payer: MEDICAID

## 2023-12-07 PROBLEM — M48.062 SPINAL STENOSIS OF LUMBAR REGION WITH NEUROGENIC CLAUDICATION: Status: ACTIVE | Noted: 2023-12-07

## 2023-12-07 PROCEDURE — 25000003 PHARM REV CODE 250: Performed by: ANESTHESIOLOGY

## 2023-12-07 PROCEDURE — D9220A PRA ANESTHESIA: ICD-10-PCS | Mod: ANES,,, | Performed by: ANESTHESIOLOGY

## 2023-12-07 PROCEDURE — D9220A PRA ANESTHESIA: Mod: ANES,,, | Performed by: ANESTHESIOLOGY

## 2023-12-07 PROCEDURE — 63600175 PHARM REV CODE 636 W HCPCS: Performed by: NURSE ANESTHETIST, CERTIFIED REGISTERED

## 2023-12-07 PROCEDURE — 25000003 PHARM REV CODE 250: Performed by: NURSE ANESTHETIST, CERTIFIED REGISTERED

## 2023-12-07 PROCEDURE — 63600175 PHARM REV CODE 636 W HCPCS: Performed by: ORTHOPAEDIC SURGERY

## 2023-12-07 PROCEDURE — D9220A PRA ANESTHESIA: Mod: CRNA,,, | Performed by: NURSE ANESTHETIST, CERTIFIED REGISTERED

## 2023-12-07 PROCEDURE — D9220A PRA ANESTHESIA: ICD-10-PCS | Mod: CRNA,,, | Performed by: NURSE ANESTHETIST, CERTIFIED REGISTERED

## 2023-12-07 RX ORDER — ONDANSETRON HYDROCHLORIDE 2 MG/ML
INJECTION, SOLUTION INTRAMUSCULAR; INTRAVENOUS
Status: DISCONTINUED | OUTPATIENT
Start: 2023-12-07 | End: 2023-12-07

## 2023-12-07 RX ORDER — SUCCINYLCHOLINE CHLORIDE 20 MG/ML
INJECTION INTRAMUSCULAR; INTRAVENOUS
Status: DISCONTINUED | OUTPATIENT
Start: 2023-12-07 | End: 2023-12-07

## 2023-12-07 RX ORDER — FENTANYL CITRATE 50 UG/ML
INJECTION, SOLUTION INTRAMUSCULAR; INTRAVENOUS
Status: DISCONTINUED | OUTPATIENT
Start: 2023-12-07 | End: 2023-12-07

## 2023-12-07 RX ORDER — KETAMINE HYDROCHLORIDE 100 MG/ML
INJECTION, SOLUTION INTRAMUSCULAR; INTRAVENOUS
Status: DISCONTINUED | OUTPATIENT
Start: 2023-12-07 | End: 2023-12-07

## 2023-12-07 RX ORDER — ROCURONIUM BROMIDE 10 MG/ML
INJECTION, SOLUTION INTRAVENOUS
Status: DISCONTINUED | OUTPATIENT
Start: 2023-12-07 | End: 2023-12-07

## 2023-12-07 RX ORDER — PROPOFOL 10 MG/ML
VIAL (ML) INTRAVENOUS CONTINUOUS PRN
Status: DISCONTINUED | OUTPATIENT
Start: 2023-12-07 | End: 2023-12-07

## 2023-12-07 RX ORDER — LIDOCAINE HYDROCHLORIDE 20 MG/ML
INJECTION INTRAVENOUS
Status: DISCONTINUED | OUTPATIENT
Start: 2023-12-07 | End: 2023-12-07

## 2023-12-07 RX ORDER — PROPOFOL 10 MG/ML
VIAL (ML) INTRAVENOUS
Status: DISCONTINUED | OUTPATIENT
Start: 2023-12-07 | End: 2023-12-07

## 2023-12-07 RX ORDER — DEXAMETHASONE SODIUM PHOSPHATE 4 MG/ML
INJECTION, SOLUTION INTRA-ARTICULAR; INTRALESIONAL; INTRAMUSCULAR; INTRAVENOUS; SOFT TISSUE
Status: DISCONTINUED | OUTPATIENT
Start: 2023-12-07 | End: 2023-12-07

## 2023-12-07 RX ORDER — MIDAZOLAM HYDROCHLORIDE 1 MG/ML
INJECTION INTRAMUSCULAR; INTRAVENOUS
Status: DISCONTINUED | OUTPATIENT
Start: 2023-12-07 | End: 2023-12-07

## 2023-12-07 RX ADMIN — KETAMINE HYDROCHLORIDE 30 MG: 100 INJECTION, SOLUTION, CONCENTRATE INTRAMUSCULAR; INTRAVENOUS at 09:12

## 2023-12-07 RX ADMIN — ONDANSETRON 4 MG: 2 INJECTION INTRAMUSCULAR; INTRAVENOUS at 09:12

## 2023-12-07 RX ADMIN — SODIUM CHLORIDE, SODIUM GLUCONATE, SODIUM ACETATE, POTASSIUM CHLORIDE AND MAGNESIUM CHLORIDE: 526; 502; 368; 37; 30 INJECTION, SOLUTION INTRAVENOUS at 07:12

## 2023-12-07 RX ADMIN — ROCURONIUM BROMIDE 5 MG: 10 INJECTION, SOLUTION INTRAVENOUS at 09:12

## 2023-12-07 RX ADMIN — MIDAZOLAM HYDROCHLORIDE 2 MG: 1 INJECTION, SOLUTION INTRAMUSCULAR; INTRAVENOUS at 08:12

## 2023-12-07 RX ADMIN — Medication 160 MG: at 09:12

## 2023-12-07 RX ADMIN — CEFAZOLIN SODIUM 2 G: 2 SOLUTION INTRAVENOUS at 09:12

## 2023-12-07 RX ADMIN — Medication 40 MG: at 09:12

## 2023-12-07 RX ADMIN — SUCCINYLCHOLINE CHLORIDE 140 MG: 20 INJECTION, SOLUTION INTRAMUSCULAR; INTRAVENOUS at 09:12

## 2023-12-07 RX ADMIN — DEXAMETHASONE SODIUM PHOSPHATE 8 MG: 4 INJECTION, SOLUTION INTRA-ARTICULAR; INTRALESIONAL; INTRAMUSCULAR; INTRAVENOUS; SOFT TISSUE at 09:12

## 2023-12-07 RX ADMIN — PROPOFOL 50 MCG/KG/MIN: 10 INJECTION, EMULSION INTRAVENOUS at 09:12

## 2023-12-07 RX ADMIN — FENTANYL CITRATE 50 MCG: 50 INJECTION, SOLUTION INTRAMUSCULAR; INTRAVENOUS at 11:12

## 2023-12-07 RX ADMIN — FENTANYL CITRATE 100 MCG: 50 INJECTION, SOLUTION INTRAMUSCULAR; INTRAVENOUS at 09:12

## 2023-12-07 RX ADMIN — SODIUM CHLORIDE, SODIUM GLUCONATE, SODIUM ACETATE, POTASSIUM CHLORIDE AND MAGNESIUM CHLORIDE: 526; 502; 368; 37; 30 INJECTION, SOLUTION INTRAVENOUS at 09:12

## 2023-12-07 RX ADMIN — LIDOCAINE HYDROCHLORIDE 100 MG: 20 INJECTION, SOLUTION INTRAVENOUS at 09:12

## 2023-12-07 RX ADMIN — FENTANYL CITRATE 50 MCG: 50 INJECTION, SOLUTION INTRAMUSCULAR; INTRAVENOUS at 09:12

## 2023-12-07 NOTE — TRANSFER OF CARE
"Anesthesia Transfer of Care Note    Patient: Nimco Caro    Procedure(s) Performed: Procedure(s) (LRB):  LAMINECTOMY, SPINE, LUMBAR, WITH FUSION L4-5 (N/A)    Patient location: PACU    Anesthesia Type: general    Transport from OR: Transported from OR on 2-3 L/min O2 by NC with adequate spontaneous ventilation    Post pain: adequate analgesia    Post assessment: no apparent anesthetic complications and tolerated procedure well    Post vital signs: stable    Level of consciousness: responds to stimulation and sedated    Nausea/Vomiting: no nausea/vomiting    Complications: none    Transfer of care protocol was followed    Last vitals: Visit Vitals  BP (!) 146/67 (BP Location: Right arm, Patient Position: Lying)   Pulse 66   Temp 36.7 °C (98.1 °F)   Resp 18   Ht 5' 7" (1.702 m)   Wt 79.4 kg (175 lb)   SpO2 95%   Breastfeeding No   BMI 27.41 kg/m²     "

## 2023-12-07 NOTE — ANESTHESIA PREPROCEDURE EVALUATION
12/07/2023  Nimco Caro is a 63 y.o., female.    Anesthesia Evaluation    I have reviewed the Patient Summary Reports.     I have reviewed the Nursing Notes. I have reviewed the NPO Status.   I have reviewed the Medications.     Review of Systems  Anesthesia Hx:             Denies Family Hx of Anesthesia complications.    Denies Personal Hx of Anesthesia complications.                    Social:  Smoker       Cardiovascular:     Hypertension                                        Pulmonary:   COPD, moderate                     Hepatic/GI:     GERD, well controlled             Musculoskeletal:  Arthritis          Spine Disorders: lumbar            Psych:   anxiety depression                Physical Exam  General:  Well nourished       Airway/Jaw/Neck:  Airway Findings: Mouth Opening: Normal     Tongue: Normal      General Airway Assessment: Adult      Mallampati: III  Improves to II with phonation.  TM Distance: Normal, at least 6 cm   Jaw/Neck Findings:     Neck ROM: Normal ROM          Dental:  Dental Findings: Periodontal disease, Severe      Chest/Lungs:  Chest/Lungs Clear      Heart/Vascular:  Heart Findings: Normal                              Anesthesia Plan  Type of Anesthesia, risks & benefits discussed:  Anesthesia Type:  general    Patient's Preference:   Plan Factors:          Intra-op Monitoring Plan:   Intra-op Monitoring Plan Comments:   Post Op Pain Control Plan: multimodal analgesia and IV/PO Opioids PRN  Post Op Pain Control Plan Comments:     Induction:   IV and Inhalation  Beta Blocker:  Patient is on a Beta-Blocker and has received one dose within the past 24 hours (No further documentation required).       Informed Consent: Informed consent signed with the Patient and all parties understand the risks and agree with anesthesia plan.  All questions answered.  Anesthesia consent signed  with patient.  ASA Score: 3     Day of Surgery Review of History & Physical: I have interviewed and examined the patient. I have reviewed the patient's H&P dated:  There are no significant changes.            Ready For Surgery From Anesthesia Perspective.             Physical Exam  General: Well nourished    Airway:  Mallampati: III / II  Mouth Opening: Normal  TM Distance: Normal, at least 6 cm  Tongue: Normal  Neck ROM: Normal ROM    Dental:  Periodontal disease, Severe        Anesthesia Plan  Type of Anesthesia, risks & benefits discussed:    Anesthesia Type: general  Post Op Pain Control Plan: multimodal analgesia and IV/PO Opioids PRN  Induction:  IV and Inhalation  Informed Consent: Informed consent signed with the Patient and all parties understand the risks and agree with anesthesia plan.  All questions answered.   ASA Score: 3  Day of Surgery Review of History & Physical: I have interviewed and examined the patient. I have reviewed the patient's H&P dated:     Ready For Surgery From Anesthesia Perspective.     .

## 2023-12-07 NOTE — ANESTHESIA PROCEDURE NOTES
Intubation    Date/Time: 12/7/2023 9:11 AM    Performed by: Jeremy Gross CRNA  Authorized by: Sony Rivera MD    Intubation:     Induction:  Intravenous    Intubated:  Postinduction    Mask Ventilation:  Easy mask    Attempts:  1    Attempted By:  CRNA    Method of Intubation:  Video laryngoscopy    Blade:  Rachel 3    Laryngeal View Grade: Grade I - full view of cords      Difficult Airway Encountered?: No      Complications:  None    Airway Device:  Oral endotracheal tube    Airway Device Size:  7.0    Style/Cuff Inflation:  Cuffed (inflated to minimal occlusive pressure)    Inflation Amount (mL):  5    Tube secured:  21    Secured at:  The lips    Placement Verified By:  Capnometry    Complicating Factors:  None    Findings Post-Intubation:  BS equal bilateral and atraumatic/condition of teeth unchanged

## 2023-12-08 PROBLEM — Z98.1 S/P LUMBAR FUSION: Status: ACTIVE | Noted: 2023-12-08

## 2023-12-08 NOTE — ANESTHESIA POSTPROCEDURE EVALUATION
Anesthesia Post Evaluation    Patient: Nimco Caro    Procedure(s) Performed: Procedure(s) (LRB):  LAMINECTOMY, SPINE, LUMBAR, WITH FUSION L4-5 (N/A)    Final Anesthesia Type: general      Patient location during evaluation: PACU  Patient participation: Yes- Able to Participate  Level of consciousness: sedated and awake  Post-procedure vital signs: reviewed and stable  Pain management: adequate  Airway patency: patent    PONV status at discharge: No PONV  Anesthetic complications: no      Cardiovascular status: hypertensive and blood pressure returned to baseline  Respiratory status: spontaneous ventilation  Hydration status: euvolemic  Follow-up not needed.              Vitals Value Taken Time   /67 12/07/23 1600   Temp 36.3 °C (97.4 °F) 12/07/23 1600   Pulse 72 12/07/23 1600   Resp 16 12/07/23 1600   SpO2 95 % 12/07/23 1600         Event Time   Out of Recovery 13:30:00         Pain/Cruz Score: Pain Rating Prior to Med Admin: 0 (12/7/2023  1:37 PM)  Pain Rating Post Med Admin: 4 (12/7/2023  1:15 PM)  Cruz Score: 9 (12/7/2023  1:00 PM)

## 2023-12-09 ENCOUNTER — HOSPITAL ENCOUNTER (INPATIENT)
Facility: HOSPITAL | Age: 63
LOS: 2 days | Discharge: HOME HEALTH | DRG: 194 | End: 2023-12-11
Attending: STUDENT IN AN ORGANIZED HEALTH CARE EDUCATION/TRAINING PROGRAM | Admitting: HOSPITALIST
Payer: MEDICAID

## 2023-12-09 DIAGNOSIS — F31.9 BIPOLAR 1 DISORDER: ICD-10-CM

## 2023-12-09 DIAGNOSIS — J44.9 CHRONIC OBSTRUCTIVE PULMONARY DISEASE, UNSPECIFIED COPD TYPE: ICD-10-CM

## 2023-12-09 DIAGNOSIS — M48.062 SPINAL STENOSIS OF LUMBAR REGION WITH NEUROGENIC CLAUDICATION: ICD-10-CM

## 2023-12-09 DIAGNOSIS — J18.9 PNEUMONIA OF BOTH LUNGS DUE TO INFECTIOUS ORGANISM, UNSPECIFIED PART OF LUNG: ICD-10-CM

## 2023-12-09 DIAGNOSIS — E87.1 HYPONATREMIA: ICD-10-CM
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
## 2023-12-09 DIAGNOSIS — R65.20 SEVERE SEPSIS: ICD-10-CM

## 2023-12-09 DIAGNOSIS — R55 SYNCOPE: ICD-10-CM

## 2023-12-09 DIAGNOSIS — A41.9 SEVERE SEPSIS: ICD-10-CM

## 2023-12-09 LAB
ALBUMIN SERPL BCP-MCNC: 2.9 G/DL (ref 3.5–5.2)
ALLENS TEST: ABNORMAL
ALLENS TEST: NORMAL
ALP SERPL-CCNC: 88 U/L (ref 55–135)
ALT SERPL W/O P-5'-P-CCNC: 12 U/L (ref 10–44)
ANION GAP SERPL CALC-SCNC: 9 MMOL/L (ref 8–16)
APTT PPP: 40.1 SEC (ref 21–32)
AST SERPL-CCNC: 22 U/L (ref 10–40)
BASOPHILS # BLD AUTO: 0.03 K/UL (ref 0–0.2)
BASOPHILS NFR BLD: 0.2 % (ref 0–1.9)
BILIRUB SERPL-MCNC: 0.7 MG/DL (ref 0.1–1)
BILIRUB UR QL STRIP: NEGATIVE
BNP SERPL-MCNC: 82 PG/ML (ref 0–99)
BUN SERPL-MCNC: 7 MG/DL (ref 8–23)
CALCIUM SERPL-MCNC: 8.9 MG/DL (ref 8.7–10.5)
CHLORIDE SERPL-SCNC: 101 MMOL/L (ref 95–110)
CLARITY UR: CLEAR
CO2 SERPL-SCNC: 20 MMOL/L (ref 23–29)
COLOR UR: YELLOW
CREAT SERPL-MCNC: 0.7 MG/DL (ref 0.5–1.4)
CRP SERPL-MCNC: 246.6 MG/L (ref 0–8.2)
DELSYS: ABNORMAL
DELSYS: NORMAL
DIFFERENTIAL METHOD: ABNORMAL
EOSINOPHIL # BLD AUTO: 0 K/UL (ref 0–0.5)
EOSINOPHIL NFR BLD: 0.2 % (ref 0–8)
ERYTHROCYTE [DISTWIDTH] IN BLOOD BY AUTOMATED COUNT: 15.9 % (ref 11.5–14.5)
ERYTHROCYTE [SEDIMENTATION RATE] IN BLOOD BY WESTERGREN METHOD: 10 MM/H
ERYTHROCYTE [SEDIMENTATION RATE] IN BLOOD BY WESTERGREN METHOD: 18 MM/H
ERYTHROCYTE [SEDIMENTATION RATE] IN BLOOD BY WESTERGREN METHOD: 80 MM/HR (ref 0–20)
EST. GFR  (NO RACE VARIABLE): >60 ML/MIN/1.73 M^2
FLOW: 4
FLOW: 5
GLUCOSE SERPL-MCNC: 119 MG/DL (ref 70–110)
GLUCOSE UR QL STRIP: NEGATIVE
HCO3 UR-SCNC: 21.4 MMOL/L (ref 24–28)
HCT VFR BLD AUTO: 31.1 % (ref 37–48.5)
HGB BLD-MCNC: 10.3 G/DL (ref 12–16)
HGB UR QL STRIP: NEGATIVE
IMM GRANULOCYTES # BLD AUTO: 0.06 K/UL (ref 0–0.04)
IMM GRANULOCYTES NFR BLD AUTO: 0.4 % (ref 0–0.5)
INFLUENZA A, MOLECULAR: NEGATIVE
INFLUENZA B, MOLECULAR: NEGATIVE
INR PPP: 1.2 (ref 0.8–1.2)
KETONES UR QL STRIP: NEGATIVE
LDH SERPL L TO P-CCNC: 0.57 MMOL/L (ref 0.36–1.25)
LEUKOCYTE ESTERASE UR QL STRIP: NEGATIVE
LYMPHOCYTES # BLD AUTO: 0.9 K/UL (ref 1–4.8)
LYMPHOCYTES NFR BLD: 6.4 % (ref 18–48)
MAGNESIUM SERPL-MCNC: 1.9 MG/DL (ref 1.6–2.6)
MCH RBC QN AUTO: 28.5 PG (ref 27–31)
MCHC RBC AUTO-ENTMCNC: 33.1 G/DL (ref 32–36)
MCV RBC AUTO: 86 FL (ref 82–98)
MODE: ABNORMAL
MODE: NORMAL
MONOCYTES # BLD AUTO: 1.3 K/UL (ref 0.3–1)
MONOCYTES NFR BLD: 9.7 % (ref 4–15)
NEUTROPHILS # BLD AUTO: 11.2 K/UL (ref 1.8–7.7)
NEUTROPHILS NFR BLD: 83.1 % (ref 38–73)
NITRITE UR QL STRIP: NEGATIVE
NRBC BLD-RTO: 0 /100 WBC
PCO2 BLDA: 38.7 MMHG (ref 35–45)
PH SMN: 7.35 [PH] (ref 7.35–7.45)
PH UR STRIP: 8 [PH] (ref 5–8)
PLATELET # BLD AUTO: 227 K/UL (ref 150–450)
PMV BLD AUTO: 9.6 FL (ref 9.2–12.9)
PO2 BLDA: 65 MMHG (ref 40–60)
POC BE: -4 MMOL/L
POC SATURATED O2: 91 % (ref 95–100)
POC TCO2: 23 MMOL/L (ref 24–29)
POTASSIUM SERPL-SCNC: 3.5 MMOL/L (ref 3.5–5.1)
PROT SERPL-MCNC: 6.4 G/DL (ref 6–8.4)
PROT UR QL STRIP: ABNORMAL
PROTHROMBIN TIME: 12.5 SEC (ref 9–12.5)
RBC # BLD AUTO: 3.61 M/UL (ref 4–5.4)
SAMPLE: ABNORMAL
SAMPLE: NORMAL
SARS-COV-2 RDRP RESP QL NAA+PROBE: NEGATIVE
SITE: ABNORMAL
SITE: NORMAL
SODIUM SERPL-SCNC: 130 MMOL/L (ref 136–145)
SP GR UR STRIP: 1.01 (ref 1–1.03)
SP02: 94
SP02: 97
SPECIMEN SOURCE: NORMAL
TROPONIN I SERPL DL<=0.01 NG/ML-MCNC: <0.006 NG/ML (ref 0–0.03)
URN SPEC COLLECT METH UR: ABNORMAL
UROBILINOGEN UR STRIP-ACNC: NEGATIVE EU/DL
WBC # BLD AUTO: 13.43 K/UL (ref 3.9–12.7)

## 2023-12-09 PROCEDURE — G0378 HOSPITAL OBSERVATION PER HR: HCPCS

## 2023-12-09 PROCEDURE — 25000003 PHARM REV CODE 250: Performed by: STUDENT IN AN ORGANIZED HEALTH CARE EDUCATION/TRAINING PROGRAM

## 2023-12-09 PROCEDURE — 93010 ELECTROCARDIOGRAM REPORT: CPT | Mod: ,,, | Performed by: GENERAL PRACTICE

## 2023-12-09 PROCEDURE — 93005 ELECTROCARDIOGRAM TRACING: CPT

## 2023-12-09 PROCEDURE — 27000221 HC OXYGEN, UP TO 24 HOURS

## 2023-12-09 PROCEDURE — 96365 THER/PROPH/DIAG IV INF INIT: CPT

## 2023-12-09 PROCEDURE — 83735 ASSAY OF MAGNESIUM: CPT | Performed by: STUDENT IN AN ORGANIZED HEALTH CARE EDUCATION/TRAINING PROGRAM

## 2023-12-09 PROCEDURE — 85730 THROMBOPLASTIN TIME PARTIAL: CPT | Performed by: STUDENT IN AN ORGANIZED HEALTH CARE EDUCATION/TRAINING PROGRAM

## 2023-12-09 PROCEDURE — 82803 BLOOD GASES ANY COMBINATION: CPT

## 2023-12-09 PROCEDURE — 81003 URINALYSIS AUTO W/O SCOPE: CPT | Performed by: STUDENT IN AN ORGANIZED HEALTH CARE EDUCATION/TRAINING PROGRAM

## 2023-12-09 PROCEDURE — 80053 COMPREHEN METABOLIC PANEL: CPT | Performed by: STUDENT IN AN ORGANIZED HEALTH CARE EDUCATION/TRAINING PROGRAM

## 2023-12-09 PROCEDURE — 85025 COMPLETE CBC W/AUTO DIFF WBC: CPT | Mod: 91 | Performed by: STUDENT IN AN ORGANIZED HEALTH CARE EDUCATION/TRAINING PROGRAM

## 2023-12-09 PROCEDURE — 63600175 PHARM REV CODE 636 W HCPCS: Performed by: STUDENT IN AN ORGANIZED HEALTH CARE EDUCATION/TRAINING PROGRAM

## 2023-12-09 PROCEDURE — 87502 INFLUENZA DNA AMP PROBE: CPT | Performed by: STUDENT IN AN ORGANIZED HEALTH CARE EDUCATION/TRAINING PROGRAM

## 2023-12-09 PROCEDURE — 99900035 HC TECH TIME PER 15 MIN (STAT)

## 2023-12-09 PROCEDURE — 85610 PROTHROMBIN TIME: CPT | Performed by: STUDENT IN AN ORGANIZED HEALTH CARE EDUCATION/TRAINING PROGRAM

## 2023-12-09 PROCEDURE — 85651 RBC SED RATE NONAUTOMATED: CPT | Performed by: STUDENT IN AN ORGANIZED HEALTH CARE EDUCATION/TRAINING PROGRAM

## 2023-12-09 PROCEDURE — 87040 BLOOD CULTURE FOR BACTERIA: CPT | Performed by: STUDENT IN AN ORGANIZED HEALTH CARE EDUCATION/TRAINING PROGRAM

## 2023-12-09 PROCEDURE — 83605 ASSAY OF LACTIC ACID: CPT

## 2023-12-09 PROCEDURE — U0002 COVID-19 LAB TEST NON-CDC: HCPCS | Performed by: STUDENT IN AN ORGANIZED HEALTH CARE EDUCATION/TRAINING PROGRAM

## 2023-12-09 PROCEDURE — 99285 EMERGENCY DEPT VISIT HI MDM: CPT | Mod: 25

## 2023-12-09 PROCEDURE — 96367 TX/PROPH/DG ADDL SEQ IV INF: CPT

## 2023-12-09 PROCEDURE — 36415 COLL VENOUS BLD VENIPUNCTURE: CPT | Performed by: STUDENT IN AN ORGANIZED HEALTH CARE EDUCATION/TRAINING PROGRAM

## 2023-12-09 PROCEDURE — 83605 ASSAY OF LACTIC ACID: CPT | Performed by: STUDENT IN AN ORGANIZED HEALTH CARE EDUCATION/TRAINING PROGRAM

## 2023-12-09 PROCEDURE — 94761 N-INVAS EAR/PLS OXIMETRY MLT: CPT | Mod: XB

## 2023-12-09 PROCEDURE — 86140 C-REACTIVE PROTEIN: CPT | Performed by: STUDENT IN AN ORGANIZED HEALTH CARE EDUCATION/TRAINING PROGRAM

## 2023-12-09 PROCEDURE — 84484 ASSAY OF TROPONIN QUANT: CPT | Performed by: STUDENT IN AN ORGANIZED HEALTH CARE EDUCATION/TRAINING PROGRAM

## 2023-12-09 PROCEDURE — 25000003 PHARM REV CODE 250

## 2023-12-09 PROCEDURE — 11000001 HC ACUTE MED/SURG PRIVATE ROOM

## 2023-12-09 PROCEDURE — 83880 ASSAY OF NATRIURETIC PEPTIDE: CPT | Performed by: STUDENT IN AN ORGANIZED HEALTH CARE EDUCATION/TRAINING PROGRAM

## 2023-12-09 PROCEDURE — 93010 EKG 12-LEAD: ICD-10-PCS | Mod: ,,, | Performed by: GENERAL PRACTICE

## 2023-12-09 RX ORDER — FLUTICASONE PROPIONATE 50 MCG
1 SPRAY, SUSPENSION (ML) NASAL DAILY
Status: DISCONTINUED | OUTPATIENT
Start: 2023-12-10 | End: 2023-12-11 | Stop reason: HOSPADM

## 2023-12-09 RX ORDER — ACETAMINOPHEN 500 MG
1000 TABLET ORAL
Status: COMPLETED | OUTPATIENT
Start: 2023-12-09 | End: 2023-12-09

## 2023-12-09 RX ORDER — IPRATROPIUM BROMIDE AND ALBUTEROL SULFATE 2.5; .5 MG/3ML; MG/3ML
3 SOLUTION RESPIRATORY (INHALATION) EVERY 4 HOURS
Status: DISCONTINUED | OUTPATIENT
Start: 2023-12-10 | End: 2023-12-11 | Stop reason: HOSPADM

## 2023-12-09 RX ORDER — HYDROCODONE BITARTRATE AND ACETAMINOPHEN 10; 325 MG/1; MG/1
1 TABLET ORAL EVERY 6 HOURS PRN
Status: DISCONTINUED | OUTPATIENT
Start: 2023-12-10 | End: 2023-12-11 | Stop reason: HOSPADM

## 2023-12-09 RX ORDER — ACETAMINOPHEN 325 MG/1
650 TABLET ORAL EVERY 4 HOURS PRN
Status: DISCONTINUED | OUTPATIENT
Start: 2023-12-10 | End: 2023-12-11 | Stop reason: HOSPADM

## 2023-12-09 RX ORDER — CHOLECALCIFEROL (VITAMIN D3) 25 MCG
1000 TABLET ORAL DAILY
Status: DISCONTINUED | OUTPATIENT
Start: 2023-12-10 | End: 2023-12-11 | Stop reason: HOSPADM

## 2023-12-09 RX ORDER — MORPHINE SULFATE 2 MG/ML
2 INJECTION, SOLUTION INTRAMUSCULAR; INTRAVENOUS EVERY 4 HOURS PRN
Status: DISCONTINUED | OUTPATIENT
Start: 2023-12-10 | End: 2023-12-09

## 2023-12-09 RX ORDER — DONEPEZIL HYDROCHLORIDE 5 MG/1
10 TABLET, FILM COATED ORAL DAILY
Status: DISCONTINUED | OUTPATIENT
Start: 2023-12-10 | End: 2023-12-11 | Stop reason: HOSPADM

## 2023-12-09 RX ORDER — POLYETHYLENE GLYCOL 3350 17 G/17G
17 POWDER, FOR SOLUTION ORAL DAILY
Status: DISCONTINUED | OUTPATIENT
Start: 2023-12-10 | End: 2023-12-11 | Stop reason: HOSPADM

## 2023-12-09 RX ORDER — FLUTICASONE FUROATE AND VILANTEROL 100; 25 UG/1; UG/1
1 POWDER RESPIRATORY (INHALATION) DAILY
Status: DISCONTINUED | OUTPATIENT
Start: 2023-12-10 | End: 2023-12-09

## 2023-12-09 RX ORDER — MECLIZINE HYDROCHLORIDE 25 MG/1
25 TABLET ORAL 3 TIMES DAILY PRN
Status: DISCONTINUED | OUTPATIENT
Start: 2023-12-09 | End: 2023-12-11 | Stop reason: HOSPADM

## 2023-12-09 RX ORDER — CETIRIZINE HYDROCHLORIDE 10 MG/1
10 TABLET ORAL DAILY
Status: DISCONTINUED | OUTPATIENT
Start: 2023-12-10 | End: 2023-12-11 | Stop reason: HOSPADM

## 2023-12-09 RX ORDER — ATENOLOL 25 MG/1
25 TABLET ORAL DAILY
Status: DISCONTINUED | OUTPATIENT
Start: 2023-12-10 | End: 2023-12-11 | Stop reason: HOSPADM

## 2023-12-09 RX ORDER — BUDESONIDE 0.5 MG/2ML
0.5 INHALANT ORAL EVERY 12 HOURS
Status: DISCONTINUED | OUTPATIENT
Start: 2023-12-10 | End: 2023-12-11 | Stop reason: HOSPADM

## 2023-12-09 RX ORDER — DICYCLOMINE HYDROCHLORIDE 10 MG/1
10 CAPSULE ORAL 2 TIMES DAILY
Status: DISCONTINUED | OUTPATIENT
Start: 2023-12-09 | End: 2023-12-11 | Stop reason: HOSPADM

## 2023-12-09 RX ORDER — OXCARBAZEPINE 150 MG/1
300 TABLET, FILM COATED ORAL DAILY
Status: DISCONTINUED | OUTPATIENT
Start: 2023-12-10 | End: 2023-12-11 | Stop reason: HOSPADM

## 2023-12-09 RX ORDER — ASCORBIC ACID 500 MG
1000 TABLET ORAL DAILY
Status: DISCONTINUED | OUTPATIENT
Start: 2023-12-10 | End: 2023-12-11 | Stop reason: HOSPADM

## 2023-12-09 RX ORDER — ONDANSETRON 2 MG/ML
4 INJECTION INTRAMUSCULAR; INTRAVENOUS EVERY 8 HOURS PRN
Status: DISCONTINUED | OUTPATIENT
Start: 2023-12-10 | End: 2023-12-11 | Stop reason: HOSPADM

## 2023-12-09 RX ORDER — FLUOXETINE HYDROCHLORIDE 20 MG/1
40 CAPSULE ORAL DAILY
Status: DISCONTINUED | OUTPATIENT
Start: 2023-12-10 | End: 2023-12-11 | Stop reason: HOSPADM

## 2023-12-09 RX ORDER — SUMATRIPTAN SUCCINATE 25 MG/1
25 TABLET ORAL 2 TIMES DAILY PRN
Status: DISCONTINUED | OUTPATIENT
Start: 2023-12-10 | End: 2023-12-11 | Stop reason: HOSPADM

## 2023-12-09 RX ORDER — HYDROCODONE BITARTRATE AND ACETAMINOPHEN 5; 325 MG/1; MG/1
1 TABLET ORAL EVERY 6 HOURS PRN
Status: DISCONTINUED | OUTPATIENT
Start: 2023-12-10 | End: 2023-12-11 | Stop reason: HOSPADM

## 2023-12-09 RX ORDER — TRAZODONE HYDROCHLORIDE 50 MG/1
250 TABLET ORAL NIGHTLY
Status: DISCONTINUED | OUTPATIENT
Start: 2023-12-10 | End: 2023-12-09

## 2023-12-09 RX ORDER — PROCHLORPERAZINE EDISYLATE 5 MG/ML
5 INJECTION INTRAMUSCULAR; INTRAVENOUS EVERY 6 HOURS PRN
Status: DISCONTINUED | OUTPATIENT
Start: 2023-12-10 | End: 2023-12-11 | Stop reason: HOSPADM

## 2023-12-09 RX ORDER — MORPHINE SULFATE 4 MG/ML
4 INJECTION, SOLUTION INTRAMUSCULAR; INTRAVENOUS EVERY 4 HOURS PRN
Status: DISCONTINUED | OUTPATIENT
Start: 2023-12-10 | End: 2023-12-09

## 2023-12-09 RX ORDER — ARFORMOTEROL TARTRATE 15 UG/2ML
15 SOLUTION RESPIRATORY (INHALATION) 2 TIMES DAILY
Status: DISCONTINUED | OUTPATIENT
Start: 2023-12-10 | End: 2023-12-11 | Stop reason: HOSPADM

## 2023-12-09 RX ORDER — TOPIRAMATE 25 MG/1
50 TABLET ORAL DAILY
Status: DISCONTINUED | OUTPATIENT
Start: 2023-12-10 | End: 2023-12-11 | Stop reason: HOSPADM

## 2023-12-09 RX ORDER — RIMEGEPANT SULFATE 75 MG/75MG
75 TABLET, ORALLY DISINTEGRATING ORAL ONCE AS NEEDED
COMMUNITY
Start: 2023-06-30

## 2023-12-09 RX ORDER — VILAZODONE HYDROCHLORIDE 20 MG/1
20 TABLET ORAL DAILY
COMMUNITY

## 2023-12-09 RX ORDER — PRAVASTATIN SODIUM 10 MG/1
20 TABLET ORAL DAILY
Status: DISCONTINUED | OUTPATIENT
Start: 2023-12-10 | End: 2023-12-11 | Stop reason: HOSPADM

## 2023-12-09 RX ORDER — CLONAZEPAM 0.5 MG/1
2 TABLET ORAL 2 TIMES DAILY
Status: DISCONTINUED | OUTPATIENT
Start: 2023-12-09 | End: 2023-12-09

## 2023-12-09 RX ORDER — GABAPENTIN 300 MG/1
300 CAPSULE ORAL 3 TIMES DAILY
Status: DISCONTINUED | OUTPATIENT
Start: 2023-12-10 | End: 2023-12-11 | Stop reason: HOSPADM

## 2023-12-09 RX ORDER — IBUPROFEN 200 MG
1 TABLET ORAL DAILY
Status: DISCONTINUED | OUTPATIENT
Start: 2023-12-10 | End: 2023-12-11 | Stop reason: HOSPADM

## 2023-12-09 RX ORDER — DOXYCYCLINE 100 MG/10ML
100 INJECTION, POWDER, LYOPHILIZED, FOR SOLUTION INTRAVENOUS
Status: DISCONTINUED | OUTPATIENT
Start: 2023-12-09 | End: 2023-12-09 | Stop reason: SDUPTHER

## 2023-12-09 RX ADMIN — CEFTRIAXONE SODIUM 1 G: 1 INJECTION, POWDER, FOR SOLUTION INTRAMUSCULAR; INTRAVENOUS at 08:12

## 2023-12-09 RX ADMIN — DOXYCYCLINE 100 MG: 100 INJECTION, POWDER, LYOPHILIZED, FOR SOLUTION INTRAVENOUS at 10:12

## 2023-12-09 RX ADMIN — ACETAMINOPHEN 1000 MG: 500 TABLET ORAL at 08:12

## 2023-12-10 LAB
ANION GAP SERPL CALC-SCNC: 10 MMOL/L (ref 8–16)
BASOPHILS # BLD AUTO: 0.03 K/UL (ref 0–0.2)
BASOPHILS NFR BLD: 0.3 % (ref 0–1.9)
BILIRUB UR QL STRIP: NEGATIVE
BUN SERPL-MCNC: 6 MG/DL (ref 8–23)
CALCIUM SERPL-MCNC: 8.7 MG/DL (ref 8.7–10.5)
CHLORIDE SERPL-SCNC: 102 MMOL/L (ref 95–110)
CLARITY UR: CLEAR
CO2 SERPL-SCNC: 22 MMOL/L (ref 23–29)
COLOR UR: YELLOW
CREAT SERPL-MCNC: 0.6 MG/DL (ref 0.5–1.4)
DIFFERENTIAL METHOD: ABNORMAL
EOSINOPHIL # BLD AUTO: 0.1 K/UL (ref 0–0.5)
EOSINOPHIL NFR BLD: 1.2 % (ref 0–8)
ERYTHROCYTE [DISTWIDTH] IN BLOOD BY AUTOMATED COUNT: 15.9 % (ref 11.5–14.5)
EST. GFR  (NO RACE VARIABLE): >60 ML/MIN/1.73 M^2
GLUCOSE SERPL-MCNC: 96 MG/DL (ref 70–110)
GLUCOSE UR QL STRIP: NEGATIVE
HCT VFR BLD AUTO: 31.2 % (ref 37–48.5)
HGB BLD-MCNC: 9.9 G/DL (ref 12–16)
HGB UR QL STRIP: NEGATIVE
IMM GRANULOCYTES # BLD AUTO: 0.04 K/UL (ref 0–0.04)
IMM GRANULOCYTES NFR BLD AUTO: 0.3 % (ref 0–0.5)
KETONES UR QL STRIP: NEGATIVE
LACTATE SERPL-SCNC: 0.5 MMOL/L (ref 0.5–2.2)
LEUKOCYTE ESTERASE UR QL STRIP: NEGATIVE
LYMPHOCYTES # BLD AUTO: 1.4 K/UL (ref 1–4.8)
LYMPHOCYTES NFR BLD: 12.2 % (ref 18–48)
MAGNESIUM SERPL-MCNC: 1.9 MG/DL (ref 1.6–2.6)
MCH RBC QN AUTO: 28.1 PG (ref 27–31)
MCHC RBC AUTO-ENTMCNC: 31.7 G/DL (ref 32–36)
MCV RBC AUTO: 89 FL (ref 82–98)
MONOCYTES # BLD AUTO: 1.3 K/UL (ref 0.3–1)
MONOCYTES NFR BLD: 11.4 % (ref 4–15)
NEUTROPHILS # BLD AUTO: 8.6 K/UL (ref 1.8–7.7)
NEUTROPHILS NFR BLD: 74.6 % (ref 38–73)
NITRITE UR QL STRIP: NEGATIVE
NRBC BLD-RTO: 0 /100 WBC
PH UR STRIP: 7 [PH] (ref 5–8)
PHOSPHATE SERPL-MCNC: 3 MG/DL (ref 2.7–4.5)
PLATELET # BLD AUTO: 232 K/UL (ref 150–450)
PMV BLD AUTO: 8.9 FL (ref 9.2–12.9)
POTASSIUM SERPL-SCNC: 3.5 MMOL/L (ref 3.5–5.1)
PROCALCITONIN SERPL IA-MCNC: 0.06 NG/ML
PROT UR QL STRIP: NEGATIVE
RBC # BLD AUTO: 3.52 M/UL (ref 4–5.4)
SODIUM SERPL-SCNC: 134 MMOL/L (ref 136–145)
SP GR UR STRIP: 1 (ref 1–1.03)
URN SPEC COLLECT METH UR: NORMAL
UROBILINOGEN UR STRIP-ACNC: NEGATIVE EU/DL
WBC # BLD AUTO: 11.57 K/UL (ref 3.9–12.7)

## 2023-12-10 PROCEDURE — 63600175 PHARM REV CODE 636 W HCPCS: Performed by: NURSE PRACTITIONER

## 2023-12-10 PROCEDURE — 25000003 PHARM REV CODE 250: Performed by: NURSE PRACTITIONER

## 2023-12-10 PROCEDURE — 83735 ASSAY OF MAGNESIUM: CPT

## 2023-12-10 PROCEDURE — 84145 PROCALCITONIN (PCT): CPT

## 2023-12-10 PROCEDURE — 85025 COMPLETE CBC W/AUTO DIFF WBC: CPT

## 2023-12-10 PROCEDURE — 84100 ASSAY OF PHOSPHORUS: CPT

## 2023-12-10 PROCEDURE — 94640 AIRWAY INHALATION TREATMENT: CPT | Mod: XB

## 2023-12-10 PROCEDURE — 80048 BASIC METABOLIC PNL TOTAL CA: CPT

## 2023-12-10 PROCEDURE — 97162 PT EVAL MOD COMPLEX 30 MIN: CPT

## 2023-12-10 PROCEDURE — 97165 OT EVAL LOW COMPLEX 30 MIN: CPT

## 2023-12-10 PROCEDURE — 81003 URINALYSIS AUTO W/O SCOPE: CPT

## 2023-12-10 PROCEDURE — 97535 SELF CARE MNGMENT TRAINING: CPT

## 2023-12-10 PROCEDURE — S4991 NICOTINE PATCH NONLEGEND: HCPCS

## 2023-12-10 PROCEDURE — 25000242 PHARM REV CODE 250 ALT 637 W/ HCPCS

## 2023-12-10 PROCEDURE — 11000001 HC ACUTE MED/SURG PRIVATE ROOM

## 2023-12-10 PROCEDURE — 25000003 PHARM REV CODE 250

## 2023-12-10 PROCEDURE — 94761 N-INVAS EAR/PLS OXIMETRY MLT: CPT

## 2023-12-10 PROCEDURE — 97116 GAIT TRAINING THERAPY: CPT

## 2023-12-10 PROCEDURE — 25000242 PHARM REV CODE 250 ALT 637 W/ HCPCS: Performed by: HOSPITALIST

## 2023-12-10 PROCEDURE — 27000221 HC OXYGEN, UP TO 24 HOURS

## 2023-12-10 RX ORDER — LANOLIN ALCOHOL/MO/W.PET/CERES
800 CREAM (GRAM) TOPICAL
Status: DISCONTINUED | OUTPATIENT
Start: 2023-12-10 | End: 2023-12-11 | Stop reason: HOSPADM

## 2023-12-10 RX ORDER — DOXYCYCLINE HYCLATE 100 MG
100 TABLET ORAL EVERY 12 HOURS
Status: DISCONTINUED | OUTPATIENT
Start: 2023-12-10 | End: 2023-12-11 | Stop reason: HOSPADM

## 2023-12-10 RX ADMIN — BUDESONIDE INHALATION 0.5 MG: 0.5 SUSPENSION RESPIRATORY (INHALATION) at 07:12

## 2023-12-10 RX ADMIN — POTASSIUM BICARBONATE 50 MEQ: 977.5 TABLET, EFFERVESCENT ORAL at 06:12

## 2023-12-10 RX ADMIN — HYPROMELLOSE 2910 2 DROP: 5 SOLUTION/ DROPS OPHTHALMIC at 07:12

## 2023-12-10 RX ADMIN — DOXYCYCLINE HYCLATE 100 MG: 100 TABLET, COATED ORAL at 09:12

## 2023-12-10 RX ADMIN — Medication 1 PATCH: at 08:12

## 2023-12-10 RX ADMIN — AZITHROMYCIN MONOHYDRATE 500 MG: 500 INJECTION, POWDER, LYOPHILIZED, FOR SOLUTION INTRAVENOUS at 11:12

## 2023-12-10 RX ADMIN — GABAPENTIN 300 MG: 300 CAPSULE ORAL at 09:12

## 2023-12-10 RX ADMIN — GABAPENTIN 300 MG: 300 CAPSULE ORAL at 08:12

## 2023-12-10 RX ADMIN — DICYCLOMINE HYDROCHLORIDE 10 MG: 10 CAPSULE ORAL at 08:12

## 2023-12-10 RX ADMIN — TOPIRAMATE 50 MG: 25 TABLET, FILM COATED ORAL at 08:12

## 2023-12-10 RX ADMIN — THERA TABS 1 TABLET: TAB at 08:12

## 2023-12-10 RX ADMIN — HYPROMELLOSE 2910 2 DROP: 5 SOLUTION/ DROPS OPHTHALMIC at 10:12

## 2023-12-10 RX ADMIN — DOXYCYCLINE HYCLATE 100 MG: 100 TABLET, COATED ORAL at 12:12

## 2023-12-10 RX ADMIN — HYPROMELLOSE 2910 2 DROP: 5 SOLUTION/ DROPS OPHTHALMIC at 06:12

## 2023-12-10 RX ADMIN — IPRATROPIUM BROMIDE AND ALBUTEROL SULFATE 3 ML: 2.5; .5 SOLUTION RESPIRATORY (INHALATION) at 03:12

## 2023-12-10 RX ADMIN — POLYETHYLENE GLYCOL (3350) 17 G: 17 POWDER, FOR SOLUTION ORAL at 08:12

## 2023-12-10 RX ADMIN — OXYCODONE HYDROCHLORIDE AND ACETAMINOPHEN 1000 MG: 500 TABLET ORAL at 08:12

## 2023-12-10 RX ADMIN — ARFORMOTEROL TARTRATE 15 MCG: 15 SOLUTION RESPIRATORY (INHALATION) at 07:12

## 2023-12-10 RX ADMIN — FLUOXETINE 40 MG: 20 CAPSULE ORAL at 08:12

## 2023-12-10 RX ADMIN — IPRATROPIUM BROMIDE AND ALBUTEROL SULFATE 3 ML: 2.5; .5 SOLUTION RESPIRATORY (INHALATION) at 12:12

## 2023-12-10 RX ADMIN — DONEPEZIL HYDROCHLORIDE 10 MG: 5 TABLET, FILM COATED ORAL at 08:12

## 2023-12-10 RX ADMIN — OXCARBAZEPINE 300 MG: 150 TABLET, FILM COATED ORAL at 08:12

## 2023-12-10 RX ADMIN — ATENOLOL 25 MG: 25 TABLET ORAL at 08:12

## 2023-12-10 RX ADMIN — HYPROMELLOSE 2910 2 DROP: 5 SOLUTION/ DROPS OPHTHALMIC at 02:12

## 2023-12-10 RX ADMIN — IPRATROPIUM BROMIDE AND ALBUTEROL SULFATE 3 ML: 2.5; .5 SOLUTION RESPIRATORY (INHALATION) at 11:12

## 2023-12-10 RX ADMIN — GABAPENTIN 300 MG: 300 CAPSULE ORAL at 02:12

## 2023-12-10 RX ADMIN — CETIRIZINE HYDROCHLORIDE 10 MG: 10 TABLET, FILM COATED ORAL at 08:12

## 2023-12-10 RX ADMIN — FLUTICASONE PROPIONATE 50 MCG: 50 SPRAY, METERED NASAL at 08:12

## 2023-12-10 RX ADMIN — IPRATROPIUM BROMIDE AND ALBUTEROL SULFATE 3 ML: 2.5; .5 SOLUTION RESPIRATORY (INHALATION) at 07:12

## 2023-12-10 RX ADMIN — PRAVASTATIN SODIUM 20 MG: 10 TABLET ORAL at 08:12

## 2023-12-10 RX ADMIN — HYDROCODONE BITARTRATE AND ACETAMINOPHEN 1 TABLET: 5; 325 TABLET ORAL at 03:12

## 2023-12-10 RX ADMIN — DICYCLOMINE HYDROCHLORIDE 10 MG: 10 CAPSULE ORAL at 09:12

## 2023-12-10 RX ADMIN — Medication 1000 UNITS: at 08:12

## 2023-12-10 NOTE — PLAN OF CARE
Problem: Adult Inpatient Plan of Care  Goal: Plan of Care Review  Outcome: Ongoing, Progressing  Goal: Readiness for Transition of Care  Outcome: Ongoing, Progressing     Problem: Infection  Goal: Absence of Infection Signs and Symptoms  Outcome: Ongoing, Progressing     Problem: Fluid Imbalance (Pneumonia)  Goal: Fluid Balance  Outcome: Ongoing, Progressing     Problem: Infection (Pneumonia)  Goal: Resolution of Infection Signs and Symptoms  Outcome: Ongoing, Progressing     Problem: Respiratory Compromise (Pneumonia)  Goal: Effective Oxygenation and Ventilation  Outcome: Ongoing, Progressing     Problem: Skin Injury Risk Increased  Goal: Skin Health and Integrity  Outcome: Ongoing, Progressing   Pt on 4 L oxygen NC. Pt responsive to voice and pain. Slurred speech. Kunal hose placed. Bed alarm set. External Catheter placed. Room near nurses station. Side rails up x3.

## 2023-12-10 NOTE — ED TRIAGE NOTES
Nimco Caro is here after a fall, walking and fell at home, just left hospital at 1530 S/P laminectomy

## 2023-12-10 NOTE — PROGRESS NOTES
"[facility] Medicine  Progress Note    Patient Name: Nimco Caro  MRN: [id number]  Patient Class: IP- Inpatient   Admission Date: 12/9/2023  Length of Stay: 0 days  Attending Physician: Marianne Ivey MD  Primary Care Provider: Katy Mason MD        Subjective:     Principal Problem:Pneumonia        HPI:  Nimco Caro is a 63 yr old female with past medical history of hypertension, hyperlipidemia, COPD with home oxygen, and back pain presenting today after a fall at home due to weakness, lethargy, and MCGARRY. She did not experience any loss of consciousness or hitting of her head. She states she is supposed to ambulated with a walker but she did not. Denies chest pain, chills, diaphoresis, numbness, tingling, dizziness, headache, LOC, nausea, or vomiting. On 12/7, she underwent a lumbar laminectomy with fusion L4-L5 with Dr. Jaimes. She was discharged today from the hospital and has returned c/o weakness possibly due to medication. She states she feels like she is "taking too much medication." She denies taking any medication prior to fall or since discharged from facility. Upon examine she does have difficulty staying awake but answers questions appropriately. ED workup revealed fever, temp 101, oxygen saturation 85% on room air increased to 95 % on 4 L NC, VBG unremarkable. White count 13.43, Na 130 at baseline. CT Thoracic demonstrated small right pleural effusion with interstitial changes at lung bases. CT lumbar demonstrated no device hardware complications. She was started on ceftriaxone and doxycycline. She is admitted to hospital medicine for further work-up and management.                      Overview/Hospital Course:  No notes on file    Interval History: Notes reviewed, no acute events overnight. Pt resting comfortably in bed.  She admits to having taking extra medication yesterday evening which likely contributed to her fall.  She also states that her " "caregiver was present at the house and patient wanted to sit in a specific chair and her caregiver told her it that it was probably not a good idea because the chair was too low and she would have difficulty getting up from it.  Patient states "I am hard headed and did it anyway" and admits she had difficulty getting out of the chair and therefore this caused her to fall.  She states she feels well today.  I discussed SNF placement for continued therapy and patient adamantly declines.  Advised patient will await PT/OT eval today and will discuss with her dispo planning.  Patient agreeable and verbalized understanding.  Will continue to monitor closely.  Pain is currently controlled with oral medication.    Review of Systems   Constitutional:  Negative for chills, fatigue and fever.   HENT:  Negative for congestion, sore throat and trouble swallowing.    Respiratory:  Negative for cough and shortness of breath.    Cardiovascular:  Negative for chest pain.   Gastrointestinal:  Negative for abdominal pain, diarrhea, nausea and vomiting.   Musculoskeletal:  Positive for back pain (post surgical) and gait problem.   Neurological:  Negative for dizziness, weakness and light-headedness.   All other systems reviewed and are negative.    Objective:     Vital Signs (Most Recent):  Temp: 97.2 °F (36.2 °C) (12/10/23 1135)  Pulse: 69 (12/10/23 1135)  Resp: 17 (12/10/23 1135)  BP: (!) 153/66 (12/10/23 1135)  SpO2: 99 % (12/10/23 1135) Vital Signs (24h Range):  Temp:  [97.2 °F (36.2 °C)-101 °F (38.3 °C)] 97.2 °F (36.2 °C)  Pulse:  [58-75] 69  Resp:  [15-20] 17  SpO2:  [85 %-99 %] 99 %  BP: ()/(47-66) 153/66     Weight: 81.3 kg (179 lb 3.7 oz)  Body mass index is 28.07 kg/m².    Intake/Output Summary (Last 24 hours) at 12/10/2023 1226  Last data filed at 12/10/2023 0511  Gross per 24 hour   Intake 240 ml   Output 450 ml   Net -210 ml         Physical Exam  Vitals and nursing note reviewed.   Constitutional:       General: She " is not in acute distress.     Appearance: Normal appearance. She is well-developed. She is not ill-appearing or diaphoretic.   HENT:      Head: Normocephalic and atraumatic.      Right Ear: External ear normal.      Left Ear: External ear normal.      Nose: Nose normal. No congestion or rhinorrhea.      Mouth/Throat:      Mouth: Mucous membranes are moist.      Pharynx: Oropharynx is clear. No oropharyngeal exudate or posterior oropharyngeal erythema.   Eyes:      General: No scleral icterus.     Conjunctiva/sclera: Conjunctivae normal.      Pupils: Pupils are equal, round, and reactive to light.   Neck:      Vascular: No JVD.   Cardiovascular:      Rate and Rhythm: Normal rate and regular rhythm.      Pulses: Normal pulses.      Heart sounds: Normal heart sounds. No murmur heard.  Pulmonary:      Effort: Pulmonary effort is normal. No respiratory distress.      Breath sounds: Normal breath sounds. No stridor. No wheezing, rhonchi or rales.   Abdominal:      General: Bowel sounds are normal. There is no distension.      Palpations: Abdomen is soft.      Tenderness: There is no abdominal tenderness.   Musculoskeletal:         General: No swelling or tenderness. Normal range of motion.      Cervical back: Normal range of motion and neck supple.   Skin:     General: Skin is warm and dry.      Capillary Refill: Capillary refill takes 2 to 3 seconds.      Coloration: Skin is not jaundiced or pale.      Findings: No erythema.      Comments: Lumbar incision covered with drsg CDI   Neurological:      General: No focal deficit present.      Mental Status: She is alert and oriented to person, place, and time.      Cranial Nerves: No cranial nerve deficit.      Sensory: No sensory deficit.   Psychiatric:         Mood and Affect: Mood normal.         Behavior: Behavior normal.         Thought Content: Thought content normal.             Significant Labs: All pertinent labs within the past 24 hours have been reviewed.  CBC:    Recent Labs   Lab 12/09/23  0523 12/09/23  2009 12/10/23  0435   WBC 14.26* 13.43* 11.57   HGB 10.6* 10.3* 9.9*   HCT 33.1* 31.1* 31.2*    227 232     CMP:   Recent Labs   Lab 12/09/23  0523 12/09/23  2009 12/10/23  0435   * 130* 134*   K 3.6 3.5 3.5    101 102   CO2 21* 20* 22*   * 119* 96   BUN 4* 7* 6*   CREATININE 0.6 0.7 0.6   CALCIUM 8.6* 8.9 8.7   PROT  --  6.4  --    ALBUMIN  --  2.9*  --    BILITOT  --  0.7  --    ALKPHOS  --  88  --    AST  --  22  --    ALT  --  12  --    ANIONGAP 11 9 10       Significant Imaging: I have reviewed all pertinent imaging results/findings within the past 24 hours.    Assessment/Plan:      * Pneumonia  Patient with current diagnosis of pneumonia with concern for bacterial etiology due to  unknown pathogen  I have reviewed the pertinent imaging. Current antimicrobial regimen consists of   Antibiotics (From admission, onward)      Start     Stop Route Frequency Ordered    12/10/23 1230  doxycycline tablet 100 mg         -- Oral Every 12 hours 12/10/23 1224        . Cultures drawn and noted-   Microbiology Results (last 7 days)       Procedure Component Value Units Date/Time    Blood culture x two cultures. Draw prior to antibiotics. [[id number]] Collected: 12/09/23 2009    Order Status: Sent Specimen: Blood from Antecubital, Right Arm Updated: 12/10/23 0909    Blood culture x two cultures. Draw prior to antibiotics. [[id number]] Collected: 12/09/23 2022    Order Status: Sent Specimen: Blood from Antecubital, Left Arm Updated: 12/10/23 0909    Influenza A & B by Molecular [[id number]] Collected: 12/09/23 2000    Order Status: Completed Specimen: Nasopharyngeal Swab Updated: 12/09/23 2032     Influenza A, Molecular Negative     Influenza B, Molecular Negative     Flu A & B Source Nasal swab         Will monitor patient closely and continue current treatment plan unchanged.    Procal negative  Cxr reviewed  Will cover with oral doxycylcine    S/P lumbar  fusion  S/p lumbar laminectomy with Dr. Jaimes on 12/7  Pt discharged in stable condition yesterday and experienced fall at home  CT Lumbar revealed no device hardware complications  Cont with PT/OT  D/w pt SNF placement and she is declining at this time. Will await therapy eval today for further recs    COPD (chronic obstructive pulmonary disease)  Patient's COPD is controlled currently.  Patient is currently off COPD Pathway. Continue scheduled inhalers Antibiotics and Supplemental oxygen and monitor respiratory status closely.   VBG pCO2 38.7    Hyperlipidemia  Chronic condition  Continue home statins        Hypertension  Chronic, controlled. Latest blood pressure and vitals reviewed-     Temp:  [96.9 °F (36.1 °C)-101 °F (38.3 °C)]   Pulse:  [65-79]   Resp:  [15-20]   BP: ()/(47-63)   SpO2:  [77 %-97 %] .   Home meds for hypertension were reviewed and noted below.   Hypertension Medications               atenoloL (TENORMIN) 25 MG tablet Take 25 mg by mouth once daily.            While in the hospital, will manage blood pressure as follows; Continue home antihypertensive regimen    Will utilize p.r.n. blood pressure medication only if patient's blood pressure greater than 180/110 and she develops symptoms such as worsening chest pain or shortness of breath.    GERD (gastroesophageal reflux disease)  Chronic condition noted  Alternative home PPI ordered        VTE Risk Mitigation (From admission, onward)           Ordered     Place sequential compression device  Until discontinued         12/09/23 6809                    Discharge Planning   JOSE:      Code Status: Full Code   Is the patient medically ready for discharge?:     Reason for patient still in hospital (select all that apply): PT / OT recommendations  Discharge Plan A: Home Health                  Madie Childs NP  Department of Hospital Medicine   [facility]

## 2023-12-10 NOTE — ASSESSMENT & PLAN NOTE
S/p lumbar laminectomy with Dr. Jaimes on 12/7  Pt discharged in stable condition yesterday and experienced fall at home  CT Lumbar revealed no device hardware complications  Cont with PT/OT  D/w pt SNF placement and she is declining at this time. Will await therapy eval today for further recs

## 2023-12-10 NOTE — ED PROVIDER NOTES
Encounter Date: 12/9/2023       History     Chief Complaint   Patient presents with    Fall     Walking and fell at home, just left hospital at 1530 S/P laminectomy      63-year-old female presents with syncopal event and associated head trauma and back pain.  Multiple chronic comorbidities.  No associated chest pain wears oxygen intermittently, oxygen 85% on room air here in ED, placed on a couple L nasal cannula.  Patient also has associated fever.  Recent laminectomy on 12/7    The history is provided by the patient.     Review of patient's allergies indicates:   Allergen Reactions    Meloxicam Other (See Comments)    Ciprofloxacin Diarrhea    Hydrocodone-acetaminophen Nausea And Vomiting, Nausea Only and Other (See Comments)    Ibuprofen Nausea Only    Naproxen Nausea Only and Other (See Comments)    Narcof [hydrocodone-guaifenesin] Nausea And Vomiting    Quetiapine Nausea And Vomiting and Other (See Comments)     Past Medical History:   Diagnosis Date    Anxiety     Bipolar affect, depressed     Cancer ovarian; uterine    1992    COPD (chronic obstructive pulmonary disease)     GERD (gastroesophageal reflux disease)     Hyperlipidemia     Hypertension     OAB (overactive bladder)     On home oxygen therapy     per patient uses PRN     Past Surgical History:   Procedure Laterality Date    BREAST CYST ASPIRATION      COLON SURGERY      COLONOSCOPY      HERNIA REPAIR N/A 2016    HIP ARTHROPLASTY Right 2/25/2019    Procedure: ARTHROPLASTY, HIP;  Surgeon: Eliseo Vaca MD;  Location: [facility] OR;  Service: Orthopedics;  Laterality: Right;  Daniel Herrera    HYSTERECTOMY  total    JOINT REPLACEMENT Right     hip replacemnt    KNEE ARTHROPLASTY Left 8/19/2019    Procedure: ARTHROPLASTY, KNEE;  Surgeon: Eliseo Vaca MD;  Location: [facility] OR;  Service: Orthopedics;  Laterality: Left;    LUMBAR LAMINECTOMY WITH FUSION N/A 12/7/2023    Procedure: LAMINECTOMY, SPINE, LUMBAR, WITH FUSION L4-5;  Surgeon: Joby Jaimes MD;   Location: [facility] OR;  Service: Orthopedics;  Laterality: N/A;  NTI, [employer]    REVISION COLOSTOMY N/A 2012     Family History   Problem Relation Age of Onset    Cancer Mother     Hypertension Father     Heart disease Father     Heart disease Sister     Hypertension Sister     Hypertension Brother     Depression Brother      Social History     Tobacco Use    Smoking status: Every Day     Current packs/day: 0.50     Average packs/day: 0.5 packs/day for 40.0 years (20.0 ttl pk-yrs)     Types: Cigarettes    Smokeless tobacco: Never   Substance Use Topics    Alcohol use: Not Currently     Alcohol/week: 1.0 standard drink of alcohol     Types: 1 Cans of beer per week     Comment: weekly    Drug use: No     Review of Systems   All other systems reviewed and are negative.      Physical Exam     Initial Vitals [12/09/23 1910]   BP Pulse Resp Temp SpO2   (!) 113/56 75 18 (!) 101 °F (38.3 °C) (!) 85 %      MAP       --         Physical Exam    Nursing note and vitals reviewed.  Constitutional:   Patient appears fatigued   HENT:   Head: Normocephalic and atraumatic.   Eyes: Right eye exhibits no discharge. Left eye exhibits no discharge.   Cardiovascular:  Normal rate and regular rhythm.           Pulmonary/Chest: Effort normal. No stridor. No respiratory distress.   Abdominal: Abdomen is soft. There is no abdominal tenderness.   Musculoskeletal:         General: Tenderness present.      Comments: Tenderness to palpation at T and L-spine     Neurological: She is alert.   No focal motor or sensory deficit noted in extremities or trunk   Skin: Skin is warm. No rash noted. No erythema.   Wound over L-spine intact with no dehiscence or purulence or erythema         ED Course   Procedures  Labs Reviewed   CBC W/ AUTO DIFFERENTIAL - Abnormal; Notable for the following components:       Result Value    WBC 13.43 (*)     RBC 3.61 (*)     Hemoglobin 10.3 (*)     Hematocrit 31.1 (*)     RDW 15.9 (*)     Gran # (ANC) 11.2 (*)     Immature  Grans (Abs) 0.06 (*)     Lymph # 0.9 (*)     Mono # 1.3 (*)     Gran % 83.1 (*)     Lymph % 6.4 (*)     All other components within normal limits   COMPREHENSIVE METABOLIC PANEL - Abnormal; Notable for the following components:    Sodium 130 (*)     CO2 20 (*)     Glucose 119 (*)     BUN 7 (*)     Albumin 2.9 (*)     All other components within normal limits   URINALYSIS, REFLEX TO URINE CULTURE - Abnormal; Notable for the following components:    Protein, UA Trace (*)     All other components within normal limits    Narrative:     Specimen Source->Urine   APTT - Abnormal; Notable for the following components:    aPTT 40.1 (*)     All other components within normal limits   C-REACTIVE PROTEIN - Abnormal; Notable for the following components:    .6 (*)     All other components within normal limits   SEDIMENTATION RATE - Abnormal; Notable for the following components:    Sed Rate 80 (*)     All other components within normal limits   ISTAT PROCEDURE - Abnormal; Notable for the following components:    POC PO2 65 (*)     POC HCO3 21.4 (*)     POC BE -4 (*)     POC TCO2 23 (*)     All other components within normal limits   INFLUENZA A & B BY MOLECULAR   CULTURE, BLOOD   CULTURE, BLOOD   SARS-COV-2 RNA AMPLIFICATION, QUAL   MAGNESIUM   PROTIME-INR   B-TYPE NATRIURETIC PEPTIDE   TROPONIN I   LACTIC ACID, PLASMA   ISTAT LACTATE          Imaging Results              CT Lumbar Spine Without Contrast (In process)                      CT Thoracic Spine Without Contrast (In process)                      CT Head Without Contrast (In process)                      X-Ray Chest AP Portable (In process)                      Medications   doxycycline injection 100 mg (has no administration in time range)   acetaminophen tablet 1,000 mg (1,000 mg Oral Given 12/9/23 2010)   cefTRIAXone (ROCEPHIN) 1 g in dextrose 5 % in water (D5W) 100 mL IVPB (MB+) (0 g Intravenous Stopped 12/9/23 2138)     Medical Decision Making   63 year old  Female presents with fall, syncopal event, nonmechanical.  EKG obtained with no acute dysrhythmia.  Chest x-ray obtained with possible bilateral infiltrate per my read.  Patient febrile and hypoxemic to 85% on arrival, patient placed on oxygen, sepsis workup initiated administered IV Rocephin and IV fluids.  Workup with minimal leukocytosis 130.  Patient administered IV Rocephin.  Multiple drug allergies.  Patient agreeable to admission, spoke with hospitalist nurse practitioner Christine Phillip who will admit for further management evaluation.  Cardiac markers within normal limits.  Do not suspect acute epidural abscess or diskitis or osteomyelitis at this time per imaging.  Patient is 2 days postop  Laminectomy.  Possible aspiration pneumonia    Amount and/or Complexity of Data Reviewed  Labs: ordered. Decision-making details documented in ED Course.  Radiology: ordered and independent interpretation performed.     Details: Possible right middle lobe pneumonia per my interpretation  ECG/medicine tests: ordered and independent interpretation performed.     Details: Rate 72, normal sinus rhythm, QRS 76, , no STEMI    Risk  OTC drugs.  Prescription drug management.  Decision regarding hospitalization.    Critical Care  Total time providing critical care: 35 minutes               ED Course as of 12/09/23 2221   Sat Dec 09, 2023   2023 POC Lactate: 0.57 [KB]   2041 Influenza A, Molecular: Negative [KB]   2041 Influenza B, Molecular: Negative [KB]   2041 SARS-CoV-2 RNA, Amplification, Qual: Negative [KB]   2044 This patient does have evidence of infective focus  My overall impression is sepsis.  Source: Respiratory  Antibiotics given- Antibiotics (72h ago, onward)    Start     Stop Route Frequency Ordered    12/09/23 2000  cefTRIAXone (ROCEPHIN) 1 g in dextrose 5 % in water (D5W)   100 mL IVPB (MB+)         12/10/23 0759 IV ED 1 Time 12/09/23 1948      Latest lactate reviewed-  @[email or web address](lactate,poclac)@  Organ  dysfunction indicated by Acute respiratory failure    Fluid challenge Not needed - patient is not hypotensive      Post- resuscitation assessment No - Post resuscitation assessment not needed       Will Not start Pressors- Levophed for MAP of 65  Source control achieved by: Ceftriaxone   [KB]   2119 WBC(!): 13.43 [KB]   2119 Hemoglobin(!): 10.3 [KB]   2119 Hematocrit(!): 31.1 [KB]   2119 aPTT(!): 40.1 [KB]   2152 Sodium(!): 130 [KB]   2152 CO2(!): 20 [KB]   2152 Glucose(!): 119 [KB]   2152 BUN(!): 7 [KB]   2202 CT imaging with no acute traumatic injury on CT head, T-spine or L-spine [KB]   2203 Per [facility] [KB]   2214 Spoke with hospitalist nurse practitioner Christine Phillip we will admit for further management evaluation, patient updated and agree with plan [KB]   2215 WBC(!): 13.43 [KB]   2216 Sodium(!): 130 [KB]   2216 Glucose(!): 119 [KB]      ED Course User Index  [KB] Rodney Rivera Jr., DO                           Clinical Impression:  Final diagnoses:  [W19.XXXA] Fall  [R55] Syncope  [A41.9, R65.20] Severe sepsis (Primary)  [J18.9] Pneumonia of both lungs due to infectious organism, unspecified part of lung  [E87.1] Hyponatremia          ED Disposition Condition    Admit Stable                Rodney Rivera Jr., DO  12/09/23 2217       Rodney Rivera Jr., DO  12/09/23 2221

## 2023-12-10 NOTE — ASSESSMENT & PLAN NOTE
Patient's COPD is controlled currently.  Patient is currently off COPD Pathway. Continue scheduled inhalers Antibiotics and Supplemental oxygen and monitor respiratory status closely.   VBG pCO2 38.7

## 2023-12-10 NOTE — ASSESSMENT & PLAN NOTE
Patient with current diagnosis of pneumonia with concern for bacterial etiology due to  unknown pathogen  I have reviewed the pertinent imaging. Current antimicrobial regimen consists of   Antibiotics (From admission, onward)      Start     Stop Route Frequency Ordered    12/10/23 1230  doxycycline tablet 100 mg         -- Oral Every 12 hours 12/10/23 1224        . Cultures drawn and noted-   Microbiology Results (last 7 days)       Procedure Component Value Units Date/Time    Blood culture x two cultures. Draw prior to antibiotics. [[id number]] Collected: 12/09/23 2009    Order Status: Sent Specimen: Blood from Antecubital, Right Arm Updated: 12/10/23 0909    Blood culture x two cultures. Draw prior to antibiotics. [[id number]] Collected: 12/09/23 2022    Order Status: Sent Specimen: Blood from Antecubital, Left Arm Updated: 12/10/23 0909    Influenza A & B by Molecular [[id number]] Collected: 12/09/23 2000    Order Status: Completed Specimen: Nasopharyngeal Swab Updated: 12/09/23 2032     Influenza A, Molecular Negative     Influenza B, Molecular Negative     Flu A & B Source Nasal swab         Will monitor patient closely and continue current treatment plan unchanged.    Procal negative  Cxr reviewed  Will cover with oral doxycylcine

## 2023-12-10 NOTE — SUBJECTIVE & OBJECTIVE
"Interval History: Notes reviewed, no acute events overnight. Pt resting comfortably in bed.  She admits to having taking extra medication yesterday evening which likely contributed to her fall.  She also states that her caregiver was present at the house and patient wanted to sit in a specific chair and her caregiver told her it that it was probably not a good idea because the chair was too low and she would have difficulty getting up from it.  Patient states "I am hard headed and did it anyway" and admits she had difficulty getting out of the chair and therefore this caused her to fall.  She states she feels well today.  I discussed SNF placement for continued therapy and patient adamantly declines.  Advised patient will await PT/OT eval today and will discuss with her dispo planning.  Patient agreeable and verbalized understanding.  Will continue to monitor closely.  Pain is currently controlled with oral medication.    Review of Systems   Constitutional:  Negative for chills, fatigue and fever.   HENT:  Negative for congestion, sore throat and trouble swallowing.    Respiratory:  Negative for cough and shortness of breath.    Cardiovascular:  Negative for chest pain.   Gastrointestinal:  Negative for abdominal pain, diarrhea, nausea and vomiting.   Musculoskeletal:  Positive for back pain (post surgical) and gait problem.   Neurological:  Negative for dizziness, weakness and light-headedness.   All other systems reviewed and are negative.    Objective:     Vital Signs (Most Recent):  Temp: 97.2 °F (36.2 °C) (12/10/23 1135)  Pulse: 69 (12/10/23 1135)  Resp: 17 (12/10/23 1135)  BP: (!) 153/66 (12/10/23 1135)  SpO2: 99 % (12/10/23 1135) Vital Signs (24h Range):  Temp:  [97.2 °F (36.2 °C)-101 °F (38.3 °C)] 97.2 °F (36.2 °C)  Pulse:  [58-75] 69  Resp:  [15-20] 17  SpO2:  [85 %-99 %] 99 %  BP: ()/(47-66) 153/66     Weight: 81.3 kg (179 lb 3.7 oz)  Body mass index is 28.07 kg/m².    Intake/Output Summary (Last 24 " hours) at 12/10/2023 1226  Last data filed at 12/10/2023 0511  Gross per 24 hour   Intake 240 ml   Output 450 ml   Net -210 ml         Physical Exam  Vitals and nursing note reviewed.   Constitutional:       General: She is not in acute distress.     Appearance: Normal appearance. She is well-developed. She is not ill-appearing or diaphoretic.   HENT:      Head: Normocephalic and atraumatic.      Right Ear: External ear normal.      Left Ear: External ear normal.      Nose: Nose normal. No congestion or rhinorrhea.      Mouth/Throat:      Mouth: Mucous membranes are moist.      Pharynx: Oropharynx is clear. No oropharyngeal exudate or posterior oropharyngeal erythema.   Eyes:      General: No scleral icterus.     Conjunctiva/sclera: Conjunctivae normal.      Pupils: Pupils are equal, round, and reactive to light.   Neck:      Vascular: No JVD.   Cardiovascular:      Rate and Rhythm: Normal rate and regular rhythm.      Pulses: Normal pulses.      Heart sounds: Normal heart sounds. No murmur heard.  Pulmonary:      Effort: Pulmonary effort is normal. No respiratory distress.      Breath sounds: Normal breath sounds. No stridor. No wheezing, rhonchi or rales.   Abdominal:      General: Bowel sounds are normal. There is no distension.      Palpations: Abdomen is soft.      Tenderness: There is no abdominal tenderness.   Musculoskeletal:         General: No swelling or tenderness. Normal range of motion.      Cervical back: Normal range of motion and neck supple.   Skin:     General: Skin is warm and dry.      Capillary Refill: Capillary refill takes 2 to 3 seconds.      Coloration: Skin is not jaundiced or pale.      Findings: No erythema.      Comments: Lumbar incision covered with drsg CDI   Neurological:      General: No focal deficit present.      Mental Status: She is alert and oriented to person, place, and time.      Cranial Nerves: No cranial nerve deficit.      Sensory: No sensory deficit.   Psychiatric:          Mood and Affect: Mood normal.         Behavior: Behavior normal.         Thought Content: Thought content normal.             Significant Labs: All pertinent labs within the past 24 hours have been reviewed.  CBC:   Recent Labs   Lab 12/09/23  0523 12/09/23  2009 12/10/23  0435   WBC 14.26* 13.43* 11.57   HGB 10.6* 10.3* 9.9*   HCT 33.1* 31.1* 31.2*    227 232     CMP:   Recent Labs   Lab 12/09/23  0523 12/09/23  2009 12/10/23  0435   * 130* 134*   K 3.6 3.5 3.5    101 102   CO2 21* 20* 22*   * 119* 96   BUN 4* 7* 6*   CREATININE 0.6 0.7 0.6   CALCIUM 8.6* 8.9 8.7   PROT  --  6.4  --    ALBUMIN  --  2.9*  --    BILITOT  --  0.7  --    ALKPHOS  --  88  --    AST  --  22  --    ALT  --  12  --    ANIONGAP 11 9 10       Significant Imaging: I have reviewed all pertinent imaging results/findings within the past 24 hours.

## 2023-12-10 NOTE — SUBJECTIVE & OBJECTIVE
Past Medical History:   Diagnosis Date    Anxiety     Bipolar affect, depressed     Cancer ovarian; uterine    1992    COPD (chronic obstructive pulmonary disease)     GERD (gastroesophageal reflux disease)     Hyperlipidemia     Hypertension     OAB (overactive bladder)     On home oxygen therapy     per patient uses PRN       Past Surgical History:   Procedure Laterality Date    BREAST CYST ASPIRATION      COLON SURGERY      COLONOSCOPY      HERNIA REPAIR N/A 2016    HIP ARTHROPLASTY Right 2/25/2019    Procedure: ARTHROPLASTY, HIP;  Surgeon: Eliseo Vaca MD;  Location: [facility] OR;  Service: Orthopedics;  Laterality: Right;  Daniel Herrera    HYSTERECTOMY  total    JOINT REPLACEMENT Right     hip replacemnt    KNEE ARTHROPLASTY Left 8/19/2019    Procedure: ARTHROPLASTY, KNEE;  Surgeon: Eliseo Vaca MD;  Location: [facility] OR;  Service: Orthopedics;  Laterality: Left;    LUMBAR LAMINECTOMY WITH FUSION N/A 12/7/2023    Procedure: LAMINECTOMY, SPINE, LUMBAR, WITH FUSION L4-5;  Surgeon: Joby Jaimes MD;  Location: [facility] OR;  Service: Orthopedics;  Laterality: N/A;  NTI, [employer]    REVISION COLOSTOMY N/A 2012       Review of patient's allergies indicates:   Allergen Reactions    Meloxicam Other (See Comments)    Ciprofloxacin Diarrhea    Hydrocodone-acetaminophen Nausea And Vomiting, Nausea Only and Other (See Comments)    Ibuprofen Nausea Only    Naproxen Nausea Only and Other (See Comments)    Narcof [hydrocodone-guaifenesin] Nausea And Vomiting    Quetiapine Nausea And Vomiting and Other (See Comments)       Current Facility-Administered Medications on File Prior to Encounter   Medication    [DISCONTINUED] acetaminophen tablet 650 mg    [DISCONTINUED] albuterol inhaler 1 puff    [DISCONTINUED] aluminum-magnesium hydroxide-simethicone 200-200-20 mg/5 mL suspension 30 mL    [DISCONTINUED] arformoteroL nebulizer solution 15 mcg    [DISCONTINUED] artificial tears 0.5 % ophthalmic solution 2 drop    [DISCONTINUED]  artificial tears 0.5 % ophthalmic solution 2 drop    [DISCONTINUED] aspirin EC tablet 81 mg    [DISCONTINUED] atenoloL tablet 25 mg    [DISCONTINUED] azelastine 137 mcg (0.1 %) nasal spray 137 mcg    [DISCONTINUED] bisacodyL suppository 10 mg    [DISCONTINUED] budesonide nebulizer solution 0.5 mg    [DISCONTINUED] cetirizine tablet 10 mg    [DISCONTINUED] clonazePAM tablet 2 mg    [DISCONTINUED] dicyclomine capsule 10 mg    [DISCONTINUED] diphenhydrAMINE capsule 50 mg    [DISCONTINUED] docusate sodium capsule 100 mg    [DISCONTINUED] donepeziL tablet 10 mg    [DISCONTINUED] FLUoxetine capsule 40 mg    [DISCONTINUED] fluticasone propionate 50 mcg/actuation nasal spray 50 mcg    [DISCONTINUED] gabapentin capsule 800 mg    [DISCONTINUED] HYDROmorphone (PF) injection 2 mg    [DISCONTINUED] HYDROmorphone PCA syringe 6 mg/30 mL (0.2 mg/mL) NS    [DISCONTINUED] lactated ringers infusion    [DISCONTINUED] linaCLOtide capsule 145 mcg    [DISCONTINUED] lurasidone tablet 40 mg    [DISCONTINUED] meclizine tablet 25 mg    [DISCONTINUED] multivitamin tablet    [DISCONTINUED] mupirocin 2 % ointment    [DISCONTINUED] naloxone 0.4 mg/mL injection 0.02 mg    [DISCONTINUED] ondansetron disintegrating tablet 4 mg    [DISCONTINUED] ondansetron disintegrating tablet 8 mg    [DISCONTINUED] OXcarbazepine tablet 300 mg    [DISCONTINUED] oxybutynin 24 hr tablet 10 mg    [DISCONTINUED] oxyCODONE immediate release tablet 15 mg    [DISCONTINUED] oxyCODONE immediate release tablet 5 mg    [DISCONTINUED] oxyCODONE immediate release tablet Tab 10 mg    [DISCONTINUED] pantoprazole EC tablet 40 mg    [DISCONTINUED] polyethylene glycol packet 17 g    [DISCONTINUED] pravastatin tablet 20 mg    [DISCONTINUED] prochlorperazine injection Soln 5 mg    [DISCONTINUED] senna-docusate 8.6-50 mg per tablet 2 tablet    [DISCONTINUED] sucralfate tablet 1 g    [DISCONTINUED] tiotropium bromide 2.5 mcg/actuation inhaler 2 puff    [DISCONTINUED] topiramate tablet  50 mg    [DISCONTINUED] traZODone tablet 200 mg     Current Outpatient Medications on File Prior to Encounter   Medication Sig    albuterol (PROVENTIL/VENTOLIN HFA) 90 mcg/actuation inhaler INHALE 2 PUFFS BY MOUTH INTO THE LUNGS EVERY 6 HOURS (Patient taking differently: Inhale 2 puffs into the lungs every 6 (six) hours as needed for Wheezing or Shortness of Breath. INHALE 2 PUFFS BY MOUTH INTO THE LUNGS EVERY 6 HOURS)    ascorbic acid, vitamin C, (VITAMIN C) 1000 MG tablet Take 1,000 mg by mouth once daily.    atenoloL (TENORMIN) 25 MG tablet Take 25 mg by mouth once daily.    azelastine (ASTELIN) 137 mcg (0.1 %) nasal spray 1 spray (137 mcg total) by Nasal route 2 (two) times daily.    b complex vitamins tablet Take 1 tablet by mouth once daily.    biotin 1 mg tablet Take 1,000 mcg by mouth once daily.    budesonide-formoterol 80-4.5 mcg (SYMBICORT) 80-4.5 mcg/actuation HFAA Inhale 2 puffs into the lungs 2 (two) times a day. Controller    cetirizine (ZYRTEC) 10 MG tablet Take 1 tablet (10 mg total) by mouth once daily.    clonazePAM (KLONOPIN) 2 MG Tab Take 2 mg by mouth 2 (two) times daily.    cyanocobalamin 1,000 mcg/mL injection Inject 1,000 mcg into the muscle every 30 days.    cycloSPORINE (RESTASIS) 0.05 % ophthalmic emulsion Apply 1 drop to eye 2 (two) times daily.    dexlansoprazole (DEXILANT) 60 mg capsule Take 60 mg by mouth once daily.    dicyclomine (BENTYL) 10 MG capsule Take 10 mg by mouth 2 (two) times daily.    donepeziL (ARICEPT) 10 MG tablet Take 10 mg by mouth once daily.    FLUoxetine 40 MG capsule Take 40 mg by mouth once daily.    fluticasone propionate (FLONASE) 50 mcg/actuation nasal spray 1 spray (50 mcg total) by Each Nostril route once daily.    gabapentin (NEURONTIN) 800 MG tablet Take 800 mg by mouth 3 (three) times daily.    LATUDA 40 mg Tab tablet Take 40 mg by mouth once daily.    LINZESS 145 mcg Cap capsule Take 145 mcg by mouth before breakfast.    meclizine (ANTIVERT) 25 mg  tablet Take 1 tablet (25 mg total) by mouth 3 (three) times daily as needed for Dizziness.    multivitamin with minerals tablet Take 1 tablet by mouth once daily.    ondansetron (ZOFRAN-ODT) 4 MG TbDL Take 4 mg by mouth every 8 (eight) hours as needed.    OXcarbazepine (TRILEPTAL) 300 MG Tab Take 300 mg by mouth once daily.    polyethylene glycol (GLYCOLAX) 17 gram/dose powder Take 17 g by mouth once daily.    pravastatin (PRAVACHOL) 20 MG tablet Take 20 mg by mouth once daily.    rimegepant (NURTEC) 75 mg odt Take 75 mg by mouth once as needed for Migraine.    salmeteroL (SEREVENT) 50 mcg/dose diskus inhaler Inhale 1 puff into the lungs once daily.    sucralfate (CARAFATE) 1 gram tablet Take 1 g by mouth 4 (four) times daily.    sumatriptan (IMITREX) 25 MG Tab Take 25 mg by mouth every 2 (two) hours as needed.    tiotropium (SPIRIVA WITH HANDIHALER) 18 mcg inhalation capsule Inhale 1 capsule (18 mcg total) into the lungs once daily. Controller    topiramate (TOPAMAX) 50 MG tablet Take 50 mg by mouth once daily.    traZODone (DESYREL) 100 MG tablet Take 250 mg by mouth every evening.    trospium (SANCTURA XR) 60 mg Cp24 capsule Take 60 mg by mouth once daily.    vilazodone (VIIBRYD) 20 mg Tab Take 20 mg by mouth once daily.    vitamin D (VITAMIN D3) 1000 units Tab Take 1,000 Units by mouth once daily.    aspirin (ECOTRIN) 81 MG EC tablet Take 81 mg by mouth once daily.    nicotine (NICODERM CQ) 21 mg/24 hr PLACE 1 PATCH ONTO THE SKIN ONCE DAILY. (Patient not taking: Reported on 12/9/2023)    oxyCODONE-acetaminophen (PERCOCET)  mg per tablet Take 1 tablet by mouth every 4 (four) hours as needed for Pain. (Patient not taking: Reported on 12/9/2023)    OXYGEN-AIR DELIVERY SYSTEMS MISC 6 L by Misc.(Non-Drug; Combo Route) route continuous.    pantoprazole (PROTONIX) 40 MG tablet Take 1 tablet by mouth every morning.    pulse oximeter (PULSE OXIMETER) device by Apply Externally route 2 (two) times a day. Use twice  daily at 8 AM and 3 PM and record the value in MyChart as directed.    [DISCONTINUED] amLODIPine (NORVASC) 5 MG tablet Take 5 mg by mouth.    [DISCONTINUED] memantine (NAMENDA) 10 MG Tab Take 10 mg by mouth 2 (two) times daily.    [DISCONTINUED] ondansetron (ZOFRAN) 4 MG tablet Take 2 tablets (8 mg total) by mouth 2 (two) times daily as needed for Nausea.     Family History       Problem Relation (Age of Onset)    Cancer Mother    Depression Brother    Heart disease Father, Sister    Hypertension Father, Sister, Brother          Tobacco Use    Smoking status: Every Day     Current packs/day: 0.50     Average packs/day: 0.5 packs/day for 40.0 years (20.0 ttl pk-yrs)     Types: Cigarettes    Smokeless tobacco: Never   Substance and Sexual Activity    Alcohol use: Not Currently     Alcohol/week: 1.0 standard drink of alcohol     Types: 1 Cans of beer per week     Comment: weekly    Drug use: No    Sexual activity: Not on file     Review of Systems   Constitutional:  Positive for activity change and fever. Negative for appetite change, chills, diaphoresis and fatigue.   Respiratory:  Positive for shortness of breath.    Cardiovascular:  Negative for chest pain and leg swelling.   Gastrointestinal:  Negative for abdominal distention, abdominal pain, constipation, nausea and vomiting.   Genitourinary:  Negative for difficulty urinating.   Musculoskeletal:  Positive for arthralgias and back pain. Negative for myalgias.   Neurological:  Positive for weakness. Negative for dizziness, light-headedness and headaches.     Objective:     Vital Signs (Most Recent):  Temp: 98.6 °F (37 °C) (12/09/23 2139)  Pulse: 70 (12/09/23 2145)  Resp: 16 (12/09/23 2145)  BP: (!) 120/56 (12/09/23 2145)  SpO2: 95 % (12/09/23 2145) Vital Signs (24h Range):  Temp:  [96.9 °F (36.1 °C)-101 °F (38.3 °C)] 98.6 °F (37 °C)  Pulse:  [70-79] 70  Resp:  [16-20] 16  SpO2:  [77 %-97 %] 95 %  BP: ()/(47-63) 120/56     Weight: 79.4 kg (175 lb)  Body mass  index is 27.41 kg/m².     Physical Exam  Constitutional:       General: She is not in acute distress.     Appearance: Normal appearance. She is not ill-appearing or toxic-appearing.   Cardiovascular:      Rate and Rhythm: Normal rate and regular rhythm.      Pulses: Normal pulses.      Heart sounds: Normal heart sounds.   Pulmonary:      Effort: Pulmonary effort is normal. No respiratory distress.      Breath sounds: Normal breath sounds. No wheezing.   Abdominal:      General: Bowel sounds are normal. There is no distension.      Palpations: Abdomen is soft.      Tenderness: There is no abdominal tenderness.   Musculoskeletal:      Right lower leg: No edema.      Left lower leg: No edema.   Skin:     General: Skin is warm and dry.      Capillary Refill: Capillary refill takes less than 2 seconds.      Comments: surgical wound to posterior lumbar area of back; intact with staples, and smalll amount of serosanguineous drainage noted   Neurological:      Mental Status: She is oriented to person, place, and time.   Psychiatric:         Mood and Affect: Mood normal.                Significant Labs: All pertinent labs within the past 24 hours have been reviewed.  Blood Culture: pending  BMP:   Recent Labs   Lab 12/09/23 2009   *   *   K 3.5      CO2 20*   BUN 7*   CREATININE 0.7   CALCIUM 8.9   MG 1.9     CBC:   Recent Labs   Lab 12/08/23  0545 12/09/23  0523 12/09/23 2009   WBC 13.09* 14.26* 13.43*   HGB 10.3* 10.6* 10.3*   HCT 31.9* 33.1* 31.1*    230 227     CMP:   Recent Labs   Lab 12/08/23  0545 12/09/23  0523 12/09/23 2009   * 135* 130*   K 4.1 3.6 3.5    103 101   CO2 24 21* 20*   GLU 98 115* 119*   BUN 5* 4* 7*   CREATININE 0.7 0.6 0.7   CALCIUM 8.5* 8.6* 8.9   PROT 5.9*  --  6.4   ALBUMIN 3.0*  --  2.9*   BILITOT 0.3  --  0.7   ALKPHOS 89  --  88   AST 22  --  22   ALT 16  --  12   ANIONGAP 9 11 9     Cardiac Markers:   Recent Labs   Lab 12/09/23 2009   BNP 82        Magnesium:   Recent Labs   Lab 12/09/23 2009   MG 1.9     Troponin:   Recent Labs   Lab 12/09/23 2009   TROPONINI <0.006     Urine Studies:   Recent Labs   Lab 12/09/23 2113   COLORU Yellow   APPEARANCEUA Clear   PHUR 8.0   SPECGRAV 1.015   PROTEINUA Trace*   GLUCUA Negative   KETONESU Negative   BILIRUBINUA Negative   OCCULTUA Negative   NITRITE Negative   UROBILINOGEN Negative   LEUKOCYTESUR Negative       Significant Imaging: I have reviewed all pertinent imaging results/findings within the past 24 hours.    PROCEDURE INFORMATION: Exam: CT Head Without Contrast   Exam date and time: 12/9/2023 8:44 PM Age: 63 years old Clinical indication: Injury or trauma; Fall; Blunt trauma (contusions or hematomas); Consciousness not specified; Injury date: 12/9/2023; Injury details: Patient was walking at home and fell. Patient recently discharged from hospital after having back surgery. TECHNIQUE: Imaging protocol: Computed tomography of the head without contrast. Coronal and sagittal reformatted images are submitted. COMPARISON: Head CT scan dated 10/26/2023 FINDINGS: The study is mildly limited by motion and beam hardening artifacts. No abnormal foci of altered attenuation are seen within the cerebral or cerebellar parenchyma. No intracranial mass or evidence for acute territorial ischemia. There is mild symmetric enlargement of the ventricles and sulci, consistent with age-related cerebral atrophy. No midline shift or hydrocephalus. No abnormal extraaxial fluid or air collection; no intracranial hemorrhage. The visualized paranasal sinuses, orbits, mastoid air cells, skull, and scalp are unremarkable. IMPRESSION: No acute intracranial pathology. Dictated and Authenticated by: Stuart Pool MD 12/09/2023 9:59 PM Central Time ([place] & [place])    PROCEDURE INFORMATION:   Exam: CT Thoracic Spine Without Contrast Exam date and time: 12/9/2023 8:45 PM Age: 63 years old Clinical indication: Injury or trauma; Fall; Blunt  trauma (contusions or hematomas); Injury date: 12/9/2023; Injury details: Patient fell while walking at home. Patient was recently discharged from the hospital from having back surgery. TECHNIQUE: Imaging protocol: Computed tomography of the thoracic spine without contrast. Radiation optimization: All CT scans at this facility use at least one of these dose optimization techniques: automated exposure control; mA and/or kV adjustment per patient size (includes targeted exams where dose is matched to clinical indication); or iterative reconstruction. COMPARISON: MRI LUMBAR SPINE WITHOUT CONTRAST 6/30/2022 10:47 AM FINDINGS: Bones/joints: Diffuse osteopenia of the regional bones. No loss of vertebral body height. Mild multilevel intervertebral disc space narrowing with small marginal osteophytic spur formation. No spondylolisthesis. Mild multilevel facet joint hypertrophy. Soft tissues: Unremarkable. Lungs: Interstitial changes of the lung bases. Pleural spaces: Small right pleural effusion. IMPRESSION: 1. Diffuse osteopenia of the regional bones. 2. No acute fracture or subluxation of the thoracic spine. 3. Small right pleural effusion and interstitial changes of the lung bases. Dictated and Authenticated by: Kate Levine DO 12/09/2023 9:45 PM Central Time ([place] & [place])    PROCEDURE INFORMATION:   Exam: CT Lumbar Spine Without Contrast Exam date and time: 12/9/2023 8:49 PM Age: 63 years old Clinical indication: Injury or trauma; Fall; Blunt trauma (contusions or hematomas); Injury date: 12/9/2023; Injury details: Patient fell while walking at home. Patient recently discharged after having back surgery. Prior surgery; Surgery date: Post-operative (0-2 days); Surgery type: Recent laminectomy on 12/7 TECHNIQUE: Imaging protocol: Computed tomography of the lumbar spine without contrast. Radiation optimization: All CT scans at this facility use at least one of these dose optimization techniques: automated exposure  control; mA and/or kV adjustment per patient size (includes targeted exams where dose is matched to clinical indication); or iterative reconstruction. COMPARISON: MRI LUMBAR SPINE WITHOUT CONTRAST 6/30/2022 10:47 AM FINDINGS: Bones/joints: Diffuse osteopenia of the regional bones. No loss of vertebral body height. Multilevel facet joint hypertrophy. Transpedicular screws and vertical connecting rods and intervertebral prosthetic disc and laminectomies at L4-L5. No hardware loosening. No spondylolisthesis. Mild osteoarthritic degenerative changes of the sacroiliac joints. Soft tissues: Postsurgical changes in the overlying soft tissues of the lower lumbar spine. Skin staples along the posterior back. IMPRESSION: 1. Diffuse osteopenia of the regional bones. 2. No acute fracture or subluxation of the lumbar spine. 3. Postsurgical changes at L4-L5, without evidence of hardware loosening or evidence of complication. 4. Mild osteoarthritic degenerative changes of the sacroiliac joints. 5. Mild degenerative changes of the lumbar spine. Dictated and Authenticated by: Kate Levine DO 12/09/2023 9:49 PM Central Time ([place] & [place])

## 2023-12-10 NOTE — CARE UPDATE
12/10/23 0708   Patient Assessment/Suction   Level of Consciousness (AVPU) alert   Respiratory Effort Normal   Expansion/Accessory Muscles/Retractions expansion symmetric   CYN Breath Sounds clear   LLL Breath Sounds diminished   RUL Breath Sounds clear   RML Breath Sounds diminished   RLL Breath Sounds diminished   Rhythm/Pattern, Respiratory pattern regular   Cough Frequency no cough   PRE-TX-O2   Device (Oxygen Therapy) nasal cannula with humidification   $ Is the patient on Low Flow Oxygen? Yes   Flow (L/min) 4   SpO2 98 %   Pulse Oximetry Type Intermittent   $ Pulse Oximetry - Multiple Charge Pulse Oximetry - Multiple   Pulse 66   Resp 16   Aerosol Therapy   $ Aerosol Therapy Charges Aerosol Treatment  (Duoneb)   Daily Review of Necessity (SVN) completed   Respiratory Treatment Status (SVN) given   Treatment Route (SVN) mask   Patient Position (SVN) semi-Machado's   Signs of Intolerance (SVN) none   Breath Sounds Post-Respiratory Treatment   Throughout All Fields Post-Treatment no change   Post-treatment Heart Rate (beats/min) 64   Post-treatment Resp Rate (breaths/min) 16

## 2023-12-10 NOTE — ASSESSMENT & PLAN NOTE
Recently discharged today; post-surgical   S/p lumbar laminectomy with Dr. Jaimes on 12/7  CT thoracic revealed small right pleural effusion with interstitial changes to lung bases.  CT Lumbar revealed no device hardware complications

## 2023-12-10 NOTE — PLAN OF CARE
Goals to be met by: 01/10/2024     Patient will increase functional independence with mobility by performin. Supine to sit with Modified [place]  2. Sit to stand transfer with Supervision  3. Bed to chair transfer with Minimal Assistance using Rolling Walker  4. Gait  x 350 feet with Contact Guard Assistance using Rolling Walker.

## 2023-12-10 NOTE — H&P
"  [facility] Medicine  History & Physical    Patient Name: Nimco Caro  MRN: [id number]  Patient Class: OP- Observation  Admission Date: 12/9/2023  Attending Physician: Marianne Ivey MD   Primary Care Provider: Katy Mason MD         Patient information was obtained from patient, past medical records, and ER records.     Subjective:     Principal Problem:Pneumonia    Chief Complaint:   Chief Complaint   Patient presents with    Fall     Walking and fell at home, just left hospital at 1530 S/P laminectomy         HPI: Nimco Caro is a 63 yr old female with past medical history of hypertension, hyperlipidemia, COPD with home oxygen, and back pain presenting today after a fall at home due to weakness, lethargy, and MCGARRY. She did not experience any loss of consciousness or hitting of her head. She states she is supposed to ambulated with a walker but she did not. Denies chest pain, chills, diaphoresis, numbness, tingling, dizziness, headache, LOC, nausea, or vomiting. On 12/7, she underwent a lumbar laminectomy with fusion L4-L5 with Dr. Jaimes. She was discharged today from the hospital and has returned c/o weakness possibly due to medication. She states she feels like she is "taking too much medication." She denies taking any medication prior to fall or since discharged from facility. Upon examine she does have difficulty staying awake but answers questions appropriately. ED workup revealed fever, temp 101, oxygen saturation 85% on room air increased to 95 % on 4 L NC, VBG unremarkable. White count 13.43, Na 130 at baseline. CT Thoracic demonstrated small right pleural effusion with interstitial changes at lung bases. CT lumbar demonstrated no device hardware complications. She was started on ceftriaxone and doxycycline. She is admitted to hospital medicine for further work-up and management.                      No new subjective & objective note has been filed under " this hospital service since the last note was generated.    Assessment/Plan:     * Pneumonia  Patient with current diagnosis of pneumonia with concern for bacterial etiology due to  unknown pathogen  I have reviewed the pertinent imaging. Current antimicrobial regimen consists of   Antibiotics (From admission, onward)      None        . Cultures drawn and noted-   Microbiology Results (last 7 days)       Procedure Component Value Units Date/Time    Influenza A & B by Molecular [[id number]] Collected: 12/09/23 2000    Order Status: Completed Specimen: Nasopharyngeal Swab Updated: 12/09/23 2032     Influenza A, Molecular Negative     Influenza B, Molecular Negative     Flu A & B Source Nasal swab    Blood culture x two cultures. Draw prior to antibiotics. [[id number]] Collected: 12/09/23 2022    Order Status: Sent Specimen: Blood from Antecubital, Left Arm Updated: 12/09/23 2030    Blood culture x two cultures. Draw prior to antibiotics. [[id number]] Collected: 12/09/23 2009    Order Status: Sent Specimen: Blood from Antecubital, Right Arm Updated: 12/09/23 2017         Will monitor patient closely and continue current treatment plan unchanged.        S/P lumbar fusion  Recently discharged today; post-surgical   S/p lumbar laminectomy with Dr. Jaimes on 12/7  CT thoracic revealed small right pleural effusion with interstitial changes to lung bases.  CT Lumbar revealed no device hardware complications    COPD (chronic obstructive pulmonary disease)  Patient's COPD is controlled currently.  Patient is currently off COPD Pathway. Continue scheduled inhalers Antibiotics and Supplemental oxygen and monitor respiratory status closely.   VBG pCO2 38.7    Hyperlipidemia  Chronic condition  Continue home statins        Hypertension  Chronic, controlled. Latest blood pressure and vitals reviewed-     Temp:  [96.9 °F (36.1 °C)-101 °F (38.3 °C)]   Pulse:  [65-79]   Resp:  [15-20]   BP: ()/(47-63)   SpO2:  [77 %-97 %] .    Home meds for hypertension were reviewed and noted below.   Hypertension Medications               atenoloL (TENORMIN) 25 MG tablet Take 25 mg by mouth once daily.            While in the hospital, will manage blood pressure as follows; Continue home antihypertensive regimen    Will utilize p.r.n. blood pressure medication only if patient's blood pressure greater than 180/110 and she develops symptoms such as worsening chest pain or shortness of breath.    GERD (gastroesophageal reflux disease)  Chronic condition noted  Alternative home PPI ordered        VTE Risk Mitigation (From admission, onward)           Ordered     Place sequential compression device  Until discontinued         12/09/23 8283                         On 12/10/2023, patient should be placed in hospital observation services under my care in collaboration with Christine Phillip NP.          Christine Phillip DNP  Department of Hospital Medicine  [facility]/Surg

## 2023-12-10 NOTE — ASSESSMENT & PLAN NOTE
Patient with current diagnosis of pneumonia with concern for bacterial etiology due to  unknown pathogen  I have reviewed the pertinent imaging. Current antimicrobial regimen consists of   Antibiotics (From admission, onward)      None        . Cultures drawn and noted-   Microbiology Results (last 7 days)       Procedure Component Value Units Date/Time    Influenza A & B by Molecular [[id number]] Collected: 12/09/23 2000    Order Status: Completed Specimen: Nasopharyngeal Swab Updated: 12/09/23 2032     Influenza A, Molecular Negative     Influenza B, Molecular Negative     Flu A & B Source Nasal swab    Blood culture x two cultures. Draw prior to antibiotics. [[id number]] Collected: 12/09/23 2022    Order Status: Sent Specimen: Blood from Antecubital, Left Arm Updated: 12/09/23 2030    Blood culture x two cultures. Draw prior to antibiotics. [[id number]] Collected: 12/09/23 2009    Order Status: Sent Specimen: Blood from Antecubital, Right Arm Updated: 12/09/23 2017         Will monitor patient closely and continue current treatment plan unchanged.

## 2023-12-10 NOTE — RESPIRATORY THERAPY
Latest Reference Range & Units 12/09/23 21:39   POC PH 7.35 - 7.45  7.350   POC PCO2 35 - 45 mmHg 38.7   POC PO2 40 - 60 mmHg 65 (HH)   POC HCO3 24 - 28 mmol/L 21.4 (L)   POC SATURATED O2 95 - 100 % 91   Sample  VENOUS   POC TCO2 24 - 29 mmol/L 23 (L)   POC BE -2 to 2 mmol/L -4 (L)   Flow  4   DelSys  Nasal Can   Site  Other   Mode  SPONT   (HH): Data is critically high  (L): Data is abnormally low

## 2023-12-10 NOTE — ASSESSMENT & PLAN NOTE
Chronic, controlled. Latest blood pressure and vitals reviewed-     Temp:  [96.9 °F (36.1 °C)-101 °F (38.3 °C)]   Pulse:  [65-79]   Resp:  [15-20]   BP: ()/(47-63)   SpO2:  [77 %-97 %] .   Home meds for hypertension were reviewed and noted below.   Hypertension Medications               atenoloL (TENORMIN) 25 MG tablet Take 25 mg by mouth once daily.            While in the hospital, will manage blood pressure as follows; Continue home antihypertensive regimen    Will utilize p.r.n. blood pressure medication only if patient's blood pressure greater than 180/110 and she develops symptoms such as worsening chest pain or shortness of breath.

## 2023-12-10 NOTE — PLAN OF CARE
Problem: Adult Inpatient Plan of Care  Goal: Plan of Care Review  Outcome: Ongoing, Progressing  Goal: Patient-Specific Goal (Individualized)  Outcome: Ongoing, Progressing  Goal: Absence of Hospital-Acquired Illness or Injury  Outcome: Ongoing, Progressing  Goal: Optimal Comfort and Wellbeing  Outcome: Ongoing, Progressing  Goal: Readiness for Transition of Care  Outcome: Ongoing, Progressing     Problem: Infection  Goal: Absence of Infection Signs and Symptoms  Outcome: Ongoing, Progressing     Problem: Fluid Imbalance (Pneumonia)  Goal: Fluid Balance  Outcome: Ongoing, Progressing     Problem: Infection (Pneumonia)  Goal: Resolution of Infection Signs and Symptoms  Outcome: Ongoing, Progressing     Problem: Respiratory Compromise (Pneumonia)  Goal: Effective Oxygenation and Ventilation  Outcome: Ongoing, Progressing     Problem: Skin Injury Risk Increased  Goal: Skin Health and Integrity  Outcome: Ongoing, Progressing

## 2023-12-10 NOTE — PLAN OF CARE
Problem: Occupational Therapy  Goal: Occupational Therapy Goal  Description: Goals to be met by: 12/24/2023     Patient will increase functional independence with ADLs by performing:    UE Dressing with Supervision.  LE Dressing with Minimal Assistance.  Grooming with Modified [place].  Toileting from toilet with Supervision for hygiene and clothing management.   Toilet transfer to toilet with Supervision.    Outcome: Ongoing, Progressing     OT evaluation completed. POC established.

## 2023-12-10 NOTE — PROGRESS NOTES
Automatic Inhaler to Nebulizer Interchange    fluticasone/vilanterol (Breo Ellipta) 100 mcg/25 mcg changed to budesonide 0.5 mg twice daily AND arformoterol 15 mcg twice daily per [facility] Automatic Therapeutic Substitutions Protocol.    Please contact pharmacy at extension [phone] with any questions.     Thank you,   Anton Rico

## 2023-12-10 NOTE — RESPIRATORY THERAPY
Latest Reference Range & Units 12/09/23 20:09   Sample  CAPILLARY   Flow  5   DelSys  Nasal Can   Site  LF   Mode  SPONT   POC Lactate 0.36 - 1.25 mmol/L 0.57   Rate  10

## 2023-12-10 NOTE — PT/OT/SLP EVAL
Occupational Therapy Evaluation and Treatment    Name: Nimco Caro  MRN: [id number]  Admitting Diagnosis: Pneumonia   Recent Surgery: * No surgery found *     Recommendations:     Discharge Recommendations: Low Intensity Therapy  Discharge Equipment Recommendations: bedside commode  Barriers to discharge: Decreased caregiver support    Assessment:     Nimco Caro is a 63 y.o. female with a medical diagnosis of Pneumonia. Patient agreed to participate in OT. Pt required frequent seated rest breaks during ADLs due to back pain. Nurse notified. She presents with performance deficits affecting function including weakness, impaired endurance, impaired self care skills, impaired functional mobility, gait instability, impaired balance, impaired cardiopulmonary response to activity and orthopedic precautions.     Rehab Prognosis: Good; patient would benefit from acute OT services to address these deficits and reach maximum level of function.    Plan:     Patient to be seen 5 x/week to address the above listed problems via self-care/home management, therapeutic activities, therapeutic exercises  Plan of Care Expires: 12/24/23  Plan of Care Reviewed with: patient    Subjective     Chief Complaint: Back pain  Patient Comments/Goals: Pain relief  Pain/Comfort:  Pain Rating 1: 9/10  Location 1: back    Patients cultural, spiritual, Muslim conflicts given the current situation: yes    Social History:  Living Environment: Patient lives with a friend.  Prior Level of Function: Prior to admission, patient was independent.  Equipment Used at Home: oxygen, rollator, shower chair, walker, rolling  DME owned (not currently used): none  Assistance Upon Discharge: unknown    Objective:     Communicated with nurse prior to session. Patient found HOB elevated with oxygen and PureWick upon OT entry to room.    General Precautions: Standard, fall   Orthopedic Precautions: spinal precautions   Braces: LSO; Pt left LSO at her house.    Respiratory Status: Nasal cannula, flow 4 L/min    Occupational Performance    Gait belt applied - Yes    Bed Mobility:   Scooting anteriorly to EOB to have both feet on floor: stand by assistance  Supine to sit with minimum assistance  Sit to Supine with minimum assistance    Functional Mobility/Transfers:  Sit <> Stand Transfer with minimum assistance with rolling walker  Toilet Transfer Step Transfer technique with minimum assistance with rolling walker  Functional Mobility: bed > bathroom > sink > chair > bed with CGA and a RW    Activities of Daily Living:  Feeding: independence  Grooming: Pt washed her hands standing at the sink with contact guard assistance.  Upper Body Dressing: mod assistance  Lower Body Dressing: max assistance to doff/don her socks  Toileting: mod assistance for clothing management    Cognitive/Visual Perceptual:  Cognitive/Psychosocial Skills: -     Oriented to: Person, Place, Time, Situation  -     Follows Commands/attention: Easily distracted  -     Communication: clear/fluent  -     Safety awareness/insight to disability: impaired    Physical Exam:  Upper Extremity Range of Motion:  -       Right Upper Extremity: WFL  -       Left Upper Extremity: WFL  Upper Extremity Strength: -       Right Upper Extremity: WFL  -       Left Upper Extremity: WFL    AMPAC 6 Click ADL:  AMPAC Total Score: 16    Treatment & Education:  Therapist provided facilitation and instruction of proper body mechanics and fall prevention strategies during tasks listed above.  Educated on the importance of OOB mobility within safe range in order to decrease adverse effects of prolonged bedrest  Educated about spinal precautions including no bending, lifting, or twisting.  Educated on safety with functional mobility; hand placement to ensure safe transfers to various surfaces in prep for ADLs  Educated on performing functional mobility and ADLs in adherence to orthopedic precautions      Patient left HOB elevated  with all lines intact, call button in reach, RN notified and bed alarm on.    GOALS:   Multidisciplinary Problems       Occupational Therapy Goals          Problem: Occupational Therapy    Goal Priority Disciplines Outcome Interventions   Occupational Therapy Goal     OT, PT/OT Ongoing, Progressing    Description: Goals to be met by: 12/24/2023     Patient will increase functional independence with ADLs by performing:    UE Dressing with Supervision.  LE Dressing with Minimal Assistance.  Grooming with Modified [place].  Toileting from toilet with Supervision for hygiene and clothing management.   Toilet transfer to toilet with Supervision.                         History:     Past Medical History:   Diagnosis Date    Anxiety     Bipolar affect, depressed     Cancer ovarian; uterine    1992    COPD (chronic obstructive pulmonary disease)     GERD (gastroesophageal reflux disease)     Hyperlipidemia     Hypertension     OAB (overactive bladder)     On home oxygen therapy     per patient uses PRN         Past Surgical History:   Procedure Laterality Date    BREAST CYST ASPIRATION      COLON SURGERY      COLONOSCOPY      HERNIA REPAIR N/A 2016    HIP ARTHROPLASTY Right 2/25/2019    Procedure: ARTHROPLASTY, HIP;  Surgeon: Eliseo Vaca MD;  Location: [facility];  Service: Orthopedics;  Laterality: Right;  Daniel Herrera    HYSTERECTOMY  total    JOINT REPLACEMENT Right     hip replacemnt    KNEE ARTHROPLASTY Left 8/19/2019    Procedure: ARTHROPLASTY, KNEE;  Surgeon: Eliseo Vaca MD;  Location: [facility] OR;  Service: Orthopedics;  Laterality: Left;    LUMBAR LAMINECTOMY WITH FUSION N/A 12/7/2023    Procedure: LAMINECTOMY, SPINE, LUMBAR, WITH FUSION L4-5;  Surgeon: Joby Jaimes MD;  Location: [facility];  Service: Orthopedics;  Laterality: N/A;  NTI, [employer]    REVISION COLOSTOMY N/A 2012       Time Tracking:     OT Date of Treatment: 12/10/23  OT Start Time: 1450  OT Stop Time: 1605  OT Total Time (min): 75  min    Billable Minutes: Evaluation 15 and Self Care/Home Management 60    12/10/2023

## 2023-12-10 NOTE — HPI
"Nimco Caro is a 63 yr old female with past medical history of hypertension, hyperlipidemia, COPD with home oxygen, and back pain presenting today after a fall at home due to weakness, lethargy, and MCGARRY. She did not experience any loss of consciousness or hitting of her head. She states she is supposed to ambulated with a walker but she did not. Denies chest pain, chills, diaphoresis, numbness, tingling, dizziness, headache, LOC, nausea, or vomiting. On 12/7, she underwent a lumbar laminectomy with fusion L4-L5 with Dr. Jaimes. She was discharged today from the hospital and has returned c/o weakness possibly due to medication. She states she feels like she is "taking too much medication." She denies taking any medication prior to fall or since discharged from facility. Upon examine she does have difficulty staying awake but answers questions appropriately. ED workup revealed fever, temp 101, oxygen saturation 85% on room air increased to 95 % on 4 L NC, VBG unremarkable. White count 13.43, Na 130 at baseline. CT Thoracic demonstrated small right pleural effusion with interstitial changes at lung bases. CT lumbar demonstrated no device hardware complications. She was started on ceftriaxone and doxycycline. She is admitted to hospital medicine for further work-up and management.                    "

## 2023-12-10 NOTE — PT/OT/SLP EVAL
Physical Therapy Evaluation    Patient Name:  Nimco Caro   MRN:  [id number]    Recommendations:     Discharge Recommendations: Low Intensity Therapy   Discharge Equipment Recommendations: none   Barriers to discharge: None    Assessment:     Nimco Caro is a 63 y.o. female admitted with a medical diagnosis of Pneumonia.  She presents with the following impairments/functional limitations: weakness, impaired endurance, impaired self care skills, impaired functional mobility, gait instability, orthopedic precautions Pt agreeable to participate in PT evaluation. Pt presents 1 day status post discharge secondary to falling at home when trying to walk to her kitchen. She states she had gotten up 3 times before this without an issue. She was walking with her rolling walker when her knees caved in and she fell backwards. She would really like to go home and not to a SNF. Pt performed supine to sit with MIN A, sit to stand with MIN A, and ambulated 250ft using RW with 1 standing rest break and CGA.     Rehab Prognosis: Good; patient would benefit from acute skilled PT services to address these deficits and reach maximum level of function.    Recent Surgery: * No surgery found *      Plan:     During this hospitalization, patient to be seen daily to address the identified rehab impairments via gait training, therapeutic activities, therapeutic exercises and progress toward the following goals:    Plan of Care Expires:  12/30/23    Subjective     Chief Complaint: knee pain, back pain after falling at home  Patient/Family Comments/goals: pt would like to go home    Pain/Comfort:  Pain Rating 1: 5/10  Location - Side 1: Left  Location 1: knee  Pain Addressed 1: Distraction, Reposition  Pain Rating Post-Intervention 1: 5/10    Patients cultural, spiritual, Sabianist conflicts given the current situation:      Living Environment:  Lives with an elderly friend, ambulates with rolling walker     Prior to admission, patients  level of function was modified independent.  Equipment used at home: walker, rolling, shower chair, oxygen, rollator.  DME owned (not currently used): none.  Upon discharge, patient will have assistance from her friend.    Objective:     Communicated with nurse Galdamez prior to session.  Patient found HOB elevated with peripheral IV, oxygen, PureWick  upon PT entry to room.    General Precautions: Standard, fall  Orthopedic Precautions:spinal precautions   Braces:  (LSO left at home when transported back to hospital)  Respiratory Status: Nasal cannula, flow 3 L/min    Exams:  Cognitive Exam:  Patient is oriented to Person, Place, Time, and Situation  RUE ROM: WFL  RUE Strength: WFL  LUE ROM: WFL  LUE Strength: WFL  RLE ROM: WFL  RLE Strength: WFL  LLE ROM: WFL  LLE Strength: WFL    Functional Mobility:  Bed Mobility:     Supine to Sit: minimum assistance  Transfers:     Sit to Stand:  minimum assistance with rolling walker  Gait: 250ft using RW with CGA       AM-PAC 6 CLICK MOBILITY  Total Score:19       Treatment & Education:    Bed mobility, transfers, and gait as above     Patient left sitting edge of bed with all lines intact, call button in reach, bed alarm on, and nurse present.    GOALS:   Multidisciplinary Problems       Physical Therapy Goals          Problem: Physical Therapy    Goal Priority Disciplines Outcome Goal Variances Interventions   Physical Therapy Goal     PT, PT/OT      Description: Goals to be met by: 01/10/2024     Patient will increase functional independence with mobility by performin. Supine to sit with Modified [place]  2. Sit to stand transfer with Supervision  3. Bed to chair transfer with Minimal Assistance using Rolling Walker  4. Gait  x 350 feet with Contact Guard Assistance using Rolling Walker.                          History:     Past Medical History:   Diagnosis Date    Anxiety     Bipolar affect, depressed     Cancer ovarian; uterine    1992    COPD (chronic  obstructive pulmonary disease)     GERD (gastroesophageal reflux disease)     Hyperlipidemia     Hypertension     OAB (overactive bladder)     On home oxygen therapy     per patient uses PRN       Past Surgical History:   Procedure Laterality Date    BREAST CYST ASPIRATION      COLON SURGERY      COLONOSCOPY      HERNIA REPAIR N/A 2016    HIP ARTHROPLASTY Right 2/25/2019    Procedure: ARTHROPLASTY, HIP;  Surgeon: Eliseo Vaca MD;  Location: [facility];  Service: Orthopedics;  Laterality: Right;  Daniel Herrera    HYSTERECTOMY  total    JOINT REPLACEMENT Right     hip replacemnt    KNEE ARTHROPLASTY Left 8/19/2019    Procedure: ARTHROPLASTY, KNEE;  Surgeon: Eliseo Vaca MD;  Location: [facility] OR;  Service: Orthopedics;  Laterality: Left;    LUMBAR LAMINECTOMY WITH FUSION N/A 12/7/2023    Procedure: LAMINECTOMY, SPINE, LUMBAR, WITH FUSION L4-5;  Surgeon: Joby Jaimes MD;  Location: [facility];  Service: Orthopedics;  Laterality: N/A;  NTI, [employer]    REVISION COLOSTOMY N/A 2012       Time Tracking:     PT Received On: 12/10/23  PT Start Time: 1212     PT Stop Time: 1255  PT Total Time (min): 43 min     Billable Minutes: Evaluation 33 and Gait Training 10      12/10/2023

## 2023-12-10 NOTE — NURSING
Nurses Note -- 4 Eyes      12/10/2023   5:18 AM      Skin assessed during: Admit      [] No Altered Skin Integrity Present    []Prevention Measures Documented      [x] Yes- Altered Skin Integrity Present or Discovered   [x] LDA Added if Not in Epic (Describe Wound)   [x] New Altered Skin Integrity was Present on Admit and Documented in LDA   [] Wound Image Taken    Wound Care Consulted? Yes    Attending Nurse:  Marilu VYAS    Second RN/Staff Member:   Elizabeth VYAS

## 2023-12-10 NOTE — PHARMACY MED REC
"              .        Admission Medication History     The home medication history was taken by Jaida Brooks.    You may go to "Admission" then "Reconcile Home Medications" tabs to review and/or act upon these items.     The home medication list has been updated by the Pharmacy department.   Please read ALL comments highlighted in yellow.   Please address this information as you see fit.    Feel free to contact us if you have any questions or require assistance.        Medications listed below were obtained from: Patient/family and Analytic software- [employer]  Current Facility-Administered Medications on File Prior to Encounter   Medication Dose Route Frequency Provider Last Rate Last Admin    [DISCONTINUED] acetaminophen tablet 650 mg  650 mg Oral Q6H PRN Fede Jeter PA        [DISCONTINUED] albuterol inhaler 1 puff  1 puff Inhalation Q4H PRN Fede Jeter PA        [DISCONTINUED] aluminum-magnesium hydroxide-simethicone 200-200-20 mg/5 mL suspension 30 mL  30 mL Oral Q4H PRN Fede Jeter PA        [DISCONTINUED] arformoteroL nebulizer solution 15 mcg  15 mcg Nebulization BID Joby Jaimes MD   15 mcg at 12/09/23 0648    [DISCONTINUED] artificial tears 0.5 % ophthalmic solution 2 drop  2 drop Both Eyes Q4H While awake Fede Jeter PA   2 drop at 12/09/23 0600    [DISCONTINUED] artificial tears 0.5 % ophthalmic solution 2 drop  2 drop Both Eyes QID PRN Fede Jeter PA   2 drop at 12/09/23 1449    [DISCONTINUED] aspirin EC tablet 81 mg  81 mg Oral Daily Fede Jeter PA   81 mg at 12/09/23 0902    [DISCONTINUED] atenoloL tablet 25 mg  25 mg Oral Daily Fede Jeter PA   25 mg at 12/09/23 0902    [DISCONTINUED] azelastine 137 mcg (0.1 %) nasal spray 137 mcg  1 spray Nasal BID Fede Jeter PA   137 mcg at 12/09/23 0903    [DISCONTINUED] bisacodyL suppository 10 mg  10 mg Rectal Daily Fede Jeter PA        [DISCONTINUED] budesonide " nebulizer solution 0.5 mg  0.5 mg Nebulization Q12H Joby Jaimes MD   0.5 mg at 12/09/23 0737    [DISCONTINUED] cetirizine tablet 10 mg  10 mg Oral Daily Fede Jeter PA   10 mg at 12/09/23 0902    [DISCONTINUED] clonazePAM tablet 2 mg  2 mg Oral BID Fede Jeter PA   2 mg at 12/09/23 0901    [DISCONTINUED] dicyclomine capsule 10 mg  10 mg Oral BID Fede Jeter PA   10 mg at 12/09/23 0902    [DISCONTINUED] diphenhydrAMINE capsule 50 mg  50 mg Oral Q6H PRN Fede Jeter PA        [DISCONTINUED] docusate sodium capsule 100 mg  100 mg Oral Daily Fede Jeter PA   100 mg at 12/09/23 0902    [DISCONTINUED] donepeziL tablet 10 mg  10 mg Oral Daily Fede Jeter PA   10 mg at 12/09/23 0902    [DISCONTINUED] FLUoxetine capsule 40 mg  40 mg Oral Daily Fede Jeter PA   40 mg at 12/09/23 0902    [DISCONTINUED] fluticasone propionate 50 mcg/actuation nasal spray 50 mcg  1 spray Each Nostril Daily Fede Jeter PA   50 mcg at 12/09/23 0903    [DISCONTINUED] gabapentin capsule 800 mg  800 mg Oral TID Fede Jeter PA   800 mg at 12/09/23 1448    [DISCONTINUED] HYDROmorphone (PF) injection 2 mg  2 mg Intravenous Q3H PRN Fede Jeter PA        [DISCONTINUED] HYDROmorphone PCA syringe 6 mg/30 mL (0.2 mg/mL) NS   Intravenous Continuous Joby Jaimes MD   Stopped at 12/08/23 0626    [DISCONTINUED] lactated ringers infusion   Intravenous Continuous Fede Jeter PA 20 mL/hr at 12/08/23 1646 Rate Verify at 12/08/23 1646    [DISCONTINUED] linaCLOtide capsule 145 mcg  145 mcg Oral Before breakfast Fede Jeter PA   145 mcg at 12/09/23 0600    [DISCONTINUED] lurasidone tablet 40 mg  40 mg Oral Daily Fede Jeter PA        [DISCONTINUED] meclizine tablet 25 mg  25 mg Oral TID PRN Fede Jeter PA        [DISCONTINUED] multivitamin tablet  1 tablet Oral Daily Fede Jeter PA   1 tablet at 12/09/23 0903     [DISCONTINUED] mupirocin 2 % ointment   Nasal BID Fede Jeter PA   Given at 12/09/23 0902    [DISCONTINUED] naloxone 0.4 mg/mL injection 0.02 mg  0.02 mg Intravenous PRN Sony Rivera MD        [DISCONTINUED] ondansetron disintegrating tablet 4 mg  4 mg Oral Q6H PRN Fede Jeter PA        [DISCONTINUED] ondansetron disintegrating tablet 8 mg  8 mg Oral Q6H PRN Fede Jeter PA        [DISCONTINUED] OXcarbazepine tablet 300 mg  300 mg Oral Daily Fede Jeter PA   300 mg at 12/09/23 0902    [DISCONTINUED] oxybutynin 24 hr tablet 10 mg  10 mg Oral Daily Fede Jeter PA   10 mg at 12/09/23 0903    [DISCONTINUED] oxyCODONE immediate release tablet 15 mg  15 mg Oral Q4H PRN Fede Jeter PA   15 mg at 12/09/23 1447    [DISCONTINUED] oxyCODONE immediate release tablet 5 mg  5 mg Oral Q4H PRN Fede Jeter PA        [DISCONTINUED] oxyCODONE immediate release tablet Tab 10 mg  10 mg Oral Q4H PRN Fede Jeter PA   10 mg at 12/08/23 1936    [DISCONTINUED] pantoprazole EC tablet 40 mg  40 mg Oral QAM Fede Jeter PA   40 mg at 12/09/23 0700    [DISCONTINUED] polyethylene glycol packet 17 g  17 g Oral BID Fede Jeter PA   17 g at 12/09/23 0903    [DISCONTINUED] pravastatin tablet 20 mg  20 mg Oral Daily Fede Jeter PA   20 mg at 12/09/23 0902    [DISCONTINUED] prochlorperazine injection Soln 5 mg  5 mg Intravenous Q6H PRN Fede Jeter PA        [DISCONTINUED] senna-docusate 8.6-50 mg per tablet 2 tablet  2 tablet Oral Nightly PRN Fede Jeter PA        [DISCONTINUED] sucralfate tablet 1 g  1 g Oral QID Fede Jeter PA   1 g at 12/09/23 0902    [DISCONTINUED] tiotropium bromide 2.5 mcg/actuation inhaler 2 puff  2 puff Inhalation Daily Fede Jeter PA   2 puff at 12/09/23 0705    [DISCONTINUED] topiramate tablet 50 mg  50 mg Oral Daily Fede Jeter PA   50 mg at 12/09/23 0902     [DISCONTINUED] traZODone tablet 200 mg  200 mg Oral QHS Fede Jeter, PA   200 mg at 12/08/23 2055     Current Outpatient Medications on File Prior to Encounter   Medication Sig Dispense Refill    albuterol (PROVENTIL/VENTOLIN HFA) 90 mcg/actuation inhaler INHALE 2 PUFFS BY MOUTH INTO THE LUNGS EVERY 6 HOURS (Patient taking differently: Inhale 2 puffs into the lungs every 6 (six) hours as needed for Wheezing or Shortness of Breath. INHALE 2 PUFFS BY MOUTH INTO THE LUNGS EVERY 6 HOURS) 18 g 11    ascorbic acid, vitamin C, (VITAMIN C) 1000 MG tablet Take 1,000 mg by mouth once daily.      atenoloL (TENORMIN) 25 MG tablet Take 25 mg by mouth once daily.      azelastine (ASTELIN) 137 mcg (0.1 %) nasal spray 1 spray (137 mcg total) by Nasal route 2 (two) times daily. 30 mL 11    b complex vitamins tablet Take 1 tablet by mouth once daily.      biotin 1 mg tablet Take 1,000 mcg by mouth once daily.      budesonide-formoterol 80-4.5 mcg (SYMBICORT) 80-4.5 mcg/actuation HFAA Inhale 2 puffs into the lungs 2 (two) times a day. Controller 10.2 g 11    cetirizine (ZYRTEC) 10 MG tablet Take 1 tablet (10 mg total) by mouth once daily. 30 tablet 0    clonazePAM (KLONOPIN) 2 MG Tab Take 2 mg by mouth 2 (two) times daily.      cyanocobalamin 1,000 mcg/mL injection Inject 1,000 mcg into the muscle every 30 days.      cycloSPORINE (RESTASIS) 0.05 % ophthalmic emulsion Apply 1 drop to eye 2 (two) times daily.      dexlansoprazole (DEXILANT) 60 mg capsule Take 60 mg by mouth once daily.      dicyclomine (BENTYL) 10 MG capsule Take 10 mg by mouth 2 (two) times daily.      donepeziL (ARICEPT) 10 MG tablet Take 10 mg by mouth once daily.      FLUoxetine 40 MG capsule Take 40 mg by mouth once daily.      fluticasone propionate (FLONASE) 50 mcg/actuation nasal spray 1 spray (50 mcg total) by Each Nostril route once daily. 15.8 mL 0    gabapentin (NEURONTIN) 800 MG tablet Take 800 mg by mouth 3 (three) times daily.      LATUDA 40 mg  Tab tablet Take 40 mg by mouth once daily.      LINZESS 145 mcg Cap capsule Take 145 mcg by mouth before breakfast.      meclizine (ANTIVERT) 25 mg tablet Take 1 tablet (25 mg total) by mouth 3 (three) times daily as needed for Dizziness. 20 tablet 0    multivitamin with minerals tablet Take 1 tablet by mouth once daily.      ondansetron (ZOFRAN-ODT) 4 MG TbDL Take 4 mg by mouth every 8 (eight) hours as needed.      OXcarbazepine (TRILEPTAL) 300 MG Tab Take 300 mg by mouth once daily.      polyethylene glycol (GLYCOLAX) 17 gram/dose powder Take 17 g by mouth once daily.      pravastatin (PRAVACHOL) 20 MG tablet Take 20 mg by mouth once daily.      rimegepant (NURTEC) 75 mg odt Take 75 mg by mouth once as needed for Migraine.      salmeteroL (SEREVENT) 50 mcg/dose diskus inhaler Inhale 1 puff into the lungs once daily.      sucralfate (CARAFATE) 1 gram tablet Take 1 g by mouth 4 (four) times daily.      sumatriptan (IMITREX) 25 MG Tab Take 25 mg by mouth every 2 (two) hours as needed.      tiotropium (SPIRIVA WITH HANDIHALER) 18 mcg inhalation capsule Inhale 1 capsule (18 mcg total) into the lungs once daily. Controller 30 capsule 3    topiramate (TOPAMAX) 50 MG tablet Take 50 mg by mouth once daily.      traZODone (DESYREL) 100 MG tablet Take 250 mg by mouth every evening.      trospium (SANCTURA XR) 60 mg Cp24 capsule Take 60 mg by mouth once daily.      vilazodone (VIIBRYD) 20 mg Tab Take 20 mg by mouth once daily.      vitamin D (VITAMIN D3) 1000 units Tab Take 1,000 Units by mouth once daily.      aspirin (ECOTRIN) 81 MG EC tablet Take 81 mg by mouth once daily.      nicotine (NICODERM CQ) 21 mg/24 hr PLACE 1 PATCH ONTO THE SKIN ONCE DAILY. (Patient not taking: Reported on 12/9/2023) 28 patch 1    oxyCODONE-acetaminophen (PERCOCET)  mg per tablet Take 1 tablet by mouth every 4 (four) hours as needed for Pain. (Patient not taking: Reported on 12/9/2023) 42 tablet 0    OXYGEN-AIR DELIVERY SYSTEMS MISC 6 L by  Misc.(Non-Drug; Combo Route) route continuous.      pantoprazole (PROTONIX) 40 MG tablet Take 1 tablet by mouth every morning.      pulse oximeter (PULSE OXIMETER) device by Apply Externally route 2 (two) times a day. Use twice daily at 8 AM and 3 PM and record the value in MyChart as directed. 1 each 0    [DISCONTINUED] amLODIPine (NORVASC) 5 MG tablet Take 5 mg by mouth.      [DISCONTINUED] memantine (NAMENDA) 10 MG Tab Take 10 mg by mouth 2 (two) times daily.      [DISCONTINUED] ondansetron (ZOFRAN) 4 MG tablet Take 2 tablets (8 mg total) by mouth 2 (two) times daily as needed for Nausea.         Potential issues to be addressed PRIOR TO DISCHARGE  Patient reported not taking the following medications: (Aspirin, Nicotine Patch & Oxycodone). These medications remain on the home medication list. Please address accordingly.     Jaida Brooks  EXT 1924

## 2023-12-10 NOTE — PLAN OF CARE
[facility]urg  Initial Discharge Assessment         Spoke to pt at bedside. She lives with her 85 y/o friend Shaunna. Pt was just discharged yesterday with [facility] then had a fall at home. [facility] DME listed. Denies hd/dm/coumadin. Shaunna to provide transport home. Pt reports her only coverage is [place] medicaid. She recently got put on disability. Pt will turn 64 this year and plans to get medicare. Pt would benefit from NP at home and OP CM r/t mobility issues. CM to follow for dc needs      Primary Care Provider: Katy Mason MD    Admission Diagnosis: Severe sepsis [A41.9, R65.20]    Admission Date: 12/9/2023  Expected Discharge Date:         Transition of Care Barriers: Mobility, No family/friends to help    Payor: MEDICAID / Plan: [place] [employer] / Product Type: Managed Medicaid /     Extended Emergency Contact Information  Primary Emergency Contact: Shaunna Leonard  Mobile Phone: [phone]  Relation: Friend  Preferred language: English   needed? No    Discharge Plan A: Home Health  Discharge Plan B: Home with family      [id number] [place] [facility] [address]  [address] [address]  Phone: [phone] Fax: [phone]      Initial Assessment (most recent)       Adult Discharge Assessment - 12/10/23 0956          Discharge Assessment    Assessment Type Discharge Planning Assessment     Confirmed/corrected address, phone number and insurance Yes     Confirmed Demographics Correct on Facesheet     Source of Information patient     People in Home --   friend Shaunna    Do you expect to return to your current living situation? Yes     Do you have help at home or someone to help you manage your care at home? No     Prior to hospitilization cognitive status: Alert/Oriented     Current cognitive status: Alert/Oriented     Walking or Climbing Stairs Difficulty yes     Walking or Climbing Stairs ambulation difficulty,  requires equipment     Dressing/Bathing Difficulty yes     Dressing/Bathing bathing difficulty, requires equipment     Equipment Currently Used at Home walker, rolling;shower chair;oxygen;rollator     Readmission within 30 days? Yes     Patient currently being followed by outpatient case management? No     Do you currently have service(s) that help you manage your care at home? Yes     Name and Contact number of agency [facility]     Is the pt/caregiver preference to resume services with current agency Yes     Do you take prescription medications? Yes     Do you have prescription coverage? Yes     Do you have any problems affording any of your prescribed medications? No     Is the patient taking medications as prescribed? yes     How do you get to doctors appointments? family or friend will provide     Are you on dialysis? No     Do you take coumadin? No     Discharge Plan A Home Health     Discharge Plan B Home with family     DME Needed Upon Discharge  none     Discharge Plan discussed with: Patient     Transition of Care Barriers Mobility;No family/friends to help

## 2023-12-10 NOTE — NURSING
"Awake alert and oriented to person only, at this time. States "2013", when asked what year it is. States "October no November", when asked what month is it. Reoriented. Slurring her words. 20g to lt a/c with site free of s/s of infiltration with IV antibiotics infusing. VSS. O2 4 liters NC.   "

## 2023-12-11 VITALS
HEART RATE: 78 BPM | OXYGEN SATURATION: 94 % | DIASTOLIC BLOOD PRESSURE: 52 MMHG | WEIGHT: 179.25 LBS | HEIGHT: 67 IN | RESPIRATION RATE: 16 BRPM | SYSTOLIC BLOOD PRESSURE: 111 MMHG | BODY MASS INDEX: 28.13 KG/M2 | TEMPERATURE: 97 F

## 2023-12-11 LAB
ANION GAP SERPL CALC-SCNC: 10 MMOL/L (ref 8–16)
BASOPHILS # BLD AUTO: 0.02 K/UL (ref 0–0.2)
BASOPHILS NFR BLD: 0.2 % (ref 0–1.9)
BUN SERPL-MCNC: 6 MG/DL (ref 8–23)
CALCIUM SERPL-MCNC: 8.9 MG/DL (ref 8.7–10.5)
CHLORIDE SERPL-SCNC: 104 MMOL/L (ref 95–110)
CO2 SERPL-SCNC: 20 MMOL/L (ref 23–29)
CREAT SERPL-MCNC: 0.7 MG/DL (ref 0.5–1.4)
DIFFERENTIAL METHOD: ABNORMAL
EOSINOPHIL # BLD AUTO: 0.1 K/UL (ref 0–0.5)
EOSINOPHIL NFR BLD: 1.1 % (ref 0–8)
ERYTHROCYTE [DISTWIDTH] IN BLOOD BY AUTOMATED COUNT: 15.7 % (ref 11.5–14.5)
EST. GFR  (NO RACE VARIABLE): >60 ML/MIN/1.73 M^2
GLUCOSE SERPL-MCNC: 108 MG/DL (ref 70–110)
HCT VFR BLD AUTO: 28.9 % (ref 37–48.5)
HGB BLD-MCNC: 9.3 G/DL (ref 12–16)
IMM GRANULOCYTES # BLD AUTO: 0.04 K/UL (ref 0–0.04)
IMM GRANULOCYTES NFR BLD AUTO: 0.4 % (ref 0–0.5)
LYMPHOCYTES # BLD AUTO: 0.9 K/UL (ref 1–4.8)
LYMPHOCYTES NFR BLD: 8.9 % (ref 18–48)
MAGNESIUM SERPL-MCNC: 1.7 MG/DL (ref 1.6–2.6)
MCH RBC QN AUTO: 27.9 PG (ref 27–31)
MCHC RBC AUTO-ENTMCNC: 32.2 G/DL (ref 32–36)
MCV RBC AUTO: 87 FL (ref 82–98)
MONOCYTES # BLD AUTO: 1 K/UL (ref 0.3–1)
MONOCYTES NFR BLD: 9.5 % (ref 4–15)
NEUTROPHILS # BLD AUTO: 8.4 K/UL (ref 1.8–7.7)
NEUTROPHILS NFR BLD: 79.9 % (ref 38–73)
NRBC BLD-RTO: 0 /100 WBC
PHOSPHATE SERPL-MCNC: 3.3 MG/DL (ref 2.7–4.5)
PLATELET # BLD AUTO: 262 K/UL (ref 150–450)
PMV BLD AUTO: 9.4 FL (ref 9.2–12.9)
POTASSIUM SERPL-SCNC: 3.8 MMOL/L (ref 3.5–5.1)
RBC # BLD AUTO: 3.33 M/UL (ref 4–5.4)
SODIUM SERPL-SCNC: 134 MMOL/L (ref 136–145)
WBC # BLD AUTO: 10.55 K/UL (ref 3.9–12.7)

## 2023-12-11 PROCEDURE — 25000003 PHARM REV CODE 250: Performed by: NURSE PRACTITIONER

## 2023-12-11 PROCEDURE — 85025 COMPLETE CBC W/AUTO DIFF WBC: CPT

## 2023-12-11 PROCEDURE — 99900031 HC PATIENT EDUCATION (STAT)

## 2023-12-11 PROCEDURE — 94761 N-INVAS EAR/PLS OXIMETRY MLT: CPT

## 2023-12-11 PROCEDURE — 94640 AIRWAY INHALATION TREATMENT: CPT

## 2023-12-11 PROCEDURE — 83735 ASSAY OF MAGNESIUM: CPT

## 2023-12-11 PROCEDURE — 27000221 HC OXYGEN, UP TO 24 HOURS

## 2023-12-11 PROCEDURE — 36415 COLL VENOUS BLD VENIPUNCTURE: CPT

## 2023-12-11 PROCEDURE — 25000003 PHARM REV CODE 250

## 2023-12-11 PROCEDURE — 25000242 PHARM REV CODE 250 ALT 637 W/ HCPCS: Performed by: HOSPITALIST

## 2023-12-11 PROCEDURE — 25000242 PHARM REV CODE 250 ALT 637 W/ HCPCS

## 2023-12-11 PROCEDURE — 97535 SELF CARE MNGMENT TRAINING: CPT

## 2023-12-11 PROCEDURE — 84100 ASSAY OF PHOSPHORUS: CPT

## 2023-12-11 PROCEDURE — 80048 BASIC METABOLIC PNL TOTAL CA: CPT

## 2023-12-11 PROCEDURE — 97116 GAIT TRAINING THERAPY: CPT

## 2023-12-11 RX ORDER — LURASIDONE HYDROCHLORIDE 20 MG/1
40 TABLET, FILM COATED ORAL DAILY
Status: DISCONTINUED | OUTPATIENT
Start: 2023-12-11 | End: 2023-12-11 | Stop reason: HOSPADM

## 2023-12-11 RX ORDER — DOXYCYCLINE HYCLATE 100 MG
100 TABLET ORAL EVERY 12 HOURS
Qty: 14 TABLET | Refills: 0 | Status: SHIPPED | OUTPATIENT
Start: 2023-12-11 | End: 2023-12-18

## 2023-12-11 RX ORDER — PANTOPRAZOLE SODIUM 40 MG/1
40 TABLET, DELAYED RELEASE ORAL DAILY
Status: DISCONTINUED | OUTPATIENT
Start: 2023-12-11 | End: 2023-12-11 | Stop reason: HOSPADM

## 2023-12-11 RX ORDER — NAPROXEN SODIUM 220 MG/1
81 TABLET, FILM COATED ORAL DAILY
Status: DISCONTINUED | OUTPATIENT
Start: 2023-12-11 | End: 2023-12-11 | Stop reason: HOSPADM

## 2023-12-11 RX ADMIN — HYPROMELLOSE 2910 2 DROP: 5 SOLUTION/ DROPS OPHTHALMIC at 09:12

## 2023-12-11 RX ADMIN — ATENOLOL 25 MG: 25 TABLET ORAL at 08:12

## 2023-12-11 RX ADMIN — IPRATROPIUM BROMIDE AND ALBUTEROL SULFATE 3 ML: 2.5; .5 SOLUTION RESPIRATORY (INHALATION) at 11:12

## 2023-12-11 RX ADMIN — DONEPEZIL HYDROCHLORIDE 10 MG: 5 TABLET, FILM COATED ORAL at 08:12

## 2023-12-11 RX ADMIN — THERA TABS 1 TABLET: TAB at 08:12

## 2023-12-11 RX ADMIN — DICYCLOMINE HYDROCHLORIDE 10 MG: 10 CAPSULE ORAL at 08:12

## 2023-12-11 RX ADMIN — Medication 1000 UNITS: at 08:12

## 2023-12-11 RX ADMIN — HYDROCODONE BITARTRATE AND ACETAMINOPHEN 1 TABLET: 10; 325 TABLET ORAL at 09:12

## 2023-12-11 RX ADMIN — GABAPENTIN 300 MG: 300 CAPSULE ORAL at 02:12

## 2023-12-11 RX ADMIN — OXYCODONE HYDROCHLORIDE AND ACETAMINOPHEN 1000 MG: 500 TABLET ORAL at 08:12

## 2023-12-11 RX ADMIN — PRAVASTATIN SODIUM 20 MG: 10 TABLET ORAL at 08:12

## 2023-12-11 RX ADMIN — OXCARBAZEPINE 300 MG: 150 TABLET, FILM COATED ORAL at 08:12

## 2023-12-11 RX ADMIN — IPRATROPIUM BROMIDE AND ALBUTEROL SULFATE 3 ML: 2.5; .5 SOLUTION RESPIRATORY (INHALATION) at 07:12

## 2023-12-11 RX ADMIN — PANTOPRAZOLE SODIUM 40 MG: 40 TABLET, DELAYED RELEASE ORAL at 08:12

## 2023-12-11 RX ADMIN — FLUTICASONE PROPIONATE 50 MCG: 50 SPRAY, METERED NASAL at 08:12

## 2023-12-11 RX ADMIN — Medication 800 MG: at 11:12

## 2023-12-11 RX ADMIN — FLUOXETINE 40 MG: 20 CAPSULE ORAL at 08:12

## 2023-12-11 RX ADMIN — Medication 800 MG: at 06:12

## 2023-12-11 RX ADMIN — IPRATROPIUM BROMIDE AND ALBUTEROL SULFATE 3 ML: 2.5; .5 SOLUTION RESPIRATORY (INHALATION) at 03:12

## 2023-12-11 RX ADMIN — ASPIRIN 81 MG CHEWABLE TABLET 81 MG: 81 TABLET CHEWABLE at 08:12

## 2023-12-11 RX ADMIN — TOPIRAMATE 50 MG: 25 TABLET, FILM COATED ORAL at 08:12

## 2023-12-11 RX ADMIN — ARFORMOTEROL TARTRATE 15 MCG: 15 SOLUTION RESPIRATORY (INHALATION) at 07:12

## 2023-12-11 RX ADMIN — CETIRIZINE HYDROCHLORIDE 10 MG: 10 TABLET, FILM COATED ORAL at 08:12

## 2023-12-11 RX ADMIN — HYPROMELLOSE 2910 2 DROP: 5 SOLUTION/ DROPS OPHTHALMIC at 02:12

## 2023-12-11 RX ADMIN — GABAPENTIN 300 MG: 300 CAPSULE ORAL at 08:12

## 2023-12-11 RX ADMIN — DOXYCYCLINE HYCLATE 100 MG: 100 TABLET, COATED ORAL at 08:12

## 2023-12-11 RX ADMIN — BUDESONIDE INHALATION 0.5 MG: 0.5 SUSPENSION RESPIRATORY (INHALATION) at 07:12

## 2023-12-11 NOTE — PLAN OF CARE
The pt is cleared for discharge home with [facility] and has follow up appts added to her AVS.    12/11/23 0920   Final Note   Assessment Type Final Discharge Note   Anticipated Discharge Disposition Home-Health   What phone number can be called within the next 1-3 days to see how you are doing after discharge? [phone]   Hospital Resources/Appts/Education Provided Appointments scheduled and added to AVS   Post-Acute Status   Post-Acute Authorization Home Health   Home Health Status Set-up Complete/Auth obtained   Patient choice form signed by patient/caregiver List with quality metrics by geographic area provided

## 2023-12-11 NOTE — PT/OT/SLP PROGRESS
Physical Therapy Treatment    Patient Name:  Nimco Caro   MRN:  [id number]    Recommendations:     Discharge Recommendations: Moderate Intensity Therapy  Discharge Equipment Recommendations: none  Barriers to discharge: None    Assessment:     Nimco Caro is a 63 y.o. female admitted with a medical diagnosis of Pneumonia.  She presents with the following impairments/functional limitations: weakness, impaired endurance, impaired functional mobility, gait instability, impaired balance, decreased safety awareness, decreased lower extremity function, decreased ROM, edema, orthopedic precautions .  Patient agreeable to PT for gait training this afternoon.  Patient presented sitting in chair at bedside and was able to stand with min assist before ambulating x 250 feet rw CGA.    Rehab Prognosis: Good; patient would benefit from acute skilled PT services to address these deficits and reach maximum level of function.    Recent Surgery: * No surgery found *      Plan:     During this hospitalization, patient to be seen daily to address the identified rehab impairments via gait training, therapeutic activities, therapeutic exercises and progress toward the following goals:    Plan of Care Expires:  12/30/23    Subjective     Chief Complaint: fatigue  Patient/Family Comments/goals: none given  Pain/Comfort:         Objective:     Communicated with nurse prior to session.  Patient found supine with chair check upon PT entry to room.     General Precautions: Standard, fall  Orthopedic Precautions: spinal precautions  Braces:  (LSO left at home when transported back to hospital)  Respiratory Status: Room air     Functional Mobility:  Transfers:     Sit to Stand:  minimum assistance with rolling walker  Gait: x 250 feet rW CGA      AM-PAC 6 CLICK MOBILITY          Treatment & Education:  Gait training x 250 feet rW CGA    Patient left up in chair with call button in reach, chair alarm on, nurse notified, and friend  present..    GOALS:   Multidisciplinary Problems       Physical Therapy Goals          Problem: Physical Therapy    Goal Priority Disciplines Outcome Goal Variances Interventions   Physical Therapy Goal     PT, PT/OT      Description: Goals to be met by: 01/10/2024     Patient will increase functional independence with mobility by performin. Supine to sit with Modified [place]  2. Sit to stand transfer with Supervision  3. Bed to chair transfer with Minimal Assistance using Rolling Walker  4. Gait  x 350 feet with Contact Guard Assistance using Rolling Walker.                          Time Tracking:     PT Received On: 23  PT Start Time: 1315     PT Stop Time: 1332  PT Total Time (min): 17 min     Billable Minutes: Gait Training 17    Treatment Type: Treatment  PT/PTA: PT     Number of PTA visits since last PT visit: 0     2023

## 2023-12-11 NOTE — PLAN OF CARE
HH orders sent to [facility] for resumption of services.    12/11/23 0919   Post-Acute Status   Post-Acute Authorization Home Health   Home Health Status Set-up Complete/Auth obtained

## 2023-12-11 NOTE — ASSESSMENT & PLAN NOTE
Patient with current diagnosis of pneumonia with concern for bacterial etiology due to  unknown pathogen  I have reviewed the pertinent imaging. Current antimicrobial regimen consists of   Antibiotics (From admission, onward)    Start     Stop Route Frequency Ordered    12/10/23 1230  doxycycline tablet 100 mg         -- Oral Every 12 hours 12/10/23 1224      . Cultures drawn and noted-   Microbiology Results (last 7 days)     Procedure Component Value Units Date/Time    Blood culture x two cultures. Draw prior to antibiotics. [[id number]] Collected: 12/09/23 2009    Order Status: Completed Specimen: Blood from Antecubital, Right Arm Updated: 12/11/23 1032     Blood Culture, Routine No Growth to date      No Growth to date    Narrative:      Aerobic and anaerobic    Blood culture x two cultures. Draw prior to antibiotics. [[id number]] Collected: 12/09/23 2022    Order Status: Completed Specimen: Blood from Antecubital, Left Arm Updated: 12/11/23 1032     Blood Culture, Routine No Growth to date      No Growth to date    Narrative:      Aerobic and anaerobic    Influenza A & B by Molecular [[id number]] Collected: 12/09/23 2000    Order Status: Completed Specimen: Nasopharyngeal Swab Updated: 12/09/23 2032     Influenza A, Molecular Negative     Influenza B, Molecular Negative     Flu A & B Source Nasal swab       Will monitor patient closely and continue current treatment plan unchanged.    Procal negative  Cxr reviewed  Will cover with oral doxycylcine

## 2023-12-11 NOTE — CARE UPDATE
12/10/23 1900 12/10/23 1904   Patient Assessment/Suction   Level of Consciousness (AVPU) alert alert   Respiratory Effort Unlabored Unlabored   Expansion/Accessory Muscles/Retractions no use of accessory muscles;no retractions no retractions   All Lung Fields Breath Sounds Anterior:;Posterior:;clear Anterior:;Posterior:;clear;diminished   CYN Breath Sounds clear  --    LLL Breath Sounds diminished  --    RUL Breath Sounds clear  --    RML Breath Sounds clear;diminished  --    RLL Breath Sounds diminished  --    PRE-TX-O2   Device (Oxygen Therapy) nasal cannula with humidification nasal cannula with humidification   $ Is the patient on Low Flow Oxygen? Yes Yes   Flow (L/min) 4 4   SpO2 98 % 98 %   Pulse Oximetry Type Intermittent Intermittent   $ Pulse Oximetry - Multiple Charge Pulse Oximetry - Multiple Pulse Oximetry - Multiple   Pulse 70 70   Resp 20 20   Aerosol Therapy   $ Aerosol Therapy Charges Aerosol Treatment  (pulmicort and duoneb) Aerosol Treatment  (brovana)   Respiratory Treatment Status (SVN) given given   Treatment Route (SVN) mask;oxygen oxygen;mask   Patient Position (SVN) HOB elevated HOB elevated   Post Treatment Assessment (SVN) breath sounds unchanged breath sounds unchanged   Signs of Intolerance (SVN) none none   Breath Sounds Post-Respiratory Treatment   Throughout All Fields Post-Treatment All Fields All Fields   Throughout All Fields Post-Treatment no change no change   Post-treatment Heart Rate (beats/min) 70 72   Post-treatment Resp Rate (breaths/min) 19 20

## 2023-12-11 NOTE — DISCHARGE INSTRUCTIONS
Take medications as prescribed. Return to ED for any worsening symptoms or concerns. It is important to use your walker to avoid falls at home. Follow up as directed.     Discharge Instructions, [facility]    Thank you for choosing [facility] for your medical care.     You were admitted to the hospital with Pneumonia and fall.     Please note your discharge instructions, including diet/activity restrictions, follow-up appointments, and medication changes.  If you have any questions about your medical issues, prescriptions, or any other questions, please feel free to contact the [facility] Medicine Dept at [phone] and we will help.    If you are previously with Home health, outpatient PT/OT or under a therapy program, you are cleared to return to those programs.    Please direct all long term medication refills and follow up to your primary care provider, Katy Mason MD. Thank you again for letting us take care of your health care needs.    Please note the following discharge instructions per your discharging physician-  Madie Childs NP

## 2023-12-11 NOTE — PROGRESS NOTES
"[facility]  Progress Note    Patient Name: Nimco Caro  MRN: [id number]  Patient Class: IP- Inpatient   Admission Date: 12/9/2023  Length of Stay: 1 days  Attending Physician: Marianne Ivey MD  Primary Care Provider: Katy Mason MD        Subjective:     Principal Problem:Pneumonia        HPI:  Nimco Caro is a 63 yr old female with past medical history of hypertension, hyperlipidemia, COPD with home oxygen, and back pain presenting today after a fall at home due to weakness, lethargy, and MCGARRY. She did not experience any loss of consciousness or hitting of her head. She states she is supposed to ambulated with a walker but she did not. Denies chest pain, chills, diaphoresis, numbness, tingling, dizziness, headache, LOC, nausea, or vomiting. On 12/7, she underwent a lumbar laminectomy with fusion L4-L5 with Dr. Jaimes. She was discharged today from the hospital and has returned c/o weakness possibly due to medication. She states she feels like she is "taking too much medication." She denies taking any medication prior to fall or since discharged from facility. Upon examine she does have difficulty staying awake but answers questions appropriately. ED workup revealed fever, temp 101, oxygen saturation 85% on room air increased to 95 % on 4 L NC, VBG unremarkable. White count 13.43, Na 130 at baseline. CT Thoracic demonstrated small right pleural effusion with interstitial changes at lung bases. CT lumbar demonstrated no device hardware complications. She was started on ceftriaxone and doxycycline. She is admitted to hospital medicine for further work-up and management.                      Overview/Hospital Course:  No notes on file    Interval History: Notes reviewed, no acute events overnight. Pt sitting on edge of bed participating with therapy.  Therapist and myself d/w pt concern about patient going back home with minimal assist and experiencing falls " again.  Patient verbalized understanding and is agreeable for SNF placement if this is a possibility.  Case management consulted and assisting with dispo planning.  Will continue to monitor closely.  Patient's pain is controlled with oral medications.    Review of Systems   Constitutional:  Negative for chills, fatigue and fever.   HENT:  Negative for congestion, sore throat and trouble swallowing.    Respiratory:  Negative for cough and shortness of breath.    Cardiovascular:  Negative for chest pain.   Gastrointestinal:  Negative for abdominal pain, diarrhea, nausea and vomiting.   Musculoskeletal:  Positive for back pain (post surgical) and gait problem.   Neurological:  Negative for dizziness, weakness and light-headedness.   All other systems reviewed and are negative.    Objective:     Vital Signs (Most Recent):  Temp: 97.1 °F (36.2 °C) (12/11/23 1137)  Pulse: 78 (12/11/23 1137)  Resp: 16 (12/11/23 1137)  BP: (!) 111/52 (12/11/23 1137)  SpO2: (!) 94 % (12/11/23 1137) Vital Signs (24h Range):  Temp:  [97.1 °F (36.2 °C)-98 °F (36.7 °C)] 97.1 °F (36.2 °C)  Pulse:  [70-82] 78  Resp:  [16-20] 16  SpO2:  [94 %-99 %] 94 %  BP: (111-139)/(52-65) 111/52     Weight: 81.3 kg (179 lb 3.7 oz)  Body mass index is 28.07 kg/m².    Intake/Output Summary (Last 24 hours) at 12/11/2023 1345  Last data filed at 12/11/2023 0340  Gross per 24 hour   Intake 723.77 ml   Output 900 ml   Net -176.23 ml           Physical Exam  Vitals and nursing note reviewed.   Constitutional:       General: She is not in acute distress.     Appearance: Normal appearance. She is well-developed. She is not ill-appearing or diaphoretic.   HENT:      Head: Normocephalic and atraumatic.      Right Ear: External ear normal.      Left Ear: External ear normal.      Nose: Nose normal. No congestion or rhinorrhea.      Mouth/Throat:      Mouth: Mucous membranes are moist.      Pharynx: Oropharynx is clear. No oropharyngeal exudate or posterior oropharyngeal  erythema.   Eyes:      General: No scleral icterus.     Conjunctiva/sclera: Conjunctivae normal.      Pupils: Pupils are equal, round, and reactive to light.   Neck:      Vascular: No JVD.   Cardiovascular:      Rate and Rhythm: Normal rate and regular rhythm.      Pulses: Normal pulses.      Heart sounds: Normal heart sounds. No murmur heard.  Pulmonary:      Effort: Pulmonary effort is normal. No respiratory distress.      Breath sounds: Normal breath sounds. No stridor. No wheezing, rhonchi or rales.   Abdominal:      General: Bowel sounds are normal. There is no distension.      Palpations: Abdomen is soft.      Tenderness: There is no abdominal tenderness.   Musculoskeletal:         General: No swelling or tenderness. Normal range of motion.      Cervical back: Normal range of motion and neck supple.   Skin:     General: Skin is warm and dry.      Capillary Refill: Capillary refill takes 2 to 3 seconds.      Coloration: Skin is not jaundiced or pale.      Findings: No erythema.      Comments: Lumbar incision covered with drsg CDI   Neurological:      General: No focal deficit present.      Mental Status: She is alert and oriented to person, place, and time.      Cranial Nerves: No cranial nerve deficit.      Sensory: No sensory deficit.   Psychiatric:         Mood and Affect: Mood normal.         Behavior: Behavior normal.         Thought Content: Thought content normal.             Significant Labs: All pertinent labs within the past 24 hours have been reviewed.  CBC:   Recent Labs   Lab 12/09/23  2009 12/10/23  0435 12/11/23  0424   WBC 13.43* 11.57 10.55   HGB 10.3* 9.9* 9.3*   HCT 31.1* 31.2* 28.9*    232 262       CMP:   Recent Labs   Lab 12/09/23  2009 12/10/23  0435 12/11/23  0424   * 134* 134*   K 3.5 3.5 3.8    102 104   CO2 20* 22* 20*   * 96 108   BUN 7* 6* 6*   CREATININE 0.7 0.6 0.7   CALCIUM 8.9 8.7 8.9   PROT 6.4  --   --    ALBUMIN 2.9*  --   --    BILITOT 0.7  --   --     ALKPHOS 88  --   --    AST 22  --   --    ALT 12  --   --    ANIONGAP 9 10 10         Significant Imaging: I have reviewed all pertinent imaging results/findings within the past 24 hours.    Assessment/Plan:      * Pneumonia  Patient with current diagnosis of pneumonia with concern for bacterial etiology due to  unknown pathogen  I have reviewed the pertinent imaging. Current antimicrobial regimen consists of   Antibiotics (From admission, onward)      Start     Stop Route Frequency Ordered    12/10/23 1230  doxycycline tablet 100 mg         -- Oral Every 12 hours 12/10/23 1224        . Cultures drawn and noted-   Microbiology Results (last 7 days)       Procedure Component Value Units Date/Time    Blood culture x two cultures. Draw prior to antibiotics. [[id number]] Collected: 12/09/23 2009    Order Status: Completed Specimen: Blood from Antecubital, Right Arm Updated: 12/11/23 1032     Blood Culture, Routine No Growth to date      No Growth to date    Narrative:      Aerobic and anaerobic    Blood culture x two cultures. Draw prior to antibiotics. [[id number]] Collected: 12/09/23 2022    Order Status: Completed Specimen: Blood from Antecubital, Left Arm Updated: 12/11/23 1032     Blood Culture, Routine No Growth to date      No Growth to date    Narrative:      Aerobic and anaerobic    Influenza A & B by Molecular [[id number]] Collected: 12/09/23 2000    Order Status: Completed Specimen: Nasopharyngeal Swab Updated: 12/09/23 2032     Influenza A, Molecular Negative     Influenza B, Molecular Negative     Flu A & B Source Nasal swab         Will monitor patient closely and continue current treatment plan unchanged.    Procal negative  Cxr reviewed  Will cover with oral doxycylcine    S/P lumbar fusion  S/p lumbar laminectomy with Dr. Jaimes on 12/7  Pt discharged in stable condition and experienced fall at home  CT Lumbar revealed no device hardware complications  Cont with PT/OT  Pt now agreeable to [facility]  consulted    COPD (chronic obstructive pulmonary disease)  Patient's COPD is controlled currently.  Patient is currently off COPD Pathway. Continue scheduled inhalers Antibiotics and Supplemental oxygen and monitor respiratory status closely.   VBG pCO2 38.7    Hyperlipidemia  Chronic condition  Continue home statins        Hypertension  Chronic, controlled. Latest blood pressure and vitals reviewed-     Temp:  [96.9 °F (36.1 °C)-101 °F (38.3 °C)]   Pulse:  [65-79]   Resp:  [15-20]   BP: ()/(47-63)   SpO2:  [77 %-97 %] .   Home meds for hypertension were reviewed and noted below.   Hypertension Medications               atenoloL (TENORMIN) 25 MG tablet Take 25 mg by mouth once daily.            While in the hospital, will manage blood pressure as follows; Continue home antihypertensive regimen    Will utilize p.r.n. blood pressure medication only if patient's blood pressure greater than 180/110 and she develops symptoms such as worsening chest pain or shortness of breath.    GERD (gastroesophageal reflux disease)  Chronic condition noted  Alternative home PPI ordered        VTE Risk Mitigation (From admission, onward)           Ordered     Place sequential compression device  Until discontinued         12/09/23 1391                    Discharge Planning   JOSE: 12/12/2023     Code Status: Full Code   Is the patient medically ready for discharge?:     Reason for patient still in hospital (select all that apply): Pending disposition  Discharge Plan A: Home Health                  Madie Childs NP  Department of Hospital Medicine   [facility]/Surg

## 2023-12-11 NOTE — NURSING
Discharge instructions provided. Tele removed. All belongings gathered. Pt does not have clothes to go home in, refuses scrubs, prefers to go home in gown. Dressing to back incision changed. Pt left floor via wheelchair to discharge home.

## 2023-12-11 NOTE — PLAN OF CARE
Per Christina with inpt rehab- she met with the pt and she does not want to do 3 hours of therapy a day. She would rather do outpt services.     Per [place]- pts [facility] and [facility] due to pts Medicaid insurance.     THEE Gil updated on pts response to rehab services.    12/11/23 3348   Post-Acute Status   Post-Acute Authorization Placement   Post-Acute Placement Status Referrals Sent

## 2023-12-11 NOTE — ASSESSMENT & PLAN NOTE
S/p lumbar laminectomy with Dr. Jaimes on 12/7  Pt discharged in stable condition and experienced fall at home  CT Lumbar revealed no device hardware complications  Cont with PT/OT  Pt now agreeable to SNF - CM consulted

## 2023-12-11 NOTE — PT/OT/SLP PROGRESS
Occupational Therapy   Treatment    Name: Nimco Caro  MRN: [id number]  Admitting Diagnosis:  Pneumonia     Recommendations:     Discharge Recommendations: Moderate Intensity Therapy  Discharge Equipment Recommendations:  bedside commode  Barriers to discharge:  Decreased caregiver support    Assessment:     Nimco Caro is a 63 y.o. female with a medical diagnosis of Pneumonia. Pt agreed to participate in OT. Pt required frequent verbal cues for sequencing and positioning of RW during turns. Pt required occasional seated rest breaks due to back pain. Pt required cues for precautions during ADLs due to distractibility. Pt would benefit from continued skilled occupational therapy services to improve her independence to safely return home. She presents with performance deficits affecting function are weakness, impaired endurance, impaired self care skills, impaired functional mobility, gait instability, impaired balance, impaired cardiopulmonary response to activity and orthopedic precautions.     Rehab Prognosis:  Good; patient would benefit from acute skilled OT services to address these deficits and reach maximum level of function.       Plan:     Patient to be seen 5 x/week to address the above listed problems via self-care/home management, therapeutic activities, therapeutic exercises  Plan of Care Expires: 12/24/23  Plan of Care Reviewed with: patient    Subjective     Chief Complaint: Back pain with movement  Patient/Family Comments/goals: Pain relief  Pain/Comfort:  Pain Rating 1: 7/10  Location 1: back    Objective:     Communicated with: nurse prior to session.  Patient found HOB elevated with oxygen, PureWick and peripheral IV upon OT entry to room.    General Precautions: Standard, fall    Orthopedic Precautions:spinal precautions  Braces: LSO  Respiratory Status: Nasal cannula, flow 4 L/min     Occupational Performance:     Bed Mobility:    Patient completed Scooting/Bridging with stand by  assistance  Patient completed Supine to Sit with minimum assistance  Patient completed Sit to Supine with minimum assistance     Functional Mobility/Transfers:  Patient completed Sit <> Stand Transfer with contact guard assistance with rolling walker   Patient completed Toilet Transfer Step Transfer technique with contact guard assistance with rolling walker  Functional Mobility: bed > bathroom > sink > bed > chair using a RW with CGA and verbal cues for sequencing and positioning of RW during turns.  Gait: ~ 100 feet using a RW with CGA with slow winter    Activities of Daily Living:  Grooming: Pt washed her hands and brushed her teeth with contact guard assistance standing at the sink.  Upper Body Dressing: minimum assistance  Lower Body Dressing: maximal assistance to doff/don her socks  Toileting: moderate assistance for clothing management secondary to standing tolerance deficits      Treatment & Education:  Therapist provided facilitation and instruction of proper body mechanics and fall prevention strategies during tasks listed above.  Educated on the importance of OOB mobility within safe range in order to decrease adverse effects of prolonged bedrest  Educated about spinal precautions including no bending, lifting, or twisting.  Educated on safety with functional mobility; hand placement to ensure safe transfers to various surfaces in prep for ADLs  Educated on performing functional mobility and ADLs in adherence to orthopedic precautions    Patient left up in chair with all lines intact, call button in reach and chair alarm on.    GOALS:   Multidisciplinary Problems       Occupational Therapy Goals          Problem: Occupational Therapy    Goal Priority Disciplines Outcome Interventions   Occupational Therapy Goal     OT, PT/OT Ongoing, Progressing    Description: Goals to be met by: 12/24/2023     Patient will increase functional independence with ADLs by performing:    UE Dressing with Supervision.  HARDEEP  Dressing with Minimal Assistance.  Grooming with Modified [place].  Toileting from toilet with Supervision for hygiene and clothing management.   Toilet transfer to toilet with Supervision.                         Time Tracking:     OT Date of Treatment: 12/11/23  OT Start Time: 0940  OT Stop Time: 1105  OT Total Time (min): 85 min    Billable Minutes:Self Care/Home Management 85               12/11/2023

## 2023-12-11 NOTE — PLAN OF CARE
AAO x 4, NSR on monitor, VSS, no complaints of pain, ext cath in place, resting between care, will monitor.     Problem: Adult Inpatient Plan of Care  Goal: Plan of Care Review  Outcome: Ongoing, Progressing     Problem: Adult Inpatient Plan of Care  Goal: Optimal Comfort and Wellbeing  Outcome: Ongoing, Progressing     Problem: Skin Injury Risk Increased  Goal: Skin Health and Integrity  Outcome: Ongoing, Progressing     Problem: Infection  Goal: Absence of Infection Signs and Symptoms  Outcome: Ongoing, Progressing     Problem: Adult Inpatient Plan of Care  Goal: Readiness for Transition of Care  Outcome: Ongoing, Progressing

## 2023-12-11 NOTE — PLAN OF CARE
Pt is cleared for discharge home with  services.    12/11/23 1539   Final Note   Assessment Type Final Discharge Note   Anticipated Discharge Disposition Home-Health

## 2023-12-11 NOTE — SUBJECTIVE & OBJECTIVE
Interval History: Notes reviewed, no acute events overnight. Pt sitting on edge of bed participating with therapy.  Therapist and myself d/w pt concern about patient going back home with minimal assist and experiencing falls again.  Patient verbalized understanding and is agreeable for SNF placement if this is a possibility.  Case management consulted and assisting with dispo planning.  Will continue to monitor closely.  Patient's pain is controlled with oral medications.    Review of Systems   Constitutional:  Negative for chills, fatigue and fever.   HENT:  Negative for congestion, sore throat and trouble swallowing.    Respiratory:  Negative for cough and shortness of breath.    Cardiovascular:  Negative for chest pain.   Gastrointestinal:  Negative for abdominal pain, diarrhea, nausea and vomiting.   Musculoskeletal:  Positive for back pain (post surgical) and gait problem.   Neurological:  Negative for dizziness, weakness and light-headedness.   All other systems reviewed and are negative.    Objective:     Vital Signs (Most Recent):  Temp: 97.1 °F (36.2 °C) (12/11/23 1137)  Pulse: 78 (12/11/23 1137)  Resp: 16 (12/11/23 1137)  BP: (!) 111/52 (12/11/23 1137)  SpO2: (!) 94 % (12/11/23 1137) Vital Signs (24h Range):  Temp:  [97.1 °F (36.2 °C)-98 °F (36.7 °C)] 97.1 °F (36.2 °C)  Pulse:  [70-82] 78  Resp:  [16-20] 16  SpO2:  [94 %-99 %] 94 %  BP: (111-139)/(52-65) 111/52     Weight: 81.3 kg (179 lb 3.7 oz)  Body mass index is 28.07 kg/m².    Intake/Output Summary (Last 24 hours) at 12/11/2023 1345  Last data filed at 12/11/2023 0340  Gross per 24 hour   Intake 723.77 ml   Output 900 ml   Net -176.23 ml           Physical Exam  Vitals and nursing note reviewed.   Constitutional:       General: She is not in acute distress.     Appearance: Normal appearance. She is well-developed. She is not ill-appearing or diaphoretic.   HENT:      Head: Normocephalic and atraumatic.      Right Ear: External ear normal.      Left  Ear: External ear normal.      Nose: Nose normal. No congestion or rhinorrhea.      Mouth/Throat:      Mouth: Mucous membranes are moist.      Pharynx: Oropharynx is clear. No oropharyngeal exudate or posterior oropharyngeal erythema.   Eyes:      General: No scleral icterus.     Conjunctiva/sclera: Conjunctivae normal.      Pupils: Pupils are equal, round, and reactive to light.   Neck:      Vascular: No JVD.   Cardiovascular:      Rate and Rhythm: Normal rate and regular rhythm.      Pulses: Normal pulses.      Heart sounds: Normal heart sounds. No murmur heard.  Pulmonary:      Effort: Pulmonary effort is normal. No respiratory distress.      Breath sounds: Normal breath sounds. No stridor. No wheezing, rhonchi or rales.   Abdominal:      General: Bowel sounds are normal. There is no distension.      Palpations: Abdomen is soft.      Tenderness: There is no abdominal tenderness.   Musculoskeletal:         General: No swelling or tenderness. Normal range of motion.      Cervical back: Normal range of motion and neck supple.   Skin:     General: Skin is warm and dry.      Capillary Refill: Capillary refill takes 2 to 3 seconds.      Coloration: Skin is not jaundiced or pale.      Findings: No erythema.      Comments: Lumbar incision covered with drsg CDI   Neurological:      General: No focal deficit present.      Mental Status: She is alert and oriented to person, place, and time.      Cranial Nerves: No cranial nerve deficit.      Sensory: No sensory deficit.   Psychiatric:         Mood and Affect: Mood normal.         Behavior: Behavior normal.         Thought Content: Thought content normal.             Significant Labs: All pertinent labs within the past 24 hours have been reviewed.  CBC:   Recent Labs   Lab 12/09/23  2009 12/10/23  0435 12/11/23  0424   WBC 13.43* 11.57 10.55   HGB 10.3* 9.9* 9.3*   HCT 31.1* 31.2* 28.9*    232 262       CMP:   Recent Labs   Lab 12/09/23  2009 12/10/23  4758  12/11/23  0424   * 134* 134*   K 3.5 3.5 3.8    102 104   CO2 20* 22* 20*   * 96 108   BUN 7* 6* 6*   CREATININE 0.7 0.6 0.7   CALCIUM 8.9 8.7 8.9   PROT 6.4  --   --    ALBUMIN 2.9*  --   --    BILITOT 0.7  --   --    ALKPHOS 88  --   --    AST 22  --   --    ALT 12  --   --    ANIONGAP 9 10 10         Significant Imaging: I have reviewed all pertinent imaging results/findings within the past 24 hours.

## 2023-12-11 NOTE — PLAN OF CARE
[facility]. CM following.    12/11/23 1203   Post-Acute Status   Post-Acute Authorization Placement   Post-Acute Placement Status Patient List Provided   Patient choice form signed by patient/caregiver List with quality metrics by geographic area provided

## 2023-12-11 NOTE — CARE UPDATE
12/11/23 0713   Patient Assessment/Suction   Level of Consciousness (AVPU) alert   Respiratory Effort Normal;Unlabored   Expansion/Accessory Muscles/Retractions no use of accessory muscles   All Lung Fields Breath Sounds clear   Cough Frequency no cough   PRE-TX-O2   Device (Oxygen Therapy) nasal cannula with humidification   $ Is the patient on Low Flow Oxygen? Yes   Flow (L/min) 4   SpO2 97 %   Pulse Oximetry Type Intermittent   $ Pulse Oximetry - Multiple Charge Pulse Oximetry - Multiple   Pulse 76   Resp 16   Positioning HOB elevated 45 degrees   Aerosol Therapy   $ Aerosol Therapy Charges Aerosol Treatment   Respiratory Treatment Status (SVN) given   Treatment Route (SVN) air;mask   Patient Position (SVN) HOB elevated   Post Treatment Assessment (SVN) increased aeration   Signs of Intolerance (SVN) none   Education   $ Education Bronchodilator;15 min     4 LPM home use

## 2023-12-12 ENCOUNTER — PES CALL (OUTPATIENT)
Dept: HOME HEALTH SERVICES | Facility: CLINIC | Age: 63
End: 2023-12-12
Payer: MEDICAID

## 2023-12-13 ENCOUNTER — PES CALL (OUTPATIENT)
Dept: HOME HEALTH SERVICES | Facility: CLINIC | Age: 63
End: 2023-12-13
Payer: MEDICAID

## 2023-12-13 NOTE — HOSPITAL COURSE
Pt was monitored closely during her hospital stay. Her pain remained controlled on oral medications. Imaging did not reveal acute fractures and lumbar laminectomy stable. Pt was eval by therapy who rec mod intensity therapy. Pt initally declined and stated she was going home. After further disuccsion pt agreeable to SNF/rehab. Pt does not have SNF benefits and rehab rep eval pt at [facility] and pt voiced she would not participate with required 3 hrs of therapy daily. Pt was discharged home with  services. Pt was educated on fall precautions and return precautions. Pt verbalized understanding of discharge instructions.     PE:  AAO x 3, pleasant, NAD  Heart - RRR, no edema  Lungs - CTA bilat, resp even unlabored  Abd - soft, nontender, normoactive BS  Skin - lumbar incision CDI, no erythema or drainage

## 2023-12-13 NOTE — DISCHARGE SUMMARY
"[facility] Medicine  Discharge Summary      Patient Name: Nimco Caro  MRN: [id number]  ED: [id number]  Patient Class: IP- Inpatient  Admission Date: 12/9/2023  Hospital Length of Stay: 1 days  Discharge Date and Time: 12/11/2023  4:01 PM  Attending Physician: Doris att. providers found   Discharging Provider: Madie Childs NP  Primary Care Provider: Katy Mason MD    Primary Care Team: Networked reference to record PCT     HPI:   Nimco Caro is a 63 yr old female with past medical history of hypertension, hyperlipidemia, COPD with home oxygen, and back pain presenting today after a fall at home due to weakness, lethargy, and MCGARRY. She did not experience any loss of consciousness or hitting of her head. She states she is supposed to ambulated with a walker but she did not. Denies chest pain, chills, diaphoresis, numbness, tingling, dizziness, headache, LOC, nausea, or vomiting. On 12/7, she underwent a lumbar laminectomy with fusion L4-L5 with Dr. Jaimes. She was discharged today from the hospital and has returned c/o weakness possibly due to medication. She states she feels like she is "taking too much medication." She denies taking any medication prior to fall or since discharged from facility. Upon examine she does have difficulty staying awake but answers questions appropriately. ED workup revealed fever, temp 101, oxygen saturation 85% on room air increased to 95 % on 4 L NC, VBG unremarkable. White count 13.43, Na 130 at baseline. CT Thoracic demonstrated small right pleural effusion with interstitial changes at lung bases. CT lumbar demonstrated no device hardware complications. She was started on ceftriaxone and doxycycline. She is admitted to hospital medicine for further work-up and management.                      * No surgery found *      Hospital Course:   Pt was monitored closely during her hospital stay. Her pain remained controlled on oral " medications. Imaging did not reveal acute fractures and lumbar laminectomy stable. Pt was eval by therapy who rec mod intensity therapy. Pt initally declined and stated she was going home. After further disuccsion pt agreeable to SNF/rehab. Pt does not have SNF benefits and rehab rep eval pt at  and pt voiced she would not participate with required 3 hrs of therapy daily. Pt was discharged home with  services. Pt was educated on fall precautions and return precautions. Pt verbalized understanding of discharge instructions.     PE:  AAO x 3, pleasant, NAD  Heart - RRR, no edema  Lungs - CTA bilat, resp even unlabored  Abd - soft, nontender, normoactive BS  Skin - lumbar incision CDI, no erythema or drainage     Goals of Care Treatment Preferences:  Code Status: Full Code      Consults:   Consults (From admission, onward)          Status Ordering Provider     Inpatient consult to Social Work/Case Management  Once        Provider:  (Not yet assigned)    Completed MORALES DELACRUZ            No new Assessment & Plan notes have been filed under this hospital service since the last note was generated.  Service: Hospital Medicine    Final Active Diagnoses:    Diagnosis Date Noted POA    PRINCIPAL PROBLEM:  Pneumonia [J18.9] 08/22/2019 Yes    S/P lumbar fusion [Z98.1] 12/08/2023 Not Applicable    COPD (chronic obstructive pulmonary disease) [J44.9] 08/20/2019 Yes    Hypertension [I10] 02/25/2019 Yes    Hyperlipidemia [E78.5] 02/25/2019 Yes    GERD (gastroesophageal reflux disease) [K21.9] 02/25/2019 Yes      Problems Resolved During this Admission:       Discharged Condition: good    Disposition: [place]-Health Care [facility] [place]    Follow Up:   Follow-up Information       Joby Jaimes MD Follow up.    Specialties: Orthopedic Surgery, Surgery  Why: 11/22 @ 10:45 with PA  Contact information:  [address]  [address]  [place] [facility] [address]  [phone]               Katy Mason MD Follow up.    Specialty: Family  Medicine  Contact information:  [facility] [facility]  [facility]  [facility] [address]  [phone]               [facility] Follow up.    Specialties: Home Health Services, Home Therapy Services, Home Living Aide Services  Why: Home Health  Contact information:  [address] [facility] [address] [facility] [address]  [facility] [address]  [phone]                         Patient Instructions:      [facility]   Standing Status: Future   Referral Priority: Routine Referral Type: Consultation   Referral Reason: Specialty Services Required   Number of Visits Requested: 1     Ambulatory referral/consult to Outpatient Case Management   Referral Priority: Routine Referral Type: Consultation   Referral Reason: Specialty Services Required   Number of Visits Requested: 1     Notify your health care provider if you experience any of the following:  temperature >100.4     Notify your health care provider if you experience any of the following:  persistent nausea and vomiting or diarrhea     Notify your health care provider if you experience any of the following:  severe uncontrolled pain     Notify your health care provider if you experience any of the following:  difficulty breathing or increased cough     Notify your health care provider if you experience any of the following:  persistent dizziness, light-headedness, or visual disturbances     Notify your health care provider if you experience any of the following:  increased confusion or weakness     Reason for not Ordering Smoking Cessation Referral     Order Specific Question Answer Comments   Reason for not ordering: Patient refused      Activity as tolerated       Significant Diagnostic Studies: Labs: CMP   Recent Labs   Lab 12/11/23 0424   *   K 3.8      CO2 20*      BUN 6*   CREATININE 0.7   CALCIUM 8.9   ANIONGAP 10   , CBC   Recent Labs   Lab 12/11/23 0424   WBC 10.55   HGB 9.3*   HCT  28.9*      , and All labs within the past 24 hours have been reviewed    Pending Diagnostic Studies:       None           Medications:  Reconciled Home Medications:      Medication List        START taking these medications      doxycycline 100 MG tablet  Commonly known as: VIBRA-TABS  Take 1 tablet (100 mg total) by mouth every 12 (twelve) hours. for 7 days     oxyCODONE-acetaminophen  mg per tablet  Commonly known as: PERCOCET  Take 1 tablet by mouth every 4 (four) hours as needed for Pain.            CHANGE how you take these medications      albuterol 90 mcg/actuation inhaler  Commonly known as: PROVENTIL/VENTOLIN HFA  INHALE 2 PUFFS BY MOUTH INTO THE LUNGS EVERY 6 HOURS  What changed:   how much to take  how to take this  when to take this  reasons to take this            CONTINUE taking these medications      ascorbic acid (vitamin C) 1000 MG tablet  Commonly known as: VITAMIN C  Take 1,000 mg by mouth once daily.     atenoloL 25 MG tablet  Commonly known as: TENORMIN  Take 25 mg by mouth once daily.     azelastine 137 mcg (0.1 %) nasal spray  Commonly known as: ASTELIN  1 spray (137 mcg total) by Nasal route 2 (two) times daily.     b complex vitamins tablet  Take 1 tablet by mouth once daily.     biotin 1 mg tablet  Take 1,000 mcg by mouth once daily.     budesonide-formoterol 80-4.5 mcg 80-4.5 mcg/actuation Hfaa  Commonly known as: SYMBICORT  Inhale 2 puffs into the lungs 2 (two) times a day. Controller     cetirizine 10 MG tablet  Commonly known as: ZYRTEC  Take 1 tablet (10 mg total) by mouth once daily.     clonazePAM 2 MG Tab  Commonly known as: KlonoPIN  Take 2 mg by mouth 2 (two) times daily.     cyanocobalamin 1,000 mcg/mL injection  Inject 1,000 mcg into the muscle every 30 days.     cycloSPORINE 0.05 % ophthalmic emulsion  Commonly known as: RESTASIS  Apply 1 drop to eye 2 (two) times daily.     dexlansoprazole 60 mg capsule  Commonly known as: DEXILANT  Take 60 mg by mouth once  daily.     dicyclomine 10 MG capsule  Commonly known as: BENTYL  Take 10 mg by mouth 2 (two) times daily.     donepeziL 10 MG tablet  Commonly known as: ARICEPT  Take 10 mg by mouth once daily.     FLUoxetine 40 MG capsule  Take 40 mg by mouth once daily.     fluticasone propionate 50 mcg/actuation nasal spray  Commonly known as: FLONASE  1 spray (50 mcg total) by Each Nostril route once daily.     gabapentin 800 MG tablet  Commonly known as: NEURONTIN  Take 800 mg by mouth 3 (three) times daily.     LATUDA 40 mg Tab tablet  Generic drug: lurasidone  Take 40 mg by mouth once daily.     LINZESS 145 mcg Cap capsule  Generic drug: linaCLOtide  Take 145 mcg by mouth before breakfast.     meclizine 25 mg tablet  Commonly known as: ANTIVERT  Take 1 tablet (25 mg total) by mouth 3 (three) times daily as needed for Dizziness.     multivitamin with minerals tablet  Take 1 tablet by mouth once daily.     NURTEC 75 mg odt  Generic drug: rimegepant  Take 75 mg by mouth once as needed for Migraine.     ondansetron 4 MG Tbdl  Commonly known as: ZOFRAN-ODT  Take 4 mg by mouth every 8 (eight) hours as needed.     OXcarbazepine 300 MG Tab  Commonly known as: TRILEPTAL  Take 300 mg by mouth once daily.     OXYGEN-AIR DELIVERY SYSTEMS MISC  6 L by Misc.(Non-Drug; Combo Route) route continuous.     pantoprazole 40 MG tablet  Commonly known as: PROTONIX  Take 1 tablet by mouth every morning.     polyethylene glycol 17 gram/dose powder  Commonly known as: GLYCOLAX  Take 17 g by mouth once daily.     pravastatin 20 MG tablet  Commonly known as: PRAVACHOL  Take 20 mg by mouth once daily.     pulse oximeter device  Commonly known as: pulse oximeter  by Apply Externally route 2 (two) times a day. Use twice daily at 8 AM and 3 PM and record the value in [facility] as directed.     salmeteroL 50 mcg/dose diskus inhaler  Commonly known as: SEREVENT  Inhale 1 puff into the lungs once daily.     SPIRIVA WITH HANDIHALER 18 mcg inhalation  capsule  Generic drug: tiotropium  Inhale 1 capsule (18 mcg total) into the lungs once daily. Controller     sucralfate 1 gram tablet  Commonly known as: CARAFATE  Take 1 g by mouth 4 (four) times daily.     sumatriptan 25 MG Tab  Commonly known as: IMITREX  Take 25 mg by mouth every 2 (two) hours as needed.     topiramate 50 MG tablet  Commonly known as: TOPAMAX  Take 50 mg by mouth once daily.     traZODone 100 MG tablet  Commonly known as: DESYREL  Take 250 mg by mouth every evening.     trospium 60 mg Cp24 capsule  Commonly known as: SANCTURA XR  Take 60 mg by mouth once daily.     vilazodone 20 mg Tab  Commonly known as: VIIBRYD  Take 20 mg by mouth once daily.     vitamin D 1000 units Tab  Commonly known as: VITAMIN D3  Take 1,000 Units by mouth once daily.            STOP taking these medications      aspirin 81 MG EC tablet  Commonly known as: ECOTRIN            ASK your doctor about these medications      nicotine 21 mg/24 hr  Commonly known as: NICODERM CQ  PLACE 1 PATCH ONTO THE SKIN ONCE DAILY.              Indwelling Lines/Drains at time of discharge:   Lines/Drains/Airways       None                   Time spent on the discharge of patient: 43 minutes         Madie Childs NP  Department of Hospital Medicine  [facility]/Surg

## 2023-12-14 ENCOUNTER — PES CALL (OUTPATIENT)
Dept: HOME HEALTH SERVICES | Facility: CLINIC | Age: 63
End: 2023-12-14
Payer: MEDICAID

## 2023-12-14 PROCEDURE — G0180 MD CERTIFICATION HHA PATIENT: HCPCS | Mod: ,,, | Performed by: PHYSICIAN ASSISTANT

## 2023-12-15 LAB
BACTERIA BLD CULT: NORMAL
BACTERIA BLD CULT: NORMAL

## 2023-12-18 ENCOUNTER — OFFICE VISIT (OUTPATIENT)
Dept: HOME HEALTH SERVICES | Facility: CLINIC | Age: 63
End: 2023-12-18
Payer: MEDICAID

## 2023-12-18 DIAGNOSIS — Z98.1 S/P LUMBAR FUSION: ICD-10-CM

## 2023-12-18 DIAGNOSIS — J44.9 CHRONIC OBSTRUCTIVE PULMONARY DISEASE, UNSPECIFIED COPD TYPE: ICD-10-CM

## 2023-12-18 DIAGNOSIS — F31.9 BIPOLAR 1 DISORDER: ICD-10-CM

## 2023-12-18 DIAGNOSIS — Z71.89 ACP (ADVANCE CARE PLANNING): ICD-10-CM
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
## 2023-12-18 DIAGNOSIS — F17.210 TOBACCO DEPENDENCE DUE TO CIGARETTES: Primary | ICD-10-CM

## 2023-12-18 DIAGNOSIS — M48.062 SPINAL STENOSIS OF LUMBAR REGION WITH NEUROGENIC CLAUDICATION: ICD-10-CM

## 2023-12-18 PROCEDURE — 99406 PR TOBACCO USE CESSATION INTERMEDIATE 3-10 MINUTES: ICD-10-PCS | Mod: S$GLB,,, | Performed by: NURSE PRACTITIONER

## 2023-12-18 PROCEDURE — 1159F PR MEDICATION LIST DOCUMENTED IN MEDICAL RECORD: ICD-10-PCS | Mod: CPTII,S$GLB,, | Performed by: NURSE PRACTITIONER

## 2023-12-18 PROCEDURE — 99406 BEHAV CHNG SMOKING 3-10 MIN: CPT | Mod: S$GLB,,, | Performed by: NURSE PRACTITIONER

## 2023-12-18 PROCEDURE — 99497 ADVNCD CARE PLAN 30 MIN: CPT | Mod: S$GLB,,, | Performed by: NURSE PRACTITIONER

## 2023-12-18 PROCEDURE — 1111F DSCHRG MED/CURRENT MED MERGE: CPT | Mod: CPTII,S$GLB,, | Performed by: NURSE PRACTITIONER

## 2023-12-18 PROCEDURE — 99349 PR HOME VISIT,ESTAB PATIENT,LEVEL III: ICD-10-PCS | Mod: 25,S$GLB,, | Performed by: NURSE PRACTITIONER

## 2023-12-18 PROCEDURE — 99497 PR ADVNCD CARE PLAN 30 MIN: ICD-10-PCS | Mod: S$GLB,,, | Performed by: NURSE PRACTITIONER

## 2023-12-18 PROCEDURE — 1160F RVW MEDS BY RX/DR IN RCRD: CPT | Mod: CPTII,S$GLB,, | Performed by: NURSE PRACTITIONER

## 2023-12-18 PROCEDURE — 1160F PR REVIEW ALL MEDS BY PRESCRIBER/CLIN PHARMACIST DOCUMENTED: ICD-10-PCS | Mod: CPTII,S$GLB,, | Performed by: NURSE PRACTITIONER

## 2023-12-18 PROCEDURE — 1111F PR DISCHARGE MEDS RECONCILED W/ CURRENT OUTPATIENT MED LIST: ICD-10-PCS | Mod: CPTII,S$GLB,, | Performed by: NURSE PRACTITIONER

## 2023-12-18 PROCEDURE — 1159F MED LIST DOCD IN RCRD: CPT | Mod: CPTII,S$GLB,, | Performed by: NURSE PRACTITIONER

## 2023-12-18 PROCEDURE — 3078F PR MOST RECENT DIASTOLIC BLOOD PRESSURE < 80 MM HG: ICD-10-PCS | Mod: CPTII,S$GLB,, | Performed by: NURSE PRACTITIONER

## 2023-12-18 PROCEDURE — 3077F SYST BP >= 140 MM HG: CPT | Mod: CPTII,S$GLB,, | Performed by: NURSE PRACTITIONER

## 2023-12-18 PROCEDURE — 3078F DIAST BP <80 MM HG: CPT | Mod: CPTII,S$GLB,, | Performed by: NURSE PRACTITIONER

## 2023-12-18 PROCEDURE — 99349 HOME/RES VST EST MOD MDM 40: CPT | Mod: 25,S$GLB,, | Performed by: NURSE PRACTITIONER

## 2023-12-18 PROCEDURE — 3077F PR MOST RECENT SYSTOLIC BLOOD PRESSURE >= 140 MM HG: ICD-10-PCS | Mod: CPTII,S$GLB,, | Performed by: NURSE PRACTITIONER

## 2023-12-20 VITALS
RESPIRATION RATE: 20 BRPM | OXYGEN SATURATION: 98 % | TEMPERATURE: 98 F | SYSTOLIC BLOOD PRESSURE: 148 MMHG | HEART RATE: 72 BPM | DIASTOLIC BLOOD PRESSURE: 78 MMHG
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
## 2023-12-20 PROBLEM — Z71.89 ACP (ADVANCE CARE PLANNING): Status: ACTIVE | Noted: 2023-12-20

## 2023-12-20 PROBLEM — F17.210 TOBACCO DEPENDENCE DUE TO CIGARETTES: Status: ACTIVE | Noted: 2019-08-19

## 2023-12-20 PROBLEM — F31.9 BIPOLAR 1 DISORDER: Status: ACTIVE | Noted: 2023-12-20

## 2023-12-20 NOTE — ASSESSMENT & PLAN NOTE
Chronic.  S/p lumbar laminectomy with fusion L4-L5 with Dr. Jaimes 12/7/23.  Fall precautions and safety discussed.

## 2023-12-20 NOTE — ASSESSMENT & PLAN NOTE
Chronic, not interested in quitting.  Negative effects of tobacco on body systems was discussed with patient in detail. Patient was Counseled for 6 minutes. Nicotine replacement options were discussed. Nicotine replacement was discussed- not prescribed per patient's request

## 2023-12-20 NOTE — ASSESSMENT & PLAN NOTE
Chronic, currently controlled.  Not using oxygen regularly.  Still smoking cigarettes and not interested in quitting.   Continue current inhalers.

## 2023-12-20 NOTE — ASSESSMENT & PLAN NOTE
During this visit, I engaged the patient in the advance care planning process.  The patient and I reviewed the role for advance directives and their purpose in directing future healthcare if the patient's unable to speak for herself.  At this point in time, the patient does have full decision-making capacity.  We discussed different extreme health states that she could experience, and reviewed what kind of medical care she would want in those situations.  The patient communicated that if she were comatose and had little chance of a meaningful recovery, she would want machines/life-sustaining treatments used.  In addition to the above preference, other important end-of-life issues for the patient include any and all treatments are desired.  The patient has not completed a living will to reflect these preferences.  The patient  has not already designated a healthcare power of  to make decisions on her behalf.   I spent a total of 20 minutes engaging the patient in this advance care planning discussion. HPOA and Living Will paperwork left in the home for further review and completion.

## 2023-12-20 NOTE — PROGRESS NOTES
"[facility]  Transitional Care Management (TCM) Home Visit    Encounter Provider: Yoko Jay   PCP: Katy Mason MD  Consult Requested By: Madie Childs  Admit Date: 12/9/23   IP Discharge Date: 12/11/23  Hospital Length of Stay:RRHLOS@ 2 days  Days since discharge (from IP or SNF): 7 days   [facility] Contact Note: 12/14/23  Hospital Diagnosis: Spinal stenosis of lumbar region with neurogenic claudication [M48.062];Chronic obstructive pulmonary disease, unspecified COPD type [J44.9];Bipolar 1 disorder [F31.9]     HISTORY OF PRESENT ILLNESS      Patient ID: Nimco Caro is a 63 y.o. female was recently admitted to the hospital, this is their TCM encounter.    Hospital Course Synopsis:  Admit: 12/9/23   Discharge: 12/11/23  HPI:   Nimco Caro is a 63 yr old female with past medical history of hypertension, hyperlipidemia, COPD with home oxygen, and back pain presenting today after a fall at home due to weakness, lethargy, and MCGARRY. She did not experience any loss of consciousness or hitting of her head. She states she is supposed to ambulated with a walker but she did not. Denies chest pain, chills, diaphoresis, numbness, tingling, dizziness, headache, LOC, nausea, or vomiting. On 12/7, she underwent a lumbar laminectomy with fusion L4-L5 with Dr. Jaimes. She was discharged today from the hospital and has returned c/o weakness possibly due to medication. She states she feels like she is "taking too much medication." She denies taking any medication prior to fall or since discharged from facility. Upon examine she does have difficulty staying awake but answers questions appropriately. ED workup revealed fever, temp 101, oxygen saturation 85% on room air increased to 95 % on 4 L NC, VBG unremarkable. White count 13.43, Na 130 at baseline. CT Thoracic demonstrated small right pleural effusion with interstitial changes at lung bases. CT lumbar demonstrated no device hardware " "complications. She was started on ceftriaxone and doxycycline. She is admitted to hospital medicine for further work-up and management.           Hospital Course:   Pt was monitored closely during her hospital stay. Her pain remained controlled on oral medications. Imaging did not reveal acute fractures and lumbar laminectomy stable. Pt was eval by therapy who rec mod intensity therapy. Pt initally declined and stated she was going home. After further disuccsion pt agreeable to SNF/rehab. Pt does not have SNF benefits and rehab rep eval pt at  and pt voiced she would not participate with required 3 hrs of therapy daily. Pt was discharged home with  services. Pt was educated on fall precautions and return precautions. Pt verbalized understanding of discharge instructions.     With this [facility] hospital follow up visit patient is found ambulating throughout the home using furniture to hold on to. She reports using walker intermittently. Reports lower back and bilateral thigh pain since surgery that has slowly started to improved. Reports no further falls since 12/9/23. Smoker not willing to quit. Reports using oxygen intermittently. States Bipolar disorder is currently controlled, taking medications as prescribed and "always feels a little anxious". Living with a friend, Shaunna.     Vital signs stable during visit. No distress noted. Program contact information provided to patient and left in home.    DECISION MAKING TODAY       Assessment & Plan:  1. Tobacco dependence due to cigarettes [F17.210]  Assessment & Plan:  Chronic, not interested in quitting.  Negative effects of tobacco on body systems was discussed with patient in detail. Patient was Counseled for 6 minutes. Nicotine replacement options were discussed. Nicotine replacement was discussed- not prescribed per patient's request         2. Spinal stenosis of lumbar region with neurogenic claudication  Assessment & Plan:  Chronic.  S/p " lumbar laminectomy with fusion L4-L5 with Dr. Jaimes 12/7/23.  Fall precautions and safety discussed.    Orders:  -     Ambulatory referral/consult to [facility]    3. Chronic obstructive pulmonary disease, unspecified COPD type  Assessment & Plan:  Chronic, currently controlled.  Not using oxygen regularly.  Still smoking cigarettes and not interested in quitting.   Continue current inhalers.    Orders:  -     [facility]    4. Bipolar 1 disorder  Assessment & Plan:  Stable with daily anxiety she reports.  Continue current medications.  Denies worsening symptoms.    Orders:  -     Ambulatory referral/consult to [facility]    5. S/P lumbar fusion  Overview:  S/p lumbar laminectomy with fusion L4-L5 with Dr. Jaimes 12/7/23     Assessment & Plan:  S/p lumbar laminectomy with Dr. Jaimes on 12/7  Pt discharged in stable condition and experienced fall at home  CT Lumbar revealed no device hardware complications  Cont with PT/OT with [facility].      6. Fall, subsequent encounter  Assessment & Plan:  Fall 12/9/23 at home s/p lumbar laminectomy/fusion with Dr. Jaimes on 12/7/23.  CT Lumbar revealed no device hardware complications  Cont with PT/OT with [facility].  Fall precautions and safety advised.      7. ACP (advance care planning)  Assessment & Plan:  During this visit, I engaged the patient in the advance care planning process.  The patient and I reviewed the role for advance directives and their purpose in directing future healthcare if the patient's unable to speak for herself.  At this point in time, the patient does have full decision-making capacity.  We discussed different extreme health states that she could experience, and reviewed what kind of medical care she would want in those situations.  The patient communicated that if she were comatose and had little chance of a meaningful recovery, she would want machines/life-sustaining  treatments used.  In addition to the above preference, other important end-of-life issues for the patient include any and all treatments are desired.  The patient has not completed a living will to reflect these preferences.  The patient  has not already designated a healthcare power of  to make decisions on her behalf.   I spent a total of 20 minutes engaging the patient in this advance care planning discussion. HPOA and Living Will paperwork left in the home for further review and completion.           Medication List on Discharge:     Medication List            Accurate as of December 18, 2023 11:59 PM. If you have any questions, ask your nurse or doctor.                CHANGE how you take these medications      albuterol 90 mcg/actuation inhaler  Commonly known as: PROVENTIL/VENTOLIN HFA  INHALE 2 PUFFS BY MOUTH INTO THE LUNGS EVERY 6 HOURS  What changed:   how much to take  how to take this  when to take this  reasons to take this            CONTINUE taking these medications      ascorbic acid (vitamin C) 1000 MG tablet  Commonly known as: VITAMIN C  Take 1,000 mg by mouth once daily.     atenoloL 25 MG tablet  Commonly known as: TENORMIN  Take 25 mg by mouth once daily.     azelastine 137 mcg (0.1 %) nasal spray  Commonly known as: ASTELIN  1 spray (137 mcg total) by Nasal route 2 (two) times daily.     b complex vitamins tablet  Take 1 tablet by mouth once daily.     biotin 1 mg tablet  Take 1,000 mcg by mouth once daily.     budesonide-formoterol 80-4.5 mcg 80-4.5 mcg/actuation Hfaa  Commonly known as: SYMBICORT  Inhale 2 puffs into the lungs 2 (two) times a day. Controller     cetirizine 10 MG tablet  Commonly known as: ZYRTEC  Take 1 tablet (10 mg total) by mouth once daily.     clonazePAM 2 MG Tab  Commonly known as: KlonoPIN  Take 2 mg by mouth 2 (two) times daily.     cyanocobalamin 1,000 mcg/mL injection  Inject 1,000 mcg into the muscle every 30 days.     cycloSPORINE 0.05 % ophthalmic  emulsion  Commonly known as: RESTASIS  Apply 1 drop to eye 2 (two) times daily.     dexlansoprazole 60 mg capsule  Commonly known as: DEXILANT  Take 60 mg by mouth once daily.     dicyclomine 10 MG capsule  Commonly known as: BENTYL  Take 10 mg by mouth 2 (two) times daily.     donepeziL 10 MG tablet  Commonly known as: ARICEPT  Take 10 mg by mouth once daily.     doxycycline 100 MG tablet  Commonly known as: VIBRA-TABS  Take 1 tablet (100 mg total) by mouth every 12 (twelve) hours. for 7 days     FLUoxetine 40 MG capsule  Take 40 mg by mouth once daily.     fluticasone propionate 50 mcg/actuation nasal spray  Commonly known as: FLONASE  1 spray (50 mcg total) by Each Nostril route once daily.     gabapentin 800 MG tablet  Commonly known as: NEURONTIN  Take 800 mg by mouth 3 (three) times daily.     LATUDA 40 mg Tab tablet  Generic drug: lurasidone  Take 40 mg by mouth once daily.     LINZESS 145 mcg Cap capsule  Generic drug: linaCLOtide  Take 145 mcg by mouth before breakfast.     meclizine 25 mg tablet  Commonly known as: ANTIVERT  Take 1 tablet (25 mg total) by mouth 3 (three) times daily as needed for Dizziness.     multivitamin with minerals tablet  Take 1 tablet by mouth once daily.     nicotine 21 mg/24 hr  Commonly known as: NICODERM CQ  PLACE 1 PATCH ONTO THE SKIN ONCE DAILY.     NURTEC 75 mg odt  Generic drug: rimegepant  Take 75 mg by mouth once as needed for Migraine.     ondansetron 4 MG Tbdl  Commonly known as: ZOFRAN-ODT  Take 4 mg by mouth every 8 (eight) hours as needed.     OXcarbazepine 300 MG Tab  Commonly known as: TRILEPTAL  Take 300 mg by mouth once daily.     OXYGEN-AIR DELIVERY SYSTEMS MISC  6 L by Misc.(Non-Drug; Combo Route) route continuous.     pantoprazole 40 MG tablet  Commonly known as: PROTONIX  Take 1 tablet by mouth every morning.     polyethylene glycol 17 gram/dose powder  Commonly known as: GLYCOLAX  Take 17 g by mouth once daily.     pravastatin 20 MG tablet  Commonly known  as: PRAVACHOL  Take 20 mg by mouth once daily.     pulse oximeter device  Commonly known as: pulse oximeter  by Apply Externally route 2 (two) times a day. Use twice daily at 8 AM and 3 PM and record the value in [facility] as directed.     salmeteroL 50 mcg/dose diskus inhaler  Commonly known as: SEREVENT  Inhale 1 puff into the lungs once daily.     SPIRIVA WITH HANDIHALER 18 mcg inhalation capsule  Generic drug: tiotropium  Inhale 1 capsule (18 mcg total) into the lungs once daily. Controller     sucralfate 1 gram tablet  Commonly known as: CARAFATE  Take 1 g by mouth 4 (four) times daily.     sumatriptan 25 MG Tab  Commonly known as: IMITREX  Take 25 mg by mouth every 2 (two) hours as needed.     topiramate 50 MG tablet  Commonly known as: TOPAMAX  Take 50 mg by mouth once daily.     traZODone 100 MG tablet  Commonly known as: DESYREL  Take 250 mg by mouth every evening.     trospium 60 mg Cp24 capsule  Commonly known as: SANCTURA XR  Take 60 mg by mouth once daily.     vilazodone 20 mg Tab  Commonly known as: VIIBRYD  Take 20 mg by mouth once daily.     vitamin D 1000 units Tab  Commonly known as: VITAMIN D3  Take 1,000 Units by mouth once daily.              Medication Reconciliation:  Were medications changed on discharge? Yes  Were medications in the home? Yes  Is the patient taking the medications as directed? Yes  Does the patient understand the medications and changes? Yes  Does updated med list accurately reflects meds patient is currently taking? Yes    ENVIRONMENT OF CARE      Family and/or Caregiver present at visit?  No  Name of Caregiver: none  History provided by: patient    Advance Care Planning   Advanced Care Planning Status:  Patient has had an ACP conversation  Living Will: No  Power of : No  [place]: No    Does Caregiver have HCPoA: No  Changes today: none       Impression upon entering the home:  Physical Dwelling: single family home   Appearance of home environment: cleaniness: clean,  walking pathways: clear, lighting: adequate, and home structure: sound structure  Functional Status: minimal assistance  Mobility: ambulatory with device  Nutritional access: adequate intake and access  Home Health: Yes, HH [facility]    DME/Supplies: rolling walker and oxygen     Diagnostic tests reviewed/disposition: No diagnosic tests pending after this hospitalization.  Disease/illness education:  fall prevention, lumbar fusion, pain control  Establishment or re-establishment of referral orders for community resources: No other necessary community resources.   Discussion with other health care providers: No discussion with other health care providers necessary.   Does patient have a PCP at [place]? Yes   Repatriation plan with PCP? follow-up with PCP within 30d   Does patient have an ostomy (ileostomy, colostomy, suprapubic catheter, nephrostomy tube, tracheostomy, PEG tube, pleurex catheter, cholecystostomy, etc)? No  Were BPAs reviewed? Yes    Social History     Socioeconomic History    Marital status:    Tobacco Use    Smoking status: Every Day     Current packs/day: 0.50     Average packs/day: 0.5 packs/day for 40.0 years (20.0 ttl pk-yrs)     Types: Cigarettes    Smokeless tobacco: Never   Substance and Sexual Activity    Alcohol use: Not Currently     Alcohol/week: 1.0 standard drink of alcohol     Types: 1 Cans of beer per week     Comment: weekly    Drug use: No         OBJECTIVE:     Vital Signs:  Vitals:    12/18/23 1000   BP: (!) 148/78   Pulse: 72   Resp: 20   Temp: 98.1 °F (36.7 °C)       Review of Systems   Constitutional:  Positive for activity change and fatigue. Negative for appetite change and fever.   HENT: Negative.     Eyes: Negative.    Respiratory:  Positive for cough and shortness of breath.         Wears oxygen occasionally   Cardiovascular: Negative.    Gastrointestinal: Negative.    Endocrine: Negative.    Genitourinary: Negative.    Musculoskeletal:  Positive for arthralgias,  back pain, gait problem and myalgias.   Skin: Negative.    Neurological:  Positive for weakness and headaches.   Hematological:  Bruises/bleeds easily.   Psychiatric/Behavioral:  Positive for confusion (intermittent). The patient is nervous/anxious.        Physical Exam:  Physical Exam  Constitutional:       General: She is not in acute distress.     Appearance: Normal appearance. She is not ill-appearing.   HENT:      Head: Normocephalic and atraumatic.      Right Ear: External ear normal.      Left Ear: External ear normal.      Mouth/Throat:      Mouth: Mucous membranes are dry.      Pharynx: Oropharynx is clear.   Eyes:      Extraocular Movements: Extraocular movements intact.      Conjunctiva/sclera: Conjunctivae normal.      Pupils: Pupils are equal, round, and reactive to light.   Cardiovascular:      Rate and Rhythm: Normal rate and regular rhythm.      Pulses: Normal pulses.      Heart sounds: Normal heart sounds.   Pulmonary:      Comments: Decreased in bases, not wearing oxygen, occasional non-productive cough noted  Abdominal:      General: Abdomen is flat. Bowel sounds are normal.      Palpations: Abdomen is soft.      Tenderness: There is no abdominal tenderness.   Musculoskeletal:      Cervical back: Normal range of motion and neck supple.   Skin:     General: Skin is warm and dry.      Capillary Refill: Capillary refill takes 2 to 3 seconds.             Comments: Lumbar surgical dressing in place, CDI.    Neurological:      Mental Status: She is alert and oriented to person, place, and time.      Motor: Weakness present.      Gait: Gait abnormal.   Psychiatric:         Mood and Affect: Mood normal.         Thought Content: Thought content normal.         Judgment: Judgment normal.      Comments: anxious         INSTRUCTIONS FOR PATIENT:     Scheduled Follow-up, Appts Reviewed with Modifications if Needed: Yes  Future Appointments   Date Time Provider Department Center   12/22/2023 10:45 AM [place]  CARMELO Segura [facility] LEONARD JOSEPH [facility]       Signature: Yoko Jay NP    Transition of Care Visit:  I have reviewed and updated the history and problem list.  I have reconciled the medication list.  I have discussed the hospitalization and current medical issues, prognosis and plans with the patient/family.

## 2023-12-20 NOTE — ASSESSMENT & PLAN NOTE
Fall 12/9/23 at home s/p lumbar laminectomy/fusion with Dr. Jaimes on 12/7/23.  CT Lumbar revealed no device hardware complications  Cont with PT/OT with [facility].  Fall precautions and safety advised.

## 2023-12-20 NOTE — ASSESSMENT & PLAN NOTE
S/p lumbar laminectomy with Dr. Jaimes on 12/7  Pt discharged in stable condition and experienced fall at home  CT Lumbar revealed no device hardware complications  Cont with PT/OT with [facility].

## 2023-12-22 ENCOUNTER — OFFICE VISIT (OUTPATIENT)
Dept: ORTHOPEDICS | Facility: CLINIC | Age: 63
End: 2023-12-22
Payer: MEDICAID

## 2023-12-22 VITALS — WEIGHT: 179 LBS | BODY MASS INDEX: 28.09 KG/M2 | HEIGHT: 67 IN

## 2023-12-22 DIAGNOSIS — Z98.1 S/P LUMBAR FUSION: Primary | ICD-10-CM

## 2023-12-22 PROCEDURE — 1160F RVW MEDS BY RX/DR IN RCRD: CPT | Mod: CPTII,S$GLB,, | Performed by: PHYSICIAN ASSISTANT

## 2023-12-22 PROCEDURE — 1160F PR REVIEW ALL MEDS BY PRESCRIBER/CLIN PHARMACIST DOCUMENTED: ICD-10-PCS | Mod: CPTII,S$GLB,, | Performed by: PHYSICIAN ASSISTANT

## 2023-12-22 PROCEDURE — 1159F PR MEDICATION LIST DOCUMENTED IN MEDICAL RECORD: ICD-10-PCS | Mod: CPTII,S$GLB,, | Performed by: PHYSICIAN ASSISTANT

## 2023-12-22 PROCEDURE — 99024 POSTOP FOLLOW-UP VISIT: CPT | Mod: S$GLB,,, | Performed by: PHYSICIAN ASSISTANT

## 2023-12-22 PROCEDURE — 1159F MED LIST DOCD IN RCRD: CPT | Mod: CPTII,S$GLB,, | Performed by: PHYSICIAN ASSISTANT

## 2023-12-22 PROCEDURE — 99024 PR POST-OP FOLLOW-UP VISIT: ICD-10-PCS | Mod: S$GLB,,, | Performed by: PHYSICIAN ASSISTANT

## 2023-12-22 RX ORDER — OXYCODONE AND ACETAMINOPHEN 5; 325 MG/1; MG/1
1 TABLET ORAL EVERY 6 HOURS PRN
Qty: 28 TABLET | Refills: 0 | Status: SHIPPED | OUTPATIENT
Start: 2023-12-22 | End: 2024-03-14

## 2023-12-22 RX ORDER — HYDROXYZINE PAMOATE 25 MG/1
25 CAPSULE ORAL EVERY 6 HOURS PRN
Qty: 120 CAPSULE | Refills: 1 | Status: SHIPPED | OUTPATIENT
Start: 2023-12-22 | End: 2024-01-17

## 2023-12-22 NOTE — PROGRESS NOTES
Subjective:    Patient ID: Nimco Caro is a 63 y.o. female.    Chief Complaint: Post-op Evaluation of the Spine (STAPLES, L4-5 TLIF/PLF 12/07/23. Pt states the legs are weak, right is the worst. Pt states it feels like cramps. This has improved some. )      History of Present Illness    Prior to meeting with the patient I reviewed the medical chart in [facility]. This included reviewing the previous progress notes from our office, review of the patient's last appointment with their primary care provider, review of any visits to the emergency room, and review of any pain management appointments or procedures.  Patient is here for her 2 week postoperative appointment status post L4-5 TLIF with posterolateral fusion and instrumentation.  Overall she is doing well but still reports some bilateral lower extremity weakness and decreased endurance with some limited functional mobility    Current Medications  Current Outpatient Medications   Medication Sig Dispense Refill    albuterol (PROVENTIL/VENTOLIN HFA) 90 mcg/actuation inhaler INHALE 2 PUFFS BY MOUTH INTO THE LUNGS EVERY 6 HOURS (Patient taking differently: Inhale 2 puffs into the lungs every 6 (six) hours as needed for Wheezing or Shortness of Breath. INHALE 2 PUFFS BY MOUTH INTO THE LUNGS EVERY 6 HOURS) 18 g 11    ascorbic acid, vitamin C, (VITAMIN C) 1000 MG tablet Take 1,000 mg by mouth once daily.      atenoloL (TENORMIN) 25 MG tablet Take 25 mg by mouth once daily.      azelastine (ASTELIN) 137 mcg (0.1 %) nasal spray 1 spray (137 mcg total) by Nasal route 2 (two) times daily. 30 mL 11    b complex vitamins tablet Take 1 tablet by mouth once daily.      biotin 1 mg tablet Take 1,000 mcg by mouth once daily.      budesonide-formoterol 80-4.5 mcg (SYMBICORT) 80-4.5 mcg/actuation HFAA Inhale 2 puffs into the lungs 2 (two) times a day. Controller 10.2 g 11    cetirizine (ZYRTEC) 10 MG tablet Take 1 tablet (10 mg total) by mouth once daily. 30 tablet 0    clonazePAM  (KLONOPIN) 2 MG Tab Take 2 mg by mouth 2 (two) times daily.      cyanocobalamin 1,000 mcg/mL injection Inject 1,000 mcg into the muscle every 30 days.      cycloSPORINE (RESTASIS) 0.05 % ophthalmic emulsion Apply 1 drop to eye 2 (two) times daily.      dexlansoprazole (DEXILANT) 60 mg capsule Take 60 mg by mouth once daily.      dicyclomine (BENTYL) 10 MG capsule Take 10 mg by mouth 2 (two) times daily.      donepeziL (ARICEPT) 10 MG tablet Take 10 mg by mouth once daily.      FLUoxetine 40 MG capsule Take 40 mg by mouth once daily.      fluticasone propionate (FLONASE) 50 mcg/actuation nasal spray 1 spray (50 mcg total) by Each Nostril route once daily. 15.8 mL 0    gabapentin (NEURONTIN) 800 MG tablet Take 800 mg by mouth 3 (three) times daily.      LATUDA 40 mg Tab tablet Take 40 mg by mouth once daily.      LINZESS 145 mcg Cap capsule Take 145 mcg by mouth before breakfast.      meclizine (ANTIVERT) 25 mg tablet Take 1 tablet (25 mg total) by mouth 3 (three) times daily as needed for Dizziness. 20 tablet 0    multivitamin with minerals tablet Take 1 tablet by mouth once daily.      nicotine (NICODERM CQ) 21 mg/24 hr PLACE 1 PATCH ONTO THE SKIN ONCE DAILY. (Patient not taking: Reported on 12/9/2023) 28 patch 1    ondansetron (ZOFRAN-ODT) 4 MG TbDL Take 4 mg by mouth every 8 (eight) hours as needed.      OXcarbazepine (TRILEPTAL) 300 MG Tab Take 300 mg by mouth once daily.      OXYGEN-AIR DELIVERY SYSTEMS MISC 6 L by Misc.(Non-Drug; Combo Route) route continuous.      pantoprazole (PROTONIX) 40 MG tablet Take 1 tablet by mouth every morning.      polyethylene glycol (GLYCOLAX) 17 gram/dose powder Take 17 g by mouth once daily.      pravastatin (PRAVACHOL) 20 MG tablet Take 20 mg by mouth once daily.      pulse oximeter (PULSE OXIMETER) device by Apply Externally route 2 (two) times a day. Use twice daily at 8 AM and 3 PM and record the value in [facility] as directed. 1 each 0    rimegepant (NURTEC) 75 mg odt Take 75  mg by mouth once as needed for Migraine.      salmeteroL (SEREVENT) 50 mcg/dose diskus inhaler Inhale 1 puff into the lungs once daily.      sucralfate (CARAFATE) 1 gram tablet Take 1 g by mouth 4 (four) times daily.      sumatriptan (IMITREX) 25 MG Tab Take 25 mg by mouth every 2 (two) hours as needed.      tiotropium (SPIRIVA WITH HANDIHALER) 18 mcg inhalation capsule Inhale 1 capsule (18 mcg total) into the lungs once daily. Controller 30 capsule 3    topiramate (TOPAMAX) 50 MG tablet Take 50 mg by mouth once daily.      traZODone (DESYREL) 100 MG tablet Take 250 mg by mouth every evening.      trospium (SANCTURA XR) 60 mg Cp24 capsule Take 60 mg by mouth once daily.      vilazodone (VIIBRYD) 20 mg Tab Take 20 mg by mouth once daily.      vitamin D (VITAMIN D3) 1000 units Tab Take 1,000 Units by mouth once daily.       No current facility-administered medications for this visit.       Allergies  Review of patient's allergies indicates:   Allergen Reactions    Meloxicam Other (See Comments)    Ciprofloxacin Diarrhea    Hydrocodone-acetaminophen Nausea And Vomiting, Nausea Only and Other (See Comments)    Ibuprofen Nausea Only    Naproxen Nausea Only and Other (See Comments)    Narcof [hydrocodone-guaifenesin] Nausea And Vomiting    Quetiapine Nausea And Vomiting and Other (See Comments)       Past Medical History  Past Medical History:   Diagnosis Date    ACP (advance care planning) 12/20/2023    Anxiety     Bipolar affect, depressed     Cancer ovarian; uterine    1992    COPD (chronic obstructive pulmonary disease)     GERD (gastroesophageal reflux disease)     Hyperlipidemia     Hypertension     OAB (overactive bladder)     On home oxygen therapy     per patient uses PRN       Surgical History  Past Surgical History:   Procedure Laterality Date    BREAST CYST ASPIRATION      COLON SURGERY      COLONOSCOPY      HERNIA REPAIR N/A 2016    HIP ARTHROPLASTY Right 2/25/2019    Procedure: ARTHROPLASTY, HIP;  Surgeon:  Eliseo Vaca MD;  Location: [facility] OR;  Service: Orthopedics;  Laterality: Right;  Daniel Herrera    HYSTERECTOMY  total    JOINT REPLACEMENT Right     hip replacemnt    KNEE ARTHROPLASTY Left 8/19/2019    Procedure: ARTHROPLASTY, KNEE;  Surgeon: Eliseo Vaca MD;  Location: [facility] OR;  Service: Orthopedics;  Laterality: Left;    LUMBAR LAMINECTOMY WITH FUSION N/A 12/7/2023    Procedure: LAMINECTOMY, SPINE, LUMBAR, WITH FUSION L4-5;  Surgeon: Joby Jaimes MD;  Location: [facility] OR;  Service: Orthopedics;  Laterality: N/A;  NTI, [employer]    REVISION COLOSTOMY N/A 2012       Family History:   Family History   Problem Relation Age of Onset    Cancer Mother     Hypertension Father     Heart disease Father     Heart disease Sister     Hypertension Sister     Hypertension Brother     Depression Brother        Social History:   Social History     Socioeconomic History    Marital status:    Tobacco Use    Smoking status: Every Day     Current packs/day: 0.50     Average packs/day: 0.5 packs/day for 40.0 years (20.0 ttl pk-yrs)     Types: Cigarettes    Smokeless tobacco: Never   Substance and Sexual Activity    Alcohol use: Not Currently     Alcohol/week: 1.0 standard drink of alcohol     Types: 1 Cans of beer per week     Comment: weekly    Drug use: No       Date of surgery:  12/07/2023    Review of Systems     General/Constitutional: Chills denies. Fatigue denies. Fever denies. Weight gain denies. Weight loss denies.    Musculoskeletal: Comments: See HPI for details    Skin: Rash denies.    Objective:   Vital Signs: There were no vitals filed for this visit.     Physical Exam    This a well-developed, well nourished patient in no acute distress.  They are alert and oriented and cooperative to examination.  Pt. walks without an antalgic gait.      General Examination:     Constitutional: The patient is alert and oriented to lace person and time. Mood is pleasant.     Head/Face: Normal facial features normal  eyebrows    Eyes: Normal extraocular motion bilaterally    Lungs: Respirations are equal and unlabored    Gait is coordinated.    Cardiovascular: There are no swelling or varicosities present.    Lymphatic: Negative for adenopathy    Skin: Normal    Neurological: Level of consciousness normal. Oriented to place person and time and situation    Psychiatric: Oriented to time place person and situation    Posterior lumbar incision healing well with no signs or symptoms of infection.  Staples removed today.  Antalgic gait with a rolling walker.  Lumbar range of motion remains moderately limited secondary to pain and guarding.  Bilateral lower extremities are distal neurovascular intact.    XRAY Report/ Interpretation:  Postop lumbar AP and lateral x-rays taken in the office today reviewed the patient demonstrate interbody cage at L4-5 with posterolateral instrumentation.  Everything is appropriately placed.      Assessment:       1. S/P lumbar fusion        Plan:       Nimco was seen today for post-op evaluation.    Diagnoses and all orders for this visit:    S/P lumbar fusion  -     X-Ray Lumbar Spine Ap And Lateral         No follow-ups on file.    Continue to increase activity as tolerated.  Continue with home health physical therapy to maximize endurance and functional mobility.  Refilled pain medication but lowered her dose and added Vistaril for itching.  Follow-up in 4 weeks or sooner if needed.    Treatment options were discussed with regards to the nature of the medical condition. Conservative pain intervention and surgical options were discussed in detail. The probability of success of each separate treatment option was discussed. The patient expressed a clear understanding of the treatment options. With regards to surgery, the procedure risk, benefits, complications, and outcomes were discussed. No guarantees were given with regards to surgical outcome.   The risk of complications, morbidity, and mortality  of patient management decisions have been made at the time of this visit. These are associated with the patient's problems, diagnostic procedures and treatment options. This includes the possible management options selected and those considered but not selected by the patient after shared medical decision making we discussed with the patient.     This note was created using Dragon voice recognition software that occasionally misinterpreted phrases or words.

## 2023-12-28 ENCOUNTER — PATIENT OUTREACH (OUTPATIENT)
Dept: ADMINISTRATIVE | Facility: OTHER | Age: 63
End: 2023-12-28
Payer: MEDICAID

## 2023-12-28 NOTE — PROGRESS NOTES
CHW - Case Closure    This Community Health Worker spoke to patient via telephone today.     Pt/Caregiver reported: pt stated she appreciates the call but doesn't need community resources at this time  Pt also stated she's doing a little better she has a PT home visit today     Pt/Caregiver denied any additional needs at this time and agrees with episode closure at this time.  Provided patient with Community Health Worker's contact information and encouraged him/her to contact this Community Health Worker if additional needs arise.

## 2024-01-06 ENCOUNTER — EXTERNAL HOME HEALTH (OUTPATIENT)
Dept: HOME HEALTH SERVICES | Facility: HOSPITAL | Age: 64
End: 2024-01-06
Payer: MEDICAID

## 2024-01-16 DIAGNOSIS — Z98.1 S/P LUMBAR FUSION: ICD-10-CM

## 2024-01-17 RX ORDER — HYDROXYZINE PAMOATE 25 MG/1
25 CAPSULE ORAL EVERY 6 HOURS PRN
Qty: 360 CAPSULE | Refills: 1 | Status: SHIPPED | OUTPATIENT
Start: 2024-01-17

## 2024-01-22 ENCOUNTER — OFFICE VISIT (OUTPATIENT)
Dept: ORTHOPEDICS | Facility: CLINIC | Age: 64
End: 2024-01-22
Payer: MEDICAID

## 2024-01-22 VITALS — BODY MASS INDEX: 27.47 KG/M2 | WEIGHT: 175 LBS | HEIGHT: 67 IN

## 2024-01-22 DIAGNOSIS — Z98.1 S/P LUMBAR FUSION: Primary | ICD-10-CM

## 2024-01-22 PROCEDURE — 1160F RVW MEDS BY RX/DR IN RCRD: CPT | Mod: CPTII,S$GLB,, | Performed by: ORTHOPAEDIC SURGERY

## 2024-01-22 PROCEDURE — 1159F MED LIST DOCD IN RCRD: CPT | Mod: CPTII,S$GLB,, | Performed by: ORTHOPAEDIC SURGERY

## 2024-01-22 PROCEDURE — 99024 POSTOP FOLLOW-UP VISIT: CPT | Mod: S$GLB,,, | Performed by: ORTHOPAEDIC SURGERY

## 2024-01-22 RX ORDER — OXYCODONE HYDROCHLORIDE 5 MG/1
5 TABLET ORAL EVERY 8 HOURS PRN
Qty: 21 TABLET | Refills: 0 | Status: SHIPPED | OUTPATIENT
Start: 2024-01-22 | End: 2024-01-29

## 2024-01-22 NOTE — PROGRESS NOTES
Subjective:    Patient ID: Nimco Caro is a 63 y.o. female.    Chief Complaint: Post-op Evaluation of the Lumbar Spine (4 week follow up-PO-Lumbar L4-5 TLIF/PLF 12/07/23, pain is pretty constant, limited standing, with the same pain prior surgery, no leg pain)      History of Present Illness    Prior to meeting with the patient I reviewed the medical chart in [facility]. This included reviewing the previous progress notes from our office, review of the patient's last appointment with their primary care provider, review of any visits to the emergency room, and review of any pain management appointments or procedures.  Status post lumbar fusion still with some residual back pain.    Current Medications  Current Outpatient Medications   Medication Sig Dispense Refill    albuterol (PROVENTIL/VENTOLIN HFA) 90 mcg/actuation inhaler INHALE 2 PUFFS BY MOUTH INTO THE LUNGS EVERY 6 HOURS (Patient taking differently: Inhale 2 puffs into the lungs every 6 (six) hours as needed for Wheezing or Shortness of Breath. INHALE 2 PUFFS BY MOUTH INTO THE LUNGS EVERY 6 HOURS) 18 g 11    ascorbic acid, vitamin C, (VITAMIN C) 1000 MG tablet Take 1,000 mg by mouth once daily.      atenoloL (TENORMIN) 25 MG tablet Take 25 mg by mouth once daily.      azelastine (ASTELIN) 137 mcg (0.1 %) nasal spray 1 spray (137 mcg total) by Nasal route 2 (two) times daily. 30 mL 11    b complex vitamins tablet Take 1 tablet by mouth once daily.      biotin 1 mg tablet Take 1,000 mcg by mouth once daily.      budesonide-formoterol 80-4.5 mcg (SYMBICORT) 80-4.5 mcg/actuation HFAA Inhale 2 puffs into the lungs 2 (two) times a day. Controller 10.2 g 11    cetirizine (ZYRTEC) 10 MG tablet Take 1 tablet (10 mg total) by mouth once daily. 30 tablet 0    clonazePAM (KLONOPIN) 2 MG Tab Take 2 mg by mouth 2 (two) times daily.      cyanocobalamin 1,000 mcg/mL injection Inject 1,000 mcg into the muscle every 30 days.      cycloSPORINE (RESTASIS) 0.05 % ophthalmic  emulsion Apply 1 drop to eye 2 (two) times daily.      dexlansoprazole (DEXILANT) 60 mg capsule Take 60 mg by mouth once daily.      dicyclomine (BENTYL) 10 MG capsule Take 10 mg by mouth 2 (two) times daily.      donepeziL (ARICEPT) 10 MG tablet Take 10 mg by mouth once daily.      FLUoxetine 40 MG capsule Take 40 mg by mouth once daily.      fluticasone propionate (FLONASE) 50 mcg/actuation nasal spray 1 spray (50 mcg total) by Each Nostril route once daily. 15.8 mL 0    gabapentin (NEURONTIN) 800 MG tablet Take 800 mg by mouth 3 (three) times daily.      hydrOXYzine pamoate (VISTARIL) 25 MG Cap TAKE 1 CAPSULE (25 MG TOTAL) BY MOUTH EVERY 6 (SIX) HOURS AS NEEDED. 360 capsule 1    LATUDA 40 mg Tab tablet Take 40 mg by mouth once daily.      LINZESS 145 mcg Cap capsule Take 145 mcg by mouth before breakfast.      meclizine (ANTIVERT) 25 mg tablet Take 1 tablet (25 mg total) by mouth 3 (three) times daily as needed for Dizziness. 20 tablet 0    multivitamin with minerals tablet Take 1 tablet by mouth once daily.      nicotine (NICODERM CQ) 21 mg/24 hr PLACE 1 PATCH ONTO THE SKIN ONCE DAILY. 28 patch 1    ondansetron (ZOFRAN-ODT) 4 MG TbDL Take 4 mg by mouth every 8 (eight) hours as needed.      OXcarbazepine (TRILEPTAL) 300 MG Tab Take 300 mg by mouth once daily.      oxyCODONE-acetaminophen (PERCOCET) 5-325 mg per tablet Take 1 tablet by mouth every 6 (six) hours as needed for Pain. 28 tablet 0    OXYGEN-AIR DELIVERY SYSTEMS MISC 6 L by Misc.(Non-Drug; Combo Route) route continuous.      pantoprazole (PROTONIX) 40 MG tablet Take 1 tablet by mouth every morning.      polyethylene glycol (GLYCOLAX) 17 gram/dose powder Take 17 g by mouth once daily.      pravastatin (PRAVACHOL) 20 MG tablet Take 20 mg by mouth once daily.      pulse oximeter (PULSE OXIMETER) device by Apply Externally route 2 (two) times a day. Use twice daily at 8 AM and 3 PM and record the value in [facility] as directed. 1 each 0    rimegepant (NURTEC)  75 mg odt Take 75 mg by mouth once as needed for Migraine.      salmeteroL (SEREVENT) 50 mcg/dose diskus inhaler Inhale 1 puff into the lungs once daily.      sucralfate (CARAFATE) 1 gram tablet Take 1 g by mouth 4 (four) times daily.      sumatriptan (IMITREX) 25 MG Tab Take 25 mg by mouth every 2 (two) hours as needed.      tiotropium (SPIRIVA WITH HANDIHALER) 18 mcg inhalation capsule Inhale 1 capsule (18 mcg total) into the lungs once daily. Controller 30 capsule 3    topiramate (TOPAMAX) 50 MG tablet Take 50 mg by mouth once daily.      traZODone (DESYREL) 100 MG tablet Take 250 mg by mouth every evening.      trospium (SANCTURA XR) 60 mg Cp24 capsule Take 60 mg by mouth once daily.      vilazodone (VIIBRYD) 20 mg Tab Take 20 mg by mouth once daily.      vitamin D (VITAMIN D3) 1000 units Tab Take 1,000 Units by mouth once daily.       No current facility-administered medications for this visit.       Allergies  Review of patient's allergies indicates:   Allergen Reactions    Meloxicam Other (See Comments)    Ciprofloxacin Diarrhea    Hydrocodone-acetaminophen Nausea And Vomiting, Nausea Only and Other (See Comments)    Ibuprofen Nausea Only    Naproxen Nausea Only and Other (See Comments)    Narcof [hydrocodone-guaifenesin] Nausea And Vomiting    Quetiapine Nausea And Vomiting and Other (See Comments)       Past Medical History  Past Medical History:   Diagnosis Date    ACP (advance care planning) 12/20/2023    Anxiety     Bipolar affect, depressed     Cancer ovarian; uterine    1992    COPD (chronic obstructive pulmonary disease)     GERD (gastroesophageal reflux disease)     Hyperlipidemia     Hypertension     OAB (overactive bladder)     On home oxygen therapy     per patient uses PRN       Surgical History  Past Surgical History:   Procedure Laterality Date    BREAST CYST ASPIRATION      COLON SURGERY      COLONOSCOPY      HERNIA REPAIR N/A 2016    HIP ARTHROPLASTY Right 2/25/2019    Procedure:  ARTHROPLASTY, HIP;  Surgeon: Eliseo Vaca MD;  Location: [facility] OR;  Service: Orthopedics;  Laterality: Right;  Daniel Herrera    HYSTERECTOMY  total    JOINT REPLACEMENT Right     hip replacemnt    KNEE ARTHROPLASTY Left 8/19/2019    Procedure: ARTHROPLASTY, KNEE;  Surgeon: Eliseo Vaca MD;  Location: [facility] OR;  Service: Orthopedics;  Laterality: Left;    LUMBAR LAMINECTOMY WITH FUSION N/A 12/7/2023    Procedure: LAMINECTOMY, SPINE, LUMBAR, WITH FUSION L4-5;  Surgeon: Joby Jaimes MD;  Location: [facility] OR;  Service: Orthopedics;  Laterality: N/A;  NTI, [employer]    REVISION COLOSTOMY N/A 2012       Family History:   Family History   Problem Relation Age of Onset    Cancer Mother     Hypertension Father     Heart disease Father     Heart disease Sister     Hypertension Sister     Hypertension Brother     Depression Brother        Social History:   Social History     Socioeconomic History    Marital status:    Tobacco Use    Smoking status: Every Day     Current packs/day: 0.50     Average packs/day: 0.5 packs/day for 40.0 years (20.0 ttl pk-yrs)     Types: Cigarettes    Smokeless tobacco: Never   Substance and Sexual Activity    Alcohol use: Not Currently     Alcohol/week: 1.0 standard drink of alcohol     Types: 1 Cans of beer per week     Comment: weekly    Drug use: No     Social Determinants of Health     Financial Resource Strain: Low Risk  (12/28/2023)    Overall Financial Resource Strain (CARDIA)     Difficulty of Paying Living Expenses: Not hard at all   Food Insecurity: No Food Insecurity (12/28/2023)    Hunger Vital Sign     Worried About Running Out of Food in the Last Year: Never true     Ran Out of Food in the Last Year: Never true   Transportation Needs: No Transportation Needs (12/28/2023)    PRAPARE - Transportation     Lack of Transportation (Medical): No     Lack of Transportation (Non-Medical): No       Date of surgery:      Review of Systems     General/Constitutional: Chills denies.  Fatigue denies. Fever denies. Weight gain denies. Weight loss denies.    Musculoskeletal: Comments: See HPI for details    Skin: Rash denies.    Objective:   Vital Signs: There were no vitals filed for this visit.     Physical Exam    This a well-developed, well nourished patient in no acute distress.  They are alert and oriented and cooperative to examination.  Pt. walks without an antalgic gait.      General Examination:     Constitutional: The patient is alert and oriented to lace person and time. Mood is pleasant.     Head/Face: Normal facial features normal eyebrows    Eyes: Normal extraocular motion bilaterally    Lungs: Respirations are equal and unlabored    Gait is coordinated.    Cardiovascular: There are no swelling or varicosities present.    Lymphatic: Negative for adenopathy    Skin: Normal    Neurological: Level of consciousness normal. Oriented to place person and time and situation    Psychiatric: Oriented to time place person and situation    Incision well healed range of motion limited  XRAY Report/ Interpretation:  AP and lateral x-rays of lumbar spine will performed today. Indications low back pain. Findings:  Instrumented lumbar fusion L4-5 early consolidation of interbody device L4-5 no signs of hardware loosening      Assessment:       1. S/P lumbar fusion        Plan:       Nimco was seen today for post-op evaluation.    Diagnoses and all orders for this visit:    S/P lumbar fusion  -     X-Ray Lumbar Spine Ap And Lateral         No follow-ups on file.    Reassurance given today begin taper pain medications after this present prescription activity as tolerated sedentary return 6 weeks with x-rays lumbar spine  Treatment options were discussed with regards to the nature of the medical condition. Conservative pain intervention and surgical options were discussed in detail. The probability of success of each separate treatment option was discussed. The patient expressed a clear understanding  of the treatment options. With regards to surgery, the procedure risk, benefits, complications, and outcomes were discussed. No guarantees were given with regards to surgical outcome.   The risk of complications, morbidity, and mortality of patient management decisions have been made at the time of this visit. These are associated with the patient's problems, diagnostic procedures and treatment options. This includes the possible management options selected and those considered but not selected by the patient after shared medical decision making we discussed with the patient.     This note was created using Dragon voice recognition software that occasionally misinterpreted phrases or words.

## 2024-02-20 ENCOUNTER — HOSPITAL ENCOUNTER (EMERGENCY)
Facility: HOSPITAL | Age: 64
Discharge: HOME | End: 2024-02-20
Attending: EMERGENCY MEDICINE
Payer: MEDICAID

## 2024-02-20 VITALS
TEMPERATURE: 99 F | HEART RATE: 64 BPM | RESPIRATION RATE: 20 BRPM | BODY MASS INDEX: 27.94 KG/M2 | SYSTOLIC BLOOD PRESSURE: 148 MMHG | HEIGHT: 67 IN | OXYGEN SATURATION: 100 % | WEIGHT: 178 LBS | DIASTOLIC BLOOD PRESSURE: 70 MMHG

## 2024-02-20 DIAGNOSIS — S80.01XA CONTUSION OF RIGHT KNEE, INITIAL ENCOUNTER: Primary | ICD-10-CM

## 2024-02-20 DIAGNOSIS — M25.569 KNEE PAIN: ICD-10-CM

## 2024-02-20 PROCEDURE — 99283 EMERGENCY DEPT VISIT LOW MDM: CPT | Mod: 25

## 2024-02-20 PROCEDURE — 25000003 PHARM REV CODE 250: Performed by: EMERGENCY MEDICINE

## 2024-02-20 RX ORDER — OXYCODONE HYDROCHLORIDE 10 MG/1
10 TABLET ORAL
Status: COMPLETED | OUTPATIENT
Start: 2024-02-20 | End: 2024-02-20

## 2024-02-20 RX ADMIN — OXYCODONE HYDROCHLORIDE 10 MG: 10 TABLET ORAL at 03:02

## 2024-02-20 NOTE — ED PROVIDER NOTES
Encounter Date: 2/20/2024       History     Chief Complaint   Patient presents with    Knee Pain     From a fall 2 weeks ago, pt did not see anyone with the initial fall     Chief complaint:  Knee pain    HPI:  63-year-old female presents with persistent right knee pain after a fall 2 weeks ago.  She suspects that she landed on a flexed knee after she tripped on her dog.  She denies any previous injury to this extremity.  She has no other injuries.      Review of patient's allergies indicates:   Allergen Reactions    Meloxicam Other (See Comments)    Ciprofloxacin Diarrhea    Hydrocodone-acetaminophen Nausea And Vomiting, Nausea Only and Other (See Comments)    Ibuprofen Nausea Only    Naproxen Nausea Only and Other (See Comments)    Narcof [hydrocodone-guaifenesin] Nausea And Vomiting    Quetiapine Nausea And Vomiting and Other (See Comments)     Past Medical History:   Diagnosis Date    ACP (advance care planning) 12/20/2023    Anxiety     Bipolar affect, depressed     Cancer ovarian; uterine    1992    COPD (chronic obstructive pulmonary disease)     GERD (gastroesophageal reflux disease)     Hyperlipidemia     Hypertension     OAB (overactive bladder)     On home oxygen therapy     per patient uses PRN     Past Surgical History:   Procedure Laterality Date    BREAST CYST ASPIRATION      COLON SURGERY      COLONOSCOPY      HERNIA REPAIR N/A 2016    HIP ARTHROPLASTY Right 2/25/2019    Procedure: ARTHROPLASTY, HIP;  Surgeon: Eliseo Vaca MD;  Location: [facility] OR;  Service: Orthopedics;  Laterality: Right;  Daniel Herrera    HYSTERECTOMY  total    JOINT REPLACEMENT Right     hip replacemnt    KNEE ARTHROPLASTY Left 8/19/2019    Procedure: ARTHROPLASTY, KNEE;  Surgeon: Eliseo Vaca MD;  Location: [facility] OR;  Service: Orthopedics;  Laterality: Left;    LUMBAR LAMINECTOMY WITH FUSION N/A 12/7/2023    Procedure: LAMINECTOMY, SPINE, LUMBAR, WITH FUSION L4-5;  Surgeon: Joby Jaimes MD;  Location: [facility] OR;  Service:  Orthopedics;  Laterality: N/A;  NTI, [employer]    REVISION COLOSTOMY N/A 2012     Family History   Problem Relation Age of Onset    Cancer Mother     Hypertension Father     Heart disease Father     Heart disease Sister     Hypertension Sister     Hypertension Brother     Depression Brother      Social History     Tobacco Use    Smoking status: Every Day     Current packs/day: 0.50     Average packs/day: 0.5 packs/day for 40.0 years (20.0 ttl pk-yrs)     Types: Cigarettes    Smokeless tobacco: Never   Substance Use Topics    Alcohol use: Not Currently     Alcohol/week: 1.0 standard drink of alcohol     Types: 1 Cans of beer per week     Comment: weekly    Drug use: No     Review of Systems   Constitutional:  Negative for fever.   Respiratory:  Negative for shortness of breath.    Genitourinary:  Negative for flank pain.   Musculoskeletal:  Positive for arthralgias. Negative for gait problem and joint swelling.   Neurological:  Negative for weakness.   Psychiatric/Behavioral:  Negative for confusion.        Physical Exam     Initial Vitals [02/20/24 1308]   BP Pulse Resp Temp SpO2   (!) 148/70 64 18 98.8 °F (37.1 °C) 100 %      MAP       --         Physical Exam    Nursing note and vitals reviewed.  Constitutional: Vital signs are normal. She is not diaphoretic.  Non-toxic appearance. She does not have a sickly appearance. No distress.   HENT:   Head: Normocephalic and atraumatic.   Eyes: Conjunctivae are normal.   Neck: Phonation normal. Neck supple. No thyromegaly present. No tracheal deviation present.   Cardiovascular:  Normal rate.           Pulmonary/Chest: No respiratory distress.   Abdominal: She exhibits no distension.   Musculoskeletal:         General: Tenderness (right lateral knee tenderness with no swelling or joint effusion.  1 cm healing abrasion of the right anterior knee) present. Normal range of motion.      Cervical back: Neck supple.     Neurological: She is alert and oriented to person, place,  and time.   Skin: Skin is warm, dry and intact. No pallor.   Psychiatric: She has a normal mood and affect. Her speech is normal and behavior is normal. Thought content normal.         ED Course   Procedures  Labs Reviewed - No data to display       Imaging Results              X-Ray Knee 3 View Right (Final result)  Result time 02/20/24 13:40:20      Final result by Sage Liu MD (02/20/24 13:40:20)                   Impression:      As above.      Electronically signed by: Sage Liu  Date:    02/20/2024  Time:    13:40               Narrative:    EXAMINATION:  XR KNEE 3 VIEW RIGHT    CLINICAL HISTORY:  Pain in unspecified knee    TECHNIQUE:  AP, lateral, and Merchant views of the right knee were performed.    COMPARISON:  06/01/2023    FINDINGS:  No radiographically evident acute fracture, dislocation, or osseous destructive process.  Small joint effusion.  Mild medial tibiofemoral joint space narrowing.  Scattered vascular calcification.                                       Medications   oxyCODONE immediate release tablet Tab 10 mg (has no administration in time range)     Medical Decision Making  63-year-old female presents with right anterior lateral knee pain with associated tenderness.  X-rays independently interpreted by me demonstrate no fracture or dislocation.  There is no evidence of septic arthritis.  The mechanism of injury suggest contusion; however, meniscus injury can not be excluded.    Risk  Prescription drug management.                                      Clinical Impression:  Final diagnoses:  [M25.569] Knee pain  [S80.01XA] Contusion of right knee, initial encounter (Primary)          ED Disposition Condition    Discharge Stable          ED Prescriptions    None       Follow-up Information       Follow up With Specialties Details Why Contact Info    Eliseo Vaca MD Orthopedic Surgery, Surgery, Sports Medicine In 1 week  [address] [address]  [phone]                Aazel Rodarte III, MD  02/20/24 6826

## 2024-02-20 NOTE — FIRST PROVIDER EVALUATION
Emergency Department TeleTriage Encounter Note      CHIEF COMPLAINT    Chief Complaint   Patient presents with    Knee Pain     From a fall 2 weeks ago, pt did not see anyone with the initial fall       VITAL SIGNS   Initial Vitals [02/20/24 1308]   BP Pulse Resp Temp SpO2   (!) 148/70 64 18 98.8 °F (37.1 °C) 100 %      MAP       --            ALLERGIES    Review of patient's allergies indicates:   Allergen Reactions    Meloxicam Other (See Comments)    Ciprofloxacin Diarrhea    Hydrocodone-acetaminophen Nausea And Vomiting, Nausea Only and Other (See Comments)    Ibuprofen Nausea Only    Naproxen Nausea Only and Other (See Comments)    Narcof [hydrocodone-guaifenesin] Nausea And Vomiting    Quetiapine Nausea And Vomiting and Other (See Comments)       PROVIDER TRIAGE NOTE  This is a teletriage evaluation of a 63 y.o. female presenting to the ED complaining of knee pain. Patient had trip and fall in garage 2 weeks ago. She has been taking percocet for pain but is now out. She describes pain as across her knee cap and then pins and needles down her leg.    Patient is alert and oriented. She speaks in complete sentences. She is sitting upright in the chair in no distress.     Initial orders will be placed and care will be transferred to an alternate provider when patient is roomed for a full evaluation. Any additional orders and the final disposition will be determined by that provider.         ORDERS  Labs Reviewed - No data to display    ED Orders (720h ago, onward)      Start Ordered     Status Ordering Provider    02/20/24 1314 02/20/24 1314  X-Ray Knee 3 View Right  1 time imaging         Acknowledged NIKUNJ WAGNER              Virtual Visit Note: The provider triage portion of this emergency department evaluation and documentation was performed via [employer], a HIPAA-compliant telemedicine application, in concert with a tele-presenter in the room. A face to face patient evaluation with one of my colleagues  will occur once the patient is placed in an emergency department room.      DISCLAIMER: This note was prepared with [employer] voice recognition transcription software. Garbled syntax, mangled pronouns, and other bizarre constructions may be attributed to that software system.

## 2024-02-27 ENCOUNTER — OFFICE VISIT (OUTPATIENT)
Dept: ORTHOPEDICS | Facility: CLINIC | Age: 64
End: 2024-02-27
Payer: MEDICAID

## 2024-02-27 VITALS — HEIGHT: 67 IN | WEIGHT: 178 LBS | BODY MASS INDEX: 27.94 KG/M2

## 2024-02-27 DIAGNOSIS — S83.206A ACUTE MENISCAL TEAR OF RIGHT KNEE, INITIAL ENCOUNTER: Primary | ICD-10-CM

## 2024-02-27 PROCEDURE — 20610 DRAIN/INJ JOINT/BURSA W/O US: CPT | Mod: 79,RT,S$GLB, | Performed by: ORTHOPAEDIC SURGERY

## 2024-02-27 PROCEDURE — 1159F MED LIST DOCD IN RCRD: CPT | Mod: CPTII,S$GLB,, | Performed by: ORTHOPAEDIC SURGERY

## 2024-02-27 PROCEDURE — 3008F BODY MASS INDEX DOCD: CPT | Mod: CPTII,S$GLB,, | Performed by: ORTHOPAEDIC SURGERY

## 2024-02-27 PROCEDURE — 1160F RVW MEDS BY RX/DR IN RCRD: CPT | Mod: CPTII,S$GLB,, | Performed by: ORTHOPAEDIC SURGERY

## 2024-02-27 PROCEDURE — 99213 OFFICE O/P EST LOW 20 MIN: CPT | Mod: 25,24,S$GLB, | Performed by: ORTHOPAEDIC SURGERY

## 2024-02-27 RX ORDER — TRIAMCINOLONE ACETONIDE 40 MG/ML
40 INJECTION, SUSPENSION INTRA-ARTICULAR; INTRAMUSCULAR
Status: DISCONTINUED | OUTPATIENT
Start: 2024-02-27 | End: 2024-02-27 | Stop reason: HOSPADM

## 2024-02-27 RX ORDER — HYDROCODONE BITARTRATE AND ACETAMINOPHEN 5; 325 MG/1; MG/1
1 TABLET ORAL EVERY 6 HOURS PRN
Qty: 28 TABLET | Refills: 0 | Status: SHIPPED | OUTPATIENT
Start: 2024-02-27 | End: 2024-03-05

## 2024-02-27 RX ADMIN — TRIAMCINOLONE ACETONIDE 40 MG: 40 INJECTION, SUSPENSION INTRA-ARTICULAR; INTRAMUSCULAR at 10:02

## 2024-02-27 NOTE — PROCEDURES
Large Joint Aspiration/Injection: R knee    Date/Time: 2/27/2024 10:15 AM    Performed by: Eliseo Vaca MD  Authorized by: Eliseo Vaca MD    Consent Done?:  Yes (Verbal)  Indications:  Pain  Site marked: the procedure site was marked    Timeout: prior to procedure the correct patient, procedure, and site was verified    Prep: patient was prepped and draped in usual sterile fashion      Local anesthesia used?: Yes    Local anesthetic:  Lidocaine 1% without epinephrine    Details:  Needle Size:  25 G  Ultrasonic Guidance for needle placement?: No    Location:  Knee  Site:  R knee  Medications:  40 mg triamcinolone acetonide 40 mg/mL  Patient tolerance:  Patient tolerated the procedure well with no immediate complications

## 2024-02-27 NOTE — PROGRESS NOTES
[facility]    Subjective:     Chief Complaint:   Chief Complaint   Patient presents with    Right Knee - Pain     Right knee pain after fall around 2.6.24 trip and fall. believes she fell on to knee while it was flexed. X ray preformed in ER believed to be meniscus tear. Pain is sharp to throbbing with numbness and pins and needle sensation. Increased pain with extension       Past Medical History:   Diagnosis Date    ACP (advance care planning) 12/20/2023    Anxiety     Bipolar affect, depressed     Cancer ovarian; uterine    1992    COPD (chronic obstructive pulmonary disease)     GERD (gastroesophageal reflux disease)     Hyperlipidemia     Hypertension     OAB (overactive bladder)     On home oxygen therapy     per patient uses PRN       Past Surgical History:   Procedure Laterality Date    BREAST CYST ASPIRATION      COLON SURGERY      COLONOSCOPY      HERNIA REPAIR N/A 2016    HIP ARTHROPLASTY Right 2/25/2019    Procedure: ARTHROPLASTY, HIP;  Surgeon: Eliseo Vaca MD;  Location: [facility] OR;  Service: Orthopedics;  Laterality: Right;  Daniel Herrera    HYSTERECTOMY  total    JOINT REPLACEMENT Right     hip replacemnt    KNEE ARTHROPLASTY Left 8/19/2019    Procedure: ARTHROPLASTY, KNEE;  Surgeon: Eliseo Vaca MD;  Location: [facility] OR;  Service: Orthopedics;  Laterality: Left;    LUMBAR LAMINECTOMY WITH FUSION N/A 12/7/2023    Procedure: LAMINECTOMY, SPINE, LUMBAR, WITH FUSION L4-5;  Surgeon: Joby Jaimes MD;  Location: [facility] OR;  Service: Orthopedics;  Laterality: N/A;  NTI, [employer]    REVISION COLOSTOMY N/A 2012       Current Outpatient Medications   Medication Sig    albuterol (PROVENTIL/VENTOLIN HFA) 90 mcg/actuation inhaler INHALE 2 PUFFS BY MOUTH INTO THE LUNGS EVERY 6 HOURS (Patient taking differently: Inhale 2 puffs into the lungs every 6 (six) hours as needed for Wheezing or Shortness of Breath. INHALE 2 PUFFS BY MOUTH INTO THE LUNGS EVERY 6 HOURS)    ascorbic acid, vitamin C, (VITAMIN C)  1000 MG tablet Take 1,000 mg by mouth once daily.    atenoloL (TENORMIN) 25 MG tablet Take 25 mg by mouth once daily.    azelastine (ASTELIN) 137 mcg (0.1 %) nasal spray SPRAY 1 SPRAY BY NASAL ROUTE 2 TIMES DAILY.    b complex vitamins tablet Take 1 tablet by mouth once daily.    biotin 1 mg tablet Take 1,000 mcg by mouth once daily.    budesonide-formoterol 80-4.5 mcg (SYMBICORT) 80-4.5 mcg/actuation HFAA Inhale 2 puffs into the lungs 2 (two) times a day. Controller    cetirizine (ZYRTEC) 10 MG tablet Take 1 tablet (10 mg total) by mouth once daily.    clonazePAM (KLONOPIN) 2 MG Tab Take 2 mg by mouth 2 (two) times daily.    cyanocobalamin 1,000 mcg/mL injection Inject 1,000 mcg into the muscle every 30 days.    cycloSPORINE (RESTASIS) 0.05 % ophthalmic emulsion Apply 1 drop to eye 2 (two) times daily.    dexlansoprazole (DEXILANT) 60 mg capsule Take 60 mg by mouth once daily.    dicyclomine (BENTYL) 10 MG capsule Take 10 mg by mouth 2 (two) times daily.    donepeziL (ARICEPT) 10 MG tablet Take 10 mg by mouth once daily.    FLUoxetine 40 MG capsule Take 40 mg by mouth once daily.    fluticasone propionate (FLONASE) 50 mcg/actuation nasal spray 1 spray (50 mcg total) by Each Nostril route once daily.    gabapentin (NEURONTIN) 800 MG tablet Take 800 mg by mouth 3 (three) times daily.    hydrOXYzine pamoate (VISTARIL) 25 MG Cap TAKE 1 CAPSULE (25 MG TOTAL) BY MOUTH EVERY 6 (SIX) HOURS AS NEEDED.    LATUDA 40 mg Tab tablet Take 40 mg by mouth once daily.    LINZESS 145 mcg Cap capsule Take 145 mcg by mouth before breakfast.    meclizine (ANTIVERT) 25 mg tablet Take 1 tablet (25 mg total) by mouth 3 (three) times daily as needed for Dizziness.    multivitamin with minerals tablet Take 1 tablet by mouth once daily.    nicotine (NICODERM CQ) 21 mg/24 hr PLACE 1 PATCH ONTO THE SKIN ONCE DAILY.    ondansetron (ZOFRAN-ODT) 4 MG TbDL Take 4 mg by mouth every 8 (eight) hours as needed.    OXcarbazepine (TRILEPTAL) 300 MG Tab  Take 300 mg by mouth once daily.    oxyCODONE-acetaminophen (PERCOCET) 5-325 mg per tablet Take 1 tablet by mouth every 6 (six) hours as needed for Pain.    OXYGEN-AIR DELIVERY SYSTEMS MISC 6 L by Misc.(Non-Drug; Combo Route) route continuous.    pantoprazole (PROTONIX) 40 MG tablet Take 1 tablet by mouth every morning.    polyethylene glycol (GLYCOLAX) 17 gram/dose powder Take 17 g by mouth once daily.    pravastatin (PRAVACHOL) 20 MG tablet Take 20 mg by mouth once daily.    pulse oximeter (PULSE OXIMETER) device by Apply Externally route 2 (two) times a day. Use twice daily at 8 AM and 3 PM and record the value in [employer] as directed.    rimegepant (NURTEC) 75 mg odt Take 75 mg by mouth once as needed for Migraine.    salmeteroL (SEREVENT) 50 mcg/dose diskus inhaler Inhale 1 puff into the lungs once daily.    sucralfate (CARAFATE) 1 gram tablet Take 1 g by mouth 4 (four) times daily.    sumatriptan (IMITREX) 25 MG Tab Take 25 mg by mouth every 2 (two) hours as needed.    tiotropium (SPIRIVA WITH HANDIHALER) 18 mcg inhalation capsule Inhale 1 capsule (18 mcg total) into the lungs once daily. Controller    topiramate (TOPAMAX) 50 MG tablet Take 50 mg by mouth once daily.    traZODone (DESYREL) 100 MG tablet Take 250 mg by mouth every evening.    trospium (SANCTURA XR) 60 mg Cp24 capsule Take 60 mg by mouth once daily.    vilazodone (VIIBRYD) 20 mg Tab Take 20 mg by mouth once daily.    vitamin D (VITAMIN D3) 1000 units Tab Take 1,000 Units by mouth once daily.     No current facility-administered medications for this visit.       Review of patient's allergies indicates:   Allergen Reactions    Meloxicam Other (See Comments)    Ciprofloxacin Diarrhea    Hydrocodone-acetaminophen Nausea And Vomiting, Nausea Only and Other (See Comments)    Ibuprofen Nausea Only    Naproxen Nausea Only and Other (See Comments)    Narcof [hydrocodone-guaifenesin] Nausea And Vomiting    Quetiapine Nausea And Vomiting and Other (See  Comments)       Family History   Problem Relation Age of Onset    Cancer Mother     Hypertension Father     Heart disease Father     Heart disease Sister     Hypertension Sister     Hypertension Brother     Depression Brother        Social History     Socioeconomic History    Marital status:    Tobacco Use    Smoking status: Every Day     Current packs/day: 0.50     Average packs/day: 0.5 packs/day for 40.0 years (20.0 ttl pk-yrs)     Types: Cigarettes    Smokeless tobacco: Never   Substance and Sexual Activity    Alcohol use: Not Currently     Alcohol/week: 1.0 standard drink of alcohol     Types: 1 Cans of beer per week     Comment: weekly    Drug use: No     Social Determinants of Health     Financial Resource Strain: Low Risk  (12/28/2023)    Overall Financial Resource Strain (CARDIA)     Difficulty of Paying Living Expenses: Not hard at all   Food Insecurity: No Food Insecurity (12/28/2023)    Hunger Vital Sign     Worried About Running Out of Food in the Last Year: Never true     Ran Out of Food in the Last Year: Never true   Transportation Needs: No Transportation Needs (12/28/2023)    PRAPARE - Transportation     Lack of Transportation (Medical): No     Lack of Transportation (Non-Medical): No       History of present illness:  Patient comes in today for the right knee.  She injured her knee when she fell at home she tripped over her garage.  At that time she had severe discomfort.  Now the knee is catching and giving way.  She is miserable she is having severe discomfort.      Review of Systems:    Constitution: Negative for chills, fever, and sweats.  Negative for unexplained weight loss.    HENT:  Negative for headaches and blurry vision.    Cardiovascular:Negative for chest pain or irregular heart beat. Negative for hypertension.    Respiratory:  Negative for cough and shortness of breath.    Gastrointestinal: Negative for abdominal pain, heartburn, melena, nausea, and  vomitting.    Genitourinary:  Negative bladder incontinence and dysuria.    Musculoskeletal:  See HPI for details.     Neurological: Negative for numbness.    Psychiatric/Behavioral: Negative for depression.  The patient is not nervous/anxious.      Endocrine: Negative for polyuria    Hematologic/Lymphatic: Negative for bleeding problem.  Does not bruise/bleed easily.    Skin: Negative for poor would healing and rash    Objective:      Physical Examination:    Vital Signs:  There were no vitals filed for this visit.    Body mass index is 27.88 kg/m².    This a well-developed, well nourished patient in no acute distress.  They are alert and oriented and cooperative to examination.        Patient appears to be stated age.  She had a lot of medial joint line tenderness she has a positive Jorge's for severe discomfort she has a 2+ effusion.  The knee is stable to varus valgus stresses.  Pertinent New Results:    XRAY Report / Interpretation:   Three views of the right knee demonstrate no fractures or subluxations.    Assessment/Plan:      Acute medial meniscal tear.  I have ordered an MRI to better look at the knee.  I will see her back with that information      This note was created using Dragon voice recognition software that occasionally misinterpreted phrases or words.

## 2024-03-01 ENCOUNTER — TELEPHONE (OUTPATIENT)
Dept: ADMINISTRATIVE | Facility: CLINIC | Age: 64
End: 2024-03-01
Payer: MEDICAID

## 2024-03-01 NOTE — TELEPHONE ENCOUNTER
ED Navigator attempted to contact patient on two  separate occasions, patient is unable to reach. ED Navigator to close encounter at this time.

## 2024-03-05 ENCOUNTER — CLINICAL SUPPORT (OUTPATIENT)
Dept: REHABILITATION | Facility: HOSPITAL | Age: 64
End: 2024-03-05
Payer: MEDICAID

## 2024-03-05 DIAGNOSIS — S83.206A ACUTE MENISCAL TEAR OF RIGHT KNEE, INITIAL ENCOUNTER: ICD-10-CM

## 2024-03-05 DIAGNOSIS — R26.2 DIFFICULTY IN WALKING INVOLVING LOWER LEG JOINT: ICD-10-CM

## 2024-03-05 DIAGNOSIS — M62.89 ABNORMAL INCREASED MUSCLE TONE: ICD-10-CM

## 2024-03-05 DIAGNOSIS — M62.81 MUSCLE WEAKNESS: Primary | ICD-10-CM

## 2024-03-05 DIAGNOSIS — M25.561 ACUTE PAIN OF RIGHT KNEE: ICD-10-CM

## 2024-03-05 DIAGNOSIS — Z55.9 SPECIAL EDUCATIONAL NEEDS: ICD-10-CM

## 2024-03-05 DIAGNOSIS — Z74.09 DECREASED FUNCTIONAL MOBILITY AND ENDURANCE: ICD-10-CM

## 2024-03-05 PROCEDURE — 97163 PT EVAL HIGH COMPLEX 45 MIN: CPT | Mod: PN

## 2024-03-05 SDOH — SOCIAL DETERMINANTS OF HEALTH (SDOH): PROBLEMS RELATED TO EDUCATION AND LITERACY, UNSPECIFIED: Z55.9

## 2024-03-06 ENCOUNTER — OFFICE VISIT (OUTPATIENT)
Dept: ORTHOPEDICS | Facility: CLINIC | Age: 64
End: 2024-03-06
Payer: MEDICAID

## 2024-03-06 VITALS — BODY MASS INDEX: 27.94 KG/M2 | HEIGHT: 67 IN | WEIGHT: 178 LBS

## 2024-03-06 DIAGNOSIS — R26.89 ANTALGIC GAIT: ICD-10-CM

## 2024-03-06 DIAGNOSIS — Z98.1 S/P LUMBAR FUSION: Primary | ICD-10-CM

## 2024-03-06 PROBLEM — Z55.9 SPECIAL EDUCATIONAL NEEDS: Status: ACTIVE | Noted: 2024-03-06

## 2024-03-06 PROBLEM — R26.2 DIFFICULTY IN WALKING INVOLVING LOWER LEG JOINT: Status: ACTIVE | Noted: 2024-03-06

## 2024-03-06 PROBLEM — M62.89 ABNORMAL INCREASED MUSCLE TONE: Status: ACTIVE | Noted: 2024-03-06

## 2024-03-06 PROBLEM — M62.81 MUSCLE WEAKNESS: Status: ACTIVE | Noted: 2024-03-06

## 2024-03-06 PROBLEM — M25.561 ACUTE PAIN OF RIGHT KNEE: Status: ACTIVE | Noted: 2024-03-06

## 2024-03-06 PROBLEM — Z74.09 DECREASED FUNCTIONAL MOBILITY AND ENDURANCE: Status: ACTIVE | Noted: 2024-03-06

## 2024-03-06 PROCEDURE — 1159F MED LIST DOCD IN RCRD: CPT | Mod: CPTII,S$GLB,, | Performed by: ORTHOPAEDIC SURGERY

## 2024-03-06 PROCEDURE — 99024 POSTOP FOLLOW-UP VISIT: CPT | Mod: S$GLB,,, | Performed by: ORTHOPAEDIC SURGERY

## 2024-03-06 PROCEDURE — 1160F RVW MEDS BY RX/DR IN RCRD: CPT | Mod: CPTII,S$GLB,, | Performed by: ORTHOPAEDIC SURGERY

## 2024-03-06 NOTE — PROGRESS NOTES
Subjective:    Patient ID: Nimco Caro is a 63 y.o. female.    Chief Complaint: Post-op Evaluation of the Spine (F/u XR PO Lumbar L4-5 TLIF/PLF 12/07/23, pain is pretty constant, limited standing, with the same pain prior surgery, no leg pain notes pan and discomfort in back but is unable to rate the level due to current knee pain after injury)      History of Present Illness    Prior to meeting with the patient I reviewed the medical chart in [facility]. This included reviewing the previous progress notes from our office, review of the patient's last appointment with their primary care provider, review of any visits to the emergency room, and review of any pain management appointments or procedures.  Patient is here 3 months status post L4-5 TLIF and posterolateral fusion.  Patient states that the lower extremity radicular symptoms that she had prior to surgery remain resolved.  Her low back pain currently is different than it was prior to surgery.  It was improved but now is irritated because she has an antalgic gait secondary to some right knee pain which she attributes to a meniscal tear and a recent contusion.  Patient is scheduled for an MRI of the right knee.  Started physical therapy evaluation but treatment was put on hold until MRI is completed per the patient and the physical therapist.    Current Medications  Current Outpatient Medications   Medication Sig Dispense Refill    albuterol (PROVENTIL/VENTOLIN HFA) 90 mcg/actuation inhaler INHALE 2 PUFFS BY MOUTH INTO THE LUNGS EVERY 6 HOURS (Patient taking differently: Inhale 2 puffs into the lungs every 6 (six) hours as needed for Wheezing or Shortness of Breath. INHALE 2 PUFFS BY MOUTH INTO THE LUNGS EVERY 6 HOURS) 18 g 11    ascorbic acid, vitamin C, (VITAMIN C) 1000 MG tablet Take 1,000 mg by mouth once daily.      atenoloL (TENORMIN) 25 MG tablet Take 25 mg by mouth once daily.      azelastine (ASTELIN) 137 mcg (0.1 %) nasal spray SPRAY 1 SPRAY BY NASAL  ROUTE 2 TIMES DAILY. 90 mL 0    b complex vitamins tablet Take 1 tablet by mouth once daily.      biotin 1 mg tablet Take 1,000 mcg by mouth once daily.      budesonide-formoterol 80-4.5 mcg (SYMBICORT) 80-4.5 mcg/actuation HFAA Inhale 2 puffs into the lungs 2 (two) times a day. Controller 10.2 g 11    cetirizine (ZYRTEC) 10 MG tablet Take 1 tablet (10 mg total) by mouth once daily. 30 tablet 0    clonazePAM (KLONOPIN) 2 MG Tab Take 2 mg by mouth 2 (two) times daily.      cyanocobalamin 1,000 mcg/mL injection Inject 1,000 mcg into the muscle every 30 days.      cycloSPORINE (RESTASIS) 0.05 % ophthalmic emulsion Apply 1 drop to eye 2 (two) times daily.      dexlansoprazole (DEXILANT) 60 mg capsule Take 60 mg by mouth once daily.      dicyclomine (BENTYL) 10 MG capsule Take 10 mg by mouth 2 (two) times daily.      donepeziL (ARICEPT) 10 MG tablet Take 10 mg by mouth once daily.      FLUoxetine 40 MG capsule Take 40 mg by mouth once daily.      fluticasone propionate (FLONASE) 50 mcg/actuation nasal spray 1 spray (50 mcg total) by Each Nostril route once daily. 15.8 mL 0    gabapentin (NEURONTIN) 800 MG tablet Take 800 mg by mouth 3 (three) times daily.      hydrOXYzine pamoate (VISTARIL) 25 MG Cap TAKE 1 CAPSULE (25 MG TOTAL) BY MOUTH EVERY 6 (SIX) HOURS AS NEEDED. 360 capsule 1    LATUDA 40 mg Tab tablet Take 40 mg by mouth once daily.      LINZESS 145 mcg Cap capsule Take 145 mcg by mouth before breakfast.      meclizine (ANTIVERT) 25 mg tablet Take 1 tablet (25 mg total) by mouth 3 (three) times daily as needed for Dizziness. 20 tablet 0    multivitamin with minerals tablet Take 1 tablet by mouth once daily.      nicotine (NICODERM CQ) 21 mg/24 hr PLACE 1 PATCH ONTO THE SKIN ONCE DAILY. 28 patch 1    ondansetron (ZOFRAN-ODT) 4 MG TbDL Take 4 mg by mouth every 8 (eight) hours as needed.      OXcarbazepine (TRILEPTAL) 300 MG Tab Take 300 mg by mouth once daily.      oxyCODONE-acetaminophen (PERCOCET) 5-325 mg per  tablet Take 1 tablet by mouth every 6 (six) hours as needed for Pain. 28 tablet 0    OXYGEN-AIR DELIVERY SYSTEMS MISC 6 L by Misc.(Non-Drug; Combo Route) route continuous.      pantoprazole (PROTONIX) 40 MG tablet Take 1 tablet by mouth every morning.      polyethylene glycol (GLYCOLAX) 17 gram/dose powder Take 17 g by mouth once daily.      pravastatin (PRAVACHOL) 20 MG tablet Take 20 mg by mouth once daily.      pulse oximeter (PULSE OXIMETER) device by Apply Externally route 2 (two) times a day. Use twice daily at 8 AM and 3 PM and [facility] as directed. 1 each 0    rimegepant (NURTEC) 75 mg odt Take 75 mg by mouth once as needed for Migraine.      salmeteroL (SEREVENT) 50 mcg/dose diskus inhaler Inhale 1 puff into the lungs once daily.      sucralfate (CARAFATE) 1 gram tablet Take 1 g by mouth 4 (four) times daily.      sumatriptan (IMITREX) 25 MG Tab Take 25 mg by mouth every 2 (two) hours as needed.      tiotropium (SPIRIVA WITH HANDIHALER) 18 mcg inhalation capsule Inhale 1 capsule (18 mcg total) into the lungs once daily. Controller 30 capsule 3    topiramate (TOPAMAX) 50 MG tablet Take 50 mg by mouth once daily.      traZODone (DESYREL) 100 MG tablet Take 250 mg by mouth every evening.      trospium (SANCTURA XR) 60 mg Cp24 capsule Take 60 mg by mouth once daily.      vilazodone (VIIBRYD) 20 mg Tab Take 20 mg by mouth once daily.      vitamin D (VITAMIN D3) 1000 units Tab Take 1,000 Units by mouth once daily.       No current facility-administered medications for this visit.       Allergies  Review of patient's allergies indicates:   Allergen Reactions    Meloxicam Other (See Comments)    Ciprofloxacin Diarrhea    Hydrocodone-acetaminophen Nausea And Vomiting, Nausea Only and Other (See Comments)    Ibuprofen Nausea Only    Naproxen Nausea Only and Other (See Comments)    Narcof [hydrocodone-guaifenesin] Nausea And Vomiting    Quetiapine Nausea And Vomiting and Other (See Comments)        Past Medical History  Past Medical History:   Diagnosis Date    ACP (advance care planning) 12/20/2023    Anxiety     Bipolar affect, depressed     Cancer ovarian; uterine    1992    COPD (chronic obstructive pulmonary disease)     GERD (gastroesophageal reflux disease)     Hyperlipidemia     Hypertension     OAB (overactive bladder)     On home oxygen therapy     per patient uses PRN       Surgical History  Past Surgical History:   Procedure Laterality Date    BREAST CYST ASPIRATION      COLON SURGERY      COLONOSCOPY      HERNIA REPAIR N/A 2016    HIP ARTHROPLASTY Right 2/25/2019    Procedure: ARTHROPLASTY, HIP;  Surgeon: Eliseo Vaca MD;  Location: [facility] OR;  Service: Orthopedics;  Laterality: Right;  Daniel Herrera    HYSTERECTOMY  total    JOINT REPLACEMENT Right     hip replacemnt    KNEE ARTHROPLASTY Left 8/19/2019    Procedure: ARTHROPLASTY, KNEE;  Surgeon: Eliseo Vaca MD;  Location: [facility] OR;  Service: Orthopedics;  Laterality: Left;    LUMBAR LAMINECTOMY WITH FUSION N/A 12/7/2023    Procedure: LAMINECTOMY, SPINE, LUMBAR, WITH FUSION L4-5;  Surgeon: Joby Jaimes MD;  [facility] OR;  Service: Orthopedics;  Laterality: N/A;  NTI, [employer]    REVISION COLOSTOMY N/A 2012       Family History:   Family History   Problem Relation Age of Onset    Cancer Mother     Hypertension Father     Heart disease Father     Heart disease Sister     Hypertension Sister     Hypertension Brother     Depression Brother        Social History:   Social History     Socioeconomic History    Marital status:    Tobacco Use    Smoking status: Every Day     Current packs/day: 0.50     Average packs/day: 0.5 packs/day for 40.0 years (20.0 ttl pk-yrs)     Types: Cigarettes    Smokeless tobacco: Never   Substance and Sexual Activity    Alcohol use: Not Currently     Alcohol/week: 1.0 standard drink of alcohol     Types: 1 Cans of beer per week     Comment: weekly    Drug use: No     Social Determinants of Health      Financial Resource Strain: Low Risk  (12/28/2023)    Overall Financial Resource Strain (CARDIA)     Difficulty of Paying Living Expenses: Not hard at all   Food Insecurity: No Food Insecurity (12/28/2023)    Hunger Vital Sign     Worried About Running Out of Food in the Last Year: Never true     Ran Out of Food in the Last Year: Never true   Transportation Needs: No Transportation Needs (12/28/2023)    PRAPARE - Transportation     Lack of Transportation (Medical): No     Lack of Transportation (Non-Medical): No       Date of surgery:  12/07/2023    Review of Systems     General/Constitutional: Chills denies. Fatigue denies. Fever denies. Weight gain denies. Weight loss denies.    Musculoskeletal: Comments: See HPI for details    Skin: Rash denies.    Objective:   Vital Signs: There were no vitals filed for this visit.     Physical Exam    This a well-developed, well nourished patient in no acute distress.  They are alert and oriented and cooperative to examination.  Pt. walks without an antalgic gait.      General Examination:     Constitutional: The patient is alert and oriented to lace person and time. Mood is pleasant.     Head/Face: Normal facial features normal eyebrows    Eyes: Normal extraocular motion bilaterally    Lungs: Respirations are equal and unlabored    Gait is coordinated.    Cardiovascular: There are no swelling or varicosities present.    Lymphatic: Negative for adenopathy    Skin: Normal    Neurological: Level of consciousness normal. Oriented to place person and time and situation    Psychiatric: Oriented to time place person and situation    Significantly antalgic gait with a rolling walker decreased weight-bearing right lower extremity.  Lumbar range of motion diminished in all planes secondary to pain and guarding.  Posterior lumbar incision remains well healed.  Bilateral lower extremities are distal neurovascular intact.    XRAY Report/ Interpretation:  Postop lumbar AP lateral x-rays  taken in the office today and reviewed the patient demonstrate L4-5 TLIF cage and posterolateral instrumentation.      Assessment:       1. S/P lumbar fusion    2. Antalgic gait        Plan:       Nimco was seen today for post-op evaluation.    Diagnoses and all orders for this visit:    S/P lumbar fusion  -     X-Ray Lumbar Spine Ap And Lateral    Antalgic gait         No follow-ups on file.  This is to attest that the physician's assistant, Fede Jeter served in the capacity as a scribe for this patient's encounter.  This is also verify that I have reviewed the patient's history and help formulate the treatment plan as discussed with the physician's assistant.  I have actively participated in the evaluation and treatment plan for this patient visit.  The treatment plan and medical decision-making is outlined below  At this time we recommend that she continue ambulating as much as possible.  It is hard for her to get an accurate assessment of her back pain postoperatively secondary to her right knee pain.  Recommend proceeding with right knee MRI and following up to discuss the possibility of right knee surgery.    Treatment options were discussed with regards to the nature of the medical condition. Conservative pain intervention and surgical options were discussed in detail. The probability of success of each separate treatment option was discussed. The patient expressed a clear understanding of the treatment options. With regards to surgery, the procedure risk, benefits, complications, and outcomes were discussed. No guarantees were given with regards to surgical outcome.   The risk of complications, morbidity, and mortality of patient management decisions have been made at the time of this visit. These are associated with the patient's problems, diagnostic procedures and treatment options. This includes the possible management options selected and those considered but not selected by the patient after  shared medical decision making we discussed with the patient.     This note was created using Dragon voice recognition software that occasionally misinterpreted phrases or words.

## 2024-03-07 ENCOUNTER — HOSPITAL ENCOUNTER (OUTPATIENT)
Dept: RADIOLOGY | Facility: HOSPITAL | Age: 64
Discharge: HOME | End: 2024-03-07
Attending: ORTHOPAEDIC SURGERY
Payer: MEDICAID

## 2024-03-07 DIAGNOSIS — S83.206A ACUTE MENISCAL TEAR OF RIGHT KNEE, INITIAL ENCOUNTER: ICD-10-CM

## 2024-03-07 PROCEDURE — 73721 MRI JNT OF LWR EXTRE W/O DYE: CPT | Mod: TC,RT

## 2024-03-07 PROCEDURE — 73721 MRI JNT OF LWR EXTRE W/O DYE: CPT | Mod: 26,RT,, | Performed by: RADIOLOGY

## 2024-03-07 NOTE — PLAN OF CARE
"[facility]   Physical Therapy Initial Evaluation     Date: 3/5/2024   Name: Nimco Caro  [facility] Number: [id number]    Therapy Diagnosis:   Encounter Diagnoses   Name Primary?    Acute meniscal tear of right knee, initial encounter     Acute pain of right knee     Muscle weakness Yes    Abnormal increased muscle tone     Decreased functional mobility and endurance     Special educational needs     Difficulty in walking involving lower leg joint      Physician: Eliseo Vaca MD    Physician Orders: PT Eval and Treat   Medical Diagnosis from Referral: S83.206A (ICD-10-CM) - Acute meniscal tear of right knee, initial encounter  Evaluation Date: 3/5/2024  Authorization Period Expiration: 02/26/2025  Plan of Care Expiration: 04/26/2024 at 2x/wk x 6 weeks  Progress Note Due: 04/04/2024  Visit # / Visits authorized: 1/1   FOTO: 1/3     Time In: 1:49 pm  Time Out: 2:41 pm  Total Appointment Time (timed & untimed codes): 52 minutes    Precautions: Standard and old right hip and left knee replacements and recent lumbar fusion.  SUBJECTIVE   Date of onset: She reports that her initial injury was approximately February 6th of 2024.    History of current condition - Nimco reports: that she tripped one her carport and had her dog in her hands and protected the dog and fell on knee She reports immediate pain and swelling. She went to her Emergency Room about two weeks. There were no fractures noted. She tried to tough it out, but her right knee started to "catch" and give out on her. She eventually sought medical attention from an orthopedic. The orthopedic believes there is a meniscus tear. He has ordered a Magnetic Resonance Image. Today, she reports the recent steroid shot made her knee pain worse. She desires to complete today's evaluation, but she wants to place PT on hold until she has her Magnetic Resonance Image and talks to the Dr about her results. PT acknowledges.    Falls: She reports no " "other history of falls besides the one that caused this injury.     Imaging, XR KNEE: FINDINGS: No radiographically evident acute fracture, dislocation, or osseous destructive process. Small joint effusion. Mild medial tibiofemoral joint space narrowing. Scattered vascular calcification. Impression: As above.     Prior Therapy: The patient has not received PT for these complaints.   Social History: Nimco lives with her adult friend. No stairs or steps are present where she lives.   Occupation: The patient is not working.  Prior Level of Function: She was independent with all activity. She was not limited.   Current Level of Function: Her desired activities are limited by her right knee pain.     Pain:  Current 9/10, worst 10/10, best 9/10   Location: The right knee.  Description: Burning, Grabbing, Tight, Numb, Sharp, Shooting, and Variable  Aggravating Factors: Prolonged standing and walking, moving the knee, going from sit to stand, and laying down.   Easing Factors: She will sit down or do some gentle walking.    Patients goals: "I want to get the Magnetic Resonance Image to see if I need surgery or not before I start my PT."     Medical History:   Past Medical History:   Diagnosis Date    ACP (advance care planning) 12/20/2023    Anxiety     Bipolar affect, depressed     Cancer ovarian; uterine    1992    COPD (chronic obstructive pulmonary disease)     GERD (gastroesophageal reflux disease)     Hyperlipidemia     Hypertension     OAB (overactive bladder)     On home oxygen therapy     per patient uses PRN     Surgical History:   Nimco Caro  has a past surgical history that includes Hysterectomy (total); Revision Colostomy (N/A, 2012); Hernia repair (N/A, 2016); Colon surgery; Colonoscopy; Hip Arthroplasty (Right, 2/25/2019); Joint replacement (Right); Knee Arthroplasty (Left, 8/19/2019); Breast cyst aspiration; and Lumbar laminectomy with fusion (N/A, 12/7/2023).    Medications:   Nimco has a current " medication list which includes the following prescription(s): albuterol, ascorbic acid (vitamin c), atenolol, azelastine, b complex vitamins, biotin, budesonide-formoterol 80-4.5 mcg, cetirizine, clonazepam, cyanocobalamin, cyclosporine, dexlansoprazole, dicyclomine, donepezil, fluoxetine, fluticasone propionate, gabapentin, hydroxyzine pamoate, latuda, linzess, meclizine, multivitamin with minerals, nicotine, ondansetron, oxcarbazepine, oxycodone-acetaminophen, oxygen-air delivery systems, pantoprazole, polyethylene glycol, pravastatin, pulse oximeter, nurtec, salmeterol, sucralfate, sumatriptan, spiriva with handihaler, topiramate, trazodone, trospium, vilazodone, and vitamin d.    Allergies:   Review of patient's allergies indicates:   Allergen Reactions    Meloxicam Other (See Comments)    Ciprofloxacin Diarrhea    Hydrocodone-acetaminophen Nausea And Vomiting, Nausea Only and Other (See Comments)    Ibuprofen Nausea Only    Naproxen Nausea Only and Other (See Comments)    Narcof [hydrocodone-guaifenesin] Nausea And Vomiting    Quetiapine Nausea And Vomiting and Other (See Comments)      OBJECTIVE     Observation: She ambulates in with her rolling walker. She has an antalgic gait. She has decreased stance time on the right leg. She appears in distress.     Range of Motion:   Knee Left active Right Active R passive   Flexion 95 35 60*   Extension 0 -5 -5*       Lower Extremity Strength  Right LE  Left LE    Knee extension: 3+/5 Knee extension: 5/5   Knee flexion: 3+/5 Knee flexion: 5/5       Special Tests:   Left Right   Valgus Stress Test (MCL) Negative Negative   Varus Stress test (LCL) Negative Negative   Lachman's test (ACL) Negative Negative   Posterior Lachman (PCL) Negative Negative   Julián's Test (Meniscus) Negative Positive   Patellar Grind Test (PFS/OA) Negative Positive     Joint Mobility: She has normal patella mobility. No restrictions. No J-Sign.    Palpation: She is tender to palpation along the  medial joint line and the medial border of the patella.    Sensation: The patient's bilateral lower extremity sensation is intact to light touch and pin prick throughout each lower extremity.     Flexibility:    Ely's test (Rectus Femoris):                                  R = (+) ;                                   L = (-)    Popliteal Angle (Hamstring tone):                         R = (+) Moderate Increase;     L = (-)    Gastrocnemius tone:                                              R =  (+);                                   L = (-)    Edema: No current warmth is noted.     Girth Measurement Joint line   Left 38 cm   Right 39 cm       Limitation/Restriction for FOTO Knee Survey    Therapist reviewed FOTO scores for Nimco Caro on 3/5/2024.   FOTO documents entered into [employer] - see Media section.    Limitation Score: 25% Function at the Evaluation.       TREATMENT     Total Treatment time (time-based codes) separate from Evaluation: 0 minutes      Nimco received the treatments listed below:  N/T      PATIENT EDUCATION AND HOME EXERCISES     Education provided:   -The patient will receive her initial written home exercise program once she receives her Magnetic Resonance Image and returns to PT if needed. She was without questions.     Written Home Exercises Provided: See Above. Exercises were reviewed and Nimco was able to demonstrate them prior to the end of the session.  Nimco demonstrated good  understanding of the education provided. See EMR under Patient Instructions for exercises provided during therapy sessions.  ASSESSMENT     Nimco is a 63 y.o. female referred to outpatient Physical Therapy with a medical diagnosis of S83.206A (ICD-10-CM) - Acute meniscal tear of right knee, initial encounter. She presents with right knee: pain, weakness, decreased motion, and increased muscle tone. She reports a decrease in her functional ability. She needs a custom written home exercise program and patient  education. She is currently ambulating with a rolling walker, and she is awaiting an Magnetic Resonance Image for a more distinct diagnosis.     Patient prognosis is Fair.     The patient will benefit from skilled outpatient Physical Therapy in order to address the deficits stated above and in the chart below, provide patient/family education, and to maximize the patient's level of independence.     Plan of care discussed with patient: Yes  Patient's spiritual, cultural and educational needs considered and patient is agreeable to the plan of care and goals as stated below:     Anticipated Barriers for therapy: co-morbidities and waiting on her Magnetic Resonance Image to assess the severity of the injury.     Medical Necessity is demonstrated by the following  History  Co-morbidities and personal factors that may impact the plan of care Co-morbidities:   anxiety, COPD/asthma, coping style/mechanism, depression, difficulty sleeping, history of cancer, HTN, prior hip surgery, prior lumbar surgery, and prior knee surgery, bipolar affect, GERDs, hyperlipidemia, overactive bladder, and uses oxygen at home as needed.     Personal Factors:   coping style  lifestyle  character  attitudes     high   Examination  Body Structures and Functions, activity limitations and participation restrictions that may impact the plan of care Body Regions:   lower extremities  trunk    Body Systems:    gross symmetry  ROM  strength  gross coordinated movement  balance  gait  motor control  motor learning  Increased muscle tone and pain control.    Participation Restrictions:   none    Activity limitations:   Learning and applying knowledge  no deficits    General Tasks and Commands  no deficits    Communication  no deficits    Mobility  lifting and carrying objects  walking  moving around using equipment (WC)  driving (bike, car, motorcycle)    Self care  dressing  looking after one's health    Domestic Life  shopping  cooking  doing house  work (cleaning house, washing dishes, laundry)  assisting others    Interactions/Relationships  complex interpersonal interactions    Life Areas  no deficits    Community and Social Life  community life  recreation and leisure         high   Clinical Presentation evolving clinical presentation with changing clinical characteristics high   Decision Making/ Complexity Score: high     Goals:    STG  Weeks/Visits Date Established  Date Met   1.Decrease her max right knee pain to 7/10 in order to improve her quality of life. 3 weeks 3/5/2024   In Progress   2. Increase her right knee strength a 1/2 grade in order to improve her standing activity endurance. 3 weeks 3/5/2024   In Progress   3.Decrease her right hamstring muscle tone to minimal to moderate increase in order to improve her bed mobility and ease of rising from a low chair.  3 weeks 3/5/2024   In Progress   4.Increase her FOTO function to >=40% in order to improve her household task and activity of daily living ability.  3 weeks 3/5/2024   In Progress   5.Fair initial written home exercise program knowledge and be without questions in order to have carryover after discharge from PT.  3 weeks 3/5/2024   In Progress     LTG Weeks/Visits Date Established  Date Met   1.Decrease her max right knee pain to 3/10 in order to improve her quality of life. 6 weeks 3/5/2024   In Progress   2. Increase her right knee strength one grade from the evaluation date in order to improve her standing activity endurance with the least assistive device. 6 weeks 3/5/2024     In Progress   3.Decrease her right hamstring muscle tone to minimal increase in order to improve her bed mobility and ease of rising from a low chair.  6 weeks 3/5/2024   In Progress   4.Increase her FOTO function to >=50% in order to improve her household task and activity of daily living ability.  6 weeks 3/5/2024   In Progress   5.Good Progressed written home exercise program knowledge and be without questions  in order to have carryover after discharge from PT.  6 weeks 3/5/2024   In Progress     PLAN   Plan of care Certification: 3/5/2024 to 04/26/2024.    Outpatient Physical Therapy 2 times weekly for 6 weeks to include the following interventions: Electrical Stimulation unattended and [place], Manual Therapy, Moist Heat/ Ice, Neuromuscular Re-ed, Patient Education, Self Care, Therapeutic Activities, Therapeutic Exercise, and care by a PTA.     Manuel Lizama, PT      I CERTIFY THE NEED FOR THESE SERVICES FURNISHED UNDER THIS PLAN OF TREATMENT AND WHILE UNDER MY CARE   Physician's comments:     Physician's Signature: ___________________________________________________

## 2024-03-11 PROBLEM — J18.9 PNEUMONIA: Status: RESOLVED | Noted: 2019-08-22 | Resolved: 2024-03-11

## 2024-03-14 ENCOUNTER — OFFICE VISIT (OUTPATIENT)
Dept: ORTHOPEDICS | Facility: CLINIC | Age: 64
End: 2024-03-14
Payer: MEDICAID

## 2024-03-14 VITALS — HEIGHT: 67 IN | WEIGHT: 177.94 LBS | BODY MASS INDEX: 27.93 KG/M2

## 2024-03-14 DIAGNOSIS — S82.121A CLOSED FRACTURE OF LATERAL PORTION OF RIGHT TIBIAL PLATEAU, INITIAL ENCOUNTER: Primary | ICD-10-CM

## 2024-03-14 PROCEDURE — 27530 TREAT KNEE FRACTURE: CPT | Mod: RT,S$GLB,, | Performed by: ORTHOPAEDIC SURGERY

## 2024-03-14 PROCEDURE — 3008F BODY MASS INDEX DOCD: CPT | Mod: CPTII,S$GLB,, | Performed by: ORTHOPAEDIC SURGERY

## 2024-03-14 PROCEDURE — 1159F MED LIST DOCD IN RCRD: CPT | Mod: CPTII,S$GLB,, | Performed by: ORTHOPAEDIC SURGERY

## 2024-03-14 PROCEDURE — 99213 OFFICE O/P EST LOW 20 MIN: CPT | Mod: 57,S$GLB,, | Performed by: ORTHOPAEDIC SURGERY

## 2024-03-14 PROCEDURE — 1160F RVW MEDS BY RX/DR IN RCRD: CPT | Mod: CPTII,S$GLB,, | Performed by: ORTHOPAEDIC SURGERY

## 2024-03-14 RX ORDER — HYDROCODONE BITARTRATE AND ACETAMINOPHEN 5; 325 MG/1; MG/1
1 TABLET ORAL EVERY 8 HOURS PRN
Qty: 21 TABLET | Refills: 0 | Status: SHIPPED | OUTPATIENT
Start: 2024-03-14

## 2024-03-14 NOTE — PROGRESS NOTES
[facility] ORTHOPEDICS    Subjective:     Chief Complaint:   Chief Complaint   Patient presents with    Right Knee - Results     MRI        Past Medical History:   Diagnosis Date    ACP (advance care planning) 12/20/2023    Anxiety     Bipolar affect, depressed     Cancer ovarian; uterine    1992    COPD (chronic obstructive pulmonary disease)     GERD (gastroesophageal reflux disease)     Hyperlipidemia     Hypertension     OAB (overactive bladder)     On home oxygen therapy     per patient uses PRN       Past Surgical History:   Procedure Laterality Date    BREAST CYST ASPIRATION      COLON SURGERY      COLONOSCOPY      HERNIA REPAIR N/A 2016    HIP ARTHROPLASTY Right 2/25/2019    Procedure: ARTHROPLASTY, HIP;  Surgeon: Eliseo Vaca MD;  Location: [facility];  Service: Orthopedics;  Laterality: Right;  Daniel Herrera    HYSTERECTOMY  total    JOINT REPLACEMENT Right     hip replacemnt    KNEE ARTHROPLASTY Left 8/19/2019    Procedure: ARTHROPLASTY, KNEE;  Surgeon: Eliseo Vaca MD;  Location[facility] OR;  Service: Orthopedics;  Laterality: Left;    LUMBAR LAMINECTOMY WITH FUSION N/A 12/7/2023    Procedure: LAMINECTOMY, SPINE, LUMBAR, WITH FUSION L4-5;  Surgeon: Joby Jaimes MD;  Location: [facility];  Service: Orthopedics;  Laterality: N/A;  NTI, [employer]    REVISION COLOSTOMY N/A 2012       Current Outpatient Medications   Medication Sig    albuterol (PROVENTIL/VENTOLIN HFA) 90 mcg/actuation inhaler INHALE 2 PUFFS BY MOUTH INTO THE LUNGS EVERY 6 HOURS (Patient taking differently: Inhale 2 puffs into the lungs every 6 (six) hours as needed for Wheezing or Shortness of Breath. INHALE 2 PUFFS BY MOUTH INTO THE LUNGS EVERY 6 HOURS)    ascorbic acid, vitamin C, (VITAMIN C) 1000 MG tablet Take 1,000 mg by mouth once daily.    atenoloL (TENORMIN) 25 MG tablet Take 25 mg by mouth once daily.    azelastine (ASTELIN) 137 mcg (0.1 %) nasal spray SPRAY 1 SPRAY BY NASAL ROUTE 2 TIMES DAILY.    b complex vitamins tablet Take 1  tablet by mouth once daily.    biotin 1 mg tablet Take 1,000 mcg by mouth once daily.    budesonide-formoterol 80-4.5 mcg (SYMBICORT) 80-4.5 mcg/actuation HFAA Inhale 2 puffs into the lungs 2 (two) times a day. Controller    cetirizine (ZYRTEC) 10 MG tablet Take 1 tablet (10 mg total) by mouth once daily.    clonazePAM (KLONOPIN) 2 MG Tab Take 2 mg by mouth 2 (two) times daily.    cyanocobalamin 1,000 mcg/mL injection Inject 1,000 mcg into the muscle every 30 days.    cycloSPORINE (RESTASIS) 0.05 % ophthalmic emulsion Apply 1 drop to eye 2 (two) times daily.    dexlansoprazole (DEXILANT) 60 mg capsule Take 60 mg by mouth once daily.    dicyclomine (BENTYL) 10 MG capsule Take 10 mg by mouth 2 (two) times daily.    donepeziL (ARICEPT) 10 MG tablet Take 10 mg by mouth once daily.    FLUoxetine 40 MG capsule Take 40 mg by mouth once daily.    fluticasone propionate (FLONASE) 50 mcg/actuation nasal spray 1 spray (50 mcg total) by Each Nostril route once daily.    gabapentin (NEURONTIN) 800 MG tablet Take 800 mg by mouth 3 (three) times daily.    hydrOXYzine pamoate (VISTARIL) 25 MG Cap TAKE 1 CAPSULE (25 MG TOTAL) BY MOUTH EVERY 6 (SIX) HOURS AS NEEDED.    LATUDA 40 mg Tab tablet Take 40 mg by mouth once daily.    LINZESS 145 mcg Cap capsule Take 145 mcg by mouth before breakfast.    meclizine (ANTIVERT) 25 mg tablet Take 1 tablet (25 mg total) by mouth 3 (three) times daily as needed for Dizziness.    multivitamin with minerals tablet Take 1 tablet by mouth once daily.    nicotine (NICODERM CQ) 21 mg/24 hr PLACE 1 PATCH ONTO THE SKIN ONCE DAILY.    ondansetron (ZOFRAN-ODT) 4 MG TbDL Take 4 mg by mouth every 8 (eight) hours as needed.    OXcarbazepine (TRILEPTAL) 300 MG Tab Take 300 mg by mouth once daily.    oxyCODONE-acetaminophen (PERCOCET) 5-325 mg per tablet Take 1 tablet by mouth every 6 (six) hours as needed for Pain.    OXYGEN-AIR DELIVERY SYSTEMS MISC 6 L by Misc.(Non-Drug; Combo Route) route continuous.     pantoprazole (PROTONIX) 40 MG tablet Take 1 tablet by mouth every morning.    polyethylene glycol (GLYCOLAX) 17 gram/dose powder Take 17 g by mouth once daily.    pravastatin (PRAVACHOL) 20 MG tablet Take 20 mg by mouth once daily.    pulse oximeter (PULSE OXIMETER) device by Apply Externally route 2 (two) times a day. Use twice daily at 8 AM and 3 PM and record the value in MyChart as directed.    rimegepant (NURTEC) 75 mg odt Take 75 mg by mouth once as needed for Migraine.    salmeteroL (SEREVENT) 50 mcg/dose diskus inhaler Inhale 1 puff into the lungs once daily.    sucralfate (CARAFATE) 1 gram tablet Take 1 g by mouth 4 (four) times daily.    sumatriptan (IMITREX) 25 MG Tab Take 25 mg by mouth every 2 (two) hours as needed.    tiotropium (SPIRIVA WITH HANDIHALER) 18 mcg inhalation capsule Inhale 1 capsule (18 mcg total) into the lungs once daily. Controller    topiramate (TOPAMAX) 50 MG tablet Take 50 mg by mouth once daily.    traZODone (DESYREL) 100 MG tablet Take 250 mg by mouth every evening.    trospium (SANCTURA XR) 60 mg Cp24 capsule Take 60 mg by mouth once daily.    vilazodone (VIIBRYD) 20 mg Tab Take 20 mg by mouth once daily.    vitamin D (VITAMIN D3) 1000 units Tab Take 1,000 Units by mouth once daily.     No current facility-administered medications for this visit.       Review of patient's allergies indicates:   Allergen Reactions    Meloxicam Other (See Comments)    Ciprofloxacin Diarrhea    Hydrocodone-acetaminophen Nausea And Vomiting, Nausea Only and Other (See Comments)    Ibuprofen Nausea Only    Naproxen Nausea Only and Other (See Comments)    Narcof [hydrocodone-guaifenesin] Nausea And Vomiting    Quetiapine Nausea And Vomiting and Other (See Comments)       Family History   Problem Relation Age of Onset    Cancer Mother     Hypertension Father     Heart disease Father     Heart disease Sister     Hypertension Sister     Hypertension Brother     Depression Brother        Social History      Socioeconomic History    Marital status:    Tobacco Use    Smoking status: Every Day     Current packs/day: 0.50     Average packs/day: 0.5 packs/day for 40.0 years (20.0 ttl pk-yrs)     Types: Cigarettes    Smokeless tobacco: Never   Substance and Sexual Activity    Alcohol use: Not Currently     Alcohol/week: 1.0 standard drink of alcohol     Types: 1 Cans of beer per week     Comment: weekly    Drug use: No     Social Determinants of Health     Financial Resource Strain: Low Risk  (12/28/2023)    Overall Financial Resource Strain (CARDIA)     Difficulty of Paying Living Expenses: Not hard at all   Food Insecurity: No Food Insecurity (12/28/2023)    Hunger Vital Sign     Worried About Running Out of Food in the Last Year: Never true     Ran Out of Food in the Last Year: Never true   Transportation Needs: No Transportation Needs (12/28/2023)    PRAPARE - Transportation     Lack of Transportation (Medical): No     Lack of Transportation (Non-Medical): No       History of present illness:  Nimco comes in today for follow-up for her right knee.  She has had her MRI.  She comes in today with those results for review.  This all stems from a fall she had about 4 weeks ago.    Review of Systems:    Constitution: Negative for chills, fever, and sweats.  Negative for unexplained weight loss.    HENT:  Negative for headaches and blurry vision.    Cardiovascular:Negative for chest pain or irregular heart beat. Negative for hypertension.    Respiratory:  Negative for cough and shortness of breath.    Gastrointestinal: Negative for abdominal pain, heartburn, melena, nausea, and vomitting.    Genitourinary:  Negative bladder incontinence and dysuria.    Musculoskeletal:  See HPI for details.     Neurological: Negative for numbness.    Psychiatric/Behavioral: Negative for depression.  The patient is not nervous/anxious.      Endocrine: Negative for polyuria    Hematologic/Lymphatic: Negative for bleeding problem.   "Does not bruise/bleed easily.    Skin: Negative for poor would healing and rash    Objective:      Physical Examination:    Vital Signs:  There were no vitals filed for this visit.    Body mass index is 27.86 kg/m².    This a well-developed, well nourished patient in no acute distress.  They are alert and oriented and cooperative to examination.        Right knee exam: Her right knee exam is similar to where she has been on previous visits.  Knee is diffusely tender both medially and laterally.  Her skin is clean dry and intact.  No erythema or ecchymosis.  No signs or symptoms of infection.  Right calf is soft and nontender.  She actually range of motion of the right knee fairly well at 0 to about 110°.  It is stable to varus and valgus stresses.  She ambulates with a rolling walker.  She has an antalgic gait with a slow winter.    Pertinent New Results:    XRAY Report / Interpretation:   No new radiographs taken on today's clinic visit.  However did review images report her right knee MRI which was significant for a minimally depressed fracture along the anterior rim of the lateral tibial plateau.  She has a good bit of bony edema in this region as well as the lateral femoral condyle.  No real displacement of the fracture fragments fortunately.    Assessment/Plan:      1. Right knee nondisplaced lateral tibial plateau fracture.      We would like to limit how much weight-bearing she does on this while this heals.  Continue use of the rolling walker in the interim.  She can range of motion of the knee as much as she would like.  Like to see her back in the next month with repeat radiographs.  Hopefully be able to increase to full weight-bearing at that time.  She has a smoker, I explained to her that this does slow/delay bony healing.  Also gave her prescription of Norco 5/325 to help with her pain.    Cristhian Manrique, Physician Assistant, served in the capacity as a "scribe" for this patient encounter.  A " ""face-to-face" encounter occurred with Dr. Eliseo Vaca on this date.  The treatment plan and medical decision-making is outlined above. Patient was seen and examined with a chaperone.       This note was created using Dragon voice recognition software that occasionally misinterpreted phrases or words.          "

## 2024-04-30 ENCOUNTER — OFFICE VISIT (OUTPATIENT)
Dept: ORTHOPEDICS | Facility: CLINIC | Age: 64
End: 2024-04-30
Payer: MEDICAID

## 2024-04-30 VITALS — RESPIRATION RATE: 18 BRPM | HEIGHT: 67 IN | BODY MASS INDEX: 27.93 KG/M2 | OXYGEN SATURATION: 98 % | WEIGHT: 177.94 LBS

## 2024-04-30 DIAGNOSIS — S82.121D CLOSED FRACTURE OF LATERAL PORTION OF RIGHT TIBIAL PLATEAU WITH ROUTINE HEALING, SUBSEQUENT ENCOUNTER: Primary | ICD-10-CM

## 2024-04-30 PROCEDURE — 99024 POSTOP FOLLOW-UP VISIT: CPT | Mod: S$GLB,,, | Performed by: ORTHOPAEDIC SURGERY

## 2024-04-30 PROCEDURE — 1160F RVW MEDS BY RX/DR IN RCRD: CPT | Mod: CPTII,S$GLB,, | Performed by: ORTHOPAEDIC SURGERY

## 2024-04-30 PROCEDURE — 1159F MED LIST DOCD IN RCRD: CPT | Mod: CPTII,S$GLB,, | Performed by: ORTHOPAEDIC SURGERY

## 2024-04-30 NOTE — PROGRESS NOTES
[facility] ORTHOPEDICS    Subjective:     Chief Complaint:   Chief Complaint   Patient presents with    Right Knee - Pain     R tibial plateau fx, walking well, has pain sometimes       Past Medical History:   Diagnosis Date    ACP (advance care planning) 12/20/2023    Anxiety     Bipolar affect, depressed     Cancer ovarian; uterine    1992    COPD (chronic obstructive pulmonary disease)     GERD (gastroesophageal reflux disease)     Hyperlipidemia     Hypertension     OAB (overactive bladder)     On home oxygen therapy     per patient uses PRN       Past Surgical History:   Procedure Laterality Date    BREAST CYST ASPIRATION      COLON SURGERY      COLONOSCOPY      HERNIA REPAIR N/A 2016    HIP ARTHROPLASTY Right 2/25/2019    Procedure: ARTHROPLASTY, HIP;  Surgeon: Eliseo Vaca MD;  Location: [facility];  Service: Orthopedics;  Laterality: Right;  Daniel Herrera    HYSTERECTOMY  total    JOINT REPLACEMENT Right     hip replacemnt    KNEE ARTHROPLASTY Left 8/19/2019    Procedure: ARTHROPLASTY, KNEE;  Surgeon: Eliseo Vaca MD;  Location: [facility];  Service: Orthopedics;  Laterality: Left;    LUMBAR LAMINECTOMY WITH FUSION N/A 12/7/2023    Procedure: LAMINECTOMY, SPINE, LUMBAR, WITH FUSION L4-5;  Surgeon: Joby Jaimes MD;  Location: [facility];  Service: Orthopedics;  Laterality: N/A;  NTI, [employer]TR[employer]    REVISION COLOSTOMY N/A 2012             Review of patient's allergies indicates:   Allergen Reactions    Meloxicam Other (See Comments)    Ciprofloxacin Diarrhea    Hydrocodone-acetaminophen Nausea And Vomiting, Nausea Only and Other (See Comments)    Ibuprofen Nausea Only    Naproxen Nausea Only and Other (See Comments)    Narcof [hydrocodone-guaifenesin] Nausea And Vomiting    Quetiapine Nausea And Vomiting and Other (See Comments)       Family History   Problem Relation Name Age of Onset    Cancer Mother      Hypertension Father      Heart disease Father      Heart disease Sister      Hypertension Sister       Hypertension Brother      Depression Brother         Social History     Socioeconomic History    Marital status:    Tobacco Use    Smoking status: Every Day     Current packs/day: 0.50     Average packs/day: 0.5 packs/day for 40.0 years (20.0 ttl pk-yrs)     Types: Cigarettes    Smokeless tobacco: Never   Substance and Sexual Activity    Alcohol use: Not Currently     Alcohol/week: 1.0 standard drink of alcohol     Types: 1 Cans of beer per week     Comment: weekly    Drug use: No     Social Determinants of Health     Financial Resource Strain: Low Risk  (12/28/2023)    Overall Financial Resource Strain (CARDIA)     Difficulty of Paying Living Expenses: Not hard at all   Food Insecurity: No Food Insecurity (12/28/2023)    Hunger Vital Sign     Worried About Running Out of Food in the Last Year: Never true     Ran Out of Food in the Last Year: Never true   Transportation Needs: No Transportation Needs (12/28/2023)    PRAPARE - Transportation     Lack of Transportation (Medical): No     Lack of Transportation (Non-Medical): No       History of present illness:  Nimco comes in today for follow-up for her right tibial plateau fracture.  She is about 10 weeks out from the initial injury.  Her pain is much improved.  She is ambulating without an aid.    Review of Systems:    Constitution: Negative for chills, fever, and sweats.  Negative for unexplained weight loss.    HENT:  Negative for headaches and blurry vision.    Cardiovascular:Negative for chest pain or irregular heart beat. Negative for hypertension.    Respiratory:  Negative for cough and shortness of breath.    Gastrointestinal: Negative for abdominal pain, heartburn, melena, nausea, and vomitting.    Genitourinary:  Negative bladder incontinence and dysuria.    Musculoskeletal:  See HPI for details.     Neurological: Negative for numbness.    Psychiatric/Behavioral: Negative for depression.  The patient is not nervous/anxious.      Endocrine:  "Negative for polyuria    Hematologic/Lymphatic: Negative for bleeding problem.  Does not bruise/bleed easily.    Skin: Negative for poor would healing and rash    Objective:      Physical Examination:    Vital Signs:    Vitals:    04/30/24 1138   Resp: 18       Body mass index is 27.86 kg/m².    This a well-developed, well nourished patient in no acute distress.  They are alert and oriented and cooperative to examination.        Right knee exam: Skin to right knee clean dry and intact.  No erythema or ecchymosis.  No signs or symptoms of infection.  She is neurovascularly intact throughout the right lower extremity.  Right calf is soft and nontender.  Right knee range of motion well-preserved at 0-120 degrees.  The knee is stable to varus and valgus stresses.  She can weightbear as tolerated on the right lower extremity.    Pertinent New Results:    XRAY Report / Interpretation:   Three views taken of the right knee today: AP, lateral, and sunrise views.  They reveal no acute fractures or dislocations.  Healed tibial plateau fracture that is nondisplaced.  Visualized soft tissues appear unremarkable.  She has mild degenerative joint changes in the right knee.    Assessment/Plan:      1. Status post right tibial plateau fracture.      Fracture is healed.  She is doing well.  Minimal pain.  She can resume/continue regular activities daily living without restriction.  Did offer an intra-articular injection for the underlying osteoarthritis and she politely declined.  She will follow up with us on an as-needed basis for any orthopedic issues or concerns that arise.    Cristhian Manrique, Physician Assistant, served in the capacity as a "scribe" for this patient encounter.  A "face-to-face" encounter occurred with Dr. Eliseo Vaca on this date.  The treatment plan and medical decision-making is outlined above. Patient was seen and examined with a chaperone.       This note was created using Dragon voice recognition " software that occasionally misinterpreted phrases or words.

## 2024-06-05 ENCOUNTER — OFFICE VISIT (OUTPATIENT)
Dept: ORTHOPEDICS | Facility: CLINIC | Age: 64
End: 2024-06-05
Payer: MEDICAID

## 2024-06-05 VITALS
SYSTOLIC BLOOD PRESSURE: 148 MMHG | WEIGHT: 168 LBS | BODY MASS INDEX: 26.37 KG/M2 | HEIGHT: 67 IN | DIASTOLIC BLOOD PRESSURE: 78 MMHG

## 2024-06-05 DIAGNOSIS — M51.36 DEGENERATIVE LUMBAR DISC: Primary | ICD-10-CM

## 2024-06-05 DIAGNOSIS — S80.01XA CONTUSION OF RIGHT KNEE, INITIAL ENCOUNTER: ICD-10-CM

## 2024-06-05 DIAGNOSIS — M48.061 LUMBAR FORAMINAL STENOSIS: ICD-10-CM

## 2024-06-05 DIAGNOSIS — M47.816 LUMBAR FACET ARTHROPATHY: ICD-10-CM

## 2024-06-05 PROCEDURE — 3077F SYST BP >= 140 MM HG: CPT | Mod: CPTII,S$GLB,, | Performed by: ORTHOPAEDIC SURGERY

## 2024-06-05 PROCEDURE — 99213 OFFICE O/P EST LOW 20 MIN: CPT | Mod: 24,S$GLB,, | Performed by: ORTHOPAEDIC SURGERY

## 2024-06-05 PROCEDURE — 1160F RVW MEDS BY RX/DR IN RCRD: CPT | Mod: CPTII,S$GLB,, | Performed by: ORTHOPAEDIC SURGERY

## 2024-06-05 PROCEDURE — 1159F MED LIST DOCD IN RCRD: CPT | Mod: CPTII,S$GLB,, | Performed by: ORTHOPAEDIC SURGERY

## 2024-06-05 PROCEDURE — 3078F DIAST BP <80 MM HG: CPT | Mod: CPTII,S$GLB,, | Performed by: ORTHOPAEDIC SURGERY

## 2024-06-05 PROCEDURE — 3008F BODY MASS INDEX DOCD: CPT | Mod: CPTII,S$GLB,, | Performed by: ORTHOPAEDIC SURGERY

## 2024-06-05 RX ORDER — BUTALBITAL, ACETAMINOPHEN AND CAFFEINE 50; 325; 40 MG/1; MG/1; MG/1
1 TABLET ORAL EVERY 4 HOURS PRN
COMMUNITY
Start: 2024-04-23

## 2024-06-05 NOTE — PROGRESS NOTES
Subjective:       Patient ID: Nimco Caro is a 63 y.o. female.    Chief Complaint: Post-op Evaluation of the Lumbar Spine (//XR PO Lumbar L4-5 TLIF/PLF 12/07/23, pain is off and on, limited and painful bending over, no leg symptoms, she is little bit better since the surgery)      History of Present Illness    Prior to meeting with the patient I reviewed the medical chart in [facility]. This included reviewing the previous progress notes from our office, review of the patient's last appointment with their primary care provider, review of any visits to the emergency room, and review of any pain management appointments or procedures.   Previous laminectomy and lumbar transforaminal interbody fusion L4-5 done in December 20, 2023 still have a little soreness and pain the back though overall improved.    Current Medications        Allergies  Review of patient's allergies indicates:   Allergen Reactions    Meloxicam Other (See Comments)    Ciprofloxacin Diarrhea    Hydrocodone-acetaminophen Nausea And Vomiting, Nausea Only and Other (See Comments)    Ibuprofen Nausea Only    Naproxen Nausea Only and Other (See Comments)    Narcof [hydrocodone-guaifenesin] Nausea And Vomiting    Quetiapine Nausea And Vomiting and Other (See Comments)       Past Medical History  Past Medical History:   Diagnosis Date    ACP (advance care planning) 12/20/2023    Anxiety     Bipolar affect, depressed     Cancer ovarian; uterine    1992    COPD (chronic obstructive pulmonary disease)     GERD (gastroesophageal reflux disease)     Hyperlipidemia     Hypertension     OAB (overactive bladder)     On home oxygen therapy     per patient uses PRN       Surgical History  Past Surgical History:   Procedure Laterality Date    BREAST CYST ASPIRATION      COLON SURGERY      COLONOSCOPY      HERNIA REPAIR N/A 2016    HIP ARTHROPLASTY Right 2/25/2019    Procedure: ARTHROPLASTY, HIP;  Surgeon: Eliseo Vaca MD;  [facility];  Service: Orthopedics;   Laterality: Right;  Daniel Herrera    HYSTERECTOMY  total    JOINT REPLACEMENT Right     hip replacemnt    KNEE ARTHROPLASTY Left 8/19/2019    Procedure: ARTHROPLASTY, KNEE;  Surgeon: Eliseo Vaca MD;  Location: [facility] OR;  Service: Orthopedics;  Laterality: Left;    LUMBAR LAMINECTOMY WITH FUSION N/A 12/7/2023    Procedure: LAMINECTOMY, SPINE, LUMBAR, WITH FUSION L4-5;  Surgeon: Joby Jaimes MD;  Location: [facility] OR;  Service: Orthopedics;  Laterality: N/A;  NTI, [employer]TR[employer]    REVISION COLOSTOMY N/A 2012       Family History:   Family History   Problem Relation Name Age of Onset    Cancer Mother      Hypertension Father      Heart disease Father      Heart disease Sister      Hypertension Sister      Hypertension Brother      Depression Brother         Social History:   Social History     Socioeconomic History    Marital status:    Tobacco Use    Smoking status: Every Day     Current packs/day: 0.50     Average packs/day: 0.5 packs/day for 44.4 years (22.2 ttl pk-yrs)     Types: Cigarettes     Start date: 1980    Smokeless tobacco: Never   Substance and Sexual Activity    Alcohol use: Not Currently     Alcohol/week: 1.0 standard drink of alcohol     Types: 1 Cans of beer per week     Comment: weekly    Drug use: No     Social Determinants of Health     Financial Resource Strain: Low Risk  (12/28/2023)    Overall Financial Resource Strain (CARDIA)     Difficulty of Paying Living Expenses: Not hard at all   Food Insecurity: No Food Insecurity (12/28/2023)    Hunger Vital Sign     Worried About Running Out of Food in the Last Year: Never true     Ran Out of Food in the Last Year: Never true   Transportation Needs: No Transportation Needs (12/28/2023)    PRAPARE - Transportation     Lack of Transportation (Medical): No     Lack of Transportation (Non-Medical): No       Hospitalization/Major Diagnostic Procedure:     Review of Systems     General/Constitutional:  Chills denies. Fatigue denies. Fever denies.  Weight gain denies. Weight loss denies.    Respiratory:  Shortness of breath denies.    Cardiovascular:  Chest pain denies.    Gastrointestinal:  Constipation denies. Diarrhea denies. Nausea denies. Vomiting denies.     Hematology:  Easy bruising denies. Prolonged bleeding denies.     Genitourinary:  Frequent urination denies. Pain in lower back denies. Painful urination denies.     Musculoskeletal:  See HPI for details    Skin:  Rash denies.    Neurologic:  Dizziness denies. Gait abnormalities denies. Seizures denies. Tingling/Numbess denies.    Psychiatric:  Anxiety denies. Depressed mood denies.     Objective:   Vital Signs:   Vitals:    06/05/24 1318   BP: (!) 148/78        Physical Exam      General Examination:     Constitutional: The patient is alert and oriented to lace person and time. Mood is pleasant.     Head/Face: Normal facial features normal eyebrows    Eyes: Normal extraocular motion bilaterally    Lungs: Respirations are equal and unlabored    Gait is coordinated.    Cardiovascular: There are no swelling or varicosities present.    Lymphatic: Negative for adenopathy    Skin: Normal    Neurological: Level of consciousness normal. Oriented to place person and time and situation    Psychiatric: Oriented to time place person and situation    Tenderness paraspinous muscles left-sided lumbosacral junction no spasm noted    XRAY Report/ Interpretation :  AP and lateral x-rays of lumbar spine will performed today. Indications low back pain. Findings:  Instrumented lumbar fusion L4-5 early calcification within the disc space no signs of hardware loosening      Assessment:       1. Degenerative lumbar disc    2. Lumbar facet arthropathy    3. Lumbar foraminal stenosis    4. Contusion of right knee, initial encounter        Plan:       Nimco was seen today for post-op evaluation.    Diagnoses and all orders for this visit:    Degenerative lumbar disc  -     X-Ray Lumbar Spine Ap And Lateral    Lumbar facet  arthropathy  -     X-Ray Lumbar Spine Ap And Lateral    Lumbar foraminal stenosis  -     X-Ray Lumbar Spine Ap And Lateral    Contusion of right knee, initial encounter  -     Ambulatory referral/consult to Orthopedics         No follow-ups on file.    Residual pain may be on the basis of facet joint arthropathy below her previous fusion.  She does not feel symptoms are severe enough to go back to pain management for some L5-S1 medial branch blocks and possible rhizotomy.  Return 3 months x-rays lumbar spine  Treatment options were discussed with regards to the nature of the medical condition. Conservative pain intervention and surgical options were discussed in detail. The probability of success of each separate treatment option was discussed. The patient expressed a clear understanding of the treatment options. With regards to surgery, the procedure risk, benefits, complications, and outcomes were discussed. No guarantees were given with regards to surgical outcome.   The risk of complications, morbidity, and mortality of patient management decisions have been made at the time of this visit. These are associated with the patient's problems, diagnostic procedures and treatment options. This includes the possible management options selected and those considered but not selected by the patient after shared medical decision making we discussed with the patient.   This note was created using Dragon voice recognition software that occasionally misinterpreted phrases or words.

## 2024-07-05 ENCOUNTER — OFFICE VISIT (OUTPATIENT)
Dept: URGENT CARE | Facility: CLINIC | Age: 64
End: 2024-07-05
Payer: MEDICAID

## 2024-07-05 VITALS
OXYGEN SATURATION: 95 % | WEIGHT: 172 LBS | BODY MASS INDEX: 27 KG/M2 | HEIGHT: 67 IN | TEMPERATURE: 97 F | DIASTOLIC BLOOD PRESSURE: 80 MMHG | RESPIRATION RATE: 18 BRPM | HEART RATE: 65 BPM | SYSTOLIC BLOOD PRESSURE: 174 MMHG

## 2024-07-05 DIAGNOSIS — J02.9 SORE THROAT: Primary | ICD-10-CM

## 2024-07-05 DIAGNOSIS — J02.9 PHARYNGITIS, UNSPECIFIED ETIOLOGY: ICD-10-CM

## 2024-07-05 LAB
CTP QC/QA: YES
FLUAV AG NPH QL: NEGATIVE
FLUBV AG NPH QL: NEGATIVE
S PYO RRNA THROAT QL PROBE: NEGATIVE
SARS-COV-2 AG RESP QL IA.RAPID: NEGATIVE

## 2024-07-05 RX ORDER — AMOXICILLIN 500 MG/1
500 TABLET, FILM COATED ORAL EVERY 12 HOURS
Qty: 20 TABLET | Refills: 0 | Status: SHIPPED | OUTPATIENT
Start: 2024-07-05 | End: 2024-07-15

## 2024-07-05 RX ORDER — PROMETHAZINE HYDROCHLORIDE AND DEXTROMETHORPHAN HYDROBROMIDE 6.25; 15 MG/5ML; MG/5ML
5 SYRUP ORAL NIGHTLY PRN
Qty: 118 ML | Refills: 0 | Status: SHIPPED | OUTPATIENT
Start: 2024-07-05

## 2024-07-05 RX ORDER — BENZONATATE 100 MG/1
100 CAPSULE ORAL 3 TIMES DAILY PRN
Qty: 30 CAPSULE | Refills: 0 | Status: SHIPPED | OUTPATIENT
Start: 2024-07-05 | End: 2024-07-15

## 2024-07-05 NOTE — PROGRESS NOTES
"Subjective:      Patient ID: Nimco Caro is a 63 y.o. female.    Vitals:  height is 5' 7" (1.702 m) and weight is 78 kg (172 lb). Her oral temperature is 97 °F (36.1 °C). Her blood pressure is 174/80 (abnormal) and her pulse is 65. Her respiration is 18 and oxygen saturation is 95%.     Chief Complaint: Sore Throat    Sore Throat   This is a new problem. The current episode started yesterday. The problem has been unchanged. There has been no fever. Associated symptoms include coughing, headaches and trouble swallowing. Pertinent negatives include no congestion, diarrhea, ear pain, shortness of breath or vomiting. She has tried nothing for the symptoms.       Constitution: Negative for appetite change, chills, sweating and fever.   HENT:  Positive for sore throat and trouble swallowing. Negative for ear pain and congestion.    Cardiovascular:  Negative for chest pain.   Respiratory:  Positive for cough. Negative for chest tightness, shortness of breath and wheezing.    Gastrointestinal:  Positive for nausea (chronic). Negative for vomiting and diarrhea.   Musculoskeletal:  Negative for muscle ache.   Skin:  Negative for rash.   Neurological:  Positive for headaches.      Objective:     Physical Exam   Constitutional: She is oriented to person, place, and time. She is cooperative.  Non-toxic appearance. She does not appear ill. No distress. awake  HENT:   Head: Normocephalic and atraumatic.   Ears:   Right Ear: Tympanic membrane, external ear and ear canal normal.   Left Ear: Tympanic membrane, external ear and ear canal normal.   Nose: No rhinorrhea or congestion.   Mouth/Throat: Mucous membranes are moist. Posterior oropharyngeal erythema present. No oropharyngeal exudate.   Eyes: Conjunctivae are normal. Right eye exhibits no discharge. Left eye exhibits no discharge.   Neck: Neck supple. No neck rigidity present.   Cardiovascular: Normal rate, regular rhythm and normal heart sounds.   Pulmonary/Chest: Effort " normal and breath sounds normal. No accessory muscle usage. No tachypnea. No respiratory distress. She has no wheezes. She has no rhonchi. She has no rales. She exhibits no tenderness.   Abdominal: Normal appearance.   Musculoskeletal:      Cervical back: She exhibits tenderness (right).   Lymphadenopathy:     She has no cervical adenopathy.   Neurological: no focal deficit. She is alert and oriented to person, place, and time. No sensory deficit.   Skin: Skin is warm, dry, not diaphoretic and no rash. Capillary refill takes 2 to 3 seconds.   Psychiatric: Her behavior is normal. Mood normal.   Nursing note and vitals reviewed.    Assessment:     1. Sore throat      COVID negative  Flu a/B negative  Strep A negative    Plan:       Sore throat  -     SARS Coronavirus 2 Antigen, POCT Manual Read  -     POCT rapid strep A  -     POCT Influenza A/B

## 2024-07-19 ENCOUNTER — HOSPITAL ENCOUNTER (INPATIENT)
Facility: HOSPITAL | Age: 64
LOS: 5 days | Discharge: NURSING, REHAB OR LONG-TERM CARE | DRG: 522 | End: 2024-07-24
Attending: EMERGENCY MEDICINE | Admitting: STUDENT IN AN ORGANIZED HEALTH CARE EDUCATION/TRAINING PROGRAM
Payer: MEDICAID

## 2024-07-19 DIAGNOSIS — D62 ACUTE BLOOD LOSS ANEMIA: ICD-10-CM

## 2024-07-19 DIAGNOSIS — E87.1 HYPONATREMIA: ICD-10-CM

## 2024-07-19 DIAGNOSIS — Z01.810 PREOPERATIVE CARDIOVASCULAR EXAMINATION: ICD-10-CM

## 2024-07-19 DIAGNOSIS — J96.11 CHRONIC RESPIRATORY FAILURE WITH HYPOXIA: ICD-10-CM

## 2024-07-19 DIAGNOSIS — S72.002A CLOSED FRACTURE OF LEFT HIP, INITIAL ENCOUNTER: Primary | ICD-10-CM
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
## 2024-07-19 LAB
ABO + RH BLD: NORMAL
ALBUMIN SERPL BCP-MCNC: 3.7 G/DL (ref 3.5–5.2)
ALP SERPL-CCNC: 136 U/L (ref 55–135)
ALT SERPL W/O P-5'-P-CCNC: 21 U/L (ref 10–44)
ANION GAP SERPL CALC-SCNC: 10 MMOL/L (ref 8–16)
AST SERPL-CCNC: 20 U/L (ref 10–40)
BASOPHILS # BLD AUTO: 0.03 K/UL (ref 0–0.2)
BASOPHILS NFR BLD: 0.4 % (ref 0–1.9)
BILIRUB SERPL-MCNC: 0.3 MG/DL (ref 0.1–1)
BILIRUB UR QL STRIP: NEGATIVE
BLD GP AB SCN CELLS X3 SERPL QL: NORMAL
BUN SERPL-MCNC: 5 MG/DL (ref 8–23)
CALCIUM SERPL-MCNC: 9.1 MG/DL (ref 8.7–10.5)
CHLORIDE SERPL-SCNC: 92 MMOL/L (ref 95–110)
CLARITY UR: CLEAR
CO2 SERPL-SCNC: 23 MMOL/L (ref 23–29)
COLOR UR: COLORLESS
CREAT SERPL-MCNC: 0.7 MG/DL (ref 0.5–1.4)
DIFFERENTIAL METHOD BLD: ABNORMAL
EOSINOPHIL # BLD AUTO: 0.1 K/UL (ref 0–0.5)
EOSINOPHIL NFR BLD: 1.3 % (ref 0–8)
ERYTHROCYTE [DISTWIDTH] IN BLOOD BY AUTOMATED COUNT: 18.2 % (ref 11.5–14.5)
EST. GFR  (NO RACE VARIABLE): >60 ML/MIN/1.73 M^2
GLUCOSE SERPL-MCNC: 94 MG/DL (ref 70–110)
GLUCOSE UR QL STRIP: NEGATIVE
HCT VFR BLD AUTO: 36.7 % (ref 37–48.5)
HGB BLD-MCNC: 12.1 G/DL (ref 12–16)
HGB UR QL STRIP: NEGATIVE
IMM GRANULOCYTES # BLD AUTO: 0.03 K/UL (ref 0–0.04)
IMM GRANULOCYTES NFR BLD AUTO: 0.4 % (ref 0–0.5)
KETONES UR QL STRIP: NEGATIVE
LEUKOCYTE ESTERASE UR QL STRIP: NEGATIVE
LYMPHOCYTES # BLD AUTO: 1.6 K/UL (ref 1–4.8)
LYMPHOCYTES NFR BLD: 23.4 % (ref 18–48)
MCH RBC QN AUTO: 27.8 PG (ref 27–31)
MCHC RBC AUTO-ENTMCNC: 33 G/DL (ref 32–36)
MCV RBC AUTO: 84 FL (ref 82–98)
MONOCYTES # BLD AUTO: 0.6 K/UL (ref 0.3–1)
MONOCYTES NFR BLD: 9 % (ref 4–15)
NEUTROPHILS # BLD AUTO: 4.5 K/UL (ref 1.8–7.7)
NEUTROPHILS NFR BLD: 65.5 % (ref 38–73)
NITRITE UR QL STRIP: NEGATIVE
NRBC BLD-RTO: 0 /100 WBC
OSMOLALITY SERPL: 261 MOSM/KG (ref 275–295)
PH UR STRIP: 7 [PH] (ref 5–8)
PLATELET # BLD AUTO: 236 K/UL (ref 150–450)
PMV BLD AUTO: 8.9 FL (ref 9.2–12.9)
POTASSIUM SERPL-SCNC: 4.1 MMOL/L (ref 3.5–5.1)
PROT SERPL-MCNC: 7.1 G/DL (ref 6–8.4)
PROT UR QL STRIP: NEGATIVE
RBC # BLD AUTO: 4.35 M/UL (ref 4–5.4)
SODIUM SERPL-SCNC: 125 MMOL/L (ref 136–145)
SODIUM UR-SCNC: 58 MMOL/L (ref 20–250)
SP GR UR STRIP: 1 (ref 1–1.03)
SPECIMEN OUTDATE: NORMAL
URN SPEC COLLECT METH UR: ABNORMAL
UROBILINOGEN UR STRIP-ACNC: NEGATIVE EU/DL
WBC # BLD AUTO: 6.91 K/UL (ref 3.9–12.7)

## 2024-07-19 PROCEDURE — 99900035 HC TECH TIME PER 15 MIN (STAT)

## 2024-07-19 PROCEDURE — 25000003 PHARM REV CODE 250: Performed by: NURSE PRACTITIONER

## 2024-07-19 PROCEDURE — 94760 N-INVAS EAR/PLS OXIMETRY 1: CPT

## 2024-07-19 PROCEDURE — 27000221 HC OXYGEN, UP TO 24 HOURS

## 2024-07-19 PROCEDURE — 86901 BLOOD TYPING SEROLOGIC RH(D): CPT | Performed by: NURSE PRACTITIONER

## 2024-07-19 PROCEDURE — 93005 ELECTROCARDIOGRAM TRACING: CPT

## 2024-07-19 PROCEDURE — 99285 EMERGENCY DEPT VISIT HI MDM: CPT | Mod: 25

## 2024-07-19 PROCEDURE — 84300 ASSAY OF URINE SODIUM: CPT | Performed by: NURSE PRACTITIONER

## 2024-07-19 PROCEDURE — 80053 COMPREHEN METABOLIC PANEL: CPT | Performed by: EMERGENCY MEDICINE

## 2024-07-19 PROCEDURE — 86900 BLOOD TYPING SEROLOGIC ABO: CPT | Performed by: NURSE PRACTITIONER

## 2024-07-19 PROCEDURE — 36415 COLL VENOUS BLD VENIPUNCTURE: CPT | Performed by: NURSE PRACTITIONER

## 2024-07-19 PROCEDURE — 96376 TX/PRO/DX INJ SAME DRUG ADON: CPT

## 2024-07-19 PROCEDURE — 81003 URINALYSIS AUTO W/O SCOPE: CPT | Performed by: NURSE PRACTITIONER

## 2024-07-19 PROCEDURE — 83930 ASSAY OF BLOOD OSMOLALITY: CPT | Performed by: NURSE PRACTITIONER

## 2024-07-19 PROCEDURE — 96361 HYDRATE IV INFUSION ADD-ON: CPT

## 2024-07-19 PROCEDURE — 51702 INSERT TEMP BLADDER CATH: CPT

## 2024-07-19 PROCEDURE — 63600175 PHARM REV CODE 636 W HCPCS: Performed by: EMERGENCY MEDICINE

## 2024-07-19 PROCEDURE — 25000003 PHARM REV CODE 250: Performed by: STUDENT IN AN ORGANIZED HEALTH CARE EDUCATION/TRAINING PROGRAM

## 2024-07-19 PROCEDURE — 63600175 PHARM REV CODE 636 W HCPCS: Performed by: NURSE PRACTITIONER

## 2024-07-19 PROCEDURE — 96374 THER/PROPH/DIAG INJ IV PUSH: CPT

## 2024-07-19 PROCEDURE — 96375 TX/PRO/DX INJ NEW DRUG ADDON: CPT

## 2024-07-19 PROCEDURE — 93010 ELECTROCARDIOGRAM REPORT: CPT | Mod: ,,, | Performed by: GENERAL PRACTICE

## 2024-07-19 PROCEDURE — 11000001 HC ACUTE MED/SURG PRIVATE ROOM

## 2024-07-19 PROCEDURE — 86850 RBC ANTIBODY SCREEN: CPT | Performed by: NURSE PRACTITIONER

## 2024-07-19 PROCEDURE — 83935 ASSAY OF URINE OSMOLALITY: CPT | Performed by: NURSE PRACTITIONER

## 2024-07-19 PROCEDURE — 85025 COMPLETE CBC W/AUTO DIFF WBC: CPT | Performed by: EMERGENCY MEDICINE

## 2024-07-19 PROCEDURE — 94799 UNLISTED PULMONARY SVC/PX: CPT

## 2024-07-19 PROCEDURE — 36415 COLL VENOUS BLD VENIPUNCTURE: CPT | Performed by: EMERGENCY MEDICINE

## 2024-07-19 RX ORDER — GLUCAGON 1 MG
1 KIT INJECTION
Status: DISCONTINUED | OUTPATIENT
Start: 2024-07-19 | End: 2024-07-24 | Stop reason: HOSPADM

## 2024-07-19 RX ORDER — HYDROMORPHONE HYDROCHLORIDE 1 MG/ML
0.5 INJECTION, SOLUTION INTRAMUSCULAR; INTRAVENOUS; SUBCUTANEOUS
Status: COMPLETED | OUTPATIENT
Start: 2024-07-19 | End: 2024-07-19

## 2024-07-19 RX ORDER — IBUPROFEN 200 MG
24 TABLET ORAL
Status: DISCONTINUED | OUTPATIENT
Start: 2024-07-19 | End: 2024-07-24 | Stop reason: HOSPADM

## 2024-07-19 RX ORDER — SODIUM,POTASSIUM PHOSPHATES 280-250MG
2 POWDER IN PACKET (EA) ORAL
Status: DISCONTINUED | OUTPATIENT
Start: 2024-07-19 | End: 2024-07-24 | Stop reason: HOSPADM

## 2024-07-19 RX ORDER — LANOLIN ALCOHOL/MO/W.PET/CERES
800 CREAM (GRAM) TOPICAL
Status: DISCONTINUED | OUTPATIENT
Start: 2024-07-19 | End: 2024-07-24 | Stop reason: HOSPADM

## 2024-07-19 RX ORDER — ONDANSETRON HYDROCHLORIDE 2 MG/ML
4 INJECTION, SOLUTION INTRAVENOUS EVERY 8 HOURS PRN
Status: DISCONTINUED | OUTPATIENT
Start: 2024-07-19 | End: 2024-07-24 | Stop reason: HOSPADM

## 2024-07-19 RX ORDER — HYDROMORPHONE HYDROCHLORIDE 1 MG/ML
1 INJECTION, SOLUTION INTRAMUSCULAR; INTRAVENOUS; SUBCUTANEOUS EVERY 4 HOURS PRN
Status: DISCONTINUED | OUTPATIENT
Start: 2024-07-19 | End: 2024-07-22

## 2024-07-19 RX ORDER — NALOXONE HCL 0.4 MG/ML
0.02 VIAL (ML) INJECTION
Status: DISCONTINUED | OUTPATIENT
Start: 2024-07-19 | End: 2024-07-24 | Stop reason: HOSPADM

## 2024-07-19 RX ORDER — TALC
9 POWDER (GRAM) TOPICAL NIGHTLY PRN
Status: DISCONTINUED | OUTPATIENT
Start: 2024-07-19 | End: 2024-07-24 | Stop reason: HOSPADM

## 2024-07-19 RX ORDER — ACETAMINOPHEN 325 MG/1
650 TABLET ORAL EVERY 4 HOURS PRN
Status: DISCONTINUED | OUTPATIENT
Start: 2024-07-19 | End: 2024-07-24 | Stop reason: HOSPADM

## 2024-07-19 RX ORDER — MUPIROCIN 20 MG/G
OINTMENT TOPICAL 2 TIMES DAILY
Status: COMPLETED | OUTPATIENT
Start: 2024-07-19 | End: 2024-07-24

## 2024-07-19 RX ORDER — ALUMINUM HYDROXIDE, MAGNESIUM HYDROXIDE, AND SIMETHICONE 1200; 120; 1200 MG/30ML; MG/30ML; MG/30ML
30 SUSPENSION ORAL 4 TIMES DAILY PRN
Status: DISCONTINUED | OUTPATIENT
Start: 2024-07-19 | End: 2024-07-24 | Stop reason: HOSPADM

## 2024-07-19 RX ORDER — ONDANSETRON HYDROCHLORIDE 2 MG/ML
4 INJECTION, SOLUTION INTRAVENOUS
Status: COMPLETED | OUTPATIENT
Start: 2024-07-19 | End: 2024-07-19

## 2024-07-19 RX ORDER — METHOCARBAMOL 500 MG/1
500 TABLET, FILM COATED ORAL 3 TIMES DAILY PRN
Status: DISCONTINUED | OUTPATIENT
Start: 2024-07-19 | End: 2024-07-24 | Stop reason: HOSPADM

## 2024-07-19 RX ORDER — ACETAMINOPHEN 325 MG/1
650 TABLET ORAL EVERY 6 HOURS PRN
Status: DISCONTINUED | OUTPATIENT
Start: 2024-07-19 | End: 2024-07-24 | Stop reason: HOSPADM

## 2024-07-19 RX ORDER — IPRATROPIUM BROMIDE AND ALBUTEROL SULFATE 2.5; .5 MG/3ML; MG/3ML
3 SOLUTION RESPIRATORY (INHALATION) EVERY 4 HOURS PRN
Status: DISCONTINUED | OUTPATIENT
Start: 2024-07-19 | End: 2024-07-24 | Stop reason: HOSPADM

## 2024-07-19 RX ORDER — IBUPROFEN 200 MG
16 TABLET ORAL
Status: DISCONTINUED | OUTPATIENT
Start: 2024-07-19 | End: 2024-07-24 | Stop reason: HOSPADM

## 2024-07-19 RX ORDER — SODIUM CHLORIDE 0.9 % (FLUSH) 0.9 %
10 SYRINGE (ML) INJECTION EVERY 8 HOURS PRN
Status: DISCONTINUED | OUTPATIENT
Start: 2024-07-19 | End: 2024-07-24 | Stop reason: HOSPADM

## 2024-07-19 RX ORDER — BUDESONIDE 0.5 MG/2ML
0.5 INHALANT ORAL EVERY 12 HOURS
Status: DISCONTINUED | OUTPATIENT
Start: 2024-07-20 | End: 2024-07-24 | Stop reason: HOSPADM

## 2024-07-19 RX ORDER — LEVALBUTEROL INHALATION SOLUTION 0.63 MG/3ML
0.63 SOLUTION RESPIRATORY (INHALATION) EVERY 4 HOURS PRN
Status: DISCONTINUED | OUTPATIENT
Start: 2024-07-19 | End: 2024-07-24 | Stop reason: HOSPADM

## 2024-07-19 RX ORDER — OXYCODONE HYDROCHLORIDE 5 MG/1
5 TABLET ORAL EVERY 6 HOURS PRN
Status: DISCONTINUED | OUTPATIENT
Start: 2024-07-19 | End: 2024-07-22

## 2024-07-19 RX ORDER — SIMETHICONE 80 MG
1 TABLET,CHEWABLE ORAL 4 TIMES DAILY PRN
Status: DISCONTINUED | OUTPATIENT
Start: 2024-07-19 | End: 2024-07-24 | Stop reason: HOSPADM

## 2024-07-19 RX ORDER — ARFORMOTEROL TARTRATE 15 UG/2ML
15 SOLUTION RESPIRATORY (INHALATION) 2 TIMES DAILY
Status: DISCONTINUED | OUTPATIENT
Start: 2024-07-19 | End: 2024-07-24 | Stop reason: HOSPADM

## 2024-07-19 RX ADMIN — MELATONIN TAB 3 MG 9 MG: 3 TAB at 08:07

## 2024-07-19 RX ADMIN — PROMETHAZINE HYDROCHLORIDE 12.5 MG: 25 INJECTION INTRAMUSCULAR; INTRAVENOUS at 08:07

## 2024-07-19 RX ADMIN — OXYCODONE 5 MG: 5 TABLET ORAL at 08:07

## 2024-07-19 RX ADMIN — ONDANSETRON 4 MG: 2 INJECTION INTRAMUSCULAR; INTRAVENOUS at 04:07

## 2024-07-19 RX ADMIN — SODIUM CHLORIDE 500 ML: 0.9 INJECTION, SOLUTION INTRAVENOUS at 06:07

## 2024-07-19 RX ADMIN — HYDROMORPHONE HYDROCHLORIDE 0.5 MG: 0.5 INJECTION, SOLUTION INTRAMUSCULAR; INTRAVENOUS; SUBCUTANEOUS at 03:07

## 2024-07-19 RX ADMIN — ONDANSETRON 4 MG: 2 INJECTION INTRAMUSCULAR; INTRAVENOUS at 07:07

## 2024-07-19 RX ADMIN — MUPIROCIN 1 G: 20 OINTMENT TOPICAL at 08:07

## 2024-07-19 RX ADMIN — HYDROMORPHONE HYDROCHLORIDE 1 MG: 1 INJECTION, SOLUTION INTRAMUSCULAR; INTRAVENOUS; SUBCUTANEOUS at 07:07

## 2024-07-19 RX ADMIN — METHOCARBAMOL 500 MG: 500 TABLET ORAL at 08:07

## 2024-07-19 NOTE — SUBJECTIVE & OBJECTIVE
Past Medical History:   Diagnosis Date    ACP (advance care planning) 12/20/2023    Anxiety     Bipolar affect, depressed     Cancer ovarian; uterine    1992    COPD (chronic obstructive pulmonary disease)     GERD (gastroesophageal reflux disease)     Hyperlipidemia     Hypertension     OAB (overactive bladder)     On home oxygen therapy     per patient uses PRN       Past Surgical History:   Procedure Laterality Date    BREAST CYST ASPIRATION      COLON SURGERY      COLONOSCOPY      HERNIA REPAIR N/A 2016    HIP ARTHROPLASTY Right 2/25/2019    Procedure: ARTHROPLASTY, HIP;  Surgeon: Eliseo Vaca MD;  Location: [facility] OR;  Service: Orthopedics;  Laterality: Right;  Daniel Herrera    HYSTERECTOMY  total    JOINT REPLACEMENT Right     hip replacemnt    KNEE ARTHROPLASTY Left 8/19/2019    Procedure: ARTHROPLASTY, KNEE;  Surgeon: Eliseo Vaca MD;  Location: [facility] OR;  Service: Orthopedics;  Laterality: Left;    LUMBAR LAMINECTOMY WITH FUSION N/A 12/7/2023    Procedure: LAMINECTOMY, SPINE, LUMBAR, WITH FUSION L4-5;  Surgeon: Joby Jaimes MD;  Location: [facility] OR;  Service: Orthopedics;  Laterality: N/A;  NTI, [employer]    REVISION COLOSTOMY N/A 2012       Review of patient's allergies indicates:   Allergen Reactions    Meloxicam Other (See Comments)    Ciprofloxacin Diarrhea    Hydrocodone-acetaminophen Nausea And Vomiting, Nausea Only and Other (See Comments)    Ibuprofen Nausea Only    Naproxen Nausea Only and Other (See Comments)    Narcof [hydrocodone-guaifenesin] Nausea And Vomiting    Quetiapine Nausea And Vomiting and Other (See Comments)       No current facility-administered medications on file prior to encounter.     Current Outpatient Medications on File Prior to Encounter   Medication Sig    ascorbic acid, vitamin C, (VITAMIN C) 1000 MG tablet Take 1,000 mg by mouth once daily.    atenoloL (TENORMIN) 25 MG tablet Take 25 mg by mouth once daily.    azelastine (ASTELIN) 137 mcg (0.1 %) nasal spray SPRAY 1  SPRAY BY NASAL ROUTE 2 TIMES DAILY. MUST MAKE APPOINTMENT    b complex vitamins tablet Take 1 tablet by mouth once daily.    biotin 1 mg tablet Take 1,000 mcg by mouth once daily.    butalbital-acetaminophen-caffeine -40 mg (FIORICET, ESGIC) -40 mg per tablet Take 1 tablet by mouth every 4 (four) hours as needed.    cetirizine (ZYRTEC) 10 MG tablet Take 1 tablet (10 mg total) by mouth once daily.    clonazePAM (KLONOPIN) 2 MG Tab Take 2 mg by mouth 2 (two) times daily.    cyanocobalamin 1,000 mcg/mL injection Inject 1,000 mcg into the muscle every 30 days.    cycloSPORINE (RESTASIS) 0.05 % ophthalmic emulsion Apply 1 drop to eye 2 (two) times daily.    dexlansoprazole (DEXILANT) 60 mg capsule Take 60 mg by mouth once daily.    dicyclomine (BENTYL) 10 MG capsule Take 10 mg by mouth 2 (two) times daily.    donepeziL (ARICEPT) 10 MG tablet Take 10 mg by mouth once daily.    fluticasone propionate (FLONASE) 50 mcg/actuation nasal spray 1 spray (50 mcg total) by Each Nostril route once daily.    fluticasone-umeclidin-vilanter (TRELEGY ELLIPTA) 200-62.5-25 mcg inhaler Inhale 1 puff into the lungs once daily.    gabapentin (NEURONTIN) 800 MG tablet Take 800 mg by mouth 3 (three) times daily.    HYDROcodone-acetaminophen (NORCO) 5-325 mg per tablet Take 1 tablet by mouth every 8 (eight) hours as needed for Pain.    hydrOXYzine pamoate (VISTARIL) 25 MG Cap TAKE 1 CAPSULE (25 MG TOTAL) BY MOUTH EVERY 6 (SIX) HOURS AS NEEDED.    LATUDA 40 mg Tab tablet Take 40 mg by mouth once daily.    levalbuterol (XOPENEX HFA) 45 mcg/actuation inhaler Inhale 1-2 puffs into the lungs every 4 (four) hours as needed for Wheezing. Rescue    LINZESS 145 mcg Cap capsule Take 145 mcg by mouth before breakfast.    meclizine (ANTIVERT) 25 mg tablet Take 1 tablet (25 mg total) by mouth 3 (three) times daily as needed for Dizziness.    multivitamin with minerals tablet Take 1 tablet by mouth once daily.    nicotine (NICODERM CQ) 21 mg/24  hr PLACE 1 PATCH ONTO THE SKIN ONCE DAILY.    ondansetron (ZOFRAN-ODT) 4 MG TbDL Take 4 mg by mouth every 8 (eight) hours as needed.    OXcarbazepine (TRILEPTAL) 300 MG Tab Take 300 mg by mouth once daily.    OXYGEN-AIR DELIVERY SYSTEMS MISC 6 L by Misc.(Non-Drug; Combo Route) route continuous.    pantoprazole (PROTONIX) 40 MG tablet Take 1 tablet by mouth every morning.    polyethylene glycol (GLYCOLAX) 17 gram/dose powder Take 17 g by mouth once daily.    pravastatin (PRAVACHOL) 20 MG tablet Take 20 mg by mouth once daily.    promethazine-dextromethorphan (PROMETHAZINE-DM) 6.25-15 mg/5 mL Syrp Take 5 mLs by mouth nightly as needed (cough).    pulse oximeter (PULSE OXIMETER) device by Apply Externally route 2 (two) times a day. Use twice daily at 8 AM and 3 PM and record the value in MyChart as directed.    rimegepant (NURTEC) 75 mg odt Take 75 mg by mouth once as needed for Migraine.    salmeteroL (SEREVENT) 50 mcg/dose diskus inhaler Inhale 1 puff into the lungs once daily.    sucralfate (CARAFATE) 1 gram tablet Take 1 g by mouth 4 (four) times daily.    sumatriptan (IMITREX) 25 MG Tab Take 25 mg by mouth every 2 (two) hours as needed.    topiramate (TOPAMAX) 50 MG tablet Take 50 mg by mouth once daily.    traZODone (DESYREL) 100 MG tablet Take 250 mg by mouth every evening.    trospium (SANCTURA XR) 60 mg Cp24 capsule Take 60 mg by mouth once daily.    vilazodone (VIIBRYD) 20 mg Tab Take 20 mg by mouth once daily.    vitamin D (VITAMIN D3) 1000 units Tab Take 1,000 Units by mouth once daily.     Family History       Problem Relation (Age of Onset)    Cancer Mother    Depression Brother    Heart disease Father, Sister    Hypertension Father, Sister, Brother          Tobacco Use    Smoking status: Every Day     Current packs/day: 0.50     Average packs/day: 0.5 packs/day for 44.5 years (22.3 ttl pk-yrs)     Types: Cigarettes     Start date: 1980    Smokeless tobacco: Never   Substance and Sexual Activity     Alcohol use: Not Currently     Alcohol/week: 1.0 standard drink of alcohol     Types: 1 Cans of beer per week     Comment: weekly    Drug use: No    Sexual activity: Not on file     Review of Systems   Constitutional:  Negative for activity change, chills, diaphoresis and fever.   HENT:  Negative for congestion, nosebleeds and tinnitus.    Eyes:  Negative for photophobia.   Respiratory:  Negative for cough, chest tightness, shortness of breath and wheezing.    Cardiovascular:  Negative for chest pain, palpitations and leg swelling.   Gastrointestinal:  Negative for abdominal distention, abdominal pain, constipation, diarrhea, nausea and vomiting.   Endocrine: Negative for cold intolerance and heat intolerance.   Genitourinary:  Negative for difficulty urinating, dysuria, frequency, hematuria and urgency.   Musculoskeletal:  Positive for arthralgias. Negative for back pain and myalgias.   Skin:  Negative for pallor, rash and wound.   Allergic/Immunologic: Negative for immunocompromised state.   Neurological:  Positive for dizziness. Negative for tremors, facial asymmetry, speech difficulty and weakness.   Hematological:  Negative for adenopathy. Does not bruise/bleed easily.   Psychiatric/Behavioral:  Negative for confusion and sleep disturbance. The patient is not nervous/anxious.      Objective:     Vital Signs (Most Recent):  Temp: 97.7 °F (36.5 °C) (07/19/24 1520)  Pulse: 70 (07/19/24 1633)  Resp: 20 (07/19/24 1545)  BP: (!) 158/82 (07/19/24 1632)  SpO2: 96 % (07/19/24 1633) Vital Signs (24h Range):  Temp:  [97.7 °F (36.5 °C)] 97.7 °F (36.5 °C)  Pulse:  [65-70] 70  Resp:  [18-20] 20  SpO2:  [85 %-98 %] 96 %  BP: (157-179)/(82-86) 158/82     Weight: 78 kg (172 lb)  Body mass index is 26.94 kg/m².     Physical Exam  Vitals and nursing note reviewed.   Constitutional:       General: She is not in acute distress.     Appearance: She is well-developed. She is not diaphoretic.   HENT:      Head: Normocephalic.       Mouth/Throat:      Mouth: Mucous membranes are moist.      Pharynx: Oropharynx is clear.   Eyes:      General: No scleral icterus.     Conjunctiva/sclera: Conjunctivae normal.      Pupils: Pupils are equal, round, and reactive to light.   Neck:      Vascular: No JVD.   Cardiovascular:      Rate and Rhythm: Normal rate and regular rhythm.      Heart sounds: Normal heart sounds. No murmur heard.     No friction rub. No gallop.   Pulmonary:      Effort: Pulmonary effort is normal. No respiratory distress.      Breath sounds: Normal breath sounds. No wheezing or rales.   Abdominal:      General: Bowel sounds are normal. There is no distension.      Palpations: Abdomen is soft.      Tenderness: There is no abdominal tenderness. There is no guarding or rebound.   Musculoskeletal:         General: Tenderness and signs of injury present. Normal range of motion.      Cervical back: Normal range of motion and neck supple.      Comments: Left leg shortened and rotated outwardly.  Neurovascularly intact   Lymphadenopathy:      Cervical: No cervical adenopathy.   Skin:     General: Skin is warm and dry.      Capillary Refill: Capillary refill takes less than 2 seconds.      Coloration: Skin is not pale.      Findings: No erythema or rash.   Neurological:      Mental Status: She is alert and oriented to person, place, and time.      Cranial Nerves: No cranial nerve deficit.      Sensory: No sensory deficit.      Coordination: Coordination normal.      Deep Tendon Reflexes: Reflexes normal.   Psychiatric:         Behavior: Behavior normal.         Thought Content: Thought content normal.         Judgment: Judgment normal.            CRANIAL NERVES     CN III, IV, VI   Pupils are equal, round, and reactive to light.       Significant Labs: All pertinent labs within the past 24 hours have been reviewed.  CBC:   Recent Labs   Lab 07/19/24  1535   WBC 6.91   HGB 12.1   HCT 36.7*        CMP:   Recent Labs   Lab 07/19/24  1535    *   K 4.1   CL 92*   CO2 23   GLU 94   BUN 5*   CREATININE 0.7   CALCIUM 9.1   PROT 7.1   ALBUMIN 3.7   BILITOT 0.3   ALKPHOS 136*   AST 20   ALT 21   ANIONGAP 10       Significant Imaging: I have reviewed all pertinent imaging results/findings within the past 24 hours.    Hip:    FINDINGS:  There is fracture of the left hip with angulation of the femoral neck best seen on the cross-table view.  Patient has had prior right hip arthroplasty with metallic acetabular and femoral components in good position.  There is diffuse osteoporosis.     Impression:     Angulated fracture of the left femoral neck.  Prior right hip arthroplasty.  Osteoporosis.    CXR  FINDINGS:  There is mild cardiomegaly.  A right pericardial fat pad is noted.  No intrapulmonary mass or infiltrate is seen.  No pneumothorax or pleural effusion is noted.     Impression:     Mild cardiomegaly otherwise negative chest x-ray.

## 2024-07-19 NOTE — ASSESSMENT & PLAN NOTE
Acute problem   NPO after midnight   Pain management   Nausea management   Monitor electrolytes and replete p.r.n.   Appreciate recommendations from Orthopedics   PT/OT consult when appropriate

## 2024-07-19 NOTE — H&P
[facility] Medicine  History & Physical    Patient Name: Nimco Caro  MRN: [id number]  Patient Class: Emergency  Admission Date: 7/19/2024  Attending Physician: Davdi Ashley MD   Primary Care Provider: Katy Mason MD         Patient information was obtained from patient, past medical records, and ER records.     Subjective:     Principal Problem:Closed fracture of left hip    Chief Complaint:   Chief Complaint   Patient presents with    Fall     Pt [facility] with a c/o L hip pain, positive shorting and rotation          HPI: Nimco Caro is a 63-year-old female who presents emergency room complaining of left hip pain.  She reports she became dizzy and experienced a mechanical fall at her residence.  She denies head trauma or loss of consciousness.  She denies any cervical pain.  She is not on any anticoagulants.  She describes the hip pain as intense.  She rates the pain at 10/10 at worst.  The pain is worse with any movement of the left lower leg.  No alleviating factors.  No recent fever or chills.  No known sick contacts or travel.  Previous medical history of hyponatremia, GERD, hyperlipidemia, osteoarthritis, active smoker, COPD, bipolar disease.  ER workup: CBC unremarkable.  CMP with hyponatremia of 125 and alk-phos elevated 136.  Urinalysis pending at the time my exam.  EKG demonstrated sinus rhythm without ST or T-wave elevation.  Chest x-ray was unremarkable.  ER physician spoke with  with orthopedics who will see patient in consultation.  Patient will be admitted to Hospital Medicine for treatment and management.  Hyponatremia labs pending.    Past Medical History:   Diagnosis Date    ACP (advance care planning) 12/20/2023    Anxiety     Bipolar affect, depressed     Cancer ovarian; uterine    1992    COPD (chronic obstructive pulmonary disease)     GERD (gastroesophageal reflux disease)     Hyperlipidemia     Hypertension     OAB (overactive  bladder)     On home oxygen therapy     per patient uses PRN       Past Surgical History:   Procedure Laterality Date    BREAST CYST ASPIRATION      COLON SURGERY      COLONOSCOPY      HERNIA REPAIR N/A 2016    HIP ARTHROPLASTY Right 2/25/2019    Procedure: ARTHROPLASTY, HIP;  Surgeon: Eliseo Vaca MD;  Location: [facility] OR;  Service: Orthopedics;  Laterality: Right;  Daniel Herrera    HYSTERECTOMY  total    JOINT REPLACEMENT Right     hip replacemnt    KNEE ARTHROPLASTY Left 8/19/2019    Procedure: ARTHROPLASTY, KNEE;  Surgeon: Eliseo Vaca MD;  Location: [facility] OR;  Service: Orthopedics;  Laterality: Left;    LUMBAR LAMINECTOMY WITH FUSION N/A 12/7/2023    Procedure: LAMINECTOMY, SPINE, LUMBAR, WITH FUSION L4-5;  Surgeon: Joby Jaimes MD;  Location: [facility] OR;  Service: Orthopedics;  Laterality: N/A;  NTI, [employer]    REVISION COLOSTOMY N/A 2012       Review of patient's allergies indicates:   Allergen Reactions    Meloxicam Other (See Comments)    Ciprofloxacin Diarrhea    Hydrocodone-acetaminophen Nausea And Vomiting, Nausea Only and Other (See Comments)    Ibuprofen Nausea Only    Naproxen Nausea Only and Other (See Comments)    Narcof [hydrocodone-guaifenesin] Nausea And Vomiting    Quetiapine Nausea And Vomiting and Other (See Comments)       No current facility-administered medications on file prior to encounter.     Current Outpatient Medications on File Prior to Encounter   Medication Sig    ascorbic acid, vitamin C, (VITAMIN C) 1000 MG tablet Take 1,000 mg by mouth once daily.    atenoloL (TENORMIN) 25 MG tablet Take 25 mg by mouth once daily.    azelastine (ASTELIN) 137 mcg (0.1 %) nasal spray SPRAY 1 SPRAY BY NASAL ROUTE 2 TIMES DAILY. MUST MAKE APPOINTMENT    b complex vitamins tablet Take 1 tablet by mouth once daily.    biotin 1 mg tablet Take 1,000 mcg by mouth once daily.    butalbital-acetaminophen-caffeine -40 mg (FIORICET, ESGIC) -40 mg per tablet Take 1 tablet by mouth every 4 (four)  hours as needed.    cetirizine (ZYRTEC) 10 MG tablet Take 1 tablet (10 mg total) by mouth once daily.    clonazePAM (KLONOPIN) 2 MG Tab Take 2 mg by mouth 2 (two) times daily.    cyanocobalamin 1,000 mcg/mL injection Inject 1,000 mcg into the muscle every 30 days.    cycloSPORINE (RESTASIS) 0.05 % ophthalmic emulsion Apply 1 drop to eye 2 (two) times daily.    dexlansoprazole (DEXILANT) 60 mg capsule Take 60 mg by mouth once daily.    dicyclomine (BENTYL) 10 MG capsule Take 10 mg by mouth 2 (two) times daily.    donepeziL (ARICEPT) 10 MG tablet Take 10 mg by mouth once daily.    fluticasone propionate (FLONASE) 50 mcg/actuation nasal spray 1 spray (50 mcg total) by Each Nostril route once daily.    fluticasone-umeclidin-vilanter (TRELEGY ELLIPTA) 200-62.5-25 mcg inhaler Inhale 1 puff into the lungs once daily.    gabapentin (NEURONTIN) 800 MG tablet Take 800 mg by mouth 3 (three) times daily.    HYDROcodone-acetaminophen (NORCO) 5-325 mg per tablet Take 1 tablet by mouth every 8 (eight) hours as needed for Pain.    hydrOXYzine pamoate (VISTARIL) 25 MG Cap TAKE 1 CAPSULE (25 MG TOTAL) BY MOUTH EVERY 6 (SIX) HOURS AS NEEDED.    LATUDA 40 mg Tab tablet Take 40 mg by mouth once daily.    levalbuterol (XOPENEX HFA) 45 mcg/actuation inhaler Inhale 1-2 puffs into the lungs every 4 (four) hours as needed for Wheezing. Rescue    LINZESS 145 mcg Cap capsule Take 145 mcg by mouth before breakfast.    meclizine (ANTIVERT) 25 mg tablet Take 1 tablet (25 mg total) by mouth 3 (three) times daily as needed for Dizziness.    multivitamin with minerals tablet Take 1 tablet by mouth once daily.    nicotine (NICODERM CQ) 21 mg/24 hr PLACE 1 PATCH ONTO THE SKIN ONCE DAILY.    ondansetron (ZOFRAN-ODT) 4 MG TbDL Take 4 mg by mouth every 8 (eight) hours as needed.    OXcarbazepine (TRILEPTAL) 300 MG Tab Take 300 mg by mouth once daily.    OXYGEN-AIR DELIVERY SYSTEMS MISC 6 L by Misc.(Non-Drug; Combo Route) route continuous.    pantoprazole  (PROTONIX) 40 MG tablet Take 1 tablet by mouth every morning.    polyethylene glycol (GLYCOLAX) 17 gram/dose powder Take 17 g by mouth once daily.    pravastatin (PRAVACHOL) 20 MG tablet Take 20 mg by mouth once daily.    promethazine-dextromethorphan (PROMETHAZINE-DM) 6.25-15 mg/5 mL Syrp Take 5 mLs by mouth nightly as needed (cough).    pulse oximeter (PULSE OXIMETER) device by Apply Externally route 2 (two) times a day. Use twice daily at 8 AM and 3 PM and record the value in MyChart as directed.    rimegepant (NURTEC) 75 mg odt Take 75 mg by mouth once as needed for Migraine.    salmeteroL (SEREVENT) 50 mcg/dose diskus inhaler Inhale 1 puff into the lungs once daily.    sucralfate (CARAFATE) 1 gram tablet Take 1 g by mouth 4 (four) times daily.    sumatriptan (IMITREX) 25 MG Tab Take 25 mg by mouth every 2 (two) hours as needed.    topiramate (TOPAMAX) 50 MG tablet Take 50 mg by mouth once daily.    traZODone (DESYREL) 100 MG tablet Take 250 mg by mouth every evening.    trospium (SANCTURA XR) 60 mg Cp24 capsule Take 60 mg by mouth once daily.    vilazodone (VIIBRYD) 20 mg Tab Take 20 mg by mouth once daily.    vitamin D (VITAMIN D3) 1000 units Tab Take 1,000 Units by mouth once daily.     Family History       Problem Relation (Age of Onset)    Cancer Mother    Depression Brother    Heart disease Father, Sister    Hypertension Father, Sister, Brother          Tobacco Use    Smoking status: Every Day     Current packs/day: 0.50     Average packs/day: 0.5 packs/day for 44.5 years (22.3 ttl pk-yrs)     Types: Cigarettes     Start date: 1980    Smokeless tobacco: Never   Substance and Sexual Activity    Alcohol use: Not Currently     Alcohol/week: 1.0 standard drink of alcohol     Types: 1 Cans of beer per week     Comment: weekly    Drug use: No    Sexual activity: Not on file     Review of Systems   Constitutional:  Negative for activity change, chills, diaphoresis and fever.   HENT:  Negative for congestion,  nosebleeds and tinnitus.    Eyes:  Negative for photophobia.   Respiratory:  Negative for cough, chest tightness, shortness of breath and wheezing.    Cardiovascular:  Negative for chest pain, palpitations and leg swelling.   Gastrointestinal:  Negative for abdominal distention, abdominal pain, constipation, diarrhea, nausea and vomiting.   Endocrine: Negative for cold intolerance and heat intolerance.   Genitourinary:  Negative for difficulty urinating, dysuria, frequency, hematuria and urgency.   Musculoskeletal:  Positive for arthralgias. Negative for back pain and myalgias.   Skin:  Negative for pallor, rash and wound.   Allergic/Immunologic: Negative for immunocompromised state.   Neurological:  Positive for dizziness. Negative for tremors, facial asymmetry, speech difficulty and weakness.   Hematological:  Negative for adenopathy. Does not bruise/bleed easily.   Psychiatric/Behavioral:  Negative for confusion and sleep disturbance. The patient is not nervous/anxious.      Objective:     Vital Signs (Most Recent):  Temp: 97.7 °F (36.5 °C) (07/19/24 1520)  Pulse: 70 (07/19/24 1633)  Resp: 20 (07/19/24 1545)  BP: (!) 158/82 (07/19/24 1632)  SpO2: 96 % (07/19/24 1633) Vital Signs (24h Range):  Temp:  [97.7 °F (36.5 °C)] 97.7 °F (36.5 °C)  Pulse:  [65-70] 70  Resp:  [18-20] 20  SpO2:  [85 %-98 %] 96 %  BP: (157-179)/(82-86) 158/82     Weight: 78 kg (172 lb)  Body mass index is 26.94 kg/m².     Physical Exam  Vitals and nursing note reviewed.   Constitutional:       General: She is not in acute distress.     Appearance: She is well-developed. She is not diaphoretic.   HENT:      Head: Normocephalic.      Mouth/Throat:      Mouth: Mucous membranes are moist.      Pharynx: Oropharynx is clear.   Eyes:      General: No scleral icterus.     Conjunctiva/sclera: Conjunctivae normal.      Pupils: Pupils are equal, round, and reactive to light.   Neck:      Vascular: No JVD.   Cardiovascular:      Rate and Rhythm: Normal  rate and regular rhythm.      Heart sounds: Normal heart sounds. No murmur heard.     No friction rub. No gallop.   Pulmonary:      Effort: Pulmonary effort is normal. No respiratory distress.      Breath sounds: Normal breath sounds. No wheezing or rales.   Abdominal:      General: Bowel sounds are normal. There is no distension.      Palpations: Abdomen is soft.      Tenderness: There is no abdominal tenderness. There is no guarding or rebound.   Musculoskeletal:         General: Tenderness and signs of injury present. Normal range of motion.      Cervical back: Normal range of motion and neck supple.      Comments: Left leg shortened and rotated outwardly.  Neurovascularly intact   Lymphadenopathy:      Cervical: No cervical adenopathy.   Skin:     General: Skin is warm and dry.      Capillary Refill: Capillary refill takes less than 2 seconds.      Coloration: Skin is not pale.      Findings: No erythema or rash.   Neurological:      Mental Status: She is alert and oriented to person, place, and time.      Cranial Nerves: No cranial nerve deficit.      Sensory: No sensory deficit.      Coordination: Coordination normal.      Deep Tendon Reflexes: Reflexes normal.   Psychiatric:         Behavior: Behavior normal.         Thought Content: Thought content normal.         Judgment: Judgment normal.            CRANIAL NERVES     CN III, IV, VI   Pupils are equal, round, and reactive to light.       Significant Labs: All pertinent labs within the past 24 hours have been reviewed.  CBC:   Recent Labs   Lab 07/19/24  1535   WBC 6.91   HGB 12.1   HCT 36.7*        CMP:   Recent Labs   Lab 07/19/24  1535   *   K 4.1   CL 92*   CO2 23   GLU 94   BUN 5*   CREATININE 0.7   CALCIUM 9.1   PROT 7.1   ALBUMIN 3.7   BILITOT 0.3   ALKPHOS 136*   AST 20   ALT 21   ANIONGAP 10       Significant Imaging: I have reviewed all pertinent imaging results/findings within the past 24 hours.    Hip:    FINDINGS:  There is  fracture of the left hip with angulation of the femoral neck best seen on the cross-table view.  Patient has had prior right hip arthroplasty with metallic acetabular and femoral components in good position.  There is diffuse osteoporosis.     Impression:     Angulated fracture of the left femoral neck.  Prior right hip arthroplasty.  Osteoporosis.    CXR  FINDINGS:  There is mild cardiomegaly.  A right pericardial fat pad is noted.  No intrapulmonary mass or infiltrate is seen.  No pneumothorax or pleural effusion is noted.     Impression:     Mild cardiomegaly otherwise negative chest x-ray.    Assessment/Plan:     * Closed fracture of left hip  Acute problem   NPO after midnight   Pain management   Nausea management   Monitor electrolytes and replete p.r.n.   Appreciate recommendations from Orthopedics   PT/OT consult when appropriate      Hyponatremia  Patient has hyponatremia which is worsening,We will aim to correct the sodium by 4-6mEq in 24 hours. We will monitor sodium Daily. The hyponatremia is due to Medications: SSRIs. We will obtain the following studies: Urine sodium, urine osmolality, serum osmolality. We will treat the hyponatremia with IV fluids as follows  Normal saline at 75 cc an hour. The patient's sodium results have been reviewed and are listed below.  Recent Labs   Lab 07/19/24  1535   *       Bipolar 1 disorder  Chronic problem   Appears stable      Tobacco dependence due to cigarettes  Dangers of cigarette smoking were reviewed with patient in detail. Patient was Counseled for 3-10 minutes. Nicotine replacement options were discussed. Nicotine replacement was discussed- prescribed    Hypertension  Chronic, controlled. Latest blood pressure and vitals reviewed-     Temp:  [97.7 °F (36.5 °C)]   Pulse:  [65-70]   Resp:  [18-20]   BP: (157-179)/(82-86)   SpO2:  [85 %-98 %] .   Home meds for hypertension were reviewed and noted below.   Hypertension Medications               atenoloL  (TENORMIN) 25 MG tablet Take 25 mg by mouth once daily.            While in the hospital, will manage blood pressure as follows; Continue home antihypertensive regimen    Will utilize p.r.n. blood pressure medication only if patient's blood pressure greater than 140/90 and she develops symptoms such as worsening chest pain or shortness of breath.      VTE Risk Mitigation (From admission, onward)      None                            Noé Flanagan NP  Department of Hospital Medicine  [facility]

## 2024-07-19 NOTE — HPI
Nimco Caro is a 63-year-old female who presents emergency room complaining of left hip pain.  She reports she became dizzy and experienced a mechanical fall at her residence.  She denies head trauma or loss of consciousness.  She denies any cervical pain.  She is not on any anticoagulants.  She describes the hip pain as intense.  She rates the pain at 10/10 at worst.  The pain is worse with any movement of the left lower leg.  No alleviating factors.  No recent fever or chills.  No known sick contacts or travel.  Previous medical history of hyponatremia, GERD, hyperlipidemia, osteoarthritis, active smoker, COPD, bipolar disease.  ER workup: CBC unremarkable.  CMP with hyponatremia of 125 and alk-phos elevated 136.  Urinalysis pending at the time my exam.  EKG demonstrated sinus rhythm without ST or T-wave elevation.  Chest x-ray was unremarkable.  ER physician spoke with  with orthopedics who will see patient in consultation.  Patient will be admitted to Hospital Medicine for treatment and management.  Hyponatremia labs pending.

## 2024-07-19 NOTE — ASSESSMENT & PLAN NOTE
Chronic, controlled. Latest blood pressure and vitals reviewed-     Temp:  [97.7 °F (36.5 °C)]   Pulse:  [65-70]   Resp:  [18-20]   BP: (157-179)/(82-86)   SpO2:  [85 %-98 %] .   Home meds for hypertension were reviewed and noted below.   Hypertension Medications               atenoloL (TENORMIN) 25 MG tablet Take 25 mg by mouth once daily.            While in the hospital, will manage blood pressure as follows; Continue home antihypertensive regimen    Will utilize p.r.n. blood pressure medication only if patient's blood pressure greater than 140/90 and she develops symptoms such as worsening chest pain or shortness of breath.

## 2024-07-19 NOTE — ASSESSMENT & PLAN NOTE
Patient has hyponatremia which is worsening,We will aim to correct the sodium by 4-6mEq in 24 hours. We will monitor sodium Daily. The hyponatremia is due to Medications: SSRIs. We will obtain the following studies: Urine sodium, urine osmolality, serum osmolality. We will treat the hyponatremia with IV fluids as follows  Normal saline at 75 cc an hour. The patient's sodium results have been reviewed and are listed below.  Recent Labs   Lab 07/19/24  1535   *

## 2024-07-19 NOTE — ED PROVIDER NOTES
Encounter Date: 7/19/2024       History     Chief Complaint   Patient presents with    Fall     Pt [facility] EMS with a c/o L hip pain, positive shorting and rotation       63-year-old female with a past medical history of bipolar disorder, COPD, and reflux disease presents for evaluation of left hip pain.  EMS reports patient was seen and accidentally fell onto her left hip.  They report associated shortening and external rotation of left leg.  They deny any associated head injury, loss of consciousness, neck pain, or back pain.  The patient is in agreement with this.  Additionally, she denies any associated chest pain syncope, shortness of breath, nausea/vomiting abdominal pain, or diarrhea.  Her pain is worse with movement and palpation of the hip.  There are no alleviating factors.  EMS reports that they gave her 100 mcg of fentanyl IV as well as 4 mg of IV Zofran prior to arrival.      Review of patient's allergies indicates:   Allergen Reactions    Meloxicam Other (See Comments)    Ciprofloxacin Diarrhea    Hydrocodone-acetaminophen Nausea And Vomiting, Nausea Only and Other (See Comments)    Ibuprofen Nausea Only    Naproxen Nausea Only and Other (See Comments)    Narcof [hydrocodone-guaifenesin] Nausea And Vomiting    Quetiapine Nausea And Vomiting and Other (See Comments)     Past Medical History:   Diagnosis Date    ACP (advance care planning) 12/20/2023    Anxiety     Bipolar affect, depressed     Cancer ovarian; uterine    1992    COPD (chronic obstructive pulmonary disease)     GERD (gastroesophageal reflux disease)     Hyperlipidemia     Hypertension     OAB (overactive bladder)     On home oxygen therapy     per patient uses PRN     Past Surgical History:   Procedure Laterality Date    BREAST CYST ASPIRATION      COLON SURGERY      COLONOSCOPY      HERNIA REPAIR N/A 2016    HIP ARTHROPLASTY Right 2/25/2019    Procedure: ARTHROPLASTY, HIP;  Surgeon: Eliseo Vaca MD;  Location: [facility];  Service:  Orthopedics;  Laterality: Right;  Daniel Herrera    HYSTERECTOMY  total    JOINT REPLACEMENT Right     hip replacemnt    KNEE ARTHROPLASTY Left 8/19/2019    Procedure: ARTHROPLASTY, KNEE;  Surgeon: Eliseo Vaca MD;  Location: [facility] OR;  Service: Orthopedics;  Laterality: Left;    LUMBAR LAMINECTOMY WITH FUSION N/A 12/7/2023    Procedure: LAMINECTOMY, SPINE, LUMBAR, WITH FUSION L4-5;  Surgeon: Joby Jaimes MD;  Location: [facility] OR;  Service: Orthopedics;  Laterality: N/A;  NTI, [employer]    REVISION COLOSTOMY N/A 2012     Family History   Problem Relation Name Age of Onset    Cancer Mother      Hypertension Father      Heart disease Father      Heart disease Sister      Hypertension Sister      Hypertension Brother      Depression Brother       Social History     Tobacco Use    Smoking status: Every Day     Current packs/day: 0.50     Average packs/day: 0.5 packs/day for 44.5 years (22.3 ttl pk-yrs)     Types: Cigarettes     Start date: 1980    Smokeless tobacco: Never   Substance Use Topics    Alcohol use: Not Currently     Alcohol/week: 1.0 standard drink of alcohol     Types: 1 Cans of beer per week     Comment: weekly    Drug use: No     Review of Systems   Constitutional:  Negative for chills and fever.   HENT:  Negative for congestion.    Respiratory:  Negative for cough and shortness of breath.    Cardiovascular:  Negative for chest pain.   Gastrointestinal:  Negative for abdominal pain, nausea and vomiting.   Genitourinary:  Negative for dysuria.   Musculoskeletal:  Positive for arthralgias. Negative for gait problem.   Skin:  Negative for color change.   Neurological:  Negative for dizziness and numbness.   Psychiatric/Behavioral:  Negative for agitation.        Physical Exam     Initial Vitals   BP Pulse Resp Temp SpO2   07/19/24 1500 07/19/24 1500 07/19/24 1500 07/19/24 1520 07/19/24 1500   (!) 179/84 68 18 97.7 °F (36.5 °C) 98 %      MAP       --                Physical Exam    Nursing note and vitals  reviewed.  Constitutional: She appears well-developed and well-nourished.   HENT:   Head: Atraumatic.   Eyes: EOM are normal. Pupils are equal, round, and reactive to light.   Neck:   Normal range of motion.  Cardiovascular:  Normal rate and regular rhythm.           Pulmonary/Chest: Breath sounds normal.   Abdominal: Abdomen is soft. Bowel sounds are normal. She exhibits no distension. There is no abdominal tenderness. There is no rebound and no guarding.   Musculoskeletal:         General: Tenderness present. Normal range of motion.      Right shoulder: Normal.      Left shoulder: Normal.      Cervical back: Normal range of motion.      Comments: Tenderness to palpation of the left hip.  Left leg externally rotated and shortened.     Neurological: She is alert and oriented to person, place, and time. GCS score is 15. GCS eye subscore is 4. GCS verbal subscore is 5. GCS motor subscore is 6.   Skin: Skin is warm and dry.   Psychiatric: She has a normal mood and affect.         ED Course   Critical Care    Date/Time: 7/19/2024 4:56 PM    Performed by: David Ashley MD  Authorized by: David Ashley MD  Direct patient critical care time: 16 minutes  Ordering / reviewing critical care time: 14 minutes  Documentation critical care time: 11 minutes  Total critical care time (exclusive of procedural time) : 41 minutes  Critical care was time spent personally by me on the following activities: development of treatment plan with patient or surrogate, evaluation of patient's response to treatment, examination of patient, obtaining history from patient or surrogate, ordering and review of laboratory studies and ordering and review of radiographic studies.        Labs Reviewed   COMPREHENSIVE METABOLIC PANEL - Abnormal       Result Value    Sodium 125 (*)     Potassium 4.1      Chloride 92 (*)     CO2 23      Glucose 94      BUN 5 (*)     Creatinine 0.7      Calcium 9.1      Total Protein 7.1      Albumin 3.7       Total Bilirubin 0.3      Alkaline Phosphatase 136 (*)     AST 20      ALT 21      eGFR >60      Anion Gap 10     CBC W/ AUTO DIFFERENTIAL - Abnormal    WBC 6.91      RBC 4.35      Hemoglobin 12.1      Hematocrit 36.7 (*)     MCV 84      MCH 27.8      MCHC 33.0      RDW 18.2 (*)     Platelets 236      MPV 8.9 (*)     Immature Granulocytes 0.4      Gran # (ANC) 4.5      Immature Grans (Abs) 0.03      Lymph # 1.6      Mono # 0.6      Eos # 0.1      Baso # 0.03      nRBC 0      Gran % 65.5      Lymph % 23.4      Mono % 9.0      Eosinophil % 1.3      Basophil % 0.4      Differential Method Automated     OSMOLALITY, SERUM   SODIUM, URINE, RANDOM   OSMOLALITY, URINE RANDOM   URINALYSIS, REFLEX TO URINE CULTURE     EKG Readings: (Independently Interpreted)   Initial Reading: No STEMI. Rhythm: Normal Sinus Rhythm. Heart Rate: 64. Ectopy: No Ectopy. Conduction: Normal. ST Segments: Normal ST Segments. T Waves: Normal. Clinical Impression: Normal Sinus Rhythm       Imaging Results              X-Ray Chest AP Portable (Final result)  Result time 07/19/24 15:58:16      Final result by Fred Stevens Jr., MD (07/19/24 15:58:16)                   Impression:      Mild cardiomegaly otherwise negative chest x-ray.      Electronically signed by: Fred Stevens MD  Date:    07/19/2024  Time:    15:58               Narrative:    EXAMINATION:  XR CHEST AP PORTABLE    CLINICAL HISTORY:  pre-op;    TECHNIQUE:  Single frontal view of the chest was performed.    COMPARISON:  Chest x-ray of December 9, 2023    FINDINGS:  There is mild cardiomegaly.  A right pericardial fat pad is noted.  No intrapulmonary mass or infiltrate is seen.  No pneumothorax or pleural effusion is noted.                                       X-Ray Hip 2 or 3 views Left with Pelvis when performed (Final result)  Result time 07/19/24 15:59:33      Final result by Fred Stevens Jr., MD (07/19/24 15:59:33)                   Impression:      Angulated  fracture of the left femoral neck.  Prior right hip arthroplasty.  Osteoporosis.      Electronically signed by: Fred Stevens MD  Date:    07/19/2024  Time:    15:59               Narrative:    EXAMINATION:  XR HIP WITH PELVIS WHEN PERFORMED 2 OR 3 VIEWS LEFT    CLINICAL HISTORY:  Unspecified fall, initial encounter    TECHNIQUE:  AP view of the pelvis and frog leg lateral view of the left hip were performed.    COMPARISON:  Hip and pelvic x-ray of June 17, 2021    FINDINGS:  There is fracture of the left hip with angulation of the femoral neck best seen on the cross-table view.  Patient has had prior right hip arthroplasty with metallic acetabular and femoral components in good position.  There is diffuse osteoporosis.                                       Medications   ondansetron injection 4 mg (4 mg Intravenous Given 7/19/24 7723)   HYDROmorphone injection 0.5 mg (0.5 mg Intravenous Given 7/19/24 6221)     Medical Decision Making  63-year-old female presents with a hip injury status post fall.    Initial differential diagnosis included but not limited to fracture, dislocation, and contusion.    Amount and/or Complexity of Data Reviewed  Labs: ordered.  Radiology: ordered.  ECG/medicine tests: ordered.    Risk  Prescription drug management.  Decision regarding hospitalization.  Risk Details: The patient was emergently evaluated in the emergency department, her evaluation was significant for an older female with an injury to the left hip.  The patient's x-rays of the hip do show a femoral neck fracture.  The patient's EKG showed no acute abnormalities per my independent interpretation.  I did check preoperative chest x-ray in this patient.  It shows no acute abnormalities per Radiology.  Additionally, I did check preoperative labs in this patient which were significant for hyponatremia.  This could be due to her psychiatric medications.  The case was discussed with the orthopedist on call, Dr. Nettles.  I will  admit her to the hospitalist service for further care.  The patient was discussed with the nurse practitioner on-call for the hospitalist Service.  He has accepted the patient for admission.                                      Clinical Impression:  Final diagnoses:  [Z01.810] Preoperative cardiovascular examination  [W19.XXXA] Fall  [S72.002A] Closed fracture of left hip, initial encounter (Primary)  [E87.1] Hyponatremia          ED Disposition Condition    Admit Stable                David Ashley MD  07/19/24 2022

## 2024-07-20 ENCOUNTER — ANESTHESIA (OUTPATIENT)
Dept: SURGERY | Facility: HOSPITAL | Age: 64
End: 2024-07-20
Payer: MEDICAID

## 2024-07-20 ENCOUNTER — ANESTHESIA EVENT (OUTPATIENT)
Dept: SURGERY | Facility: HOSPITAL | Age: 64
End: 2024-07-20
Payer: MEDICAID

## 2024-07-20 LAB
ALBUMIN SERPL BCP-MCNC: 3.3 G/DL (ref 3.5–5.2)
ALP SERPL-CCNC: 127 U/L (ref 55–135)
ALT SERPL W/O P-5'-P-CCNC: 20 U/L (ref 10–44)
ANION GAP SERPL CALC-SCNC: 11 MMOL/L (ref 8–16)
AST SERPL-CCNC: 19 U/L (ref 10–40)
BASOPHILS # BLD AUTO: 0.03 K/UL (ref 0–0.2)
BASOPHILS NFR BLD: 0.3 % (ref 0–1.9)
BILIRUB SERPL-MCNC: 0.4 MG/DL (ref 0.1–1)
BUN SERPL-MCNC: 4 MG/DL (ref 8–23)
CALCIUM SERPL-MCNC: 8.9 MG/DL (ref 8.7–10.5)
CHLORIDE SERPL-SCNC: 95 MMOL/L (ref 95–110)
CO2 SERPL-SCNC: 21 MMOL/L (ref 23–29)
CREAT SERPL-MCNC: 0.6 MG/DL (ref 0.5–1.4)
DIFFERENTIAL METHOD BLD: ABNORMAL
EOSINOPHIL # BLD AUTO: 0.1 K/UL (ref 0–0.5)
EOSINOPHIL NFR BLD: 1 % (ref 0–8)
ERYTHROCYTE [DISTWIDTH] IN BLOOD BY AUTOMATED COUNT: 18.1 % (ref 11.5–14.5)
EST. GFR  (NO RACE VARIABLE): >60 ML/MIN/1.73 M^2
GLUCOSE SERPL-MCNC: 118 MG/DL (ref 70–110)
HCT VFR BLD AUTO: 37.5 % (ref 37–48.5)
HGB BLD-MCNC: 12.4 G/DL (ref 12–16)
IMM GRANULOCYTES # BLD AUTO: 0.02 K/UL (ref 0–0.04)
IMM GRANULOCYTES NFR BLD AUTO: 0.2 % (ref 0–0.5)
LYMPHOCYTES # BLD AUTO: 0.9 K/UL (ref 1–4.8)
LYMPHOCYTES NFR BLD: 8.4 % (ref 18–48)
MAGNESIUM SERPL-MCNC: 1.9 MG/DL (ref 1.6–2.6)
MCH RBC QN AUTO: 27.8 PG (ref 27–31)
MCHC RBC AUTO-ENTMCNC: 33.1 G/DL (ref 32–36)
MCV RBC AUTO: 84 FL (ref 82–98)
MONOCYTES # BLD AUTO: 1 K/UL (ref 0.3–1)
MONOCYTES NFR BLD: 8.5 % (ref 4–15)
NEUTROPHILS # BLD AUTO: 9.2 K/UL (ref 1.8–7.7)
NEUTROPHILS NFR BLD: 81.6 % (ref 38–73)
NRBC BLD-RTO: 0 /100 WBC
OSMOLALITY UR: 202 MOSM/KG (ref 50–1200)
PHOSPHATE SERPL-MCNC: 3.5 MG/DL (ref 2.7–4.5)
PLATELET # BLD AUTO: 252 K/UL (ref 150–450)
PMV BLD AUTO: 9.4 FL (ref 9.2–12.9)
POTASSIUM SERPL-SCNC: 4.3 MMOL/L (ref 3.5–5.1)
PROT SERPL-MCNC: 6.8 G/DL (ref 6–8.4)
RBC # BLD AUTO: 4.46 M/UL (ref 4–5.4)
SODIUM SERPL-SCNC: 127 MMOL/L (ref 136–145)
WBC # BLD AUTO: 11.23 K/UL (ref 3.9–12.7)

## 2024-07-20 PROCEDURE — 27000221 HC OXYGEN, UP TO 24 HOURS

## 2024-07-20 PROCEDURE — 85025 COMPLETE CBC W/AUTO DIFF WBC: CPT | Performed by: NURSE PRACTITIONER

## 2024-07-20 PROCEDURE — 63600175 PHARM REV CODE 636 W HCPCS: Performed by: ANESTHESIOLOGY

## 2024-07-20 PROCEDURE — 94799 UNLISTED PULMONARY SVC/PX: CPT | Mod: XB

## 2024-07-20 PROCEDURE — 27236 TREAT THIGH FRACTURE: CPT | Mod: LT,,, | Performed by: ORTHOPAEDIC SURGERY

## 2024-07-20 PROCEDURE — C1776 JOINT DEVICE (IMPLANTABLE): HCPCS | Performed by: ORTHOPAEDIC SURGERY

## 2024-07-20 PROCEDURE — 94640 AIRWAY INHALATION TREATMENT: CPT

## 2024-07-20 PROCEDURE — 36415 COLL VENOUS BLD VENIPUNCTURE: CPT | Performed by: NURSE PRACTITIONER

## 2024-07-20 PROCEDURE — 94761 N-INVAS EAR/PLS OXIMETRY MLT: CPT

## 2024-07-20 PROCEDURE — 25000003 PHARM REV CODE 250: Performed by: STUDENT IN AN ORGANIZED HEALTH CARE EDUCATION/TRAINING PROGRAM

## 2024-07-20 PROCEDURE — 80053 COMPREHEN METABOLIC PANEL: CPT | Performed by: NURSE PRACTITIONER

## 2024-07-20 PROCEDURE — 25000003 PHARM REV CODE 250: Performed by: ORTHOPAEDIC SURGERY

## 2024-07-20 PROCEDURE — 25000242 PHARM REV CODE 250 ALT 637 W/ HCPCS: Performed by: ORTHOPAEDIC SURGERY

## 2024-07-20 PROCEDURE — 25000003 PHARM REV CODE 250: Performed by: ANESTHESIOLOGY

## 2024-07-20 PROCEDURE — D9220A PRA ANESTHESIA: Mod: ANES,,, | Performed by: ANESTHESIOLOGY

## 2024-07-20 PROCEDURE — 25000242 PHARM REV CODE 250 ALT 637 W/ HCPCS: Performed by: STUDENT IN AN ORGANIZED HEALTH CARE EDUCATION/TRAINING PROGRAM

## 2024-07-20 PROCEDURE — 27201423 OPTIME MED/SURG SUP & DEVICES STERILE SUPPLY: Performed by: ORTHOPAEDIC SURGERY

## 2024-07-20 PROCEDURE — 63600175 PHARM REV CODE 636 W HCPCS: Performed by: ORTHOPAEDIC SURGERY

## 2024-07-20 PROCEDURE — 0SRS0JA REPLACEMENT OF LEFT HIP JOINT, FEMORAL SURFACE WITH SYNTHETIC SUBSTITUTE, UNCEMENTED, OPEN APPROACH: ICD-10-PCS | Performed by: ORTHOPAEDIC SURGERY

## 2024-07-20 PROCEDURE — 37000008 HC ANESTHESIA 1ST 15 MINUTES: Performed by: ORTHOPAEDIC SURGERY

## 2024-07-20 PROCEDURE — 11000001 HC ACUTE MED/SURG PRIVATE ROOM

## 2024-07-20 PROCEDURE — 36000711: Performed by: ORTHOPAEDIC SURGERY

## 2024-07-20 PROCEDURE — D9220A PRA ANESTHESIA: Mod: CRNA,,, | Performed by: NURSE ANESTHETIST, CERTIFIED REGISTERED

## 2024-07-20 PROCEDURE — 63600175 PHARM REV CODE 636 W HCPCS: Performed by: NURSE ANESTHETIST, CERTIFIED REGISTERED

## 2024-07-20 PROCEDURE — 63600175 PHARM REV CODE 636 W HCPCS: Performed by: NURSE PRACTITIONER

## 2024-07-20 PROCEDURE — 84100 ASSAY OF PHOSPHORUS: CPT | Performed by: NURSE PRACTITIONER

## 2024-07-20 PROCEDURE — 25000003 PHARM REV CODE 250: Performed by: NURSE ANESTHETIST, CERTIFIED REGISTERED

## 2024-07-20 PROCEDURE — 83735 ASSAY OF MAGNESIUM: CPT | Performed by: NURSE PRACTITIONER

## 2024-07-20 PROCEDURE — 99223 1ST HOSP IP/OBS HIGH 75: CPT | Mod: 57,,, | Performed by: ORTHOPAEDIC SURGERY

## 2024-07-20 PROCEDURE — 37000009 HC ANESTHESIA EA ADD 15 MINS: Performed by: ORTHOPAEDIC SURGERY

## 2024-07-20 PROCEDURE — 71000039 HC RECOVERY, EACH ADD'L HOUR: Performed by: ORTHOPAEDIC SURGERY

## 2024-07-20 PROCEDURE — 71000033 HC RECOVERY, INTIAL HOUR: Performed by: ORTHOPAEDIC SURGERY

## 2024-07-20 PROCEDURE — 36000710: Performed by: ORTHOPAEDIC SURGERY

## 2024-07-20 DEVICE — NECK ADJUSTMENT SLEEVE
Type: IMPLANTABLE DEVICE | Site: HIP | Status: FUNCTIONAL
Brand: UNITRAX

## 2024-07-20 DEVICE — HEAD COMPONENT
Type: IMPLANTABLE DEVICE | Site: HIP | Status: FUNCTIONAL
Brand: UNITRAX

## 2024-07-20 RX ORDER — FENTANYL CITRATE 50 UG/ML
25 INJECTION, SOLUTION INTRAMUSCULAR; INTRAVENOUS EVERY 5 MIN PRN
Status: DISCONTINUED | OUTPATIENT
Start: 2024-07-20 | End: 2024-07-20

## 2024-07-20 RX ORDER — KETOROLAC TROMETHAMINE 30 MG/ML
15 INJECTION, SOLUTION INTRAMUSCULAR; INTRAVENOUS ONCE
Status: COMPLETED | OUTPATIENT
Start: 2024-07-20 | End: 2024-07-20

## 2024-07-20 RX ORDER — FAMOTIDINE 20 MG/1
20 TABLET, FILM COATED ORAL 2 TIMES DAILY
Status: DISCONTINUED | OUTPATIENT
Start: 2024-07-20 | End: 2024-07-24 | Stop reason: HOSPADM

## 2024-07-20 RX ORDER — GABAPENTIN 400 MG/1
800 CAPSULE ORAL 3 TIMES DAILY
Status: DISCONTINUED | OUTPATIENT
Start: 2024-07-20 | End: 2024-07-24 | Stop reason: HOSPADM

## 2024-07-20 RX ORDER — TRAZODONE HYDROCHLORIDE 50 MG/1
100 TABLET ORAL NIGHTLY
Status: DISCONTINUED | OUTPATIENT
Start: 2024-07-20 | End: 2024-07-24 | Stop reason: HOSPADM

## 2024-07-20 RX ORDER — OXYBUTYNIN CHLORIDE 5 MG/1
5 TABLET, EXTENDED RELEASE ORAL DAILY
Status: DISCONTINUED | OUTPATIENT
Start: 2024-07-20 | End: 2024-07-24 | Stop reason: HOSPADM

## 2024-07-20 RX ORDER — TOPIRAMATE 25 MG/1
50 TABLET ORAL DAILY
Status: DISCONTINUED | OUTPATIENT
Start: 2024-07-20 | End: 2024-07-24 | Stop reason: HOSPADM

## 2024-07-20 RX ORDER — DICYCLOMINE HYDROCHLORIDE 10 MG/1
10 CAPSULE ORAL 2 TIMES DAILY
Status: DISCONTINUED | OUTPATIENT
Start: 2024-07-20 | End: 2024-07-24 | Stop reason: HOSPADM

## 2024-07-20 RX ORDER — FLUTICASONE PROPIONATE 50 MCG
1 SPRAY, SUSPENSION (ML) NASAL DAILY
Status: DISCONTINUED | OUTPATIENT
Start: 2024-07-20 | End: 2024-07-24 | Stop reason: HOSPADM

## 2024-07-20 RX ORDER — PHENYLEPHRINE HYDROCHLORIDE 10 MG/ML
INJECTION INTRAVENOUS
Status: DISCONTINUED | OUTPATIENT
Start: 2024-07-20 | End: 2024-07-20

## 2024-07-20 RX ORDER — PRAVASTATIN SODIUM 10 MG/1
20 TABLET ORAL NIGHTLY
Status: DISCONTINUED | OUTPATIENT
Start: 2024-07-20 | End: 2024-07-24 | Stop reason: HOSPADM

## 2024-07-20 RX ORDER — ROCURONIUM BROMIDE 10 MG/ML
INJECTION, SOLUTION INTRAVENOUS
Status: DISCONTINUED | OUTPATIENT
Start: 2024-07-20 | End: 2024-07-20

## 2024-07-20 RX ORDER — FENTANYL CITRATE 50 UG/ML
INJECTION, SOLUTION INTRAMUSCULAR; INTRAVENOUS
Status: DISCONTINUED | OUTPATIENT
Start: 2024-07-20 | End: 2024-07-20

## 2024-07-20 RX ORDER — TRANEXAMIC ACID 100 MG/ML
INJECTION, SOLUTION INTRAVENOUS
Status: DISCONTINUED | OUTPATIENT
Start: 2024-07-20 | End: 2024-07-20

## 2024-07-20 RX ORDER — ONDANSETRON HYDROCHLORIDE 2 MG/ML
4 INJECTION, SOLUTION INTRAVENOUS ONCE AS NEEDED
Status: COMPLETED | OUTPATIENT
Start: 2024-07-20 | End: 2024-07-20

## 2024-07-20 RX ORDER — LIDOCAINE HYDROCHLORIDE 20 MG/ML
INJECTION INTRAVENOUS
Status: DISCONTINUED | OUTPATIENT
Start: 2024-07-20 | End: 2024-07-20

## 2024-07-20 RX ORDER — DONEPEZIL HYDROCHLORIDE 5 MG/1
10 TABLET, FILM COATED ORAL DAILY
Status: DISCONTINUED | OUTPATIENT
Start: 2024-07-20 | End: 2024-07-24 | Stop reason: HOSPADM

## 2024-07-20 RX ORDER — SUCCINYLCHOLINE CHLORIDE 20 MG/ML
INJECTION INTRAMUSCULAR; INTRAVENOUS
Status: DISCONTINUED | OUTPATIENT
Start: 2024-07-20 | End: 2024-07-20

## 2024-07-20 RX ORDER — LURASIDONE HYDROCHLORIDE 20 MG/1
40 TABLET, FILM COATED ORAL DAILY
Status: DISCONTINUED | OUTPATIENT
Start: 2024-07-20 | End: 2024-07-24 | Stop reason: HOSPADM

## 2024-07-20 RX ORDER — SODIUM CHLORIDE 0.9 % (FLUSH) 0.9 %
10 SYRINGE (ML) INJECTION
Status: DISCONTINUED | OUTPATIENT
Start: 2024-07-20 | End: 2024-07-24 | Stop reason: HOSPADM

## 2024-07-20 RX ORDER — CLONAZEPAM 0.5 MG/1
2 TABLET ORAL 2 TIMES DAILY
Status: DISCONTINUED | OUTPATIENT
Start: 2024-07-20 | End: 2024-07-24 | Stop reason: HOSPADM

## 2024-07-20 RX ORDER — SUCRALFATE 1 G/1
1 TABLET ORAL 4 TIMES DAILY
Status: DISCONTINUED | OUTPATIENT
Start: 2024-07-20 | End: 2024-07-24 | Stop reason: HOSPADM

## 2024-07-20 RX ORDER — CEFAZOLIN SODIUM 1 G/3ML
INJECTION, POWDER, FOR SOLUTION INTRAMUSCULAR; INTRAVENOUS
Status: DISCONTINUED | OUTPATIENT
Start: 2024-07-20 | End: 2024-07-20

## 2024-07-20 RX ORDER — DOCUSATE SODIUM 100 MG/1
100 CAPSULE, LIQUID FILLED ORAL EVERY 12 HOURS
Status: DISCONTINUED | OUTPATIENT
Start: 2024-07-20 | End: 2024-07-23

## 2024-07-20 RX ORDER — MIDAZOLAM HYDROCHLORIDE 1 MG/ML
INJECTION INTRAMUSCULAR; INTRAVENOUS
Status: DISCONTINUED | OUTPATIENT
Start: 2024-07-20 | End: 2024-07-20

## 2024-07-20 RX ORDER — OXCARBAZEPINE 150 MG/1
300 TABLET, FILM COATED ORAL DAILY
Status: DISCONTINUED | OUTPATIENT
Start: 2024-07-20 | End: 2024-07-24 | Stop reason: HOSPADM

## 2024-07-20 RX ORDER — KETAMINE HCL IN 0.9 % NACL 50 MG/5 ML
SYRINGE (ML) INTRAVENOUS
Status: DISCONTINUED | OUTPATIENT
Start: 2024-07-20 | End: 2024-07-20

## 2024-07-20 RX ORDER — LOPERAMIDE HYDROCHLORIDE 2 MG/1
2 CAPSULE ORAL CONTINUOUS PRN
Status: DISCONTINUED | OUTPATIENT
Start: 2024-07-20 | End: 2024-07-24 | Stop reason: HOSPADM

## 2024-07-20 RX ORDER — OXYCODONE HYDROCHLORIDE 5 MG/1
5 TABLET ORAL ONCE AS NEEDED
Status: COMPLETED | OUTPATIENT
Start: 2024-07-20 | End: 2024-07-20

## 2024-07-20 RX ORDER — PROPOFOL 10 MG/ML
VIAL (ML) INTRAVENOUS
Status: DISCONTINUED | OUTPATIENT
Start: 2024-07-20 | End: 2024-07-20

## 2024-07-20 RX ORDER — ATENOLOL 25 MG/1
25 TABLET ORAL DAILY
Status: DISCONTINUED | OUTPATIENT
Start: 2024-07-20 | End: 2024-07-24 | Stop reason: HOSPADM

## 2024-07-20 RX ORDER — ONDANSETRON HYDROCHLORIDE 2 MG/ML
INJECTION, SOLUTION INTRAVENOUS
Status: DISCONTINUED | OUTPATIENT
Start: 2024-07-20 | End: 2024-07-20

## 2024-07-20 RX ORDER — DEXAMETHASONE SODIUM PHOSPHATE 4 MG/ML
INJECTION, SOLUTION INTRA-ARTICULAR; INTRALESIONAL; INTRAMUSCULAR; INTRAVENOUS; SOFT TISSUE
Status: DISCONTINUED | OUTPATIENT
Start: 2024-07-20 | End: 2024-07-20

## 2024-07-20 RX ORDER — NAPROXEN SODIUM 220 MG/1
81 TABLET, FILM COATED ORAL 2 TIMES DAILY
Status: DISCONTINUED | OUTPATIENT
Start: 2024-07-20 | End: 2024-07-24 | Stop reason: HOSPADM

## 2024-07-20 RX ADMIN — SODIUM CHLORIDE, SODIUM GLUCONATE, SODIUM ACETATE, POTASSIUM CHLORIDE, MAGNESIUM CHLORIDE, SODIUM PHOSPHATE, DIBASIC, AND POTASSIUM PHOSPHATE: .53; .5; .37; .037; .03; .012; .00082 INJECTION, SOLUTION INTRAVENOUS at 09:07

## 2024-07-20 RX ADMIN — GABAPENTIN 800 MG: 400 CAPSULE ORAL at 02:07

## 2024-07-20 RX ADMIN — MIDAZOLAM HYDROCHLORIDE 2 MG: 1 INJECTION INTRAMUSCULAR; INTRAVENOUS at 08:07

## 2024-07-20 RX ADMIN — HYPROMELLOSE 2910 2 DROP: 5 SOLUTION/ DROPS OPHTHALMIC at 05:07

## 2024-07-20 RX ADMIN — HYPROMELLOSE 2910 2 DROP: 5 SOLUTION/ DROPS OPHTHALMIC at 08:07

## 2024-07-20 RX ADMIN — CLONAZEPAM 2 MG: 0.5 TABLET ORAL at 08:07

## 2024-07-20 RX ADMIN — SUGAMMADEX 200 MG: 100 INJECTION, SOLUTION INTRAVENOUS at 10:07

## 2024-07-20 RX ADMIN — SUCRALFATE 1 G: 1 TABLET ORAL at 08:07

## 2024-07-20 RX ADMIN — FAMOTIDINE 20 MG: 20 TABLET, FILM COATED ORAL at 11:07

## 2024-07-20 RX ADMIN — Medication 25 MG: at 08:07

## 2024-07-20 RX ADMIN — SUCRALFATE 1 G: 1 TABLET ORAL at 05:07

## 2024-07-20 RX ADMIN — HYPROMELLOSE 2910 2 DROP: 5 SOLUTION/ DROPS OPHTHALMIC at 02:07

## 2024-07-20 RX ADMIN — LIDOCAINE HYDROCHLORIDE 100 MG: 20 INJECTION, SOLUTION INTRAVENOUS at 08:07

## 2024-07-20 RX ADMIN — FENTANYL CITRATE 50 MCG: 50 INJECTION, SOLUTION INTRAMUSCULAR; INTRAVENOUS at 08:07

## 2024-07-20 RX ADMIN — ROCURONIUM BROMIDE 20 MG: 10 INJECTION, SOLUTION INTRAVENOUS at 09:07

## 2024-07-20 RX ADMIN — FENTANYL CITRATE 50 MCG: 50 INJECTION, SOLUTION INTRAMUSCULAR; INTRAVENOUS at 11:07

## 2024-07-20 RX ADMIN — ASPIRIN 81 MG CHEWABLE TABLET 81 MG: 81 TABLET CHEWABLE at 08:07

## 2024-07-20 RX ADMIN — HYDROMORPHONE HYDROCHLORIDE 1 MG: 1 INJECTION, SOLUTION INTRAMUSCULAR; INTRAVENOUS; SUBCUTANEOUS at 03:07

## 2024-07-20 RX ADMIN — FAMOTIDINE 20 MG: 20 TABLET, FILM COATED ORAL at 08:07

## 2024-07-20 RX ADMIN — DEXAMETHASONE SODIUM PHOSPHATE 8 MG: 4 INJECTION, SOLUTION INTRA-ARTICULAR; INTRALESIONAL; INTRAMUSCULAR; INTRAVENOUS; SOFT TISSUE at 08:07

## 2024-07-20 RX ADMIN — SUCRALFATE 1 G: 1 TABLET ORAL at 11:07

## 2024-07-20 RX ADMIN — CEFAZOLIN 2 G: 1 INJECTION, POWDER, FOR SOLUTION INTRAVENOUS at 09:07

## 2024-07-20 RX ADMIN — ONDANSETRON 4 MG: 2 INJECTION INTRAMUSCULAR; INTRAVENOUS at 10:07

## 2024-07-20 RX ADMIN — ASPIRIN 81 MG CHEWABLE TABLET 81 MG: 81 TABLET CHEWABLE at 11:07

## 2024-07-20 RX ADMIN — ONDANSETRON 4 MG: 2 INJECTION INTRAMUSCULAR; INTRAVENOUS at 08:07

## 2024-07-20 RX ADMIN — BUDESONIDE INHALATION 0.5 MG: 0.5 SUSPENSION RESPIRATORY (INHALATION) at 07:07

## 2024-07-20 RX ADMIN — ARFORMOTEROL TARTRATE 15 MCG: 15 SOLUTION RESPIRATORY (INHALATION) at 07:07

## 2024-07-20 RX ADMIN — ROCURONIUM BROMIDE 25 MG: 10 INJECTION, SOLUTION INTRAVENOUS at 08:07

## 2024-07-20 RX ADMIN — DOCUSATE SODIUM 100 MG: 100 CAPSULE, LIQUID FILLED ORAL at 11:07

## 2024-07-20 RX ADMIN — OXYCODONE 5 MG: 5 TABLET ORAL at 10:07

## 2024-07-20 RX ADMIN — ROCURONIUM BROMIDE 5 MG: 10 INJECTION, SOLUTION INTRAVENOUS at 08:07

## 2024-07-20 RX ADMIN — OXYCODONE 5 MG: 5 TABLET ORAL at 05:07

## 2024-07-20 RX ADMIN — PHENYLEPHRINE HYDROCHLORIDE 100 MCG: 10 INJECTION INTRAVENOUS at 10:07

## 2024-07-20 RX ADMIN — TRAZODONE HYDROCHLORIDE 100 MG: 50 TABLET ORAL at 08:07

## 2024-07-20 RX ADMIN — SUCCINYLCHOLINE CHLORIDE 160 MG: 20 INJECTION, SOLUTION INTRAMUSCULAR; INTRAVENOUS at 08:07

## 2024-07-20 RX ADMIN — GABAPENTIN 800 MG: 400 CAPSULE ORAL at 08:07

## 2024-07-20 RX ADMIN — SODIUM CHLORIDE, SODIUM GLUCONATE, SODIUM ACETATE, POTASSIUM CHLORIDE, MAGNESIUM CHLORIDE, SODIUM PHOSPHATE, DIBASIC, AND POTASSIUM PHOSPHATE: .53; .5; .37; .037; .03; .012; .00082 INJECTION, SOLUTION INTRAVENOUS at 08:07

## 2024-07-20 RX ADMIN — FLUTICASONE PROPIONATE 50 MCG: 50 SPRAY, METERED NASAL at 08:07

## 2024-07-20 RX ADMIN — MUPIROCIN 1 G: 20 OINTMENT TOPICAL at 08:07

## 2024-07-20 RX ADMIN — Medication 25 MG: at 09:07

## 2024-07-20 RX ADMIN — PRAVASTATIN SODIUM 20 MG: 10 TABLET ORAL at 08:07

## 2024-07-20 RX ADMIN — DICYCLOMINE HYDROCHLORIDE 10 MG: 10 CAPSULE ORAL at 08:07

## 2024-07-20 RX ADMIN — FENTANYL CITRATE 25 MCG: 50 INJECTION, SOLUTION INTRAMUSCULAR; INTRAVENOUS at 10:07

## 2024-07-20 RX ADMIN — KETOROLAC TROMETHAMINE 15 MG: 30 INJECTION, SOLUTION INTRAMUSCULAR at 10:07

## 2024-07-20 RX ADMIN — OXYBUTYNIN CHLORIDE 5 MG: 5 TABLET, EXTENDED RELEASE ORAL at 11:07

## 2024-07-20 RX ADMIN — PROPOFOL 150 MG: 10 INJECTION, EMULSION INTRAVENOUS at 08:07

## 2024-07-20 RX ADMIN — TRANEXAMIC ACID 500 MG: 100 INJECTION, SOLUTION INTRAVENOUS at 09:07

## 2024-07-20 RX ADMIN — CEFAZOLIN 2 G: 2 INJECTION, POWDER, FOR SOLUTION INTRAMUSCULAR; INTRAVENOUS at 02:07

## 2024-07-20 RX ADMIN — CLONAZEPAM 2 MG: 0.5 TABLET ORAL at 11:07

## 2024-07-20 RX ADMIN — HYPROMELLOSE 2910 2 DROP: 5 SOLUTION/ DROPS OPHTHALMIC at 09:07

## 2024-07-20 NOTE — PT/OT/SLP PROGRESS
Physical Therapy      Patient Name:  Nimco Caro   MRN:  [id number]    Patient not seen today secondary to pending surgery. Spoke with RN (Edmond) re will require new PT consult post op.

## 2024-07-20 NOTE — CARE UPDATE
07/19/24 2022   Patient Assessment/Suction   Level of Consciousness (AVPU) alert   Respiratory Effort Unlabored   Expansion/Accessory Muscles/Retractions no use of accessory muscles;no retractions   All Lung Fields Breath Sounds diminished   PRE-TX-O2   Device (Oxygen Therapy) nasal cannula with humidification   $ Is the patient on Low Flow Oxygen? Yes   Flow (L/min) (Oxygen Therapy) 3   SpO2 96 %   Pulse Oximetry Type Intermittent   $ Pulse Oximetry - Single Charge Pulse Oximetry - Single   Respiratory Evaluation   $ Care Plan Tech Time 15 min   $ Respiratory Evaluation Complete   Evaluation For New Orders   Admitting Diagnosis hip fracture   Pulmonary Diagnosis copd   Home Oxygen   Has Home Oxygen? Yes   Liter Flow 3   Duration with sleep   Route nasal cannula   Mode continuous   Device home concentrator with portable unit   Home Aerosol, MDI, DPI, and Other Treatments/Therapies   Home Respiratory Therapy Per Patient/Review of Chart Yes   MDI Home Meds/Freq trelegy qd, xopenex inhaler prn   Oxygen Care Plan   Oxygen Care Plan Per Protocol   SPO2 Goal (%) 95% cardiac   Rationale SpO2 is <95% (Cardiac Pt.)   Bronchodilator Care Plan   Bronchodilator Care Plan Aerosol   Aerosol Meds w/ frequency   (pulmicort, brovana bid, with duoneb xope Q4prn)

## 2024-07-20 NOTE — HOSPITAL COURSE
63F admitted for displaced left femoral neck fracture after a fall. Had acute on chronic hyponatremia on multiple psych meds. Sodium trended and stabilized. Ortho consulted and performed left hip repair 7/20. PT/OT consulted. Also with dysuria and UA with many bacteria so started on abx for UTI with culture pending at time of discharge. Accepted and transferred to [facility].

## 2024-07-20 NOTE — RESPIRATORY THERAPY
Patient receiving aerosol tx via 0.5mg pulmicort follow by 15mcg brovana now and bid.  Patient started on IS and averaging 1750ml on Is.  PRN xopenex not required at this time.  Placed pt. Back on 02.  Patient wears 02 at HOME.

## 2024-07-20 NOTE — TRANSFER OF CARE
"Anesthesia Transfer of Care Note    Patient: Nimco Caro    Procedure(s) Performed: Procedure(s) (LRB):  HEMIARTHROPLASTY, HIP (Left)    Patient location: PACU    Anesthesia Type: general    Transport from OR: Transported from OR on 2-3 L/min O2 by NC with adequate spontaneous ventilation    Post pain: adequate analgesia    Post assessment: no apparent anesthetic complications and tolerated procedure well    Post vital signs: stable    Level of consciousness: awake, alert and oriented    Nausea/Vomiting: no nausea/vomiting    Complications: none    Transfer of care protocol was followed    Last vitals: Visit Vitals  BP (!) 158/74 (BP Location: Right arm, Patient Position: Lying)   Pulse 76   Temp 36.8 °C (98.3 °F) (Oral)   Resp 18   Ht 5' 7" (1.702 m)   Wt 79.2 kg (174 lb 9.7 oz)   SpO2 (!) 88%   BMI 27.35 kg/m²     " 1 or 2

## 2024-07-20 NOTE — PLAN OF CARE
Released from Pacu per Anesthesia when criteria met pain controlled skin w+d No nausea No emesis dsg dry intact aaox4 encouraged deep breaths Pt has all belongings in room glasses returned to her on face and tele on and running nsr  ice to L hip  has zofran as preventive prophylaxis   instr on IS encouraged use every hour  IN BED ON 02 AT 3 LITERS TO KEEP SATS AT 94 %

## 2024-07-20 NOTE — PT/OT/SLP PROGRESS
Occupational Therapy      Patient Name:  Nimco Caro   MRN:  [id number]    Patient not seen today secondary to  (pending hip surgery. Will require new OT consult post op.).     7/20/2024

## 2024-07-20 NOTE — SUBJECTIVE & OBJECTIVE
Interval History: Patient seen and examined. BRIE. Seen postop. Pain controlled.      Objective:     Vital Signs (Most Recent):  Temp: 98.1 °F (36.7 °C) (07/20/24 1120)  Pulse: 84 (07/20/24 1120)  Resp: 16 (07/20/24 1120)  BP: (!) 133/58 (07/20/24 1120)  SpO2: 95 % (07/20/24 1120) Vital Signs (24h Range):  Temp:  [97.7 °F (36.5 °C)-99.1 °F (37.3 °C)] 98.1 °F (36.7 °C)  Pulse:  [] 84  Resp:  [15-20] 16  SpO2:  [85 %-98 %] 95 %  BP: (112-193)/(54-89) 133/58     Weight: 79.2 kg (174 lb 9.7 oz)  Body mass index is 27.35 kg/m².    Intake/Output Summary (Last 24 hours) at 7/20/2024 1425  Last data filed at 7/20/2024 1122  Gross per 24 hour   Intake 1864.61 ml   Output 3950 ml   Net -2085.39 ml         Physical Exam  Vitals reviewed.   Constitutional:       General: She is not in acute distress.  HENT:      Head: Normocephalic and atraumatic.   Cardiovascular:      Rate and Rhythm: Normal rate and regular rhythm.   Pulmonary:      Effort: Pulmonary effort is normal. No respiratory distress.      Breath sounds: Normal breath sounds.   Musculoskeletal:      Comments: LLE NVI  L hip dressed   Neurological:      General: No focal deficit present.      Mental Status: She is alert and oriented to person, place, and time. Mental status is at baseline.   Psychiatric:         Mood and Affect: Affect normal.         Behavior: Behavior normal.         Thought Content: Thought content normal.             Significant Labs: All pertinent labs within the past 24 hours have been reviewed.    Significant Imaging: I have reviewed all pertinent imaging results/findings within the past 24 hours.

## 2024-07-20 NOTE — NURSING
Nurses Note -- 4 Eyes      7/20/2024   1:09 AM      Skin assessed during: Admit      [x] No Altered Skin Integrity Present    []Prevention Measures Documented      [] Yes- Altered Skin Integrity Present or Discovered   [] LDA Added if Not in Epic (Describe Wound)   [] New Altered Skin Integrity was Present on Admit and Documented in LDA   [] Wound Image Taken    Wound Care Consulted? No    Attending Nurse:  Blanca Aparicio LPN    Second RN/Staff Member:   Alyx Gracia RN

## 2024-07-20 NOTE — ANESTHESIA PREPROCEDURE EVALUATION
07/20/2024  Nimco Caro is a 63 y.o., female.      Pre-op Assessment    I have reviewed the Patient Summary Reports.     I have reviewed the Nursing Notes. I have reviewed the NPO Status.   I have reviewed the Medications.     Review of Systems  Anesthesia Hx:  No problems with previous Anesthesia                Social:  Smoker       Hematology/Oncology:                        --  Cancer in past history:                     Cardiovascular:     Hypertension           hyperlipidemia                       Hypertension         Pulmonary:   COPD         Chronic Obstructive Pulmonary Disease (COPD):                      Hepatic/GI:     GERD      Gerd          Musculoskeletal:  Arthritis   Left hip fracture      Arthritis     Spine Disorders: lumbar            Neurological:      Arthritis                           Psych:  Psychiatric History  depression                Physical Exam  General: Well nourished, Cooperative, Alert and Oriented    Airway:  Mallampati: II   Mouth Opening: Normal  TM Distance: Normal  Tongue: Normal  Neck ROM: Normal ROM    Dental:  Dentures    Chest/Lungs:  Normal Respiratory Rate    Heart:  Rate: Normal        Anesthesia Plan  Type of Anesthesia, risks & benefits discussed:    Anesthesia Type: Gen ETT  Intra-op Monitoring Plan: Standard ASA Monitors  Post Op Pain Control Plan: multimodal analgesia and IV/PO Opioids PRN  Induction:  IV  Airway Plan: Direct and Video, Post-Induction  Informed Consent: Informed consent signed with the Patient and all parties understand the risks and agree with anesthesia plan.  All questions answered. Patient consented to blood products? Yes  ASA Score: 3    Ready For Surgery From Anesthesia Perspective.     .      
CONST: well appearing for age  HEAD:  normocephalic, atraumatic  EYES:  conjunctivae without injection, drainage or discharge  ENMT: nasal mucosa moist; mouth moist without ulcerations or lesions; throat moist without erythema, exudate, ulcerations or lesions  NECK:  supple, no masses, no significant lymphadenopathy  CARDIAC:  regular rate and rhythm, normal S1 and S2, no murmurs, rubs or gallops  RESP:  respiratory rate and effort appear normal for age; lungs are clear to auscultation bilaterally; no rales or wheezes  ABDOMEN:  soft, nondistended, no organomegaly, tender to palpation in belly button with 0.5 to 1 cm mass protruding from belly button, not able to be reduced, no surrounding erythema  LYMPHATICS:  no significant lymphadenopathy  MUSCULOSKELETAL/NEURO:  normal movement, normal tone  SKIN:  normal skin color for age and race, well-perfused; warm and dry

## 2024-07-20 NOTE — PLAN OF CARE
DSG DRY ICE TO HIP ON L ABDUCTION PILLOW IN PLACE RADIOLOGY CALLED FOR X RAYS TO BE DONE THEY WILL MEET PT IN HER ROOM [address] SAYS BED LOW LOCKED ALL PERSONAL BELONGINGS GIVEN TO PT TEET IN MOUTH GLASSES ON FACE CELL PHONE IN HAND  INST NOT TO GET OOB AND DO iS HOURLY REPORT TO [facility]

## 2024-07-20 NOTE — PROGRESS NOTES
[facility]  Progress Note    Patient Name: Nimco Caro  MRN: [id number]  Patient Class: IP- Inpatient   Admission Date: 7/19/2024  Length of Stay: 1 days  Attending Physician: Leno Gaspar MD  Primary Care Provider: Katy Mason MD        Subjective:     Principal Problem:Closed fracture of left hip        HPI:  Nimco Caro is a 63-year-old female who presents emergency room complaining of left hip pain.  She reports she became dizzy and experienced a mechanical fall at her residence.  She denies head trauma or loss of consciousness.  She denies any cervical pain.  She is not on any anticoagulants.  She describes the hip pain as intense.  She rates the pain at 10/10 at worst.  The pain is worse with any movement of the left lower leg.  No alleviating factors.  No recent fever or chills.  No known sick contacts or travel.  Previous medical history of hyponatremia, GERD, hyperlipidemia, osteoarthritis, active smoker, COPD, bipolar disease.  ER workup: CBC unremarkable.  CMP with hyponatremia of 125 and alk-phos elevated 136.  Urinalysis pending at the time my exam.  EKG demonstrated sinus rhythm without ST or T-wave elevation.  Chest x-ray was unremarkable.  ER physician spoke with  with orthopedics who will see patient in consultation.  Patient will be admitted to Hospital Medicine for treatment and management.  Hyponatremia labs pending.    Overview/Hospital Course:  63F admitted for left hip fracture after a fall. Has chronic hyponatremia on multiple psych meds. Ortho consulted and performed left hip repair 7/20. PT/OT consulted.    Interval History: Patient seen and examined. BRIE. Seen postop. Pain controlled.      Objective:     Vital Signs (Most Recent):  Temp: 98.1 °F (36.7 °C) (07/20/24 1120)  Pulse: 84 (07/20/24 1120)  Resp: 16 (07/20/24 1120)  BP: (!) 133/58 (07/20/24 1120)  SpO2: 95 % (07/20/24 1120) Vital Signs (24h Range):  Temp:   [97.7 °F (36.5 °C)-99.1 °F (37.3 °C)] 98.1 °F (36.7 °C)  Pulse:  [] 84  Resp:  [15-20] 16  SpO2:  [85 %-98 %] 95 %  BP: (112-193)/(54-89) 133/58     Weight: 79.2 kg (174 lb 9.7 oz)  Body mass index is 27.35 kg/m².    Intake/Output Summary (Last 24 hours) at 7/20/2024 1425  Last data filed at 7/20/2024 1122  Gross per 24 hour   Intake 1864.61 ml   Output 3950 ml   Net -2085.39 ml         Physical Exam  Vitals reviewed.   Constitutional:       General: She is not in acute distress.  HENT:      Head: Normocephalic and atraumatic.   Cardiovascular:      Rate and Rhythm: Normal rate and regular rhythm.   Pulmonary:      Effort: Pulmonary effort is normal. No respiratory distress.      Breath sounds: Normal breath sounds.   Musculoskeletal:      Comments: LLE NVI  L hip dressed   Neurological:      General: No focal deficit present.      Mental Status: She is alert and oriented to person, place, and time. Mental status is at baseline.   Psychiatric:         Mood and Affect: Affect normal.         Behavior: Behavior normal.         Thought Content: Thought content normal.             Significant Labs: All pertinent labs within the past 24 hours have been reviewed.    Significant Imaging: I have reviewed all pertinent imaging results/findings within the past 24 hours.    Assessment/Plan:      * Closed fracture of left hip  Now s/p repair 7/20  - postop care  - IS  - PT/OT  - f/u postop h/h  - dispo planning    Hyponatremia  Acute on chronic. Improved  - monitor daily  - continue chronic psych meds    Bipolar 1 disorder  Chronic problem, appears stable  - continue her chronic meds    Tobacco dependence due to cigarettes  Nicotine replacement prescribed    Hypertension  Chronic, controlled. Latest blood pressure and vitals reviewed-     Temp:  [97.7 °F (36.5 °C)-99.1 °F (37.3 °C)]   Pulse:  []   Resp:  [15-20]   BP: (112-193)/(54-89)   SpO2:  [85 %-98 %] .   Home meds for hypertension were reviewed and noted  below.   Hypertension Medications               atenoloL (TENORMIN) 25 MG tablet Take 25 mg by mouth once daily.          While in the hospital, will manage blood pressure as follows; Continue home antihypertensive regimen      VTE Risk Mitigation (From admission, onward)           Ordered     IP VTE LOW RISK PATIENT  Once         07/20/24 1110     Place sequential compression device  Until discontinued         07/19/24 1908     Place REMINGTON hose  Until discontinued         07/19/24 1908                    Discharge Planning   JOSE:  7/22    Code Status: Full Code   Is the patient medically ready for discharge?:     Reason for patient still in hospital (select all that apply): Patient trending condition, PT / OT recommendations, and Pending disposition                     Leno Gaspar MD  Department of Hospital Medicine   [facility]/Surg

## 2024-07-20 NOTE — ASSESSMENT & PLAN NOTE
Chronic, controlled. Latest blood pressure and vitals reviewed-     Temp:  [97.7 °F (36.5 °C)-99.1 °F (37.3 °C)]   Pulse:  []   Resp:  [15-20]   BP: (112-193)/(54-89)   SpO2:  [85 %-98 %] .   Home meds for hypertension were reviewed and noted below.   Hypertension Medications               atenoloL (TENORMIN) 25 MG tablet Take 25 mg by mouth once daily.          While in the hospital, will manage blood pressure as follows; Continue home antihypertensive regimen

## 2024-07-20 NOTE — NURSING
Personal belongings including gold necklace X 2, rings and earings received by security, form signed per patient. Patients purse locked up in cabinet in room 319 per recommendation of security.

## 2024-07-20 NOTE — ED NOTES
Bed: 12  Expected date:   Expected time:   Means of arrival:   Comments:  EMS    
Tele [phone]  
Opt out

## 2024-07-20 NOTE — ANESTHESIA POSTPROCEDURE EVALUATION
Anesthesia Post Evaluation    Patient: Nimco Caro    Procedure(s) Performed: Procedure(s) (LRB):  HEMIARTHROPLASTY, HIP (Left)    Final Anesthesia Type: general      Patient location during evaluation: PACU  Patient participation: Yes- Able to Participate  Level of consciousness: awake and alert  Post-procedure vital signs: reviewed and stable  Pain management: adequate  Airway patency: patent    PONV status at discharge: No PONV  Anesthetic complications: no      Cardiovascular status: hemodynamically stable  Respiratory status: unassisted and room air  Hydration status: euvolemic  Follow-up not needed.              Vitals Value Taken Time   /74 07/20/24 1100   Temp 36.6 °C (97.9 °F) 07/20/24 1045   Pulse 86 07/20/24 1106   Resp 28 07/20/24 1105   SpO2 95 % 07/20/24 1106   Vitals shown include unfiled device data.      No case tracking events are documented in the log.      Pain/Cruz Score: Pain Rating Prior to Med Admin: 6 (7/20/2024 11:00 AM)  Pain Rating Post Med Admin: 0 (7/20/2024  3:32 AM)

## 2024-07-20 NOTE — SUBJECTIVE & OBJECTIVE
Past Medical History:   Diagnosis Date    ACP (advance care planning) 12/20/2023    Anxiety     Bipolar affect, depressed     Cancer ovarian; uterine    1992    COPD (chronic obstructive pulmonary disease)     GERD (gastroesophageal reflux disease)     Hyperlipidemia     Hypertension     OAB (overactive bladder)     On home oxygen therapy     per patient uses PRN       Past Surgical History:   Procedure Laterality Date    BREAST CYST ASPIRATION      COLON SURGERY      COLONOSCOPY      HERNIA REPAIR N/A 2016    HIP ARTHROPLASTY Right 2/25/2019    Procedure: ARTHROPLASTY, HIP;  Surgeon: Eliseo Vaca MD;  Location: [facility] OR;  Service: Orthopedics;  Laterality: Right;  Daniel Herrera    HYSTERECTOMY  total    JOINT REPLACEMENT Right     hip replacemnt    KNEE ARTHROPLASTY Left 8/19/2019    Procedure: ARTHROPLASTY, KNEE;  Surgeon: Eliseo Vaca MD;  Location: [facility] OR;  Service: Orthopedics;  Laterality: Left;    LUMBAR LAMINECTOMY WITH FUSION N/A 12/7/2023    Procedure: LAMINECTOMY, SPINE, LUMBAR, WITH FUSION L4-5;  Surgeon: Joby Jaimes MD;  Location: [facility] OR;  Service: Orthopedics;  Laterality: N/A;  NTI, [employer]    REVISION COLOSTOMY N/A 2012       Review of patient's allergies indicates:   Allergen Reactions    Meloxicam Other (See Comments)    Ciprofloxacin Diarrhea    Hydrocodone-acetaminophen Nausea And Vomiting, Nausea Only and Other (See Comments)    Ibuprofen Nausea Only    Naproxen Nausea Only and Other (See Comments)    Narcof [hydrocodone-guaifenesin] Nausea And Vomiting    Quetiapine Nausea And Vomiting and Other (See Comments)       Current Facility-Administered Medications   Medication    acetaminophen tablet 650 mg    acetaminophen tablet 650 mg    albuterol-ipratropium 2.5 mg-0.5 mg/3 mL nebulizer solution 3 mL    aluminum-magnesium hydroxide-simethicone 200-200-20 mg/5 mL suspension 30 mL    arformoteroL nebulizer solution 15 mcg    artificial tears 0.5 % ophthalmic solution 2 drop    artificial  tears 0.5 % ophthalmic solution 2 drop    atenoloL tablet 25 mg    budesonide nebulizer solution 0.5 mg    clonazePAM tablet 2 mg    dextrose 10% bolus 125 mL 125 mL    dextrose 10% bolus 250 mL 250 mL    dicyclomine capsule 10 mg    donepeziL tablet 10 mg    fluticasone propionate 50 mcg/actuation nasal spray 50 mcg    gabapentin capsule 800 mg    glucagon (human recombinant) injection 1 mg    glucose chewable tablet 16 g    glucose chewable tablet 24 g    HYDROmorphone injection 1 mg    levalbuterol nebulizer solution 0.63 mg    lurasidone tablet 40 mg    magnesium oxide tablet 800 mg    magnesium oxide tablet 800 mg    melatonin tablet 9 mg    methocarbamoL tablet 500 mg    mupirocin 2 % ointment    naloxone 0.4 mg/mL injection 0.02 mg    ondansetron injection 4 mg    OXcarbazepine tablet 300 mg    oxybutynin 24 hr tablet 5 mg    oxyCODONE immediate release tablet 5 mg    potassium bicarbonate disintegrating tablet 35 mEq    potassium bicarbonate disintegrating tablet 50 mEq    potassium bicarbonate disintegrating tablet 60 mEq    potassium, sodium phosphates 280-160-250 mg packet 2 packet    potassium, sodium phosphates 280-160-250 mg packet 2 packet    potassium, sodium phosphates 280-160-250 mg packet 2 packet    pravastatin tablet 20 mg    simethicone chewable tablet 80 mg    sodium chloride 0.9% flush 10 mL    sucralfate tablet 1 g    topiramate tablet 50 mg    traZODone tablet 100 mg     Family History       Problem Relation (Age of Onset)    Cancer Mother    Depression Brother    Heart disease Father, Sister    Hypertension Father, Sister, Brother          Tobacco Use    Smoking status: Every Day     Current packs/day: 0.50     Average packs/day: 0.5 packs/day for 44.5 years (22.3 ttl pk-yrs)     Types: Cigarettes     Start date: 1980    Smokeless tobacco: Never   Substance and Sexual Activity    Alcohol use: Not Currently     Alcohol/week: 1.0 standard drink of alcohol     Types: 1 Cans of beer per week  "    Comment: weekly    Drug use: No    Sexual activity: Not on file     Review of Systems   Constitutional: Negative for chills and fever.   HENT:  Negative for congestion.    Eyes:  Negative for blurred vision.   Cardiovascular:  Negative for chest pain and syncope.   Respiratory:  Negative for cough and shortness of breath.    Endocrine: Negative for polyuria.   Hematologic/Lymphatic: Negative for bleeding problem. Does not bruise/bleed easily.   Skin:  Negative for rash.   Musculoskeletal:  Positive for falls, joint pain and joint swelling. Negative for muscle cramps, muscle weakness and myalgias.   Gastrointestinal:  Negative for abdominal pain, nausea and vomiting.   Genitourinary:  Negative for flank pain.   Neurological:  Positive for dizziness. Negative for numbness and seizures.   Psychiatric/Behavioral:  Negative for altered mental status.    Allergic/Immunologic: Negative for persistent infections.     Objective:     Vital Signs (Most Recent):  Temp: 98.3 °F (36.8 °C) (07/20/24 0711)  Pulse: 76 (07/20/24 0720)  Resp: 18 (07/20/24 0720)  BP: (!) 158/74 (07/20/24 0711)  SpO2: (!) 88 % (07/20/24 0720) Vital Signs (24h Range):  Temp:  [97.7 °F (36.5 °C)-99.1 °F (37.3 °C)] 98.3 °F (36.8 °C)  Pulse:  [65-88] 76  Resp:  [18-20] 18  SpO2:  [85 %-98 %] 88 %  BP: (148-193)/(69-89) 158/74     Weight: 79.2 kg (174 lb 9.7 oz)  Height: 5' 7" (170.2 cm)  Body mass index is 27.35 kg/m².      Intake/Output Summary (Last 24 hours) at 7/20/2024 0830  Last data filed at 7/20/2024 0542  Gross per 24 hour   Intake 289.61 ml   Output 3050 ml   Net -2760.39 ml        General    Nursing note and vitals reviewed.  Constitutional: She is oriented to person, place, and time. She appears well-developed and well-nourished. No distress.   HENT:   Head: Atraumatic.   Eyes: EOM are normal.   Cardiovascular:  Normal rate.            Pulmonary/Chest: Effort normal.   Abdominal: Soft.   Neurological: She is alert and oriented to person, " "place, and time.   Psychiatric: Her behavior is normal.             Right Hip Exam   Right hip exam is normal.   Left Hip Exam     Inspection   Swelling: present  Erythema: absent    Tenderness   The patient tender to palpation of the trochanteric bursa.    Range of Motion   Extension:  abnormal   Flexion:  abnormal     Tests   Log Roll: positive    Other   Sensation: normal          Vascular Exam       Left Pulses  Dorsalis Pedis:      2+             Significant Labs: BMP:   Recent Labs   Lab 07/20/24  0502   *   *   K 4.3   CL 95   CO2 21*   BUN 4*   CREATININE 0.6   CALCIUM 8.9   MG 1.9     CBC:   Recent Labs   Lab 07/19/24  1535 07/20/24  0502   WBC 6.91 11.23   HGB 12.1 12.4   HCT 36.7* 37.5    252     CMP:   Recent Labs   Lab 07/19/24  1535 07/20/24  0502   * 127*   K 4.1 4.3   CL 92* 95   CO2 23 21*   GLU 94 118*   BUN 5* 4*   CREATININE 0.7 0.6   CALCIUM 9.1 8.9   PROT 7.1 6.8   ALBUMIN 3.7 3.3*   BILITOT 0.3 0.4   ALKPHOS 136* 127   AST 20 19   ALT 21 20   ANIONGAP 10 11     Coagulation: No results for input(s): "LABPROT", "INR", "APTT" in the last 48 hours.  All pertinent labs within the past 24 hours have been reviewed.    Significant Imaging: X-Ray: I have reviewed all pertinent results/findings and my personal findings are:  X-ray of the left hip shows a displaced left femoral neck fracture.  "

## 2024-07-20 NOTE — HPI
Nimco Caro is a 63-year-old female who presents emergency room complaining of left hip pain.  She reports she became dizzy and experienced a mechanical fall at her residence.  She denies head trauma or loss of consciousness.  She denies any cervical pain.  She is not on any anticoagulants.  She describes the hip pain as intense.   The pain is worse with any movement of the left lower leg.  No alleviating factors.  No recent fever or chills.  No known sick contacts or travel.  Previous medical history of hyponatremia, GERD, hyperlipidemia, osteoarthritis, active smoker, COPD, bipolar disease.  ER workup: CBC unremarkable.  CMP with hyponatremia of 125 and alk-phos elevated 136.  Urinalysis pending at the time my exam.  EKG demonstrated sinus rhythm without ST or T-wave elevation.  Chest x-ray was unremarkable.

## 2024-07-20 NOTE — PLAN OF CARE
Attempted to see pt to complete assessment; pt is out of room at this time.    07/20/24 1132   Discharge Assessment   Assessment Type Discharge Planning Assessment

## 2024-07-20 NOTE — OP NOTE
[place]/Surg  OrthopedicSurgery  Operative Note    SUMMARY     Date of Procedure: 7/20/2024     Procedure: Procedure(s) (LRB):  HEMIARTHROPLASTY, HIP (Left)       Surgeons and Role:     * Timothy Nettles MD - Primary    ASSISTANT: Destin Underwood    Pre-Operative Diagnosis: Closed fracture of left hip, initial encounter [S72.002A]    Post-Operative Diagnosis: Post-Op Diagnosis Codes:     * Closed fracture of left hip, initial encounter [S72.002A]    Anesthesia: Choice    Complications: No    Estimated Blood Loss (EBL): 150ml           Implants:   Implant Name Type Inv. Item Serial No.  Lot No. LRB No. Used Action   Omnifit HFx 132 degree     [id number] Left 1 Implanted   HEAD FEM ENDO UNI MOD 48MM - [id number]  HEAD FEM ENDO UNI MOD 48MM  [employer]AK[employer]. CANDELARIO. [id number] Left 1 Implanted   SLEEVE FEM C-TAPER UNI +0MM - [id number]  SLEEVE FEM C-TAPER UNI +0MM  [employer]AK[employer]. CANDELARIO. [id number] Left 1 Implanted       Specimens:   Specimen (24h ago, onward)      None                    Condition: Good    Disposition: PACU - hemodynamically stable.    Attestation: I was present and scrubbed for the entire procedure.    Indications:This is a 63 year old female who had a fall and suffered and a displaced femoral neck fracture. Patient was evaluated and cleared for surgery.      PROCEDURE IN DETAIL: Risks, benefits and alternatives of the procedure were   explained to the patient including, but not limited to damage to nerves,   arteries, blood vessels. Also explained risk of infection, DVT, dislocation,   limp as well as anesthetic complications including seizure, stroke, heart attack  and death. They understood this and signed informed consent. The patient's   Lefthip was marked prior to coming to the Operating Room. Once there, a formal   timeout was done in which correct patient, procedure and operative site were all   correctly identified and confirmed by the entire operating team, 2gm Ancef was  given  prior to surgical incision. General anesthesia was induced. The   patient was placed in lateral decubitus position with the Left hip towards the   ceiling. Left hip was then prepped and draped in normal sterile fashion from   toes to mid abdomen, a standard posterior approach was made, centered over   the tip of the greater trochanter and this was taken through the skin and soft tissue  until the IT band was identified. A small slit in the IT band was made and then   Adamson scissors were used to extend this. Blunt dissection was used to move some of   the gluteus gabe muscle and  fascia open and the posterior hip area. Charnley retractor was then placed. Some bursa   was resected using the Bovie cautery. Lou elevator was used to reflect the   tissue until the piriformis tendon was seen. A Cobra retractor was used to   retract the gluteus medius and minimus tendons away from the piriformis.   Piriformis was tagged with an 0 Ethibond and then released off the greater   trochanter. We then released the rest of the external rotators and hip capsule   in a single envelope, intact for repair at the end of the case.  Fracture was identified and a corkscrew was used to remove the femoral head.   Femoral head was excised and sized to a 48, so the 48 trial was placed on handle and   then placed in the acetabulum, it had a good fit with good suction fit, so we   prepare to use a 48. We then flexed the hip maximally, internally rotated,   exposing the neck. We then placed a femoral elevator under it and a second Cobra   just below the lesser trochanter. We then evaluated the femoral neck cut.  No further cut needed to be made as we were about 1 fingerbreadth above the lesser trochanter.  After this was done, we then prepared the femoral canal. Cookie cutter was used  to enter the canal and then entry reamer was then used.  A lateralizing reamer was used to lateralize and then we began reaming by hand. We reamed up from  a 5 to about a 8  and then began broaching and were able to broach up to a size 8. We used calcar planar and then trialed. It was stable with the 0 neck and the head. So, we prepared for final stem implantation. All the trials were removed. The canal was copiously   irrigated with Pulsavac. the final stem size 8 was then   implanted and it matched a normal version  of the femur. After this was done, we trialed   one final time, liking the 0 neck. The patient had good stability, equal leg  lengths, stability with the position of sleep, flexion and internal rotation,   external rotation with minimal shuck, so we then proceeded with final head   implantation. The trunnion was dried and washed and dried and then the final 48  head was implanted with 0 neck. We then reduced the hip and we then closed the capsule back  through bone tunnels using 0 Ethibond. Piriformis was closed in to the gluteus  medius tendon using 0 Ethibond as well. The IT band was closed using a StrataFix, deep fat was closed using 0 Vicryl and skin was closed using a 2-0 Vicryl and running 3-0 StrataFix and Dermabond. Sterile dressing was applied. The patient was placed in an abduction pillow, extubated, awakened, transferred from the Operating Room to   the Recovery Room in stable condition.

## 2024-07-20 NOTE — ANESTHESIA PROCEDURE NOTES
Intubation    Date/Time: 7/20/2024 8:46 AM    Performed by: Jeremy Gross CRNA  Authorized by: Jaya Nicole MD    Intubation:     Induction:  Intravenous    Intubated:  Postinduction    Mask Ventilation:  Easy mask    Attempts:  1    Attempted By:  CRNA    Method of Intubation:  Video laryngoscopy    Blade:  Rachel 3    Laryngeal View Grade: Grade I - full view of cords      Difficult Airway Encountered?: No      Complications:  None    Airway Device:  Oral endotracheal tube    Airway Device Size:  7.5    Style/Cuff Inflation:  Cuffed (inflated to minimal occlusive pressure)    Inflation Amount (mL):  5    Tube secured:  21    Secured at:  The lips    Placement Verified By:  Capnometry    Complicating Factors:  None    Findings Post-Intubation:  BS equal bilateral and atraumatic/condition of teeth unchanged

## 2024-07-21 PROBLEM — D62 ACUTE BLOOD LOSS ANEMIA: Status: ACTIVE | Noted: 2024-07-21

## 2024-07-21 LAB
ANION GAP SERPL CALC-SCNC: 9 MMOL/L (ref 8–16)
BUN SERPL-MCNC: 8 MG/DL (ref 8–23)
CALCIUM SERPL-MCNC: 9 MG/DL (ref 8.7–10.5)
CHLORIDE SERPL-SCNC: 98 MMOL/L (ref 95–110)
CO2 SERPL-SCNC: 25 MMOL/L (ref 23–29)
CREAT SERPL-MCNC: 0.6 MG/DL (ref 0.5–1.4)
ERYTHROCYTE [DISTWIDTH] IN BLOOD BY AUTOMATED COUNT: 18.8 % (ref 11.5–14.5)
EST. GFR  (NO RACE VARIABLE): >60 ML/MIN/1.73 M^2
GLUCOSE SERPL-MCNC: 121 MG/DL (ref 70–110)
HCT VFR BLD AUTO: 33.5 % (ref 37–48.5)
HGB BLD-MCNC: 10.7 G/DL (ref 12–16)
MCH RBC QN AUTO: 27.8 PG (ref 27–31)
MCHC RBC AUTO-ENTMCNC: 31.9 G/DL (ref 32–36)
MCV RBC AUTO: 87 FL (ref 82–98)
PLATELET # BLD AUTO: 225 K/UL (ref 150–450)
PMV BLD AUTO: 9.5 FL (ref 9.2–12.9)
POTASSIUM SERPL-SCNC: 4.2 MMOL/L (ref 3.5–5.1)
RBC # BLD AUTO: 3.85 M/UL (ref 4–5.4)
SODIUM SERPL-SCNC: 132 MMOL/L (ref 136–145)
WBC # BLD AUTO: 10.75 K/UL (ref 3.9–12.7)

## 2024-07-21 PROCEDURE — 85027 COMPLETE CBC AUTOMATED: CPT | Performed by: ORTHOPAEDIC SURGERY

## 2024-07-21 PROCEDURE — 25000003 PHARM REV CODE 250: Performed by: ORTHOPAEDIC SURGERY

## 2024-07-21 PROCEDURE — 27000221 HC OXYGEN, UP TO 24 HOURS

## 2024-07-21 PROCEDURE — 97116 GAIT TRAINING THERAPY: CPT

## 2024-07-21 PROCEDURE — 94799 UNLISTED PULMONARY SVC/PX: CPT | Mod: XB

## 2024-07-21 PROCEDURE — 94761 N-INVAS EAR/PLS OXIMETRY MLT: CPT

## 2024-07-21 PROCEDURE — 25000242 PHARM REV CODE 250 ALT 637 W/ HCPCS: Performed by: ORTHOPAEDIC SURGERY

## 2024-07-21 PROCEDURE — 80048 BASIC METABOLIC PNL TOTAL CA: CPT | Performed by: ORTHOPAEDIC SURGERY

## 2024-07-21 PROCEDURE — 97165 OT EVAL LOW COMPLEX 30 MIN: CPT

## 2024-07-21 PROCEDURE — 11000001 HC ACUTE MED/SURG PRIVATE ROOM

## 2024-07-21 PROCEDURE — 94640 AIRWAY INHALATION TREATMENT: CPT

## 2024-07-21 PROCEDURE — 36415 COLL VENOUS BLD VENIPUNCTURE: CPT | Performed by: ORTHOPAEDIC SURGERY

## 2024-07-21 PROCEDURE — 99900035 HC TECH TIME PER 15 MIN (STAT)

## 2024-07-21 PROCEDURE — 97161 PT EVAL LOW COMPLEX 20 MIN: CPT

## 2024-07-21 PROCEDURE — 97530 THERAPEUTIC ACTIVITIES: CPT

## 2024-07-21 PROCEDURE — 63600175 PHARM REV CODE 636 W HCPCS: Performed by: ORTHOPAEDIC SURGERY

## 2024-07-21 RX ORDER — ENOXAPARIN SODIUM 100 MG/ML
40 INJECTION SUBCUTANEOUS EVERY 24 HOURS
Status: DISCONTINUED | OUTPATIENT
Start: 2024-07-21 | End: 2024-07-21

## 2024-07-21 RX ADMIN — ONDANSETRON 4 MG: 2 INJECTION INTRAMUSCULAR; INTRAVENOUS at 05:07

## 2024-07-21 RX ADMIN — OXYBUTYNIN CHLORIDE 5 MG: 5 TABLET, EXTENDED RELEASE ORAL at 08:07

## 2024-07-21 RX ADMIN — FLUTICASONE PROPIONATE 50 MCG: 50 SPRAY, METERED NASAL at 08:07

## 2024-07-21 RX ADMIN — SUCRALFATE 1 G: 1 TABLET ORAL at 08:07

## 2024-07-21 RX ADMIN — OXYCODONE 5 MG: 5 TABLET ORAL at 08:07

## 2024-07-21 RX ADMIN — HYDROMORPHONE HYDROCHLORIDE 1 MG: 1 INJECTION, SOLUTION INTRAMUSCULAR; INTRAVENOUS; SUBCUTANEOUS at 03:07

## 2024-07-21 RX ADMIN — OXCARBAZEPINE 300 MG: 150 TABLET, FILM COATED ORAL at 08:07

## 2024-07-21 RX ADMIN — HYPROMELLOSE 2910 2 DROP: 5 SOLUTION/ DROPS OPHTHALMIC at 02:07

## 2024-07-21 RX ADMIN — BUDESONIDE INHALATION 0.5 MG: 0.5 SUSPENSION RESPIRATORY (INHALATION) at 06:07

## 2024-07-21 RX ADMIN — DICYCLOMINE HYDROCHLORIDE 10 MG: 10 CAPSULE ORAL at 08:07

## 2024-07-21 RX ADMIN — ASPIRIN 81 MG CHEWABLE TABLET 81 MG: 81 TABLET CHEWABLE at 08:07

## 2024-07-21 RX ADMIN — SUCRALFATE 1 G: 1 TABLET ORAL at 04:07

## 2024-07-21 RX ADMIN — ARFORMOTEROL TARTRATE 15 MCG: 15 SOLUTION RESPIRATORY (INHALATION) at 07:07

## 2024-07-21 RX ADMIN — GABAPENTIN 800 MG: 400 CAPSULE ORAL at 03:07

## 2024-07-21 RX ADMIN — SUCRALFATE 1 G: 1 TABLET ORAL at 01:07

## 2024-07-21 RX ADMIN — HYDROMORPHONE HYDROCHLORIDE 1 MG: 1 INJECTION, SOLUTION INTRAMUSCULAR; INTRAVENOUS; SUBCUTANEOUS at 10:07

## 2024-07-21 RX ADMIN — MUPIROCIN 1 G: 20 OINTMENT TOPICAL at 08:07

## 2024-07-21 RX ADMIN — BUDESONIDE INHALATION 0.5 MG: 0.5 SUSPENSION RESPIRATORY (INHALATION) at 07:07

## 2024-07-21 RX ADMIN — GABAPENTIN 800 MG: 400 CAPSULE ORAL at 08:07

## 2024-07-21 RX ADMIN — DONEPEZIL HYDROCHLORIDE 10 MG: 5 TABLET, FILM COATED ORAL at 08:07

## 2024-07-21 RX ADMIN — DOCUSATE SODIUM 100 MG: 100 CAPSULE, LIQUID FILLED ORAL at 08:07

## 2024-07-21 RX ADMIN — TOPIRAMATE 50 MG: 25 TABLET, FILM COATED ORAL at 08:07

## 2024-07-21 RX ADMIN — CLONAZEPAM 2 MG: 0.5 TABLET ORAL at 08:07

## 2024-07-21 RX ADMIN — TRAZODONE HYDROCHLORIDE 100 MG: 50 TABLET ORAL at 08:07

## 2024-07-21 RX ADMIN — ATENOLOL 25 MG: 25 TABLET ORAL at 08:07

## 2024-07-21 RX ADMIN — PRAVASTATIN SODIUM 20 MG: 10 TABLET ORAL at 08:07

## 2024-07-21 RX ADMIN — FAMOTIDINE 20 MG: 20 TABLET, FILM COATED ORAL at 08:07

## 2024-07-21 RX ADMIN — HYPROMELLOSE 2910 2 DROP: 5 SOLUTION/ DROPS OPHTHALMIC at 05:07

## 2024-07-21 RX ADMIN — HYPROMELLOSE 2910 2 DROP: 5 SOLUTION/ DROPS OPHTHALMIC at 08:07

## 2024-07-21 RX ADMIN — HYDROMORPHONE HYDROCHLORIDE 1 MG: 1 INJECTION, SOLUTION INTRAMUSCULAR; INTRAVENOUS; SUBCUTANEOUS at 02:07

## 2024-07-21 RX ADMIN — METHOCARBAMOL 500 MG: 500 TABLET ORAL at 08:07

## 2024-07-21 NOTE — PT/OT/SLP EVAL
Occupational Therapy   Evaluation    Name: Nimco Caro  MRN: [id number]  Admitting Diagnosis: Closed fracture of left hip  Recent Surgery: Procedure(s) (LRB):  HEMIARTHROPLASTY, HIP (Left) 1 Day Post-Op    Recommendations:     Discharge Recommendations:  (Low Intensity/HHOT progressing to Oupatient OT)  Discharge Equipment Recommendations:  none  Barriers to discharge:   (Increased physical assistance with ADLs and functional mobility.)    Assessment:     Nimco Caro is a 63 y.o. female with a medical diagnosis of Closed fracture of left hip.  She presents with general weakness s/p left hemiarthorplasty. Performance deficits affecting function: weakness, impaired endurance, impaired self care skills, impaired balance, gait instability, impaired functional mobility, decreased safety awareness, decreased lower extremity function, decreased upper extremity function, orthopedic precautions.      Rehab Prognosis: Fair; patient would benefit from acute skilled OT services to address these deficits and reach maximum level of function.       Plan:     Patient to be seen 5 x/week to address the above listed problems via self-care/home management, therapeutic activities, therapeutic exercises  Plan of Care Expires: 08/21/24  Plan of Care Reviewed with: patient    Subjective     Chief Complaint: General weakness  Patient/Family Comments/goals: Improved functional mobility and ADL independence.    Occupational Profile:  Living Environment: lives with friend in 1 story home, 1 step to enter.  Previous level of function: modified independent using rollator for ambulation, ADLs, light cooking and light cleaning.  Roles and Routines: Limited homemaker  Equipment Used at Home: walker, rolling, shower chair, oxygen, rollator (tall toilet)  Assistance upon Discharge: Roommate    Pain/Comfort:  Pain Rating 1: 8/10  Location 1:  (Right knee and Left hip)  Pain Addressed 1: Reposition, Distraction  Pain Rating Post-Intervention 1:  8/10    Patients cultural, spiritual, Jehovah's witness conflicts given the current situation: no    Objective:     Communicated with: nurse prior to session.  Patient found up in chair with telemetry, peripheral IV, crowe catheter, oxygen upon OT entry to room.    General Precautions: Standard, fall  Orthopedic Precautions: LLE weight bearing as tolerated, LLE posterior precautions  Braces: Hip abduction brace  Respiratory Status: Nasal cannula, flow 3 L/min    Occupational Performance:    Functional Mobility/Transfers:  Patient completed Sit <> Stand Transfer with maximal assistance  with  hand-held assist and rolling walker   Functional Mobility: Ambulated 5 feet forwards/backwards with minimal assistance using rolling walker.    Cognitive/Visual Perceptual:  Cognitive/Psychosocial Skills:     -       Oriented to: Person, Place, and Situation   -       Follows Commands/attention:Follows two-step commands  -       Communication: clear/fluent  -       Memory: No Deficits noted  -       Safety awareness/insight to disability: impaired   -       Mood/Affect/Coping skills/emotional control: Cooperative, Pleasant, and Guarded  Visual/Perceptual:      -Intact Acuity    Physical Exam:  Balance:    -       Sitting: Contact Guard, Standing: Minimal Assist  Upper Extremity Range of Motion:     -       Right Upper Extremity: WFL  -       Left Upper Extremity: WFL  Upper Extremity Strength:    -       Right Upper Extremity: 4/5  -       Left Upper Extremity: 4/5   Strength:    -       Right Upper Extremity: WFL  -       Left Upper Extremity: WFL  Fine Motor Coordination:    -       Intact    AMPAC 6 Click ADL:  AMPAC Total Score: 16    Treatment & Education:  Patient educated on the purpose of Occupational Therapy and the importance of getting OOB.    Patient left up in chair with all lines intact and call button in reach    GOALS:   Multidisciplinary Problems       Occupational Therapy Goals          Problem: Occupational  Therapy    Goal Priority Disciplines Outcome Interventions   Occupational Therapy Goal     OT, PT/OT     Description: Goals to be met by: 8/21/2024     Patient will increase functional independence with ADLs by performing:    UE Dressing with Supervision.  LE Dressing with Supervision.  Grooming while standing at sink with Supervision.  Toileting from toilet with Supervision for hygiene and clothing management.   Toilet transfer to toilet with Supervision.  Perform 30 minutes sitting/standing ADL activity while maintaining left posterior hip precautions.                         History:     Past Medical History:   Diagnosis Date    ACP (advance care planning) 12/20/2023    Anxiety     Bipolar affect, depressed     Cancer ovarian; uterine    1992    COPD (chronic obstructive pulmonary disease)     GERD (gastroesophageal reflux disease)     Hyperlipidemia     Hypertension     OAB (overactive bladder)     On home oxygen therapy     per patient uses PRN         Past Surgical History:   Procedure Laterality Date    BREAST CYST ASPIRATION      COLON SURGERY      COLONOSCOPY      HERNIA REPAIR N/A 2016    HIP ARTHROPLASTY Right 2/25/2019    Procedure: ARTHROPLASTY, HIP;  Surgeon: Eliseo Vaca MD;  Location: [facility];  Service: Orthopedics;  Laterality: Right;  Daniel Herrera    HYSTERECTOMY  total    JOINT REPLACEMENT Right     hip replacemnt    KNEE ARTHROPLASTY Left 8/19/2019    Procedure: ARTHROPLASTY, KNEE;  Surgeon: Eliseo Vaca MD;  Location: [facility];  Service: Orthopedics;  Laterality: Left;    LUMBAR LAMINECTOMY WITH FUSION N/A 12/7/2023    Procedure: LAMINECTOMY, SPINE, LUMBAR, WITH FUSION L4-5;  Surgeon: Joby Jaimes MD;  Location: [facility];  Service: Orthopedics;  Laterality: N/A;  NTI, [employer]    REVISION COLOSTOMY N/A 2012       Time Tracking:     OT Date of Treatment: 07/21/24  OT Start Time: 1016  OT Stop Time: 1039  OT Total Time (min): 23 min    Billable Minutes:Evaluation 10  Therapeutic Activity  13    7/21/2024

## 2024-07-21 NOTE — SUBJECTIVE & OBJECTIVE
Principal Problem:Closed fracture of left hip      Interval History: Did pretty well. Able to walk to door and back    Review of patient's allergies indicates:   Allergen Reactions    Meloxicam Other (See Comments)    Ciprofloxacin Diarrhea    Hydrocodone-acetaminophen Nausea And Vomiting, Nausea Only and Other (See Comments)    Ibuprofen Nausea Only    Naproxen Nausea Only and Other (See Comments)    Narcof [hydrocodone-guaifenesin] Nausea And Vomiting    Quetiapine Nausea And Vomiting and Other (See Comments)       Current Facility-Administered Medications   Medication    acetaminophen tablet 650 mg    acetaminophen tablet 650 mg    albuterol-ipratropium 2.5 mg-0.5 mg/3 mL nebulizer solution 3 mL    aluminum-magnesium hydroxide-simethicone 200-200-20 mg/5 mL suspension 30 mL    arformoteroL nebulizer solution 15 mcg    artificial tears 0.5 % ophthalmic solution 2 drop    artificial tears 0.5 % ophthalmic solution 2 drop    aspirin chewable tablet 81 mg    atenoloL tablet 25 mg    budesonide nebulizer solution 0.5 mg    clonazePAM tablet 2 mg    dextrose 10% bolus 125 mL 125 mL    dextrose 10% bolus 250 mL 250 mL    dicyclomine capsule 10 mg    docusate sodium capsule 100 mg    donepeziL tablet 10 mg    famotidine tablet 20 mg    fluticasone propionate 50 mcg/actuation nasal spray 50 mcg    gabapentin capsule 800 mg    glucagon (human recombinant) injection 1 mg    glucose chewable tablet 16 g    glucose chewable tablet 24 g    HYDROmorphone injection 1 mg    levalbuterol nebulizer solution 0.63 mg    loperamide capsule 2 mg    lurasidone tablet 40 mg    magnesium oxide tablet 800 mg    magnesium oxide tablet 800 mg    melatonin tablet 9 mg    methocarbamoL tablet 500 mg    mupirocin 2 % ointment    naloxone 0.4 mg/mL injection 0.02 mg    ondansetron injection 4 mg    OXcarbazepine tablet 300 mg    oxybutynin 24 hr tablet 5 mg    oxyCODONE immediate release tablet 5 mg    potassium bicarbonate disintegrating tablet  "35 mEq    potassium bicarbonate disintegrating tablet 50 mEq    potassium bicarbonate disintegrating tablet 60 mEq    potassium, sodium phosphates 280-160-250 mg packet 2 packet    potassium, sodium phosphates 280-160-250 mg packet 2 packet    potassium, sodium phosphates 280-160-250 mg packet 2 packet    pravastatin tablet 20 mg    simethicone chewable tablet 80 mg    sodium chloride 0.9% flush 10 mL    sodium chloride 0.9% flush 10 mL    sucralfate tablet 1 g    topiramate tablet 50 mg    traZODone tablet 100 mg     Objective:     Vital Signs (Most Recent):  Temp: 98.4 °F (36.9 °C) (07/21/24 1115)  Pulse: 74 (07/21/24 1115)  Resp: 16 (07/21/24 1115)  BP: (!) 96/51 (07/21/24 1115)  SpO2: (!) 93 % (07/21/24 1115) Vital Signs (24h Range):  Temp:  [98 °F (36.7 °C)-98.4 °F (36.9 °C)] 98.4 °F (36.9 °C)  Pulse:  [73-91] 74  Resp:  [16-18] 16  SpO2:  [93 %-97 %] 93 %  BP: ()/(48-61) 96/51     Weight: 79.2 kg (174 lb 9.7 oz)  Height: 5' 7" (170.2 cm)  Body mass index is 27.35 kg/m².      Intake/Output Summary (Last 24 hours) at 7/21/2024 1225  Last data filed at 7/21/2024 0716  Gross per 24 hour   Intake 240 ml   Output 1550 ml   Net -1310 ml        General    Nursing note and vitals reviewed.  Constitutional: She is oriented to person, place, and time. She appears well-developed and well-nourished. No distress.   HENT:   Head: Atraumatic.   Eyes: EOM are normal.   Cardiovascular:  Normal rate.            Pulmonary/Chest: Effort normal.   Abdominal: Soft.   Neurological: She is alert and oriented to person, place, and time.   Psychiatric: Her behavior is normal.             Right Hip Exam   Right hip exam is normal.   Left Hip Exam     Inspection   Swelling: present  Erythema: absent    Tenderness   The patient tender to palpation of the trochanteric bursa.    Range of Motion   Extension:  abnormal   Flexion:  abnormal     Tests   Log Roll: positive    Other   Sensation: normal          Vascular Exam       Left " Pulses  Dorsalis Pedis:      2+             Significant Labs: BMP:   Recent Labs   Lab 07/20/24  0502 07/21/24  0444   * 121*   * 132*   K 4.3 4.2   CL 95 98   CO2 21* 25   BUN 4* 8   CREATININE 0.6 0.6   CALCIUM 8.9 9.0   MG 1.9  --      CBC:   Recent Labs   Lab 07/19/24  1535 07/20/24  0502 07/21/24  0444   WBC 6.91 11.23 10.75   HGB 12.1 12.4 10.7*   HCT 36.7* 37.5 33.5*    252 225     CMP:   Recent Labs   Lab 07/19/24  1535 07/20/24  0502 07/21/24  0444   * 127* 132*   K 4.1 4.3 4.2   CL 92* 95 98   CO2 23 21* 25   GLU 94 118* 121*   BUN 5* 4* 8   CREATININE 0.7 0.6 0.6   CALCIUM 9.1 8.9 9.0   PROT 7.1 6.8  --    ALBUMIN 3.7 3.3*  --    BILITOT 0.3 0.4  --    ALKPHOS 136* 127  --    AST 20 19  --    ALT 21 20  --    ANIONGAP 10 11 9     All pertinent labs within the past 24 hours have been reviewed.    Significant Imaging: X-Ray: I have reviewed all pertinent results/findings and my personal findings are:  R knee: no fracture noted. L hip: stable hip jessica.

## 2024-07-21 NOTE — PLAN OF CARE
Problem: Physical Therapy  Goal: Physical Therapy Goal  Description: Goals to be met by: 8/15/2024     Patient will increase functional independence with mobility by performin). Supine to sit with Modified [place]  2). Sit to supine with Modified [place]  3). Sit to stand transfer with Modified [place]  4). Gait  x > 25 feet with Modified [place] using Rolling Walker.     Outcome: Progressing

## 2024-07-21 NOTE — ASSESSMENT & PLAN NOTE
Patient's anemia is currently worsening. Has not received any PRBCs to date. Etiology likely d/t acute blood loss which was from hip surgery  Current CBC reviewed-   Lab Results   Component Value Date    HGB 10.7 (L) 07/21/2024    HCT 33.5 (L) 07/21/2024     Monitor serial CBC and transfuse if patient becomes hemodynamically unstable, symptomatic or H/H drops below 7/21.

## 2024-07-21 NOTE — ASSESSMENT & PLAN NOTE
Chronic, controlled. Latest blood pressure and vitals reviewed-     Temp:  [98 °F (36.7 °C)-98.4 °F (36.9 °C)]   Pulse:  [73-91]   Resp:  [16-18]   BP: ()/(48-61)   SpO2:  [93 %-97 %] .   Home meds for hypertension were reviewed and noted below.   Hypertension Medications               atenoloL (TENORMIN) 25 MG tablet Take 25 mg by mouth once daily.          While in the hospital, will manage blood pressure as follows; Continue home antihypertensive regimen

## 2024-07-21 NOTE — RESPIRATORY THERAPY
Patient receiving aerosol tx via 0.5mg pulmicort follow by 15mcg Brovana now and bid.  Hr 76 and 02 saturation 96% on nc at 3lpm.  Patient averaging 1750ml on IS.

## 2024-07-21 NOTE — PT/OT/SLP EVAL
Physical Therapy Evaluation    Patient Name:  Nimco Caro   MRN:  [id number]    Recommendations:     Discharge Recommendations:  (Low vs. Moderate Intensity Therapy (patient's goal is to D/C to home))   Discharge Equipment Recommendations: none (has walkers)    Assessment:     Nimco Caro is a 63 y.o. female admitted with a medical diagnosis of Closed fracture of left hip.  She presents with the following impairments/functional limitations: weakness, impaired endurance, impaired cognition, orthopedic precautions, impaired balance, gait instability, impaired functional mobility, decreased lower extremity function  .    Rehab Prognosis: Good; patient would benefit from acute skilled PT services to address these deficits and reach maximum level of function.    Recent Surgery: Procedure(s) (LRB):  HEMIARTHROPLASTY, HIP (Left) 1 Day Post-Op    Plan:     During this hospitalization, patient to be seen BID to address the identified rehab impairments via gait training, therapeutic activities, therapeutic exercises and progress toward the following goals:    Plan of Care Expires:  08/15/24    Subjective     Patient/Family Comments/goals: initially declines but after speaking to MD agrees to work with PT.    Living Environment:  House with friend, 0 steps to enter.  Prior to admission, patients level of function was independent.  Equipment used at home: walker, rolling, bedside commode, rollator.  DME owned (not currently used): rolling walker, bedside commode, and rollator .  Upon discharge, patient will have assistance from friend.    Objective:     Communicated with RN (Edmond) prior to session.  Patient found supine with peripheral IV, bed alarm, oxygen, telemetry  upon PT entry to room.    General Precautions: Standard, fall  Orthopedic Precautions:LLE weight bearing as tolerated, LLE posterior precautions   Braces: N/A  Respiratory Status: Nasal cannula, flow 3 L/min    Functional Mobility training:  Bed Mobility:      Rolling Left:  minimum assistance  Scooting: minimum assistance  Supine to Sit: minimum assistance  Transfers:     Sit to Stand:  moderate assistance with rolling walker  Gait: 15' (slow) with RW with min (initially mod) A and cues WBAT LLE      AM-PAC 6 CLICK MOBILITY  Total Score:14       Treatment & Education:  Mobility training as above with cues for technique  Instructed and demonstrated (pre and post activity) posterior hip precautions, answered all questions, patient verbalized understanding and demonstrated compliance during session.  Ankle DF/PF (rec'd to perform frequently to prevent DVTs)    Patient reclined in chair with all lines intact, call button in reach, chair alarm on, and RN (Edmond) notified, OT offered to transfer pt back to bed later.    GOALS:   Multidisciplinary Problems       Physical Therapy Goals          Problem: Physical Therapy    Goal Priority Disciplines Outcome Goal Variances Interventions   Physical Therapy Goal     PT, PT/OT Progressing     Description: Goals to be met by: 8/15/2024     Patient will increase functional independence with mobility by performin). Supine to sit with Modified [place]  2). Sit to supine with Modified [place]  3). Sit to stand transfer with Modified [place]  4). Gait  x > 25 feet with Modified [place] using Rolling Walker.                          History:     Past Medical History:   Diagnosis Date    ACP (advance care planning) 2023    Anxiety     Bipolar affect, depressed     Cancer ovarian; uterine        COPD (chronic obstructive pulmonary disease)     GERD (gastroesophageal reflux disease)     Hyperlipidemia     Hypertension     OAB (overactive bladder)     On home oxygen therapy     per patient uses PRN       Past Surgical History:   Procedure Laterality Date    BREAST CYST ASPIRATION      COLON SURGERY      COLONOSCOPY      HERNIA REPAIR N/A 2016    HIP ARTHROPLASTY Right 2019    Procedure: ARTHROPLASTY,  HIP;  Surgeon: Eliseo Vaca MD;  Location: [facility] OR;  Service: Orthopedics;  Laterality: Right;  Daniel Herrera    HYSTERECTOMY  total    JOINT REPLACEMENT Right     hip replacemnt    KNEE ARTHROPLASTY Left 8/19/2019    Procedure: ARTHROPLASTY, KNEE;  Surgeon: Eliseo Vaca MD;  Location: [facility] OR;  Service: Orthopedics;  Laterality: Left;    LUMBAR LAMINECTOMY WITH FUSION N/A 12/7/2023    Procedure: LAMINECTOMY, SPINE, LUMBAR, WITH FUSION L4-5;  Surgeon: Joby Jaimes MD;  Location: [facility];  Service: Orthopedics;  Laterality: N/A;  NTI, [employer]    REVISION COLOSTOMY N/A 2012       Time Tracking:     PT Received On: 07/21/24  PT Start Time: 0921 0845    PT Stop Time: 0952 0851   PT Total Time (min): 31 min     Billable Minutes: Evaluation 15 and Gait Training 15      07/21/2024

## 2024-07-21 NOTE — SUBJECTIVE & OBJECTIVE
Interval History: Patient seen and examined. NAEON. Pain mostly controlled. To work with therapists. Wants to go home rather than SNF/rehab.      Objective:     Vital Signs (Most Recent):  Temp: 98.4 °F (36.9 °C) (07/21/24 1115)  Pulse: 74 (07/21/24 1115)  Resp: 16 (07/21/24 1115)  BP: (!) 96/51 (07/21/24 1115)  SpO2: (!) 93 % (07/21/24 1115) Vital Signs (24h Range):  Temp:  [98 °F (36.7 °C)-98.4 °F (36.9 °C)] 98.4 °F (36.9 °C)  Pulse:  [73-91] 74  Resp:  [16-18] 16  SpO2:  [93 %-97 %] 93 %  BP: ()/(48-61) 96/51     Weight: 79.2 kg (174 lb 9.7 oz)  Body mass index is 27.35 kg/m².    Intake/Output Summary (Last 24 hours) at 7/21/2024 1155  Last data filed at 7/21/2024 0716  Gross per 24 hour   Intake 240 ml   Output 1550 ml   Net -1310 ml         Physical Exam  Vitals reviewed.   Constitutional:       General: She is not in acute distress.  HENT:      Head: Normocephalic and atraumatic.   Cardiovascular:      Rate and Rhythm: Normal rate and regular rhythm.   Pulmonary:      Effort: Pulmonary effort is normal. No respiratory distress.      Breath sounds: Normal breath sounds.   Musculoskeletal:      Comments: LLE NVI  L hip dressed   Neurological:      General: No focal deficit present.      Mental Status: She is alert and oriented to person, place, and time. Mental status is at baseline.   Psychiatric:         Mood and Affect: Affect normal.         Behavior: Behavior normal.         Thought Content: Thought content normal.             Significant Labs: All pertinent labs within the past 24 hours have been reviewed.    Significant Imaging: I have reviewed all pertinent imaging results/findings within the past 24 hours.

## 2024-07-21 NOTE — ASSESSMENT & PLAN NOTE
Now s/p repair 7/20  - postop care  - IS  - PT/OT  - DVT ppx with asa BID per ortho  - follow postop h/h  - dispo planning

## 2024-07-21 NOTE — PROGRESS NOTES
[facility]/Surg  Orthopedics  Progress Note    Patient Name: Nimco Caro  MRN: [id number]  Admission Date: 7/19/2024  Hospital Length of Stay: 2 days  Attending Provider: Leno Gaspar MD  Primary Care Provider: Katy Mason MD  Follow-up For: Procedure(s) (LRB):  HEMIARTHROPLASTY, HIP (Left)    Post-Operative Day: 1 Day Post-Op  Subjective:     Principal Problem:Closed fracture of left hip      Interval History: Did pretty well. Able to walk to door and back    Review of patient's allergies indicates:   Allergen Reactions    Meloxicam Other (See Comments)    Ciprofloxacin Diarrhea    Hydrocodone-acetaminophen Nausea And Vomiting, Nausea Only and Other (See Comments)    Ibuprofen Nausea Only    Naproxen Nausea Only and Other (See Comments)    Narcof [hydrocodone-guaifenesin] Nausea And Vomiting    Quetiapine Nausea And Vomiting and Other (See Comments)       Current Facility-Administered Medications   Medication    acetaminophen tablet 650 mg    acetaminophen tablet 650 mg    albuterol-ipratropium 2.5 mg-0.5 mg/3 mL nebulizer solution 3 mL    aluminum-magnesium hydroxide-simethicone 200-200-20 mg/5 mL suspension 30 mL    arformoteroL nebulizer solution 15 mcg    artificial tears 0.5 % ophthalmic solution 2 drop    artificial tears 0.5 % ophthalmic solution 2 drop    aspirin chewable tablet 81 mg    atenoloL tablet 25 mg    budesonide nebulizer solution 0.5 mg    clonazePAM tablet 2 mg    dextrose 10% bolus 125 mL 125 mL    dextrose 10% bolus 250 mL 250 mL    dicyclomine capsule 10 mg    docusate sodium capsule 100 mg    donepeziL tablet 10 mg    famotidine tablet 20 mg    fluticasone propionate 50 mcg/actuation nasal spray 50 mcg    gabapentin capsule 800 mg    glucagon (human recombinant) injection 1 mg    glucose chewable tablet 16 g    glucose chewable tablet 24 g    HYDROmorphone injection 1 mg    levalbuterol nebulizer solution 0.63 mg    loperamide capsule 2 mg    lurasidone  "tablet 40 mg    magnesium oxide tablet 800 mg    magnesium oxide tablet 800 mg    melatonin tablet 9 mg    methocarbamoL tablet 500 mg    mupirocin 2 % ointment    naloxone 0.4 mg/mL injection 0.02 mg    ondansetron injection 4 mg    OXcarbazepine tablet 300 mg    oxybutynin 24 hr tablet 5 mg    oxyCODONE immediate release tablet 5 mg    potassium bicarbonate disintegrating tablet 35 mEq    potassium bicarbonate disintegrating tablet 50 mEq    potassium bicarbonate disintegrating tablet 60 mEq    potassium, sodium phosphates 280-160-250 mg packet 2 packet    potassium, sodium phosphates 280-160-250 mg packet 2 packet    potassium, sodium phosphates 280-160-250 mg packet 2 packet    pravastatin tablet 20 mg    simethicone chewable tablet 80 mg    sodium chloride 0.9% flush 10 mL    sodium chloride 0.9% flush 10 mL    sucralfate tablet 1 g    topiramate tablet 50 mg    traZODone tablet 100 mg     Objective:     Vital Signs (Most Recent):  Temp: 98.4 °F (36.9 °C) (07/21/24 1115)  Pulse: 74 (07/21/24 1115)  Resp: 16 (07/21/24 1115)  BP: (!) 96/51 (07/21/24 1115)  SpO2: (!) 93 % (07/21/24 1115) Vital Signs (24h Range):  Temp:  [98 °F (36.7 °C)-98.4 °F (36.9 °C)] 98.4 °F (36.9 °C)  Pulse:  [73-91] 74  Resp:  [16-18] 16  SpO2:  [93 %-97 %] 93 %  BP: ()/(48-61) 96/51     Weight: 79.2 kg (174 lb 9.7 oz)  Height: 5' 7" (170.2 cm)  Body mass index is 27.35 kg/m².      Intake/Output Summary (Last 24 hours) at 7/21/2024 1225  Last data filed at 7/21/2024 0716  Gross per 24 hour   Intake 240 ml   Output 1550 ml   Net -1310 ml        General    Nursing note and vitals reviewed.  Constitutional: She is oriented to person, place, and time. She appears well-developed and well-nourished. No distress.   HENT:   Head: Atraumatic.   Eyes: EOM are normal.   Cardiovascular:  Normal rate.            Pulmonary/Chest: Effort normal.   Abdominal: Soft.   Neurological: She is alert and oriented to person, place, and time.   Psychiatric: " Her behavior is normal.             Right Hip Exam   Right hip exam is normal.   Left Hip Exam     Inspection   Swelling: present  Erythema: absent    Tenderness   The patient tender to palpation of the trochanteric bursa.    Range of Motion   Extension:  abnormal   Flexion:  abnormal     Tests   Log Roll: positive    Other   Sensation: normal          Vascular Exam       Left Pulses  Dorsalis Pedis:      2+             Significant Labs: BMP:   Recent Labs   Lab 07/20/24  0502 07/21/24  0444   * 121*   * 132*   K 4.3 4.2   CL 95 98   CO2 21* 25   BUN 4* 8   CREATININE 0.6 0.6   CALCIUM 8.9 9.0   MG 1.9  --      CBC:   Recent Labs   Lab 07/19/24  1535 07/20/24  0502 07/21/24  0444   WBC 6.91 11.23 10.75   HGB 12.1 12.4 10.7*   HCT 36.7* 37.5 33.5*    252 225     CMP:   Recent Labs   Lab 07/19/24  1535 07/20/24  0502 07/21/24  0444   * 127* 132*   K 4.1 4.3 4.2   CL 92* 95 98   CO2 23 21* 25   GLU 94 118* 121*   BUN 5* 4* 8   CREATININE 0.7 0.6 0.6   CALCIUM 9.1 8.9 9.0   PROT 7.1 6.8  --    ALBUMIN 3.7 3.3*  --    BILITOT 0.3 0.4  --    ALKPHOS 136* 127  --    AST 20 19  --    ALT 21 20  --    ANIONGAP 10 11 9     All pertinent labs within the past 24 hours have been reviewed.    Significant Imaging: X-Ray: I have reviewed all pertinent results/findings and my personal findings are:  R knee: no fracture noted. L hip: stable hip jessica.   Assessment/Plan:     * Closed fracture of left hip  Cont PT. WBAT.  Pain control  VTE prophylaxis  Monitor H/H  Likely SNF placement.           Timothy Nettles MD  Orthopedics  [facility]/Surg

## 2024-07-21 NOTE — PROGRESS NOTES
[facility] Hospital Medicine  Progress Note    Patient Name: Nimco Caro  MRN: [id number]  Patient Class: IP- Inpatient   Admission Date: 7/19/2024  Length of Stay: 2 days  Attending Physician: Leno Gaspar MD  Primary Care Provider: Katy Mason MD        Subjective:     Principal Problem:Closed fracture of left hip        HPI:  Nimco Caro is a 63-year-old female who presents emergency room complaining of left hip pain.  She reports she became dizzy and experienced a mechanical fall at her residence.  She denies head trauma or loss of consciousness.  She denies any cervical pain.  She is not on any anticoagulants.  She describes the hip pain as intense.  She rates the pain at 10/10 at worst.  The pain is worse with any movement of the left lower leg.  No alleviating factors.  No recent fever or chills.  No known sick contacts or travel.  Previous medical history of hyponatremia, GERD, hyperlipidemia, osteoarthritis, active smoker, COPD, bipolar disease.  ER workup: CBC unremarkable.  CMP with hyponatremia of 125 and alk-phos elevated 136.  Urinalysis pending at the time my exam.  EKG demonstrated sinus rhythm without ST or T-wave elevation.  Chest x-ray was unremarkable.  ER physician spoke with  with orthopedics who will see patient in consultation.  Patient will be admitted to Hospital Medicine for treatment and management.  Hyponatremia labs pending.    Overview/Hospital Course:  63F admitted for left hip fracture after a fall. Has acute on chronic hyponatremia on multiple psych meds. Sodium trended. Ortho consulted and performed left hip repair 7/20. PT/OT consulted.    Interval History: Patient seen and examined. BRIE. Pain mostly controlled. To work with therapists. Wants to go home rather than SNF/rehab.      Objective:     Vital Signs (Most Recent):  Temp: 98.4 °F (36.9 °C) (07/21/24 1115)  Pulse: 74 (07/21/24 1115)  Resp: 16 (07/21/24 1115)  BP: (!)  96/51 (07/21/24 1115)  SpO2: (!) 93 % (07/21/24 1115) Vital Signs (24h Range):  Temp:  [98 °F (36.7 °C)-98.4 °F (36.9 °C)] 98.4 °F (36.9 °C)  Pulse:  [73-91] 74  Resp:  [16-18] 16  SpO2:  [93 %-97 %] 93 %  BP: ()/(48-61) 96/51     Weight: 79.2 kg (174 lb 9.7 oz)  Body mass index is 27.35 kg/m².    Intake/Output Summary (Last 24 hours) at 7/21/2024 1155  Last data filed at 7/21/2024 0716  Gross per 24 hour   Intake 240 ml   Output 1550 ml   Net -1310 ml         Physical Exam  Vitals reviewed.   Constitutional:       General: She is not in acute distress.  HENT:      Head: Normocephalic and atraumatic.   Cardiovascular:      Rate and Rhythm: Normal rate and regular rhythm.   Pulmonary:      Effort: Pulmonary effort is normal. No respiratory distress.      Breath sounds: Normal breath sounds.   Musculoskeletal:      Comments: LLE NVI  L hip dressed   Neurological:      General: No focal deficit present.      Mental Status: She is alert and oriented to person, place, and time. Mental status is at baseline.   Psychiatric:         Mood and Affect: Affect normal.         Behavior: Behavior normal.         Thought Content: Thought content normal.             Significant Labs: All pertinent labs within the past 24 hours have been reviewed.    Significant Imaging: I have reviewed all pertinent imaging results/findings within the past 24 hours.    Assessment/Plan:      * Closed fracture of left hip  Now s/p repair 7/20  - postop care  - IS  - PT/OT  - DVT ppx with asa BID per ortho  - follow postop h/h  - dispo planning    Acute blood loss anemia  Patient's anemia is currently worsening. Has not received any PRBCs to date. Etiology likely d/t acute blood loss which was from hip surgery  Current CBC reviewed-   Lab Results   Component Value Date    HGB 10.7 (L) 07/21/2024    HCT 33.5 (L) 07/21/2024     Monitor serial CBC and transfuse if patient becomes hemodynamically unstable, symptomatic or H/H drops below  7/21.    Hyponatremia  Acute on chronic. Improving  - monitor daily  - continue chronic psych meds    Bipolar 1 disorder  Chronic problem, appears stable  - continue her chronic meds    Tobacco dependence due to cigarettes  -encourage cessation  -Nicotine replacement prescribed    Hypertension  Chronic, controlled. Latest blood pressure and vitals reviewed-     Temp:  [98 °F (36.7 °C)-98.4 °F (36.9 °C)]   Pulse:  [73-91]   Resp:  [16-18]   BP: ()/(48-61)   SpO2:  [93 %-97 %] .   Home meds for hypertension were reviewed and noted below.   Hypertension Medications               atenoloL (TENORMIN) 25 MG tablet Take 25 mg by mouth once daily.          While in the hospital, will manage blood pressure as follows; Continue home antihypertensive regimen      VTE Risk Mitigation (From admission, onward)           Ordered     IP VTE LOW RISK PATIENT  Once         07/20/24 1110     Place sequential compression device  Until discontinued         07/19/24 1908     Place REMINGTON hose  Until discontinued         07/19/24 1908                    Discharge Planning   JOSE: 7/22/2024     Code Status: Full Code   Is the patient medically ready for discharge?:     Reason for patient still in hospital (select all that apply): Patient trending condition and PT / OT recommendations                     Leno Gaspar MD  Department of Hospital Medicine   [facility] - Med/Surg

## 2024-07-21 NOTE — PLAN OF CARE
Nutrition Plan of Care:    Recommendations  1.  Enocurage po intake    2. Recommend commercial beverages if po intake <50%    3. Monitor labs and weights   4. Collaboration with medical providers     Goals: Patient to consume >75% of EEN prior to RD follow up  Nutrition Goal Status: new  Communication of RD Recs: other (comment) (consult, poc)     Assessment and Plan     Nutrition Problem  Increased nutritional needs     Related to (etiology):   Closed fracture of the left hip with surgery      Signs and Symptoms (as evidenced by):   Higher nutrient demands for healing   Decreased po intake      Interventions/Recommendations (treatment strategy):  Commercial beverages  Collaboration with medical providers     Nutrition Diagnosis Status:   Josue Lui MS, RDN, LDN

## 2024-07-21 NOTE — PLAN OF CARE
[facility] - Med/Surg  Initial Discharge Assessment       Primary Care Provider: Katy Mason MD    Admission Diagnosis: Closed fracture of left hip, initial encounter [S72.002A]    Admission Date: 7/19/2024  Expected Discharge Date: 7/22/2024    Transition of Care Barriers: None    Initial assessment completed at bedside with pt. Pt confirmed demographics, PCP and pharmacy. Pt anticipates discharge home with home health when medically clear. Pt plans to start with home health then transition to Out patient therapy.     Payor: MEDICAID / Plan: [place] [employer] / Product Type: Managed Medicaid /     Extended Emergency Contact Information  Primary Emergency Contact: Shaunna Leonard  Mobile Phone: [phone]  Relation: Friend  Preferred language: English   needed? No    Discharge Plan A: Home Health  Discharge Plan B: Home Health      [id number] [place] - [address]  [address] [address]  Phone: [phone] Fax: [phone]      Initial Assessment (most recent)       Adult Discharge Assessment - 07/21/24 1552          Discharge Assessment    Assessment Type Discharge Planning Assessment     Confirmed/corrected address, phone number and insurance Yes     Confirmed Demographics Correct on Facesheet     Source of Information patient     When was your last doctors appointment? --   2024    Reason For Admission FX hip     People in Home friend(s)     Facility Arrived From: home     Do you expect to return to your current living situation? Yes     Do you have help at home or someone to help you manage your care at home? Yes     Who are your caregiver(s) and their phone number(s)? Shaunna (friend) [phone]     Prior to hospitilization cognitive status: Alert/Oriented     Current cognitive status: Alert/Oriented     Walking or Climbing Stairs Difficulty yes     Walking or Climbing Stairs ambulation difficulty, requires equipment     Mobility  Management rolling walker     Dressing/Bathing Difficulty yes     Dressing/Bathing bathing difficulty, requires equipment     Dressing/Bathing Management shower chair     Home Accessibility not wheelchair accessible     Equipment Currently Used at Home walker, rolling;rollator;shower chair     Readmission within 30 days? No     Patient currently being followed by outpatient case management? No     Do you currently have service(s) that help you manage your care at home? No     Do you take prescription medications? Yes     Do you have prescription coverage? Yes     [place]     Do you have any problems affording any of your prescribed medications? No     Is the patient taking medications as prescribed? yes     Who is going to help you get home at discharge? Shaunna (friend) [phone]     How do you get to doctors appointments? car, drives self     Are you on dialysis? No     Do you take coumadin? No     Discharge Plan A Home Health     Discharge Plan B Home Health     DME Needed Upon Discharge  other (see comments)   TBD    Discharge Plan discussed with: Patient     Transition of Care Barriers None        Physical Activity    On average, how many days per week do you engage in moderate to strenuous exercise (like a brisk walk)? Patient declined     On average, how many minutes do you engage in exercise at this level? Patient declined        Financial Resource Strain    How hard is it for you to pay for the very basics like food, housing, medical care, and heating? Patient declined        Housing Stability    In the last 12 months, was there a time when you were not able to pay the mortgage or rent on time? Patient declined     At any time in the past 12 months, were you homeless or living in a shelter (including now)? Patient declined        Transportation Needs    Has the lack of transportation kept you from medical appointments, meetings, work or from getting things needed for daily living?  No        Food Insecurity    Within the past 12 months, you worried that your food would run out before you got the money to buy more. Patient declined     Within the past 12 months, the food you bought just didn't last and you didn't have money to get more. Patient declined        Stress    Do you feel stress - tense, restless, nervous, or anxious, or unable to sleep at night because your mind is troubled all the time - these days? Patient declined        Social Isolation    How often do you feel lonely or isolated from those around you?  Patient declined        Alcohol Use    Q1: How often do you have a drink containing alcohol? Patient declined     Q2: How many drinks containing alcohol do you have on a typical day when you are drinking? Patient declined     Q3: How often do you have six or more drinks on one occasion? Patient declined        Utilities    In the past 12 months has the electric, gas, oil, or water company threatened to shut off services in your home? Patient declined        Health Literacy    How often do you need to have someone help you when you read instructions, pamphlets, or other written material from your doctor or pharmacy? Never        OTHER    Name(s) of People in Home Shaunna (friend) [phone]

## 2024-07-21 NOTE — CONSULTS
[facility]/Surg  Adult Nutrition  Consult Note    SUMMARY     Recommendations  1.  Enocurage po intake    2. Recommend commercial beverages if po intake <50%    3. Monitor labs and weights   4. Collaboration with medical providers    Goals: Patient to consume >75% of EEN prior to RD follow up  Nutrition Goal Status: new  Communication of RD Recs: other (comment) (consult, poc)    Assessment and Plan    Nutrition Problem  Increased nutritional needs    Related to (etiology):   Closed fracture of the left hip with surgery     Signs and Symptoms (as evidenced by):   Higher nutrient demands for healing   Decreased po intake     Interventions/Recommendations (treatment strategy):  Commercial beverages  Collaboration with medical providers    Nutrition Diagnosis Status:   New         Malnutrition Assessment               Patient does not meet positive criteria of NFPE at this time.  RD to continue to monitor and follow.                         Reason for Assessment    Reason For Assessment: consult (malnutrition consult)    Diagnosis:  (closed fracture of left hip)  Patient Active Problem List   Diagnosis    GERD (gastroesophageal reflux disease)    Hypertension    Hyperlipidemia    Primary osteoarthritis of left knee    History of total hip arthroplasty, right    Degenerative arthritis of left knee    Mild episode of recurrent major depressive disorder    Tobacco dependence due to cigarettes    Chronic bronchitis    COPD (chronic obstructive pulmonary disease)    Spinal stenosis of lumbar region with neurogenic claudication    S/P lumbar fusion    Bipolar 1 disorder    Fall    ACP (advance care planning)    Acute pain of right knee    Muscle weakness    Abnormal increased muscle tone    Decreased functional mobility and endurance    Special educational needs    Difficulty in walking involving lower leg joint    Closed fracture of left hip    Hyponatremia       Relevant Medical History:   Past Medical  "History:   Diagnosis Date    ACP (advance care planning) 12/20/2023    Anxiety     Bipolar affect, depressed     Cancer ovarian; uterine    1992    COPD (chronic obstructive pulmonary disease)     GERD (gastroesophageal reflux disease)     Hyperlipidemia     Hypertension     OAB (overactive bladder)     On home oxygen therapy     per patient uses PRN       Interdisciplinary Rounds: did not attend (RD remote)    General Information Comments:   7/20/2024: Patient is on a regular diet.  Patient with closed fracture of left hip.  S/P surgery.  Labs reviewed.  NKFA.  No significant weight loss found within the past 1 year.  Patient does not meet positive criteria of NFPE at this time.  RD to continue to monitor and follow up visits. LBM: 7/19/2024.    Nutrition Discharge Planning: Patient to continue on healthy oral diet post discharge    Nutrition Risk Screen    Nutrition Risk Screen: no indicators present  Nutrition Related Social Determinants of Health: SDOH: Adequate food in home environment and None Identified    Nutrition/Diet History    Spiritual, Cultural Beliefs, Anglican Practices, Values that Affect Care: no  Food Allergies: NKFA    Anthropometrics    Temp: 98 °F (36.7 °C)  Height Method: Stated  Height: 5' 7" (170.2 cm)  Height (inches): 67 in  Weight Method: Bed Scale  Weight: 79.2 kg (174 lb 9.7 oz)  Weight (lb): 174.61 lb  Ideal Body Weight (IBW), Female: 135 lb  % Ideal Body Weight, Female (lb): 129.34 %  BMI (Calculated): 27.3  BMI Grade: 25 - 29.9 - overweight       Lab/Procedures/Meds    Pertinent Labs Reviewed: reviewed  BMP  Lab Results   Component Value Date     (L) 07/20/2024    K 4.3 07/20/2024    CL 95 07/20/2024    CO2 21 (L) 07/20/2024    BUN 4 (L) 07/20/2024    CREATININE 0.6 07/20/2024    CALCIUM 8.9 07/20/2024    ANIONGAP 11 07/20/2024    EGFRNORACEVR >60 07/20/2024     No results found for: "LABA1C", "HGBA1C"  Lab Results   Component Value Date    CALCIUM 8.9 07/20/2024    PHOS 3.5 " 07/20/2024       Pertinent Medications Reviewed: reviewed  Scheduled Meds:   arformoteroL  15 mcg Nebulization BID    artificial tears  2 drop Both Eyes Q4H While awake    aspirin  81 mg Oral BID    atenoloL  25 mg Oral Daily    budesonide  0.5 mg Nebulization Q12H    ceFAZolin (Ancef) IV (PEDS and ADULTS)  2 g Intravenous Q8H    clonazePAM  2 mg Oral BID    dicyclomine  10 mg Oral BID    docusate sodium  100 mg Oral Q12H    donepeziL  10 mg Oral Daily    famotidine  20 mg Oral BID    fluticasone propionate  1 spray Each Nostril Daily    gabapentin  800 mg Oral TID    lurasidone  40 mg Oral Daily    mupirocin   Nasal BID    OXcarbazepine  300 mg Oral Daily    oxybutynin  5 mg Oral Daily    pravastatin  20 mg Oral QHS    sucralfate  1 g Oral QID    topiramate  50 mg Oral Daily    traZODone  100 mg Oral QHS     Continuous Infusions:   loperamide  2 mg Oral Continuous PRN         PRN Meds:.  Current Facility-Administered Medications:     acetaminophen, 650 mg, Oral, Q4H PRN    acetaminophen, 650 mg, Oral, Q6H PRN    albuterol-ipratropium, 3 mL, Nebulization, Q4H PRN    aluminum-magnesium hydroxide-simethicone, 30 mL, Oral, QID PRN    artificial tears, 2 drop, Both Eyes, QID PRN    dextrose 10%, 12.5 g, Intravenous, PRN    dextrose 10%, 25 g, Intravenous, PRN    glucagon (human recombinant), 1 mg, Intramuscular, PRN    glucose, 16 g, Oral, PRN    glucose, 24 g, Oral, PRN    HYDROmorphone, 1 mg, Intravenous, Q4H PRN    levalbuterol, 0.63 mg, Nebulization, Q4H PRN    loperamide, 2 mg, Oral, Continuous PRN    magnesium oxide, 800 mg, Oral, PRN    magnesium oxide, 800 mg, Oral, PRN    melatonin, 9 mg, Oral, Nightly PRN    methocarbamoL, 500 mg, Oral, TID PRN    naloxone, 0.02 mg, Intravenous, PRN    ondansetron, 4 mg, Intravenous, Q8H PRN    oxyCODONE, 5 mg, Oral, Q6H PRN    potassium bicarbonate, 35 mEq, Oral, PRN    potassium bicarbonate, 50 mEq, Oral, PRN    potassium bicarbonate, 60 mEq, Oral, PRN    potassium, sodium  phosphates, 2 packet, Oral, PRN    potassium, sodium phosphates, 2 packet, Oral, PRN    potassium, sodium phosphates, 2 packet, Oral, PRN    simethicone, 1 tablet, Oral, QID PRN    sodium chloride 0.9%, 10 mL, Intravenous, Q8H PRN    sodium chloride 0.9%, 10 mL, Intravenous, PRN    Physical Findings/Assessment         Estimated/Assessed Needs    Weight Used For Calorie Calculations: 79.2 kg (174 lb 9.7 oz)  Energy Calorie Requirements (kcal): 25kcals/kg (1980kcals/day)  Energy Need Method: Kcal/kg  Protein Requirements: 1.0g/day (79g/day)  Weight Used For Protein Calculations: 79.2 kg (174 lb 9.7 oz)  Fluid Requirements (mL): 1ml/kcal  Estimated Fluid Requirement Method: RDA Method  RDA Method (mL): 25  CHO Requirement: 250      Nutrition Prescription Ordered    Current Diet Order: Regular  Oral Nutrition Supplement: Boost if needed    Evaluation of Received Nutrient/Fluid Intake    % Kcal Needs: <50%  % Protein Needs: <50%  I/O: 7/20/2024: -2535mls since admit  Energy Calories Required: not meeting needs  Protein Required: not meeting needs  Fluid Required: not meeting needs  Comments: LBM: 7/19/2024  Tolerance: tolerating  % Intake of Estimated Energy Needs: 25 - 50 %  % Meal Intake: 25 - 50 %    Nutrition Risk    Level of Risk/Frequency of Follow-up: moderate (follow up: 1-2x per week)       Monitor and Evaluation    Food and Nutrient Intake: energy intake, food and beverage intake  Food and Nutrient Adminstration: diet order  Knowledge/Beliefs/Attitudes: beliefs and attitudes  Physical Activity and Function: nutrition-related ADLs and IADLs, factors affecting access to physical activity  Anthropometric Measurements: height/length, weight, weight change, body mass index  Biochemical Data, Medical Tests and Procedures: electrolyte and renal panel, gastrointestinal profile, glucose/endocrine profile, inflammatory profile, lipid profile       Nutrition Follow-Up    RD Follow-up?: Yes  Stephanie Lui, MS, RDN, LDN

## 2024-07-21 NOTE — CARE UPDATE
07/20/24 1940   Patient Assessment/Suction   Level of Consciousness (AVPU) alert   Respiratory Effort Normal;Unlabored   Expansion/Accessory Muscles/Retractions expansion symmetric;no retractions;no use of accessory muscles   All Lung Fields Breath Sounds diminished   PRE-TX-O2   Device (Oxygen Therapy) nasal cannula   Flow (L/min) (Oxygen Therapy) 3   Oxygen Concentration (%) 32   SpO2 96 %   Pulse Oximetry Type Intermittent   Pulse 86   Resp 18   Aerosol Therapy   $ Aerosol Therapy Charges Aerosol Treatment   Respiratory Treatment Status (SVN) given   Treatment Route (SVN) mask;oxygen   Patient Position HOB elevated   Signs of Intolerance (SVN) none   Breath Sounds Post-Respiratory Treatment   Throughout All Fields Post-Treatment All Fields   Throughout All Fields Post-Treatment no change   Post-treatment Heart Rate (beats/min) 83   Post-treatment Resp Rate (breaths/min) 18   Ready to Wean/Extubation Screen   FIO2<=50 (chart decimal) 0.32

## 2024-07-22 PROBLEM — J96.11 CHRONIC RESPIRATORY FAILURE WITH HYPOXIA: Status: ACTIVE | Noted: 2024-07-22

## 2024-07-22 LAB
ANION GAP SERPL CALC-SCNC: 9 MMOL/L (ref 8–16)
BUN SERPL-MCNC: 6 MG/DL (ref 8–23)
CALCIUM SERPL-MCNC: 8.9 MG/DL (ref 8.7–10.5)
CHLORIDE SERPL-SCNC: 100 MMOL/L (ref 95–110)
CO2 SERPL-SCNC: 24 MMOL/L (ref 23–29)
CREAT SERPL-MCNC: 0.6 MG/DL (ref 0.5–1.4)
ERYTHROCYTE [DISTWIDTH] IN BLOOD BY AUTOMATED COUNT: 18.6 % (ref 11.5–14.5)
EST. GFR  (NO RACE VARIABLE): >60 ML/MIN/1.73 M^2
GLUCOSE SERPL-MCNC: 122 MG/DL (ref 70–110)
HCT VFR BLD AUTO: 31.3 % (ref 37–48.5)
HGB BLD-MCNC: 9.9 G/DL (ref 12–16)
MCH RBC QN AUTO: 27.7 PG (ref 27–31)
MCHC RBC AUTO-ENTMCNC: 31.6 G/DL (ref 32–36)
MCV RBC AUTO: 87 FL (ref 82–98)
PLATELET # BLD AUTO: 206 K/UL (ref 150–450)
PMV BLD AUTO: 9.4 FL (ref 9.2–12.9)
POTASSIUM SERPL-SCNC: 3.8 MMOL/L (ref 3.5–5.1)
RBC # BLD AUTO: 3.58 M/UL (ref 4–5.4)
SODIUM SERPL-SCNC: 133 MMOL/L (ref 136–145)
WBC # BLD AUTO: 10.22 K/UL (ref 3.9–12.7)

## 2024-07-22 PROCEDURE — 94799 UNLISTED PULMONARY SVC/PX: CPT

## 2024-07-22 PROCEDURE — 97535 SELF CARE MNGMENT TRAINING: CPT

## 2024-07-22 PROCEDURE — 85027 COMPLETE CBC AUTOMATED: CPT | Performed by: ORTHOPAEDIC SURGERY

## 2024-07-22 PROCEDURE — 25000242 PHARM REV CODE 250 ALT 637 W/ HCPCS: Performed by: ORTHOPAEDIC SURGERY

## 2024-07-22 PROCEDURE — 11000001 HC ACUTE MED/SURG PRIVATE ROOM

## 2024-07-22 PROCEDURE — 25000003 PHARM REV CODE 250: Performed by: STUDENT IN AN ORGANIZED HEALTH CARE EDUCATION/TRAINING PROGRAM

## 2024-07-22 PROCEDURE — 99900035 HC TECH TIME PER 15 MIN (STAT)

## 2024-07-22 PROCEDURE — 36415 COLL VENOUS BLD VENIPUNCTURE: CPT | Performed by: ORTHOPAEDIC SURGERY

## 2024-07-22 PROCEDURE — 80048 BASIC METABOLIC PNL TOTAL CA: CPT | Performed by: ORTHOPAEDIC SURGERY

## 2024-07-22 PROCEDURE — 94761 N-INVAS EAR/PLS OXIMETRY MLT: CPT

## 2024-07-22 PROCEDURE — 25000003 PHARM REV CODE 250: Performed by: ORTHOPAEDIC SURGERY

## 2024-07-22 PROCEDURE — 27000221 HC OXYGEN, UP TO 24 HOURS

## 2024-07-22 PROCEDURE — 63600175 PHARM REV CODE 636 W HCPCS: Performed by: ORTHOPAEDIC SURGERY

## 2024-07-22 PROCEDURE — 97530 THERAPEUTIC ACTIVITIES: CPT | Mod: CQ

## 2024-07-22 PROCEDURE — 94640 AIRWAY INHALATION TREATMENT: CPT

## 2024-07-22 PROCEDURE — 97116 GAIT TRAINING THERAPY: CPT | Mod: CQ

## 2024-07-22 PROCEDURE — 97110 THERAPEUTIC EXERCISES: CPT

## 2024-07-22 RX ORDER — OXYCODONE AND ACETAMINOPHEN 10; 325 MG/1; MG/1
1 TABLET ORAL EVERY 6 HOURS PRN
Status: DISCONTINUED | OUTPATIENT
Start: 2024-07-22 | End: 2024-07-24 | Stop reason: HOSPADM

## 2024-07-22 RX ORDER — OXYCODONE AND ACETAMINOPHEN 5; 325 MG/1; MG/1
1 TABLET ORAL EVERY 6 HOURS PRN
Status: DISCONTINUED | OUTPATIENT
Start: 2024-07-22 | End: 2024-07-24 | Stop reason: HOSPADM

## 2024-07-22 RX ADMIN — SUCRALFATE 1 G: 1 TABLET ORAL at 10:07

## 2024-07-22 RX ADMIN — CLONAZEPAM 2 MG: 0.5 TABLET ORAL at 09:07

## 2024-07-22 RX ADMIN — ASPIRIN 81 MG CHEWABLE TABLET 81 MG: 81 TABLET CHEWABLE at 09:07

## 2024-07-22 RX ADMIN — FAMOTIDINE 20 MG: 20 TABLET, FILM COATED ORAL at 10:07

## 2024-07-22 RX ADMIN — HYPROMELLOSE 2910 2 DROP: 5 SOLUTION/ DROPS OPHTHALMIC at 02:07

## 2024-07-22 RX ADMIN — OXCARBAZEPINE 300 MG: 150 TABLET, FILM COATED ORAL at 10:07

## 2024-07-22 RX ADMIN — HYPROMELLOSE 2910 2 DROP: 5 SOLUTION/ DROPS OPHTHALMIC at 05:07

## 2024-07-22 RX ADMIN — OXYCODONE AND ACETAMINOPHEN 1 TABLET: 10; 325 TABLET ORAL at 09:07

## 2024-07-22 RX ADMIN — HYPROMELLOSE 2910 2 DROP: 5 SOLUTION/ DROPS OPHTHALMIC at 09:07

## 2024-07-22 RX ADMIN — ASPIRIN 81 MG CHEWABLE TABLET 81 MG: 81 TABLET CHEWABLE at 10:07

## 2024-07-22 RX ADMIN — GABAPENTIN 800 MG: 400 CAPSULE ORAL at 08:07

## 2024-07-22 RX ADMIN — ATENOLOL 25 MG: 25 TABLET ORAL at 10:07

## 2024-07-22 RX ADMIN — ARFORMOTEROL TARTRATE 15 MCG: 15 SOLUTION RESPIRATORY (INHALATION) at 08:07

## 2024-07-22 RX ADMIN — CLONAZEPAM 2 MG: 0.5 TABLET ORAL at 08:07

## 2024-07-22 RX ADMIN — GABAPENTIN 800 MG: 400 CAPSULE ORAL at 02:07

## 2024-07-22 RX ADMIN — BUDESONIDE INHALATION 0.5 MG: 0.5 SUSPENSION RESPIRATORY (INHALATION) at 07:07

## 2024-07-22 RX ADMIN — DOCUSATE SODIUM 100 MG: 100 CAPSULE, LIQUID FILLED ORAL at 10:07

## 2024-07-22 RX ADMIN — ARFORMOTEROL TARTRATE 15 MCG: 15 SOLUTION RESPIRATORY (INHALATION) at 07:07

## 2024-07-22 RX ADMIN — PRAVASTATIN SODIUM 20 MG: 10 TABLET ORAL at 09:07

## 2024-07-22 RX ADMIN — DICYCLOMINE HYDROCHLORIDE 10 MG: 10 CAPSULE ORAL at 09:07

## 2024-07-22 RX ADMIN — FLUTICASONE PROPIONATE 50 MCG: 50 SPRAY, METERED NASAL at 10:07

## 2024-07-22 RX ADMIN — OXYBUTYNIN CHLORIDE 5 MG: 5 TABLET, EXTENDED RELEASE ORAL at 10:07

## 2024-07-22 RX ADMIN — DONEPEZIL HYDROCHLORIDE 10 MG: 5 TABLET, FILM COATED ORAL at 10:07

## 2024-07-22 RX ADMIN — TRAZODONE HYDROCHLORIDE 100 MG: 50 TABLET ORAL at 09:07

## 2024-07-22 RX ADMIN — GABAPENTIN 800 MG: 400 CAPSULE ORAL at 09:07

## 2024-07-22 RX ADMIN — SUCRALFATE 1 G: 1 TABLET ORAL at 02:07

## 2024-07-22 RX ADMIN — FAMOTIDINE 20 MG: 20 TABLET, FILM COATED ORAL at 09:07

## 2024-07-22 RX ADMIN — TOPIRAMATE 50 MG: 25 TABLET, FILM COATED ORAL at 10:07

## 2024-07-22 RX ADMIN — MUPIROCIN 1 G: 20 OINTMENT TOPICAL at 09:07

## 2024-07-22 RX ADMIN — DOCUSATE SODIUM 100 MG: 100 CAPSULE, LIQUID FILLED ORAL at 09:07

## 2024-07-22 RX ADMIN — DICYCLOMINE HYDROCHLORIDE 10 MG: 10 CAPSULE ORAL at 10:07

## 2024-07-22 RX ADMIN — MUPIROCIN 1 G: 20 OINTMENT TOPICAL at 10:07

## 2024-07-22 RX ADMIN — ONDANSETRON 4 MG: 2 INJECTION INTRAMUSCULAR; INTRAVENOUS at 02:07

## 2024-07-22 NOTE — CARE UPDATE
07/22/24 0706 07/22/24 0711   Patient Assessment/Suction   Level of Consciousness (AVPU) alert alert   Respiratory Effort Normal;Unlabored  --    Expansion/Accessory Muscles/Retractions no use of accessory muscles;no retractions;expansion symmetric  --    All Lung Fields Breath Sounds Anterior:;Lateral:;diminished  --    Rhythm/Pattern, Respiratory unlabored;depth regular;pattern regular;no shortness of breath reported  --    Cough Frequency no cough  --    PRE-TX-O2   Device (Oxygen Therapy) nasal cannula with humidification nasal cannula with humidification   $ Is the patient on Low Flow Oxygen? Yes Yes   Flow (L/min) (Oxygen Therapy) 3 3   SpO2 96 % 98 %   Pulse Oximetry Type Intermittent Intermittent   $ Pulse Oximetry - Multiple Charge Pulse Oximetry - Multiple Pulse Oximetry - Multiple   Pulse 95 95   Resp 16 16   Aerosol Therapy   $ Aerosol Therapy Charges Aerosol Treatment Aerosol Treatment   Respiratory Treatment Status (SVN) given given   Treatment Route (SVN) mask;oxygen mask;oxygen   Patient Position HOB elevated HOB elevated   Post Treatment Assessment (SVN)  --  breath sounds unchanged   Signs of Intolerance (SVN) none none   Breath Sounds Post-Respiratory Treatment   Throughout All Fields Post-Treatment All Fields  --    Throughout All Fields Post-Treatment no change no change   Post-treatment Heart Rate (beats/min) 95 93   Post-treatment Resp Rate (breaths/min) 16 16   Incentive Spirometer   $ Incentive Spirometer Charges  --  done with encouragement   Incentive Spirometer Predicted Level (mL)  --  1500   Administration (IS)  --  mouthpiece utilized   Number of Repetitions (IS)  --  6   Level Incentive Spirometer (mL)  --  1250   Patient Tolerance (IS)  --  fair;no adverse signs/symptoms present

## 2024-07-22 NOTE — ASSESSMENT & PLAN NOTE
Patient's anemia is currently worsening. Has not received any PRBCs to date. Etiology likely d/t acute blood loss which was from hip surgery  Current CBC reviewed-   Lab Results   Component Value Date    HGB 9.9 (L) 07/22/2024    HCT 31.3 (L) 07/22/2024     Monitor serial CBC and transfuse if patient becomes hemodynamically unstable, symptomatic or H/H drops below 7/21.

## 2024-07-22 NOTE — CARE UPDATE
07/21/24 1906   Patient Assessment/Suction   Level of Consciousness (AVPU) alert   Respiratory Effort Normal;Unlabored   Expansion/Accessory Muscles/Retractions expansion symmetric   All Lung Fields Breath Sounds Anterior:;Lateral:   CYN Breath Sounds diminished   LLL Breath Sounds diminished   RUL Breath Sounds diminished   RML Breath Sounds diminished   RLL Breath Sounds diminished   Rhythm/Pattern, Respiratory pattern regular   Cough Frequency no cough   PRE-TX-O2   Device (Oxygen Therapy) nasal cannula with humidification   $ Is the patient on Low Flow Oxygen? Yes   Flow (L/min) (Oxygen Therapy) 3  (pt was 87 on room air. placed back on nasal cannula.)   SpO2 (!) 92 %   Pulse Oximetry Type Intermittent   $ Pulse Oximetry - Multiple Charge Pulse Oximetry - Multiple   Pulse 96   Resp 18   Aerosol Therapy   $ Aerosol Therapy Charges Aerosol Treatment   Respiratory Treatment Status (SVN) given   Treatment Route (SVN) mask;oxygen   Patient Position HOB elevated   Signs of Intolerance (SVN) none   Breath Sounds Post-Respiratory Treatment   Throughout All Fields Post-Treatment All Fields   Throughout All Fields Post-Treatment no change   Post-treatment Heart Rate (beats/min) 92   Post-treatment Resp Rate (breaths/min) 18   Incentive Spirometer   $ Incentive Spirometer Charges other (see comments)  (pt declined due to nausea at this time.)

## 2024-07-22 NOTE — PROGRESS NOTES
[facility] Medicine  Progress Note    Patient Name: Nimco Caro  MRN: [id number]  Patient Class: IP- Inpatient   Admission Date: 7/19/2024  Length of Stay: 3 days  Attending Physician: Leno Gaspar MD  Primary Care Provider: Katy Mason MD        Subjective:     Principal Problem:Closed fracture of left hip        HPI:  Nimco Caro is a 63-year-old female who presents emergency room complaining of left hip pain.  She reports she became dizzy and experienced a mechanical fall at her residence.  She denies head trauma or loss of consciousness.  She denies any cervical pain.  She is not on any anticoagulants.  She describes the hip pain as intense.  She rates the pain at 10/10 at worst.  The pain is worse with any movement of the left lower leg.  No alleviating factors.  No recent fever or chills.  No known sick contacts or travel.  Previous medical history of hyponatremia, GERD, hyperlipidemia, osteoarthritis, active smoker, COPD, bipolar disease.  ER workup: CBC unremarkable.  CMP with hyponatremia of 125 and alk-phos elevated 136.  Urinalysis pending at the time my exam.  EKG demonstrated sinus rhythm without ST or T-wave elevation.  Chest x-ray was unremarkable.  ER physician spoke with  with orthopedics who will see patient in consultation.  Patient will be admitted to Hospital Medicine for treatment and management.  Hyponatremia labs pending.    Overview/Hospital Course:  63F admitted for displaced left femoral neck fracture after a fall. Has acute on chronic hyponatremia on multiple psych meds. Sodium trended and stabilized. Ortho consulted and performed left hip repair 7/20. PT/OT consulted.    Interval History: Patient seen and examined. BRIE. Expresses some hesitancy for home discharge plan now as the only person who will be helping her is over 80 years old. Interested in SNF if insurance will cover.      Objective:     Vital Signs (Most  Recent):  Temp: 98.1 °F (36.7 °C) (07/22/24 1111)  Pulse: 82 (07/22/24 1111)  Resp: 18 (07/22/24 1111)  BP: (!) 108/54 (07/22/24 1111)  SpO2: 95 % (07/22/24 1111) Vital Signs (24h Range):  Temp:  [98.1 °F (36.7 °C)-99.9 °F (37.7 °C)] 98.1 °F (36.7 °C)  Pulse:  [80-96] 82  Resp:  [16-18] 18  SpO2:  [92 %-100 %] 95 %  BP: ()/(54-60) 108/54     Weight: 79.2 kg (174 lb 9.7 oz)  Body mass index is 27.35 kg/m².    Intake/Output Summary (Last 24 hours) at 7/22/2024 1414  Last data filed at 7/22/2024 0800  Gross per 24 hour   Intake 720 ml   Output 1850 ml   Net -1130 ml         Physical Exam  Vitals reviewed.   Constitutional:       General: She is not in acute distress.  HENT:      Head: Normocephalic and atraumatic.   Cardiovascular:      Rate and Rhythm: Normal rate and regular rhythm.   Pulmonary:      Effort: Pulmonary effort is normal. No respiratory distress.      Breath sounds: Normal breath sounds.   Musculoskeletal:      Comments: LLE NVI  L hip dressed   Neurological:      General: No focal deficit present.      Mental Status: She is alert and oriented to person, place, and time. Mental status is at baseline.   Psychiatric:         Mood and Affect: Affect normal.         Behavior: Behavior normal.         Thought Content: Thought content normal.             Significant Labs: All pertinent labs within the past 24 hours have been reviewed.    Significant Imaging: I have reviewed all pertinent imaging results/findings within the past 24 hours.    Assessment/Plan:      * Closed fracture of left hip  Now s/p repair 7/20  - postop care  - IS  - PT/OT  - DVT ppx with asa BID per ortho  - follow postop h/h  - dispo planning. Unsafe for discharge to home currently but may improve. Looking into SNF possibility    Chronic respiratory failure with hypoxia  Wears 3L at home    Acute blood loss anemia  Patient's anemia is currently worsening. Has not received any PRBCs to date. Etiology likely d/t acute blood loss which  was from hip surgery  Current CBC reviewed-   Lab Results   Component Value Date    HGB 9.9 (L) 07/22/2024    HCT 31.3 (L) 07/22/2024     Monitor serial CBC and transfuse if patient becomes hemodynamically unstable, symptomatic or H/H drops below 7/21.    Hyponatremia  Acute on chronic. Improving  - monitor daily  - continue chronic psych meds    Bipolar 1 disorder  Chronic problem, appears stable  - continue her chronic meds    Tobacco dependence due to cigarettes  -encourage cessation  -Nicotine replacement prescribed    Hypertension  Chronic, controlled. Latest blood pressure and vitals reviewed-     Temp:  [98.1 °F (36.7 °C)-99.9 °F (37.7 °C)]   Pulse:  [80-96]   Resp:  [16-18]   BP: ()/(54-60)   SpO2:  [92 %-100 %] .   Home meds for hypertension were reviewed and noted below.   Hypertension Medications               atenoloL (TENORMIN) 25 MG tablet Take 25 mg by mouth once daily.          While in the hospital, will manage blood pressure as follows; Continue home antihypertensive regimen      VTE Risk Mitigation (From admission, onward)           Ordered     IP VTE LOW RISK PATIENT  Once         07/20/24 1110     Place sequential compression device  Until discontinued         07/19/24 1908     Place REMINGTON hose  Until discontinued         07/19/24 1908                    Discharge Planning   JOSE: 7/23/2024     Code Status: Full Code   Is the patient medically ready for discharge?:     Reason for patient still in hospital (select all that apply): Patient trending condition and Pending disposition  Discharge Plan A: Home Health                  Leno Gaspar MD  Department of Hospital Medicine   [facility]/Surg

## 2024-07-22 NOTE — PLAN OF CARE
Per provider pt interested in SNF placement if insurance will cover.  Referrals sent to [facility].  Awaiting response.       07/22/24 1107   Post-Acute Status   Post-Acute Authorization Placement   Post-Acute Placement Status Referrals Sent     1530- extended out referrals 100 miles.  Also sent referrals to Rehab.  Awaiting response.

## 2024-07-22 NOTE — PT/OT/SLP PROGRESS
Occupational Therapy   Treatment    Name: Nimco Caro  MRN: [id number]  Admitting Diagnosis:  Closed fracture of left hip  2 Days Post-Op    Recommendations:     Discharge Recommendations: Moderate Intensity Therapy  Discharge Equipment Recommendations:  none  Barriers to discharge:  Decreased caregiver support; limited mobility; requires assistance with ADLs    Assessment:     Nimco Caro is a 63 y.o. female with a medical diagnosis of Closed fracture of left hip.  She presents with performance deficits affecting function are weakness, impaired endurance, impaired self care skills, impaired balance, gait instability, impaired functional mobility, decreased safety awareness, decreased lower extremity function, decreased upper extremity function and orthopedic precautions.     Rehab Prognosis:  Good; patient would benefit from acute skilled OT services to address these deficits and reach maximum level of function.       Plan:     Patient to be seen 5 x/week to address the above listed problems via self-care/home management, therapeutic activities, therapeutic exercises  Plan of Care Expires: 08/21/24  Plan of Care Reviewed with: patient    Subjective     Chief Complaint: Left hip pain  Patient/Family Comments/goals: Pain relief  Pain/Comfort:  Pain Rating 1: 8/10  Location - Side 1: Left  Location 1: hip  Pain Addressed 1: Nurse notified    Objective:     Communicated with: nurse prior to session.  Patient found  seated on BSC with nurse present  with oxygen upon OT entry to room.    General Precautions: Standard, fall    Orthopedic Precautions:LLE weight bearing as tolerated, LLE posterior precautions  Braces: Hip abduction brace  Respiratory Status: Nasal cannula, flow 3 L/min     Occupational Performance:     Functional Mobility/Transfers:  Patient completed Sit <> Stand Transfer with moderate assistance with rolling walker   Patient completed Bedside commode > Chair Transfer using Step Transfer technique with  moderate assistance with rolling walker  Functional Mobility: bedside commode > chair using a RW with mod A     Activities of Daily Living:  Grooming: Pt completed hand hygiene and washed her face with set up A/stand by assistance seated in chair.  Lower Body Dressing: total assistance to don socks  Toileting: maximal assistance       Treatment & Education:  Educated on the importance of OOB mobility within safe range in order to decrease adverse effects of prolonged bedrest.  Educated on safety with functional mobility; hand placement to ensure safe transfers to various surfaces in prep for ADLs  Educated on performing functional mobility and ADL's in adherence to orthopedic precautions.  Therapist provided facilitation and instruction of proper body mechanics, energy conservation and fall prevention strategies during tasks listed above.  Pt performed B UE resistive exercises using 2# bar x 3 sets of 10 reps shoulder press, chest press and bicep curls with rest breaks taken between each set.      Patient left up in chair with all lines intact, call button in reach, chair alarm on and nurse notified.    GOALS:   Multidisciplinary Problems       Occupational Therapy Goals          Problem: Occupational Therapy    Goal Priority Disciplines Outcome Interventions   Occupational Therapy Goal     OT, PT/OT     Description: Goals to be met by: 8/21/2024     Patient will increase functional independence with ADLs by performing:    UE Dressing with Supervision.  LE Dressing with Supervision.  Grooming while standing at sink with Supervision.  Toileting from toilet with Supervision for hygiene and clothing management.   Toilet transfer to toilet with Supervision.  Perform 30 minutes sitting/standing ADL activity while maintaining left posterior hip precautions.                         Time Tracking:     OT Date of Treatment: 07/22/24  OT Start Time: 1017  OT Stop Time: 1055  OT Total Time (min): 38 min    Billable  Minutes:Self Care/Home Management 15  Therapeutic Exercise 23 7/22/2024

## 2024-07-22 NOTE — ASSESSMENT & PLAN NOTE
Chronic, controlled. Latest blood pressure and vitals reviewed-     Temp:  [98.1 °F (36.7 °C)-99.9 °F (37.7 °C)]   Pulse:  [80-96]   Resp:  [16-18]   BP: ()/(54-60)   SpO2:  [92 %-100 %] .   Home meds for hypertension were reviewed and noted below.   Hypertension Medications               atenoloL (TENORMIN) 25 MG tablet Take 25 mg by mouth once daily.          While in the hospital, will manage blood pressure as follows; Continue home antihypertensive regimen

## 2024-07-22 NOTE — ASSESSMENT & PLAN NOTE
Now s/p repair 7/20  - postop care  - IS  - PT/OT  - DVT ppx with asa BID per ortho  - follow postop h/h  - dispo planning. Unsafe for discharge to home currently but may improve. Looking into SNF possibility

## 2024-07-22 NOTE — SUBJECTIVE & OBJECTIVE
Interval History: Patient seen and examined. RILEY Expresses some hesitancy for home discharge plan now as the only person who will be helping her is over 80 years old. Interested in SNF if insurance will cover.      Objective:     Vital Signs (Most Recent):  Temp: 98.1 °F (36.7 °C) (07/22/24 1111)  Pulse: 82 (07/22/24 1111)  Resp: 18 (07/22/24 1111)  BP: (!) 108/54 (07/22/24 1111)  SpO2: 95 % (07/22/24 1111) Vital Signs (24h Range):  Temp:  [98.1 °F (36.7 °C)-99.9 °F (37.7 °C)] 98.1 °F (36.7 °C)  Pulse:  [80-96] 82  Resp:  [16-18] 18  SpO2:  [92 %-100 %] 95 %  BP: ()/(54-60) 108/54     Weight: 79.2 kg (174 lb 9.7 oz)  Body mass index is 27.35 kg/m².    Intake/Output Summary (Last 24 hours) at 7/22/2024 1414  Last data filed at 7/22/2024 0800  Gross per 24 hour   Intake 720 ml   Output 1850 ml   Net -1130 ml         Physical Exam  Vitals reviewed.   Constitutional:       General: She is not in acute distress.  HENT:      Head: Normocephalic and atraumatic.   Cardiovascular:      Rate and Rhythm: Normal rate and regular rhythm.   Pulmonary:      Effort: Pulmonary effort is normal. No respiratory distress.      Breath sounds: Normal breath sounds.   Musculoskeletal:      Comments: LLE NVI  L hip dressed   Neurological:      General: No focal deficit present.      Mental Status: She is alert and oriented to person, place, and time. Mental status is at baseline.   Psychiatric:         Mood and Affect: Affect normal.         Behavior: Behavior normal.         Thought Content: Thought content normal.             Significant Labs: All pertinent labs within the past 24 hours have been reviewed.    Significant Imaging: I have reviewed all pertinent imaging results/findings within the past 24 hours.

## 2024-07-22 NOTE — PT/OT/SLP PROGRESS
Physical Therapy Treatment    Patient Name:  Nimco Caro   MRN:  [id number]    Recommendations:     Discharge Recommendations:  (Low vs. Moderate Intensity Therapy (patient's goal is to D/C to home))  Discharge Equipment Recommendations: none  Barriers to discharge:  Increased caregiver burden    Assessment:     Nimco Caro is a 63 y.o. female admitted with a medical diagnosis of Closed fracture of left hip.  She presents with the following impairments/functional limitations: weakness, impaired endurance, impaired self care skills, impaired functional mobility, gait instability, impaired balance, decreased lower extremity function, pain, orthopedic precautions . Pt up in bed side chair requesting to return to bed. Pt c/o buttock pain from what she explained as an uncomfortable chair. Pt t/f back to bed performing a stand pivot min a with rw, cues for sequencing and O2 line management. Sit to supine t/f performed requiring min a for LE management. Pt c/o nausea, pt given emesis back and RN notified.     Rehab Prognosis: Fair; patient would benefit from acute skilled PT services to address these deficits and reach maximum level of function.    Recent Surgery: Procedure(s) (LRB):  HEMIARTHROPLASTY, HIP (Left) 2 Days Post-Op    Plan:     During this hospitalization, patient to be seen BID to address the identified rehab impairments via gait training, therapeutic activities, therapeutic exercises and progress toward the following goals:    Plan of Care Expires:  08/15/24    Subjective     Chief Complaint: Nausea  Patient/Family Comments/goals: Pt agreeable to t/f back to bed.   Pain/Comfort:  Pain Rating 1: other (see comments) (dnq)  Location - Side 1: Left  Location - Orientation 1: generalized  Location 1: hip  Pain Addressed 1: Nurse notified, Cessation of Activity  Pain Rating Post-Intervention 1: other (see comments)      Objective:     Communicated with RN prior to session.  Patient found up in chair with  oxygen, peripheral IV, telemetry upon PT entry to room.     General Precautions: Standard, fall  Orthopedic Precautions: LLE weight bearing as tolerated, LLE posterior precautions  Braces: N/A  Respiratory Status: Nasal cannula, flow 3 L/min     Functional Mobility:  Bed Mobility:     Sit to Supine: minimum assistance  Transfers:     Chair to bed: minimum assistance with  rolling walker  using  Stand Pivot      AM-PAC 6 CLICK MOBILITY  Turning over in bed (including adjusting bedclothes, sheets and blankets)?: 3  Sitting down on and standing up from a chair with arms (e.g., wheelchair, bedside commode, etc.): 2  Moving from lying on back to sitting on the side of the bed?: 3  Moving to and from a bed to a chair (including a wheelchair)?: 3  Need to walk in hospital room?: 3  Climbing 3-5 steps with a railing?: 1  Basic Mobility Total Score: 15       Treatment & Education:  Pt was educated on the following: call light use, importance of OOB activity and functional mobility to negate the negative effects of prolonged bed rest during this hospitalization, safe transfers/ambulation and discharge planning recommendations/options.     Patient left HOB elevated with all lines intact, call button in reach, bed alarm on, and RN notified..    GOALS:   Multidisciplinary Problems       Physical Therapy Goals          Problem: Physical Therapy    Goal Priority Disciplines Outcome Goal Variances Interventions   Physical Therapy Goal     PT, PT/OT Progressing     Description: Goals to be met by: 8/15/2024     Patient will increase functional independence with mobility by performin). Supine to sit with Modified [place]  2). Sit to supine with Modified [place]  3). Sit to stand transfer with Modified [place]  4). Gait  x > 25 feet with Modified [place] using Rolling Walker.                          Time Tracking:     PT Received On: 24  PT Start Time: 1338     PT Stop Time: 1352  PT Total Time (min):  14 min     Billable Minutes: Therapeutic Activity 14    Treatment Type: Treatment  PT/PTA: PTA     Number of PTA visits since last PT visit: 2     07/22/2024

## 2024-07-22 NOTE — PT/OT/SLP PROGRESS
"Physical Therapy Treatment    Patient Name:  Nimco Caro   MRN:  [id number]    Recommendations:     Discharge Recommendations:  (Low vs. Moderate Intensity Therapy (patient's goal is to D/C to home))  Discharge Equipment Recommendations: none  Barriers to discharge: Decreased caregiver support    Assessment:     Nimco Caro is a 63 y.o. female admitted with a medical diagnosis of Closed fracture of left hip.  She presents with the following impairments/functional limitations: weakness, impaired endurance, impaired self care skills, impaired functional mobility, gait instability, impaired balance, decreased lower extremity function, decreased safety awareness, pain, orthopedic precautions . Pt with HOB elevated and agreeable to PT. Re educated pt on orthopedic precautions with a verbal understanding with pt. Pt mobilized to eob min a requiring cues for sequencing. Once seated eob, PTA demonstrated step sequencing to offload pt's painful LE. Pt completed gait trial with a slowed winter, requiring extended time for ambulation CGA/Min a with RW and chair follow for safety. Upon returning to the room the pt requested to be left up on bed side commode to urinate. Pt left up on bsc with RN and OT notified.     Rehab Prognosis: Fair; patient would benefit from acute skilled PT services to address these deficits and reach maximum level of function.    Recent Surgery: Procedure(s) (LRB):  HEMIARTHROPLASTY, HIP (Left) 2 Days Post-Op    Plan:     During this hospitalization, patient to be seen BID to address the identified rehab impairments via gait training, therapeutic activities, therapeutic exercises and progress toward the following goals:    Plan of Care Expires:  08/15/24    Subjective     Chief Complaint: L hip pain  Patient/Family Comments/goals: Pt c/o her crowe being taken out and nobody telling her she had to use a bsc. Also c/o "shitty walker" while ambulating. Pt stated "if anything goes wrong I will ronel this " "place" multiple times throughout tx.   Pain/Comfort:  Pain Rating 1: 8/10  Location - Side 1: Left  Location - Orientation 1: generalized  Location 1: hip  Pain Addressed 1: Nurse notified, Cessation of Activity  Pain Rating Post-Intervention 1: 8/10      Objective:     Communicated with RN prior to session.  Patient found HOB elevated with bed alarm, oxygen, peripheral IV, telemetry, hip abduction pillow upon PT entry to room.     General Precautions: Standard, fall  Orthopedic Precautions: LLE weight bearing as tolerated, LLE posterior precautions  Braces: N/A  Respiratory Status: Nasal cannula, flow 3 L/min     Functional Mobility:  Bed Mobility:     Supine to Sit: moderate assistance  Transfers:     Sit to Stand:  moderate assistance with rolling walker  Gait: 36' CGA/min a with rw      AM-PAC 6 CLICK MOBILITY  Turning over in bed (including adjusting bedclothes, sheets and blankets)?: 3  Sitting down on and standing up from a chair with arms (e.g., wheelchair, bedside commode, etc.): 2  Moving from lying on back to sitting on the side of the bed?: 3  Moving to and from a bed to a chair (including a wheelchair)?: 3  Need to walk in hospital room?: 3  Climbing 3-5 steps with a railing?: 1  Basic Mobility Total Score: 15       Treatment & Education:  Pt was educated on the following: call light use, importance of OOB activity and functional mobility to negate the negative effects of prolonged bed rest during this hospitalization, safe transfers/ambulation and discharge planning recommendations/options.     Patient left  on BSC  with all lines intact, call button in reach, and RN notified..    GOALS:   Multidisciplinary Problems       Physical Therapy Goals          Problem: Physical Therapy    Goal Priority Disciplines Outcome Goal Variances Interventions   Physical Therapy Goal     PT, PT/OT Progressing     Description: Goals to be met by: 8/15/2024     Patient will increase functional independence with mobility " by performin). Supine to sit with Modified [place]  2). Sit to supine with Modified [place]  3). Sit to stand transfer with Modified [place]  4). Gait  x > 25 feet with Modified [place] using Rolling Walker.                          Time Tracking:     PT Received On: 24  PT Start Time: 912     PT Stop Time: 1005  PT Total Time (min): 53 min     Billable Minutes: Gait Training 38 and Therapeutic Activity 15    Treatment Type: Treatment  PT/PTA: PTA     Number of PTA visits since last PT visit: 2024

## 2024-07-23 PROBLEM — R82.90 URINE MALODOR: Status: ACTIVE | Noted: 2024-07-23

## 2024-07-23 LAB
ANION GAP SERPL CALC-SCNC: 10 MMOL/L (ref 8–16)
BACTERIA #/AREA URNS HPF: ABNORMAL /HPF
BILIRUB UR QL STRIP: NEGATIVE
BUN SERPL-MCNC: 7 MG/DL (ref 8–23)
CALCIUM SERPL-MCNC: 8.9 MG/DL (ref 8.7–10.5)
CHLORIDE SERPL-SCNC: 100 MMOL/L (ref 95–110)
CLARITY UR: ABNORMAL
CO2 SERPL-SCNC: 23 MMOL/L (ref 23–29)
COLOR UR: YELLOW
CREAT SERPL-MCNC: 0.6 MG/DL (ref 0.5–1.4)
ERYTHROCYTE [DISTWIDTH] IN BLOOD BY AUTOMATED COUNT: 18.3 % (ref 11.5–14.5)
EST. GFR  (NO RACE VARIABLE): >60 ML/MIN/1.73 M^2
GLUCOSE SERPL-MCNC: 118 MG/DL (ref 70–110)
GLUCOSE UR QL STRIP: NEGATIVE
HCT VFR BLD AUTO: 28.1 % (ref 37–48.5)
HGB BLD-MCNC: 8.9 G/DL (ref 12–16)
HGB UR QL STRIP: NEGATIVE
KETONES UR QL STRIP: NEGATIVE
LEUKOCYTE ESTERASE UR QL STRIP: ABNORMAL
MCH RBC QN AUTO: 28.3 PG (ref 27–31)
MCHC RBC AUTO-ENTMCNC: 31.7 G/DL (ref 32–36)
MCV RBC AUTO: 89 FL (ref 82–98)
MICROSCOPIC COMMENT: ABNORMAL
NITRITE UR QL STRIP: NEGATIVE
OHS QRS DURATION: 84 MS
OHS QTC CALCULATION: 460 MS
PH UR STRIP: 8 [PH] (ref 5–8)
PLATELET # BLD AUTO: 205 K/UL (ref 150–450)
PMV BLD AUTO: 9.2 FL (ref 9.2–12.9)
POTASSIUM SERPL-SCNC: 4.2 MMOL/L (ref 3.5–5.1)
PROT UR QL STRIP: NEGATIVE
RBC # BLD AUTO: 3.15 M/UL (ref 4–5.4)
RBC #/AREA URNS HPF: 1 /HPF (ref 0–4)
SODIUM SERPL-SCNC: 133 MMOL/L (ref 136–145)
SP GR UR STRIP: 1.01 (ref 1–1.03)
SQUAMOUS #/AREA URNS HPF: 2 /HPF
URN SPEC COLLECT METH UR: ABNORMAL
UROBILINOGEN UR STRIP-ACNC: NEGATIVE EU/DL
WBC # BLD AUTO: 10.1 K/UL (ref 3.9–12.7)
WBC #/AREA URNS HPF: 7 /HPF (ref 0–5)

## 2024-07-23 PROCEDURE — 25000003 PHARM REV CODE 250: Performed by: ORTHOPAEDIC SURGERY

## 2024-07-23 PROCEDURE — 97110 THERAPEUTIC EXERCISES: CPT

## 2024-07-23 PROCEDURE — 25000242 PHARM REV CODE 250 ALT 637 W/ HCPCS: Performed by: ORTHOPAEDIC SURGERY

## 2024-07-23 PROCEDURE — 87086 URINE CULTURE/COLONY COUNT: CPT

## 2024-07-23 PROCEDURE — 85027 COMPLETE CBC AUTOMATED: CPT | Performed by: ORTHOPAEDIC SURGERY

## 2024-07-23 PROCEDURE — 80048 BASIC METABOLIC PNL TOTAL CA: CPT | Performed by: ORTHOPAEDIC SURGERY

## 2024-07-23 PROCEDURE — 97116 GAIT TRAINING THERAPY: CPT

## 2024-07-23 PROCEDURE — 81000 URINALYSIS NONAUTO W/SCOPE: CPT | Performed by: STUDENT IN AN ORGANIZED HEALTH CARE EDUCATION/TRAINING PROGRAM

## 2024-07-23 PROCEDURE — 94761 N-INVAS EAR/PLS OXIMETRY MLT: CPT

## 2024-07-23 PROCEDURE — 25000003 PHARM REV CODE 250: Performed by: NURSE PRACTITIONER

## 2024-07-23 PROCEDURE — 87186 SC STD MICRODIL/AGAR DIL: CPT

## 2024-07-23 PROCEDURE — 94799 UNLISTED PULMONARY SVC/PX: CPT | Mod: XB

## 2024-07-23 PROCEDURE — 27000221 HC OXYGEN, UP TO 24 HOURS

## 2024-07-23 PROCEDURE — 97535 SELF CARE MNGMENT TRAINING: CPT

## 2024-07-23 PROCEDURE — 94640 AIRWAY INHALATION TREATMENT: CPT

## 2024-07-23 PROCEDURE — 11000001 HC ACUTE MED/SURG PRIVATE ROOM

## 2024-07-23 PROCEDURE — 25000003 PHARM REV CODE 250: Performed by: STUDENT IN AN ORGANIZED HEALTH CARE EDUCATION/TRAINING PROGRAM

## 2024-07-23 PROCEDURE — 97530 THERAPEUTIC ACTIVITIES: CPT

## 2024-07-23 PROCEDURE — 99900035 HC TECH TIME PER 15 MIN (STAT)

## 2024-07-23 PROCEDURE — 36415 COLL VENOUS BLD VENIPUNCTURE: CPT | Performed by: ORTHOPAEDIC SURGERY

## 2024-07-23 RX ORDER — POLYETHYLENE GLYCOL 3350 17 G/17G
17 POWDER, FOR SOLUTION ORAL DAILY
Status: DISCONTINUED | OUTPATIENT
Start: 2024-07-23 | End: 2024-07-24 | Stop reason: HOSPADM

## 2024-07-23 RX ADMIN — DICYCLOMINE HYDROCHLORIDE 10 MG: 10 CAPSULE ORAL at 08:07

## 2024-07-23 RX ADMIN — DICYCLOMINE HYDROCHLORIDE 10 MG: 10 CAPSULE ORAL at 09:07

## 2024-07-23 RX ADMIN — SUCRALFATE 1 G: 1 TABLET ORAL at 05:07

## 2024-07-23 RX ADMIN — MUPIROCIN 1 G: 20 OINTMENT TOPICAL at 08:07

## 2024-07-23 RX ADMIN — HYPROMELLOSE 2910 2 DROP: 5 SOLUTION/ DROPS OPHTHALMIC at 01:07

## 2024-07-23 RX ADMIN — PRAVASTATIN SODIUM 20 MG: 10 TABLET ORAL at 08:07

## 2024-07-23 RX ADMIN — ASPIRIN 81 MG CHEWABLE TABLET 81 MG: 81 TABLET CHEWABLE at 08:07

## 2024-07-23 RX ADMIN — METHOCARBAMOL 500 MG: 500 TABLET ORAL at 08:07

## 2024-07-23 RX ADMIN — SUCRALFATE 1 G: 1 TABLET ORAL at 01:07

## 2024-07-23 RX ADMIN — BUDESONIDE INHALATION 0.5 MG: 0.5 SUSPENSION RESPIRATORY (INHALATION) at 07:07

## 2024-07-23 RX ADMIN — TOPIRAMATE 50 MG: 25 TABLET, FILM COATED ORAL at 09:07

## 2024-07-23 RX ADMIN — HYPROMELLOSE 2910 2 DROP: 5 SOLUTION/ DROPS OPHTHALMIC at 05:07

## 2024-07-23 RX ADMIN — OXYCODONE AND ACETAMINOPHEN 1 TABLET: 10; 325 TABLET ORAL at 09:07

## 2024-07-23 RX ADMIN — FAMOTIDINE 20 MG: 20 TABLET, FILM COATED ORAL at 09:07

## 2024-07-23 RX ADMIN — ASPIRIN 81 MG CHEWABLE TABLET 81 MG: 81 TABLET CHEWABLE at 09:07

## 2024-07-23 RX ADMIN — CLONAZEPAM 2 MG: 0.5 TABLET ORAL at 08:07

## 2024-07-23 RX ADMIN — GABAPENTIN 800 MG: 400 CAPSULE ORAL at 08:07

## 2024-07-23 RX ADMIN — ARFORMOTEROL TARTRATE 15 MCG: 15 SOLUTION RESPIRATORY (INHALATION) at 07:07

## 2024-07-23 RX ADMIN — ACETAMINOPHEN 650 MG: 325 TABLET ORAL at 08:07

## 2024-07-23 RX ADMIN — FAMOTIDINE 20 MG: 20 TABLET, FILM COATED ORAL at 08:07

## 2024-07-23 RX ADMIN — SUCRALFATE 1 G: 1 TABLET ORAL at 09:07

## 2024-07-23 RX ADMIN — ATENOLOL 25 MG: 25 TABLET ORAL at 09:07

## 2024-07-23 RX ADMIN — OXYBUTYNIN CHLORIDE 5 MG: 5 TABLET, EXTENDED RELEASE ORAL at 09:07

## 2024-07-23 RX ADMIN — DOCUSATE SODIUM 100 MG: 100 CAPSULE, LIQUID FILLED ORAL at 09:07

## 2024-07-23 RX ADMIN — FLUTICASONE PROPIONATE 50 MCG: 50 SPRAY, METERED NASAL at 01:07

## 2024-07-23 RX ADMIN — DONEPEZIL HYDROCHLORIDE 10 MG: 5 TABLET, FILM COATED ORAL at 09:07

## 2024-07-23 RX ADMIN — OXCARBAZEPINE 300 MG: 150 TABLET, FILM COATED ORAL at 09:07

## 2024-07-23 RX ADMIN — MUPIROCIN 1 G: 20 OINTMENT TOPICAL at 09:07

## 2024-07-23 RX ADMIN — CLONAZEPAM 2 MG: 0.5 TABLET ORAL at 09:07

## 2024-07-23 RX ADMIN — GABAPENTIN 800 MG: 400 CAPSULE ORAL at 05:07

## 2024-07-23 RX ADMIN — TRAZODONE HYDROCHLORIDE 100 MG: 50 TABLET ORAL at 08:07

## 2024-07-23 RX ADMIN — HYPROMELLOSE 2910 2 DROP: 5 SOLUTION/ DROPS OPHTHALMIC at 08:07

## 2024-07-23 RX ADMIN — POLYETHYLENE GLYCOL (3350) 17 G: 17 POWDER, FOR SOLUTION ORAL at 08:07

## 2024-07-23 RX ADMIN — OXYCODONE AND ACETAMINOPHEN 1 TABLET: 10; 325 TABLET ORAL at 08:07

## 2024-07-23 RX ADMIN — SUCRALFATE 1 G: 1 TABLET ORAL at 08:07

## 2024-07-23 RX ADMIN — GABAPENTIN 800 MG: 400 CAPSULE ORAL at 09:07

## 2024-07-23 RX ADMIN — MELATONIN TAB 3 MG 9 MG: 3 TAB at 08:07

## 2024-07-23 NOTE — CARE UPDATE
07/22/24 1957   Patient Assessment/Suction   Level of Consciousness (AVPU) alert   Respiratory Effort Normal;Unlabored   Expansion/Accessory Muscles/Retractions expansion symmetric;no retractions;no use of accessory muscles   All Lung Fields Breath Sounds diminished   Cough Frequency no cough   PRE-TX-O2   Device (Oxygen Therapy) nasal cannula   Flow (L/min) (Oxygen Therapy) 3   Oxygen Concentration (%) 32   SpO2 96 %   Pulse Oximetry Type Intermittent   Pulse 88   Resp 20   Aerosol Therapy   $ Aerosol Therapy Charges Aerosol Treatment   Respiratory Treatment Status (SVN) given   Treatment Route (SVN) mask;oxygen   Patient Position HOB elevated   Signs of Intolerance (SVN) none   Breath Sounds Post-Respiratory Treatment   Throughout All Fields Post-Treatment All Fields   Throughout All Fields Post-Treatment no change   Post-treatment Heart Rate (beats/min) 89   Post-treatment Resp Rate (breaths/min) 18   Incentive Spirometer   $ Incentive Spirometer Charges done with encouragement   Administration (IS) proper technique demonstrated   Number of Repetitions (IS) 5   Level Incentive Spirometer (mL) 1250   Patient Tolerance (IS) good   Ready to Wean/Extubation Screen   FIO2<=50 (chart decimal) 0.32

## 2024-07-23 NOTE — PLAN OF CARE
Plan of care reviewed with patient, verbalized understanding. Continuous cardiac monitoring in place. IV intact and patent with no s/s infection or infiltration noted to insertion site. Meds given per MAR. Up to BSC with x2 assist. Pain managed with PRN medication. 3L O2 NC in place. IS at bedside and encouraged use. NAD noted. Purposeful q2hr rounding done. Safety maintained with side rails up x3, bed wheels locked, bed in lowest position, bed alarm set, slip resistant socks maintained, and call light with in reach of patient. Patient educated to call for assistance when needed. Patient remains free of falls. No further needs expressed at this time. Will continue to monitor.    Problem: Infection  Goal: Absence of Infection Signs and Symptoms  Outcome: Progressing     Problem: Adult Inpatient Plan of Care  Goal: Plan of Care Review  Outcome: Progressing  Goal: Patient-Specific Goal (Individualized)  Outcome: Progressing  Goal: Absence of Hospital-Acquired Illness or Injury  Outcome: Progressing  Goal: Optimal Comfort and Wellbeing  Outcome: Progressing  Goal: Readiness for Transition of Care  Outcome: Progressing     Problem: Skin Injury Risk Increased  Goal: Skin Health and Integrity  Outcome: Progressing     Problem: Fall Injury Risk  Goal: Absence of Fall and Fall-Related Injury  Outcome: Progressing     Problem: Pain Acute  Goal: Optimal Pain Control and Function  Outcome: Progressing

## 2024-07-23 NOTE — PLAN OF CARE
Problem: Physical Therapy  Goal: Physical Therapy Goal  Description: Goals to be met by: 8/15/2024     Patient will increase functional independence with mobility by performin). Supine to sit with Modified [place]  2). Sit to supine with Modified [place]  3). Sit to stand transfer with Modified [place]  4). Gait  x > 25 feet with Modified [place] using Rolling Walker.     Outcome: Progressing   Pt ambulated 40ft x2 with RW min assist with standing rests. OOB chair. Mod intensity

## 2024-07-23 NOTE — PT/OT/SLP PROGRESS
Physical Therapy Treatment    Patient Name:  Nimco Caro   MRN:  [id number]    Recommendations:     Discharge Recommendations: Moderate Intensity Therapy  Discharge Equipment Recommendations: none  Barriers to discharge: Decreased caregiver support    Assessment:     Nimco Caro is a 63 y.o. female admitted with a medical diagnosis of Closed fracture of left hip.  She presents with the following impairments/functional limitations: weakness, impaired endurance, impaired self care skills, impaired functional mobility, gait instability, impaired balance, decreased lower extremity function, pain, orthopedic precautions .    Pt seen supine in bed- was up in chair earlier. Pt agreeable to ambulate and requiring min assist for mobility and to stand with extra time. Pt ambulated at hallways 40ft x2 with RW and O2 at 3 liters- slow winter/talkative. OOB chair post PT..    Rehab Prognosis: Fair; patient would benefit from acute skilled PT services to address these deficits and reach maximum level of function.    Recent Surgery: Procedure(s) (LRB):  HEMIARTHROPLASTY, HIP (Left) 3 Days Post-Op    Plan:     During this hospitalization, patient to be seen BID to address the identified rehab impairments via gait training, therapeutic activities, therapeutic exercises and progress toward the following goals:    Plan of Care Expires:  08/15/24    Subjective   Pt stated was up in chair and was uncomfortable  Chief Complaint: pain LH  Patient/Family Comments/goals: get well and go home  Pain/Comfort:  Pain Rating 1: 5/10  Location - Side 1: Left  Location 1: hip  Pain Addressed 1: Pre-medicate for activity, Reposition, Distraction, Nurse notified      Objective:     Communicated with nurse Chau prior to session.  Patient found HOB elevated with oxygen, telemetry upon PT entry to room.     General Precautions: Standard, fall  Orthopedic Precautions: LLE weight bearing as tolerated, LLE posterior precautions  Braces:  N/A  Respiratory Status: Nasal cannula, flow 3 L/min     Functional Mobility:  Bed Mobility:     Scooting: minimum assistance  Supine to Sit: minimum assistance and moderate assistance  Transfers:     Sit to Stand:  minimum assistance with rolling walker  Bed to Chair: minimum assistance with  rolling walker  using  Stand Pivot  Gait: 40ft x2 with RW slow winter with extra time. Distraction technique to increase gait distance      AM-PAC 6 CLICK MOBILITY  Turning over in bed (including adjusting bedclothes, sheets and blankets)?: 3  Sitting down on and standing up from a chair with arms (e.g., wheelchair, bedside commode, etc.): 3  Moving from lying on back to sitting on the side of the bed?: 3  Moving to and from a bed to a chair (including a wheelchair)?: 3  Need to walk in hospital room?: 3  Climbing 3-5 steps with a railing?: 1  Basic Mobility Total Score: 16       Treatment & Education:  Patient was educated on the importance of OOB activity and functional mobility to negate negative effects of prolonged bed rest during hospitalization, safe transfers and ambulation, and D/C planning   OOb chair for lunch. Allowed to sit on 2 pillows for increased support/comfort    Patient left up in chair with all lines intact, call button in reach, and chair alarm on..    GOALS:   Multidisciplinary Problems       Physical Therapy Goals          Problem: Physical Therapy    Goal Priority Disciplines Outcome Goal Variances Interventions   Physical Therapy Goal     PT, PT/OT Progressing     Description: Goals to be met by: 8/15/2024     Patient will increase functional independence with mobility by performin). Supine to sit with Modified [place]  2). Sit to supine with Modified [place]  3). Sit to stand transfer with Modified [place]  4). Gait  x > 25 feet with Modified [place] using Rolling Walker.                          Time Tracking:     PT Received On: 24  PT Start Time: 1056     PT Stop  Time: 1134  PT Total Time (min): 38 min     Billable Minutes: Gait Training 30 and Therapeutic Activity 8    Treatment Type: Treatment  PT/PTA: PT     Number of PTA visits since last PT visit: 0     07/23/2024

## 2024-07-23 NOTE — ASSESSMENT & PLAN NOTE
Now s/p repair 7/20  - postop care  - IS  - PT/OT  - DVT ppx with asa BID per ortho  - follow postop h/h  - dispo planning. Unsafe for discharge to home currently but may improve. Looking into SNF or rehab possibility pending her progress with PT/OT

## 2024-07-23 NOTE — PROGRESS NOTES
[facility] Medicine  Progress Note    Patient Name: Nimco Caro  MRN: [id number]  Patient Class: IP- Inpatient   Admission Date: 7/19/2024  Length of Stay: 4 days  Attending Physician: Leno Gaspar MD  Primary Care Provider: Katy Mason MD        Subjective:     Principal Problem:Closed fracture of left hip        HPI:  Nimco Caro is a 63-year-old female who presents emergency room complaining of left hip pain.  She reports she became dizzy and experienced a mechanical fall at her residence.  She denies head trauma or loss of consciousness.  She denies any cervical pain.  She is not on any anticoagulants.  She describes the hip pain as intense.  She rates the pain at 10/10 at worst.  The pain is worse with any movement of the left lower leg.  No alleviating factors.  No recent fever or chills.  No known sick contacts or travel.  Previous medical history of hyponatremia, GERD, hyperlipidemia, osteoarthritis, active smoker, COPD, bipolar disease.  ER workup: CBC unremarkable.  CMP with hyponatremia of 125 and alk-phos elevated 136.  Urinalysis pending at the time my exam.  EKG demonstrated sinus rhythm without ST or T-wave elevation.  Chest x-ray was unremarkable.  ER physician spoke with  with orthopedics who will see patient in consultation.  Patient will be admitted to Hospital Medicine for treatment and management.  Hyponatremia labs pending.    Overview/Hospital Course:  63F admitted for displaced left femoral neck fracture after a fall. Has acute on chronic hyponatremia on multiple psych meds. Sodium trended and stabilized. Ortho consulted and performed left hip repair 7/20. PT/OT consulted.    Interval History: Patient seen and examined. BRIE. Pain and function appears similar. Looking at dispo options. Nurse reports malodorous urine.       Objective:     Vital Signs (Most Recent):  Temp: 97.8 °F (36.6 °C) (07/23/24 0723)  Pulse: 77 (07/23/24  0723)  Resp: 17 (07/23/24 0951)  BP: (!) 101/51 (07/23/24 0723)  SpO2: 95 % (07/23/24 0723) Vital Signs (24h Range):  Temp:  [97.7 °F (36.5 °C)-98.3 °F (36.8 °C)] 97.8 °F (36.6 °C)  Pulse:  [73-88] 77  Resp:  [17-20] 17  SpO2:  [90 %-97 %] 95 %  BP: (101-132)/(51-60) 101/51     Weight: 79.1 kg (174 lb 6.1 oz)  Body mass index is 27.31 kg/m².    Intake/Output Summary (Last 24 hours) at 7/23/2024 1350  Last data filed at 7/23/2024 0030  Gross per 24 hour   Intake 360 ml   Output 250 ml   Net 110 ml         Physical Exam  Vitals reviewed.   Constitutional:       General: She is not in acute distress.  HENT:      Head: Normocephalic and atraumatic.   Cardiovascular:      Rate and Rhythm: Normal rate and regular rhythm.   Pulmonary:      Effort: Pulmonary effort is normal. No respiratory distress.      Breath sounds: Normal breath sounds.   Musculoskeletal:      Comments: LLE NVI  L hip dressed   Neurological:      General: No focal deficit present.      Mental Status: She is alert and oriented to person, place, and time. Mental status is at baseline.   Psychiatric:         Mood and Affect: Affect normal.         Behavior: Behavior normal.         Thought Content: Thought content normal.             Significant Labs: All pertinent labs within the past 24 hours have been reviewed.    Significant Imaging: I have reviewed all pertinent imaging results/findings within the past 24 hours.    Assessment/Plan:      * Closed fracture of left hip  Now s/p repair 7/20  - postop care  - IS  - PT/OT  - DVT ppx with asa BID per ortho  - follow postop h/h  - dispo planning. Unsafe for discharge to home currently but may improve. Looking into SNF or rehab possibility pending her progress with PT/OT    Urine malodor  - check UA    Chronic respiratory failure with hypoxia  Wears 3L at home    Acute blood loss anemia  Patient's anemia is currently worsening. Has not received any PRBCs to date. Etiology likely d/t acute blood loss which was  from hip surgery  Current CBC reviewed-   Lab Results   Component Value Date    HGB 8.9 (L) 07/23/2024    HCT 28.1 (L) 07/23/2024     Monitor serial CBC and transfuse if patient becomes hemodynamically unstable, symptomatic or H/H drops below 7/21.    Hyponatremia  Stable now  - monitor daily  - continue chronic psych meds    Bipolar 1 disorder  Chronic problem, appears stable  - continue her chronic meds    Tobacco dependence due to cigarettes  -encourage cessation  -Nicotine replacement prescribed    Hypertension  Chronic, controlled. Latest blood pressure and vitals reviewed-     Temp:  [97.7 °F (36.5 °C)-98.3 °F (36.8 °C)]   Pulse:  [73-88]   Resp:  [17-20]   BP: (101-132)/(51-60)   SpO2:  [90 %-97 %] .   Home meds for hypertension were reviewed and noted below.   Hypertension Medications               atenoloL (TENORMIN) 25 MG tablet Take 25 mg by mouth once daily.          While in the hospital, will manage blood pressure as follows; Continue home antihypertensive regimen      VTE Risk Mitigation (From admission, onward)           Ordered     IP VTE LOW RISK PATIENT  Once         07/20/24 1110     Place sequential compression device  Until discontinued         07/19/24 1908     Place REMINGTON hose  Until discontinued         07/19/24 1908                    Discharge Planning   JOSE: 7/24/2024     Code Status: Full Code   Is the patient medically ready for discharge?:     Reason for patient still in hospital (select all that apply): Patient trending condition, PT / OT recommendations, and Pending disposition  Discharge Plan A: Home Health                  Leno Gaspar MD  Department of Hospital Medicine   [facility]/Surg

## 2024-07-23 NOTE — RESPIRATORY THERAPY
Patient receiving aerosol tx via 0.5mg pulmicort follow by 15mcg Brovana now and bid.  Patient averaging 1500ml on IS.  Prn xopenex tx not required at the.

## 2024-07-23 NOTE — ASSESSMENT & PLAN NOTE
Chronic, controlled. Latest blood pressure and vitals reviewed-     Temp:  [97.7 °F (36.5 °C)-98.3 °F (36.8 °C)]   Pulse:  [73-88]   Resp:  [17-20]   BP: (101-132)/(51-60)   SpO2:  [90 %-97 %] .   Home meds for hypertension were reviewed and noted below.   Hypertension Medications               atenoloL (TENORMIN) 25 MG tablet Take 25 mg by mouth once daily.          While in the hospital, will manage blood pressure as follows; Continue home antihypertensive regimen

## 2024-07-23 NOTE — SUBJECTIVE & OBJECTIVE
Interval History: Patient seen and examined. NAEON. Pain and function appears similar. Looking at dispo options. Nurse reports malodorous urine.       Objective:     Vital Signs (Most Recent):  Temp: 97.8 °F (36.6 °C) (07/23/24 0723)  Pulse: 77 (07/23/24 0723)  Resp: 17 (07/23/24 0951)  BP: (!) 101/51 (07/23/24 0723)  SpO2: 95 % (07/23/24 0723) Vital Signs (24h Range):  Temp:  [97.7 °F (36.5 °C)-98.3 °F (36.8 °C)] 97.8 °F (36.6 °C)  Pulse:  [73-88] 77  Resp:  [17-20] 17  SpO2:  [90 %-97 %] 95 %  BP: (101-132)/(51-60) 101/51     Weight: 79.1 kg (174 lb 6.1 oz)  Body mass index is 27.31 kg/m².    Intake/Output Summary (Last 24 hours) at 7/23/2024 1350  Last data filed at 7/23/2024 0030  Gross per 24 hour   Intake 360 ml   Output 250 ml   Net 110 ml         Physical Exam  Vitals reviewed.   Constitutional:       General: She is not in acute distress.  HENT:      Head: Normocephalic and atraumatic.   Cardiovascular:      Rate and Rhythm: Normal rate and regular rhythm.   Pulmonary:      Effort: Pulmonary effort is normal. No respiratory distress.      Breath sounds: Normal breath sounds.   Musculoskeletal:      Comments: LLE NVI  L hip dressed   Neurological:      General: No focal deficit present.      Mental Status: She is alert and oriented to person, place, and time. Mental status is at baseline.   Psychiatric:         Mood and Affect: Affect normal.         Behavior: Behavior normal.         Thought Content: Thought content normal.             Significant Labs: All pertinent labs within the past 24 hours have been reviewed.    Significant Imaging: I have reviewed all pertinent imaging results/findings within the past 24 hours.

## 2024-07-23 NOTE — PT/OT/SLP PROGRESS
Occupational Therapy   Treatment    Name: Nimco Caro  MRN: [id number]  Admitting Diagnosis:  Closed fracture of left hip  3 Days Post-Op    Recommendations:     Discharge Recommendations: Moderate Intensity Therapy  Discharge Equipment Recommendations:  none  Barriers to discharge:  Decreased caregiver support    Assessment:     Nimco Caro is a 63 y.o. female with a medical diagnosis of Closed fracture of left hip. Patient agreed to participate in OT. Patient demonstrates progress and is able to complete toileting with mod A from max A on 7/22/2024. Pt put forth good effort. She presents with performance deficits affecting function are weakness, impaired endurance, impaired self care skills, impaired balance, gait instability, impaired functional mobility, decreased safety awareness, decreased lower extremity function, decreased upper extremity function and orthopedic precautions. Pt would benefit from further inpatient therapy services to improve her independence with ADLs and functional mobility to maximize return to prior level of function.     Rehab Prognosis:  Good; patient would benefit from acute skilled OT services to address these deficits and reach maximum level of function.       Plan:     Patient to be seen 5 x/week to address the above listed problems via self-care/home management, therapeutic activities, therapeutic exercises  Plan of Care Expires: 08/21/24  Plan of Care Reviewed with: patient    Subjective     Chief Complaint: Left hip pain with movement  Patient/Family Comments/goals: Pain relief and to return to PLOF  Pain/Comfort:  Pain Rating 1: 5/10  Location - Side 1: Left  Location 1: hip    Objective:     Communicated with: nurse prior to session.  Patient found HOB elevated with oxygen upon OT entry to room.    General Precautions: Standard, fall    Orthopedic Precautions:LLE weight bearing as tolerated, LLE posterior precautions  Braces: Hip abduction brace  Respiratory Status: Nasal  cannula, flow 3 L/min     Occupational Performance:     Bed Mobility:    Patient completed Scooting/Bridging with minimum assistance  Patient completed Supine to Sit with minimum assistance     Functional Mobility/Transfers:  Patient completed Sit <> Stand transfer with minimum assistance with rolling walker   Patient completed bed <> bedside Transfer using step transfer technique with moderate assistance with rolling walker  Patient completed bedside commode > chair step transfer technique with moderate assistance with rolling walker  Functional Mobility: 5 steps using a RW with min A    Activities of Daily Living:  Feeding:  independence  Grooming: Pt completed hand hygiene with set up A seated in chair.  Bathing: maximal assistance seated bed bath  Upper Body Dressing: minimum assistance to don gown  Lower Body Dressing: maximal assistance to don socks  Toileting: moderate assistance for clothing management      Treatment & Education:  OT educated patient on bedside commode transfer techniques using rolling walker.  OT ed patient on safety with walker use for functional mobility with cues for hand placement and sequencing.   Educated on safety with functional mobility; hand placement to ensure safe transfers to various surfaces in prep for ADLs  Educated on performing functional mobility and ADL's in adherence to orthopedic precautions.  Therapist provided facilitation and instruction of proper body mechanics, energy conservation and fall prevention strategies during tasks listed above.  Pt performed B UE resistive exercises using 2# bar x 3 sets of 10 reps shoulder press and chest press with rest breaks taken between each set.    Patient left up in chair with all lines intact, call button in reach and nurse notified.    GOALS:   Multidisciplinary Problems       Occupational Therapy Goals          Problem: Occupational Therapy    Goal Priority Disciplines Outcome Interventions   Occupational Therapy Goal     OT,  PT/OT     Description: Goals to be met by: 8/21/2024     Patient will increase functional independence with ADLs by performing:    UE Dressing with Supervision.  LE Dressing with Supervision.  Grooming while standing at sink with Supervision.  Toileting from toilet with Supervision for hygiene and clothing management.   Toilet transfer to toilet with Supervision.  Perform 30 minutes sitting/standing ADL activity while maintaining left posterior hip precautions.                         Time Tracking:     OT Date of Treatment: 07/23/24  OT Start Time: 0738  OT Stop Time: 0850  OT Total Time (min): 72 min    Billable Minutes:Self Care/Home Management 57  Therapeutic Exercise 15               7/23/2024

## 2024-07-23 NOTE — ASSESSMENT & PLAN NOTE
Patient's anemia is currently worsening. Has not received any PRBCs to date. Etiology likely d/t acute blood loss which was from hip surgery  Current CBC reviewed-   Lab Results   Component Value Date    HGB 8.9 (L) 07/23/2024    HCT 28.1 (L) 07/23/2024     Monitor serial CBC and transfuse if patient becomes hemodynamically unstable, symptomatic or H/H drops below 7/21.

## 2024-07-24 ENCOUNTER — TELEPHONE (OUTPATIENT)
Dept: ORTHOPEDICS | Facility: CLINIC | Age: 64
End: 2024-07-24
Payer: MEDICAID

## 2024-07-24 ENCOUNTER — CLINICAL SUPPORT (OUTPATIENT)
Dept: SMOKING CESSATION | Facility: CLINIC | Age: 64
End: 2024-07-24
Payer: COMMERCIAL

## 2024-07-24 VITALS
SYSTOLIC BLOOD PRESSURE: 137 MMHG | WEIGHT: 183.88 LBS | DIASTOLIC BLOOD PRESSURE: 60 MMHG | TEMPERATURE: 98 F | BODY MASS INDEX: 28.86 KG/M2 | RESPIRATION RATE: 18 BRPM | HEIGHT: 67 IN | HEART RATE: 83 BPM | OXYGEN SATURATION: 92 %

## 2024-07-24 DIAGNOSIS — F17.200 NICOTINE DEPENDENCE: Primary | ICD-10-CM

## 2024-07-24 PROBLEM — N39.0 UTI (URINARY TRACT INFECTION): Status: ACTIVE | Noted: 2024-07-23

## 2024-07-24 LAB
ANION GAP SERPL CALC-SCNC: 11 MMOL/L (ref 8–16)
BUN SERPL-MCNC: 9 MG/DL (ref 8–23)
CALCIUM SERPL-MCNC: 8.9 MG/DL (ref 8.7–10.5)
CHLORIDE SERPL-SCNC: 101 MMOL/L (ref 95–110)
CO2 SERPL-SCNC: 22 MMOL/L (ref 23–29)
CREAT SERPL-MCNC: 0.6 MG/DL (ref 0.5–1.4)
ERYTHROCYTE [DISTWIDTH] IN BLOOD BY AUTOMATED COUNT: 18 % (ref 11.5–14.5)
EST. GFR  (NO RACE VARIABLE): >60 ML/MIN/1.73 M^2
GLUCOSE SERPL-MCNC: 121 MG/DL (ref 70–110)
HCT VFR BLD AUTO: 26.5 % (ref 37–48.5)
HGB BLD-MCNC: 8.3 G/DL (ref 12–16)
MCH RBC QN AUTO: 28.1 PG (ref 27–31)
MCHC RBC AUTO-ENTMCNC: 31.3 G/DL (ref 32–36)
MCV RBC AUTO: 90 FL (ref 82–98)
PLATELET # BLD AUTO: 208 K/UL (ref 150–450)
PMV BLD AUTO: 9.6 FL (ref 9.2–12.9)
POTASSIUM SERPL-SCNC: 4.3 MMOL/L (ref 3.5–5.1)
RBC # BLD AUTO: 2.95 M/UL (ref 4–5.4)
SODIUM SERPL-SCNC: 134 MMOL/L (ref 136–145)
WBC # BLD AUTO: 8.56 K/UL (ref 3.9–12.7)

## 2024-07-24 PROCEDURE — 36415 COLL VENOUS BLD VENIPUNCTURE: CPT | Performed by: ORTHOPAEDIC SURGERY

## 2024-07-24 PROCEDURE — 25000003 PHARM REV CODE 250: Performed by: NURSE PRACTITIONER

## 2024-07-24 PROCEDURE — 27000221 HC OXYGEN, UP TO 24 HOURS

## 2024-07-24 PROCEDURE — 25000003 PHARM REV CODE 250: Performed by: ORTHOPAEDIC SURGERY

## 2024-07-24 PROCEDURE — 99222 1ST HOSP IP/OBS MODERATE 55: CPT | Mod: ,,, | Performed by: PHYSICAL MEDICINE & REHABILITATION

## 2024-07-24 PROCEDURE — 25000242 PHARM REV CODE 250 ALT 637 W/ HCPCS: Performed by: ORTHOPAEDIC SURGERY

## 2024-07-24 PROCEDURE — 80048 BASIC METABOLIC PNL TOTAL CA: CPT | Performed by: ORTHOPAEDIC SURGERY

## 2024-07-24 PROCEDURE — 25000003 PHARM REV CODE 250: Performed by: STUDENT IN AN ORGANIZED HEALTH CARE EDUCATION/TRAINING PROGRAM

## 2024-07-24 PROCEDURE — 94640 AIRWAY INHALATION TREATMENT: CPT

## 2024-07-24 PROCEDURE — 85027 COMPLETE CBC AUTOMATED: CPT | Performed by: ORTHOPAEDIC SURGERY

## 2024-07-24 PROCEDURE — 63600175 PHARM REV CODE 636 W HCPCS

## 2024-07-24 PROCEDURE — 94799 UNLISTED PULMONARY SVC/PX: CPT

## 2024-07-24 PROCEDURE — 25000003 PHARM REV CODE 250

## 2024-07-24 PROCEDURE — 97535 SELF CARE MNGMENT TRAINING: CPT

## 2024-07-24 PROCEDURE — 94761 N-INVAS EAR/PLS OXIMETRY MLT: CPT

## 2024-07-24 RX ORDER — NAPROXEN SODIUM 220 MG/1
81 TABLET, FILM COATED ORAL 2 TIMES DAILY
Start: 2024-07-24 | End: 2024-08-18

## 2024-07-24 RX ORDER — OXYCODONE AND ACETAMINOPHEN 5; 325 MG/1; MG/1
1 TABLET ORAL EVERY 6 HOURS PRN
Start: 2024-07-24

## 2024-07-24 RX ORDER — TRAZODONE HYDROCHLORIDE 100 MG/1
100 TABLET ORAL NIGHTLY
Start: 2024-07-24

## 2024-07-24 RX ORDER — PHENAZOPYRIDINE HYDROCHLORIDE 100 MG/1
100 TABLET, FILM COATED ORAL ONCE
Status: DISCONTINUED | OUTPATIENT
Start: 2024-07-24 | End: 2024-07-24 | Stop reason: HOSPADM

## 2024-07-24 RX ORDER — CEFDINIR 300 MG/1
300 CAPSULE ORAL 2 TIMES DAILY
Qty: 6 CAPSULE | Refills: 0 | Status: SHIPPED | OUTPATIENT
Start: 2024-07-24 | End: 2024-07-27

## 2024-07-24 RX ORDER — PHENAZOPYRIDINE HYDROCHLORIDE 100 MG/1
100 TABLET, FILM COATED ORAL
Start: 2024-07-24 | End: 2024-07-26

## 2024-07-24 RX ADMIN — ATENOLOL 25 MG: 25 TABLET ORAL at 09:07

## 2024-07-24 RX ADMIN — FAMOTIDINE 20 MG: 20 TABLET, FILM COATED ORAL at 09:07

## 2024-07-24 RX ADMIN — DICYCLOMINE HYDROCHLORIDE 10 MG: 10 CAPSULE ORAL at 09:07

## 2024-07-24 RX ADMIN — MUPIROCIN 1 G: 20 OINTMENT TOPICAL at 09:07

## 2024-07-24 RX ADMIN — ASPIRIN 81 MG CHEWABLE TABLET 81 MG: 81 TABLET CHEWABLE at 09:07

## 2024-07-24 RX ADMIN — OXYBUTYNIN CHLORIDE 5 MG: 5 TABLET, EXTENDED RELEASE ORAL at 09:07

## 2024-07-24 RX ADMIN — ARFORMOTEROL TARTRATE 15 MCG: 15 SOLUTION RESPIRATORY (INHALATION) at 06:07

## 2024-07-24 RX ADMIN — OXCARBAZEPINE 300 MG: 150 TABLET, FILM COATED ORAL at 09:07

## 2024-07-24 RX ADMIN — DONEPEZIL HYDROCHLORIDE 10 MG: 5 TABLET, FILM COATED ORAL at 09:07

## 2024-07-24 RX ADMIN — POLYETHYLENE GLYCOL (3350) 17 G: 17 POWDER, FOR SOLUTION ORAL at 09:07

## 2024-07-24 RX ADMIN — CLONAZEPAM 2 MG: 0.5 TABLET ORAL at 09:07

## 2024-07-24 RX ADMIN — HYPROMELLOSE 2910 2 DROP: 5 SOLUTION/ DROPS OPHTHALMIC at 09:07

## 2024-07-24 RX ADMIN — TOPIRAMATE 50 MG: 25 TABLET, FILM COATED ORAL at 09:07

## 2024-07-24 RX ADMIN — SUCRALFATE 1 G: 1 TABLET ORAL at 09:07

## 2024-07-24 RX ADMIN — CEFTRIAXONE SODIUM 1 G: 1 INJECTION, POWDER, FOR SOLUTION INTRAMUSCULAR; INTRAVENOUS at 02:07

## 2024-07-24 RX ADMIN — FLUTICASONE PROPIONATE 50 MCG: 50 SPRAY, METERED NASAL at 09:07

## 2024-07-24 RX ADMIN — GABAPENTIN 800 MG: 400 CAPSULE ORAL at 09:07

## 2024-07-24 RX ADMIN — BUDESONIDE INHALATION 0.5 MG: 0.5 SUSPENSION RESPIRATORY (INHALATION) at 06:07

## 2024-07-24 RX ADMIN — OXYCODONE AND ACETAMINOPHEN 1 TABLET: 10; 325 TABLET ORAL at 02:07

## 2024-07-24 RX ADMIN — OXYCODONE AND ACETAMINOPHEN 1 TABLET: 10; 325 TABLET ORAL at 09:07

## 2024-07-24 NOTE — PLAN OF CARE
Plan of care reviewed verbalized understanding.VSS afebrile pain managed with PRN medications,  Up to BSC with x2 assist O2 3/L NC in place. bed in lowest position, bed alarm set Telemetry monitored tolerated PT OT well Incision CDI no redness swelling or drainage noted at this time

## 2024-07-24 NOTE — PLAN OF CARE
POc reviewed VSS afebrile pain managed with PRN medications Surgical incision CDI no redness swelling or drainage noted dc to [facility] rehab via wheel chair van

## 2024-07-24 NOTE — DISCHARGE INSTRUCTIONS
[facility]/Surg  Facility Transfer Orders        Admit to: rehab    Diagnoses:   Active Hospital Problems    Diagnosis  POA    *Closed fracture of left hip [S72.002A]  Yes    Urine malodor [R82.90]  No    Chronic respiratory failure with hypoxia [J96.11]  Yes    Acute blood loss anemia [D62]  No    Hyponatremia [E87.1]  Yes    Bipolar 1 disorder [F31.9]  Yes    Tobacco dependence due to cigarettes [F17.210]  Yes    Hypertension [I10]  Yes      Resolved Hospital Problems   No resolved problems to display.     Allergies:   Review of patient's allergies indicates:   Allergen Reactions    Meloxicam Other (See Comments)    Ciprofloxacin Diarrhea    Hydrocodone-acetaminophen Nausea And Vomiting, Nausea Only and Other (See Comments)    Ibuprofen Nausea Only    Naproxen Nausea Only and Other (See Comments)    Narcof [hydrocodone-guaifenesin] Nausea And Vomiting    Quetiapine Nausea And Vomiting and Other (See Comments)       Code Status: full    Vitals: Routine       Diet: cardiac diet    Activity: Activity as tolerated    Nursing Precautions: Fall    Bed/Surface: routine    Consults: PT to evaluate and treat  and OT to evaluate and treat-    Oxygen: 3 liters/min via nasal cannula    Dialysis: Patient is not on dialysis.     Labs:  n/a                 Pending Diagnostic Studies:       None          Imaging: n/a    Miscellaneous Care:   N/a    IV Access: n/a     Medications: Discontinue all previous medication orders, if any. See new list below.  Current Discharge Medication List        START taking these medications    Details   aspirin 81 MG Chew Take 1 tablet (81 mg total) by mouth 2 (two) times daily. for 25 days      cefdinir (OMNICEF) 300 MG capsule Take 1 capsule (300 mg total) by mouth 2 (two) times daily. for 3 days  Qty: 6 capsule, Refills: 0      oxyCODONE-acetaminophen (PERCOCET) 5-325 mg per tablet Take 1 tablet by mouth every 6 (six) hours as needed.      phenazopyridine (PYRIDIUM) 100 MG  tablet Take 1 tablet (100 mg total) by mouth 3 (three) times daily with meals. for 2 days           CONTINUE these medications which have CHANGED    Details   traZODone (DESYREL) 100 MG tablet Take 1 tablet (100 mg total) by mouth every evening.           CONTINUE these medications which have NOT CHANGED    Details   ascorbic acid, vitamin C, (VITAMIN C) 1000 MG tablet Take 1,000 mg by mouth once daily.      atenoloL (TENORMIN) 25 MG tablet Take 25 mg by mouth once daily.      biotin 1 mg tablet Take 1,000 mcg by mouth once daily.      cetirizine (ZYRTEC) 10 MG tablet Take 1 tablet (10 mg total) by mouth once daily.  Qty: 30 tablet, Refills: 0    Associated Diagnoses: COVID-19 virus detected      clonazePAM (KLONOPIN) 2 MG Tab Take 2 mg by mouth 2 (two) times daily.      cyanocobalamin 1,000 mcg/mL injection Inject 1,000 mcg into the muscle every 30 days.      cycloSPORINE (RESTASIS) 0.05 % ophthalmic emulsion Apply 1 drop to eye 2 (two) times daily.      dicyclomine (BENTYL) 10 MG capsule Take 10 mg by mouth 2 (two) times daily.      donepeziL (ARICEPT) 10 MG tablet Take 10 mg by mouth once daily.      fluticasone propionate (FLONASE) 50 mcg/actuation nasal spray 1 spray (50 mcg total) by Each Nostril route once daily.  Qty: 15.8 mL, Refills: 0    Associated Diagnoses: Non-seasonal allergic rhinitis, unspecified trigger; COVID-19 virus detected      gabapentin (NEURONTIN) 800 MG tablet Take 800 mg by mouth 3 (three) times daily.      LATUDA 40 mg Tab tablet Take 40 mg by mouth once daily.      meclizine (ANTIVERT) 25 mg tablet Take 1 tablet (25 mg total) by mouth 3 (three) times daily as needed for Dizziness.  Qty: 20 tablet, Refills: 0      ondansetron (ZOFRAN-ODT) 4 MG TbDL Take 4 mg by mouth every 8 (eight) hours as needed.      OXcarbazepine (TRILEPTAL) 300 MG Tab Take 300 mg by mouth once daily.      pravastatin (PRAVACHOL) 20 MG tablet Take 20 mg by mouth once daily.      sucralfate (CARAFATE) 1 gram tablet  Take 1 g by mouth 4 (four) times daily.      topiramate (TOPAMAX) 50 MG tablet Take 50 mg by mouth once daily.      trospium (SANCTURA XR) 60 mg Cp24 capsule Take 60 mg by mouth once daily.      vitamin D (VITAMIN D3) 1000 units Tab Take 1,000 Units by mouth once daily.      azelastine (ASTELIN) 137 mcg (0.1 %) nasal spray SPRAY 1 SPRAY BY NASAL ROUTE 2 TIMES DAILY. ***MUST MAKE APPOINTMENT  Qty: 30 mL, Refills: 3    Associated Diagnoses: Non-seasonal allergic rhinitis, unspecified trigger      b complex vitamins tablet Take 1 tablet by mouth once daily.      butalbital-acetaminophen-caffeine -40 mg (FIORICET, ESGIC) -40 mg per tablet Take 1 tablet by mouth every 4 (four) hours as needed.      fluticasone-umeclidin-vilanter (TRELEGY ELLIPTA) 200-62.5-25 mcg inhaler Inhale 1 puff into the lungs once daily.  Qty: 60 each, Refills: 3    Associated Diagnoses: Personal history of tobacco use, presenting hazards to health; Centrilobular emphysema; Chronic hypoxemic respiratory failure      hydrOXYzine pamoate (VISTARIL) 25 MG Cap TAKE 1 CAPSULE (25 MG TOTAL) BY MOUTH EVERY 6 (SIX) HOURS AS NEEDED.  Qty: 360 capsule, Refills: 1    Associated Diagnoses: S/P lumbar fusion      levalbuterol (XOPENEX HFA) 45 mcg/actuation inhaler Inhale 1-2 puffs into the lungs every 4 (four) hours as needed for Wheezing. Rescue  Qty: 15 g, Refills: 3    Associated Diagnoses: Personal history of tobacco use, presenting hazards to health; Centrilobular emphysema; Chronic hypoxemic respiratory failure      LINZESS 145 mcg Cap capsule Take 145 mcg by mouth before breakfast.      multivitamin with minerals tablet Take 1 tablet by mouth once daily.      nicotine (NICODERM CQ) 21 mg/24 hr PLACE 1 PATCH ONTO THE SKIN ONCE DAILY.  Qty: 28 patch, Refills: 1    Associated Diagnoses: Personal history of tobacco use, presenting hazards to health      OXYGEN-AIR DELIVERY SYSTEMS MISC 6 L by Misc.(Non-Drug; Combo Route) route continuous.       pantoprazole (PROTONIX) 40 MG tablet Take 1 tablet by mouth every morning.      polyethylene glycol (GLYCOLAX) 17 gram/dose powder Take 17 g by mouth once daily.      pulse oximeter (PULSE OXIMETER) device by Apply Externally route 2 (two) times a day. Use twice daily at 8 AM and 3 PM and record the value in MyChart as directed.  Qty: 1 each, Refills: 0    Comments: This is a NO CHARGE item.  Please override price to zero.  DO NOT PRINT.  NORMAL MODE e-PRESCRIBE ONLY.  Associated Diagnoses: COVID-19 virus detected      rimegepant (NURTEC) 75 mg odt Take 75 mg by mouth once as needed for Migraine.      vilazodone (VIIBRYD) 20 mg Tab Take 20 mg by mouth once daily.           STOP taking these medications       dexlansoprazole (DEXILANT) 60 mg capsule Comments:   Reason for Stopping:         salmeteroL (SEREVENT) 50 mcg/dose diskus inhaler Comments:   Reason for Stopping:         sumatriptan (IMITREX) 25 MG Tab Comments:   Reason for Stopping:             Follow up:       Immunizations Administered as of 7/24/2024       Name Date Dose VIS Date Route Exp Date    COVID-19, MRNA, LN-S, PF (Pfizer) (Purple Cap) 10/4/2021 10:46 AM 0.3 mL 12/12/2020 Intramuscular 10/31/2021    Site: Right deltoid     Given By: Darby Longoria LPN     : [employer]     Lot: [id number]     COVID-19, MRNA, LN-S, PF (Pfizer) (Purple Cap) 3/30/2021  8:44 AM 0.3 mL 12/12/2020 Intramuscular 7/31/2021    Site: Right deltoid     Given By: Bailey Schmitz RN     : [employer]     Lot: [id number]     COVID-19, MRNA, LN-S, PF (Pfizer) (Purple Cap) 3/9/2021  9:21 AM 0.3 mL 12/12/2020 Intramuscular 6/30/2021    Site: Right deltoid     Given By: Bailey Schmitz RN     : [employer]     Lot: [id number]                    Leno Gaspar MD

## 2024-07-24 NOTE — TELEPHONE ENCOUNTER
----- Message from Saniya Catalan RN sent at 7/24/2024 10:12 AM CDT -----  Regarding: Post op follow up  Good morning,    Pt being discharged to [facility] today from [facility].  Please contact pt to schedule follow up appointment.    Thank you,    Saniya

## 2024-07-24 NOTE — RESPIRATORY THERAPY
Patient remains on aerosol tx via 0.5mg pulmicort follow by 15mcg now and bid.  Patient averaging 1500m l on IS.  Hr 67 and 02 saturation 96% on nc at 3lpm.

## 2024-07-24 NOTE — NURSING
Patient just up to bedside commode to void. Patient's urine noted earlier in shift, and continues, to be very odorous. Urine is yellow in color. Noted UA done 7/23/24 in the afternoon. Contact is made with Hospital Medicine provider, JANA Phillip DNP, at [phone] regarding the above information and noted UA results. Notified provider of all pertinent, qualifying, and relevant patient information. Upon contacting provider, orders are noted as entered by the provider.

## 2024-07-24 NOTE — PLAN OF CARE
Received message from Debbie with [facility].  They have received auth.  Planned for discharge today.       07/24/24 0873   Post-Acute Status   Post-Acute Authorization Placement   Post-Acute Placement Status Set-up Complete/Auth obtained

## 2024-07-24 NOTE — DISCHARGE SUMMARY
[facility] Medicine  Discharge Summary      Patient Name: Nimco Caro  MRN: [id number]  [facility]: [id number]  Patient Class: IP- Inpatient  Admission Date: 7/19/2024  Hospital Length of Stay: 5 days  Discharge Date and Time:  07/24/2024 2:10 PM  Attending Physician: Leno Gaspar MD   Discharging Provider: Leno Gaspar MD  Primary Care Provider: Katy Mason MD    Primary Care Team: Networked reference to record PCT     HPI:   Nimco Caro is a 63-year-old female who presents emergency room complaining of left hip pain.  She reports she became dizzy and experienced a mechanical fall at her residence.  She denies head trauma or loss of consciousness.  She denies any cervical pain.  She is not on any anticoagulants.  She describes the hip pain as intense.  She rates the pain at 10/10 at worst.  The pain is worse with any movement of the left lower leg.  No alleviating factors.  No recent fever or chills.  No known sick contacts or travel.  Previous medical history of hyponatremia, GERD, hyperlipidemia, osteoarthritis, active smoker, COPD, bipolar disease.  ER workup: CBC unremarkable.  CMP with hyponatremia of 125 and alk-phos elevated 136.  Urinalysis pending at the time my exam.  EKG demonstrated sinus rhythm without ST or T-wave elevation.  Chest x-ray was unremarkable.  ER physician spoke with  with orthopedics who will see patient in consultation.  Patient will be admitted to Hospital Medicine for treatment and management.  Hyponatremia labs pending.    Procedure(s) (LRB):  HEMIARTHROPLASTY, HIP (Left)      Hospital Course:   63F admitted for displaced left femoral neck fracture after a fall. Had acute on chronic hyponatremia on multiple psych meds. Sodium trended and stabilized. Ortho consulted and performed left hip repair 7/20. PT/OT consulted. Also with dysuria and UA with many bacteria so started on abx for UTI with culture pending at time of discharge.  Accepted and transferred to [facility].      Goals of Care Treatment Preferences:  Code Status: Full Code      Consults:   Consults (From admission, onward)          Status Ordering Provider     IP consult to dietary  Once        Provider:  (Not yet assigned)    Completed HAWA DEMPSEY     Inpatient consult to Orthopedics  Once        Provider:  Hawa Dempsey MD    Completed ROXY GALVAN            No new Assessment & Plan notes have been filed under this hospital service since the last note was generated.  Service: Hospital Medicine    Final Active Diagnoses:    Diagnosis Date Noted POA    PRINCIPAL PROBLEM:  Closed fracture of left hip [S72.002A] 07/19/2024 Yes    UTI (urinary tract infection) [N39.0] 07/23/2024 No    Chronic respiratory failure with hypoxia [J96.11] 07/22/2024 Yes    Acute blood loss anemia [D62] 07/21/2024 No    Hyponatremia [E87.1] 07/19/2024 Yes    Bipolar 1 disorder [F31.9] 12/20/2023 Yes    Tobacco dependence due to cigarettes [F17.210] 08/19/2019 Yes    Hypertension [I10] 02/25/2019 Yes      Problems Resolved During this Admission:       Discharged Condition: good    Disposition: Rehab Facility    Follow Up:   Follow-up Information       Hawa Dempsey MD Follow up.    Specialties: Sports Medicine, Orthopedic Surgery  Why: Office to contact you to schedule appointment  Contact information:  [address] [place] [address] [address]  [facility] [address] [phone]                           Patient Instructions:      Ambulatory referral/consult to Smoking Cessation Program   Standing Status: Future   Referral Priority: Routine Referral Type: Consultation   Referral Reason: Specialty Services Required   Requested Specialty: CTTS   Number of Visits Requested: 1     Diet Cardiac     Notify your health care provider if you experience any of the following:  temperature >100.4     Notify your health care provider if you experience any of the following:  persistent nausea and  vomiting or diarrhea     Notify your health care provider if you experience any of the following:  severe uncontrolled pain     Notify your health care provider if you experience any of the following:  redness, tenderness, or signs of infection (pain, swelling, redness, odor or green/yellow discharge around incision site)     Notify your health care provider if you experience any of the following:  difficulty breathing or increased cough     Notify your health care provider if you experience any of the following:  severe persistent headache     Notify your health care provider if you experience any of the following:  worsening rash     Notify your health care provider if you experience any of the following:  persistent dizziness, light-headedness, or visual disturbances     Notify your health care provider if you experience any of the following:  increased confusion or weakness     Activity as tolerated       Significant Diagnostic Studies:     Lab Results   Component Value Date    WBC 8.56 07/24/2024    HGB 8.3 (L) 07/24/2024    HCT 26.5 (L) 07/24/2024    MCV 90 07/24/2024     07/24/2024       BMP  Lab Results   Component Value Date     (L) 07/24/2024    K 4.3 07/24/2024     07/24/2024    CO2 22 (L) 07/24/2024    BUN 9 07/24/2024    CREATININE 0.6 07/24/2024    CALCIUM 8.9 07/24/2024    ANIONGAP 11 07/24/2024    EGFRNORACEVR >60 07/24/2024       X-Ray Hip 1 View Left    Result Date: 7/20/2024  EXAMINATION: XR HIP 1 VIEW LEFT (WITH PELVIS WHEN PERFORMED) CLINICAL HISTORY: Hip jessica; TECHNIQUE: Single view of the left hip was performed. COMPARISON: Hip x-rays of July 19, 2024 FINDINGS: The patient has undergone a left hip arthroplasty with metallic acetabular and femoral components in good position.  Fracture or dislocation is not presently seen.     Status post left hip arthroplasty. Electronically signed by: Fred Stevens MD Date:    07/20/2024 Time:    11:52    X-Ray Knee 1 or 2 View  Right    Result Date: 7/20/2024  EXAMINATION: XR KNEE 1 OR 2 VIEW RIGHT CLINICAL HISTORY: R knee pain; TECHNIQUE: AP and lateral views of the right knee were performed. COMPARISON: Knee x-rays of April 30, 2024 FINDINGS: A fracture of the femur, patella, tibia or fibula is not seen.  There is narrowing of the medial intercondylar joint space unchanged from prior studies.  No joint effusion is identified.     Mild osteoarthritis right knee Electronically signed by: Fred Stevens MD Date:    07/20/2024 Time:    11:50    X-Ray Hip 2 or 3 views Left with Pelvis when performed    Result Date: 7/19/2024  EXAMINATION: XR HIP WITH PELVIS WHEN PERFORMED 2 OR 3 VIEWS LEFT CLINICAL HISTORY: Unspecified fall, initial encounter TECHNIQUE: AP view of the pelvis and frog leg lateral view of the left hip were performed. COMPARISON: Hip and pelvic x-ray of June 17, 2021 FINDINGS: There is fracture of the left hip with angulation of the femoral neck best seen on the cross-table view.  Patient has had prior right hip arthroplasty with metallic acetabular and femoral components in good position.  There is diffuse osteoporosis.     Angulated fracture of the left femoral neck.  Prior right hip arthroplasty.  Osteoporosis. Electronically signed by: Fred Stevens MD Date:    07/19/2024 Time:    15:59    X-Ray Chest AP Portable    Result Date: 7/19/2024  EXAMINATION: XR CHEST AP PORTABLE CLINICAL HISTORY: pre-op; TECHNIQUE: Single frontal view of the chest was performed. COMPARISON: Chest x-ray of December 9, 2023 FINDINGS: There is mild cardiomegaly.  A right pericardial fat pad is noted.  No intrapulmonary mass or infiltrate is seen.  No pneumothorax or pleural effusion is noted.     Mild cardiomegaly otherwise negative chest x-ray. Electronically signed by: Fred Stevens MD Date:    07/19/2024 Time:    15:58           Pending Diagnostic Studies:       None           Medications:  Reconciled Home Medications:      Medication List         START taking these medications      aspirin 81 MG Chew  Take 1 tablet (81 mg total) by mouth 2 (two) times daily. for 25 days     cefdinir 300 MG capsule  Commonly known as: OMNICEF  Take 1 capsule (300 mg total) by mouth 2 (two) times daily. for 3 days     oxyCODONE-acetaminophen 5-325 mg per tablet  Commonly known as: PERCOCET  Take 1 tablet by mouth every 6 (six) hours as needed.     phenazopyridine 100 MG tablet  Commonly known as: PYRIDIUM  Take 1 tablet (100 mg total) by mouth 3 (three) times daily with meals. for 2 days            CHANGE how you take these medications      traZODone 100 MG tablet  Commonly known as: DESYREL  Take 1 tablet (100 mg total) by mouth every evening.  What changed: how much to take            CONTINUE taking these medications      ascorbic acid (vitamin C) 1000 MG tablet  Commonly known as: VITAMIN C  Take 1,000 mg by mouth once daily.     atenoloL 25 MG tablet  Commonly known as: TENORMIN  Take 25 mg by mouth once daily.     azelastine 137 mcg (0.1 %) nasal spray  Commonly known as: ASTELIN  SPRAY 1 SPRAY BY NASAL ROUTE 2 TIMES DAILY.       b complex vitamins tablet  Take 1 tablet by mouth once daily.     biotin 1 mg tablet  Take 1,000 mcg by mouth once daily.     butalbital-acetaminophen-caffeine -40 mg -40 mg per tablet  Commonly known as: FIORICET, ESGIC  Take 1 tablet by mouth every 4 (four) hours as needed.     cetirizine 10 MG tablet  Commonly known as: ZYRTEC  Take 1 tablet (10 mg total) by mouth once daily.     clonazePAM 2 MG Tab  Commonly known as: KlonoPIN  Take 2 mg by mouth 2 (two) times daily.     cyanocobalamin 1,000 mcg/mL injection  Inject 1,000 mcg into the muscle every 30 days.     cycloSPORINE 0.05 % ophthalmic emulsion  Commonly known as: RESTASIS  Apply 1 drop to eye 2 (two) times daily.     dicyclomine 10 MG capsule  Commonly known as: BENTYL  Take 10 mg by mouth 2 (two) times daily.     donepeziL 10 MG tablet  Commonly known as:  ARICEPT  Take 10 mg by mouth once daily.     fluticasone propionate 50 mcg/actuation nasal spray  Commonly known as: FLONASE  1 spray (50 mcg total) by Each Nostril route once daily.     fluticasone-umeclidin-vilanter 200-62.5-25 mcg inhaler  Commonly known as: TRELEGY ELLIPTA  Inhale 1 puff into the lungs once daily.     gabapentin 800 MG tablet  Commonly known as: NEURONTIN  Take 800 mg by mouth 3 (three) times daily.     hydrOXYzine pamoate 25 MG Cap  Commonly known as: VISTARIL  TAKE 1 CAPSULE (25 MG TOTAL) BY MOUTH EVERY 6 (SIX) HOURS AS NEEDED.     LATUDA 40 mg Tab tablet  Generic drug: lurasidone  Take 40 mg by mouth once daily.     levalbuterol 45 mcg/actuation inhaler  Commonly known as: XOPENEX HFA  Inhale 1-2 puffs into the lungs every 4 (four) hours as needed for Wheezing. Rescue     LINZESS 145 mcg Cap capsule  Generic drug: linaCLOtide  Take 145 mcg by mouth before breakfast.     meclizine 25 mg tablet  Commonly known as: ANTIVERT  Take 1 tablet (25 mg total) by mouth 3 (three) times daily as needed for Dizziness.     multivitamin with minerals tablet  Take 1 tablet by mouth once daily.     nicotine 21 mg/24 hr  Commonly known as: NICODERM CQ  PLACE 1 PATCH ONTO THE SKIN ONCE DAILY.     NURTEC 75 mg odt  Generic drug: rimegepant  Take 75 mg by mouth once as needed for Migraine.     ondansetron 4 MG Tbdl  Commonly known as: ZOFRAN-ODT  Take 4 mg by mouth every 8 (eight) hours as needed.     OXcarbazepine 300 MG Tab  Commonly known as: TRILEPTAL  Take 300 mg by mouth once daily.     OXYGEN-AIR DELIVERY SYSTEMS MISC  6 L by Misc.(Non-Drug; Combo Route) route continuous.     pantoprazole 40 MG tablet  Commonly known as: PROTONIX  Take 1 tablet by mouth every morning.     polyethylene glycol 17 gram/dose powder  Commonly known as: GLYCOLAX  Take 17 g by mouth once daily.     pravastatin 20 MG tablet  Commonly known as: PRAVACHOL  Take 20 mg by mouth once daily.     pulse oximeter device  Commonly known as:  pulse oximeter  by Apply Externally route 2 (two) times a day. Use twice daily at 8 AM and 3 PM and record the value in MyChart as directed.     sucralfate 1 gram tablet  Commonly known as: CARAFATE  Take 1 g by mouth 4 (four) times daily.     topiramate 50 MG tablet  Commonly known as: TOPAMAX  Take 50 mg by mouth once daily.     trospium 60 mg Cp24 capsule  Commonly known as: SANCTURA XR  Take 60 mg by mouth once daily.     vilazodone 20 mg Tab  Commonly known as: VIIBRYD  Take 20 mg by mouth once daily.     vitamin D 1000 units Tab  Commonly known as: VITAMIN D3  Take 1,000 Units by mouth once daily.            STOP taking these medications      dexlansoprazole 60 mg capsule  Commonly known as: DEXILANT     salmeteroL 50 mcg/dose 50 mcg/dose diskus inhaler  Commonly known as: SEREVENT     sumatriptan 25 MG Tab  Commonly known as: IMITREX              Indwelling Lines/Drains at time of discharge:   Lines/Drains/Airways       None                   Time spent on the discharge of patient: 38 minutes         Leno Gaspar MD  Department of Hospital Medicine  [facility]/Surg

## 2024-07-24 NOTE — CARE UPDATE
07/23/24 1907   Patient Assessment/Suction   Level of Consciousness (AVPU) alert   Respiratory Effort Unlabored   Expansion/Accessory Muscles/Retractions no retractions;no use of accessory muscles   PRE-TX-O2   Device (Oxygen Therapy) nasal cannula with humidification   $ Is the patient on Low Flow Oxygen? Yes   Flow (L/min) (Oxygen Therapy) 3   SpO2 96 %   Pulse Oximetry Type Intermittent   $ Pulse Oximetry - Multiple Charge Pulse Oximetry - Multiple   Pulse 85   Resp 19   Aerosol Therapy   $ Aerosol Therapy Charges Refused  (pt doesnt want to be bothered anymore tonight, pt states she may do aero tx's in the am.)   Incentive Spirometer   $ Incentive Spirometer Charges refused

## 2024-07-24 NOTE — NURSING
Surgical dressing to left hip surgical uincision removed incision well approximated and healing no redness swelling or drainage noted incision cleansed with betadyne after verifying no allergy to betadyne tolerated well covered with Sterile islan dressing

## 2024-07-24 NOTE — PT/OT/SLP PROGRESS
Occupational Therapy   Treatment    Name: Nimco Caro  MRN: [id number]  Admitting Diagnosis:  Closed fracture of left hip  4 Days Post-Op    Recommendations:     Discharge Recommendations: High Intensity Therapy  Discharge Equipment Recommendations:  none  Barriers to discharge:  Decreased caregiver support    Assessment:     Nimco Caro is a 63 y.o. female with a medical diagnosis of Closed fracture of left hip.  Patient agreed to participate in OT. Patient demonstrates progress and was able to walk to the bathroom using a RW with CGA from use of the bedside commode on 7/23/2024. Pt completed toileting with mod A for clothing management due to 10/10 left hip pain. Nurse notified. She continues to present with weakness, impaired endurance, impaired self care skills, impaired balance, gait instability, impaired functional mobility, decreased safety awareness, decreased lower extremity function, decreased upper extremity function and orthopedic precautions. Pt would benefit from further inpatient therapy services to improve her independence with ADLs and functional mobility to maximize return to prior level of function.     Rehab Prognosis:  Good; patient would benefit from acute skilled OT services to address these deficits and reach maximum level of function.       Plan:     Patient to be seen 5 x/week to address the above listed problems via self-care/home management, therapeutic activities, therapeutic exercises  Plan of Care Expires: 08/21/24  Plan of Care Reviewed with: patient    Subjective     Chief Complaint: Left hip pain  Patient/Family Comments/goals: Pain relief  Pain/Comfort:  Pain Rating 1: 10/10  Location - Side 1: Left  Location 1: hip  Pain Addressed 1: Nurse notified    Objective:     Communicated with: nurse prior to session.  Patient found HOB elevated with PureWick upon OT entry to room.    General Precautions: Standard, fall    Orthopedic Precautions: L LE weight bearing as tolerated, L LE  posterior precautions  Braces: Hip abduction brace  Respiratory Status: Nasal cannula, flow 3 L/min     Occupational Performance:     Bed Mobility:    Patient completed Scooting/Bridging with minimum assistance  Patient completed Supine to Sit with moderate assistance     Functional Mobility/Transfers:  Patient completed Sit <> Stand Transfer with minimum assistance with rolling walker   Patient completed Toilet Transfer Step Transfer technique with moderate assistance with with rolling walker  Functional Mobility: bed > bathroom > chair using a RW with CGA and verbal cues for walker management    Activities of Daily Living:  Grooming: Pt completed oral hygiene with set up A/SBA at bed level.   Lower Body Dressing: maximal assistance to don socks  Toileting: moderate assistance for clothing management    Treatment & Education:  OT ed pt on bed mobility techniques to increase independence with task.  OT educated patient on toilet transfer techniques using rolling walker.  OT ed patient on safety with walker use for functional mobility with cues for hand placement and sequencing.   Educated on safety with functional mobility; hand placement to ensure safe transfers to various surfaces in prep for ADLs  Educated on performing functional mobility and ADL's in adherence to orthopedic precautions.  Therapist provided facilitation and instruction of proper body mechanics, energy conservation and fall prevention strategies during tasks listed above.    Patient left up in chair with all lines intact, call button in reach, chair alarm on and nurse notified.    GOALS:   Multidisciplinary Problems       Occupational Therapy Goals          Problem: Occupational Therapy    Goal Priority Disciplines Outcome Interventions   Occupational Therapy Goal     OT, PT/OT     Description: Goals to be met by: 8/21/2024     Patient will increase functional independence with ADLs by performing:    UE Dressing with Supervision.  LE Dressing  with Supervision.  Grooming while standing at sink with Supervision.  Toileting from toilet with Supervision for hygiene and clothing management.   Toilet transfer to toilet with Supervision.  Perform 30 minutes sitting/standing ADL activity while maintaining left posterior hip precautions.                         Time Tracking:     OT Date of Treatment: 07/24/24  OT Start Time: 0825  OT Stop Time: 0935  OT Total Time (min): 70 min    Billable Minutes:Self Care/Home Management 70               7/24/2024

## 2024-07-24 NOTE — PROGRESS NOTES
Pt was educated on smoking cessation, Pt states she is interested in quitting and wants to re-enroll in the [facility] smoking cessation program.  Smoking cessation information also given to Pt.

## 2024-07-24 NOTE — PLAN OF CARE
Problem: Infection  Goal: Absence of Infection Signs and Symptoms  Outcome: Progressing     Problem: Adult Inpatient Plan of Care  Goal: Plan of Care Review  Outcome: Progressing  Goal: Patient-Specific Goal (Individualized)  Outcome: Progressing  Goal: Optimal Comfort and Wellbeing  Outcome: Progressing     Problem: Skin Injury Risk Increased  Goal: Skin Health and Integrity  Outcome: Progressing     Problem: Fall Injury Risk  Goal: Absence of Fall and Fall-Related Injury  Outcome: Progressing     Problem: Pain Acute  Goal: Optimal Pain Control and Function  Outcome: Progressing     Problem: Hip Fracture Surgical Repair  Goal: Absence of Bleeding  Outcome: Progressing  Goal: Cognitive Function Maintained  Outcome: Progressing  Goal: Absence of Infection Signs and Symptoms  Outcome: Progressing  Goal: Optimal Functional Ability  Outcome: Progressing  Goal: Optimal Pain Control and Function  Outcome: Progressing  Goal: Effective Urinary Elimination  Outcome: Progressing  Goal: Effective Oxygenation and Ventilation  Outcome: Progressing

## 2024-07-24 NOTE — PLAN OF CARE
Pt clear for DC from case management standpoint. Discharging to [facility].  Nurse to call report at noon to [phone] and stephie set up for 2pm.       07/24/24 1149   Final Note   Assessment Type Final Discharge Note   Anticipated Discharge Disposition Rehab

## 2024-07-24 NOTE — NURSING
Upon assessment, patient noted to have not yet move bowels since procedure on the 20th. Patient reports good appetite and oral intake. At 2013, contact made with Hospital Medicine provider, LISA Flanagan NP, regarding GI systems and bowel status. Reviewed relevant, pertinent, and qualifying patient information. Upon contacting NP, orders noted. Miralax is administered to patient with education offered and an understanding verbalized.

## 2024-07-26 LAB — BACTERIA UR CULT: ABNORMAL

## 2024-07-31 DIAGNOSIS — M25.552 LEFT HIP PAIN: Primary | ICD-10-CM

## 2024-08-04 PROCEDURE — G0180 MD CERTIFICATION HHA PATIENT: HCPCS | Mod: ,,, | Performed by: ORTHOPAEDIC SURGERY

## 2024-08-08 ENCOUNTER — DOCUMENT SCAN (OUTPATIENT)
Dept: HOME HEALTH SERVICES | Facility: HOSPITAL | Age: 64
End: 2024-08-08
Payer: MEDICAID

## 2024-08-08 ENCOUNTER — OFFICE VISIT (OUTPATIENT)
Dept: ORTHOPEDICS | Facility: CLINIC | Age: 64
End: 2024-08-08
Payer: MEDICAID

## 2024-08-08 ENCOUNTER — HOSPITAL ENCOUNTER (OUTPATIENT)
Dept: RADIOLOGY | Facility: HOSPITAL | Age: 64
Discharge: HOME | End: 2024-08-08
Attending: ORTHOPAEDIC SURGERY
Payer: MEDICAID

## 2024-08-08 ENCOUNTER — EXTERNAL HOME HEALTH (OUTPATIENT)
Dept: HOME HEALTH SERVICES | Facility: HOSPITAL | Age: 64
End: 2024-08-08
Payer: MEDICAID

## 2024-08-08 VITALS — HEIGHT: 67 IN | BODY MASS INDEX: 28.86 KG/M2 | RESPIRATION RATE: 18 BRPM | WEIGHT: 183.88 LBS

## 2024-08-08 DIAGNOSIS — Z96.642 HISTORY OF LEFT HIP HEMIARTHROPLASTY: Primary | ICD-10-CM

## 2024-08-08 DIAGNOSIS — Z96.642 HISTORY OF LEFT HIP HEMIARTHROPLASTY: ICD-10-CM

## 2024-08-08 DIAGNOSIS — M25.552 LEFT HIP PAIN: ICD-10-CM

## 2024-08-08 PROCEDURE — 73502 X-RAY EXAM HIP UNI 2-3 VIEWS: CPT | Mod: 26,LT,, | Performed by: RADIOLOGY

## 2024-08-08 PROCEDURE — 99214 OFFICE O/P EST MOD 30 MIN: CPT | Mod: PBBFAC,25,PO | Performed by: ORTHOPAEDIC SURGERY

## 2024-08-08 PROCEDURE — 99999 PR PBB SHADOW E&M-EST. PATIENT-LVL IV: CPT | Mod: PBBFAC,,, | Performed by: ORTHOPAEDIC SURGERY

## 2024-08-08 PROCEDURE — 73502 X-RAY EXAM HIP UNI 2-3 VIEWS: CPT | Mod: TC,PO,LT

## 2024-08-08 RX ORDER — OXYCODONE AND ACETAMINOPHEN 5; 325 MG/1; MG/1
1 TABLET ORAL EVERY 6 HOURS PRN
Qty: 28 TABLET
Start: 2024-08-08 | End: 2024-08-08 | Stop reason: SDUPTHER

## 2024-08-08 RX ORDER — OXYCODONE AND ACETAMINOPHEN 5; 325 MG/1; MG/1
1 TABLET ORAL EVERY 6 HOURS PRN
Qty: 28 TABLET | Refills: 0 | Status: SHIPPED | OUTPATIENT
Start: 2024-08-08

## 2024-08-13 DIAGNOSIS — Z96.642 HISTORY OF LEFT HIP HEMIARTHROPLASTY: Primary | ICD-10-CM

## 2024-08-13 DIAGNOSIS — M25.552 LEFT HIP PAIN: ICD-10-CM

## 2024-08-13 NOTE — TELEPHONE ENCOUNTER
----- Message from Leidy Hernandez MA sent at 8/13/2024 11:09 AM CDT -----  Contact: pt  States she needs stronger pain medication in groin area, and swelling   Barely walking   Has PT tomorrow   Call back

## 2024-08-14 RX ORDER — OXYCODONE AND ACETAMINOPHEN 10; 325 MG/1; MG/1
1 TABLET ORAL EVERY 6 HOURS PRN
Qty: 28 TABLET | Refills: 0 | Status: SHIPPED | OUTPATIENT
Start: 2024-08-14

## 2024-08-15 ENCOUNTER — DOCUMENT SCAN (OUTPATIENT)
Dept: HOME HEALTH SERVICES | Facility: HOSPITAL | Age: 64
End: 2024-08-15
Payer: MEDICAID

## 2024-09-05 ENCOUNTER — TELEPHONE (OUTPATIENT)
Dept: ORTHOPEDICS | Facility: CLINIC | Age: 64
End: 2024-09-05
Payer: MEDICAID
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
## 2024-09-06 ENCOUNTER — CLINICAL SUPPORT (OUTPATIENT)
Dept: REHABILITATION | Facility: HOSPITAL | Age: 64
End: 2024-09-06
Attending: ORTHOPAEDIC SURGERY
Payer: MEDICAID

## 2024-09-06 DIAGNOSIS — Z96.642 HISTORY OF LEFT HIP HEMIARTHROPLASTY: ICD-10-CM

## 2024-09-06 DIAGNOSIS — M25.652 DECREASED RANGE OF LEFT HIP MOVEMENT: Primary | ICD-10-CM

## 2024-09-06 DIAGNOSIS — R29.898 DECREASED STRENGTH OF LOWER EXTREMITY: ICD-10-CM

## 2024-09-06 PROCEDURE — 97161 PT EVAL LOW COMPLEX 20 MIN: CPT | Mod: PN

## 2024-09-06 PROCEDURE — 97110 THERAPEUTIC EXERCISES: CPT | Mod: PN

## 2024-09-06 NOTE — PLAN OF CARE
[facility]  Physical Therapy Initial Evaluation    Date: 9/6/2024   Name: Nimco Caro  Clinic Number: [id number]    Therapy Diagnosis:   Encounter Diagnoses   Name Primary?    History of left hip hemiarthroplasty     Decreased range of left hip movement Yes    Decreased strength of lower extremity      Physician: Timothy Nettles,*    Physician Orders: PT Eval and Treat   Medical Diagnosis from Referral: Z96.642 (ICD-10-CM) - History of left hip hemiarthroplasty   Evaluation Date: 9/6/2024  Authorization Period Expiration: 8/8/2025  Plan of Care Expiration: 11/29/2024  Visit # / Visits authorized: 1/ 1    Time In: 1230 pm  Time Out: 120 pm  Total Appointment Time (timed & untimed codes): 50 minutes    Precautions: Standard    Date of Procedure: 7/20/2024   Procedure: Procedure(s) (LRB):  HEMIARTHROPLASTY, HIP (Left)       Plan:  Reviewed the findings with her today.  Patient may continue to weight bear as tolerated with a assistive device.  Continue physical therapy.  Continue posterior hip precautions at this time.  I will see her back in a month.  No x-rays needed.  Refilled her pain medicine     Subjective   Date of onset: a few months  History of current condition - Nimco reports: a few months ago she fell because he sodium got too low. States she fractured her L hip. States she had surgery and went to rehab hospital for a while. States she finished home health this week and is ready for more rehab. States she has L groin pain with standing and walking/early morning. States she has no steps. States she was not using an AD prior to this injury. States she has been following hip precautions. Patient states she had a R hip replacement and L TKA years ago.      Medical History:   Past Medical History:   Diagnosis Date    ACP (advance care planning) 12/20/2023    Anxiety     Bipolar affect, depressed     Cancer ovarian; uterine    1992    COPD (chronic obstructive pulmonary  disease)     GERD (gastroesophageal reflux disease)     Hyperlipidemia     Hypertension     OAB (overactive bladder)     On home oxygen therapy     per patient uses PRN       Surgical History:   Nimco Caro  has a past surgical history that includes Hysterectomy (total); Revision Colostomy (N/A, 2012); Hernia repair (N/A, 2016); Colon surgery; Colonoscopy; Hip Arthroplasty (Right, 2/25/2019); Joint replacement (Right); Knee Arthroplasty (Left, 8/19/2019); Breast cyst aspiration; Lumbar laminectomy with fusion (N/A, 12/7/2023); and Hemiarthroplasty of hip (Left, 7/20/2024).    Medications:   Nimco has a current medication list which includes the following prescription(s): ascorbic acid (vitamin c), aspirin, atenolol, azelastine, b complex vitamins, biotin, butalbital-acetaminophen-caffeine -40 mg, cetirizine, clonazepam, cyanocobalamin, cyclosporine, dicyclomine, donepezil, fluticasone propionate, fluticasone-umeclidin-vilanter, gabapentin, hydroxyzine pamoate, latuda, levalbuterol, linzess, meclizine, multivitamin with minerals, nicotine, ondansetron, oxcarbazepine, oxycodone-acetaminophen, oxycodone-acetaminophen, oxygen-air delivery systems, pantoprazole, polyethylene glycol, pravastatin, pulse oximeter, nurtec, sucralfate, topiramate, trazodone, trospium, vilazodone, and vitamin d.    Allergies:   Review of patient's allergies indicates:   Allergen Reactions    Meloxicam Other (See Comments)    Ciprofloxacin Diarrhea    Hydrocodone-acetaminophen Nausea And Vomiting, Nausea Only and Other (See Comments)    Ibuprofen Nausea Only    Naproxen Nausea Only and Other (See Comments)    Narcof [hydrocodone-guaifenesin] Nausea And Vomiting    Quetiapine Nausea And Vomiting and Other (See Comments)        Imaging, none    Prior Therapy: N  Social History:  lives with their family  Occupation: retired  Prior Level of Function: I  Current Level of Function: Mod I with RW    Pain:  Current 5/10, worst 7/10, best  0/10   Location: left hip    Description: Aching  Aggravating Factors: Standing and Walking  Easing Factors: rest    Pt's goals: walk better    Objective     Posture: narrow MARY in stance; R trunk lean    Gait pattern: RW; compensated trendelenburg pattern in L stance      Hip Range of Motion:   Left active Left Passive Right active  Right Passive   Flexion 80 90 90 100   Extension 10 10 10 15   Ext. Rotation 30 30 30 30   Int. Rotation 30 30 30 30       Hip Joint Mobility: decreased on L    Lower Extremity Strength    Active SLR:     Right LE  Left LE    Knee extension: 4/5 Knee extension: 4-/5   Knee flexion: 4/5 Knee flexion: 4-/5   Hip flexion: 4+/5 Hip flexion: 3+/5   Hip extension:  4-/5 Hip extension: 3-/5   Hip abduction: 3/5 Hip abduction: 3-/5         Functional Testing:  TUG - 28s with RW     Table: Population Norms for TUG    Age  Average TUG    60 - 69 years  8.1 seconds    70 - 79 years  9.2 seconds    80 - 99 years  11.3 seconds      Balance Assessment:       Evaluation   Single Limb Stance R LE unable  (<10 sec = HIGH FALL RISK)   Single Limb Stance L LE unable  (<10 sec = HIGH FALL RISK)           Evaluation   5xSTS 35s with BUE support completed with no arms        Limitation/Restriction for FOTO Hip Survey    Therapist reviewed FOTO scores for Nimco Caro on 9/6/2024.   FOTO documents entered into [employer] - see Media section.    Score: 32%  Predicted score: 57%       TREATMENT   Treatment Time In: 1257 pm  Treatment Time Out: 120 pm  Total Treatment time (time-based codes) separate from Evaluation: 23 minutes    Nimco received therapeutic exercises to develop strength, endurance, and ROM for 23 minutes including:    Education - HEP / POC / fall prevention handout / use walker at all times / hip precautions  Supine March RTB 2 x 10 B  Supine Clams RTB 2 x 10 B  Supine Bridge TRB around knees 2 x 10     Home Exercises and Patient Education Provided    Education provided:   - see above    Written  Home Exercises Provided: yes.  Exercises were reviewed and Nimco was able to demonstrate them prior to the end of the session.  Nimco demonstrated good  understanding of the education provided.     See EMR under Patient Instructions for exercises provided 9/6/2024.    Assessment   Nimco is a 63 y.o. female referred to outpatient Physical Therapy with a medical diagnosis of History of left hip hemiarthroplasty . Pt presents with decreased hip ROM, LE weakness, abnormal gait, and functional limitations of difficulty standing and walking prolonged distances. Pt would benefit from skilled PT to address these deficits and facilitate a return to OF.      Pt prognosis is Good.   Pt will benefit from skilled outpatient Physical Therapy to address the deficits stated above and in the chart below, provide pt/family education, and to maximize pt's level of independence.     Plan of care discussed with patient: Yes  Pt's spiritual, cultural and educational needs considered and patient is agreeable to the plan of care and goals as stated below:     Anticipated Barriers for therapy: none    Medical Necessity is demonstrated by the following  History  Co-morbidities and personal factors that may impact the plan of care Co-morbidities:   anxiety, high BMI, HTN, and COPD, Bipolar    Personal Factors:   age     high   Examination  Body Structures and Functions, activity limitations and participation restrictions that may impact the plan of care Body Regions:   lower extremities  trunk    Body Systems:    gross symmetry  ROM  strength  gross coordinated movement  balance  gait  transfers  transitions  motor control    Participation Restrictions:   none    Activity limitations:   no deficits    General Tasks and Commands  no deficits    Communication  no deficits    Mobility  lifting and carrying objects  walking    Self care  no deficits    Domestic Life  doing house work (cleaning house, washing dishes, laundry)  assisting  others    Interactions/Relationships  no deficits    Life Areas  no deficits    Community and Social Life  community life  recreation and leisure         low   Clinical Presentation stable and uncomplicated low   Decision Making/ Complexity Score: low     Goals:  GOALS: Short Term Goals:  6 weeks  1.Report decreased hip pain  < / =  3/10  to increase tolerance for ADL's  2. Improve TUG by at least 8 seconds to demonstrate significant decrease in fall risk  3. Increase strength by 1/3 MMT grade in quad strength  to increase tolerance for ADL and work activities.  4. Pt to tolerate HEP to improve ROM and independence with ADL's     Long Term Goals:12 weeks  1.Improve TUG to </=12s without AD to demonstrate decrease in fall risk  2.Patient goal: return to walking without RW  3.Increase strength to >/= 4+/5 in quad/hip  to increase tolerance for ADL's  4. Pt will improve 5xSTS without UE support in at least 15s to demonstrate improve LE strength     Plan   Plan of care Certification: 9/6/2024 to 11/29/2024.    Outpatient Physical Therapy 2 times weekly for 12 weeks to include the following interventions: Gait Training, Manual Therapy, Moist Heat/ Ice, Neuromuscular Re-ed, Patient Education, Self Care, Therapeutic Activities, and Therapeutic Exercise.     Michael Burk, PT

## 2024-09-12 ENCOUNTER — TELEPHONE (OUTPATIENT)
Dept: ORTHOPEDICS | Facility: CLINIC | Age: 64
End: 2024-09-12
Payer: MEDICAID

## 2024-09-12 NOTE — TELEPHONE ENCOUNTER
----- Message from Grant Madrid sent at 9/12/2024  9:47 AM CDT -----  Patient would like a callback rescheduling her Post Op from today.    EP--Medicaid-- Out of template    [phone]

## 2024-09-16 ENCOUNTER — OFFICE VISIT (OUTPATIENT)
Dept: ORTHOPEDICS | Facility: CLINIC | Age: 64
End: 2024-09-16
Payer: MEDICAID

## 2024-09-16 DIAGNOSIS — Z96.642 HISTORY OF LEFT HIP HEMIARTHROPLASTY: Primary | ICD-10-CM

## 2024-09-16 PROCEDURE — 99999 PR PBB SHADOW E&M-EST. PATIENT-LVL I: CPT | Mod: PBBFAC,,, | Performed by: ORTHOPAEDIC SURGERY

## 2024-09-16 PROCEDURE — 1160F RVW MEDS BY RX/DR IN RCRD: CPT | Mod: CPTII,,, | Performed by: ORTHOPAEDIC SURGERY

## 2024-09-16 PROCEDURE — 99211 OFF/OP EST MAY X REQ PHY/QHP: CPT | Mod: PBBFAC,PO | Performed by: ORTHOPAEDIC SURGERY

## 2024-09-16 PROCEDURE — 99024 POSTOP FOLLOW-UP VISIT: CPT | Mod: S$PBB,,, | Performed by: ORTHOPAEDIC SURGERY

## 2024-09-16 PROCEDURE — 1159F MED LIST DOCD IN RCRD: CPT | Mod: CPTII,,, | Performed by: ORTHOPAEDIC SURGERY

## 2024-09-16 NOTE — PROGRESS NOTES
Past Medical History:   Diagnosis Date    ACP (advance care planning) 12/20/2023    Anxiety     Bipolar affect, depressed     Cancer ovarian; uterine    1992    COPD (chronic obstructive pulmonary disease)     GERD (gastroesophageal reflux disease)     Hyperlipidemia     Hypertension     OAB (overactive bladder)     On home oxygen therapy     per patient uses PRN       Past Surgical History:   Procedure Laterality Date    BREAST CYST ASPIRATION      COLON SURGERY      COLONOSCOPY      HEMIARTHROPLASTY OF HIP Left 7/20/2024    Procedure: HEMIARTHROPLASTY, HIP;  Surgeon: Timothy Nettles MD;  Location: [facility] OR;  Service: Orthopedics;  Laterality: Left;    HERNIA REPAIR N/A 2016    HIP ARTHROPLASTY Right 2/25/2019    Procedure: ARTHROPLASTY, HIP;  Surgeon: Eliseo Vaca MD;  Location: [facility] [place] OR;  Service: Orthopedics;  Laterality: Right;  Daniel Herrera    HYSTERECTOMY  total    JOINT REPLACEMENT Right     hip replacemnt    KNEE ARTHROPLASTY Left 8/19/2019    Procedure: ARTHROPLASTY, KNEE;  Surgeon: Eliseo Vaca MD;  Location: [facility] [place] OR;  Service: Orthopedics;  Laterality: Left;    LUMBAR LAMINECTOMY WITH FUSION N/A 12/7/2023    Procedure: LAMINECTOMY, SPINE, LUMBAR, WITH FUSION L4-5;  Surgeon: Joby Jaimes MD;  Location: [facility] OR;  Service: Orthopedics;  Laterality: N/A;  [employer], [employer]    REVISION COLOSTOMY N/A 2012             Review of patient's allergies indicates:   Allergen Reactions    Meloxicam Other (See Comments)    Ciprofloxacin Diarrhea    Hydrocodone-acetaminophen Nausea And Vomiting, Nausea Only and Other (See Comments)    Ibuprofen Nausea Only    Naproxen Nausea Only and Other (See Comments)    Narcof [hydrocodone-guaifenesin] Nausea And Vomiting    Quetiapine Nausea And Vomiting and Other (See Comments)       Family History   Problem Relation Name Age of Onset    Cancer Mother      Hypertension Father      Heart disease Father      Heart disease Sister      Hypertension Sister      Hypertension  Brother      Depression Brother         Social History     Socioeconomic History    Marital status:    Tobacco Use    Smoking status: Every Day     Current packs/day: 0.50     Average packs/day: 0.5 packs/day for 44.7 years (22.4 ttl pk-yrs)     Types: Cigarettes     Start date: 1980    Smokeless tobacco: Never   Substance and Sexual Activity    Alcohol use: Not Currently     Alcohol/week: 1.0 standard drink of alcohol     Types: 1 Cans of beer per week     Comment: weekly    Drug use: No     Social Determinants of Health     Financial Resource Strain: Low Risk  (7/25/2024)    Received from [facility]    Overall Financial Resource Strain (CARDIA)     Difficulty of Paying Living Expenses: Not hard at all   Food Insecurity: No Food Insecurity (7/25/2024)    Received from [facility]    Hunger Vital Sign     Worried About Running Out of Food in the Last Year: Never true     Ran Out of Food in the Last Year: Never true   Transportation Needs: No Transportation Needs (8/2/2024)    Received from [facility]     Has lack of transportation kept you from medical appointments or from getting medications?: No     Has lack of transportation kept you from meetings, work, or from getting things needed for daily living?: No   Physical Activity: Patient Declined (7/21/2024)    Exercise Vital Sign     Days of Exercise per Week: Patient declined     Minutes of Exercise per Session: Patient declined   Stress: No Stress Concern Present (8/3/2024)    Received from [facility] - Occupational Stress Questionnaire     Feeling of Stress : Not at all   Housing Stability: Low Risk  (7/25/2024)    Received from [facility]    Housing Stability Vital Sign     Unable to Pay for Housing in the Last Year: No     Number of Times Moved in the Last Year: 1     Homeless in the Last Year: No       Chief Complaint: No chief complaint on file.      Date  of surgery:  July 20, 2024    History of present illness:  63-year-old female underwent left hip hemiarthroplasty.  Patient is walking with a walker.  She is doing okay.  Complains of groin pain still.  Not quite as bad as it was before.  Is scheduled to start outpatient physical therapy next week.  Rates the pain is a 7/10.        Review of Systems:    Musculoskeletal:  See HPI        Physical Examination:    Vital Signs:  There were no vitals filed for this visit.    There is no height or weight on file to calculate BMI.    This a well-developed, well nourished patient in no acute distress.  They are alert and oriented and cooperative to examination.  Pt. walks with a mild antalgic gait using a walker.      Examination left hip incision shows healed surgical incision without erythema drainage.  Patient does not have any significant bruising or hematoma.  Neurovascularly intact.  Has some pain in the groin in his fairly stiff.    X-rays:  X-rays left hip are  review which show a well-aligned hip hemiarthroplasty without complications.  Leg lengths appear equal.     Assessment::  Status post left hip hemiarthroplasty    Plan:  Reviewed the findings with her today.  Patient may continue to weight bear as tolerated with a assistive device.  Start outpatient physical therapy.  Follow up in 6 weeks with another x-ray of the left hip.  Talked about how she might be having residual acetabular pain with possible need for revision to a total hip if not improving.    This note was created using [employer] voice recognition software that occasionally misinterpreted phrases or words.

## 2024-09-17 ENCOUNTER — CLINICAL SUPPORT (OUTPATIENT)
Dept: REHABILITATION | Facility: HOSPITAL | Age: 64
End: 2024-09-17
Payer: MEDICAID

## 2024-09-17 DIAGNOSIS — R29.898 DECREASED STRENGTH OF LOWER EXTREMITY: ICD-10-CM

## 2024-09-17 DIAGNOSIS — M25.652 DECREASED RANGE OF LEFT HIP MOVEMENT: Primary | ICD-10-CM

## 2024-09-17 PROCEDURE — 97110 THERAPEUTIC EXERCISES: CPT | Mod: PN

## 2024-09-17 RX ORDER — OXYCODONE AND ACETAMINOPHEN 5; 325 MG/1; MG/1
1 TABLET ORAL EVERY 6 HOURS PRN
Qty: 28 TABLET | Refills: 0 | Status: SHIPPED | OUTPATIENT
Start: 2024-09-17

## 2024-09-17 NOTE — PROGRESS NOTES
Physical Therapy Treatment Note     Name: Nimco Caro  Clinic Number: [id number]    Therapy Diagnosis:   Encounter Diagnoses   Name Primary?    Decreased range of left hip movement Yes    Decreased strength of lower extremity      Physician: Timothy Nettles,*    Visit Date: 9/17/2024    Physician Orders: PT Eval and Treat   Medical Diagnosis from Referral: Z96.642 (ICD-10-CM) - History of left hip hemiarthroplasty   Evaluation Date: 9/6/2024  Authorization Period Expiration: 8/8/2025  Plan of Care Expiration: 11/29/2024  Visit # / Visits authorized: 1/ 12     Time In: 1050 pm  Time Out: 1143 pm  Total Appointment Time (timed & untimed codes): 53 minutes     Precautions: Standard     Date of Procedure: 7/20/2024   Procedure: Procedure(s) (LRB):  HEMIARTHROPLASTY, HIP (Left)        Plan:  Reviewed the findings with her today.  Patient may continue to weight bear as tolerated with a assistive device.  Continue physical therapy.  Continue posterior hip precautions at this time.  I will see her back in a month.  No x-rays needed.  Refilled her pain medicine     Subjective     Pt reports: she saw her doctor who was pleased with progress. States she is walking better.   She was compliant with home exercise program.  Response to previous treatment: felt fine  Functional change: ongoing    Pain: 2/10  Location: left hip      Objective     Nimco received therapeutic exercises to develop strength, endurance, and ROM for 53 minutes including:     Education - HEP / POC / fall prevention handout / use walker at all times / hip precautions  NuStep x 8 minutes Level 2   Supine March RTB 3 x 10 B  Supine Clams RTB 3 x 10 B  Supine Hip Add with Ball Squeeze 3 x 10  Supine Bridge TRB around knees 2 x 10   SL Clam Isometrics off ball; x 30; 3s holds    Seated LAQ 5# 3 x 10 B    Standing Hip Abd leaning on mat 3 x 8  Standing Hip Ext leaning on mat 3 x 8     DL Mini Squat RTB; 3 x 8 - cueing for valgus     Nimco received  the following manual therapy techniques: Joint mobilizations were applied to the: / for 0 minutes, including:        Home Exercises Provided and Patient Education Provided     Education provided:   - HEP    Written Home Exercises Provided: yes.  Exercises were reviewed and Nimco was able to demonstrate them prior to the end of the session.  Nimco demonstrated good  understanding of the education provided.     See EMR under Patient Instructions for exercises provided 9/17/2024.    Assessment     Nimco did well today. Demonstrating improve tolerance to prolonged activity. No pain within session, Favors valgus moment with CKC exercises. Improved with red theraband for tactile cue.     Nimco Is progressing well towards her goals.   Pt prognosis is Good.     Pt will continue to benefit from skilled outpatient physical therapy to address the deficits listed in the problem list box on initial evaluation, provide pt/family education and to maximize pt's level of independence in the home and community environment.     Pt's spiritual, cultural and educational needs considered and pt agreeable to plan of care and goals.     Anticipated barriers to physical therapy: none    Goals:  GOALS: Short Term Goals:  6 weeks  1.Report decreased hip pain  < / =  3/10  to increase tolerance for ADL's  2. Improve TUG by at least 8 seconds to demonstrate significant decrease in fall risk  3. Increase strength by 1/3 MMT grade in quad strength  to increase tolerance for ADL and work activities.  4. Pt to tolerate HEP to improve ROM and independence with ADL's     Long Term Goals:12 weeks  1.Improve TUG to </=12s without AD to demonstrate decrease in fall risk  2.Patient goal: return to walking without RW  3.Increase strength to >/= 4+/5 in quad/hip  to increase tolerance for ADL's  4. Pt will improve 5xSTS without UE support in at least 15s to demonstrate improve LE strength      Plan   Plan of care Certification: 9/6/2024 to  11/29/2024.       Michael Burk, PT , DPT, OCS

## 2024-09-19 ENCOUNTER — CLINICAL SUPPORT (OUTPATIENT)
Dept: REHABILITATION | Facility: HOSPITAL | Age: 64
End: 2024-09-19
Payer: MEDICAID

## 2024-09-19 ENCOUNTER — TELEPHONE (OUTPATIENT)
Dept: ORTHOPEDICS | Facility: CLINIC | Age: 64
End: 2024-09-19
Payer: MEDICAID

## 2024-09-19 DIAGNOSIS — M25.652 DECREASED RANGE OF LEFT HIP MOVEMENT: Primary | ICD-10-CM

## 2024-09-19 DIAGNOSIS — R29.898 DECREASED STRENGTH OF LOWER EXTREMITY: ICD-10-CM

## 2024-09-19 PROCEDURE — 97110 THERAPEUTIC EXERCISES: CPT | Mod: PN

## 2024-09-19 NOTE — PROGRESS NOTES
Physical Therapy Treatment Note     Name: Nimco Caro  Clinic Number: [id number]    Therapy Diagnosis:   Encounter Diagnoses   Name Primary?    Decreased range of left hip movement Yes    Decreased strength of lower extremity        Physician: Timothy Nettles,*    Visit Date: 9/19/2024    Physician Orders: PT Eval and Treat   Medical Diagnosis from Referral: Z96.642 (ICD-10-CM) - History of left hip hemiarthroplasty   Evaluation Date: 9/6/2024  Authorization Period Expiration: 8/8/2025  Plan of Care Expiration: 11/29/2024  Visit # / Visits authorized: 2 / 12     Time In: 1100 am  Time Out: 1154 am  Total Appointment Time (timed & untimed codes): 54 minutes     Precautions: Standard     Date of Procedure: 7/20/2024   Procedure: Procedure(s) (LRB):  HEMIARTHROPLASTY, HIP (Left)        Plan:  Reviewed the findings with her today.  Patient may continue to weight bear as tolerated with a assistive device.  Continue physical therapy.  Continue posterior hip precautions at this time.  I will see her back in a month.  No x-rays needed.  Refilled her pain medicine     Subjective     Pt reports: she saw her doctor who was pleased with progress. States she is walking better.     She was compliant with home exercise program.  Response to previous treatment: felt fine  Functional change: ongoing    Pain: 2/10  Location: left hip      Objective     Nimco received therapeutic exercises to develop strength, endurance, and ROM for 44 minutes including:     Education - HEP / use walker at all times / hip precautions  NuStep x 8 minutes Level 2   Supine March RTB 3 x 10 B  Supine Clams RTB 3 x 10 B  Supine Hip Add with Ball Squeeze 3 x 10  Supine Bridge TRB around knees 2 x 10   SL Clam Isometrics off ball; x 30; 3s holds    Seated LAQ 10# 3 x 10 B    Standing Hip Abd leaning on mat 2 x 10  Standing Hip Ext leaning on mat 2 x 10 3#     [employer] Taps 2 x 8 B   Sit to stands with RLE in front 2 x 5;       Nimco received the  following manual therapy techniques: Joint mobilizations were applied to the: L hip for 8 minutes, including:    Inferior glide; grade 3   Long axis hip distraction       Home Exercises Provided and Patient Education Provided     Education provided:   - HEP    Written Home Exercises Provided: yes.  Exercises were reviewed and Nimco was able to demonstrate them prior to the end of the session.  Nimco demonstrated good  understanding of the education provided.     See EMR under Patient Instructions for exercises provided 9/17/2024.    Assessment     Nimco did well today. Advised she continue to use walker at all times. Encouraged her to perform standing hip abd and ext at counter top at home using Both Ue's for support. Pt demo good understanding of this.     Nimco Is progressing well towards her goals.   Pt prognosis is Good.     Pt will continue to benefit from skilled outpatient physical therapy to address the deficits listed in the problem list box on initial evaluation, provide pt/family education and to maximize pt's level of independence in the home and community environment.     Pt's spiritual, cultural and educational needs considered and pt agreeable to plan of care and goals.     Anticipated barriers to physical therapy: none    Goals:  GOALS: Short Term Goals:  6 weeks  1.Report decreased hip pain  < / =  3/10  to increase tolerance for ADL's  2. Improve TUG by at least 8 seconds to demonstrate significant decrease in fall risk  3. Increase strength by 1/3 MMT grade in quad strength  to increase tolerance for ADL and work activities.  4. Pt to tolerate HEP to improve ROM and independence with ADL's     Long Term Goals:12 weeks  1.Improve TUG to </=12s without AD to demonstrate decrease in fall risk  2.Patient goal: return to walking without RW  3.Increase strength to >/= 4+/5 in quad/hip  to increase tolerance for ADL's  4. Pt will improve 5xSTS without UE support in at least 15s to demonstrate  improve LE strength      Plan   Plan of care Certification: 9/6/2024 to 11/29/2024.       Michael Burk, PT , DPT, OCS

## 2024-09-19 NOTE — TELEPHONE ENCOUNTER
----- Message from Grant Madrid sent at 9/19/2024  2:24 PM CDT -----  Patient would like a callback regarding extending her PT visits from 12 to 24.    [phone]

## 2024-09-20 ENCOUNTER — DOCUMENT SCAN (OUTPATIENT)
Dept: HOME HEALTH SERVICES | Facility: HOSPITAL | Age: 64
End: 2024-09-20
Payer: MEDICAID

## 2024-09-24 ENCOUNTER — CLINICAL SUPPORT (OUTPATIENT)
Dept: REHABILITATION | Facility: HOSPITAL | Age: 64
End: 2024-09-24
Payer: MEDICAID

## 2024-09-24 ENCOUNTER — DOCUMENTATION ONLY (OUTPATIENT)
Dept: REHABILITATION | Facility: HOSPITAL | Age: 64
End: 2024-09-24

## 2024-09-24 DIAGNOSIS — M25.652 DECREASED RANGE OF LEFT HIP MOVEMENT: Primary | ICD-10-CM

## 2024-09-24 DIAGNOSIS — R29.898 DECREASED STRENGTH OF LOWER EXTREMITY: ICD-10-CM

## 2024-09-24 PROCEDURE — 97110 THERAPEUTIC EXERCISES: CPT | Mod: KX,PN,CQ

## 2024-09-24 NOTE — PROGRESS NOTES
Physical Therapy Treatment Note     Name: Nimco Caro  Clinic Number: [id number]    Therapy Diagnosis:   Encounter Diagnoses   Name Primary?    Decreased range of left hip movement Yes    Decreased strength of lower extremity        Physician: Timothy Nettles,*    Visit Date: 9/24/2024    Physician Orders: PT Eval and Treat   Medical Diagnosis from Referral: Z96.642 (ICD-10-CM) - History of left hip hemiarthroplasty   Evaluation Date: 9/6/2024  Authorization Period Expiration: 8/8/2025  Plan of Care Expiration: 11/29/2024  Visit # / Visits authorized: 3 / 12     Time In: 202p  Time Out: 256p  Total Appointment Time (timed & untimed codes): 54 minutes     Precautions: Standard     Date of Procedure: 7/20/2024   Procedure: Procedure(s) (LRB):  HEMIARTHROPLASTY, HIP (Left)        Plan:  Reviewed the findings with her today.  Patient may continue to weight bear as tolerated with a assistive device.  Continue physical therapy.  Continue posterior hip precautions at this time.  I will see her back in a month.  No x-rays needed.  Refilled her pain medicine     Subjective     Pt reports: she is hurting today     She was compliant with home exercise program.  Response to previous treatment: pain x 3 days  Functional change: ongoing    Pain: 8/10  Location: left hip      Objective     Nimco received therapeutic exercises to develop strength, endurance, and ROM for 54 minutes including:       NuStep x 10 minutes Level 1      Supine Hip Add with Ball Squeeze 3 x 10  Supine Bridge RTB around knees 2 x 10   SL Clam Isometrics off ball; x 30; 3s holds    Seated LAQ 5# 3 x 10 B    Standing Hip Abd leaning on mat 3 x 10  Standing Hip Ext leaning on mat 3 x 10 3#   Marches 3 x 10 Bilateral       NOT PERFORMED below  Cone Taps 2 x 8 B   Sit to stands with RLE in front 2 x 5;     Education - HEP / use walker at all times / hip precautions  Supine March RTB 3 x 10 Bilateral   Supine Clams RTB 3 x 10 B  Nimco received the  following manual therapy techniques: Joint mobilizations were applied to the: L hip for 8 minutes, including:    Inferior glide; grade 3   Long axis hip distraction       Home Exercises Provided and Patient Education Provided     Education provided:   - HEP    Written Home Exercises Provided: yes.  Exercises were reviewed and Nmico was able to demonstrate them prior to the end of the session.  Nimco demonstrated good  understanding of the education provided.     See EMR under Patient Instructions for exercises provided 9/17/2024.    Assessment     Nimco presented with increased pain levels.  Decreased some resistance with her exercises today in hopes of decreasing her pain.  Pt reported difficulty rolling her RW; reviewed with pt to not lean on her RW to improve wheel motion and smooth glide.    Nimco Is progressing well towards her goals.   Pt prognosis is Good.     Pt will continue to benefit from skilled outpatient physical therapy to address the deficits listed in the problem list box on initial evaluation, provide pt/family education and to maximize pt's level of independence in the home and community environment.     Pt's spiritual, cultural and educational needs considered and pt agreeable to plan of care and goals.     Anticipated barriers to physical therapy: none    Goals:  GOALS: Short Term Goals:  6 weeks  1.Report decreased hip pain  < / =  3/10  to increase tolerance for ADL's  2. Improve TUG by at least 8 seconds to demonstrate significant decrease in fall risk  3. Increase strength by 1/3 MMT grade in quad strength  to increase tolerance for ADL and work activities.  4. Pt to tolerate HEP to improve ROM and independence with ADL's     Long Term Goals:12 weeks  1.Improve TUG to </=12s without AD to demonstrate decrease in fall risk  2.Patient goal: return to walking without RW  3.Increase strength to >/= 4+/5 in quad/hip  to increase tolerance for ADL's  4. Pt will improve 5xSTS without UE  support in at least 15s to demonstrate improve LE strength      Plan   Plan of care Certification: 9/6/2024 to 11/29/2024.     Cont per Plan of Care, gait pattern, strength, balance    Nichole Méndez, PTA

## 2024-09-26 ENCOUNTER — CLINICAL SUPPORT (OUTPATIENT)
Dept: REHABILITATION | Facility: HOSPITAL | Age: 64
End: 2024-09-26
Payer: MEDICAID

## 2024-09-26 DIAGNOSIS — R29.898 DECREASED STRENGTH OF LOWER EXTREMITY: ICD-10-CM

## 2024-09-26 DIAGNOSIS — M25.652 DECREASED RANGE OF LEFT HIP MOVEMENT: Primary | ICD-10-CM

## 2024-09-26 PROCEDURE — 97110 THERAPEUTIC EXERCISES: CPT | Mod: PN

## 2024-09-26 NOTE — PROGRESS NOTES
Physical Therapy Treatment Note     Name: Nimco Caro  Clinic Number: [id number]    Therapy Diagnosis:   Encounter Diagnoses   Name Primary?    Decreased range of left hip movement Yes    Decreased strength of lower extremity        Physician: Timothy Nettles,*    Visit Date: 9/26/2024    Physician Orders: PT Eval and Treat   Medical Diagnosis from Referral: Z96.642 (ICD-10-CM) - History of left hip hemiarthroplasty   Evaluation Date: 9/6/2024  Authorization Period Expiration: 8/8/2025  Plan of Care Expiration: 11/29/2024  Visit # / Visits authorized: 4 / 12     Time In: 11:00 pm  Time Out: 11:54 pm  Total Appointment Time (timed & untimed codes): 54 minutes     Precautions: Standard     Date of Procedure: 7/20/2024   Procedure: Procedure(s) (LRB):  HEMIARTHROPLASTY, HIP (Left)        Plan:  Reviewed the findings with her today.  Patient may continue to weight bear as tolerated with a assistive device.  Continue physical therapy.  Continue posterior hip precautions at this time.  I will see her back in a month.  No x-rays needed.  Refilled her pain medicine     Subjective     Pt reports: she is hurting today     She was compliant with home exercise program.  Response to previous treatment: pain x 3 days  Functional change: ongoing    Pain: 8/10  Location: left hip      Objective     Nimco received therapeutic exercises to develop strength, endurance, and ROM for 46 minutes including:       NuStep x 10 minutes Level 1      Supine Hip Add with Ball Squeeze 3 x 10  Supine Bridge RTB around knees 2 x 10   SL Clam Isometrics off ball; x 30; 3s holds    Seated LAQ 10# 3 x 10 B  Seated SLR 3 x 8   Seated Hip Abd on slide board 3 x 10   Standing Hip Ext leaning on mat 3 x 10 3#   Standing Marches 3 x 10 Bilateral     DL Leg Press Precor 20#; RLE forward 3 x10     NOT PERFORMED below  Cone Taps 2 x 8 B   Sit to stands with RLE in front 2 x 5;     Education - HEP / use walker at all times / hip precautions  Supine  March RTB 3 x 10 Bilateral   Supine Clams RTB 3 x 10 B  Nimco received the following manual therapy techniques: Joint mobilizations were applied to the: L hip for 8 minutes, including:    Inferior glide; grade 3   Long axis hip distraction       Home Exercises Provided and Patient Education Provided     Education provided:   - HEP    Written Home Exercises Provided: yes.  Exercises were reviewed and Nimco was able to demonstrate them prior to the end of the session.  Nimco demonstrated good  understanding of the education provided.     See EMR under Patient Instructions for exercises provided 9/17/2024.    Assessment     Nimco did well today. Continues to arrive with reports of groin soreness and struggles to with prolonged weight accpetance. She continue to report improvement of symptoms following light exercise. I think she will continue to improve but slow considering conditioning/smoke history.     Nimco Is progressing well towards her goals.   Pt prognosis is Good.     Pt will continue to benefit from skilled outpatient physical therapy to address the deficits listed in the problem list box on initial evaluation, provide pt/family education and to maximize pt's level of independence in the home and community environment.     Pt's spiritual, cultural and educational needs considered and pt agreeable to plan of care and goals.     Anticipated barriers to physical therapy: none    Goals:  GOALS: Short Term Goals:  6 weeks  1.Report decreased hip pain  < / =  3/10  to increase tolerance for ADL's  2. Improve TUG by at least 8 seconds to demonstrate significant decrease in fall risk  3. Increase strength by 1/3 MMT grade in quad strength  to increase tolerance for ADL and work activities.  4. Pt to tolerate HEP to improve ROM and independence with ADL's     Long Term Goals:12 weeks  1.Improve TUG to </=12s without AD to demonstrate decrease in fall risk  2.Patient goal: return to walking without  RW  3.Increase strength to >/= 4+/5 in quad/hip  to increase tolerance for ADL's  4. Pt will improve 5xSTS without UE support in at least 15s to demonstrate improve LE strength      Plan   Plan of care Certification: 9/6/2024 to 11/29/2024.     Cont per Plan of Care, gait pattern, strength, balance    Michael Burk, PT

## 2024-09-30 ENCOUNTER — TELEPHONE (OUTPATIENT)
Dept: ORTHOPEDICS | Facility: CLINIC | Age: 64
End: 2024-09-30
Payer: MEDICAID

## 2024-09-30 NOTE — TELEPHONE ENCOUNTER
Returned patient's phone call. I did get her scheduled for Monday 10/7/2024. Patient is also switching insurances and will drop by to update her cards so we can order physical therapy.

## 2024-09-30 NOTE — TELEPHONE ENCOUNTER
----- Message from Reji Forbes sent at 9/30/2024  1:42 PM CDT -----  Contact: PT  Wants to be seen sooner than 6 weeks   Call back

## 2024-10-07 ENCOUNTER — OFFICE VISIT (OUTPATIENT)
Dept: ORTHOPEDICS | Facility: CLINIC | Age: 64
End: 2024-10-07
Payer: MEDICARE

## 2024-10-07 VITALS — BODY MASS INDEX: 28.86 KG/M2 | RESPIRATION RATE: 18 BRPM | WEIGHT: 183.88 LBS | HEIGHT: 67 IN

## 2024-10-07 DIAGNOSIS — M25.552 LEFT HIP PAIN: ICD-10-CM

## 2024-10-07 DIAGNOSIS — Z96.642 HISTORY OF LEFT HIP HEMIARTHROPLASTY: ICD-10-CM

## 2024-10-07 DIAGNOSIS — Z96.642 HISTORY OF LEFT HIP HEMIARTHROPLASTY: Primary | ICD-10-CM

## 2024-10-07 PROCEDURE — 99024 POSTOP FOLLOW-UP VISIT: CPT | Mod: S$PBB,,, | Performed by: ORTHOPAEDIC SURGERY

## 2024-10-07 PROCEDURE — 99214 OFFICE O/P EST MOD 30 MIN: CPT | Mod: PBBFAC,PO | Performed by: ORTHOPAEDIC SURGERY

## 2024-10-07 PROCEDURE — 99999 PR PBB SHADOW E&M-EST. PATIENT-LVL IV: CPT | Mod: PBBFAC,,, | Performed by: ORTHOPAEDIC SURGERY

## 2024-10-07 RX ORDER — OXYCODONE AND ACETAMINOPHEN 10; 325 MG/1; MG/1
1 TABLET ORAL EVERY 6 HOURS PRN
Qty: 28 TABLET | Refills: 0 | Status: SHIPPED | OUTPATIENT
Start: 2024-10-07

## 2024-10-07 NOTE — PROGRESS NOTES
Past Medical History:   Diagnosis Date    ACP (advance care planning) 12/20/2023    Anxiety     Bipolar affect, depressed     Cancer ovarian; uterine    1992    COPD (chronic obstructive pulmonary disease)     GERD (gastroesophageal reflux disease)     Hyperlipidemia     Hypertension     OAB (overactive bladder)     On home oxygen therapy     per patient uses PRN       Past Surgical History:   Procedure Laterality Date    BREAST CYST ASPIRATION      COLON SURGERY      COLONOSCOPY      HEMIARTHROPLASTY OF HIP Left 7/20/2024    Procedure: HEMIARTHROPLASTY, HIP;  Surgeon: Timothy Nettles MD;  [place] [facility] [place];  Service: Orthopedics;  Laterality: Left;    HERNIA REPAIR N/A 2016    HIP ARTHROPLASTY Right 2/25/2019    Procedure: ARTHROPLASTY, HIP;  Surgeon: Eliseo Vaca MD;  Location: [facility] OR;  Service: Orthopedics;  Laterality: Right;  Daniel Herrera    HYSTERECTOMY  total    JOINT REPLACEMENT Right     hip replacemnt    KNEE ARTHROPLASTY Left 8/19/2019    Procedure: ARTHROPLASTY, KNEE;  Surgeon: Eliseo Vaca MD;  Location: [facility] OR;  Service: Orthopedics;  Laterality: Left;    LUMBAR LAMINECTOMY WITH FUSION N/A 12/7/2023    Procedure: LAMINECTOMY, SPINE, LUMBAR, WITH FUSION L4-5;  Surgeon: Joby Jaimes MD;  [place] [facility] [place];  Service: Orthopedics;  Laterality: N/A;  NTI, [employer]    REVISION COLOSTOMY N/A 2012             Review of patient's allergies indicates:   Allergen Reactions    Meloxicam Other (See Comments)    Ciprofloxacin Diarrhea    Hydrocodone-acetaminophen Nausea And Vomiting, Nausea Only and Other (See Comments)    Ibuprofen Nausea Only    Naproxen Nausea Only and Other (See Comments)    Narcof [hydrocodone-guaifenesin] Nausea And Vomiting    Quetiapine Nausea And Vomiting and Other (See Comments)       Family History   Problem Relation Name Age of Onset    Cancer Mother      Hypertension Father      Heart disease Father      Heart disease Sister      Hypertension Sister      Hypertension  Brother      Depression Brother         Social History     Socioeconomic History    Marital status:    Tobacco Use    Smoking status: Every Day     Current packs/day: 0.50     Average packs/day: 0.5 packs/day for 44.8 years (22.4 ttl pk-yrs)     Types: Cigarettes     Start date: 1980    Smokeless tobacco: Never   Substance and Sexual Activity    Alcohol use: Not Currently     Alcohol/week: 1.0 standard drink of alcohol     Types: 1 Cans of beer per week     Comment: weekly    Drug use: No     Social Drivers of Health     Financial Resource Strain: Low Risk  (7/25/2024)    Received from [facility]    Overall Financial Resource Strain (CARDIA)     Difficulty of Paying Living Expenses: Not hard at all   Food Insecurity: No Food Insecurity (7/25/2024)    Received from [facility]    Hunger Vital Sign     Worried About Running Out of Food in the Last Year: Never true     Ran Out of Food in the Last Year: Never true   Transportation Needs: No Transportation Needs (8/2/2024)    Received from [facility] Transportation Source     Has lack of transportation kept you from medical appointments or from getting medications?: No     Has lack of transportation kept you from meetings, work, or from getting things needed for daily living?: No   Physical Activity: Patient Declined (7/21/2024)    Exercise Vital Sign     Days of Exercise per Week: Patient declined     Minutes of Exercise per Session: Patient declined   Stress: No Stress Concern Present (8/3/2024)    Received from [facility] [place] of Occupational Health - Occupational Stress Questionnaire     Feeling of Stress : Not at all   Housing Stability: Low Risk  (7/25/2024)    Received from [facility]    Housing Stability Vital Sign     Unable to Pay for Housing in the Last Year: No     Number of Times Moved in the Last Year: 1     Homeless in the Last Year: No       Chief Complaint: No chief complaint on file.      Date of  surgery:  July 20, 2024    History of present illness:  63-year-old female underwent left hip hemiarthroplasty.  Patient is walking with a walker.  She is having more left hip pain.  Complains of groin pain still.      Review of Systems:    Musculoskeletal:  See HPI        Physical Examination:    Vital Signs:  There were no vitals filed for this visit.    There is no height or weight on file to calculate BMI.    This a well-developed, well nourished patient in no acute distress.  They are alert and oriented and cooperative to examination.  Pt. walks with a moderate antalgic gait using a walker.      Examination left hip incision shows healed surgical incision without erythema drainage.  Patient does not have any significant bruising or hematoma.  Neurovascularly intact.  Has some pain in the groin and the hip is fairly stiff.    X-rays:  X-rays left hip are  review which show a well-aligned hip hemiarthroplasty without complications.  Leg lengths appear equal.     Assessment::  Status post left hip hemiarthroplasty    Plan:  Reviewed the findings with her today.  Patient may continue to weight bear as tolerated with a assistive device.  Recommended getting her in with Dr. Vaca.  Patient did well with her total hip on the other side.  I think she is likely having some acetabular pain that is worsening and not improving.  Might benefit from conversion of her jessica to a total.  Refilled her pain medicine in the meantime.    This note was created using [employer] voice recognition software that occasionally misinterpreted phrases or words.

## 2024-10-10 ENCOUNTER — OFFICE VISIT (OUTPATIENT)
Dept: ORTHOPEDICS | Facility: CLINIC | Age: 64
End: 2024-10-10
Payer: MEDICARE

## 2024-10-10 VITALS — HEIGHT: 67 IN | WEIGHT: 183.88 LBS | BODY MASS INDEX: 28.86 KG/M2

## 2024-10-10 DIAGNOSIS — Z96.642 HISTORY OF LEFT HIP HEMIARTHROPLASTY: Primary | ICD-10-CM

## 2024-10-10 DIAGNOSIS — T84.84XA PAINFUL ORTHOPAEDIC HARDWARE: ICD-10-CM

## 2024-10-10 PROCEDURE — 99212 OFFICE O/P EST SF 10 MIN: CPT | Mod: PBBFAC,PN | Performed by: ORTHOPAEDIC SURGERY

## 2024-10-10 PROCEDURE — 99999 PR PBB SHADOW E&M-EST. PATIENT-LVL II: CPT | Mod: PBBFAC,,, | Performed by: ORTHOPAEDIC SURGERY

## 2024-10-10 NOTE — PROGRESS NOTES
[facility] ORTHOPEDICS    Subjective:     Chief Complaint:   Chief Complaint   Patient presents with    Left Hip - Pain     Patient is here with complaints of Left hip/groin pain, Hx of Hemiarthroplasty, states her pain is worse than her last visit       Past Medical History:   Diagnosis Date    ACP (advance care planning) 12/20/2023    Anxiety     Bipolar affect, depressed     Cancer ovarian; uterine    1992    COPD (chronic obstructive pulmonary disease)     GERD (gastroesophageal reflux disease)     Hyperlipidemia     Hypertension     OAB (overactive bladder)     On home oxygen therapy     per patient uses PRN       Past Surgical History:   Procedure Laterality Date    BREAST CYST ASPIRATION      COLON SURGERY      COLONOSCOPY      HEMIARTHROPLASTY OF HIP Left 7/20/2024    Procedure: HEMIARTHROPLASTY, HIP;  Surgeon: Timothy Nettles MD;  Location: [facility] OR;  Service: Orthopedics;  Laterality: Left;    HERNIA REPAIR N/A 2016    HIP ARTHROPLASTY Right 2/25/2019    Procedure: ARTHROPLASTY, HIP;  Surgeon: Eliseo Vaca MD;  Location: [facility] OR;  Service: Orthopedics;  Laterality: Right;  Daniel Herrera    HYSTERECTOMY  total    JOINT REPLACEMENT Right     hip replacemnt    KNEE ARTHROPLASTY Left 8/19/2019    Procedure: ARTHROPLASTY, KNEE;  Surgeon: Eliseo Vaca MD;  Location: [facility] OR;  Service: Orthopedics;  Laterality: Left;    LUMBAR LAMINECTOMY WITH FUSION N/A 12/7/2023    Procedure: LAMINECTOMY, SPINE, LUMBAR, WITH FUSION L4-5;  Surgeon: Joby Jaimes MD;  Location: [facility];  Service: Orthopedics;  Laterality: N/A;  NTI, [employer]    REVISION COLOSTOMY N/A 2012             Review of patient's allergies indicates:   Allergen Reactions    Meloxicam Other (See Comments)    Ciprofloxacin Diarrhea    Hydrocodone-acetaminophen Nausea And Vomiting, Nausea Only and Other (See Comments)    Ibuprofen Nausea Only    Naproxen Nausea Only and Other (See Comments)    Narcof [hydrocodone-guaifenesin] Nausea And Vomiting     Quetiapine Nausea And Vomiting and Other (See Comments)       Family History   Problem Relation Name Age of Onset    Cancer Mother      Hypertension Father      Heart disease Father      Heart disease Sister      Hypertension Sister      Hypertension Brother      Depression Brother         Social History     Socioeconomic History    Marital status:    Tobacco Use    Smoking status: Every Day     Current packs/day: 0.50     Average packs/day: 0.5 packs/day for 44.8 years (22.4 ttl pk-yrs)     Types: Cigarettes     Start date: 1980    Smokeless tobacco: Never   Substance and Sexual Activity    Alcohol use: Not Currently     Alcohol/week: 1.0 standard drink of alcohol     Types: 1 Cans of beer per week     Comment: weekly    Drug use: No     Social Drivers of Health     Financial Resource Strain: Low Risk  (7/25/2024)    Received from [facility]    Overall Financial Resource Strain (CARDIA)     Difficulty of Paying Living Expenses: Not hard at all   Food Insecurity: No Food Insecurity (7/25/2024)    Received from [facility]    Hunger Vital Sign     Worried About Running Out of Food in the Last Year: Never true     Ran Out of Food in the Last Year: Never true   Transportation Needs: No Transportation Needs (8/2/2024)    Received from [facility] Transportation Source     Has lack of transportation kept you from medical appointments or from getting medications?: No     Has lack of transportation kept you from meetings, work, or from getting things needed for daily living?: No   Physical Activity: Patient Declined (7/21/2024)    Exercise Vital Sign     Days of Exercise per Week: Patient declined     Minutes of Exercise per Session: Patient declined   Stress: No Stress Concern Present (8/3/2024)    Received from [facility] [place] of Occupational Health - Occupational Stress Questionnaire     Feeling of Stress : Not at all   Housing Stability: Low Risk  (7/25/2024)     Received from [facility] Stability Vital Sign     Unable to Pay for Housing in the Last Year: No     Number of Times Moved in the Last Year: 1     Homeless in the Last Year: No       History of present illness:  Ms. Rinaldi comes in today for follow-up for her left hip.  She is about 3 months out from a femoral neck fracture where she underwent a hemiarthroplasty.  Unfortunately continues to have pain in the left groin.  It bothers her on a daily basis.  She is having difficulty ambulating and weight-bearing.  She continues to use a rolling walker to assist and offload.    Review of Systems:    Constitution: Negative for chills, fever, and sweats.  Negative for unexplained weight loss.    HENT:  Negative for headaches and blurry vision.    Cardiovascular:Negative for chest pain or irregular heart beat. Negative for hypertension.    Respiratory:  Negative for cough and shortness of breath.    Gastrointestinal: Negative for abdominal pain, heartburn, melena, nausea, and vomitting.    Genitourinary:  Negative bladder incontinence and dysuria.    Musculoskeletal:  See HPI for details.     Neurological: Negative for numbness.    Psychiatric/Behavioral: Negative for depression.  The patient is not nervous/anxious.      Endocrine: Negative for polyuria    Hematologic/Lymphatic: Negative for bleeding problem.  Does not bruise/bleed easily.    Skin: Negative for poor would healing and rash    Objective:      Physical Examination:    Vital Signs:  There were no vitals filed for this visit.    Body mass index is 28.8 kg/m².    This a well-developed, well nourished patient in no acute distress.  They are alert and oriented and cooperative to examination.        Left hip exam: Skin to left hip clean dry and intact.  No erythema or ecchymosis.  No signs or symptoms of infection.  She is neurovascularly intact throughout the left lower extremity.  Negative Homans on the left.  Painful internal/external rotation of  "left hip.  The pain is located in the groin.  She has a well-healed surgical incision.  No wound dehiscence or drainage.  She can weightbear on the left lower extremity but it is painful for her.  She has a slight leg length discrepancy with being short on the left side.      Pertinent New Results:    XRAY Report / Interpretation:   No new radiographs taken on today's clinic visit.    Assessment/Plan:      1. Failed left hip arthroplasty.    Treatment options were discussed with her today.  Risks and benefits of proceeding with left total hip revision with conversion to total hip arthroplasty were discussed today.  All of her questions were answered.  She understood and wished to proceed.  Surgical consents were obtained today.    Risks of the procedure were reviewed with the patient.  This includes, but is not limited to: bleeding, pain, swelling, decreased range of motion, nerve injury/damage, wound dehiscence, hardware failure, deep vein thrombosis, pulmonary embolism, reflex sympathetic dystrophy, leg length discrepancy, dislocation, thigh pain, and possible need for further surgery.    The mobility limitation cannot be sufficiently resolved by the use of a cane. Patient's functional mobility deficit can be sufficiently resolved with the use of a (Rolling Walker or Walker). Patient's mobility limitation significantly impairs their ability to participate in one of more activities of daily living. The use of a (RW or Walker) will significantly improve the patient's ability to participate in MRADLS and the patient will use it on regular basis in the home.    Cristhian Manrique, Physician Assistant, served in the capacity as a "scribe" for this patient encounter.  A "face-to-face" encounter occurred with Dr. Eliseo Vaca on this date.  The treatment plan and medical decision-making is outlined above. Patient was seen and examined with a chaperone.       This note was created using Dragon voice recognition " software that occasionally misinterpreted phrases or words.

## 2024-10-21 PROBLEM — J96.11 CHRONIC RESPIRATORY FAILURE WITH HYPOXIA: Status: RESOLVED | Noted: 2024-07-22 | Resolved: 2024-10-21

## 2024-10-28 PROBLEM — N39.0 UTI (URINARY TRACT INFECTION): Status: RESOLVED | Noted: 2024-07-23 | Resolved: 2024-10-28

## 2024-12-04 NOTE — ANESTHESIA PROCEDURE NOTES
Spinal    Diagnosis: DJD hip right  Patient location during procedure: OR  Start time: 2/25/2019 8:41 AM  Timeout: 2/25/2019 8:40 AM  End time: 2/25/2019 8:46 AM  Staffing  Anesthesiologist: Jacek Valdez MD  Performed: anesthesiologist   Preanesthetic Checklist  Completed: patient identified, site marked, surgical consent, pre-op evaluation, timeout performed, IV checked, risks and benefits discussed and monitors and equipment checked  Spinal Block  Patient position: sitting  Patient monitoring: cardiac monitor, continuous pulse ox, continuous capnometry and frequent blood pressure checks  Approach: right paramedian  Location: L3-4  Injection technique: lumbar puncture  CSF Fluid: clear free-flowing CSF  Needle  Needle type: pencil-tip   Needle gauge: 25 G  Needle length: 3.5 in  Additional Documentation: incremental injection, negative aspiration for heme and no paresthesia on injection  Needle localization: anatomical landmarks  Assessment  Sensory level: T6   Dermatomal levels determined by alcohol wipe  Ease of block: easy  Patient's tolerance of the procedure: comfortable throughout block and no complaints  Additional Notes  Isobaric SAB          
Awake

## 2025-02-13 ENCOUNTER — TELEPHONE (OUTPATIENT)
Dept: ORTHOPEDICS | Facility: CLINIC | Age: 65
End: 2025-02-13

## 2025-02-13 ENCOUNTER — PATIENT MESSAGE (OUTPATIENT)
Dept: ORTHOPEDICS | Facility: CLINIC | Age: 65
End: 2025-02-13
Payer: MEDICARE

## 2025-02-13 ENCOUNTER — OFFICE VISIT (OUTPATIENT)
Dept: ORTHOPEDICS | Facility: CLINIC | Age: 65
End: 2025-02-13
Payer: MEDICARE

## 2025-02-13 VITALS — WEIGHT: 165 LBS | BODY MASS INDEX: 25.9 KG/M2 | HEIGHT: 67 IN

## 2025-02-13 DIAGNOSIS — Z96.642 HISTORY OF LEFT HIP HEMIARTHROPLASTY: Primary | ICD-10-CM

## 2025-02-13 DIAGNOSIS — Z96.642 HISTORY OF LEFT HIP HEMIARTHROPLASTY: ICD-10-CM

## 2025-02-13 DIAGNOSIS — T84.019D FAILED ARTHROPLASTY, SUBSEQUENT ENCOUNTER: ICD-10-CM

## 2025-02-13 DIAGNOSIS — Z01.818 PRE-OP TESTING: ICD-10-CM

## 2025-02-13 DIAGNOSIS — T84.019D FAILED ARTHROPLASTY, SUBSEQUENT ENCOUNTER: Primary | ICD-10-CM

## 2025-02-13 DIAGNOSIS — T84.84XA PAINFUL ORTHOPAEDIC HARDWARE: ICD-10-CM

## 2025-02-13 PROCEDURE — 99999 PR PBB SHADOW E&M-EST. PATIENT-LVL IV: CPT | Mod: PBBFAC,,, | Performed by: ORTHOPAEDIC SURGERY

## 2025-02-13 PROCEDURE — 99214 OFFICE O/P EST MOD 30 MIN: CPT | Mod: PBBFAC,PN | Performed by: ORTHOPAEDIC SURGERY

## 2025-02-13 PROCEDURE — 99213 OFFICE O/P EST LOW 20 MIN: CPT | Mod: S$PBB,,, | Performed by: ORTHOPAEDIC SURGERY

## 2025-02-13 RX ORDER — MEMANTINE HYDROCHLORIDE 10 MG/1
10 TABLET ORAL 2 TIMES DAILY
COMMUNITY
Start: 2025-01-02

## 2025-02-13 RX ORDER — SUMATRIPTAN SUCCINATE 25 MG/1
25 TABLET ORAL 2 TIMES DAILY PRN
COMMUNITY
Start: 2024-11-21

## 2025-02-13 RX ORDER — CEFAZOLIN SODIUM 2 G/50ML
2 SOLUTION INTRAVENOUS
OUTPATIENT
Start: 2025-02-13

## 2025-02-13 RX ORDER — TRAMADOL HYDROCHLORIDE 50 MG/1
50 TABLET ORAL EVERY 8 HOURS PRN
Qty: 21 TABLET | Refills: 0 | Status: SHIPPED | OUTPATIENT
Start: 2025-02-13

## 2025-02-13 RX ORDER — METFORMIN HYDROCHLORIDE 500 MG/1
500 TABLET, EXTENDED RELEASE ORAL
COMMUNITY
Start: 2024-12-31

## 2025-02-13 RX ORDER — TRANEXAMIC ACID 10 MG/ML
1000 INJECTION, SOLUTION INTRAVENOUS
OUTPATIENT
Start: 2025-02-13

## 2025-02-13 RX ORDER — SERTRALINE HYDROCHLORIDE 100 MG/1
100 TABLET, FILM COATED ORAL
COMMUNITY
Start: 2025-01-19

## 2025-02-13 RX ORDER — TIOTROPIUM BROMIDE 18 UG/1
CAPSULE ORAL; RESPIRATORY (INHALATION)
COMMUNITY
Start: 2025-01-25

## 2025-02-13 NOTE — PROGRESS NOTES
[facility] ORTHOPEDICS    Subjective:     Chief Complaint:   Chief Complaint   Patient presents with    Left Hip - Pain     Patient is here for a f/up on Left Hip, states her pain is worse than her last visit and would like to schedule surgery       Past Medical History:   Diagnosis Date    ACP (advance care planning) 12/20/2023    Anxiety     Bipolar affect, depressed     Cancer ovarian; uterine    1992    COPD (chronic obstructive pulmonary disease)     GERD (gastroesophageal reflux disease)     Hyperlipidemia     Hypertension     OAB (overactive bladder)     On home oxygen therapy     per patient uses PRN       Past Surgical History:   Procedure Laterality Date    BREAST CYST ASPIRATION      COLON SURGERY      COLONOSCOPY      HEMIARTHROPLASTY OF HIP Left 7/20/2024    Procedure: HEMIARTHROPLASTY, HIP;  Surgeon: Timothy Nettles MD;  Location: [facility] OR;  Service: Orthopedics;  Laterality: Left;    HERNIA REPAIR N/A 2016    HIP ARTHROPLASTY Right 2/25/2019    Procedure: ARTHROPLASTY, HIP;  Surgeon: Eliseo Vaca MD;  Location: [facility] OR;  Service: Orthopedics;  Laterality: Right;  Daniel Herrera    HYSTERECTOMY  total    JOINT REPLACEMENT Right     hip replacemnt    KNEE ARTHROPLASTY Left 8/19/2019    Procedure: ARTHROPLASTY, KNEE;  Surgeon: Eliseo Vaca MD;  Location: [facility] OR;  Service: Orthopedics;  Laterality: Left;    LUMBAR LAMINECTOMY WITH FUSION N/A 12/7/2023    Procedure: LAMINECTOMY, SPINE, LUMBAR, WITH FUSION L4-5;  Surgeon: Joby Jaimes MD;  Location: [facility];  Service: Orthopedics;  Laterality: N/A;  NTI, [employer]TR[employer]    REVISION COLOSTOMY N/A 2012             Review of patient's allergies indicates:   Allergen Reactions    Meloxicam Other (See Comments)    Ciprofloxacin Diarrhea    Hydrocodone-acetaminophen Nausea And Vomiting, Nausea Only and Other (See Comments)    Ibuprofen Nausea Only    Naproxen Nausea Only and Other (See Comments)    Narcof [hydrocodone-guaifenesin] Nausea And Vomiting     Quetiapine Nausea And Vomiting and Other (See Comments)       Family History   Problem Relation Name Age of Onset    Cancer Mother      Hypertension Father      Heart disease Father      Heart disease Sister      Hypertension Sister      Hypertension Brother      Depression Brother         Social History     Socioeconomic History    Marital status:    Tobacco Use    Smoking status: Every Day     Current packs/day: 0.50     Average packs/day: 0.5 packs/day for 45.1 years (22.6 ttl pk-yrs)     Types: Cigarettes     Start date: 1980    Smokeless tobacco: Never   Substance and Sexual Activity    Alcohol use: Not Currently     Alcohol/week: 1.0 standard drink of alcohol     Types: 1 Cans of beer per week     Comment: weekly    Drug use: No     Social Drivers of Health     Financial Resource Strain: Low Risk  (7/25/2024)    Received from [facility]    Overall Financial Resource Strain (CARDIA)     Difficulty of Paying Living Expenses: Not hard at all   Food Insecurity: No Food Insecurity (7/25/2024)    Received from [facility]    Hunger Vital Sign     Worried About Running Out of Food in the Last Year: Never true     Ran Out of Food in the Last Year: Never true   Transportation Needs: No Transportation Needs (8/2/2024)    Received from [facility] Source     Has lack of transportation kept you from medical appointments or from getting medications?: No     Has lack of transportation kept you from meetings, work, or from getting things needed for daily living?: No   Physical Activity: Patient Declined (7/21/2024)    Exercise Vital Sign     Days of Exercise per Week: Patient declined     Minutes of Exercise per Session: Patient declined   Stress: No Stress Concern Present (8/3/2024)    Received from [facility] [employer] [facility] [employer] [facility] - Occupational Stress Questionnaire     Feeling of Stress : Not at all   Housing Stability: Low Risk  (7/25/2024)    Received  from [facility]    Housing Stability Vital Sign     Unable to Pay for Housing in the Last Year: No     Number of Times Moved in the Last Year: 1     Homeless in the Last Year: No       History of present illness:  Ms. Rinaldi comes in today for follow-up for her left hip.  She is about 7 months out from a femoral neck fracture where she underwent a hemiarthroplasty.  Unfortunately continues to have pain in the left groin.  It bothers her on a daily basis.  She is having difficulty ambulating and weight-bearing.  She continues to use a rolling walker to assist and offload.     Review of Systems:    Constitution: Negative for chills, fever, and sweats.  Negative for unexplained weight loss.    HENT:  Negative for headaches and blurry vision.    Cardiovascular:Negative for chest pain or irregular heart beat. Negative for hypertension.    Respiratory:  Negative for cough and shortness of breath.    Gastrointestinal: Negative for abdominal pain, heartburn, melena, nausea, and vomitting.    Genitourinary:  Negative bladder incontinence and dysuria.    Musculoskeletal:  See HPI for details.     Neurological: Negative for numbness.    Psychiatric/Behavioral: Negative for depression.  The patient is not nervous/anxious.      Endocrine: Negative for polyuria    Hematologic/Lymphatic: Negative for bleeding problem.  Does not bruise/bleed easily.    Skin: Negative for poor would healing and rash    Objective:      Physical Examination:    Vital Signs:  There were no vitals filed for this visit.    Body mass index is 25.84 kg/m².    This a well-developed, well nourished patient in no acute distress.  They are alert and oriented and cooperative to examination.        Left hip exam: No real change in her left hip exam from her last visit with us. Skin to left hip clean dry and intact. No erythema or ecchymosis. No signs or symptoms of infection. She is neurovascularly intact throughout the left lower extremity. Negative Homans  "on the left. Painful internal/external rotation of left hip. The pain is located in the groin. She has a well-healed surgical incision. No wound dehiscence or drainage. She can weightbear on the left lower extremity but it is painful for her. She has a slight leg length discrepancy with being short on the left side.     Pertinent New Results:    XRAY Report / Interpretation:   No new radiographs taken on today's clinic visit.    Assessment/Plan:      1. Failed left hip hemiarthroplasty.      Treatment options were discussed with her today. Risks and benefits of proceeding with left total hip revision with conversion to total hip arthroplasty were discussed today. All of her questions were answered. She understood and wished to proceed. Surgical consents were obtained today.     Risks of the procedure were reviewed with the patient.  This includes, but is not limited to: bleeding, pain, swelling, decreased range of motion, nerve injury/damage, wound dehiscence, hardware failure, deep vein thrombosis, pulmonary embolism, reflex sympathetic dystrophy, leg length discrepancy, dislocation, thigh pain, and possible need for further surgery.    The mobility limitation cannot be sufficiently resolved by the use of a cane. Patient's functional mobility deficit can be sufficiently resolved with the use of a (Rolling Walker or Walker). Patient's mobility limitation significantly impairs their ability to participate in one of more activities of daily living. The use of a (RW or Walker) will significantly improve the patient's ability to participate in MRADLS and the patient will use it on regular basis in the home.    Cristhian Manrique, Physician Assistant, served in the capacity as a "scribe" for this patient encounter.  A "face-to-face" encounter occurred with Dr. Eliseo Vaca on this date.  The treatment plan and medical decision-making is outlined above. Patient was seen and examined with a chaperone.       This note " was created using Dragon voice recognition software that occasionally misinterpreted phrases or words.

## 2025-02-13 NOTE — TELEPHONE ENCOUNTER
Patient states she was to have been given pain medication at visit today. Please send to pharmacy on file

## 2025-02-27 ENCOUNTER — TELEPHONE (OUTPATIENT)
Dept: ORTHOPEDICS | Facility: CLINIC | Age: 65
End: 2025-02-27
Payer: MEDICARE

## 2025-03-12 ENCOUNTER — TELEPHONE (OUTPATIENT)
Dept: ORTHOPEDICS | Facility: CLINIC | Age: 65
End: 2025-03-12
Payer: MEDICARE

## 2025-03-12 NOTE — TELEPHONE ENCOUNTER
----- Message from Caity sent at 3/11/2025  3:39 PM CDT -----  [phone]-she has the flu and needs to r/s surgery in April

## 2025-03-12 NOTE — TELEPHONE ENCOUNTER
Spoke with the patient. Surgery rescheduled to 4/21. Pre-op and post-op appointments rescheduled as well.

## 2025-04-07 ENCOUNTER — HOSPITAL ENCOUNTER (OUTPATIENT)
Dept: PREADMISSION TESTING | Facility: HOSPITAL | Age: 65
Discharge: HOME | End: 2025-04-07
Attending: ORTHOPAEDIC SURGERY
Payer: MEDICARE

## 2025-04-07 ENCOUNTER — HOSPITAL ENCOUNTER (OUTPATIENT)
Dept: RADIOLOGY | Facility: HOSPITAL | Age: 65
Discharge: HOME | End: 2025-04-07
Attending: ORTHOPAEDIC SURGERY
Payer: MEDICARE

## 2025-04-07 DIAGNOSIS — Z01.818 PRE-OPERATIVE EXAM: ICD-10-CM

## 2025-04-07 DIAGNOSIS — T84.019D FAILED ARTHROPLASTY, SUBSEQUENT ENCOUNTER: ICD-10-CM

## 2025-04-07 DIAGNOSIS — Z96.642 HISTORY OF LEFT HIP HEMIARTHROPLASTY: ICD-10-CM

## 2025-04-07 DIAGNOSIS — Z96.642 HISTORY OF LEFT HIP HEMIARTHROPLASTY: Primary | ICD-10-CM

## 2025-04-07 DIAGNOSIS — Z01.818 PRE-OP TESTING: Primary | ICD-10-CM

## 2025-04-07 LAB
ABSOLUTE EOSINOPHIL (SMH): 0.1 K/UL
ABSOLUTE MONOCYTE (SMH): 0.66 K/UL (ref 0.3–1)
ABSOLUTE NEUTROPHIL COUNT (SMH): 7.6 K/UL (ref 1.8–7.7)
ALBUMIN SERPL-MCNC: 3.7 G/DL (ref 3.5–5.2)
ALP SERPL-CCNC: 141 UNIT/L (ref 40–150)
ALT SERPL-CCNC: 8 UNIT/L (ref 10–44)
ANION GAP (SMH): 7 MMOL/L (ref 8–16)
APTT PPP: 35.6 SECONDS (ref 21–32)
AST SERPL-CCNC: 18 UNIT/L (ref 11–45)
BASOPHILS # BLD AUTO: 0.04 K/UL
BASOPHILS NFR BLD AUTO: 0.4 %
BILIRUB SERPL-MCNC: 0.2 MG/DL (ref 0.1–1)
BUN SERPL-MCNC: 6 MG/DL (ref 8–23)
CALCIUM SERPL-MCNC: 8.8 MG/DL (ref 8.7–10.5)
CHLORIDE SERPL-SCNC: 100 MMOL/L (ref 95–110)
CO2 SERPL-SCNC: 26 MMOL/L (ref 23–29)
CREAT SERPL-MCNC: 0.6 MG/DL (ref 0.5–1.4)
ERYTHROCYTE [DISTWIDTH] IN BLOOD BY AUTOMATED COUNT: 18.6 % (ref 11.5–14.5)
GFR SERPLBLD CREATININE-BSD FMLA CKD-EPI: >60 ML/MIN/1.73/M2
GLUCOSE SERPL-MCNC: 65 MG/DL (ref 70–110)
HCT VFR BLD AUTO: 41.8 % (ref 37–48.5)
HGB BLD-MCNC: 13 GM/DL (ref 12–16)
IMM GRANULOCYTES # BLD AUTO: 0.04 K/UL (ref 0–0.04)
IMM GRANULOCYTES NFR BLD AUTO: 0.4 % (ref 0–0.5)
INDIRECT COOMBS: NORMAL
INR PPP: 1 (ref 0.8–1.2)
LYMPHOCYTES # BLD AUTO: 1.8 K/UL (ref 1–4.8)
MCH RBC QN AUTO: 26.4 PG (ref 27–31)
MCHC RBC AUTO-ENTMCNC: 31.1 G/DL (ref 32–36)
MCV RBC AUTO: 85 FL (ref 82–98)
NUCLEATED RBC (/100WBC) (SMH): 0 /100 WBC
OHS QRS DURATION: 76 MS
OHS QTC CALCULATION: 439 MS
PLATELET # BLD AUTO: 235 K/UL (ref 150–450)
PMV BLD AUTO: 9.2 FL (ref 9.2–12.9)
POTASSIUM SERPL-SCNC: 4.2 MMOL/L (ref 3.5–5.1)
PROT SERPL-MCNC: 7.7 GM/DL (ref 6–8.4)
PROTHROMBIN TIME: 10.8 SECONDS (ref 9–12.5)
RBC # BLD AUTO: 4.92 M/UL (ref 4–5.4)
RELATIVE EOSINOPHIL (SMH): 1 % (ref 0–8)
RELATIVE LYMPHOCYTE (SMH): 17.6 % (ref 18–48)
RELATIVE MONOCYTE (SMH): 6.5 % (ref 4–15)
RELATIVE NEUTROPHIL (SMH): 74.1 % (ref 38–73)
RH BLD: NORMAL
SODIUM SERPL-SCNC: 133 MMOL/L (ref 136–145)
SPECIMEN OUTDATE: NORMAL
WBC # BLD AUTO: 10.23 K/UL (ref 3.9–12.7)

## 2025-04-07 PROCEDURE — 93005 ELECTROCARDIOGRAM TRACING: CPT

## 2025-04-07 PROCEDURE — 80053 COMPREHEN METABOLIC PANEL: CPT | Performed by: ORTHOPAEDIC SURGERY

## 2025-04-07 PROCEDURE — 71046 X-RAY EXAM CHEST 2 VIEWS: CPT | Mod: 26,,, | Performed by: RADIOLOGY

## 2025-04-07 PROCEDURE — 86900 BLOOD TYPING SEROLOGIC ABO: CPT | Performed by: ORTHOPAEDIC SURGERY

## 2025-04-07 PROCEDURE — 85025 COMPLETE CBC W/AUTO DIFF WBC: CPT | Performed by: ORTHOPAEDIC SURGERY

## 2025-04-07 PROCEDURE — 71046 X-RAY EXAM CHEST 2 VIEWS: CPT | Mod: TC

## 2025-04-07 PROCEDURE — 36415 COLL VENOUS BLD VENIPUNCTURE: CPT | Performed by: ANESTHESIOLOGY

## 2025-04-07 PROCEDURE — 85730 THROMBOPLASTIN TIME PARTIAL: CPT | Performed by: ANESTHESIOLOGY

## 2025-04-07 PROCEDURE — 93010 ELECTROCARDIOGRAM REPORT: CPT | Mod: ,,, | Performed by: INTERNAL MEDICINE

## 2025-04-07 PROCEDURE — 85610 PROTHROMBIN TIME: CPT | Performed by: ANESTHESIOLOGY

## 2025-04-07 NOTE — DISCHARGE INSTRUCTIONS
To confirm, Your doctor has instructed you that surgery is scheduled for: 4/21/25    Please report to [facility], Registration the morning of surgery. You must check-in and receive a wristband before going to your procedure.  [address] [place] [address] [place] [address]    Pre-Op will call the afternoon prior to surgery between 1:00 and 6:00 PM with the final arrival time.  Phone number: [phone]    PLEASE NOTE:  The surgery schedule has many variables which may affect the time of your surgery case.  Family members should be available if your surgery time changes.  Plan to be here the day of your procedure between 4-6 hours.    MEDICATIONS:  TAKE ONLY THESE MEDICATIONS WITH A SMALL SIP OF WATER THE MORNING OF YOUR PROCEDURE:    SEE MED LIST          DO NOT TAKE THESE MEDICATIONS 5-7 DAYS PRIOR to your procedure or per your surgeon's request:   ASPIRIN, ALEVE, ADVIL, IBUPROFEN, FISH OIL VITAMIN E, HERBALS  (May take Tylenol)    ONLY if you are prescribed any types of blood thinners such as:  Aspirin, Coumadin, Plavix, Pradaxa, Xarelto, Aggrenox, Effient, Eliquis, Savasya, Brilinta, or any other, ask your surgeon whether you should stop taking them and how long before surgery you should stop.  You may also need to verify with the prescribing physician if it is ok to stop your medication.      INSTRUCTIONS IMPORTANT!!  Do not eat or drink anything between midnight and the time of your procedure- this includes gum, mints, and candy.  EXCEPT: you may have clear liquids such as:  WATER, BLACK COFFEE, UNSWEET TEA, OR GATORADE (NO RED OR PURPLE) UP TO 2 HOURS PRIOR TO YOUR ARRIVAL TIME.  Do not smoke or drink alcoholic beverages 24 hours prior to your procedure.  Shower the night before AND the morning of your procedure with a Chlorhexidine wash such as Hibiclens or Dial antibacterial soap from the neck down.  Do not get it on your face or in your eyes.  You may use your own shampoo and face wash. This  helps your skin to be as bacteria free as possible.    If you wear contact lenses, dentures, hearing aids or glasses, bring a container to put them in during surgery and give to a family member for safe keeping.  Please leave all jewelry, piercing's and valuables at home. You must remove your false eyelashes prior to surgery.    DO NOT remove hair from the surgery site.  Do not shave the incision site unless you are given specific instructions to do so.    ONLY if you have been diagnosed with sleep apnea please bring your C-PAP machine.  ONLY if you wear home oxygen please bring your portable oxygen tank the day of your procedure.  ONLY if you have a history of OPEN HEART SURGERY you will need a clearance from your Cardiologist per Anesthesia.      ONLY for patients requiring bowel prep, written instructions will be given by your doctor's office.  ONLY if you have a neuro stimulator, please bring the controller with you the morning of surgery  ONLY if a type and screen test is needed before surgery, please return:  If your doctor has scheduled you for an overnight stay, bring a small overnight bag with any personal items you need.  Make arrangements in advance for transportation home by a responsible adult. You can not go home in an uber or a cab per hospital policy.  It is not safe to drive a vehicle during the 24 hours after anesthesia.          All  facilities and properties are tobacco free.  Smoking is NOT allowed.   If you have any questions about these instructions, call Pre-Op Admit  Nursing at [phone] or the Pre-Op Day Surgery Unit at [phone].

## 2025-04-07 NOTE — PRE ADMISSION SCREENING
"               [facility] Risk Assessment Scale    Patient Name: Nimco Caro  YOB: 1960  MRN: [id number]            RIsk Factor Measure Recommendation Patient Data Scale/Score   BMI >40 Reconsider surgery, weight loss   Estimated body mass index is 25.84 kg/m² as calculated from the following:    Height as of 2/13/25: 5' 7" (1.702 m).    Weight as of 2/13/25: 74.8 kg (165 lb).   [] 0 = 1 - 24.9  [x] 1 = 25-29.9  [] 2 = 30-34.9  [] 3 = 35-39.9  [] 4 = 40-44.9  [] 5 = 45-99.9   Hemoglobin AIC (if applicable) >9 Delay surgery until DM under control  Refer for:  Nutrition Therapy  Exercise   Medication    No results found for: "LABA1C", "HGBA1C"    Lab Results   Component Value Date     (H) 07/24/2024      [] 0 = 4.0-5.6  [] 1 = 5.7-6.4  [] 2 = 6.5-6.9  [] 3 = 7.0-7.9  [] 4 = 8.0-8.9  [] 5 = 9.0-12   Hemoglobin (Anemia) <9 Delay surgery   Correct anemia Lab Results   Component Value Date    HGB 13.0 04/07/2025    [] 20 - <9.0                    Albumin <3 Delay surgery &Workup Lab Results   Component Value Date    ALBUMIN 3.7 04/07/2025    [] 20 - <3.0   Smoking Cessation >4 Weeks Delay Surgery  Refer to OP Cessation Class    Current Everyday Smoker [x] 20 - current smoker                                __0.5 PPD                    Hx of MI, PE, Arrhythmia, CVA, DVT <30 Days Delay Surgery    N/A [] 20      Infection Variable Delay surgery and re-evaluate   N/A [] 20 - recent/current infection     Depression (PHQ) >10 out of 27 Delay Surgery and re-evaluate  Medication  Counseling              [x] 0     []1     []2     []3      []4      [] 5                    (1-4)      (5-9)  (10-14)  (15-19)   (20-27)     Memory Impairment & Memory loss (Mini-Cog Screening Tool) Advanced dementia and/or Parkinson's Reconsider surgery     [x] 0     []1     []2     []3     []4     [] 5     Physical Conditioning (Modified AM-PAC Per Physical Therapy at [facility]) Unable to ambulate on day of surgery Delay surgery and " re-evaluate  Pre-Rehabilitation   (PT evaluation)       []  0   [x]4       []8     []12        []16     []20       (<20%)   (<40%)   (<60%)   (<80% )    (>80%)     Home Environment/Caregiver support  (Per /Navigator Interview)    Availability of basic services and/or approprate assistance during post-operative period Delay surgery and re-evaluate  Safe home environment  Health   1 week post-surgery  Transportation  availability  Ability to obtain DME/Medications post-op    [x] 0     []1     []2     []3     []4     [] 5  [x] 0     []1     []2     []3     []4     [] 5  [x] 0     []1     []2     []3     []4     [] 5  [x] 0     []1     []2     []3     []4     [] 5         MD Contact: Dr. Vaca Comments:  Total Score:  25

## 2025-04-07 NOTE — PRE ADMISSION SCREENING
JOINT CAMP ASSESSMENT    Name Nimco Caro   MRN [id number]    Age/Sex 64 y.o. female    Surgeon Dr. Eliseo Vaca   Joint Camp Date 4/7/2025   Surgery Date 4/21/2025   Procedure Left Hip Arthroplasty - Revision   Insurance Payor: MEDICARE / Plan: MEDICARE PART A & B / Product Type: Government /    Care Team Patient Care Team:  Katy Mason MD as PCP - General (Family Medicine)  Eliseo Vaca MD as Consulting Physician (Orthopedic Surgery)    Pharmacy   [id number] - [place], [place] - [address]  [address]  [place] [address]  Phone: [phone] Fax: [phone]     AM-PAC Score   20   Risk Assessment Score 25     Past Medical History:   Diagnosis Date    ACP (advance care planning) 12/20/2023    Anxiety     Back pain     Bipolar affect, depressed     Cancer ovarian; uterine    1992    COPD (chronic obstructive pulmonary disease)     Diabetes mellitus     GERD (gastroesophageal reflux disease)     Hyperlipidemia     Hypertension     OAB (overactive bladder)     On home oxygen therapy     per patient uses PRN       Past Surgical History:   Procedure Laterality Date    BREAST CYST ASPIRATION      COLON SURGERY      COLECTOMY    COLONOSCOPY      HEMIARTHROPLASTY OF HIP Left 07/20/2024    Procedure: HEMIARTHROPLASTY, HIP;  Surgeon: Timothy Nettles MD;  Location: [facility] OR;  Service: Orthopedics;  Laterality: Left;    HERNIA REPAIR N/A 2016    HIP ARTHROPLASTY Right 02/25/2019    Procedure: ARTHROPLASTY, HIP;  Surgeon: Eliseo Vaca MD;  Location: [facility] OR;  Service: Orthopedics;  Laterality: Right;  Daniel Herrera    HYSTERECTOMY  total    JOINT REPLACEMENT Right     hip replacemnt    KNEE ARTHROPLASTY Left 08/19/2019    Procedure: ARTHROPLASTY, KNEE;  Surgeon: Eliseo Vaca MD;  Location: [facility] OR;  Service: Orthopedics;  Laterality: Left;    LUMBAR LAMINECTOMY WITH FUSION N/A 12/07/2023    Procedure: LAMINECTOMY, SPINE, LUMBAR, WITH FUSION L4-5;  Surgeon: Joby Jaimes MD;  Location:  [facility] OR;  Service: Orthopedics;  Laterality: N/A;  NTI, [employer]TR[employer]    REVISION COLOSTOMY N/A 2012    REVERSAL         Home Enviroment     Living Arrangement: Lives with friend  Home Environment: 1-story house/ trailer, walk-in shower  Home Safety Concerns: Pets in the home: dogs (1).    DISCHARGE CAREGIVER/SUPPORT SYSTEM     Identified post-op caregiver: Patient has children / family / friends to help, Shaunna Aisha .  Patient's caregiver(s) will not be able to provide physical assistance. Patient will have someone to assist overnight.      Caregiver present at pre-op interview:  No      PRE-OPERATIVE FUNCTIONAL STATUS     Employment: Retired    Pre-op Functional Status: Patient is independent with mobility/ambulation, transfers, ADL's, IADL's.    Use of assistive device for ambulation: quad cane  ADL: self care  ADL Limitations: difficulty with walking  Medical Restrictions: Unstable ambulation and Decreased range of motions in extremities    POTENTIAL BARRIERS TO DISCHARGE/POTENTIAL POST-OP COMPLICATIONS     Patient is a current everyday smoker which could delay wound healing. Patient with hx of COPD, HTN, home oxygen therapy. Patient had previous hip and knee arthroplasty (2019). POSSIBLE SAME DAY DISCHARGE.    DISCHARGE PLAN     Expected LOS of 1 days or less for joint replacement discussed with patient. We also discussed a discharge path of HH for approximately two weeks with a transition to outpatient PT on the third week given no post-op complications.      Patient in agreement with discharge plan: Yes    Discharge to: Discharge home with home health (PT/OT) x2 weeks with transition to outpatient PT     HH:  [facility] ([place], [place]).  Patient disclosure form completed and sent to case management for upload to the medical record.      OP PT: [facility] ([address]     Home DME: rolling walker, rollator, quad cane, bedside commode, and assistive device kit    Needed DME at D/C: none      Rx: Per Dr. Vaca at discharge     Meds to Beds: Yes  Patient expected to discharge on Aspirin 81mg by mouth twice daily for DVT prophylaxis.

## 2025-04-07 NOTE — PRE ADMISSION SCREENING
Patient Name: Nimco Caro  YOB: 1960   MRN: [id number]     [facility]   Basic Mobility Inpatient Short Form 6 Clicks         How much difficulty does the patient currently have  Unable  A Lot  A Little  None      1. Turning over in bed (including adjusting bedclothes, sheets and blankets)?     1 []    2 []    3 []    4 [x]        2. Sitting down on and standing up from a chair with arms (e.g., wheelchair, bedside commode, etc.)     1 []  2 []  3 []     4 [x]      3. Moving from lying on back to sitting on the side of the bed?     1 []  2 []  3 []    4 [x]    How much help from another person does the patient currently need  Total  A Lot  A Little  None      4. Moving to and from a bed to a chair (including a wheelchair)?    1 []  2 []  3 []    4 [x]      5. Need to walk in hospital room?    1 []  2 []  3 []    4 [x]      6. Climbing 3-5 steps with a railing?    1 [x]  2 []  3 []    4 []       Raw Score:     20             CMS 0-100% Score:   35.83         %   Standardized Score:    47.67           CMS Modifier:      CJ                                    [facility]   Basic Mobility Inpatient Short Form 6 Clicks Score Conversion Table*         *Use this form to convert -PAC Basic Mobility Inpatient Raw Scores.   [facility] Inpatient Basic Mobility Short Form Scoring Example   1. Add the number values associated with the response to each item. For example, items totals yield a Raw Score of 21.   2. Match the raw score to the t-Scale scores (t-Scale score = 50.25, SE = 4.69).   3. Find the associated CMS % (CMS % = 28.97%).   4. Locate the correct CMS Functional Modifier Code, or G Code (G code = CJ)     NOTE: Each -PAC Short Form has a separate conversion table. Make sure that you use the correct conversion table.       Instruction Manual - page 45 contains conversion table

## 2025-04-16 DIAGNOSIS — Z96.642 HISTORY OF LEFT HIP HEMIARTHROPLASTY: Primary | ICD-10-CM

## 2025-04-16 DIAGNOSIS — T84.019D FAILED ARTHROPLASTY, SUBSEQUENT ENCOUNTER: ICD-10-CM

## 2025-04-16 DIAGNOSIS — T84.84XA PAINFUL ORTHOPAEDIC HARDWARE: ICD-10-CM

## 2025-04-16 RX ORDER — ONDANSETRON 4 MG/1
4 TABLET, FILM COATED ORAL EVERY 8 HOURS PRN
Qty: 30 TABLET | Refills: 0 | Status: SHIPPED | OUTPATIENT
Start: 2025-04-16

## 2025-04-16 RX ORDER — CELECOXIB 200 MG/1
200 CAPSULE ORAL 2 TIMES DAILY
Qty: 60 CAPSULE | Refills: 0 | Status: SHIPPED | OUTPATIENT
Start: 2025-04-16 | End: 2025-05-17

## 2025-04-16 RX ORDER — ASPIRIN 81 MG/1
81 TABLET ORAL EVERY 12 HOURS
Qty: 60 TABLET | Refills: 0 | Status: SHIPPED | OUTPATIENT
Start: 2025-04-16 | End: 2025-05-17

## 2025-04-16 RX ORDER — DOCUSATE SODIUM 100 MG/1
100 CAPSULE, LIQUID FILLED ORAL 2 TIMES DAILY
Qty: 60 CAPSULE | Refills: 0 | Status: SHIPPED | OUTPATIENT
Start: 2025-04-16 | End: 2025-05-17

## 2025-04-16 RX ORDER — OXYCODONE AND ACETAMINOPHEN 7.5; 325 MG/1; MG/1
1 TABLET ORAL EVERY 6 HOURS PRN
Qty: 28 TABLET | Refills: 0 | Status: SHIPPED | OUTPATIENT
Start: 2025-04-16

## 2025-04-17 ENCOUNTER — ANESTHESIA EVENT (OUTPATIENT)
Dept: SURGERY | Facility: HOSPITAL | Age: 65
End: 2025-04-17
Payer: MEDICARE

## 2025-04-21 ENCOUNTER — HOSPITAL ENCOUNTER (INPATIENT)
Facility: HOSPITAL | Age: 65
LOS: 1 days | Discharge: HOME HEALTH | DRG: 467 | End: 2025-04-22
Attending: ORTHOPAEDIC SURGERY | Admitting: ORTHOPAEDIC SURGERY
Payer: MEDICARE

## 2025-04-21 ENCOUNTER — ANESTHESIA (OUTPATIENT)
Dept: SURGERY | Facility: HOSPITAL | Age: 65
End: 2025-04-21
Payer: MEDICARE

## 2025-04-21 DIAGNOSIS — T84.019D FAILED ARTHROPLASTY, SUBSEQUENT ENCOUNTER: Primary | ICD-10-CM

## 2025-04-21 DIAGNOSIS — Z01.818 PRE-OP TESTING: ICD-10-CM

## 2025-04-21 DIAGNOSIS — Z96.642 HISTORY OF LEFT HIP HEMIARTHROPLASTY: ICD-10-CM

## 2025-04-21 LAB
ANION GAP (SMH): 9 MMOL/L (ref 8–16)
BUN SERPL-MCNC: 8 MG/DL (ref 8–23)
CALCIUM SERPL-MCNC: 8.6 MG/DL (ref 8.7–10.5)
CHLORIDE SERPL-SCNC: 102 MMOL/L (ref 95–110)
CO2 SERPL-SCNC: 26 MMOL/L (ref 23–29)
CREAT SERPL-MCNC: 0.6 MG/DL (ref 0.5–1.4)
ERYTHROCYTE [DISTWIDTH] IN BLOOD BY AUTOMATED COUNT: 18.1 % (ref 11.5–14.5)
GFR SERPLBLD CREATININE-BSD FMLA CKD-EPI: >60 ML/MIN/1.73/M2
GLUCOSE SERPL-MCNC: 166 MG/DL (ref 70–110)
HCT VFR BLD AUTO: 34.1 % (ref 37–48.5)
HGB BLD-MCNC: 10.5 GM/DL (ref 12–16)
MCH RBC QN AUTO: 27.1 PG (ref 27–31)
MCHC RBC AUTO-ENTMCNC: 30.8 G/DL (ref 32–36)
MCV RBC AUTO: 88 FL (ref 82–98)
PLATELET # BLD AUTO: 225 K/UL (ref 150–450)
PMV BLD AUTO: 9.5 FL (ref 9.2–12.9)
POCT GLUCOSE: 139 MG/DL (ref 70–110)
POTASSIUM SERPL-SCNC: 4.3 MMOL/L (ref 3.5–5.1)
RBC # BLD AUTO: 3.87 M/UL (ref 4–5.4)
SODIUM SERPL-SCNC: 137 MMOL/L (ref 136–145)
WBC # BLD AUTO: 15.85 K/UL (ref 3.9–12.7)

## 2025-04-21 PROCEDURE — D9220A PRA ANESTHESIA: Mod: CRNA,,, | Performed by: NURSE ANESTHETIST, CERTIFIED REGISTERED

## 2025-04-21 PROCEDURE — 25000003 PHARM REV CODE 250: Performed by: ORTHOPAEDIC SURGERY

## 2025-04-21 PROCEDURE — C1776 JOINT DEVICE (IMPLANTABLE): HCPCS | Performed by: ORTHOPAEDIC SURGERY

## 2025-04-21 PROCEDURE — 97161 PT EVAL LOW COMPLEX 20 MIN: CPT

## 2025-04-21 PROCEDURE — 37000008 HC ANESTHESIA 1ST 15 MINUTES: Performed by: ORTHOPAEDIC SURGERY

## 2025-04-21 PROCEDURE — 25000003 PHARM REV CODE 250

## 2025-04-21 PROCEDURE — 94799 UNLISTED PULMONARY SVC/PX: CPT

## 2025-04-21 PROCEDURE — 0SPB0JZ REMOVAL OF SYNTHETIC SUBSTITUTE FROM LEFT HIP JOINT, OPEN APPROACH: ICD-10-PCS | Performed by: ORTHOPAEDIC SURGERY

## 2025-04-21 PROCEDURE — 36415 COLL VENOUS BLD VENIPUNCTURE: CPT

## 2025-04-21 PROCEDURE — 25000003 PHARM REV CODE 250: Performed by: NURSE ANESTHETIST, CERTIFIED REGISTERED

## 2025-04-21 PROCEDURE — 82310 ASSAY OF CALCIUM: CPT

## 2025-04-21 PROCEDURE — 11000001 HC ACUTE MED/SURG PRIVATE ROOM

## 2025-04-21 PROCEDURE — 71000039 HC RECOVERY, EACH ADD'L HOUR: Performed by: ORTHOPAEDIC SURGERY

## 2025-04-21 PROCEDURE — 71000015 HC POSTOP RECOV 1ST HR: Performed by: ORTHOPAEDIC SURGERY

## 2025-04-21 PROCEDURE — C1713 ANCHOR/SCREW BN/BN,TIS/BN: HCPCS | Performed by: ORTHOPAEDIC SURGERY

## 2025-04-21 PROCEDURE — 94799 UNLISTED PULMONARY SVC/PX: CPT | Mod: XB

## 2025-04-21 PROCEDURE — 36000710: Performed by: ORTHOPAEDIC SURGERY

## 2025-04-21 PROCEDURE — D9220A PRA ANESTHESIA: Mod: ANES,,, | Performed by: ANESTHESIOLOGY

## 2025-04-21 PROCEDURE — 63600175 PHARM REV CODE 636 W HCPCS: Performed by: ANESTHESIOLOGY

## 2025-04-21 PROCEDURE — 25000003 PHARM REV CODE 250: Performed by: ANESTHESIOLOGY

## 2025-04-21 PROCEDURE — 63600175 PHARM REV CODE 636 W HCPCS: Mod: JZ

## 2025-04-21 PROCEDURE — 63600175 PHARM REV CODE 636 W HCPCS: Performed by: NURSE ANESTHETIST, CERTIFIED REGISTERED

## 2025-04-21 PROCEDURE — 63600175 PHARM REV CODE 636 W HCPCS: Performed by: ORTHOPAEDIC SURGERY

## 2025-04-21 PROCEDURE — 37000009 HC ANESTHESIA EA ADD 15 MINS: Performed by: ORTHOPAEDIC SURGERY

## 2025-04-21 PROCEDURE — 27201423 OPTIME MED/SURG SUP & DEVICES STERILE SUPPLY: Performed by: ORTHOPAEDIC SURGERY

## 2025-04-21 PROCEDURE — 27132 TOTAL HIP ARTHROPLASTY: CPT | Mod: LT,,, | Performed by: ORTHOPAEDIC SURGERY

## 2025-04-21 PROCEDURE — 97165 OT EVAL LOW COMPLEX 30 MIN: CPT

## 2025-04-21 PROCEDURE — 71000033 HC RECOVERY, INTIAL HOUR: Performed by: ORTHOPAEDIC SURGERY

## 2025-04-21 PROCEDURE — 85027 COMPLETE CBC AUTOMATED: CPT

## 2025-04-21 PROCEDURE — 63600175 PHARM REV CODE 636 W HCPCS: Mod: JZ | Performed by: ANESTHESIOLOGY

## 2025-04-21 PROCEDURE — 99900035 HC TECH TIME PER 15 MIN (STAT)

## 2025-04-21 PROCEDURE — 0QH704Z INSERTION OF INTERNAL FIXATION DEVICE INTO LEFT UPPER FEMUR, OPEN APPROACH: ICD-10-PCS | Performed by: ORTHOPAEDIC SURGERY

## 2025-04-21 PROCEDURE — 36000711: Performed by: ORTHOPAEDIC SURGERY

## 2025-04-21 PROCEDURE — 94761 N-INVAS EAR/PLS OXIMETRY MLT: CPT

## 2025-04-21 PROCEDURE — 97116 GAIT TRAINING THERAPY: CPT

## 2025-04-21 PROCEDURE — 0SRB03A REPLACEMENT OF LEFT HIP JOINT WITH CERAMIC SYNTHETIC SUBSTITUTE, UNCEMENTED, OPEN APPROACH: ICD-10-PCS | Performed by: ORTHOPAEDIC SURGERY

## 2025-04-21 PROCEDURE — 97535 SELF CARE MNGMENT TRAINING: CPT

## 2025-04-21 PROCEDURE — 71000016 HC POSTOP RECOV ADDL HR: Performed by: ORTHOPAEDIC SURGERY

## 2025-04-21 DEVICE — 132 DEGREE NECK ANGLE HIP STEM
Type: IMPLANTABLE DEVICE | Site: HIP | Status: FUNCTIONAL
Brand: SECUR-FIT

## 2025-04-21 DEVICE — CERAMIC C-TAPER FEMORAL HEAD
Type: IMPLANTABLE DEVICE | Site: HIP | Status: FUNCTIONAL
Brand: BIOLOX

## 2025-04-21 DEVICE — TRIDENT II TRITANIUM CLUSTER 52E
Type: IMPLANTABLE DEVICE | Site: HIP | Status: FUNCTIONAL
Brand: TRIDENT II

## 2025-04-21 DEVICE — 1.7MM CABLE WITH CRIMP 750MM-STERILE: Type: IMPLANTABLE DEVICE | Site: HIP | Status: FUNCTIONAL

## 2025-04-21 DEVICE — TRIDENT X3 10 DEGREE POLYETHYLENE INSERT
Type: IMPLANTABLE DEVICE | Site: HIP | Status: FUNCTIONAL
Brand: TRIDENT X3 INSERT

## 2025-04-21 RX ORDER — PRAVASTATIN SODIUM 10 MG/1
20 TABLET ORAL DAILY
Status: DISCONTINUED | OUTPATIENT
Start: 2025-04-22 | End: 2025-04-22 | Stop reason: HOSPADM

## 2025-04-21 RX ORDER — ACETAMINOPHEN 325 MG/1
650 TABLET ORAL EVERY 4 HOURS PRN
Status: DISCONTINUED | OUTPATIENT
Start: 2025-04-21 | End: 2025-04-22 | Stop reason: HOSPADM

## 2025-04-21 RX ORDER — IBUPROFEN 200 MG
16 TABLET ORAL
Status: DISCONTINUED | OUTPATIENT
Start: 2025-04-21 | End: 2025-04-22 | Stop reason: HOSPADM

## 2025-04-21 RX ORDER — ONDANSETRON HYDROCHLORIDE 2 MG/ML
4 INJECTION, SOLUTION INTRAVENOUS DAILY PRN
Status: DISCONTINUED | OUTPATIENT
Start: 2025-04-21 | End: 2025-04-21

## 2025-04-21 RX ORDER — HYDROMORPHONE HYDROCHLORIDE 1 MG/ML
1 INJECTION, SOLUTION INTRAMUSCULAR; INTRAVENOUS; SUBCUTANEOUS ONCE
Refills: 0 | Status: COMPLETED | OUTPATIENT
Start: 2025-04-21 | End: 2025-04-21

## 2025-04-21 RX ORDER — MIDAZOLAM HYDROCHLORIDE 1 MG/ML
INJECTION INTRAMUSCULAR; INTRAVENOUS
Status: DISCONTINUED | OUTPATIENT
Start: 2025-04-21 | End: 2025-04-21

## 2025-04-21 RX ORDER — CELECOXIB 100 MG/1
400 CAPSULE ORAL ONCE
Status: COMPLETED | OUTPATIENT
Start: 2025-04-21 | End: 2025-04-21

## 2025-04-21 RX ORDER — MUPIROCIN 20 MG/G
1 OINTMENT TOPICAL 2 TIMES DAILY
Status: DISCONTINUED | OUTPATIENT
Start: 2025-04-21 | End: 2025-04-22 | Stop reason: HOSPADM

## 2025-04-21 RX ORDER — ASPIRIN 81 MG/1
81 TABLET ORAL EVERY 12 HOURS
Status: DISCONTINUED | OUTPATIENT
Start: 2025-04-21 | End: 2025-04-22 | Stop reason: HOSPADM

## 2025-04-21 RX ORDER — HYDROMORPHONE HYDROCHLORIDE 2 MG/ML
0.2 INJECTION, SOLUTION INTRAMUSCULAR; INTRAVENOUS; SUBCUTANEOUS EVERY 5 MIN PRN
Status: DISCONTINUED | OUTPATIENT
Start: 2025-04-21 | End: 2025-04-21

## 2025-04-21 RX ORDER — LIDOCAINE HYDROCHLORIDE 10 MG/ML
1 INJECTION, SOLUTION EPIDURAL; INFILTRATION; INTRACAUDAL; PERINEURAL ONCE
Status: COMPLETED | OUTPATIENT
Start: 2025-04-21 | End: 2025-04-21

## 2025-04-21 RX ORDER — EPHEDRINE SULFATE 50 MG/ML
INJECTION, SOLUTION INTRAVENOUS
Status: DISCONTINUED | OUTPATIENT
Start: 2025-04-21 | End: 2025-04-21

## 2025-04-21 RX ORDER — MEMANTINE HYDROCHLORIDE 5 MG/1
10 TABLET ORAL 2 TIMES DAILY
Status: DISCONTINUED | OUTPATIENT
Start: 2025-04-21 | End: 2025-04-22 | Stop reason: HOSPADM

## 2025-04-21 RX ORDER — DONEPEZIL HYDROCHLORIDE 5 MG/1
10 TABLET, FILM COATED ORAL DAILY
Status: DISCONTINUED | OUTPATIENT
Start: 2025-04-22 | End: 2025-04-22 | Stop reason: HOSPADM

## 2025-04-21 RX ORDER — BUTALBITAL, ACETAMINOPHEN AND CAFFEINE 50; 325; 40 MG/1; MG/1; MG/1
1 TABLET ORAL EVERY 6 HOURS PRN
Status: DISCONTINUED | OUTPATIENT
Start: 2025-04-21 | End: 2025-04-22 | Stop reason: HOSPADM

## 2025-04-21 RX ORDER — FAMOTIDINE 20 MG/1
20 TABLET, FILM COATED ORAL 2 TIMES DAILY
Status: DISCONTINUED | OUTPATIENT
Start: 2025-04-21 | End: 2025-04-22 | Stop reason: HOSPADM

## 2025-04-21 RX ORDER — ONDANSETRON HYDROCHLORIDE 2 MG/ML
INJECTION, SOLUTION INTRAMUSCULAR; INTRAVENOUS
Status: DISCONTINUED | OUTPATIENT
Start: 2025-04-21 | End: 2025-04-21

## 2025-04-21 RX ORDER — LANOLIN ALCOHOL/MO/W.PET/CERES
800 CREAM (GRAM) TOPICAL
Status: DISCONTINUED | OUTPATIENT
Start: 2025-04-21 | End: 2025-04-22 | Stop reason: HOSPADM

## 2025-04-21 RX ORDER — SODIUM CHLORIDE 0.9 % (FLUSH) 0.9 %
3 SYRINGE (ML) INJECTION
Status: DISCONTINUED | OUTPATIENT
Start: 2025-04-21 | End: 2025-04-21

## 2025-04-21 RX ORDER — SODIUM CHLORIDE 0.9 % (FLUSH) 0.9 %
3 SYRINGE (ML) INJECTION
Status: DISCONTINUED | OUTPATIENT
Start: 2025-04-21 | End: 2025-04-22 | Stop reason: HOSPADM

## 2025-04-21 RX ORDER — INSULIN ASPART 100 [IU]/ML
0-5 INJECTION, SOLUTION INTRAVENOUS; SUBCUTANEOUS
Status: DISCONTINUED | OUTPATIENT
Start: 2025-04-21 | End: 2025-04-22 | Stop reason: HOSPADM

## 2025-04-21 RX ORDER — GLUCAGON 1 MG
1 KIT INJECTION
Status: DISCONTINUED | OUTPATIENT
Start: 2025-04-21 | End: 2025-04-22 | Stop reason: HOSPADM

## 2025-04-21 RX ORDER — MECLIZINE HYDROCHLORIDE 25 MG/1
25 TABLET ORAL 3 TIMES DAILY PRN
Status: DISCONTINUED | OUTPATIENT
Start: 2025-04-21 | End: 2025-04-22 | Stop reason: HOSPADM

## 2025-04-21 RX ORDER — TRANEXAMIC ACID 100 MG/ML
INJECTION, SOLUTION INTRAVENOUS
Status: DISCONTINUED | OUTPATIENT
Start: 2025-04-21 | End: 2025-04-21

## 2025-04-21 RX ORDER — DEXAMETHASONE SODIUM PHOSPHATE 4 MG/ML
INJECTION, SOLUTION INTRA-ARTICULAR; INTRALESIONAL; INTRAMUSCULAR; INTRAVENOUS; SOFT TISSUE
Status: DISCONTINUED | OUTPATIENT
Start: 2025-04-21 | End: 2025-04-21

## 2025-04-21 RX ORDER — SCOPOLAMINE 1 MG/3D
1 PATCH, EXTENDED RELEASE TRANSDERMAL
Status: DISCONTINUED | OUTPATIENT
Start: 2025-04-21 | End: 2025-04-22 | Stop reason: HOSPADM

## 2025-04-21 RX ORDER — AZELASTINE 1 MG/ML
1 SPRAY, METERED NASAL 2 TIMES DAILY
Status: DISCONTINUED | OUTPATIENT
Start: 2025-04-21 | End: 2025-04-22 | Stop reason: HOSPADM

## 2025-04-21 RX ORDER — CEFAZOLIN 2 G/1
2 INJECTION, POWDER, FOR SOLUTION INTRAMUSCULAR; INTRAVENOUS
Status: COMPLETED | OUTPATIENT
Start: 2025-04-21 | End: 2025-04-21

## 2025-04-21 RX ORDER — HYDROCODONE BITARTRATE AND ACETAMINOPHEN 5; 325 MG/1; MG/1
1 TABLET ORAL EVERY 4 HOURS PRN
Status: DISCONTINUED | OUTPATIENT
Start: 2025-04-21 | End: 2025-04-22 | Stop reason: HOSPADM

## 2025-04-21 RX ORDER — FENTANYL CITRATE 50 UG/ML
INJECTION, SOLUTION INTRAMUSCULAR; INTRAVENOUS
Status: DISCONTINUED | OUTPATIENT
Start: 2025-04-21 | End: 2025-04-21

## 2025-04-21 RX ORDER — FENTANYL CITRATE 50 UG/ML
25 INJECTION, SOLUTION INTRAMUSCULAR; INTRAVENOUS EVERY 5 MIN PRN
Status: COMPLETED | OUTPATIENT
Start: 2025-04-21 | End: 2025-04-21

## 2025-04-21 RX ORDER — IPRATROPIUM BROMIDE AND ALBUTEROL SULFATE 2.5; .5 MG/3ML; MG/3ML
3 SOLUTION RESPIRATORY (INHALATION) EVERY 6 HOURS PRN
Status: DISCONTINUED | OUTPATIENT
Start: 2025-04-21 | End: 2025-04-22 | Stop reason: HOSPADM

## 2025-04-21 RX ORDER — CEFAZOLIN SODIUM 1 G/3ML
1 INJECTION, POWDER, FOR SOLUTION INTRAMUSCULAR; INTRAVENOUS
Status: COMPLETED | OUTPATIENT
Start: 2025-04-21 | End: 2025-04-22

## 2025-04-21 RX ORDER — SODIUM CHLORIDE 0.9 % (FLUSH) 0.9 %
5 SYRINGE (ML) INJECTION
Status: DISCONTINUED | OUTPATIENT
Start: 2025-04-21 | End: 2025-04-22 | Stop reason: HOSPADM

## 2025-04-21 RX ORDER — TOPIRAMATE 25 MG/1
50 TABLET ORAL DAILY
Status: DISCONTINUED | OUTPATIENT
Start: 2025-04-22 | End: 2025-04-22 | Stop reason: HOSPADM

## 2025-04-21 RX ORDER — ROCURONIUM BROMIDE 10 MG/ML
INJECTION, SOLUTION INTRAVENOUS
Status: DISCONTINUED | OUTPATIENT
Start: 2025-04-21 | End: 2025-04-21

## 2025-04-21 RX ORDER — PROPOFOL 10 MG/ML
VIAL (ML) INTRAVENOUS
Status: DISCONTINUED | OUTPATIENT
Start: 2025-04-21 | End: 2025-04-21

## 2025-04-21 RX ORDER — LIDOCAINE HYDROCHLORIDE 20 MG/ML
INJECTION INTRAVENOUS
Status: DISCONTINUED | OUTPATIENT
Start: 2025-04-21 | End: 2025-04-21

## 2025-04-21 RX ORDER — OXYCODONE AND ACETAMINOPHEN 10; 325 MG/1; MG/1
1 TABLET ORAL EVERY 6 HOURS PRN
Refills: 0 | Status: DISCONTINUED | OUTPATIENT
Start: 2025-04-21 | End: 2025-04-22 | Stop reason: HOSPADM

## 2025-04-21 RX ORDER — ZOLPIDEM TARTRATE 5 MG/1
5 TABLET ORAL NIGHTLY PRN
Status: DISCONTINUED | OUTPATIENT
Start: 2025-04-21 | End: 2025-04-22 | Stop reason: HOSPADM

## 2025-04-21 RX ORDER — TALC
9 POWDER (GRAM) TOPICAL NIGHTLY PRN
Status: DISCONTINUED | OUTPATIENT
Start: 2025-04-21 | End: 2025-04-22 | Stop reason: HOSPADM

## 2025-04-21 RX ORDER — SERTRALINE HYDROCHLORIDE 50 MG/1
100 TABLET, FILM COATED ORAL DAILY
Status: DISCONTINUED | OUTPATIENT
Start: 2025-04-22 | End: 2025-04-22 | Stop reason: HOSPADM

## 2025-04-21 RX ORDER — NALOXONE HCL 0.4 MG/ML
0.02 VIAL (ML) INJECTION
Status: DISCONTINUED | OUTPATIENT
Start: 2025-04-21 | End: 2025-04-22 | Stop reason: HOSPADM

## 2025-04-21 RX ORDER — ONDANSETRON HYDROCHLORIDE 2 MG/ML
4 INJECTION, SOLUTION INTRAVENOUS EVERY 6 HOURS PRN
Status: DISCONTINUED | OUTPATIENT
Start: 2025-04-21 | End: 2025-04-22 | Stop reason: HOSPADM

## 2025-04-21 RX ORDER — ATENOLOL 25 MG/1
25 TABLET ORAL DAILY
Status: DISCONTINUED | OUTPATIENT
Start: 2025-04-22 | End: 2025-04-22 | Stop reason: HOSPADM

## 2025-04-21 RX ORDER — AMOXICILLIN 250 MG
1 CAPSULE ORAL 2 TIMES DAILY
Status: DISCONTINUED | OUTPATIENT
Start: 2025-04-21 | End: 2025-04-22 | Stop reason: HOSPADM

## 2025-04-21 RX ORDER — DEXMEDETOMIDINE HYDROCHLORIDE 100 UG/ML
INJECTION, SOLUTION INTRAVENOUS
Status: DISCONTINUED | OUTPATIENT
Start: 2025-04-21 | End: 2025-04-21

## 2025-04-21 RX ORDER — ACETAMINOPHEN 500 MG
1000 TABLET ORAL
Status: COMPLETED | OUTPATIENT
Start: 2025-04-21 | End: 2025-04-21

## 2025-04-21 RX ORDER — PANTOPRAZOLE SODIUM 40 MG/1
40 TABLET, DELAYED RELEASE ORAL DAILY
Status: DISCONTINUED | OUTPATIENT
Start: 2025-04-22 | End: 2025-04-22 | Stop reason: HOSPADM

## 2025-04-21 RX ORDER — OXYCODONE HCL 10 MG/1
10 TABLET, FILM COATED, EXTENDED RELEASE ORAL ONCE
Status: COMPLETED | OUTPATIENT
Start: 2025-04-21 | End: 2025-04-21

## 2025-04-21 RX ORDER — OXCARBAZEPINE 150 MG/1
300 TABLET, FILM COATED ORAL DAILY
Status: DISCONTINUED | OUTPATIENT
Start: 2025-04-22 | End: 2025-04-22 | Stop reason: HOSPADM

## 2025-04-21 RX ORDER — MORPHINE SULFATE 2 MG/ML
6 INJECTION, SOLUTION INTRAMUSCULAR; INTRAVENOUS ONCE
Status: DISCONTINUED | OUTPATIENT
Start: 2025-04-21 | End: 2025-04-21

## 2025-04-21 RX ORDER — POLYETHYLENE GLYCOL 3350 17 G/17G
17 POWDER, FOR SOLUTION ORAL DAILY
Status: DISCONTINUED | OUTPATIENT
Start: 2025-04-22 | End: 2025-04-22 | Stop reason: HOSPADM

## 2025-04-21 RX ORDER — BISACODYL 10 MG/1
10 SUPPOSITORY RECTAL DAILY
Status: DISCONTINUED | OUTPATIENT
Start: 2025-04-21 | End: 2025-04-22 | Stop reason: HOSPADM

## 2025-04-21 RX ORDER — IBUPROFEN 200 MG
24 TABLET ORAL
Status: DISCONTINUED | OUTPATIENT
Start: 2025-04-21 | End: 2025-04-22 | Stop reason: HOSPADM

## 2025-04-21 RX ORDER — OXYCODONE AND ACETAMINOPHEN 5; 325 MG/1; MG/1
1 TABLET ORAL EVERY 6 HOURS PRN
Refills: 0 | Status: DISCONTINUED | OUTPATIENT
Start: 2025-04-21 | End: 2025-04-22 | Stop reason: HOSPADM

## 2025-04-21 RX ADMIN — CEFAZOLIN 1 G: 330 INJECTION, POWDER, FOR SOLUTION INTRAMUSCULAR; INTRAVENOUS at 11:04

## 2025-04-21 RX ADMIN — FAMOTIDINE 20 MG: 20 TABLET, FILM COATED ORAL at 08:04

## 2025-04-21 RX ADMIN — EPHEDRINE SULFATE 20 MG: 50 INJECTION, SOLUTION INTRAMUSCULAR; INTRAVENOUS; SUBCUTANEOUS at 09:04

## 2025-04-21 RX ADMIN — TRANEXAMIC ACID 750 MG: 100 INJECTION, SOLUTION INTRAVENOUS at 07:04

## 2025-04-21 RX ADMIN — HYDROMORPHONE HYDROCHLORIDE 1 MG: 1 INJECTION, SOLUTION INTRAMUSCULAR; INTRAVENOUS; SUBCUTANEOUS at 08:04

## 2025-04-21 RX ADMIN — DEXMEDETOMIDINE HYDROCHLORIDE 5 MCG: 100 INJECTION, SOLUTION INTRAVENOUS at 09:04

## 2025-04-21 RX ADMIN — ACETAMINOPHEN 1000 MG: 500 TABLET ORAL at 07:04

## 2025-04-21 RX ADMIN — HYDROMORPHONE HYDROCHLORIDE 0.2 MG: 2 INJECTION, SOLUTION INTRAMUSCULAR; INTRAVENOUS; SUBCUTANEOUS at 11:04

## 2025-04-21 RX ADMIN — MIDAZOLAM HYDROCHLORIDE 2 MG: 1 INJECTION, SOLUTION INTRAMUSCULAR; INTRAVENOUS at 07:04

## 2025-04-21 RX ADMIN — FENTANYL CITRATE 25 MCG: 50 INJECTION, SOLUTION INTRAMUSCULAR; INTRAVENOUS at 10:04

## 2025-04-21 RX ADMIN — CELECOXIB 400 MG: 100 CAPSULE ORAL at 07:04

## 2025-04-21 RX ADMIN — SODIUM CHLORIDE, SODIUM GLUCONATE, SODIUM ACETATE, POTASSIUM CHLORIDE AND MAGNESIUM CHLORIDE: 526; 502; 368; 37; 30 INJECTION, SOLUTION INTRAVENOUS at 07:04

## 2025-04-21 RX ADMIN — CEFAZOLIN 1 G: 330 INJECTION, POWDER, FOR SOLUTION INTRAMUSCULAR; INTRAVENOUS at 04:04

## 2025-04-21 RX ADMIN — FENTANYL CITRATE 50 MCG: 0.05 INJECTION, SOLUTION INTRAMUSCULAR; INTRAVENOUS at 09:04

## 2025-04-21 RX ADMIN — CEFAZOLIN 2 G: 2 INJECTION, POWDER, FOR SOLUTION INTRAMUSCULAR; INTRAVENOUS at 07:04

## 2025-04-21 RX ADMIN — ONDANSETRON 4 MG: 2 INJECTION INTRAMUSCULAR; INTRAVENOUS at 09:04

## 2025-04-21 RX ADMIN — THERA TABS 1 TABLET: TAB at 04:04

## 2025-04-21 RX ADMIN — ASPIRIN 81 MG: 81 TABLET, COATED ORAL at 08:04

## 2025-04-21 RX ADMIN — OXYCODONE HYDROCHLORIDE 10 MG: 10 TABLET, FILM COATED, EXTENDED RELEASE ORAL at 07:04

## 2025-04-21 RX ADMIN — PROPOFOL 140 MG: 10 INJECTION, EMULSION INTRAVENOUS at 07:04

## 2025-04-21 RX ADMIN — MEMANTINE 10 MG: 5 TABLET ORAL at 08:04

## 2025-04-21 RX ADMIN — SCOPOLAMINE 1 PATCH: 1.5 PATCH, EXTENDED RELEASE TRANSDERMAL at 07:04

## 2025-04-21 RX ADMIN — SENNOSIDES AND DOCUSATE SODIUM 1 TABLET: 50; 8.6 TABLET ORAL at 08:04

## 2025-04-21 RX ADMIN — SUGAMMADEX 100 MG: 100 INJECTION, SOLUTION INTRAVENOUS at 09:04

## 2025-04-21 RX ADMIN — FENTANYL CITRATE 50 MCG: 0.05 INJECTION, SOLUTION INTRAMUSCULAR; INTRAVENOUS at 08:04

## 2025-04-21 RX ADMIN — LIDOCAINE HYDROCHLORIDE 75 MG: 20 INJECTION, SOLUTION INTRAVENOUS at 07:04

## 2025-04-21 RX ADMIN — HYDROMORPHONE HYDROCHLORIDE 0.2 MG: 2 INJECTION, SOLUTION INTRAMUSCULAR; INTRAVENOUS; SUBCUTANEOUS at 10:04

## 2025-04-21 RX ADMIN — EPHEDRINE SULFATE 10 MG: 50 INJECTION, SOLUTION INTRAMUSCULAR; INTRAVENOUS; SUBCUTANEOUS at 09:04

## 2025-04-21 RX ADMIN — OXYCODONE HYDROCHLORIDE AND ACETAMINOPHEN 1 TABLET: 10; 325 TABLET ORAL at 04:04

## 2025-04-21 RX ADMIN — ROCURONIUM BROMIDE 50 MG: 10 INJECTION, SOLUTION INTRAVENOUS at 07:04

## 2025-04-21 RX ADMIN — LIDOCAINE HYDROCHLORIDE 10 MG: 10 INJECTION, SOLUTION EPIDURAL; INFILTRATION; INTRACAUDAL; PERINEURAL at 07:04

## 2025-04-21 RX ADMIN — DEXMEDETOMIDINE HYDROCHLORIDE 5 MCG: 100 INJECTION, SOLUTION INTRAVENOUS at 08:04

## 2025-04-21 RX ADMIN — ONDANSETRON 4 MG: 2 INJECTION INTRAMUSCULAR; INTRAVENOUS at 07:04

## 2025-04-21 RX ADMIN — MUPIROCIN 1 G: 20 OINTMENT TOPICAL at 08:04

## 2025-04-21 RX ADMIN — DEXAMETHASONE SODIUM PHOSPHATE 8 MG: 4 INJECTION, SOLUTION INTRA-ARTICULAR; INTRALESIONAL; INTRAMUSCULAR; INTRAVENOUS; SOFT TISSUE at 08:04

## 2025-04-21 RX ADMIN — FENTANYL CITRATE 100 MCG: 0.05 INJECTION, SOLUTION INTRAMUSCULAR; INTRAVENOUS at 07:04

## 2025-04-21 NOTE — ANESTHESIA POSTPROCEDURE EVALUATION
Anesthesia Post Evaluation    Patient: Nimco Caro    Procedure(s) Performed: Procedure(s) (LRB):  ARTHROPLASTY, HIP (Left)    Final Anesthesia Type: general      Patient location during evaluation: PACU  Patient participation: Yes- Able to Participate  Level of consciousness: sedated and awake  Post-procedure vital signs: reviewed and stable  Pain management: adequate  Airway patency: patent    PONV status at discharge: No PONV  Anesthetic complications: no      Cardiovascular status: hypertensive, blood pressure returned to baseline and hemodynamically stable  Respiratory status: spontaneous ventilation  Hydration status: euvolemic  Follow-up not needed.              Vitals Value Taken Time   /75 04/21/25 11:28   Temp 36.6 °C (97.9 °F) 04/21/25 09:55   Pulse 70 04/21/25 11:28   Resp 16 04/21/25 11:28   SpO2 96 % 04/21/25 11:28         Event Time   Out of Recovery 10:28:00         Pain/Cruz Score: Pain Rating Prior to Med Admin: 7 (4/21/2025 11:10 AM)  Pain Rating Post Med Admin: 7 (4/21/2025 11:10 AM)  Cruz Score: 9 (4/21/2025 11:10 AM)  Modified Cruz Score: 19 (4/21/2025 11:28 AM)

## 2025-04-21 NOTE — ANESTHESIA PREPROCEDURE EVALUATION
04/21/2025  Nimco Caro is a 64 y.o., female.      Pre-op Assessment    I have reviewed the Patient Summary Reports.     I have reviewed the Nursing Notes. I have reviewed the NPO Status.   I have reviewed the Medications.     Review of Systems  Anesthesia Hx:  No problems with previous Anesthesia                Social:  Smoker       Hematology/Oncology:                        --  Cancer in past history:                     Cardiovascular:     Hypertension           hyperlipidemia                         Hypertension         Pulmonary:   COPD         Chronic Obstructive Pulmonary Disease (COPD):                      Hepatic/GI:     GERD                Musculoskeletal:  Arthritis   Left hip fracture        Spine Disorders: lumbar            Psych:  Psychiatric History  depression                Physical Exam  General: Well nourished, Cooperative, Alert and Oriented    Airway:  Mallampati: II   Mouth Opening: Normal  TM Distance: Normal  Tongue: Normal  Neck ROM: Normal ROM    Dental:  Dentures    Chest/Lungs:  Normal Respiratory Rate, Clear to auscultation    Heart:  Rate: Normal  Rhythm: Regular Rhythm        Anesthesia Plan  Type of Anesthesia, risks & benefits discussed:    Anesthesia Type: Gen ETT  Intra-op Monitoring Plan: Standard ASA Monitors  Post Op Pain Control Plan: multimodal analgesia and IV/PO Opioids PRN  Induction:  IV and Inhalation  Airway Plan: Direct and Video, Post-Induction  Informed Consent: Informed consent signed with the Patient and all parties understand the risks and agree with anesthesia plan.  All questions answered. Patient consented to blood products? Yes  ASA Score: 3    Ready For Surgery From Anesthesia Perspective.     .

## 2025-04-21 NOTE — PLAN OF CARE
POC/Meds reviewed, pt verbalized understanding. Vitals stable. Afebrile. Remains on room air. Tele In place-NSR. Purewick in place, good UOP. IS at bedside, instructed on use and return demonstration performed. PRN pain meds administered. Repositions self. Hourly/Q2hr rounding performed, safety maintained. Bed in lowest position, wheels locked, SR up x2, call light in easy reach. No complaints at this time. Will continue to monitor.

## 2025-04-21 NOTE — PLAN OF CARE
Release per anesthesia vital signs stable instructed on IS no n&v  encouraged deep breaths has all belongings. Ice on abductor pillow in place.

## 2025-04-21 NOTE — PLAN OF CARE
Patient not cleared for discharge per PT,  PT recommended patient be admitted overnight for observation, pain control and continued therapy, discussed with patient and she is in agreement.  PT wants OT to evaluate patient first.

## 2025-04-21 NOTE — H&P
CC/Indication for Procedure: 64 y.o. female with History of left hip hemiarthroplasty [Z96.642]  Failed arthroplasty, subsequent encounter [T84.019D]  Pre-op testing [Z01.818].    Patient scheduled for NV CONV PREV HIP SURG TO TOT HIP ARTHROPLAS [58423] (ARTHROPLASTY, HIP(CONVERT ANUP TO DANNY), LEFT).    Past Medical History:   Diagnosis Date    ACP (advance care planning) 12/20/2023    Anxiety     Back pain     Bipolar affect, depressed     Cancer ovarian; uterine    1992    COPD (chronic obstructive pulmonary disease)     Diabetes mellitus     GERD (gastroesophageal reflux disease)     Hyperlipidemia     Hypertension     OAB (overactive bladder)     On home oxygen therapy     per patient uses PRN     Past Surgical History:   Procedure Laterality Date    BREAST CYST ASPIRATION      COLON SURGERY      COLECTOMY    COLONOSCOPY      HEMIARTHROPLASTY OF HIP Left 07/20/2024    Procedure: HEMIARTHROPLASTY, HIP;  Surgeon: Timothy Nettles MD;  Location: [facility] OR;  Service: Orthopedics;  Laterality: Left;    HERNIA REPAIR N/A 2016    HIP ARTHROPLASTY Right 02/25/2019    Procedure: ARTHROPLASTY, HIP;  Surgeon: Eliseo Vaca MD;  Location: [facility] OR;  Service: Orthopedics;  Laterality: Right;  Daniel Herrera    HYSTERECTOMY  total    JOINT REPLACEMENT Right     hip replacemnt    KNEE ARTHROPLASTY Left 08/19/2019    Procedure: ARTHROPLASTY, KNEE;  Surgeon: Eliseo Vaca MD;  Location: [facility] OR;  Service: Orthopedics;  Laterality: Left;    LUMBAR LAMINECTOMY WITH FUSION N/A 12/07/2023    Procedure: LAMINECTOMY, SPINE, LUMBAR, WITH FUSION L4-5;  Surgeon: Joby Jaimes MD;  Location: [facility] OR;  Service: Orthopedics;  Laterality: N/A;  NTI, [employer]    REVISION COLOSTOMY N/A 2012    REVERSAL     Family History   Problem Relation Name Age of Onset    Cancer Mother      Hypertension Father      Heart disease Father      Heart disease Sister      Hypertension Sister      Hypertension Brother      Depression Brother       Social  History     Socioeconomic History    Marital status:    Tobacco Use    Smoking status: Every Day     Current packs/day: 0.50     Average packs/day: 0.5 packs/day for 45.3 years (22.7 ttl pk-yrs)     Types: Cigarettes     Start date: 1980    Smokeless tobacco: Never   Substance and Sexual Activity    Alcohol use: Yes     Alcohol/week: 1.0 standard drink of alcohol     Types: 1 Cans of beer per week     Comment: weekly    Drug use: No    Sexual activity: Not Currently     Social Drivers of Health     Financial Resource Strain: Low Risk  (7/25/2024)    Received from [facility]    Overall Financial Resource Strain (CARDIA)     Difficulty of Paying Living Expenses: Not hard at all   Food Insecurity: No Food Insecurity (7/25/2024)    Received from [facility]    Hunger Vital Sign     Worried About Running Out of Food in the Last Year: Never true     Ran Out of Food in the Last Year: Never true   Transportation Needs: No Transportation Needs (8/2/2024)    Received from [facility] SDOH Transportation Source     Has lack of transportation kept you from medical appointments or from getting medications?: No     Has lack of transportation kept you from meetings, work, or from getting things needed for daily living?: No   Physical Activity: Patient Declined (7/21/2024)    Exercise Vital Sign     Days of Exercise per Week: Patient declined     Minutes of Exercise per Session: Patient declined   Stress: No Stress Concern Present (8/3/2024)    Received from [facility] Occup[facility] - Occupational Stress Questionnaire     Feeling of Stress : Not at all   Housing Stability: Low Risk  (7/25/2024)    Received from [facility]    Housing Stability Vital Sign     Unable to Pay for Housing in the Last Year: No     Number of Times Moved in the Last Year: 1     Homeless in the Last Year: No       Review of patient's allergies indicates:   Allergen Reactions    Ciprofloxacin  Diarrhea    Hydrocodone-acetaminophen Nausea And Vomiting, Nausea Only and Other (See Comments)    Ibuprofen Nausea Only    Meloxicam Hives    Naproxen Nausea Only and Other (See Comments)    Narcof [hydrocodone-guaifenesin] Nausea And Vomiting    Quetiapine Nausea And Vomiting and Other (See Comments)       Current Medications[1]    ROS:    Denies chest pain or palpitations  Denies shortness of breath  Denies fevers or chills  Denies chest pain  Denies abdominal pain    PE:    General Appearance: Well nourished  Orientation: Oriented to time, place, person  Mental Status: Alert  Heart: RRR  Lungs: CTA  Abdomen: Soft and non-tender    Anesthesia/Surgery risks, benefits and alternative options discussed and understood by patient/family.    This note was created using Dragon voice recognition software that occasionally misinterpreted phrases or words.       [1]   Current Facility-Administered Medications:     ceFAZolin 2 g, 2 g, Intravenous, On Call Procedure, Eliseo Vaca MD    electrolyte-S (ISOLYTE), , Intravenous, Continuous, Harry Severino MD    scopolamine 1.3-1.5 mg (1 mg over 3 days) 1 patch, 1 patch, Transdermal, Q3 Days, Sony Rivera MD, 1 patch at 04/21/25 0715    tranexamic acid (CYKLOKAPRON) 1,000 mg in 0.9% NaCl 100 mL IVPB (MB+), 1,000 mg, Intravenous, On Call Procedure, Eliseo Vaca MD

## 2025-04-21 NOTE — PLAN OF CARE
Patient received from recovery at this time.  AAOX3.  NAD noted.  Dressing to left hip remains clean, dry and intact. Tolerating po intake well with no complaints of nausea/vomiting.  Patient able to move BLE with no problems noted.  Due to void.  Rolling walker returned to patient at bedside.

## 2025-04-21 NOTE — TRANSFER OF CARE
"Anesthesia Transfer of Care Note    Patient: Nimco Caro    Procedure(s) Performed: Procedure(s) (LRB):  ARTHROPLASTY, HIP(CONVERT ANUP TO DANNY), LEFT (Left)    Patient location: PACU    Anesthesia Type: general    Transport from OR: Transported from OR on 2-3 L/min O2 by NC with adequate spontaneous ventilation    Post pain: adequate analgesia    Post assessment: no apparent anesthetic complications and tolerated procedure well    Post vital signs: stable    Level of consciousness: sedated    Nausea/Vomiting: no nausea/vomiting    Complications: none    Transfer of care protocol was followed    Last vitals: Visit Vitals  BP (!) 172/78 (BP Location: Right arm, Patient Position: Lying)   Pulse 68   Temp 36.8 °C (98.2 °F)   Resp 18   Ht 5' 7" (1.702 m)   Wt 74.8 kg (165 lb)   SpO2 (!) 90%   Breastfeeding No   BMI 25.84 kg/m²     "

## 2025-04-21 NOTE — PLAN OF CARE
Patient not cleared by PT/OT to discharge to home. Dr Vaca and CARMELO Adorno notified. Order received to consult hospital medicine.  Call placed to hospitalist and spoke with Hazel Holly and informed that someone would see the patient.

## 2025-04-21 NOTE — NURSING
Pt arrived to floor safely. VSS, no distress observed. Pt requesting pain meds, no other requests at this time.

## 2025-04-21 NOTE — HPI
Nimco Caro is a 64 y.o. y.o. female with a PMHx of bipolar, HTN, HLD, COPD on home O2 prn, and GERD who was evaluated after going left DANNY with Dr. Vaca on 04/21.  Patient was seen in clinic with Dr. Vaca on 2/13 for continued left hip pain and decision was made for surgery.  Postoperatively, patient reports left hip pain and chronic shortness of breath which is at baseline.  She denies chest pain abdominal pain or nausea. Preop labs were reviewed.Hospital Medicine was consulted for further workup and management of the patient.

## 2025-04-21 NOTE — ASSESSMENT & PLAN NOTE
S/p left DANNY with Dr. Vaca on 04/21  Continue to follow Orthopedic recommendations.  Needs aggressive incentive spirometry.  Follow hemoglobin and hematocrit closely.  Pain control with IV narcotics and antiemetics as needed.  Physical therapy as per Orthopedics protocol with fall precautions.

## 2025-04-21 NOTE — CARE UPDATE
04/21/25 0714   Incentive Spirometer   $ Incentive Spirometer Charges done with encouragement   Incentive Spirometer Predicted Level (mL) 1960   Administration (IS) instruction provided, initial;proper technique demonstrated   Number of Repetitions (IS) 5   Level Incentive Spirometer (mL) 2500   Patient Tolerance (IS) good

## 2025-04-21 NOTE — PT/OT/SLP EVAL
Physical Therapy Evaluation    Patient Name:  Nimco Caro   MRN:  [id number]    Recommendations:     Discharge Recommendations: Low Intensity Therapy   Discharge Equipment Recommendations: none   Barriers to discharge: None    Assessment:     Nimco Caro is a 64 y.o. female admitted with a medical diagnosis of Fall.  She presents with the following impairments/functional limitations: weakness, impaired endurance, impaired functional mobility, gait instability, impaired balance, decreased lower extremity function, pain, decreased ROM, edema, orthopedic precautions .  Patient agreeable to PT evaluation this morning but reported pain graded 9/10 in left hip.  Patient presented supine in bed and required mod assist to transfer to sitting EOB and min assist to stand with a RW.  Patient then ambulated x 75 feet, x 25 feet, x 5 feet, x 30 feet rw CGA. Also ambulated up/down 4 inch step rw min assist.  Patient currently lives with an 89 YO who is unable to provide much physical assistance if it is required.     Rehab Prognosis: Good; patient would benefit from acute skilled PT services to address these deficits and reach maximum level of function.    Recent Surgery: Procedure(s) (LRB):  ARTHROPLASTY, HIP (Left) * Day of Surgery *    Plan:     During this hospitalization, patient to be seen BID to address the identified rehab impairments via gait training, therapeutic activities, therapeutic exercises and progress toward the following goals:    Plan of Care Expires:  05/19/25    Subjective     Chief Complaint: pain  Patient/Family Comments/goals: not hurt so badly  Pain/Comfort:  Pain Rating 1: 9/10  Location - Side 1: Left  Location - Orientation 1: lower  Location 1: hip  Pain Addressed 1: Cessation of Activity  Pain Rating Post-Intervention 1: 9/10    Patients cultural, spiritual, Gnosticism conflicts given the current situation:      Living Environment:  Currently lives with friend in 1 story home with 1 step entry  "but friend is 89 YO can not provide much physical assistance.  Prior to admission, patients level of function was modified independent with falls.  Equipment used at home: walker, rolling, rollator, cane, straight, bedside commode.  DME owned (not currently used): none.  Upon discharge, patient will have assistance from friend.    Objective:     Communicated with nurse prior to session.  Patient found supine with cryotherapy  upon PT entry to room.    General Precautions: Standard, fall  Orthopedic Precautions:LLE weight bearing as tolerated, LLE posterior precautions   Braces:    Respiratory Status: Room air    Exams:  RLE ROM: WFL  RLE Strength: Deficits: 4/5 overall  LLE ROM: not tested  LLE Strength: Deficits: 2/5 overall    Functional Mobility:  Bed Mobility:     Supine to Sit: moderate assistance  Transfers:     Sit to Stand:  minimum assistance with rolling walker  Gait: x 75 feet rw CGA  Stairs:  Pt ascended/descended 4" curb step with Rolling Walker with no handrails with Minimal Assistance.       AM-PAC 6 CLICK MOBILITY  Total Score:18       Treatment & Education:  Gait training x 75 feet, x 25 feet, x 5 feet, x 30 feet rw cga with slow step to gait pattern    Patient left up in chair with call button in reach and nurse notified.    GOALS:   Multidisciplinary Problems       Physical Therapy Goals          Problem: Physical Therapy    Goal Priority Disciplines Outcome Interventions   Physical Therapy Goal     PT, PT/OT Progressing    Description: Goals to be met by: 25     Patient will increase functional independence with mobility by performin. Supine to sit with [place]  2. Sit to supine with [place]  3. Sit to stand transfer with Stand-by Assistance  4. Bed to chair transfer with Stand-by Assistance using Rolling Walker  5. Gait  x 150 feet with Stand-by Assistance using Rolling Walker.                          DME Justifications:  No DME recommended requiring DME " justifications    History:     Past Medical History:   Diagnosis Date    ACP (advance care planning) 12/20/2023    Anxiety     Back pain     Bipolar affect, depressed     Cancer ovarian; uterine    1992    COPD (chronic obstructive pulmonary disease)     Diabetes mellitus     GERD (gastroesophageal reflux disease)     Hyperlipidemia     Hypertension     OAB (overactive bladder)     On home oxygen therapy     per patient uses PRN       Past Surgical History:   Procedure Laterality Date    BREAST CYST ASPIRATION      COLON SURGERY      COLECTOMY    COLONOSCOPY      HEMIARTHROPLASTY OF HIP Left 07/20/2024    Procedure: HEMIARTHROPLASTY, HIP;  Surgeon: Timothy Nettles MD;  Location: [facility] OR;  Service: Orthopedics;  Laterality: Left;    HERNIA REPAIR N/A 2016    HIP ARTHROPLASTY Right 02/25/2019    Procedure: ARTHROPLASTY, HIP;  Surgeon: Elisoe Vaca MD;  Location: [facility] OR;  Service: Orthopedics;  Laterality: Right;  Daniel Herrera    HYSTERECTOMY  total    JOINT REPLACEMENT Right     hip replacemnt    KNEE ARTHROPLASTY Left 08/19/2019    Procedure: ARTHROPLASTY, KNEE;  Surgeon: Eliseo Vaca MD;  Location: [facility] OR;  Service: Orthopedics;  Laterality: Left;    LUMBAR LAMINECTOMY WITH FUSION N/A 12/07/2023    Procedure: LAMINECTOMY, SPINE, LUMBAR, WITH FUSION L4-5;  Surgeon: Joby Jaimes MD;  Location: [facility] OR;  Service: Orthopedics;  Laterality: N/A;  NTI, [employer]    REVISION COLOSTOMY N/A 2012    REVERSAL       Time Tracking:     PT Received On: 04/21/25  PT Start Time: 1125     PT Stop Time: 1210  PT Total Time (min): 45 min     Billable Minutes: Evaluation 20 and Gait Training 25 04/21/2025

## 2025-04-21 NOTE — PLAN OF CARE
Patient transferred to room 401 at this time.  Tolerating po intake well with no complaints of nausea/vomiting.  Patient able to void with no problems noted.  Dressing to left hip remains clean dry and intact.  Medications delivered to bedside and transferred with the patient to room 401.  No family available at this time.  Personal belongings and rolling walker transferred with patient.

## 2025-04-21 NOTE — PLAN OF CARE
Problem: Physical Therapy  Goal: Physical Therapy Goal  Description: Goals to be met by: 25     Patient will increase functional independence with mobility by performin. Supine to sit with [place]  2. Sit to supine with [place]  3. Sit to stand transfer with Stand-by Assistance  4. Bed to chair transfer with Stand-by Assistance using Rolling Walker  5. Gait  x 150 feet with Stand-by Assistance using Rolling Walker.     Outcome: Progressing

## 2025-04-21 NOTE — ANESTHESIA PROCEDURE NOTES
Intubation    Date/Time: 4/21/2025 7:48 AM    Performed by: Uche Escoto CRNA  Authorized by: Sony Rivera MD    Intubation:     Induction:  Intravenous    Intubated:  Postinduction    Mask Ventilation:  Easy mask    Attempts:  1    Attempted By:  CRNA    Method of Intubation:  Video laryngoscopy    Blade:  Rachel 3    Laryngeal View Grade: Grade I - full view of cords      Difficult Airway Encountered?: No      Complications:  None    Airway Device:  Oral endotracheal tube    Airway Device Size:  7.5    Style/Cuff Inflation:  Cuffed    Inflation Amount (mL):  4    Tube secured:  22    Secured at:  The lips    Placement Verified By:  Capnometry    Complicating Factors:  None    Findings Post-Intubation:  BS equal bilateral

## 2025-04-21 NOTE — ASSESSMENT & PLAN NOTE
Patient's COPD is controlled currently.  Patient is currently off COPD Pathway. Continue scheduled inhalers Supplemental oxygen as needed and monitor respiratory status closely.

## 2025-04-21 NOTE — PT/OT/SLP EVAL
Occupational Therapy   Evaluation    Name: Nimco Caro  MRN: [id number]  Admitting Diagnosis: Left hip pain  Recent Surgery: Procedure(s) (LRB):  ARTHROPLASTY, HIP (Left) * Day of Surgery *    Recommendations:     Discharge Recommendations: Low Intensity Therapy  Discharge Equipment Recommendations:  none  Barriers to discharge:  None    Assessment:     Nimco Caro is a 64 y.o. female with a medical diagnosis of Left hip pain.  She presents with c/o L hip pain especially with mobility. Noted unsteadiness with standing and transferring with RW. Patient experienced one episode of posterior LOB when standing from chair requiring Mod A to regain balance. Patient required frequent reminders of hip precautions and hand placement. Patient would benefit from another day of acute OT to reinforce precautions with mobility and safe ADL participation. Performance deficits affecting function: weakness, impaired endurance, impaired self care skills, impaired functional mobility, gait instability, impaired balance, decreased lower extremity function, decreased coordination, decreased ROM, pain, orthopedic precautions, decreased safety awareness.      Rehab Prognosis: Good; patient would benefit from acute skilled OT services to address these deficits and reach maximum level of function.       Plan:     Patient to be seen 3 x/week to address the above listed problems via self-care/home management, therapeutic activities, therapeutic exercises  Plan of Care Expires: 05/12/25  Plan of Care Reviewed with: patient    Subjective     Chief Complaint: L hip pain  Patient/Family Comments/goals: none    Occupational Profile:  Living Environment: Patient lives with friend (who is an 87 y/o female) in a [facility].   Previous level of function: Patient was Mod I with ADLs and mobility.   Equipment Used at Home: walker, rolling, cane, straight, bedside commode, rollator  Assistance upon Discharge: Patient's friend will provided assist at home.      Pain/Comfort:  Pain Rating 1: 7/10  Location - Side 1: Left  Location - Orientation 1: generalized  Location 1: hip  Pain Addressed 1: Reposition, Distraction  Pain Rating Post-Intervention 1: 9/10    Patients cultural, spiritual, Bahai conflicts given the current situation: no    Objective:     Communicated with: nurse prior to session.  Patient found up in chair with cryotherapy upon OT entry to room.    General Precautions: Standard, fall  Orthopedic Precautions: LLE weight bearing as tolerated, LLE posterior precautions  Braces: N/A  Respiratory Status: Room air    Occupational Performance:    Bed Mobility:    Not assessed; patient seated in chair upon arrival to room. See PT notes.     Functional Mobility/Transfers:  Patient completed Sit <> Stand Transfer with minimum assistance  with  rolling walker   Patient completed Bed <> Chair Transfer using Stand Pivot technique with minimum assistance with rolling walker  Functional Mobility: Noted one episode of posterior LOB when standing from chair requiring Min A for balance correction.    Activities of Daily Living:  Grooming: supervision with hand hygiene using sani-hand wipes after voiding in BSC in room  Toileting: moderate assistance with managing adult brief before and after voiding in BSC     Cognitive/Visual Perceptual:  Cognitive/Psychosocial Skills:     -       Oriented to: Person, Place, Time, and Situation   -       Follows Commands/attention:Follows multistep  commands  -       Communication: clear/fluent  -       Safety awareness/insight to disability: impaired   -       Mood/Affect/Coping skills/emotional control: Appropriate to situation and Cooperative  Visual/Perceptual:      -Intact with glasses donned    Physical Exam:  Postural examination/scapula alignment:    -       Rounded shoulders  -       Forward head  Upper Extremity Range of Motion:     -       Right Upper Extremity: WFL  -       Left Upper Extremity: WFL  Upper Extremity  Strength:    -       Right Upper Extremity: WFL  -       Left Upper Extremity: WFL   Strength:    -       Right Upper Extremity: WFL  -       Left Upper Extremity: WFL  Fine Motor Coordination:    -       Intact  Gross motor coordination:   WFL in BUE    AMPAC 6 Click ADL:  AMPAC Total Score: 20    Treatment & Education:  Pt educated on role of OT/POC, importance of time EOB/OOB, safety with ADLs, importance of intermittent mobility, safe techniques for transfers/ambulation, discharge recommendations/options, and use of call light for assistance and fall prevention.   OT educated pt on posterior hip precautions with instruction sheet and demonstration provided.  OT ed pt on use of adaptive equipment for LB dressing & safe item retrieval with reacher with demonstration provided.      Patient left up in chair with  nurse notified    GOALS:   Multidisciplinary Problems       Occupational Therapy Goals          Problem: Occupational Therapy    Goal Priority Disciplines Outcome Interventions   Occupational Therapy Goal     OT, PT/OT Progressing    Description: Goals to be met by: 5/12/2025     Patient will increase functional independence with ADLs by performing:    LE Dressing with Modified [place] and Assistive Devices as needed.  Grooming while standing at sink with Modified [place].  Toileting from toilet with Modified [place] for hygiene and clothing management.   Supine to sit with Modified [place].  Toilet transfer to toilet with Modified [place].                         DME Justifications:  none    History:     Past Medical History:   Diagnosis Date    ACP (advance care planning) 12/20/2023    Anxiety     Back pain     Bipolar affect, depressed     Cancer ovarian; uterine    1992    COPD (chronic obstructive pulmonary disease)     Diabetes mellitus     GERD (gastroesophageal reflux disease)     Hyperlipidemia     Hypertension     OAB (overactive bladder)     On home oxygen therapy      per patient uses PRN         Past Surgical History:   Procedure Laterality Date    BREAST CYST ASPIRATION      COLON SURGERY      COLECTOMY    COLONOSCOPY      HEMIARTHROPLASTY OF HIP Left 07/20/2024    Procedure: HEMIARTHROPLASTY, HIP;  Surgeon: Timothy Nettles MD;  Location: [facility] OR;  Service: Orthopedics;  Laterality: Left;    HERNIA REPAIR N/A 2016    HIP ARTHROPLASTY Right 02/25/2019    Procedure: ARTHROPLASTY, HIP;  Surgeon: Eliseo Vaca MD;  Location: [facility] OR;  Service: Orthopedics;  Laterality: Right;  Daniel Herrera    HYSTERECTOMY  total    JOINT REPLACEMENT Right     hip replacemnt    KNEE ARTHROPLASTY Left 08/19/2019    Procedure: ARTHROPLASTY, KNEE;  Surgeon: Eliseo Vaca MD;  Location: [facility] OR;  Service: Orthopedics;  Laterality: Left;    LUMBAR LAMINECTOMY WITH FUSION N/A 12/07/2023    Procedure: LAMINECTOMY, SPINE, LUMBAR, WITH FUSION L4-5;  Surgeon: Joby Jaimes MD;  Location: [facility];  Service: Orthopedics;  Laterality: N/A;  NTI, [employer]    REVISION COLOSTOMY N/A 2012    REVERSAL       Time Tracking:     OT Date of Treatment: 04/21/25  OT Start Time: 1319  OT Stop Time: 1355  OT Total Time (min): 36 min    Billable Minutes:Evaluation 10  Self Care/Home Management 26    4/21/2025

## 2025-04-21 NOTE — ASSESSMENT & PLAN NOTE
Patient's blood pressure range in the last 24 hours was: BP  Min: 111/54  Max: 183/76.The patient's inpatient anti-hypertensive regimen is listed below:  Current Antihypertensives  atenoloL tablet 25 mg, Daily, Oral    Plan  - BP is controlled, no changes needed to their regimen  -

## 2025-04-21 NOTE — OP NOTE
[facility]  Surgery Department  Operative Note    SUMMARY     Date of Procedure: 4/21/2025     Procedure:  Revision total hip arthroplasty from hemiarthroplasty to total hip arthroplasty including both the stem and the cup    Surgeons and Role:     * Eliseo Vaca MD - Primary    Assisting Surgeon:  Gala    Pre-Operative Diagnosis: History of left hip hemiarthroplasty [Z96.642]  Failed arthroplasty, subsequent encounter [T84.019D]  Pre-op testing [Z01.818]    Post-Operative Diagnosis: Post-Op Diagnosis Codes:     * History of left hip hemiarthroplasty [Z96.642]     * Failed arthroplasty, subsequent encounter [T84.019D]     * Pre-op testing [Z01.818]    Anesthesia: General    Intraoperative Findings:  Loose stem    Description of the Findings of the Procedure:  Patient was placed on the operative table lateral decubitus position.  She was prepped and draped in the usual sterile manner for surgery.  The old incision was utilized and carried down sharply through the skin.  The deep fascia was identified and taken down off the greater trochanter.  It was tagged for later reattachment.  There was a lot of scar as to be expected.  The stem was identified and the hip was dislocated.  At this point the small amount of soft tissue was cleared just from the insertion site.  I now placed a tamp onto the stem and tapped in the ball came off very easily.  We now grasped the stem and pulled on it was clearly mobile.  We put an extraction tool in the stem and with just a few tapped the stem came out.  It was clearly loose.  We pulsed and irrigated.  We now reamed and we reamed 1 Reamer larger than the current stem.  We broach 1 broach larger than the current stem which gave me an excellent fit and fill.  At this point we turned our attention to the acetabulum.  Retractors were placed anteriorly and posteriorly.  The foveal contents were cleaned with the Bovie.  We now used a 44 mm Reamer and reamed  just down to the level of the fovea.  We now expanded out until we got to a 52 mm Reamer.  That gave us an excellent round cup and good bleeding bone.  I tapped a 52 mm cup into position the construct was well fixated.  The liner was tapped into position.  We now turned our attention back to the stem.  We dropped her broach and began trialing.  We had an excellent Press-Fit and excellent stability with the +7.5.  It also gave us now symmetric leg lengths as she was quite short prior to the procedure.  At this point we removed the trial components and tapped the actual components into position.  The construct trialed perfectly.  I did notice a small type 1 crack in the proximal aspect of the femur and I felt that the most safe thing to do was to put a cable into position.  This was done using standard technique we had an excellent bite.  The fracture was really no longer visible with a cable in position.  We pulsed and irrigated again.  We closed the short external rotators with 2. FiberWire.  We irrigated again and closed the deep fascia with a running 1. Quill stitch.  We irrigated again and closed the subcu with 2-0 Vicryl and the skin with 4-0 Monocryl.  Sterile dressings were applied and the patient was noted to be in stable condition pending an x-ray neurovascular check    Complications: No    Estimated Blood Loss (EBL): * No values recorded between 4/21/2025  7:36 AM and 4/21/2025  9:35 AM *           Implants:   Implant Name Type Inv. Item Serial No.  Lot No. LRB No. Used Action   CEMENT BONE SURG SMPLX P RADPQ - [id number]  CEMENT BONE SURG SMPLX P RADPQ  [employer]. [id number] Left 2 Wasted   SHELL TRIDENT II ACET E 52MM - [id number]  SHELL TRIDENT II ACET E 52MM  [employer]. [id number] Left 1 Implanted   INSERT TRIDENT X3 10 DEG 36MM - [id number]  INSERT TRIDENT X3 10 DEG 36MM  [employer] CANDEL[employer]IO. [id number] Left 1 Implanted   STEM FEMORAL SZ 9 132 DEG TI - [id number]  STEM FEMORAL SZ 9  132 DEG TI  [employer] CANDELARIO. [id number] Left 1 Implanted   CABLE W/CRIMP STRL 1.7MM - [id number]  CABLE W/CRIMP STRL 1.7MM  [employer] [id number] Left 1 Implanted   HEAD FEM C-TAP BIOLOX 36M/+7.5 - [id number]  HEAD FEM C-TAP BIOLOX 36M/+7.5  [employer] CANDELARIO. [id number] Left 1 Implanted       Specimens:   Specimen (24h ago, onward)      None                    Condition: Good    Disposition: PACU - hemodynamically stable.              Discharge Note    SUMMARY     Admit Date: 4/21/2025    Discharge Date and Time:  04/21/2025 9:35 AM    Hospital Course (synopsis of major diagnoses, care, treatment, and services provided during the course of the hospital stay): Uneventful      Final Diagnosis: Post-Op Diagnosis Codes:     * History of left hip hemiarthroplasty [Z96.642]     * Failed arthroplasty, subsequent encounter [T84.019D]     * Pre-op testing [Z01.818]    Disposition: Home or Self Care    Follow Up/Patient Instructions:     Medications:  Reconciled Home Medications:       No discharge procedures on file.

## 2025-04-21 NOTE — H&P
[facility]  History & Physical    Patient Name: Nimco Caro  MRN: [id number]  Patient Class: IP- Inpatient  Admission Date: 4/21/2025  Attending Physician: Eliseo Vaca MD   Primary Care Provider: Katy Mason MD         Patient information was obtained from patient and past medical records.     Subjective:     Principal Problem:Left hip pain    Chief Complaint: No chief complaint on file.       HPI: Nimco Caro is a 64 y.o. y.o. female with a PMHx of bipolar, HTN, HLD, COPD on home O2 prn, and GERD who was evaluated after going left DANNY with Dr. Vaca on 04/21.  Patient was seen in clinic with Dr. Vaca on 2/13 for continued left hip pain and decision was made for surgery.  Postoperatively, patient reports left hip pain and chronic shortness of breath which is at baseline.  She denies chest pain abdominal pain or nausea. Preop labs were reviewed.Hospital Medicine was consulted for further workup and management of the patient.       Past Medical History:   Diagnosis Date    ACP (advance care planning) 12/20/2023    Anxiety     Back pain     Bipolar affect, depressed     Cancer ovarian; uterine    1992    COPD (chronic obstructive pulmonary disease)     Diabetes mellitus     GERD (gastroesophageal reflux disease)     Hyperlipidemia     Hypertension     OAB (overactive bladder)     On home oxygen therapy     per patient uses PRN       Past Surgical History:   Procedure Laterality Date    BREAST CYST ASPIRATION      COLON SURGERY      COLECTOMY    COLONOSCOPY      HEMIARTHROPLASTY OF HIP Left 07/20/2024    Procedure: HEMIARTHROPLASTY, HIP;  Surgeon: Timothy Nettles MD;  Location: [facility] OR;  Service: Orthopedics;  Laterality: Left;    HERNIA REPAIR N/A 2016    HIP ARTHROPLASTY Right 02/25/2019    Procedure: ARTHROPLASTY, HIP;  Surgeon: Eliseo Vaca MD;  Location: [facility] OR;  Service: Orthopedics;  Laterality: Right;  Daniel Herrera    HYSTERECTOMY  total     JOINT REPLACEMENT Right     hip replacemnt    KNEE ARTHROPLASTY Left 08/19/2019    Procedure: ARTHROPLASTY, KNEE;  Surgeon: Eliseo Vaca MD;  Location: [facility] OR;  Service: Orthopedics;  Laterality: Left;    LUMBAR LAMINECTOMY WITH FUSION N/A 12/07/2023    Procedure: LAMINECTOMY, SPINE, LUMBAR, WITH FUSION L4-5;  Surgeon: Joby Jaimes MD;  Location: [facility] OR;  Service: Orthopedics;  Laterality: N/A;  NTI, MEDTRONIC    REVISION COLOSTOMY N/A 2012    REVERSAL       Review of patient's allergies indicates:   Allergen Reactions    Ciprofloxacin Diarrhea    Hydrocodone-acetaminophen Nausea And Vomiting, Nausea Only and Other (See Comments)    Ibuprofen Nausea Only    Meloxicam Hives    Naproxen Nausea Only and Other (See Comments)    Narcof [hydrocodone-guaifenesin] Nausea And Vomiting    Quetiapine Nausea And Vomiting and Other (See Comments)       No current facility-administered medications on file prior to encounter.     Current Outpatient Medications on File Prior to Encounter   Medication Sig    ascorbic acid, vitamin C, (VITAMIN C) 1000 MG tablet Take 1,000 mg by mouth once daily.    atenoloL (TENORMIN) 25 MG tablet Take 25 mg by mouth once daily.    azelastine (ASTELIN) 137 mcg (0.1 %) nasal spray SPRAY 1 SPRAY BY NASAL ROUTE 2 TIMES DAILY. MUST MAKE APPOINTMENT    b complex vitamins tablet Take 1 tablet by mouth once daily.    biotin 1 mg tablet Take 1,000 mcg by mouth once daily.    butalbital-acetaminophen-caffeine -40 mg (FIORICET, ESGIC) -40 mg per tablet Take 1 tablet by mouth every 4 (four) hours as needed.    clonazePAM (KLONOPIN) 2 MG Tab Take 2 mg by mouth 2 (two) times daily.    cyanocobalamin 1,000 mcg/mL injection Inject 1,000 mcg into the muscle every 30 days.    cycloSPORINE (RESTASIS) 0.05 % ophthalmic emulsion Apply 1 drop to eye 2 (two) times daily.    dicyclomine (BENTYL) 10 MG capsule Take 10 mg by mouth 2 (two) times daily.    donepeziL (ARICEPT) 10 MG tablet Take 10 mg by  mouth once daily.    fluticasone propionate (FLONASE) 50 mcg/actuation nasal spray 1 spray (50 mcg total) by Each Nostril route once daily.    gabapentin (NEURONTIN) 800 MG tablet Take 800 mg by mouth 3 (three) times daily.    hydrOXYzine pamoate (VISTARIL) 25 MG Cap TAKE 1 CAPSULE (25 MG TOTAL) BY MOUTH EVERY 6 (SIX) HOURS AS NEEDED.    levalbuterol (XOPENEX HFA) 45 mcg/actuation inhaler Inhale 1-2 puffs into the lungs every 4 (four) hours as needed for Wheezing. Rescue    LINZESS 145 mcg Cap capsule Take 145 mcg by mouth before breakfast.    meclizine (ANTIVERT) 25 mg tablet Take 1 tablet (25 mg total) by mouth 3 (three) times daily as needed for Dizziness.    memantine (NAMENDA) 10 MG Tab Take 10 mg by mouth 2 (two) times daily.    multivitamin with minerals tablet Take 1 tablet by mouth once daily.    OXcarbazepine (TRILEPTAL) 300 MG Tab Take 300 mg by mouth once daily.    polyethylene glycol (GLYCOLAX) 17 gram/dose powder Take 17 g by mouth once daily.    pravastatin (PRAVACHOL) 20 MG tablet Take 20 mg by mouth once daily.    sertraline (ZOLOFT) 100 MG tablet Take 100 mg by mouth.    sucralfate (CARAFATE) 1 gram tablet Take 1 g by mouth 4 (four) times daily.    sumatriptan (IMITREX) 25 MG Tab Take 25 mg by mouth 2 (two) times daily as needed.    tiotropium (SPIRIVA) 18 mcg inhalation capsule Inhale into the lungs.    topiramate (TOPAMAX) 50 MG tablet Take 50 mg by mouth once daily.    traZODone (DESYREL) 100 MG tablet Take 1 tablet (100 mg total) by mouth every evening.    TRELEGY ELLIPTA 200-62.5-25 mcg inhaler INHALE 1 PUFF INTO THE LUNGS ONCE DAILY.    trospium (SANCTURA XR) 60 mg Cp24 capsule Take 60 mg by mouth once daily.    vilazodone (VIIBRYD) 20 mg Tab Take 20 mg by mouth once daily.    vitamin D (VITAMIN D3) 1000 units Tab Take 1,000 Units by mouth once daily.    OXYGEN-AIR DELIVERY SYSTEMS MISC 6 L by Misc.(Non-Drug; Combo Route) route continuous.    pulse oximeter (PULSE OXIMETER) device by Apply  Externally route 2 (two) times a day. Use twice daily at 8 AM and 3 PM and record the value in MyChart as directed.    [DISCONTINUED] cetirizine (ZYRTEC) 10 MG tablet Take 1 tablet (10 mg total) by mouth once daily.    [DISCONTINUED] LATUDA 40 mg Tab tablet Take 40 mg by mouth once daily. (Patient not taking: Reported on 4/7/2025)    [DISCONTINUED] metFORMIN (GLUCOPHAGE-XR) 500 MG ER 24hr tablet Take 500 mg by mouth. (Patient not taking: Reported on 4/7/2025)    [DISCONTINUED] nicotine (NICODERM CQ) 21 mg/24 hr PLACE 1 PATCH ONTO THE SKIN ONCE DAILY.    [DISCONTINUED] pantoprazole (PROTONIX) 40 MG tablet Take 1 tablet by mouth every morning. (Patient not taking: Reported on 4/7/2025)    [DISCONTINUED] rimegepant (NURTEC) 75 mg odt Take 75 mg by mouth once as needed for Migraine.     Family History       Problem Relation (Age of Onset)    Cancer Mother    Depression Brother    Heart disease Father, Sister    Hypertension Father, Sister, Brother          Tobacco Use    Smoking status: Every Day     Current packs/day: 0.50     Average packs/day: 0.5 packs/day for 45.3 years (22.7 ttl pk-yrs)     Types: Cigarettes     Start date: 1980    Smokeless tobacco: Never   Substance and Sexual Activity    Alcohol use: Yes     Alcohol/week: 1.0 standard drink of alcohol     Types: 1 Cans of beer per week     Comment: weekly    Drug use: No    Sexual activity: Not Currently     Review of Systems   Respiratory:  Positive for shortness of breath (Chronic, at baseline).    Cardiovascular:  Negative for chest pain.   Gastrointestinal:  Negative for abdominal pain and nausea.   Musculoskeletal:  Positive for arthralgias and gait problem.     Objective:     Vital Signs (Most Recent):  Temp: 97.9 °F (36.6 °C) (04/21/25 0955)  Pulse: 66 (04/21/25 1240)  Resp: 16 (04/21/25 1240)  BP: (!) 183/76 (04/21/25 1240)  SpO2: (!) 94 % (04/21/25 1240) Vital Signs (24h Range):  Temp:  [97.9 °F (36.6 °C)-98.2 °F (36.8 °C)] 97.9 °F (36.6 °C)  Pulse:   [60-70] 66  Resp:  [12-24] 16  SpO2:  [89 %-98 %] 94 %  BP: (111-183)/(54-92) 183/76     Weight: 74.8 kg (165 lb)  Body mass index is 25.84 kg/m².     Physical Exam  Vitals and nursing note reviewed.   Constitutional:       General: She is not in acute distress.     Appearance: Normal appearance. She is normal weight.   HENT:      Head: Normocephalic and atraumatic.      Mouth/Throat:      Mouth: Mucous membranes are moist.      Pharynx: Oropharynx is clear.   Eyes:      Extraocular Movements: Extraocular movements intact.   Cardiovascular:      Rate and Rhythm: Normal rate and regular rhythm.   Pulmonary:      Effort: Pulmonary effort is normal.      Breath sounds: Decreased breath sounds present.   Abdominal:      General: Abdomen is flat. Bowel sounds are normal.      Palpations: Abdomen is soft.   Musculoskeletal:      Cervical back: Normal range of motion and neck supple.      Comments: Dressing to left hip C/D/I.    Patient was neurovascularly intact.   Skin:     General: Skin is warm.   Neurological:      General: No focal deficit present.      Mental Status: She is alert and oriented to person, place, and time. Mental status is at baseline.   Psychiatric:         Mood and Affect: Mood normal.         Behavior: Behavior normal.                Significant Labs:   Reviewed preop labs.    Significant Imaging: I have reviewed all pertinent imaging results/findings within the past 24 hours.  Assessment/Plan:     Assessment & Plan  Left hip pain  S/p left DANNY with Dr. Vaca on 04/21  Continue to follow Orthopedic recommendations.  Needs aggressive incentive spirometry.  Follow hemoglobin and hematocrit closely.  Pain control with IV narcotics and antiemetics as needed.  Physical therapy as per Orthopedics protocol with fall precautions.    GERD (gastroesophageal reflux disease)  Chronic:   -continue PPI    Hypertension  Patient's blood pressure range in the last 24 hours was: BP  Min: 111/54  Max: 183/76.The  "patient's inpatient anti-hypertensive regimen is listed below:  Current Antihypertensives  atenoloL tablet 25 mg, Daily, Oral    Plan  - BP is controlled, no changes needed to their regimen  -  Hyperlipidemia   Patient is chronically on statin.will continue for now. Monitor clinically. Last LDL was No results found for: "LDLCALC"       Mild episode of recurrent major depressive disorder  Chronic:   -continue home medications  COPD (chronic obstructive pulmonary disease)  Patient's COPD is controlled currently.  Patient is currently off COPD Pathway. Continue scheduled inhalers Supplemental oxygen as needed and monitor respiratory status closely.   VTE Risk Mitigation (From admission, onward)           Ordered     IP VTE LOW RISK PATIENT  Once         04/21/25 0940     Place sequential compression device  Until discontinued         04/21/25 0940     Place REMINGTON hose  Until discontinued         04/21/25 0537     Place sequential compression device  Until discontinued         04/21/25 0537                                    JEFFREY RealC  Department of Hospital Medicine  [facility]/Surg          "

## 2025-04-21 NOTE — SUBJECTIVE & OBJECTIVE
Past Medical History:   Diagnosis Date    ACP (advance care planning) 12/20/2023    Anxiety     Back pain     Bipolar affect, depressed     Cancer ovarian; uterine    1992    COPD (chronic obstructive pulmonary disease)     Diabetes mellitus     GERD (gastroesophageal reflux disease)     Hyperlipidemia     Hypertension     OAB (overactive bladder)     On home oxygen therapy     per patient uses PRN       Past Surgical History:   Procedure Laterality Date    BREAST CYST ASPIRATION      COLON SURGERY      COLECTOMY    COLONOSCOPY      HEMIARTHROPLASTY OF HIP Left 07/20/2024    Procedure: HEMIARTHROPLASTY, HIP;  Surgeon: Timothy Nettles MD;  Location: [facility] OR;  Service: Orthopedics;  Laterality: Left;    HERNIA REPAIR N/A 2016    HIP ARTHROPLASTY Right 02/25/2019    Procedure: ARTHROPLASTY, HIP;  Surgeon: Eliseo Vaca MD;  Location: [facility] OR;  Service: Orthopedics;  Laterality: Right;  Daniel Herrera    HYSTERECTOMY  total    JOINT REPLACEMENT Right     hip replacemnt    KNEE ARTHROPLASTY Left 08/19/2019    Procedure: ARTHROPLASTY, KNEE;  Surgeon: Eliseo Vaca MD;  Location: [facility] OR;  Service: Orthopedics;  Laterality: Left;    LUMBAR LAMINECTOMY WITH FUSION N/A 12/07/2023    Procedure: LAMINECTOMY, SPINE, LUMBAR, WITH FUSION L4-5;  Surgeon: Joby Jaimes MD;  Location: [facility] OR;  Service: Orthopedics;  Laterality: N/A;  NTI, [employer]TR[employer]    REVISION COLOSTOMY N/A 2012    REVERSAL       Review of patient's allergies indicates:   Allergen Reactions    Ciprofloxacin Diarrhea    Hydrocodone-acetaminophen Nausea And Vomiting, Nausea Only and Other (See Comments)    Ibuprofen Nausea Only    Meloxicam Hives    Naproxen Nausea Only and Other (See Comments)    Narcof [hydrocodone-guaifenesin] Nausea And Vomiting    Quetiapine Nausea And Vomiting and Other (See Comments)       No current facility-administered medications on file prior to encounter.     Current Outpatient Medications on File Prior to Encounter    Medication Sig    ascorbic acid, vitamin C, (VITAMIN C) 1000 MG tablet Take 1,000 mg by mouth once daily.    atenoloL (TENORMIN) 25 MG tablet Take 25 mg by mouth once daily.    azelastine (ASTELIN) 137 mcg (0.1 %) nasal spray SPRAY 1 SPRAY BY NASAL ROUTE 2 TIMES DAILY. MUST MAKE APPOINTMENT    b complex vitamins tablet Take 1 tablet by mouth once daily.    biotin 1 mg tablet Take 1,000 mcg by mouth once daily.    butalbital-acetaminophen-caffeine -40 mg (FIORICET, ESGIC) -40 mg per tablet Take 1 tablet by mouth every 4 (four) hours as needed.    clonazePAM (KLONOPIN) 2 MG Tab Take 2 mg by mouth 2 (two) times daily.    cyanocobalamin 1,000 mcg/mL injection Inject 1,000 mcg into the muscle every 30 days.    cycloSPORINE (RESTASIS) 0.05 % ophthalmic emulsion Apply 1 drop to eye 2 (two) times daily.    dicyclomine (BENTYL) 10 MG capsule Take 10 mg by mouth 2 (two) times daily.    donepeziL (ARICEPT) 10 MG tablet Take 10 mg by mouth once daily.    fluticasone propionate (FLONASE) 50 mcg/actuation nasal spray 1 spray (50 mcg total) by Each Nostril route once daily.    gabapentin (NEURONTIN) 800 MG tablet Take 800 mg by mouth 3 (three) times daily.    hydrOXYzine pamoate (VISTARIL) 25 MG Cap TAKE 1 CAPSULE (25 MG TOTAL) BY MOUTH EVERY 6 (SIX) HOURS AS NEEDED.    levalbuterol (XOPENEX HFA) 45 mcg/actuation inhaler Inhale 1-2 puffs into the lungs every 4 (four) hours as needed for Wheezing. Rescue    LINZESS 145 mcg Cap capsule Take 145 mcg by mouth before breakfast.    meclizine (ANTIVERT) 25 mg tablet Take 1 tablet (25 mg total) by mouth 3 (three) times daily as needed for Dizziness.    memantine (NAMENDA) 10 MG Tab Take 10 mg by mouth 2 (two) times daily.    multivitamin with minerals tablet Take 1 tablet by mouth once daily.    OXcarbazepine (TRILEPTAL) 300 MG Tab Take 300 mg by mouth once daily.    polyethylene glycol (GLYCOLAX) 17 gram/dose powder Take 17 g by mouth once daily.    pravastatin (PRAVACHOL)  20 MG tablet Take 20 mg by mouth once daily.    sertraline (ZOLOFT) 100 MG tablet Take 100 mg by mouth.    sucralfate (CARAFATE) 1 gram tablet Take 1 g by mouth 4 (four) times daily.    sumatriptan (IMITREX) 25 MG Tab Take 25 mg by mouth 2 (two) times daily as needed.    tiotropium (SPIRIVA) 18 mcg inhalation capsule Inhale into the lungs.    topiramate (TOPAMAX) 50 MG tablet Take 50 mg by mouth once daily.    traZODone (DESYREL) 100 MG tablet Take 1 tablet (100 mg total) by mouth every evening.    TRELEGY ELLIPTA 200-62.5-25 mcg inhaler INHALE 1 PUFF INTO THE LUNGS ONCE DAILY.    trospium (SANCTURA XR) 60 mg Cp24 capsule Take 60 mg by mouth once daily.    vilazodone (VIIBRYD) 20 mg Tab Take 20 mg by mouth once daily.    vitamin D (VITAMIN D3) 1000 units Tab Take 1,000 Units by mouth once daily.    OXYGEN-AIR DELIVERY SYSTEMS MISC 6 L by Misc.(Non-Drug; Combo Route) route continuous.    pulse oximeter (PULSE OXIMETER) device by Apply Externally route 2 (two) times a day. Use twice daily at 8 AM and 3 PM and record the value in MyChart as directed.    [DISCONTINUED] cetirizine (ZYRTEC) 10 MG tablet Take 1 tablet (10 mg total) by mouth once daily.    [DISCONTINUED] LATUDA 40 mg Tab tablet Take 40 mg by mouth once daily. (Patient not taking: Reported on 4/7/2025)    [DISCONTINUED] metFORMIN (GLUCOPHAGE-XR) 500 MG ER 24hr tablet Take 500 mg by mouth. (Patient not taking: Reported on 4/7/2025)    [DISCONTINUED] nicotine (NICODERM CQ) 21 mg/24 hr PLACE 1 PATCH ONTO THE SKIN ONCE DAILY.    [DISCONTINUED] pantoprazole (PROTONIX) 40 MG tablet Take 1 tablet by mouth every morning. (Patient not taking: Reported on 4/7/2025)    [DISCONTINUED] rimegepant (NURTEC) 75 mg odt Take 75 mg by mouth once as needed for Migraine.     Family History       Problem Relation (Age of Onset)    Cancer Mother    Depression Brother    Heart disease Father, Sister    Hypertension Father, Sister, Brother          Tobacco Use    Smoking status:  Every Day     Current packs/day: 0.50     Average packs/day: 0.5 packs/day for 45.3 years (22.7 ttl pk-yrs)     Types: Cigarettes     Start date: 1980    Smokeless tobacco: Never   Substance and Sexual Activity    Alcohol use: Yes     Alcohol/week: 1.0 standard drink of alcohol     Types: 1 Cans of beer per week     Comment: weekly    Drug use: No    Sexual activity: Not Currently     Review of Systems   Respiratory:  Positive for shortness of breath (Chronic, at baseline).    Cardiovascular:  Negative for chest pain.   Gastrointestinal:  Negative for abdominal pain and nausea.   Musculoskeletal:  Positive for arthralgias and gait problem.     Objective:     Vital Signs (Most Recent):  Temp: 97.9 °F (36.6 °C) (04/21/25 0955)  Pulse: 66 (04/21/25 1240)  Resp: 16 (04/21/25 1240)  BP: (!) 183/76 (04/21/25 1240)  SpO2: (!) 94 % (04/21/25 1240) Vital Signs (24h Range):  Temp:  [97.9 °F (36.6 °C)-98.2 °F (36.8 °C)] 97.9 °F (36.6 °C)  Pulse:  [60-70] 66  Resp:  [12-24] 16  SpO2:  [89 %-98 %] 94 %  BP: (111-183)/(54-92) 183/76     Weight: 74.8 kg (165 lb)  Body mass index is 25.84 kg/m².     Physical Exam  Vitals and nursing note reviewed.   Constitutional:       General: She is not in acute distress.     Appearance: Normal appearance. She is normal weight.   HENT:      Head: Normocephalic and atraumatic.      Mouth/Throat:      Mouth: Mucous membranes are moist.      Pharynx: Oropharynx is clear.   Eyes:      Extraocular Movements: Extraocular movements intact.   Cardiovascular:      Rate and Rhythm: Normal rate and regular rhythm.   Pulmonary:      Effort: Pulmonary effort is normal.      Breath sounds: Decreased breath sounds present.   Abdominal:      General: Abdomen is flat. Bowel sounds are normal.      Palpations: Abdomen is soft.   Musculoskeletal:      Cervical back: Normal range of motion and neck supple.      Comments: Dressing to left hip C/D/I.    Patient was neurovascularly intact.   Skin:     General: Skin  is warm.   Neurological:      General: No focal deficit present.      Mental Status: She is alert and oriented to person, place, and time. Mental status is at baseline.   Psychiatric:         Mood and Affect: Mood normal.         Behavior: Behavior normal.                Significant Labs:   Reviewed preop labs.    Significant Imaging: I have reviewed all pertinent imaging results/findings within the past 24 hours.

## 2025-04-21 NOTE — DISCHARGE INSTRUCTIONS
Follow up with PCP and ortho  Prescribed pain medications by ortho.  Take as prescribed and do not combined with benzos.  Do not drive or operate heavy machinery while taking narcotics.  Take aspirin twice daily as prescribed per ortho  Take blood pressure daily and bring log to future appointments.    Discharge Instructions, Critical access hospital Medicine    Thank you for choosing [facility] for your medical care. The primary doctor who is taking care of you at the time of your discharge is Sage Bains MD.     You were admitted to the hospital with Left hip pain.     Please note your discharge instructions, including diet/activity restrictions, follow-up appointments, and medication changes.  If you have any questions about your medical issues, prescriptions, or any other questions, please feel free to contact the [facility] Medicine Dept at [phone] and we will help.    If you are previously with Home health, outpatient PT/OT or under a therapy program, you are cleared to return to those programs.    Please direct all long term medication refills and follow up to your primary care provider, Katy Mason MD. Thank you again for letting us take care of your health care needs.    Please note the following discharge instructions per your discharging physician-  Crystal Laureano PA-C      .Our goal at [facility] is to always give you outstanding care and exceptional service. You may receive a survey from [employer] by mail, text or e-mail in the next 24-48 hours asking about the care you received with us. The survey should only take 5-10 minutes to complete and is very important to us.     Your feedback provides us with a way to recognize our staff who work tirelessly to provide the best care! Also, your responses help us learn how to improve when your experience was below our aspiration of excellence. We are always looking for ways to improve your stay. We WILL use your  feedback to continue making improvements to help us provide the highest quality care. We keep your personal information and feedback confidential. We appreciate your time completing this survey and can't wait to hear from you!!!    We look forward to your continued care with us! Thanks so much for choosing [facility] for your healthcare needs!

## 2025-04-21 NOTE — PLAN OF CARE
Problem: Occupational Therapy  Goal: Occupational Therapy Goal  Description: Goals to be met by: 5/12/2025     Patient will increase functional independence with ADLs by performing:    LE Dressing with Modified [place] and Assistive Devices as needed.  Grooming while standing at sink with Modified [place].  Toileting from toilet with Modified [place] for hygiene and clothing management.   Supine to sit with Modified [place].  Toilet transfer to toilet with Modified [place].    Outcome: Progressing

## 2025-04-22 VITALS
TEMPERATURE: 98 F | WEIGHT: 176.38 LBS | OXYGEN SATURATION: 91 % | SYSTOLIC BLOOD PRESSURE: 181 MMHG | HEART RATE: 68 BPM | HEIGHT: 67 IN | DIASTOLIC BLOOD PRESSURE: 72 MMHG | BODY MASS INDEX: 27.68 KG/M2 | RESPIRATION RATE: 16 BRPM

## 2025-04-22 LAB
ANION GAP (SMH): 8 MMOL/L (ref 8–16)
BUN SERPL-MCNC: 8 MG/DL (ref 8–23)
CALCIUM SERPL-MCNC: 8.6 MG/DL (ref 8.7–10.5)
CHLORIDE SERPL-SCNC: 102 MMOL/L (ref 95–110)
CO2 SERPL-SCNC: 26 MMOL/L (ref 23–29)
CREAT SERPL-MCNC: 0.5 MG/DL (ref 0.5–1.4)
ERYTHROCYTE [DISTWIDTH] IN BLOOD BY AUTOMATED COUNT: 18.1 % (ref 11.5–14.5)
GFR SERPLBLD CREATININE-BSD FMLA CKD-EPI: >60 ML/MIN/1.73/M2
GLUCOSE SERPL-MCNC: 106 MG/DL (ref 70–110)
HCT VFR BLD AUTO: 28.9 % (ref 37–48.5)
HGB BLD-MCNC: 9.1 GM/DL (ref 12–16)
MCH RBC QN AUTO: 27.7 PG (ref 27–31)
MCHC RBC AUTO-ENTMCNC: 31.5 G/DL (ref 32–36)
MCV RBC AUTO: 88 FL (ref 82–98)
PLATELET # BLD AUTO: 184 K/UL (ref 150–450)
PMV BLD AUTO: 9.5 FL (ref 9.2–12.9)
POCT GLUCOSE: 106 MG/DL (ref 70–110)
POCT GLUCOSE: 117 MG/DL (ref 70–110)
POTASSIUM SERPL-SCNC: 4.3 MMOL/L (ref 3.5–5.1)
RBC # BLD AUTO: 3.28 M/UL (ref 4–5.4)
SODIUM SERPL-SCNC: 136 MMOL/L (ref 136–145)
WBC # BLD AUTO: 11.71 K/UL (ref 3.9–12.7)

## 2025-04-22 PROCEDURE — 97535 SELF CARE MNGMENT TRAINING: CPT

## 2025-04-22 PROCEDURE — 25000003 PHARM REV CODE 250: Performed by: ORTHOPAEDIC SURGERY

## 2025-04-22 PROCEDURE — 25000003 PHARM REV CODE 250

## 2025-04-22 PROCEDURE — 36415 COLL VENOUS BLD VENIPUNCTURE: CPT

## 2025-04-22 PROCEDURE — 94761 N-INVAS EAR/PLS OXIMETRY MLT: CPT

## 2025-04-22 PROCEDURE — 63600175 PHARM REV CODE 636 W HCPCS: Performed by: ORTHOPAEDIC SURGERY

## 2025-04-22 PROCEDURE — 99900035 HC TECH TIME PER 15 MIN (STAT)

## 2025-04-22 PROCEDURE — 97110 THERAPEUTIC EXERCISES: CPT

## 2025-04-22 PROCEDURE — 82310 ASSAY OF CALCIUM: CPT

## 2025-04-22 PROCEDURE — 85027 COMPLETE CBC AUTOMATED: CPT

## 2025-04-22 PROCEDURE — 97116 GAIT TRAINING THERAPY: CPT

## 2025-04-22 PROCEDURE — 63600175 PHARM REV CODE 636 W HCPCS

## 2025-04-22 PROCEDURE — 27000221 HC OXYGEN, UP TO 24 HOURS

## 2025-04-22 RX ADMIN — FAMOTIDINE 20 MG: 20 TABLET, FILM COATED ORAL at 08:04

## 2025-04-22 RX ADMIN — PROMETHAZINE HYDROCHLORIDE 6.25 MG: 25 INJECTION, SOLUTION INTRAMUSCULAR; INTRAVENOUS at 01:04

## 2025-04-22 RX ADMIN — ATENOLOL 25 MG: 25 TABLET ORAL at 08:04

## 2025-04-22 RX ADMIN — PANTOPRAZOLE SODIUM 40 MG: 40 TABLET, DELAYED RELEASE ORAL at 08:04

## 2025-04-22 RX ADMIN — SERTRALINE HYDROCHLORIDE 100 MG: 50 TABLET ORAL at 08:04

## 2025-04-22 RX ADMIN — CEFAZOLIN 1 G: 330 INJECTION, POWDER, FOR SOLUTION INTRAMUSCULAR; INTRAVENOUS at 08:04

## 2025-04-22 RX ADMIN — THERA TABS 1 TABLET: TAB at 08:04

## 2025-04-22 RX ADMIN — OXCARBAZEPINE 300 MG: 150 TABLET, FILM COATED ORAL at 08:04

## 2025-04-22 RX ADMIN — OXYCODONE HYDROCHLORIDE AND ACETAMINOPHEN 1 TABLET: 10; 325 TABLET ORAL at 03:04

## 2025-04-22 RX ADMIN — TOPIRAMATE 50 MG: 25 TABLET, FILM COATED ORAL at 08:04

## 2025-04-22 RX ADMIN — DONEPEZIL HYDROCHLORIDE 10 MG: 5 TABLET, FILM COATED ORAL at 08:04

## 2025-04-22 RX ADMIN — ONDANSETRON 4 MG: 2 INJECTION INTRAMUSCULAR; INTRAVENOUS at 08:04

## 2025-04-22 RX ADMIN — OXYCODONE HYDROCHLORIDE AND ACETAMINOPHEN 1 TABLET: 10; 325 TABLET ORAL at 10:04

## 2025-04-22 RX ADMIN — MUPIROCIN 1 G: 20 OINTMENT TOPICAL at 08:04

## 2025-04-22 RX ADMIN — MEMANTINE 10 MG: 5 TABLET ORAL at 08:04

## 2025-04-22 RX ADMIN — ASPIRIN 81 MG: 81 TABLET, COATED ORAL at 08:04

## 2025-04-22 RX ADMIN — PRAVASTATIN SODIUM 20 MG: 10 TABLET ORAL at 08:04

## 2025-04-22 RX ADMIN — SENNOSIDES AND DOCUSATE SODIUM 1 TABLET: 50; 8.6 TABLET ORAL at 09:04

## 2025-04-22 RX ADMIN — HYPROMELLOSE 2910 2 DROP: 5 SOLUTION/ DROPS OPHTHALMIC at 10:04

## 2025-04-22 NOTE — PLAN OF CARE
Problem: Physical Therapy  Goal: Physical Therapy Goal  Description: Goals to be met by: 25     Patient will increase functional independence with mobility by performin. Supine to sit with [place]  2. Sit to supine with [place]  3. Sit to stand transfer with Stand-by Assistance  4. Bed to chair transfer with Stand-by Assistance using Rolling Walker  5. Gait  x 150 feet with Stand-by Assistance using Rolling Walker.     Outcome: Progressing   Pt ambulated 250ft with RW with min/CGA with chair following  and 2 seated rests. Lots of encouragement to increase distance. Education on L posterior hip precautions

## 2025-04-22 NOTE — PLAN OF CARE
04/22/25 1218   Post-Acute Status   Hospital Resources/Appts/Education Provided Appointments scheduled and added to AVS;Patient refused appointment set-up     Ortho F/U scheduled and added to AVS. Pt refuses for me to set up her PCP appt. States it was a lot and she needed to look at all her appointments she has to reschedule and coordinate. Pt has done virtual visits  in th past and I reminded her she could do that again with her PCP in 7-10 days just to update her on her condition.

## 2025-04-22 NOTE — PLAN OF CARE
Problem: Adult Inpatient Plan of Care  Goal: Plan of Care Review  Outcome: Met  Goal: Patient-Specific Goal (Individualized)  Outcome: Met  Goal: Absence of Hospital-Acquired Illness or Injury  Outcome: Met  Goal: Optimal Comfort and Wellbeing  Outcome: Met  Goal: Readiness for Transition of Care  Outcome: Met     Problem: Wound  Goal: Optimal Coping  Outcome: Met  Goal: Optimal Functional Ability  Outcome: Met  Goal: Absence of Infection Signs and Symptoms  Outcome: Met  Goal: Improved Oral Intake  Outcome: Met  Goal: Optimal Pain Control and Function  Outcome: Met  Goal: Skin Health and Integrity  Outcome: Met  Goal: Optimal Wound Healing  Outcome: Met     Problem: Infection  Goal: Absence of Infection Signs and Symptoms  Outcome: Met     Problem: Skin Injury Risk Increased  Goal: Skin Health and Integrity  Outcome: Met   POC has been reviewed with pt.  Pt got up and walked with  feet with RW today.  Pt's dsg was changed and CDI.  Pt is having home health set up for her at home.  Pain has been controlled with current pain regimen.  No falls during this shift.  No replacements needed today.  Pt is adequate for discharge home today with HH.

## 2025-04-22 NOTE — DISCHARGE SUMMARY
[facility] Medicine  Discharge Summary      Patient Name: Nimco Caro  MRN: [id number]  DE: [id number]  Patient Class: IP- Inpatient  Admission Date: 4/21/2025  Hospital Length of Stay: 1 days  Discharge Date and Time: 04/22/2025 12:20 PM  Attending Physician: Sage Bains MD   Discharging Provider: Crystal Laureano PA-C  Primary Care Provider: Katy Mason MD    Primary Care Team: Networked reference to record PCT     HPI:   Nimco Caro is a 64 y.o. y.o. female with a PMHx of bipolar, HTN, HLD, COPD on home O2 prn, and GERD who was evaluated after going left DANNY with Dr. Vaca on 04/21.  Patient was seen in clinic with Dr. Vaca on 2/13 for continued left hip pain and decision was made for surgery.  Postoperatively, patient reports left hip pain and chronic shortness of breath which is at baseline.  She denies chest pain abdominal pain or nausea. Preop labs were reviewed.Hospital Medicine was consulted for further workup and management of the patient.       Procedure(s) (LRB):  ARTHROPLASTY, HIP (Left)      Hospital Course:   Patient hospital after undergoing left DANNY with Dr. Vaca.  Patient was monitored closely throughout hospital stay.  PT/OT were consulted on admission.  Hematocrit and hemoglobin was monitored closely postoperatively and was stable.  PT/OT recommended low intensity therapy.  Home health was ordered at discharge.  Patient was seen on day of discharge.  Patient was cleared for discharge by ortho.  Patient to follow up with PCP and Orthopedics.  Pain medication and aspirin 81 mg b.i.d. for 30 days prescribed per ortho.  Return precautions discussed and patient voiced understanding.  All questions answered.     Goals of Care Treatment Preferences:  Code Status: Full Code      SDOH Screening:  The patient declined to be screened for utility difficulties, food insecurity, transport difficulties, housing insecurity, and interpersonal safety, so  no concerns could be identified this admission.     Consults:   Consults (From admission, onward)          Status Ordering Provider     Inpatient consult to Orthopedics  Once        Provider:  Eliseo Vaca MD    Completed CRYSTAL LAUREANO     Inpatient consult to Hospitalist  Once        Provider:  Crystal Laureano, RODOLFO    Acknowledged ELISEO VACA            Assessment & Plan  Left hip pain    GERD (gastroesophageal reflux disease)    Hypertension    Hyperlipidemia    Mild episode of recurrent major depressive disorder    COPD (chronic obstructive pulmonary disease)     Final Active Diagnoses:    Diagnosis Date Noted POA    PRINCIPAL PROBLEM:  Left hip pain [M25.552] 08/26/2021 Yes    COPD (chronic obstructive pulmonary disease) [J44.9] 08/20/2019 Yes    Mild episode of recurrent major depressive disorder [F33.0] 08/19/2019 Yes    Hypertension [I10] 02/25/2019 Yes    Hyperlipidemia [E78.5] 02/25/2019 Yes    GERD (gastroesophageal reflux disease) [K21.9] 02/25/2019 Yes      Problems Resolved During this Admission:       Discharged Condition: good    Disposition: Home or Self Care    Follow Up:   Follow-up Information       Katy Mason MD Follow up in 1 week(s).    Specialty: Family Medicine  Why: Please call and set up an appointment, you can do a virtual / phone appointment with your PCP in a week, so she can follow up on how you are doing as we discussed.  Contact information:  [facility]  [facility] [address]  [phone]               Eliseo Vaca MD Follow up on 5/6/2025.    Specialties: Orthopedic Surgery, Surgery, Sports Medicine  Why: Post-Op follow up Tuesday May 6th at 10am  Contact information:  [address] [address]  [phone]                           Patient Instructions:      Ambulatory referral/consult to Smoking Cessation Program   Standing Status: Future   Referral Priority: Routine Referral Type: Consultation   Referral  Reason: Specialty Services Required   Requested Specialty: CTTS   Number of Visits Requested: 1     Diet general     Call MD for:  temperature >100.4     Call MD for:  persistent nausea and vomiting     Call MD for:  severe uncontrolled pain     Call MD for:  difficulty breathing, headache or visual disturbances     Call MD for:  redness, tenderness, or signs of infection (pain, swelling, redness, odor or green/yellow discharge around incision site)     Call MD for:  hives     Call MD for:  persistent dizziness or light-headedness     Call MD for:  extreme fatigue     Notify your health care provider if you experience any of the following:  temperature >100.4     Notify your health care provider if you experience any of the following:  persistent nausea and vomiting or diarrhea     Notify your health care provider if you experience any of the following:  severe uncontrolled pain     Notify your health care provider if you experience any of the following:  redness, tenderness, or signs of infection (pain, swelling, redness, odor or green/yellow discharge around incision site)     Notify your health care provider if you experience any of the following:  difficulty breathing or increased cough     Notify your health care provider if you experience any of the following:  increased confusion or weakness     Reason for not Prescribing Nicotine Replacement     Order Specific Question Answer Comments   Reason for not Prescribing: Patient refused      Activity as tolerated       Significant Diagnostic Studies: Labs: CMP   Recent Labs   Lab 04/21/25  1535 04/22/25  0450    136   K 4.3 4.3   CO2 26 26   BUN 8 8   CREATININE 0.6 0.5   CALCIUM 8.6* 8.6*    and CBC   Recent Labs   Lab 04/21/25  1535 04/22/25  0450   WBC 15.85* 11.71   HGB 10.5* 9.1*   HCT 34.1* 28.9*    184       Pending Diagnostic Studies:       None           Medications:  Reconciled Home Medications:      Medication List        CONTINUE taking  these medications      ascorbic acid (vitamin C) 1000 MG tablet  Commonly known as: VITAMIN C  Take 1,000 mg by mouth once daily.     aspirin 81 MG EC tablet  Commonly known as: ECOTRIN  Take 1 tablet (81 mg total) by mouth every 12 (twelve) hours.     atenoloL 25 MG tablet  Commonly known as: TENORMIN  Take 25 mg by mouth once daily.     azelastine 137 mcg (0.1 %) nasal spray  Commonly known as: ASTELIN  SPRAY 1 SPRAY BY NASAL ROUTE 2 TIMES DAILY. MUST MAKE APPOINTMENT     b complex vitamins tablet  Take 1 tablet by mouth once daily.     biotin 1 mg tablet  Take 1,000 mcg by mouth once daily.     butalbital-acetaminophen-caffeine -40 mg -40 mg per tablet  Commonly known as: FIORICET, ESGIC  Take 1 tablet by mouth every 4 (four) hours as needed.     celecoxib 200 MG capsule  Commonly known as: CeleBREX  Take 1 capsule (200 mg total) by mouth 2 (two) times daily.     clonazePAM 2 MG Tab  Commonly known as: KlonoPIN  Take 2 mg by mouth 2 (two) times daily.     cyanocobalamin 1,000 mcg/mL injection  Inject 1,000 mcg into the muscle every 30 days.     cycloSPORINE 0.05 % ophthalmic emulsion  Commonly known as: RESTASIS  Apply 1 drop to eye 2 (two) times daily.     dicyclomine 10 MG capsule  Commonly known as: BENTYL  Take 10 mg by mouth 2 (two) times daily.     docusate sodium 100 MG capsule  Commonly known as: COLACE  Take 1 capsule (100 mg total) by mouth 2 (two) times daily.     donepeziL 10 MG tablet  Commonly known as: ARICEPT  Take 10 mg by mouth once daily.     fluticasone propionate 50 mcg/actuation nasal spray  Commonly known as: FLONASE  1 spray (50 mcg total) by Each Nostril route once daily.     gabapentin 800 MG tablet  Commonly known as: NEURONTIN  Take 800 mg by mouth 3 (three) times daily.     hydrOXYzine pamoate 25 MG Cap  Commonly known as: VISTARIL  TAKE 1 CAPSULE (25 MG TOTAL) BY MOUTH EVERY 6 (SIX) HOURS AS NEEDED.     levalbuterol 45 mcg/actuation inhaler  Commonly known as: XOPENEX  HFA  Inhale 1-2 puffs into the lungs every 4 (four) hours as needed for Wheezing. Rescue     LINZESS 145 mcg Cap capsule  Generic drug: linaCLOtide  Take 145 mcg by mouth before breakfast.     meclizine 25 mg tablet  Commonly known as: ANTIVERT  Take 1 tablet (25 mg total) by mouth 3 (three) times daily as needed for Dizziness.     memantine 10 MG Tab  Commonly known as: NAMENDA  Take 10 mg by mouth 2 (two) times daily.     miscellaneous medical supply Kit  by Apply Externally route 2 (two) times a day. Use twice daily at 8 AM and 3 PM and record the value in [id number] as directed.     multivitamin with minerals tablet  Take 1 tablet by mouth once daily.     ondansetron 4 MG tablet  Commonly known as: ZOFRAN  Take 1 tablet (4 mg total) by mouth every 8 (eight) hours as needed for Nausea.     OXcarbazepine 300 MG Tab  Commonly known as: TRILEPTAL  Take 300 mg by mouth once daily.     oxyCODONE-acetaminophen 7.5-325 mg per tablet  Commonly known as: PERCOCET  Take 1 tablet by mouth every 6 (six) hours as needed for Pain.     OXYGEN-AIR DELIVERY SYSTEMS MISC  6 L by Misc.(Non-Drug; Combo Route) route continuous.     polyethylene glycol 17 gram/dose powder  Commonly known as: GLYCOLAX  Take 17 g by mouth once daily.     pravastatin 20 MG tablet  Commonly known as: PRAVACHOL  Take 20 mg by mouth once daily.     sertraline 100 MG tablet  Commonly known as: ZOLOFT  Take 100 mg by mouth.     sucralfate 1 gram tablet  Commonly known as: CARAFATE  Take 1 g by mouth 4 (four) times daily.     sumatriptan 25 MG Tab  Commonly known as: IMITREX  Take 25 mg by mouth 2 (two) times daily as needed.     tiotropium 18 mcg inhalation capsule  Commonly known as: SPIRIVA  Inhale into the lungs.     topiramate 50 MG tablet  Commonly known as: TOPAMAX  Take 50 mg by mouth once daily.     traZODone 100 MG tablet  Commonly known as: DESYREL  Take 1 tablet (100 mg total) by mouth every evening.     TRELEGY ELLIPTA 200-62.5-25 mcg  inhaler  Generic drug: fluticasone-umeclidin-vilanter  INHALE 1 PUFF INTO THE LUNGS ONCE DAILY.     trospium 60 mg Cp24 capsule  Commonly known as: SANCTURA XR  Take 60 mg by mouth once daily.     vilazodone 20 mg Tab  Commonly known as: VIIBRYD  Take 20 mg by mouth once daily.     vitamin D 1000 units Tab  Commonly known as: VITAMIN D3  Take 1,000 Units by mouth once daily.              Indwelling Lines/Drains at time of discharge:   Lines/Drains/Airways       None                   Time spent on the discharge of patient: 35 minutes         Crystal Laureano PA-C  Department of Hospital Medicine  [facility]

## 2025-04-22 NOTE — PT/OT/SLP PROGRESS
Occupational Therapy   Treatment    Name: Nimco Caro  MRN: [id number]  Admitting Diagnosis:  Left hip pain  1 Day Post-Op    Recommendations:     Discharge Recommendations: Low Intensity Therapy  Discharge Equipment Recommendations:  none  Barriers to discharge:  None    Assessment:     Nmico Caro is a 64 y.o. female with a medical diagnosis of Left hip pain.  She presents with slight improvement to safety awareness with sit<>stand using RW. Patient was able to recall more hip precautions this tx. Patient was able to perform grooming tasks at sink with SBA.   Performance deficits affecting function are weakness, impaired endurance, impaired self care skills, impaired functional mobility, gait instability, impaired balance, decreased lower extremity function, decreased coordination, pain, decreased ROM, orthopedic precautions, decreased safety awareness.     Rehab Prognosis:  Good; patient would benefit from acute skilled OT services to address these deficits and reach maximum level of function.       Plan:     Patient to be seen 3 x/week to address the above listed problems via self-care/home management, therapeutic exercises  Plan of Care Expires: 05/12/25  Plan of Care Reviewed with: patient    Subjective     Chief Complaint: L hip pain  Patient/Family Comments/goals: none  Pain/Comfort:  Pain Rating 1:  (not rated)  Location - Side 1: Left  Location - Orientation 1: generalized  Location 1: hip  Pain Addressed 1: Reposition, Distraction  Pain Rating Post-Intervention 1:  (not rated)    Objective:     Communicated with: nurse Ros/Sharri prior to session.  Patient found up in chair with chair check upon OT entry to room.    General Precautions: Standard, fall    Orthopedic Precautions:LLE weight bearing as tolerated, LLE posterior precautions  Braces: N/A  Respiratory Status: Room air     Occupational Performance:     Bed Mobility:    Patient completed Scooting/Bridging with stand by assistance  Patient  completed Sit to Supine with minimum assistance to get LLE back in bed    Functional Mobility/Transfers:  Patient completed Sit <> Stand Transfer with contact guard assistance  with  rolling walker   Patient completed Chair > Bed Transfer using Stand Pivot technique with contact guard assistance with rolling walker    Activities of Daily Living:  Grooming: stand by assistance with oral hygiene while standing at sink  Toileting: Purewick in place      AMPA 6 Click ADL:      Treatment & Education:  OT reviewed hip precautions with patient. Patient able to identify more hip precautions this tx.   OT ed patient on safety with walker use for functional mobility with cues for hand placement & sequencing.       Patient left HOB elevated with all lines intact, call button in reach, bed alarm on, and nurse notified    GOALS:   Multidisciplinary Problems       Occupational Therapy Goals          Problem: Occupational Therapy    Goal Priority Disciplines Outcome Interventions   Occupational Therapy Goal     OT, PT/OT Progressing    Description: Goals to be met by: 5/12/2025     Patient will increase functional independence with ADLs by performing:    LE Dressing with Modified [place] and Assistive Devices as needed.  Grooming while standing at sink with Modified [place].  Toileting from toilet with Modified [place] for hygiene and clothing management.   Supine to sit with Modified [place].  Toilet transfer to toilet with Modified [place].                         DME Justifications:  None    Time Tracking:     OT Date of Treatment: 04/22/25  OT Start Time: 1055  OT Stop Time: 1114  OT Total Time (min): 19 min    Billable Minutes:Self Care/Home Management 19    OT/MANSOOR: OT          4/22/2025

## 2025-04-22 NOTE — HOSPITAL COURSE
Patient hospital after undergoing left DANNY with Dr. Vaca.  Patient was monitored closely throughout hospital stay.  PT/OT were consulted on admission.  Hematocrit and hemoglobin was monitored closely postoperatively and was stable.  PT/OT recommended low intensity therapy.  Home health was ordered at discharge.  Patient was seen on day of discharge.  Patient was cleared for discharge by ortho.  Patient was follow up with PCP and Orthopedics.  Pain medication and aspirin 81 mg b.i.d. for 30 days prescribed per ortho.  Return precautions discussed and patient was voiced understanding.  All questions answered.

## 2025-04-22 NOTE — PLAN OF CARE
POC/Meds reviewed, pt verbalized understanding. Vitals stable.  Afebrile.   Tele In place-0512. Purewick in place. Prn medication given. Accuchecks monitored. Repositions self. Hourly/Q2hr rounding performed, safety maintained. Bed in lowest position, wheels locked, SR up x2, call light in easy reach. No  complaints at this time.

## 2025-04-22 NOTE — SUBJECTIVE & OBJECTIVE
Principal Problem:Left hip pain    Principal Orthopedic Problem:  Failed left hip hemiarthroplasty    Interval History:  Status post left total hip arthroplasty conversion from left hip hemiarthroplasty    Review of patient's allergies indicates:   Allergen Reactions    Ciprofloxacin Diarrhea    Hydrocodone-acetaminophen Nausea And Vomiting, Nausea Only and Other (See Comments)    Ibuprofen Nausea Only    Meloxicam Hives    Naproxen Nausea Only and Other (See Comments)    Narcof [hydrocodone-guaifenesin] Nausea And Vomiting    Quetiapine Nausea And Vomiting and Other (See Comments)       Current Facility-Administered Medications   Medication    acetaminophen tablet 650 mg    albuterol-ipratropium 2.5 mg-0.5 mg/3 mL nebulizer solution 3 mL    artificial tears 0.5 % ophthalmic solution 2 drop    artificial tears 0.5 % ophthalmic solution 2 drop    aspirin EC tablet 81 mg    atenoloL tablet 25 mg    azelastine 137 mcg (0.1 %) nasal spray 137 mcg    bisacodyL suppository 10 mg    butalbital-acetaminophen-caffeine -40 mg per tablet 1 tablet    ceFAZolin injection 1 g    dextrose 50% injection 12.5 g    dextrose 50% injection 25 g    donepeziL tablet 10 mg    famotidine tablet 20 mg    glucagon (human recombinant) injection 1 mg    glucose chewable tablet 16 g    glucose chewable tablet 24 g    HYDROcodone-acetaminophen 5-325 mg per tablet 1 tablet    insulin aspart U-100 pen 0-5 Units    magnesium oxide tablet 800 mg    magnesium oxide tablet 800 mg    meclizine tablet 25 mg    melatonin tablet 9 mg    memantine tablet 10 mg    multivitamin tablet    mupirocin 2 % ointment 1 g    naloxone 0.4 mg/mL injection 0.02 mg    ondansetron injection 4 mg    OXcarbazepine tablet 300 mg    oxyCODONE-acetaminophen  mg per tablet 1 tablet    oxyCODONE-acetaminophen 5-325 mg per tablet 1 tablet    pantoprazole EC tablet 40 mg    polyethylene glycol packet 17 g    potassium bicarbonate disintegrating tablet 35 mEq     "potassium bicarbonate disintegrating tablet 50 mEq    potassium bicarbonate disintegrating tablet 60 mEq    pravastatin tablet 20 mg    scopolamine 1.3-1.5 mg (1 mg over 3 days) 1 patch    senna-docusate 8.6-50 mg per tablet 1 tablet    sertraline tablet 100 mg    sodium chloride 0.9% flush 3 mL    sodium chloride 0.9% flush 5 mL    topiramate tablet 50 mg    zolpidem tablet 5 mg     Objective:     Vital Signs (Most Recent):  Temp: 97.6 °F (36.4 °C) (04/22/25 0326)  Pulse: 68 (04/22/25 0400)  Resp: 18 (04/22/25 0333)  BP: (!) 183/72 (04/22/25 0326)  SpO2: (!) 92 % (04/22/25 0326) Vital Signs (24h Range):  Temp:  [97.6 °F (36.4 °C)-98.2 °F (36.8 °C)] 97.6 °F (36.4 °C)  Pulse:  [60-78] 68  Resp:  [12-24] 18  SpO2:  [89 %-98 %] 92 %  BP: (111-183)/(54-92) 183/72     Weight: 80 kg (176 lb 5.9 oz)  Height: 5' 7" (170.2 cm)  Body mass index is 27.62 kg/m².      Intake/Output Summary (Last 24 hours) at 4/22/2025 0601  Last data filed at 4/22/2025 0419  Gross per 24 hour   Intake 1240 ml   Output 1600 ml   Net -360 ml        General    Constitutional: She is oriented to person, place, and time. She appears well-developed and well-nourished.   Pulmonary/Chest: Effort normal.   Neurological: She is alert and oriented to person, place, and time.   Psychiatric: She has a normal mood and affect. Her behavior is normal. Judgment and thought content normal.         Left Hip Exam     Inspection   Swelling: absent  Erythema: absent  Bruising: absent    Other   Sensation: normal    Comments:  Patient lying in bed comfortably in supine position.  Incision to left hip well without wound dehiscence or drainage.  No surrounding erythema or ecchymosis.  Neurovascularly intact throughout the left lower extremity.  Negative Homans on the left.  Left EHL is intact.  Can dorsiflex and plantar flex the left ankle without difficulty.          Muscle Strength   Left Lower Extremity   Ankle Dorsiflexion:  5/5        Significant Labs: " None    Significant Imaging: None

## 2025-04-22 NOTE — PLAN OF CARE
[facility] - Med/Surg  Initial Discharge Assessment       Primary Care Provider: Katy Mason MD    Admission Diagnosis: History of left hip hemiarthroplasty [Z96.642]  Failed arthroplasty, subsequent encounter [T84.019D]  Pre-op testing [Z01.818]    Admission Date: 4/21/2025  Expected Discharge Date: 4/22/2025    DC assessment completed with patient at bedside. Verified information on facesheet as correct. Pt lives at listed address withher friends Opal. Reports she has help if needed from her.Reports PCP is Dr. Mason- reports last apt was  about a week ago and it was over the pone.  Pharmacy is [facility].  Has [facility], and will JOJO with them, states Rissa will come out tomorrow DME pt has a rolling walker in her room that is hers. She also has a rollator, shower chair, bath bench and bedside commode and a seat to elevate her toilet. Reports being independent with activities. Drives herself to apts. But Shaunna will be driving her until she is better. Reports .... Shaunna will be  transporting her home. She is taking home medications as prescribed and can currently afford them. Verified insurance on file. Denies recent inpt stay in last 30 days.  DC plan is JOJO with PT with [facility].       Transition of Care Barriers: None    Payor: MEDICARE / Plan: MEDICARE PART A & B / Product Type: Government /     Extended Emergency Contact Information  Primary Emergency Contact: Shaunna Leonard  Mobile Phone: [phone]  Relation: Friend  Preferred language: English   needed? No    Discharge Plan A: Home Health  Discharge Plan B: Rehab      [id number] [place] - [address]  [address] [address]  Phone: [phone] Fax: [phone]      Initial Assessment (most recent)       Adult Discharge Assessment - 04/22/25 1155          Discharge Assessment    Assessment Type Discharge Planning Assessment     Confirmed/corrected address,  phone number and insurance Yes     Confirmed Demographics Correct on Facesheet     Source of Information patient     When was your last doctors appointment? --   unsure, last PCP was a vitrual call.    Reason For Admission L hip revision     People in Home friend(s)     Do you expect to return to your current living situation? Yes     Do you have help at home or someone to help you manage your care at home? Yes     Who are your caregiver(s) and their phone number(s)? Shaunna Leonard [phone]     Prior to hospitilization cognitive status: Alert/Oriented;No Deficits     Current cognitive status: Alert/Oriented;No Deficits     Walking or Climbing Stairs Difficulty yes     Walking or Climbing Stairs ambulation difficulty, requires equipment     Mobility Management Rolling Walker     Dressing/Bathing Difficulty no   but has a shower chair, and assist at home if needed    Home Accessibility not wheelchair accessible     Equipment Currently Used at Home bedside commode;blood pressure machine;cane, straight;shower chair;raised toilet;walker, rolling;rollator     Readmission within 30 days? No     Patient currently being followed by outpatient case management? No     Do you currently have service(s) that help you manage your care at home? Yes     How Many hours does patient receive services 6     Name and Contact number of [facility]     Is the pt/caregiver preference to resume services with current agency Yes     Do you take prescription medications? Yes     Do you have prescription coverage? Yes     Coverage Pt doesn' have her card, but states she has a prescriptin benefits     Do you have any problems affording any of your prescribed medications? No     Is the patient taking medications as prescribed? yes     Who is going to help you get home at discharge? Shaunna [phone]     How do you get to doctors appointments? family or friend will provide     Are you on dialysis? No     Do you take coumadin? No      Discharge Plan A Home Health     Discharge Plan B Rehab     DME Needed Upon Discharge  none     Discharge Plan discussed with: Patient     Transition of Care Barriers None

## 2025-04-22 NOTE — NURSING
Pt discharged to home with HH.  Pt left with her at bedside medications.  Pt also took her own personal RW home.  Pt rolled down with w/c to vehicle.  Pt verbalized understanding of discharge orders.   no c/o pain/nausea/vomitting.  Will CTM.

## 2025-04-22 NOTE — PLAN OF CARE
04/22/25 1154   Medicare Message   Important Message from Medicare regarding Discharge Appeal Rights Given to patient/caregiver;Explained to patient/caregiver   Date IMM was signed 04/22/25   Time IMM was signed 1135     Given to April to scan.

## 2025-04-22 NOTE — PLAN OF CARE
04/22/25 1210   Post-Acute Status   Post-Acute Authorization Home Health     Pt will [facility] with [facility]. I spoke with Madie and they will need orders and DC summary faxed.

## 2025-04-22 NOTE — PT/OT/SLP PROGRESS
Physical Therapy Treatment    Patient Name:  Nimco Caro   MRN:  [id number]    Recommendations:     Discharge Recommendations: Low Intensity Therapy  Discharge Equipment Recommendations: none  Barriers to discharge: Decreased caregiver support    Assessment:     Nimco Caro is a 64 y.o. female admitted with a medical diagnosis of Left hip pain.  She presents with the following impairments/functional limitations: weakness, impaired endurance, impaired functional mobility, gait instability, impaired balance, decreased lower extremity function, pain, decreased ROM, edema, orthopedic precautions .    Pt seen supine in bed- scheduled for discharge. Pt has abd pillow on and educated on use and purpose. Pt educated on L posterior hip precautions. Pt completed thera ex in supine. Min assist to sit EOB with assist LLE. Pt asking to be allowed to move on own. Pt requiring min assist to stand and ambulated at hallways 250ft with min assist and another person following with chair with 2 seated rests. Pt requiring encouragement to complete task. She is talkative. OOB chair post PT.  LIT.    Rehab Prognosis: Fair; patient would benefit from acute skilled PT services to address these deficits and reach maximum level of function.    Recent Surgery: Procedure(s) (LRB):  ARTHROPLASTY, HIP (Left) 1 Day Post-Op    Plan:     During this hospitalization, patient to be seen BID to address the identified rehab impairments via gait training, therapeutic activities, therapeutic exercises and progress toward the following goals:    Plan of Care Expires:  05/19/25    Subjective   Stated did not sleep well and ate little for breakfast  Stated her friend Shaunna will be her caregiver  Chief Complaint: pain LH   Patient/Family Comments/goals: get well  Pain/Comfort:  Pain Rating 1: 10/10  Location - Side 1: Left  Location 1: hip  Pain Addressed 1: Reposition, Distraction, Nurse notified      Objective:     Communicated with nurse Sommer prior to  session.  Patient found HOB elevated with bed alarm, hip abduction pillow upon PT entry to room.     General Precautions: Standard, fall  Orthopedic Precautions: LLE weight bearing as tolerated, LLE posterior precautions  Braces: N/A  Respiratory Status: Room air     Functional Mobility:  Bed Mobility:     Scooting: minimum assistance  Supine to Sit: minimum assistance  Transfers:     Sit to Stand:  minimum assistance with rolling walker  Bed to Chair: minimum assistance with  rolling walker  using  Stand Pivot  Gait: 250ft with RW min assist and another person following with chair. Pt took 2 seated rests      AM-PAC 6 CLICK MOBILITY  Turning over in bed (including adjusting bedclothes, sheets and blankets)?: 3  Sitting down on and standing up from a chair with arms (e.g., wheelchair, bedside commode, etc.): 3  Moving from lying on back to sitting on the side of the bed?: 3  Moving to and from a bed to a chair (including a wheelchair)?: 3  Need to walk in hospital room?: 3  Climbing 3-5 steps with a railing?: 1  Basic Mobility Total Score: 16       Treatment & Education:  Patient was educated on the importance of OOB activity and functional mobility to negate negative effects of prolonged bed rest during hospitalization, safe transfers and ambulation, and D/C planning   Thera ex in supine with AP,QS/GS,  Education on posterior hip precautions      Patient left up in chair with all lines intact, call button in reach, chair alarm on, and nurse Ros notified..    GOALS:   Multidisciplinary Problems       Physical Therapy Goals          Problem: Physical Therapy    Goal Priority Disciplines Outcome Interventions   Physical Therapy Goal     PT, PT/OT Progressing    Description: Goals to be met by: 25     Patient will increase functional independence with mobility by performin. Supine to sit with [place]  2. Sit to supine with [place]  3. Sit to stand transfer with Stand-by Assistance  4. Bed to chair  transfer with Stand-by Assistance using Rolling Walker  5. Gait  x 150 feet with Stand-by Assistance using Rolling Walker.                          DME Justifications:  No DME recommended requiring DME justifications    Time Tracking:     PT Received On: 04/22/25  PT Start Time: 0924     PT Stop Time: 1027  PT Total Time (min): 63 min     Billable Minutes: Gait Training 41 and Therapeutic Exercise 22    Treatment Type: Treatment  PT/PTA: PT     Number of PTA visits since last PT visit: 0     04/22/2025

## 2025-04-22 NOTE — PLAN OF CARE
04/22/25 1313   Final Note   Assessment Type Final Discharge Note   Anticipated Discharge Disposition Home-Health   What phone number can be called within the next 1-3 days to see how you are doing after discharge?   ([phone])     Pt clear for DC from case management standpoint. Discharging to home with [facility] to continue with HH. Confirmed with Madie, orders faxed. Pt states her friend Shaunna will transport her home. Pt has her own rolling walker in the room to DC home with. No additional CM  needs.

## 2025-04-22 NOTE — CONSULTS
[facility]/Surg  Orthopedics  Progress Note    Patient Name: Nimco Caro  MRN: [id number]  Admission Date: 4/21/2025  Hospital Length of Stay: 1 days  Attending Provider: Eliseo Vaca MD  Primary Care Provider: Katy Mason MD  Follow-up For: Procedure(s) (LRB):  ARTHROPLASTY, HIP (Left)    Post-Operative Day: 1 Day Post-Op  Subjective:     Principal Problem:Left hip pain    Principal Orthopedic Problem:  Failed left hip hemiarthroplasty    Interval History:  Status post left total hip arthroplasty conversion from left hip hemiarthroplasty    Review of patient's allergies indicates:   Allergen Reactions    Ciprofloxacin Diarrhea    Hydrocodone-acetaminophen Nausea And Vomiting, Nausea Only and Other (See Comments)    Ibuprofen Nausea Only    Meloxicam Hives    Naproxen Nausea Only and Other (See Comments)    Narcof [hydrocodone-guaifenesin] Nausea And Vomiting    Quetiapine Nausea And Vomiting and Other (See Comments)       Current Facility-Administered Medications   Medication    acetaminophen tablet 650 mg    albuterol-ipratropium 2.5 mg-0.5 mg/3 mL nebulizer solution 3 mL    artificial tears 0.5 % ophthalmic solution 2 drop    artificial tears 0.5 % ophthalmic solution 2 drop    aspirin EC tablet 81 mg    atenoloL tablet 25 mg    azelastine 137 mcg (0.1 %) nasal spray 137 mcg    bisacodyL suppository 10 mg    butalbital-acetaminophen-caffeine -40 mg per tablet 1 tablet    ceFAZolin injection 1 g    dextrose 50% injection 12.5 g    dextrose 50% injection 25 g    donepeziL tablet 10 mg    famotidine tablet 20 mg    glucagon (human recombinant) injection 1 mg    glucose chewable tablet 16 g    glucose chewable tablet 24 g    HYDROcodone-acetaminophen 5-325 mg per tablet 1 tablet    insulin aspart U-100 pen 0-5 Units    magnesium oxide tablet 800 mg    magnesium oxide tablet 800 mg    meclizine tablet 25 mg    melatonin tablet 9 mg    memantine tablet 10 mg    multivitamin tablet  "   mupirocin 2 % ointment 1 g    naloxone 0.4 mg/mL injection 0.02 mg    ondansetron injection 4 mg    OXcarbazepine tablet 300 mg    oxyCODONE-acetaminophen  mg per tablet 1 tablet    oxyCODONE-acetaminophen 5-325 mg per tablet 1 tablet    pantoprazole EC tablet 40 mg    polyethylene glycol packet 17 g    potassium bicarbonate disintegrating tablet 35 mEq    potassium bicarbonate disintegrating tablet 50 mEq    potassium bicarbonate disintegrating tablet 60 mEq    pravastatin tablet 20 mg    scopolamine 1.3-1.5 mg (1 mg over 3 days) 1 patch    senna-docusate 8.6-50 mg per tablet 1 tablet    sertraline tablet 100 mg    sodium chloride 0.9% flush 3 mL    sodium chloride 0.9% flush 5 mL    topiramate tablet 50 mg    zolpidem tablet 5 mg     Objective:     Vital Signs (Most Recent):  Temp: 97.6 °F (36.4 °C) (04/22/25 0326)  Pulse: 68 (04/22/25 0400)  Resp: 18 (04/22/25 0333)  BP: (!) 183/72 (04/22/25 0326)  SpO2: (!) 92 % (04/22/25 0326) Vital Signs (24h Range):  Temp:  [97.6 °F (36.4 °C)-98.2 °F (36.8 °C)] 97.6 °F (36.4 °C)  Pulse:  [60-78] 68  Resp:  [12-24] 18  SpO2:  [89 %-98 %] 92 %  BP: (111-183)/(54-92) 183/72     Weight: 80 kg (176 lb 5.9 oz)  Height: 5' 7" (170.2 cm)  Body mass index is 27.62 kg/m².      Intake/Output Summary (Last 24 hours) at 4/22/2025 0601  Last data filed at 4/22/2025 0419  Gross per 24 hour   Intake 1240 ml   Output 1600 ml   Net -360 ml        General    Constitutional: She is oriented to person, place, and time. She appears well-developed and well-nourished.   Pulmonary/Chest: Effort normal.   Neurological: She is alert and oriented to person, place, and time.   Psychiatric: She has a normal mood and affect. Her behavior is normal. Judgment and thought content normal.         Left Hip Exam     Inspection   Swelling: absent  Erythema: absent  Bruising: absent    Other   Sensation: normal    Comments:  Patient lying in bed comfortably in supine position.  Incision to left hip well " without wound dehiscence or drainage.  No surrounding erythema or ecchymosis.  Neurovascularly intact throughout the left lower extremity.  Negative Homans on the left.  Left EHL is intact.  Can dorsiflex and plantar flex the left ankle without difficulty.          Muscle Strength   Left Lower Extremity   Ankle Dorsiflexion:  5/5        Significant Labs: None    Significant Imaging: None  Assessment/Plan:     * Left hip pain  1. Status post left total hip arthroplasty conversion from left hip hemiarthroplasty    Patient doing well postoperatively.  We will start with daily dressing changes.  Aspirin 81 mg by mouth twice a day for 1 month postop for DVT prophylaxis.  She is stable from an orthopedic perspective and able to be discharged home with home health physical therapy if amenable to hospitalist/attending.  Follow up in the outpatient clinic in 10-12 days for wound check and suture removal.  I did review with her total hip replacement precautions to help reduce the risk/incidence of dislocation.  She can weightbear as tolerated on the left lower extremity.  Ambulate with a rolling walker for least 4-6 weeks postop.          Cristhian Manrique PA-C  Orthopedics  [facility]/Surg

## 2025-04-22 NOTE — ASSESSMENT & PLAN NOTE
1. Status post left total hip arthroplasty conversion from left hip hemiarthroplasty    Patient doing well postoperatively.  We will start with daily dressing changes.  Aspirin 81 mg by mouth twice a day for 1 month postop for DVT prophylaxis.  She is stable from an orthopedic perspective and able to be discharged home with home health physical therapy if amenable to hospitalist/attending.  Follow up in the outpatient clinic in 10-12 days for wound check and suture removal.  I did review with her total hip replacement precautions to help reduce the risk/incidence of dislocation.  She can weightbear as tolerated on the left lower extremity.  Ambulate with a rolling walker for least 4-6 weeks postop.

## 2025-04-22 NOTE — PLAN OF CARE
04/21/25 1922   Patient Assessment/Suction   Level of Consciousness (AVPU) alert   Respiratory Effort Normal;Unlabored   Rhythm/Pattern, Respiratory pattern regular   Cough Frequency no cough   PRE-TX-O2   Device (Oxygen Therapy) room air   SpO2 98 %   Pulse Oximetry Type Intermittent   Aerosol Therapy   $ Aerosol Therapy Charges PRN treatment not required   Incentive Spirometer   $ Incentive Spirometer Charges done with encouragement   Administration (IS) proper technique demonstrated   Number of Repetitions (IS) 10   Level Incentive Spirometer (mL) 2250   Patient Tolerance (IS) good;no adverse signs/symptoms present

## 2025-04-25 ENCOUNTER — PATIENT OUTREACH (OUTPATIENT)
Dept: ADMINISTRATIVE | Facility: CLINIC | Age: 65
End: 2025-04-25
Payer: MEDICARE

## 2025-04-30 NOTE — PROCEDURES
Large Joint Aspiration/Injection: R knee  Date/Time: 10/1/2019 1:45 PM  Performed by: Eliseo Vaca MD  Authorized by: Eliseo Vaca MD     Consent Done?:  Yes (Verbal)  Indications:  Pain  Procedure site marked: Yes    Timeout: Prior to procedure the correct patient, procedure, and site was verified    Anesthesia  Local anesthesia used  Anesthetic: lidocaine 1% without epinephrine    Location:  Knee  Site:  R knee  Prep: Patient was prepped and draped in usual sterile fashion    Needle size:  25 G  Medications:  40 mg methylPREDNISolone acetate 40 mg/mL; 40 mg methylPREDNISolone acetate 40 mg/mL  Patient tolerance:  Patient tolerated the procedure well with no immediate complications       Pain greater than 7 on scale of 10 on ambulation

## 2025-05-05 ENCOUNTER — OFFICE VISIT (OUTPATIENT)
Dept: HOME HEALTH SERVICES | Facility: CLINIC | Age: 65
End: 2025-05-05
Payer: MEDICARE

## 2025-05-05 VITALS
BODY MASS INDEX: 27.68 KG/M2 | OXYGEN SATURATION: 98 % | WEIGHT: 176.38 LBS | TEMPERATURE: 97 F | RESPIRATION RATE: 20 BRPM | SYSTOLIC BLOOD PRESSURE: 148 MMHG | DIASTOLIC BLOOD PRESSURE: 80 MMHG | HEIGHT: 67 IN | HEART RATE: 86 BPM

## 2025-05-05 DIAGNOSIS — M25.552 LEFT HIP PAIN: Primary | ICD-10-CM

## 2025-05-05 PROCEDURE — 99349 HOME/RES VST EST MOD MDM 40: CPT | Mod: S$GLB,,, | Performed by: NURSE PRACTITIONER

## 2025-05-06 ENCOUNTER — OFFICE VISIT (OUTPATIENT)
Dept: ORTHOPEDICS | Facility: CLINIC | Age: 65
End: 2025-05-06
Payer: MEDICARE

## 2025-05-06 VITALS — BODY MASS INDEX: 26.68 KG/M2 | WEIGHT: 170 LBS | HEIGHT: 67 IN

## 2025-05-06 DIAGNOSIS — T84.019D FAILED ARTHROPLASTY, SUBSEQUENT ENCOUNTER: ICD-10-CM

## 2025-05-06 DIAGNOSIS — Z96.642 STATUS POST TOTAL REPLACEMENT OF LEFT HIP: Primary | ICD-10-CM

## 2025-05-06 DIAGNOSIS — T84.84XA PAINFUL ORTHOPAEDIC HARDWARE: ICD-10-CM

## 2025-05-06 DIAGNOSIS — Z96.642 HISTORY OF LEFT HIP HEMIARTHROPLASTY: ICD-10-CM

## 2025-05-06 PROCEDURE — 99999 PR PBB SHADOW E&M-EST. PATIENT-LVL IV: CPT | Mod: PBBFAC,,, | Performed by: ORTHOPAEDIC SURGERY

## 2025-05-06 PROCEDURE — 99024 POSTOP FOLLOW-UP VISIT: CPT | Mod: POP,,, | Performed by: ORTHOPAEDIC SURGERY

## 2025-05-06 PROCEDURE — 99214 OFFICE O/P EST MOD 30 MIN: CPT | Mod: PBBFAC,PN | Performed by: ORTHOPAEDIC SURGERY

## 2025-05-06 RX ORDER — BUDESONIDE AND FORMOTEROL FUMARATE 80; 4.5 UG/1; UG/1
AEROSOL, METERED RESPIRATORY (INHALATION)
COMMUNITY
Start: 2025-04-06

## 2025-05-06 RX ORDER — GLIPIZIDE 2.5 MG/1
2.5 TABLET ORAL
COMMUNITY
Start: 2025-04-22

## 2025-05-06 RX ORDER — OXYCODONE AND ACETAMINOPHEN 7.5; 325 MG/1; MG/1
1 TABLET ORAL EVERY 6 HOURS PRN
Qty: 28 TABLET | Refills: 0 | Status: SHIPPED | OUTPATIENT
Start: 2025-05-06

## 2025-05-06 NOTE — PROGRESS NOTES
"[facility]  Transitional Care Management (TCM) Home Visit    Encounter Provider: Joby Pierce   PCP: Katy Mason MD  Consult Requested By: No ref. provider found  Admit Date: 4/21/25   IP Discharge Date: 4/22/25  Hospital Length of Stay:RRHLOS@ 2 days  Days since discharge (from IP or SNF): 7 days   [facility] Contact Note: 12/14/23  Hospital Diagnosis: No admission diagnoses are documented for this encounter.     HISTORY OF PRESENT ILLNESS      Patient ID: Nimco Caro is a 64 y.o. female was recently admitted to the hospital, this is their TCM encounter.    Hospital Course Synopsis:  Admit: 12/9/23   Discharge: 12/11/23  HPI:   Nimco Caro is a 63 yr old female with past medical history of hypertension, hyperlipidemia, COPD with home oxygen, and back pain presenting today after a fall at home due to weakness, lethargy, and MCGARRY. She did not experience any loss of consciousness or hitting of her head. She states she is supposed to ambulated with a walker but she did not. Denies chest pain, chills, diaphoresis, numbness, tingling, dizziness, headache, LOC, nausea, or vomiting. On 12/7, she underwent a lumbar laminectomy with fusion L4-L5 with Dr. Jaimes. She was discharged today from the hospital and has returned c/o weakness possibly due to medication. She states she feels like she is "taking too much medication." She denies taking any medication prior to fall or since discharged from facility. Upon examine she does have difficulty staying awake but answers questions appropriately. ED workup revealed fever, temp 101, oxygen saturation 85% on room air increased to 95 % on 4 L NC, VBG unremarkable. White count 13.43, Na 130 at baseline. CT Thoracic demonstrated small right pleural effusion with interstitial changes at lung bases. CT lumbar demonstrated no device hardware complications. She was started on ceftriaxone and doxycycline. She is admitted to hospital medicine for further work-up " "and management.           Hospital Course:   Pt was monitored closely during her hospital stay. Her pain remained controlled on oral medications. Imaging did not reveal acute fractures and lumbar laminectomy stable. Pt was eval by therapy who rec mod intensity therapy. Pt initally declined and stated she was going home. After further disuccsion pt agreeable to SNF/rehab. Pt does not have SNF benefits and rehab rep eval pt at  and pt voiced she would not participate with required 3 hrs of therapy daily. Pt was discharged home with  services. Pt was educated on fall precautions and return precautions. Pt verbalized understanding of discharge instructions.     With this [facility] hospital follow up visit patient is found ambulating throughout the home using furniture to hold on to. She reports using walker intermittently. Reports lower back and bilateral thigh pain since surgery that has slowly started to improved. Reports no further falls since 12/9/23. Smoker not willing to quit. Reports using oxygen intermittently. States Bipolar disorder is currently controlled, taking medications as prescribed and "always feels a little anxious". Living with a friend, Shaunna.     Vital signs stable during visit. No distress noted. Program contact information provided to patient and left in home.    DECISION MAKING TODAY       Assessment & Plan:  1. Left hip pain  Assessment & Plan:  Pt is post op Left arthropolasty and is stable at this time,     Pt has pt/ot and walking with walker as directed, pt is weight bearing and tolerating well,   Taking pain meds as needed, no distress,     Dressing site is intact small amount of sanguanous fluid noted. See image,     Pt has follow up as directed.            Medication List on Discharge:     Medication List            Accurate as of May 5, 2025  9:30 PM. If you have any questions, ask your nurse or doctor.                CONTINUE taking these medications      ascorbic " acid (vitamin C) 1000 MG tablet  Commonly known as: VITAMIN C  Take 1,000 mg by mouth once daily.     aspirin 81 MG EC tablet  Commonly known as: ECOTRIN  Take 1 tablet (81 mg total) by mouth every 12 (twelve) hours.     atenoloL 25 MG tablet  Commonly known as: TENORMIN  Take 25 mg by mouth once daily.     azelastine 137 mcg (0.1 %) nasal spray  Commonly known as: ASTELIN  SPRAY 1 SPRAY BY NASAL ROUTE 2 TIMES DAILY. ***MUST MAKE APPOINTMENT     b complex vitamins tablet  Take 1 tablet by mouth once daily.     biotin 1 mg tablet  Take 1,000 mcg by mouth once daily.     butalbital-acetaminophen-caffeine -40 mg -40 mg per tablet  Commonly known as: FIORICET, ESGIC  Take 1 tablet by mouth every 4 (four) hours as needed.     celecoxib 200 MG capsule  Commonly known as: CeleBREX  Take 1 capsule (200 mg total) by mouth 2 (two) times daily.     clonazePAM 2 MG Tab  Commonly known as: KlonoPIN  Take 2 mg by mouth 2 (two) times daily.     cyanocobalamin 1,000 mcg/mL injection  Inject 1,000 mcg into the muscle every 30 days.     cycloSPORINE 0.05 % ophthalmic emulsion  Commonly known as: RESTASIS  Apply 1 drop to eye 2 (two) times daily.     dicyclomine 10 MG capsule  Commonly known as: BENTYL  Take 10 mg by mouth 2 (two) times daily.     docusate sodium 100 MG capsule  Commonly known as: COLACE  Take 1 capsule (100 mg total) by mouth 2 (two) times daily.     donepeziL 10 MG tablet  Commonly known as: ARICEPT  Take 10 mg by mouth once daily.     fluticasone propionate 50 mcg/actuation nasal spray  Commonly known as: FLONASE  1 spray (50 mcg total) by Each Nostril route once daily.     gabapentin 800 MG tablet  Commonly known as: NEURONTIN  Take 800 mg by mouth 3 (three) times daily.     hydrOXYzine pamoate 25 MG Cap  Commonly known as: VISTARIL  TAKE 1 CAPSULE (25 MG TOTAL) BY MOUTH EVERY 6 (SIX) HOURS AS NEEDED.     levalbuterol 45 mcg/actuation inhaler  Commonly known as: XOPENEX HFA  Inhale 1-2 puffs into the  lungs every 4 (four) hours as needed for Wheezing. Rescue     LINZESS 145 mcg Cap capsule  Generic drug: linaCLOtide  Take 145 mcg by mouth before breakfast.     meclizine 25 mg tablet  Commonly known as: ANTIVERT  Take 1 tablet (25 mg total) by mouth 3 (three) times daily as needed for Dizziness.     memantine 10 MG Tab  Commonly known as: NAMENDA  Take 10 mg by mouth 2 (two) times daily.     miscellaneous medical supply Kit  by Apply Externally route 2 (two) times a day. Use twice daily at 8 AM and 3 PM and record the value in [facility] as directed.     multivitamin with minerals tablet  Take 1 tablet by mouth once daily.     ondansetron 4 MG tablet  Commonly known as: ZOFRAN  Take 1 tablet (4 mg total) by mouth every 8 (eight) hours as needed for Nausea.     OXcarbazepine 300 MG Tab  Commonly known as: TRILEPTAL  Take 300 mg by mouth once daily.     oxyCODONE-acetaminophen 7.5-325 mg per tablet  Commonly known as: PERCOCET  Take 1 tablet by mouth every 6 (six) hours as needed for Pain.     OXYGEN-AIR DELIVERY SYSTEMS MISC  6 L by Misc.(Non-Drug; Combo Route) route continuous.     polyethylene glycol 17 gram/dose powder  Commonly known as: GLYCOLAX  Take 17 g by mouth once daily.     pravastatin 20 MG tablet  Commonly known as: PRAVACHOL  Take 20 mg by mouth once daily.     sertraline 100 MG tablet  Commonly known as: ZOLOFT  Take 100 mg by mouth.     sucralfate 1 gram tablet  Commonly known as: CARAFATE  Take 1 g by mouth 4 (four) times daily.     sumatriptan 25 MG Tab  Commonly known as: IMITREX  Take 25 mg by mouth 2 (two) times daily as needed.     tiotropium 18 mcg inhalation capsule  Commonly known as: SPIRIVA  Inhale into the lungs.     topiramate 50 MG tablet  Commonly known as: TOPAMAX  Take 50 mg by mouth once daily.     traZODone 100 MG tablet  Commonly known as: DESYREL  Take 1 tablet (100 mg total) by mouth every evening.     TRELEGY ELLIPTA 200-62.5-25 mcg inhaler  Generic drug:  fluticasone-umeclidin-vilanter  INHALE 1 PUFF INTO THE LUNGS ONCE DAILY.     trospium 60 mg Cp24 capsule  Commonly known as: SANCTURA XR  Take 60 mg by mouth once daily.     vilazodone 20 mg Tab  Commonly known as: VIIBRYD  Take 20 mg by mouth once daily.     vitamin D 1000 units Tab  Commonly known as: VITAMIN D3  Take 1,000 Units by mouth once daily.              Medication Reconciliation:  Were medications changed on discharge? Yes  Were medications in the home? Yes  Is the patient taking the medications as directed? Yes  Does the patient understand the medications and changes? Yes  Does updated med list accurately reflects meds patient is currently taking? Yes    ENVIRONMENT OF CARE      Family and/or Caregiver present at visit?  No  Name of Caregiver: none  History provided by: patient and caregiver    Advance Care Planning   Advanced Care Planning Status:  Patient has had an ACP conversation  Living Will: No  Power of : No  [place]: No    Does Caregiver have HCPoA: No  Changes today: none       Impression upon entering the home:  Physical Dwelling: single family home   Appearance of home environment: cleaniness: clean, walking pathways: clear, lighting: adequate, and home structure: sound structure  Functional Status: minimal assistance  Mobility: ambulatory with device  Nutritional access: adequate intake and access  Home Health: Yes,  Agency [facility]    DME/Supplies: rolling walker and oxygen     Diagnostic tests reviewed/disposition: No diagnosic tests pending after this hospitalization.  Disease/illness education: fall prevention, lumbar fusion, pain control  Establishment or re-establishment of referral orders for community resources: No other necessary community resources.   Discussion with other health care providers: No discussion with other health care providers necessary.   Does patient have a PCP at [facility]? Yes   Repatriation plan with PCP? follow-up with PCP within 30d   Does patient have an  ostomy (ileostomy, colostomy, suprapubic catheter, nephrostomy tube, tracheostomy, PEG tube, pleurex catheter, cholecystostomy, etc)? No  Were BPAs reviewed? Yes    Social History     Socioeconomic History    Marital status:    Tobacco Use    Smoking status: Every Day     Current packs/day: 0.50     Average packs/day: 0.5 packs/day for 45.3 years (22.7 ttl pk-yrs)     Types: Cigarettes     Start date: 1980    Smokeless tobacco: Never   Substance and Sexual Activity    Alcohol use: Yes     Alcohol/week: 1.0 standard drink of alcohol     Types: 1 Cans of beer per week     Comment: weekly    Drug use: No    Sexual activity: Not Currently     Social Drivers of Health     Financial Resource Strain: Patient Declined (4/21/2025)    Overall Financial Resource Strain (CARDIA)     Difficulty of Paying Living Expenses: Patient declined   Food Insecurity: Patient Declined (4/21/2025)    Hunger Vital Sign     Worried About Running Out of Food in the Last Year: Patient declined     Ran Out of Food in the Last Year: Patient declined   Transportation Needs: Patient Declined (4/21/2025)    PRAPARE - Transportation     Lack of Transportation (Medical): Patient declined     Lack of Transportation (Non-Medical): Patient declined   Physical Activity: Patient Declined (7/21/2024)    Exercise Vital Sign     Days of Exercise per Week: Patient declined     Minutes of Exercise per Session: Patient declined   Stress: Patient Declined (4/21/2025)    [employer] - Occupational Stress Questionnaire     Feeling of Stress : Patient declined   Housing Stability: Patient Declined (4/21/2025)    Housing Stability Vital Sign     Unable to Pay for Housing in the Last Year: Patient declined     Homeless in the Last Year: Patient declined         OBJECTIVE:     Vital Signs:  Vitals:    05/05/25 2048   BP: (!) 148/80   Pulse: 86   Resp: 20   Temp: 97 °F (36.1 °C)       Review of Systems   Constitutional:  Positive for  fatigue. Negative for activity change, appetite change, fever and unexpected weight change.   HENT: Negative.  Negative for congestion and facial swelling.    Eyes: Negative.  Negative for photophobia and visual disturbance.   Respiratory:  Positive for cough. Negative for apnea, chest tightness and shortness of breath.         Wears oxygen occasionally   Cardiovascular: Negative.  Negative for chest pain and palpitations.   Gastrointestinal: Negative.  Negative for abdominal pain, blood in stool and vomiting.   Endocrine: Negative.  Negative for cold intolerance and heat intolerance.   Genitourinary: Negative.  Negative for difficulty urinating, dysuria, flank pain, hematuria, menstrual problem and pelvic pain.   Musculoskeletal:  Positive for arthralgias, gait problem and myalgias. Negative for back pain.   Skin: Negative.  Negative for color change.   Neurological:  Positive for weakness and headaches. Negative for dizziness, seizures and syncope.   Hematological:  Bruises/bleeds easily.   Psychiatric/Behavioral:  Positive for confusion (intermittent). Negative for agitation, self-injury and suicidal ideas. The patient is nervous/anxious.        Physical Exam:  Physical Exam  Vitals reviewed.   Constitutional:       General: She is not in acute distress.     Appearance: Normal appearance. She is not ill-appearing.   HENT:      Head: Normocephalic and atraumatic.      Right Ear: External ear normal.      Left Ear: External ear normal.      Nose: Nose normal.      Mouth/Throat:      Mouth: Mucous membranes are moist.      Pharynx: Oropharynx is clear.   Eyes:      Extraocular Movements: Extraocular movements intact.      Conjunctiva/sclera: Conjunctivae normal.      Pupils: Pupils are equal, round, and reactive to light.   Cardiovascular:      Rate and Rhythm: Normal rate and regular rhythm.      Pulses: Normal pulses.      Heart sounds: Normal heart sounds.   Pulmonary:      Effort: Pulmonary effort is normal.       Breath sounds: Normal breath sounds.      Comments: Decreased in bases, not wearing oxygen, occasional non-productive cough noted  Abdominal:      General: Abdomen is flat. Bowel sounds are normal.      Palpations: Abdomen is soft.      Tenderness: There is no abdominal tenderness. There is no right CVA tenderness, left CVA tenderness, guarding or rebound.   Musculoskeletal:         General: Normal range of motion.      Cervical back: Normal range of motion and neck supple.   Skin:     General: Skin is warm and dry.      Capillary Refill: Capillary refill takes 2 to 3 seconds.             Comments: Lumbar surgical dressing in place, CDI.    Neurological:      General: No focal deficit present.      Mental Status: She is alert and oriented to person, place, and time.      Motor: Weakness present.      Gait: Gait abnormal.   Psychiatric:         Attention and Perception: Attention normal.         Mood and Affect: Mood normal.         Behavior: Behavior normal.         Thought Content: Thought content normal.         Cognition and Memory: Cognition normal.         Judgment: Judgment normal.      Comments: anxious         INSTRUCTIONS FOR PATIENT:     Scheduled Follow-up, Appts Reviewed with Modifications if Needed: Yes  Future Appointments   Date Time Provider Department Center   5/6/2025 10:00 AM [facility]     I spent a total of 40  minutes on the day of the visit.This includes face to face time and non-face to face time preparing to see the patient (eg, review of tests), obtaining and/or reviewing separately obtained history, documenting clinical information in the electronic or other health record, independently interpreting results and communicating results to the patient/family/caregiver, or care coordinator.    Signature: GUILLE Lopez      Transition of Care Visit:  I have reviewed and updated the history and problem list.  I have reconciled the medication list.  I have  discussed the hospitalization and current medical issues, prognosis and plans with the patient/family.

## 2025-05-06 NOTE — ASSESSMENT & PLAN NOTE
Pt is post op Left arthropolasty and is stable at this time,     Pt has pt/ot and walking with walker as directed, pt is weight bearing and tolerating well,   Taking pain meds as needed, no distress,     Dressing site is intact small amount of sanguanous fluid noted. See image,     Pt has follow up as directed.

## 2025-05-06 NOTE — PROGRESS NOTES
[facility]    Subjective:     Chief Complaint:   Chief Complaint   Patient presents with    Left Hip - Post-op Evaluation     S/P Left DANNY on 4/21/25, had Philipp converted to Total, pain is pretty constant, has itching where adhesives are, c/o groin pain, has home health PT coming to her       Past Medical History:   Diagnosis Date    ACP (advance care planning) 12/20/2023    Anxiety     Back pain     Bipolar affect, depressed     Cancer ovarian; uterine    1992    COPD (chronic obstructive pulmonary disease)     Diabetes mellitus     GERD (gastroesophageal reflux disease)     Hyperlipidemia     Hypertension     OAB (overactive bladder)     On home oxygen therapy     per patient uses PRN       Past Surgical History:   Procedure Laterality Date    BREAST CYST ASPIRATION      COLON SURGERY      COLECTOMY    COLONOSCOPY      HEMIARTHROPLASTY OF HIP Left 07/20/2024    Procedure: HEMIARTHROPLASTY, HIP;  Surgeon: Timothy Nettles MD;  Location: [facility];  Service: Orthopedics;  Laterality: Left;    HERNIA REPAIR N/A 2016    HIP ARTHROPLASTY Right 02/25/2019    Procedure: ARTHROPLASTY, HIP;  Surgeon: Eliseo Vaca MD;  Location: [facility] OR;  Service: Orthopedics;  Laterality: Right;  Daniel Herrera    HIP ARTHROPLASTY Left 4/21/2025    Procedure: ARTHROPLASTY, HIP;  Surgeon: Eliseo Vaca MD;  Location: [facility] OR;  Service: Orthopedics;  Laterality: Left;  [place] Sleep    HYSTERECTOMY  total    JOINT REPLACEMENT Right     hip replacemnt    KNEE ARTHROPLASTY Left 08/19/2019    Procedure: ARTHROPLASTY, KNEE;  Surgeon: Eliseo Vaca MD;  Location: [facility] OR;  Service: Orthopedics;  Laterality: Left;    LUMBAR LAMINECTOMY WITH FUSION N/A 12/07/2023    Procedure: LAMINECTOMY, SPINE, LUMBAR, WITH FUSION L4-5;  Surgeon: Joby Jaimes MD;  Location: [facility] OR;  Service: Orthopedics;  Laterality: N/A;  NTI, [employer]    REVISION COLOSTOMY N/A 2012    REVERSAL             Review of patient's allergies indicates:   Allergen  Reactions    Ciprofloxacin Diarrhea    Hydrocodone-acetaminophen Nausea And Vomiting, Nausea Only and Other (See Comments)    Ibuprofen Nausea Only    Meloxicam Hives    Naproxen Nausea Only and Other (See Comments)    Narcof [hydrocodone-guaifenesin] Nausea And Vomiting    Quetiapine Nausea And Vomiting and Other (See Comments)       Family History   Problem Relation Name Age of Onset    Cancer Mother      Hypertension Father      Heart disease Father      Heart disease Sister      Hypertension Sister      Hypertension Brother      Depression Brother         Social History[1]    History of present illness:  64-year-old female, returns to clinic today status post left revision arthroplasty of the hip.  She has developed some dermatitis or irritation from the surgical dressings, she has had some weeping of the wound from the dermatitis.  No drainage from the surgical site.  Also here today for suture removal.      Review of Systems:    Constitution: Negative for chills, fever, and sweats.  Negative for unexplained weight loss.    HENT:  Negative for headaches and blurry vision.    Cardiovascular:Negative for chest pain or irregular heart beat. Negative for hypertension.    Respiratory:  Negative for cough and shortness of breath.    Gastrointestinal: Negative for abdominal pain, heartburn, melena, nausea, and vomitting.    Genitourinary:  Negative bladder incontinence and dysuria.    Musculoskeletal:  See HPI for details.     Neurological: Negative for numbness.    Psychiatric/Behavioral: Negative for depression.  The patient is not nervous/anxious.      Endocrine: Negative for polyuria    Hematologic/Lymphatic: Negative for bleeding problem.  Does not bruise/bleed easily.    Skin: Negative for poor would healing and rash    Objective:      Physical Examination:    Vital Signs:  There were no vitals filed for this visit.    Body mass index is 26.63 kg/m².    This a well-developed, well nourished patient in no acute  distress.  They are alert and oriented and cooperative to examination.        Examination of the left hip, patient does have some erythema clearly demarcated in the shape of the island dressing.  The surgical wound itself is without erythema ecchymosis or signs symptoms of infection no wound failure dehiscence.  The remainder of her subcuticular sutures and surgical dressings were removed without complication.    Pertinent New Results:    XRAY Report / Interpretation:       Assessment/Plan:      Stable status post revision left hip arthroplasty.  Continue with physical therapy, weight-bearing as tolerated.  We went over wound care and medications.    Wound Care review: on approximately day 3 after surgery, or 72 hours after your initial surgery date, you may begin cleaning your wounds (AS LONG AS THERE IS NO DRAINAGE PRESENT).     Gentle cleansing with a mild soap and water is recommended. Pat the area dry, and then cover the wound with a clean, sterile dressing.  Do this at least once daily.  Do not apply any ointments creams or lotions to the wounds until told to do so.  Avoid submerging or immersing the wounds in water such as a sink, tub, or pool for at least 1 month after surgery.    At the time of your surgery you were prescribed a combination of various medications which may have included up to six prescriptions:      1. First prescription was for an anticoagulant.  Most commonly  aspirin 81 mg to be taken every 12 hours for 30 days postoperatively for DVT prophylaxis.  If you took aspirin prior to the start of this medication you can resume your normal aspirin dosing per your prescribing provider after 30 days.    If you were already on an anticoagulant then continue this medication as directed daily.    2. Second prescription was for a narcotic pain medication (Percocet 7.5 mg or similar) with instructions to take 1 tablet by mouth every 6 hours as needed for pain. Over the next 3 months, we will continue  "to taper your medications decreasing both the frequency and strength of the medications over time.    3. Third prescription was for an anti-inflammatory, Celebrex 200 mg with instructions to take 1 tab by mouth as needed every 12 hours for 1 month postoperatively.     4. Fourth prescription is for Neurontin 300 mg to be taken as needed 1 tablet by mouth every 12 hours for 1 month postoperatively.    5. Fifth prescription was for Colace 100 mg to be taken as needed every 12 hours for constipation associated with narcotic use.    6.  A Sixth and final prescription may be an antiemetic such as Zofran to be taken as needed for postop nausea.    Hip precautions include: 1. avoid bending at the waist or hip greater than or past 90°, 2. twisting your leg in or out, 3. crossing your legs. Sit with your hips higher than your knees, sit in a chair with armrests, and sleep on your back with a pillow between your legs. These should be followed for 3 months after surgery.       Edmond Hirsch, Physician Assistant, served in the capacity as a "scribe" for this patient encounter.  A "face-to-face" encounter occurred with Dr. Eliseo Vaca on this date.  The treatment plan and medical decision-making is outlined above. Patient was seen and examined with a chaperone.     This note was created using Dragon voice recognition software that occasionally misinterpreted phrases or words.             [1]   Social History  Socioeconomic History    Marital status:    Tobacco Use    Smoking status: Every Day     Current packs/day: 0.50     Average packs/day: 0.5 packs/day for 45.3 years (22.7 ttl pk-yrs)     Types: Cigarettes     Start date: 1980    Smokeless tobacco: Never   Substance and Sexual Activity    Alcohol use: Yes     Alcohol/week: 1.0 standard drink of alcohol     Types: 1 Cans of beer per week     Comment: weekly    Drug use: No    Sexual activity: Not Currently     Social Drivers of Health     Financial Resource Strain: " Patient Declined (4/21/2025)    Overall Financial Resource Strain (CARDIA)     Difficulty of Paying Living Expenses: Patient declined   Food Insecurity: Patient Declined (4/21/2025)    Hunger Vital Sign     Worried About Running Out of Food in the Last Year: Patient declined     Ran Out of Food in the Last Year: Patient declined   Transportation Needs: Patient Declined (4/21/2025)    PRAPARE - Transportation     Lack of Transportation (Medical): Patient declined     Lack of Transportation (Non-Medical): Patient declined   Physical Activity: Patient Declined (7/21/2024)    Exercise Vital Sign     Days of Exercise per Week: Patient declined     Minutes of Exercise per Session: Patient declined   Stress: Patient Declined (4/21/2025)    [employer] - Occupational Stress Questionnaire     Feeling of Stress : Patient declined   Housing Stability: Patient Declined (4/21/2025)    Housing Stability Vital Sign     Unable to Pay for Housing in the Last Year: Patient declined     Homeless in the Last Year: Patient declined

## 2025-05-06 NOTE — PROGRESS NOTES
"[facility]  Transitional Care Management (TCM) Home Visit    Encounter Provider: Joby Pierce   PCP: Katy Mason MD  Consult Requested By: No ref. provider found  Admit Date: 4/21/25   IP Discharge Date: 4/22/25  Hospital Length of Stay:RRHLOS@ 2 days  Days since discharge (from IP or SNF): 7 days   [facility] Contact Note: 12/14/23  Hospital Diagnosis: No admission diagnoses are documented for this encounter.     HISTORY OF PRESENT ILLNESS      Patient ID: Nimco Caro is a 64 y.o. female was recently admitted to the hospital, this is their TCM encounter.    Hospital Course Synopsis:  Admit: 12/9/23   Discharge: 12/11/23  HPI:   Nimco Caro is a 63 yr old female with past medical history of hypertension, hyperlipidemia, COPD with home oxygen, and back pain presenting today after a fall at home due to weakness, lethargy, and MCGARRY. She did not experience any loss of consciousness or hitting of her head. She states she is supposed to ambulated with a walker but she did not. Denies chest pain, chills, diaphoresis, numbness, tingling, dizziness, headache, LOC, nausea, or vomiting. On 12/7, she underwent a lumbar laminectomy with fusion L4-L5 with Dr. Jaimes. She was discharged today from the hospital and has returned c/o weakness possibly due to medication. She states she feels like she is "taking too much medication." She denies taking any medication prior to fall or since discharged from facility. Upon examine she does have difficulty staying awake but answers questions appropriately. ED workup revealed fever, temp 101, oxygen saturation 85% on room air increased to 95 % on 4 L NC, VBG unremarkable. White count 13.43, Na 130 at baseline. CT Thoracic demonstrated small right pleural effusion with interstitial changes at lung bases. CT lumbar demonstrated no device hardware complications. She was started on ceftriaxone and doxycycline. She is admitted to hospital medicine for further work-up " "and management.           Hospital Course:   Pt was monitored closely during her hospital stay. Her pain remained controlled on oral medications. Imaging did not reveal acute fractures and lumbar laminectomy stable. Pt was eval by therapy who rec mod intensity therapy. Pt initally declined and stated she was going home. After further disuccsion pt agreeable to SNF/rehab. Pt does not have SNF benefits and rehab rep eval pt at  and pt voiced she would not participate with required 3 hrs of therapy daily. Pt was discharged home with  services. Pt was educated on fall precautions and return precautions. Pt verbalized understanding of discharge instructions.     With this [facility] hospital follow up visit patient is found ambulating throughout the home using furniture to hold on to. She reports using walker intermittently. Reports lower back and bilateral thigh pain since surgery that has slowly started to improved. Reports no further falls since 12/9/23. Smoker not willing to quit. Reports using oxygen intermittently. States Bipolar disorder is currently controlled, taking medications as prescribed and "always feels a little anxious". Living with a friend, Shaunna.     Vital signs stable during visit. No distress noted. Program contact information provided to patient and left in home.    DECISION MAKING TODAY       Assessment & Plan:  1. Left hip pain  Assessment & Plan:  Pt is post op Left arthropolasty and is stable at this time,     Pt has pt/ot and walking with walker as directed, pt is weight bearing and tolerating well,   Taking pain meds as needed, no distress,     Dressing site is intact small amount of sanguanous fluid noted. See image,     Pt has follow up as directed.            Medication List on Discharge:     Medication List            Accurate as of May 5, 2025  9:30 PM. If you have any questions, ask your nurse or doctor.                CONTINUE taking these medications      ascorbic " acid (vitamin C) 1000 MG tablet  Commonly known as: VITAMIN C  Take 1,000 mg by mouth once daily.     aspirin 81 MG EC tablet  Commonly known as: ECOTRIN  Take 1 tablet (81 mg total) by mouth every 12 (twelve) hours.     atenoloL 25 MG tablet  Commonly known as: TENORMIN  Take 25 mg by mouth once daily.     azelastine 137 mcg (0.1 %) nasal spray  Commonly known as: ASTELIN  SPRAY 1 SPRAY BY NASAL ROUTE 2 TIMES DAILY. ***MUST MAKE APPOINTMENT     b complex vitamins tablet  Take 1 tablet by mouth once daily.     biotin 1 mg tablet  Take 1,000 mcg by mouth once daily.     butalbital-acetaminophen-caffeine -40 mg -40 mg per tablet  Commonly known as: FIORICET, ESGIC  Take 1 tablet by mouth every 4 (four) hours as needed.     celecoxib 200 MG capsule  Commonly known as: CeleBREX  Take 1 capsule (200 mg total) by mouth 2 (two) times daily.     clonazePAM 2 MG Tab  Commonly known as: KlonoPIN  Take 2 mg by mouth 2 (two) times daily.     cyanocobalamin 1,000 mcg/mL injection  Inject 1,000 mcg into the muscle every 30 days.     cycloSPORINE 0.05 % ophthalmic emulsion  Commonly known as: RESTASIS  Apply 1 drop to eye 2 (two) times daily.     dicyclomine 10 MG capsule  Commonly known as: BENTYL  Take 10 mg by mouth 2 (two) times daily.     docusate sodium 100 MG capsule  Commonly known as: COLACE  Take 1 capsule (100 mg total) by mouth 2 (two) times daily.     donepeziL 10 MG tablet  Commonly known as: ARICEPT  Take 10 mg by mouth once daily.     fluticasone propionate 50 mcg/actuation nasal spray  Commonly known as: FLONASE  1 spray (50 mcg total) by Each Nostril route once daily.     gabapentin 800 MG tablet  Commonly known as: NEURONTIN  Take 800 mg by mouth 3 (three) times daily.     hydrOXYzine pamoate 25 MG Cap  Commonly known as: VISTARIL  TAKE 1 CAPSULE (25 MG TOTAL) BY MOUTH EVERY 6 (SIX) HOURS AS NEEDED.     levalbuterol 45 mcg/actuation inhaler  Commonly known as: XOPENEX HFA  Inhale 1-2 puffs into the  lungs every 4 (four) hours as needed for Wheezing. Rescue     LINZESS 145 mcg Cap capsule  Generic drug: linaCLOtide  Take 145 mcg by mouth before breakfast.     meclizine 25 mg tablet  Commonly known as: ANTIVERT  Take 1 tablet (25 mg total) by mouth 3 (three) times daily as needed for Dizziness.     memantine 10 MG Tab  Commonly known as: NAMENDA  Take 10 mg by mouth 2 (two) times daily.     miscellaneous medical supply Kit  by Apply Externally route 2 (two) times a day. Use twice daily at 8 AM and 3 PM and record the value in [facility] as directed.     multivitamin with minerals tablet  Take 1 tablet by mouth once daily.     ondansetron 4 MG tablet  Commonly known as: ZOFRAN  Take 1 tablet (4 mg total) by mouth every 8 (eight) hours as needed for Nausea.     OXcarbazepine 300 MG Tab  Commonly known as: TRILEPTAL  Take 300 mg by mouth once daily.     oxyCODONE-acetaminophen 7.5-325 mg per tablet  Commonly known as: PERCOCET  Take 1 tablet by mouth every 6 (six) hours as needed for Pain.     OXYGEN-AIR DELIVERY SYSTEMS MISC  6 L by Misc.(Non-Drug; Combo Route) route continuous.     polyethylene glycol 17 gram/dose powder  Commonly known as: GLYCOLAX  Take 17 g by mouth once daily.     pravastatin 20 MG tablet  Commonly known as: PRAVACHOL  Take 20 mg by mouth once daily.     sertraline 100 MG tablet  Commonly known as: ZOLOFT  Take 100 mg by mouth.     sucralfate 1 gram tablet  Commonly known as: CARAFATE  Take 1 g by mouth 4 (four) times daily.     sumatriptan 25 MG Tab  Commonly known as: IMITREX  Take 25 mg by mouth 2 (two) times daily as needed.     tiotropium 18 mcg inhalation capsule  Commonly known as: SPIRIVA  Inhale into the lungs.     topiramate 50 MG tablet  Commonly known as: TOPAMAX  Take 50 mg by mouth once daily.     traZODone 100 MG tablet  Commonly known as: DESYREL  Take 1 tablet (100 mg total) by mouth every evening.     TRELEGY ELLIPTA 200-62.5-25 mcg inhaler  Generic drug:  fluticasone-umeclidin-vilanter  INHALE 1 PUFF INTO THE LUNGS ONCE DAILY.     trospium 60 mg Cp24 capsule  Commonly known as: SANCTURA XR  Take 60 mg by mouth once daily.     vilazodone 20 mg Tab  Commonly known as: VIIBRYD  Take 20 mg by mouth once daily.     vitamin D 1000 units Tab  Commonly known as: VITAMIN D3  Take 1,000 Units by mouth once daily.              Medication Reconciliation:  Were medications changed on discharge? Yes  Were medications in the home? Yes  Is the patient taking the medications as directed? Yes  Does the patient understand the medications and changes? Yes  Does updated med list accurately reflects meds patient is currently taking? Yes    ENVIRONMENT OF CARE      Family and/or Caregiver present at visit?  No  Name of Caregiver: none  History provided by: patient and      Advance Care Planning   Advanced Care Planning Status:  Patient has had an ACP conversation  Living Will: No  Power of : No  [place]: No    Does Caregiver have HCPoA: No  Changes today: none       Impression upon entering the home:  Physical Dwelling: single family home   Appearance of home environment: cleaniness: clean, walking pathways: clear, lighting: adequate, and home structure: sound structure  Functional Status: minimal assistance  Mobility: ambulatory with device  Nutritional access: adequate intake and access  Home Health: Yes,  [facility]    DME/Supplies: rolling walker and oxygen     Diagnostic tests reviewed/disposition: No diagnosic tests pending after this hospitalization.  Disease/illness education: fall prevention, lumbar fusion, pain control  Establishment or re-establishment of referral orders for community resources: No other necessary community resources.   Discussion with other health care providers: No discussion with other health care providers necessary.   Does patient have a PCP at [facility]? Yes   Repatriation plan with PCP? follow-up with PCP within 30d   Does patient have an ostomy  (ileostomy, colostomy, suprapubic catheter, nephrostomy tube, tracheostomy, PEG tube, pleurex catheter, cholecystostomy, etc)? No  Were BPAs reviewed? Yes    Social History     Socioeconomic History    Marital status:    Tobacco Use    Smoking status: Every Day     Current packs/day: 0.50     Average packs/day: 0.5 packs/day for 45.3 years (22.7 ttl pk-yrs)     Types: Cigarettes     Start date: 1980    Smokeless tobacco: Never   Substance and Sexual Activity    Alcohol use: Yes     Alcohol/week: 1.0 standard drink of alcohol     Types: 1 Cans of beer per week     Comment: weekly    Drug use: No    Sexual activity: Not Currently     Social Drivers of Health     Financial Resource Strain: Patient Declined (4/21/2025)    Overall Financial Resource Strain (CARDIA)     Difficulty of Paying Living Expenses: Patient declined   Food Insecurity: Patient Declined (4/21/2025)    Hunger Vital Sign     Worried About Running Out of Food in the Last Year: Patient declined     Ran Out of Food in the Last Year: Patient declined   Transportation Needs: Patient Declined (4/21/2025)    PRAPARE - Transportation     Lack of Transportation (Medical): Patient declined     Lack of Transportation (Non-Medical): Patient declined   Physical Activity: Patient Declined (7/21/2024)    Exercise Vital Sign     Days of Exercise per Week: Patient declined     Minutes of Exercise per Session: Patient declined   Stress: Patient Declined (4/21/2025)    [employer] - Occupational Stress Questionnaire     Feeling of Stress : Patient declined   Housing Stability: Patient Declined (4/21/2025)    Housing Stability Vital Sign     Unable to Pay for Housing in the Last Year: Patient declined     Homeless in the Last Year: Patient declined         OBJECTIVE:     Vital Signs:  Vitals:    05/05/25 2048   BP: (!) 148/80   Pulse: 86   Resp: 20   Temp: 97 °F (36.1 °C)       Review of Systems   Constitutional:  Positive for  fatigue. Negative for activity change, appetite change, fever and unexpected weight change.   HENT: Negative.  Negative for congestion and facial swelling.    Eyes: Negative.  Negative for photophobia and visual disturbance.   Respiratory:  Positive for cough. Negative for apnea, chest tightness and shortness of breath.         Wears oxygen occasionally   Cardiovascular: Negative.  Negative for chest pain and palpitations.   Gastrointestinal: Negative.  Negative for abdominal pain, blood in stool and vomiting.   Endocrine: Negative.  Negative for cold intolerance and heat intolerance.   Genitourinary: Negative.  Negative for difficulty urinating, dysuria, flank pain, hematuria, menstrual problem and pelvic pain.   Musculoskeletal:  Positive for arthralgias, gait problem and myalgias. Negative for back pain.   Skin: Negative.  Negative for color change.   Neurological:  Positive for weakness and headaches. Negative for dizziness, seizures and syncope.   Hematological:  Bruises/bleeds easily.   Psychiatric/Behavioral:  Positive for confusion (intermittent). Negative for agitation, self-injury and suicidal ideas. The patient is nervous/anxious.        Physical Exam:  Physical Exam  Vitals reviewed.   Constitutional:       General: She is not in acute distress.     Appearance: Normal appearance. She is not ill-appearing.   HENT:      Head: Normocephalic and atraumatic.      Right Ear: External ear normal.      Left Ear: External ear normal.      Nose: Nose normal.      Mouth/Throat:      Mouth: Mucous membranes are moist.      Pharynx: Oropharynx is clear.   Eyes:      Extraocular Movements: Extraocular movements intact.      Conjunctiva/sclera: Conjunctivae normal.      Pupils: Pupils are equal, round, and reactive to light.   Cardiovascular:      Rate and Rhythm: Normal rate and regular rhythm.      Pulses: Normal pulses.      Heart sounds: Normal heart sounds.   Pulmonary:      Effort: Pulmonary effort is normal.       Breath sounds: Normal breath sounds.      Comments: Decreased in bases, not wearing oxygen, occasional non-productive cough noted  Abdominal:      General: Abdomen is flat. Bowel sounds are normal.      Palpations: Abdomen is soft.      Tenderness: There is no abdominal tenderness. There is no right CVA tenderness, left CVA tenderness, guarding or rebound.   Musculoskeletal:         General: Normal range of motion.      Cervical back: Normal range of motion and neck supple.   Skin:     General: Skin is warm and dry.      Capillary Refill: Capillary refill takes 2 to 3 seconds.             Comments: Lumbar surgical dressing in place, CDI.    Neurological:      General: No focal deficit present.      Mental Status: She is alert and oriented to person, place, and time.      Motor: Weakness present.      Gait: Gait abnormal.   Psychiatric:         Attention and Perception: Attention normal.         Mood and Affect: Mood normal.         Behavior: Behavior normal.         Thought Content: Thought content normal.         Cognition and Memory: Cognition normal.         Judgment: Judgment normal.      Comments: anxious         INSTRUCTIONS FOR PATIENT:     Scheduled Follow-up, Appts Reviewed with Modifications if Needed: Yes  Future Appointments   Date Time Provider Department Center   5/6/2025 10:00 AM [facility]     I spent a total of 40  minutes on the day of the visit.This includes face to face time and non-face to face time preparing to see the patient (eg, review of tests), obtaining and/or reviewing separately obtained history, documenting clinical information in the electronic or other health record, independently interpreting results and communicating results to the patient/family/caregiver, or care coordinator.    Signature: GUILLE Lopez      Transition of Care Visit:  I have reviewed and updated the history and problem list.  I have reconciled the medication list.  I have  discussed the hospitalization and current medical issues, prognosis and plans with the patient/family.

## 2025-05-06 NOTE — PROGRESS NOTES
"  [facility]  Transitional Care Management (TCM) Home Visit    Encounter Provider: Joby Pierce   PCP: Katy Mason MD  Consult Requested By: No ref. provider found  Admit Date: 4/21/25   IP Discharge Date: 4/22/25  Hospital Length of Stay:RRHLOS@ 2 days  Days since discharge (from IP or SNF): 7 days   [facility] Contact Note: 12/14/23  Hospital Diagnosis: No admission diagnoses are documented for this encounter.     HISTORY OF PRESENT ILLNESS      Patient ID: Nimco Caro is a 64 y.o. female was recently admitted to the hospital, this is their TCM encounter.    Hospital Course Synopsis:  Admit: 12/9/23   Discharge: 12/11/23  HPI:   Nimco Caro is a 63 yr old female with past medical history of hypertension, hyperlipidemia, COPD with home oxygen, and back pain presenting today after a fall at home due to weakness, lethargy, and MCGARRY. She did not experience any loss of consciousness or hitting of her head. She states she is supposed to ambulated with a walker but she did not. Denies chest pain, chills, diaphoresis, numbness, tingling, dizziness, headache, LOC, nausea, or vomiting. On 12/7, she underwent a lumbar laminectomy with fusion L4-L5 with Dr. Jaimes. She was discharged today from the hospital and has returned c/o weakness possibly due to medication. She states she feels like she is "taking too much medication." She denies taking any medication prior to fall or since discharged from facility. Upon examine she does have difficulty staying awake but answers questions appropriately. ED workup revealed fever, temp 101, oxygen saturation 85% on room air increased to 95 % on 4 L NC, VBG unremarkable. White count 13.43, Na 130 at baseline. CT Thoracic demonstrated small right pleural effusion with interstitial changes at lung bases. CT lumbar demonstrated no device hardware complications. She was started on ceftriaxone and doxycycline. She is admitted to hospital medicine for further work-up " "and management.           Hospital Course:   Pt was monitored closely during her hospital stay. Her pain remained controlled on oral medications. Imaging did not reveal acute fractures and lumbar laminectomy stable. Pt was eval by therapy who rec mod intensity therapy. Pt initally declined and stated she was going home. After further disuccsion pt agreeable to SNF/rehab. Pt does not have SNF benefits and rehab rep eval pt at  and pt voiced she would not participate with required 3 hrs of therapy daily. Pt was discharged home with  services. Pt was educated on fall precautions and return precautions. Pt verbalized understanding of discharge instructions.     With this [facility] hospital follow up visit patient is found ambulating throughout the home using furniture to hold on to. She reports using walker intermittently. Reports lower back and bilateral thigh pain since surgery that has slowly started to improved. Reports no further falls since 12/9/23. Smoker not willing to quit. Reports using oxygen intermittently. States Bipolar disorder is currently controlled, taking medications as prescribed and "always feels a little anxious". Living with a friend, Shaunna.     Vital signs stable during visit. No distress noted. Program contact information provided to patient and left in home.    DECISION MAKING TODAY       Assessment & Plan:  1. Left hip pain  Assessment & Plan:  Pt is post op Left arthropolasty and is stable at this time,     Pt has pt/ot and walking with walker as directed, pt is weight bearing and tolerating well,   Taking pain meds as needed, no distress,     Dressing site is intact small amount of sanguanous fluid noted. See image,     Pt has follow up as directed.            Medication List on Discharge:        Medication Reconciliation:  Were medications changed on discharge? Yes  Were medications in the home? Yes  Is the patient taking the medications as directed? Yes  Does the patient " understand the medications and changes? Yes  Does updated med list accurately reflects meds patient is currently taking? Yes    ENVIRONMENT OF CARE      Family and/or Caregiver present at visit?  No  Name of Caregiver: none  History provided by: patient    Advance Care Planning   Advanced Care Planning Status:  Patient has had an ACP conversation  Living Will: No  Power of : No  [place]: No    Does Caregiver have HCPoA: No  Changes today: none       Impression upon entering the home:  Physical Dwelling: single family home   Appearance of home environment: cleaniness: clean, walking pathways: clear, lighting: adequate, and home structure: sound structure  Functional Status: minimal assistance  Mobility: ambulatory with device  Nutritional access: adequate intake and access  Home Health: Yes,  Agency [facility]   DME/Supplies: rolling walker and oxygen     Diagnostic tests reviewed/disposition: No diagnosic tests pending after this hospitalization.  Disease/illness education: fall prevention, lumbar fusion, pain control  Establishment or re-establishment of referral orders for community resources: No other necessary community resources.   Discussion with other health care providers: No discussion with other health care providers necessary.   Does patient have a PCP at [facility]? Yes   Repatriation plan with PCP? follow-up with PCP within 30d   Does patient have an ostomy (ileostomy, colostomy, suprapubic catheter, nephrostomy tube, tracheostomy, PEG tube, pleurex catheter, cholecystostomy, etc)? No  Were BPAs reviewed? Yes    Social History     Socioeconomic History    Marital status:    Tobacco Use    Smoking status: Every Day     Current packs/day: 0.50     Average packs/day: 0.5 packs/day for 45.3 years (22.7 ttl pk-yrs)     Types: Cigarettes     Start date: 1980    Smokeless tobacco: Never   Substance and Sexual Activity    Alcohol use: Yes     Alcohol/week: 1.0 standard drink of alcohol     Types: 1 Cans  of beer per week     Comment: weekly    Drug use: No    Sexual activity: Not Currently     Social Drivers of Health     Financial Resource Strain: Patient Declined (4/21/2025)    Overall Financial Resource Strain (CARDIA)     Difficulty of Paying Living Expenses: Patient declined   Food Insecurity: Patient Declined (4/21/2025)    Hunger Vital Sign     Worried About Running Out of Food in the Last Year: Patient declined     Ran Out of Food in the Last Year: Patient declined   Transportation Needs: Patient Declined (4/21/2025)    PRAPARE - Transportation     Lack of Transportation (Medical): Patient declined     Lack of Transportation (Non-Medical): Patient declined   Physical Activity: Patient Declined (7/21/2024)    Exercise Vital Sign     Days of Exercise per Week: Patient declined     Minutes of Exercise per Session: Patient declined   Stress: Patient Declined (4/21/2025)    [employer] - Occupational Stress Questionnaire     Feeling of Stress : Patient declined   Housing Stability: Patient Declined (4/21/2025)    Housing Stability Vital Sign     Unable to Pay for Housing in the Last Year: Patient declined     Homeless in the Last Year: Patient declined         OBJECTIVE:     Vital Signs:  Vitals:    05/05/25 2048   BP: (!) 148/80   Pulse: 86   Resp: 20   Temp: 97 °F (36.1 °C)       Review of Systems   Constitutional:  Positive for activity change and fatigue. Negative for appetite change and fever.   HENT: Negative.     Eyes: Negative.    Respiratory:  Positive for cough and shortness of breath.         Wears oxygen occasionally   Cardiovascular: Negative.    Gastrointestinal: Negative.    Endocrine: Negative.    Genitourinary: Negative.    Musculoskeletal:  Positive for arthralgias, back pain, gait problem and myalgias.   Skin: Negative.    Neurological:  Positive for weakness and headaches.   Hematological:  Bruises/bleeds easily.   Psychiatric/Behavioral:  Positive for confusion  (intermittent). The patient is nervous/anxious.        Physical Exam:  Physical Exam  Constitutional:       General: She is not in acute distress.     Appearance: Normal appearance. She is not ill-appearing.   HENT:      Head: Normocephalic and atraumatic.      Right Ear: External ear normal.      Left Ear: External ear normal.      Mouth/Throat:      Mouth: Mucous membranes are dry.      Pharynx: Oropharynx is clear.   Eyes:      Extraocular Movements: Extraocular movements intact.      Conjunctiva/sclera: Conjunctivae normal.      Pupils: Pupils are equal, round, and reactive to light.   Cardiovascular:      Rate and Rhythm: Normal rate and regular rhythm.      Pulses: Normal pulses.      Heart sounds: Normal heart sounds.   Pulmonary:      Comments: Decreased in bases, not wearing oxygen, occasional non-productive cough noted  Abdominal:      General: Abdomen is flat. Bowel sounds are normal.      Palpations: Abdomen is soft.      Tenderness: There is no abdominal tenderness.   Musculoskeletal:      Cervical back: Normal range of motion and neck supple.   Skin:     General: Skin is warm and dry.      Capillary Refill: Capillary refill takes 2 to 3 seconds.             Comments: Lumbar surgical dressing in place, CDI.    Neurological:      Mental Status: She is alert and oriented to person, place, and time.      Motor: Weakness present.      Gait: Gait abnormal.   Psychiatric:         Mood and Affect: Mood normal.         Thought Content: Thought content normal.         Judgment: Judgment normal.      Comments: anxious         INSTRUCTIONS FOR PATIENT:     Scheduled Follow-up, Appts Reviewed with Modifications if Needed: Yes  Future Appointments   Date Time Provider Department Center   5/6/2025 10:00 AM Eliseo Vaca MD [facility] at [facility]     I spent a total of 40 minutes on the day of the visit.This includes face to face time and non-face to face time preparing to see the patient (eg, review of tests),  obtaining and/or reviewing separately obtained history, documenting clinical information in the electronic or other health record, independently interpreting results and communicating results to the patient/family/caregiver, or care coordinator.    Signature: GUILLE Lopez      Transition of Care Visit:  I have reviewed and updated the history and problem list.  I have reconciled the medication list.  I have discussed the hospitalization and current medical issues, prognosis and plans with the patient/family.

## 2025-05-27 ENCOUNTER — HOSPITAL ENCOUNTER (OUTPATIENT)
Dept: RADIOLOGY | Facility: HOSPITAL | Age: 65
Discharge: HOME | End: 2025-05-27
Attending: ORTHOPAEDIC SURGERY
Payer: MEDICARE

## 2025-05-27 ENCOUNTER — OFFICE VISIT (OUTPATIENT)
Dept: ORTHOPEDICS | Facility: CLINIC | Age: 65
End: 2025-05-27
Payer: MEDICARE

## 2025-05-27 VITALS — HEIGHT: 67 IN | BODY MASS INDEX: 26.68 KG/M2 | WEIGHT: 170 LBS

## 2025-05-27 DIAGNOSIS — Z96.642 STATUS POST TOTAL REPLACEMENT OF LEFT HIP: Primary | ICD-10-CM

## 2025-05-27 PROCEDURE — 72170 X-RAY EXAM OF PELVIS: CPT | Mod: 26,,, | Performed by: RADIOLOGY

## 2025-05-27 PROCEDURE — 99024 POSTOP FOLLOW-UP VISIT: CPT | Mod: POP,,, | Performed by: ORTHOPAEDIC SURGERY

## 2025-05-27 PROCEDURE — 99214 OFFICE O/P EST MOD 30 MIN: CPT | Mod: PBBFAC,25,PN | Performed by: ORTHOPAEDIC SURGERY

## 2025-05-27 PROCEDURE — 72170 X-RAY EXAM OF PELVIS: CPT | Mod: TC,PN

## 2025-05-27 PROCEDURE — 99999 PR PBB SHADOW E&M-EST. PATIENT-LVL IV: CPT | Mod: PBBFAC,,, | Performed by: ORTHOPAEDIC SURGERY

## 2025-05-27 RX ORDER — GLIMEPIRIDE 2 MG/1
2 TABLET ORAL
COMMUNITY
Start: 2025-05-23

## 2025-05-27 NOTE — PROGRESS NOTES
[facility] ORTHOPEDICS POST-OP NOTE    Subjective:           Chief Complaint:   Chief Complaint   Patient presents with    Left Hip - Post-op Evaluation     Follow up for PO Left DANNY on 4/21/25 (Philipp to Total), states pain has been improving since last visit. Has some aching and discomfort with activity        Past Medical History:   Diagnosis Date    ACP (advance care planning) 12/20/2023    Anxiety     Back pain     Bipolar affect, depressed     Cancer ovarian; uterine    1992    COPD (chronic obstructive pulmonary disease)     Diabetes mellitus     GERD (gastroesophageal reflux disease)     Hyperlipidemia     Hypertension     OAB (overactive bladder)     On home oxygen therapy     per patient uses PRN       Past Surgical History:   Procedure Laterality Date    BREAST CYST ASPIRATION      COLON SURGERY      COLECTOMY    COLONOSCOPY      HEMIARTHROPLASTY OF HIP Left 07/20/2024    Procedure: HEMIARTHROPLASTY, HIP;  Surgeon: Timothy Nettles MD;  Location: [facility] OR;  Service: Orthopedics;  Laterality: Left;    HERNIA REPAIR N/A 2016    HIP ARTHROPLASTY Right 02/25/2019    Procedure: ARTHROPLASTY, HIP;  Surgeon: Eliseo Vaca MD;  Location: [facility] OR;  Service: Orthopedics;  Laterality: Right;  Daniel Herrera    HIP ARTHROPLASTY Left 4/21/2025    Procedure: ARTHROPLASTY, HIP;  Surgeon: Eliseo Vaca MD;  Location: [facility] OR;  Service: Orthopedics;  Laterality: Left;  [place]    HYSTERECTOMY  total    JOINT REPLACEMENT Right     hip replacemnt    KNEE ARTHROPLASTY Left 08/19/2019    Procedure: ARTHROPLASTY, KNEE;  Surgeon: Eliseo Vaca MD;  Location: [facility] OR;  Service: Orthopedics;  Laterality: Left;    LUMBAR LAMINECTOMY WITH FUSION N/A 12/07/2023    Procedure: LAMINECTOMY, SPINE, LUMBAR, WITH FUSION L4-5;  Surgeon: Joby Jaimes MD;  Location: [facility] OR;  Service: Orthopedics;  Laterality: N/A;  NTI, [employer]    REVISION COLOSTOMY N/A 2012    REVERSAL             Review of patient's allergies indicates:    Allergen Reactions    Ciprofloxacin Diarrhea    Hydrocodone-acetaminophen Nausea And Vomiting, Nausea Only and Other (See Comments)    Ibuprofen Nausea Only    Meloxicam Hives    Naproxen Nausea Only and Other (See Comments)    Narcof [hydrocodone-guaifenesin] Nausea And Vomiting    Quetiapine Nausea And Vomiting and Other (See Comments)       Family History   Problem Relation Name Age of Onset    Cancer Mother      Hypertension Father      Heart disease Father      Heart disease Sister      Hypertension Sister      Hypertension Brother      Depression Brother         Social History[1]    History of present illness:  Nimco comes in today for follow-up for her left hip conversion from hemiarthroplasty to total arthroplasty.  She is 6 weeks postop and doing well.  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 comes in today for routine follow-up and radiographs.  She is walking with a quad cane.    Review of Systems:    Musculoskeletal:  See HPI      Objective:        Physical Examination:    Vital Signs:  There were no vitals filed for this visit.    Body mass index is 26.62 kg/m².    This a well-developed, well nourished patient in no acute distress.  They are alert and oriented and cooperative to examination.        Left hip exam:  Skin to left hip clean dry and intact.  No erythema or ecchymosis.  No signs or symptoms of infection.  Neurovascularly intact throughout the left lower extremity.  Negative Homans on the left.  Left lateral hip incision well healed without wound dehiscence or drainage.  She can weightbear as tolerated left lower extremity.  She does ambulate with a slow winter and a mildly antalgic gait.  She ambulates with a quad cane.    Pertinent New Results:    XRAY Report / Interpretation:   Radiographs taken of the left hip today.  She has an anatomically placed left total hip arthroplasty without evidence of hardware failure or subsidence.  Proximal femoral cable in place without evidence of  "failure.    Assessment/Plan:      1. Status post left total hip conversion from hemiarthroplasty.    Continue with ambulation for exercise.  She can weightbear as tolerated left lower extremity.  She needs to continue following total hip replacement precautions for least 6 more weeks for total of 3 months postoperatively to reduce the risk/incidence of dislocation.  We will see her back in 2 months to assess her postoperative progress.      Cristhian Manrique, Physician Assistant, served in the capacity as a "scribe" for this patient encounter.  A "face-to-face" encounter occurred with Dr. Eliseo Vaca on this date.  The treatment plan and medical decision-making is outlined above. Patient was seen and examined with a chaperone.     This note was created using Dragon voice recognition software that occasionally misinterpreted phrases or words.             [1]   Social History  Socioeconomic History    Marital status:    Tobacco Use    Smoking status: Every Day     Current packs/day: 0.50     Average packs/day: 0.5 packs/day for 45.4 years (22.7 ttl pk-yrs)     Types: Cigarettes     Start date: 1980    Smokeless tobacco: Never   Substance and Sexual Activity    Alcohol use: Yes     Alcohol/week: 1.0 standard drink of alcohol     Types: 1 Cans of beer per week     Comment: weekly    Drug use: No    Sexual activity: Not Currently     Social Drivers of Health     Financial Resource Strain: Patient Declined (4/21/2025)    Overall Financial Resource Strain (CARDIA)     Difficulty of Paying Living Expenses: Patient declined   Food Insecurity: Patient Declined (4/21/2025)    Hunger Vital Sign     Worried About Running Out of Food in the Last Year: Patient declined     Ran Out of Food in the Last Year: Patient declined   Transportation Needs: Patient Declined (4/21/2025)    PRAPARE - Transportation     Lack of Transportation (Medical): Patient declined     Lack of Transportation (Non-Medical): Patient declined "   Physical Activity: Patient Declined (7/21/2024)    Exercise Vital Sign     Days of Exercise per Week: Patient declined     Minutes of Exercise per Session: Patient declined   Stress: Patient Declined (4/21/2025)    [employer] - Occupational Stress Questionnaire     Feeling of Stress : Patient declined   Housing Stability: Patient Declined (4/21/2025)    Housing Stability Vital Sign     Unable to Pay for Housing in the Last Year: Patient declined     Homeless in the Last Year: Patient declined

## 2025-07-29 ENCOUNTER — HOSPITAL ENCOUNTER (OUTPATIENT)
Dept: RADIOLOGY | Facility: HOSPITAL | Age: 65
Discharge: HOME | End: 2025-07-29
Attending: ORTHOPAEDIC SURGERY
Payer: MEDICARE

## 2025-07-29 ENCOUNTER — OFFICE VISIT (OUTPATIENT)
Dept: ORTHOPEDICS | Facility: CLINIC | Age: 65
End: 2025-07-29
Payer: MEDICARE

## 2025-07-29 VITALS — BODY MASS INDEX: 26.68 KG/M2 | HEIGHT: 67 IN | WEIGHT: 170 LBS

## 2025-07-29 DIAGNOSIS — Z96.642 HISTORY OF TOTAL HIP REPLACEMENT, LEFT: Primary | ICD-10-CM

## 2025-07-29 DIAGNOSIS — M17.11 PRIMARY OSTEOARTHRITIS OF RIGHT KNEE: ICD-10-CM

## 2025-07-29 PROCEDURE — 72170 X-RAY EXAM OF PELVIS: CPT | Mod: TC,PN

## 2025-07-29 PROCEDURE — 20610 DRAIN/INJ JOINT/BURSA W/O US: CPT | Mod: PBBFAC,PN,RT | Performed by: ORTHOPAEDIC SURGERY

## 2025-07-29 PROCEDURE — 72170 X-RAY EXAM OF PELVIS: CPT | Mod: 26,,, | Performed by: RADIOLOGY

## 2025-07-29 PROCEDURE — 99214 OFFICE O/P EST MOD 30 MIN: CPT | Mod: PBBFAC,25,PN | Performed by: ORTHOPAEDIC SURGERY

## 2025-07-29 PROCEDURE — 99999PBSHW PR PBB SHADOW TECHNICAL ONLY FILED TO HB: Mod: PBBFAC,,,

## 2025-07-29 PROCEDURE — 99999 PR PBB SHADOW E&M-EST. PATIENT-LVL IV: CPT | Mod: PBBFAC,,, | Performed by: ORTHOPAEDIC SURGERY

## 2025-07-29 RX ORDER — AMLODIPINE BESYLATE 5 MG/1
5 TABLET ORAL
COMMUNITY
Start: 2025-07-24

## 2025-07-29 RX ORDER — TRIAMCINOLONE ACETONIDE 40 MG/ML
40 INJECTION, SUSPENSION INTRA-ARTICULAR; INTRAMUSCULAR
Status: DISCONTINUED | OUTPATIENT
Start: 2025-07-29 | End: 2025-07-29 | Stop reason: HOSPADM

## 2025-07-29 RX ORDER — CETIRIZINE HYDROCHLORIDE 10 MG/1
10 TABLET ORAL DAILY
COMMUNITY

## 2025-07-29 RX ORDER — OLANZAPINE 15 MG/1
15 TABLET, FILM COATED ORAL NIGHTLY
COMMUNITY

## 2025-07-29 RX ADMIN — TRIAMCINOLONE ACETONIDE 40 MG: 40 INJECTION, SUSPENSION INTRA-ARTICULAR; INTRAMUSCULAR at 10:07

## 2025-07-29 NOTE — PROGRESS NOTES
[facility]    Subjective:     Chief Complaint:   Chief Complaint   Patient presents with    Left Hip - Post-op Evaluation      Follow up for Left DANNY 4/21/25. Had a fall on 7.27.25, was on a 2 step ladder and fell onto a toilet, hit her head. Having increased pain in L hip since her fall. Was doing well, had groin pain, problems moving her L leg before her fall. Using a cane to ambulate. Having problems with walking backwards involuntarily, would like to discuss this today, pt informed this is likely not related to her hip.        Past Medical History:   Diagnosis Date    ACP (advance care planning) 12/20/2023    Anxiety     Back pain     Bipolar affect, depressed     Cancer ovarian; uterine    1992    COPD (chronic obstructive pulmonary disease)     Diabetes mellitus     GERD (gastroesophageal reflux disease)     Hyperlipidemia     Hypertension     OAB (overactive bladder)     On home oxygen therapy     per patient uses PRN       Past Surgical History:   Procedure Laterality Date    BREAST CYST ASPIRATION      COLON SURGERY      COLECTOMY    COLONOSCOPY      HEMIARTHROPLASTY OF HIP Left 07/20/2024    Procedure: HEMIARTHROPLASTY, HIP;  Surgeon: Timothy Nettles MD;  Location: [facility] OR;  Service: Orthopedics;  Laterality: Left;    HERNIA REPAIR N/A 2016    HIP ARTHROPLASTY Right 02/25/2019    Procedure: ARTHROPLASTY, HIP;  Surgeon: Eliseo Vaca MD;  Location: [facility] OR;  Service: Orthopedics;  Laterality: Right;  Daniel Herrera    HIP ARTHROPLASTY Left 4/21/2025    Procedure: ARTHROPLASTY, HIP;  Surgeon: Eliseo Vaca MD;  Location: [facility] OR;  Service: Orthopedics;  Laterality: Left;  Gracia    HYSTERECTOMY  total    JOINT REPLACEMENT Right     hip replacemnt    KNEE ARTHROPLASTY Left 08/19/2019    Procedure: ARTHROPLASTY, KNEE;  Surgeon: Eliseo Vaca MD;  Location: [facility] OR;  Service: Orthopedics;  Laterality: Left;    LUMBAR LAMINECTOMY WITH FUSION N/A 12/07/2023    Procedure: LAMINECTOMY, SPINE,  LUMBAR, WITH FUSION L4-5;  Surgeon: Joby Jaimes MD;  [facility]: [facility];  Service: Orthopedics;  Laterality: N/A;  NTI, [employer]    REVISION COLOSTOMY N/A 2012    REVERSAL             Review of patient's allergies indicates:   Allergen Reactions    Ciprofloxacin Diarrhea    Hydrocodone-acetaminophen Nausea And Vomiting, Nausea Only and Other (See Comments)    Ibuprofen Nausea Only    Meloxicam Hives    Naproxen Nausea Only and Other (See Comments)    Narcof [hydrocodone-guaifenesin] Nausea And Vomiting    Quetiapine Nausea And Vomiting and Other (See Comments)       Family History   Problem Relation Name Age of Onset    Cancer Mother      Hypertension Father      Heart disease Father      Heart disease Sister      Hypertension Sister      Hypertension Brother      Depression Brother         Social History[1]    History of present illness: 64-year-old female, returns to clinic today for the left hip, she has a history of a left hip hemiarthroplasty July of 2024.  She continued to have complaints of chronic pain she sought a 2nd medical opinion with us.  We recommended conversion from a jessica to a total hip arthroplasty.  This was performed April of this year.  Unfortunately just Sunday 2 days ago she had a fall from a step ladder while cleaning a mirror.  She has some bruises and aches generally on her left side.  She states that she was doing well enough after the surgery, she was better than her initial surgery last year but still had some hip pain, she generally complains of difficulty putting on her undergarments.  She was a biting by her hip precautions however.  She also smokes and continues to smoke    Review of Systems:    Constitution: Negative for chills, fever, and sweats.  Negative for unexplained weight loss.    HENT:  Negative for headaches and blurry vision.    Cardiovascular:Negative for chest pain or irregular heart beat. Negative for hypertension.    Respiratory:  Negative for cough and  "shortness of breath.    Gastrointestinal: Negative for abdominal pain, heartburn, melena, nausea, and vomitting.    Genitourinary:  Negative bladder incontinence and dysuria.    Musculoskeletal:  See HPI for details.     Neurological: Negative for numbness.    Psychiatric/Behavioral: Negative for depression.  The patient is not nervous/anxious.      Endocrine: Negative for polyuria    Hematologic/Lymphatic: Negative for bleeding problem.  Does not bruise/bleed easily.    Skin: Negative for poor would healing and rash    Objective:      Physical Examination:    Vital Signs:  There were no vitals filed for this visit.    Body mass index is 26.62 kg/m².    This a well-developed, well nourished patient in no acute distress.  They are alert and oriented and cooperative to examination.         Examination of the left hip, some mild diffuse tenderness to palpation over the left hip generally.  Normal range motion of the knee, she does have history of a left total knee arthroplasty.  No groin pain with gentle range motion of the left hip.    Pertinent New Results:    XRAY Report / Interpretation:     AP pelvis taken today in the office demonstrate bilateral total hip arthroplasties.  Left total hip arthroplasty specifically, no evidence of hardware failure or loosening.  No subsidence.  A single cerclage wire is noted.    Assessment/Plan:       Revision hip arthroplasty now just greater than 3 months ago.  Unfortunately she had a fall a few days ago, she has got some musculoskeletal pain.  No evidence of fracture or hardware disruption from her new x-rays.  She continues to ambulate with a single-point cane but she does this sparingly.  Continue with mobilization efforts, she is greater than 3 months so she can begin normal motion in the hip such as crossing the legs and bending over.  Follow up in 2 months.      Edmond Hirsch, Physician Assistant, served in the capacity as a "scribe" for this patient encounter.  A " ""face-to-face" encounter occurred with Dr. Eliseo Vaca on this date.  The treatment plan and medical decision-making is outlined above. Patient was seen and examined with a chaperone.       This note was created using Dragon voice recognition software that occasionally misinterpreted phrases or words.             [1]   Social History  Socioeconomic History    Marital status:    Tobacco Use    Smoking status: Every Day     Current packs/day: 0.50     Average packs/day: 0.5 packs/day for 45.6 years (22.8 ttl pk-yrs)     Types: Cigarettes     Start date: 1980    Smokeless tobacco: Never   Substance and Sexual Activity    Alcohol use: Yes     Alcohol/week: 1.0 standard drink of alcohol     Types: 1 Cans of beer per week     Comment: weekly    Drug use: No    Sexual activity: Not Currently     Social Drivers of Health     Financial Resource Strain: Patient Declined (4/21/2025)    Overall Financial Resource Strain (CARDIA)     Difficulty of Paying Living Expenses: Patient declined   Food Insecurity: Patient Declined (4/21/2025)    Hunger Vital Sign     Worried About Running Out of Food in the Last Year: Patient declined     Ran Out of Food in the Last Year: Patient declined   Transportation Needs: Patient Declined (4/21/2025)    PRAPARE - Transportation     Lack of Transportation (Medical): Patient declined     Lack of Transportation (Non-Medical): Patient declined   Physical Activity: Patient Declined (7/21/2024)    Exercise Vital Sign     Days of Exercise per Week: Patient declined     Minutes of Exercise per Session: Patient declined   Stress: Patient Declined (4/21/2025)    [employer] - Occupational Stress Questionnaire     Feeling of Stress : Patient declined   Housing Stability: Patient Declined (4/21/2025)    Housing Stability Vital Sign     Unable to Pay for Housing in the Last Year: Patient declined     Homeless in the Last Year: Patient declined     "

## 2025-07-29 NOTE — PROCEDURES
Large Joint Aspiration/Injection: R knee    Date/Time: 7/29/2025 10:15 AM    Performed by: Eliseo Vaca MD  Authorized by: Eliseo Vaca MD    Site marked: the procedure site was marked    Timeout: prior to procedure the correct patient, procedure, and site was verified    Prep: patient was prepped and draped in usual sterile fashion      Local anesthesia used?: Yes    Local anesthetic:  Lidocaine 1% without epinephrine    Details:  Needle Size:  25 G  Ultrasonic Guidance for needle placement?: No    Location:  Knee  Site:  R knee  Medications:  40 mg triamcinolone acetonide 40 mg/mL  Patient tolerance:  Patient tolerated the procedure well with no immediate complications

## 2025-08-05 ENCOUNTER — DOCUMENT SCAN (OUTPATIENT)
Dept: HOME HEALTH SERVICES | Facility: HOSPITAL | Age: 65
End: 2025-08-05
Payer: MEDICARE

## 2025-08-15 ENCOUNTER — TELEPHONE (OUTPATIENT)
Dept: ORTHOPEDICS | Facility: CLINIC | Age: 65
End: 2025-08-15
Payer: MEDICARE

## 2025-08-19 ENCOUNTER — OFFICE VISIT (OUTPATIENT)
Dept: ORTHOPEDICS | Facility: CLINIC | Age: 65
End: 2025-08-19
Payer: MEDICARE

## 2025-08-19 ENCOUNTER — HOSPITAL ENCOUNTER (OUTPATIENT)
Dept: RADIOLOGY | Facility: HOSPITAL | Age: 65
Discharge: HOME | End: 2025-08-19
Attending: ORTHOPAEDIC SURGERY
Payer: MEDICARE

## 2025-08-19 VITALS — BODY MASS INDEX: 26.53 KG/M2 | HEIGHT: 67 IN | WEIGHT: 169.06 LBS

## 2025-08-19 DIAGNOSIS — M70.62 GREATER TROCHANTERIC BURSITIS, LEFT: ICD-10-CM

## 2025-08-19 DIAGNOSIS — Z96.642 HISTORY OF TOTAL HIP REPLACEMENT, LEFT: Primary | ICD-10-CM

## 2025-08-19 PROCEDURE — 72170 X-RAY EXAM OF PELVIS: CPT | Mod: 26,,, | Performed by: RADIOLOGY

## 2025-08-19 PROCEDURE — 99213 OFFICE O/P EST LOW 20 MIN: CPT | Mod: 25,S$PBB,,

## 2025-08-19 PROCEDURE — 72170 X-RAY EXAM OF PELVIS: CPT | Mod: TC,PN

## 2025-08-19 PROCEDURE — 99999 PR PBB SHADOW E&M-EST. PATIENT-LVL IV: CPT | Mod: PBBFAC,,,

## 2025-08-19 PROCEDURE — 99999PBSHW PR PBB SHADOW TECHNICAL ONLY FILED TO HB: Mod: PBBFAC,,,

## 2025-08-19 PROCEDURE — 20610 DRAIN/INJ JOINT/BURSA W/O US: CPT | Mod: S$PBB,LT,,

## 2025-08-19 PROCEDURE — 20610 DRAIN/INJ JOINT/BURSA W/O US: CPT | Mod: PBBFAC,PN,LT

## 2025-08-19 PROCEDURE — 99214 OFFICE O/P EST MOD 30 MIN: CPT | Mod: PBBFAC,25,PN

## 2025-08-19 PROCEDURE — 72220 X-RAY EXAM SACRUM TAILBONE: CPT | Mod: 26,,, | Performed by: RADIOLOGY

## 2025-08-19 PROCEDURE — 72220 X-RAY EXAM SACRUM TAILBONE: CPT | Mod: TC,PN

## 2025-08-19 RX ORDER — TRIAMCINOLONE ACETONIDE 40 MG/ML
40 INJECTION, SUSPENSION INTRA-ARTICULAR; INTRAMUSCULAR
Status: DISCONTINUED | OUTPATIENT
Start: 2025-08-19 | End: 2025-08-19 | Stop reason: HOSPADM

## 2025-08-19 RX ADMIN — TRIAMCINOLONE ACETONIDE 40 MG: 40 INJECTION, SUSPENSION INTRA-ARTICULAR; INTRAMUSCULAR at 01:08

## 2025-09-02 ENCOUNTER — OFFICE VISIT (OUTPATIENT)
Dept: ORTHOPEDICS | Facility: CLINIC | Age: 65
End: 2025-09-02
Payer: MEDICARE

## 2025-09-02 VITALS — HEIGHT: 67 IN | WEIGHT: 169.06 LBS | BODY MASS INDEX: 26.53 KG/M2

## 2025-09-02 DIAGNOSIS — M97.01XA PERIPROSTHETIC FRACTURE AROUND INTERNAL PROSTHETIC RIGHT HIP JOINT, INITIAL ENCOUNTER: ICD-10-CM

## 2025-09-02 DIAGNOSIS — Z96.641 HISTORY OF TOTAL RIGHT HIP REPLACEMENT: Primary | ICD-10-CM

## 2025-09-02 PROCEDURE — 99999 PR PBB SHADOW E&M-EST. PATIENT-LVL IV: CPT | Mod: PBBFAC,,, | Performed by: ORTHOPAEDIC SURGERY

## 2025-09-02 PROCEDURE — 99214 OFFICE O/P EST MOD 30 MIN: CPT | Mod: PBBFAC,PN | Performed by: ORTHOPAEDIC SURGERY

## 2025-09-02 PROCEDURE — 99213 OFFICE O/P EST LOW 20 MIN: CPT | Mod: S$PBB,,, | Performed by: ORTHOPAEDIC SURGERY

## (undated) DEVICE — TOGA FLYTE PEEL AWAY XLARGE

## (undated) DEVICE — SEE MEDLINE ITEM 157166

## (undated) DEVICE — SEE MEDLINE ITEM 157131

## (undated) DEVICE — ADHESIVE MASTISOL VIAL 48/BX

## (undated) DEVICE — APPLICATOR CHLORAPREP ORN 26ML

## (undated) DEVICE — SEE MEDLINE ITEM 157216

## (undated) DEVICE — BANDAGE ESMARK 6X12

## (undated) DEVICE — ALCOHOL 70% ISOP RUBBING 4OZ

## (undated) DEVICE — TAPE SILK 3IN

## (undated) DEVICE — SYR 50CC LL

## (undated) DEVICE — INTERPULSE SET

## (undated) DEVICE — KIT TOTAL HIP BN PREPARATION

## (undated) DEVICE — SPONGE SUPER KERLIX 6X6.75IN

## (undated) DEVICE — PILLOW HIP ABDUCTION MED

## (undated) DEVICE — LINER SUCTION 3000CC

## (undated) DEVICE — FULL DOSE BONE CEMENT, 10 PACK CATALOG NUMBER IS 6191-1-010
Type: IMPLANTABLE DEVICE | Site: HIP | Status: NON-FUNCTIONAL
Brand: SIMPLEX

## (undated) DEVICE — BLADE SAW SGTL 21.2X85.5X1.2

## (undated) DEVICE — TUBE SUCTION YANKAUER HI CAP

## (undated) DEVICE — BLADE SURG CARBON STEEL #10

## (undated) DEVICE — SEALER BIPOLAR TISSUE 6.0

## (undated) DEVICE — STRAP OR TABLE 5IN X 72IN

## (undated) DEVICE — SOL NACL IRR 1000ML BTL

## (undated) DEVICE — SYS SURGIPHOR STRL IRRG

## (undated) DEVICE — MANIFOLD 4 PORT

## (undated) DEVICE — SEE MEDLINE ITEM 152622

## (undated) DEVICE — COVER TABLE 44X90 STERILE

## (undated) DEVICE — SEE MEDLINE ITEM 152530

## (undated) DEVICE — DRAPE THREE-QTR REINF 53X77IN

## (undated) DEVICE — BNDG COFLEX FOAM LF2 ST 6X5YD

## (undated) DEVICE — SUT STRATAFIX SPIRAL VIOLET

## (undated) DEVICE — GLOVE PROTEXIS PI CLASSIC 8.5

## (undated) DEVICE — BLADE SAW SGTL DL STR 25X1.27X

## (undated) DEVICE — TAPE SURGICAL MICROFOAM 3IN

## (undated) DEVICE — DRAPE STERI INSTRUMENT 1018

## (undated) DEVICE — GLOVE SENSICARE PI GRN 8.5

## (undated) DEVICE — SOCKINETTE IMPERVIOUS 12X48IN

## (undated) DEVICE — PACK SIRUS BASIC V SURG STRL

## (undated) DEVICE — SLEEVE SCD EXPRESS KNEE MEDIUM

## (undated) DEVICE — SOL NACL IRR 3000ML

## (undated) DEVICE — TOURNIQUET SB QC DP 34X4IN

## (undated) DEVICE — ELECTRODE REM PLYHSV RETURN 9

## (undated) DEVICE — BOWL SUMMIT VACUUM CEMENT

## (undated) DEVICE — PAD CAST SPECIALIST STRL 6

## (undated) DEVICE — PACK CUSTOM UNIV BASIN SLI

## (undated) DEVICE — ALCOHOL RUBBING 70% ISO 4OZ

## (undated) DEVICE — DRAPE INCISE IOBAN 2 23X17IN

## (undated) DEVICE — PACK LOWER EXTREMITY

## (undated) DEVICE — CLOSURE SKIN STERI STRIP 1/2X4

## (undated) DEVICE — SLEEVE SCD EXPRESS CALF MEDIUM

## (undated) DEVICE — DRAPE PLASTIC U 60X72

## (undated) DEVICE — Device

## (undated) DEVICE — SUT VICRYL 2-0 CT-1 18 CR

## (undated) DEVICE — SOL NACL 0.9% IV INJ 250ML

## (undated) DEVICE — SUT VCRL 2-0 GYN 8-18IN MO4

## (undated) DEVICE — YANKAUER FLEX NO VENT HI CAP

## (undated) DEVICE — GOWN TOGA SYS PEELWY ZIP 2 XL

## (undated) DEVICE — SUT STRATAFIX SPRL PS-2 3-0

## (undated) DEVICE — DRAPE U SPLIT SHEET 54X76IN

## (undated) DEVICE — CONNECTOR TUBING STR 5 IN 1

## (undated) DEVICE — WRAP SHLDR HIP ACCU THRM PACK

## (undated) DEVICE — PRESSURIZER HI VAC HIP KIT

## (undated) DEVICE — DRAPE ORTH SPLIT 77X108IN

## (undated) DEVICE — UNDERGLOVES BIOGEL PI SIZE 8.5

## (undated) DEVICE — GLOVE 8.5 PROTEXIS PI BLUE

## (undated) DEVICE — DRAPE STERI U-SHAPED 47X51IN

## (undated) DEVICE — SEE MEDLINE ITEM 107746

## (undated) DEVICE — GLOVE SURG ULTRA TOUCH 8.5

## (undated) DEVICE — SUT X424H ETHIBOND 0-0

## (undated) DEVICE — PACK BASIC

## (undated) DEVICE — NDL SPINAL 18GX3.5 SPINOCAN

## (undated) DEVICE — SET DECANTER MEDICHOICE

## (undated) DEVICE — SEE MEDLINE ITEM 146313

## (undated) DEVICE — DRAPE STERI-DRAPE 1000 17X11IN

## (undated) DEVICE — GLOVE SENSICARE PI GRN 8

## (undated) DEVICE — SYS CLSR DERMABOND PRINEO 42CM

## (undated) DEVICE — SOL 9P NACL IRR PIC IL

## (undated) DEVICE — DRAPE INVISISHIELD TOWEL SMALL

## (undated) DEVICE — PAD ABDOMINAL STERILE 8X10IN

## (undated) DEVICE — SUT STRATAFIX PDS 1 CTX 18IN

## (undated) DEVICE — SUT FIBERWIRE 2 38 IN TAPER

## (undated) DEVICE — SPONGE BULKEE II ABSRB 6X6.75

## (undated) DEVICE — SEE MEDLINE ITEM 146420

## (undated) DEVICE — ALCOHOL 70% ANTISEPTIC ISO 4OZ

## (undated) DEVICE — PILLOW ABDUCTION FOAM MED

## (undated) DEVICE — GLOVE SENSICARE PI ALOE 8.5

## (undated) DEVICE — PAD ABD 8X10 STERILE

## (undated) DEVICE — SEE MEDLINE ITEM 146231

## (undated) DEVICE — SOL IRR NACL .9% 3000ML

## (undated) DEVICE — SUT MONOCRYL 3-0 PS-1

## (undated) DEVICE — ELECTRODE BLADE TEFLON 6

## (undated) DEVICE — DRAPE HIP TIBURON 87X115X134

## (undated) DEVICE — DRESSING GAUZE OIL EMUL 3X8

## (undated) DEVICE — NDL SURG MAYO CATGUT

## (undated) DEVICE — BLADE ELECTRO EXTENDED.

## (undated) DEVICE — SPACESUIT TOGA T5 ZIPPER PEEL

## (undated) DEVICE — SUT CTD VICRYL 1 UND BR

## (undated) DEVICE — CUBE COLD THERAPY POLAR CARE

## (undated) DEVICE — TUBING SUC UNIV W/CONN 12FT

## (undated) DEVICE — BLADE SAW SGTL NARROW 0.89

## (undated) DEVICE — DRAPE INCISE IOBAN 2 23X33IN

## (undated) DEVICE — ELECTRODE MEGADYNE RETURN DUAL

## (undated) DEVICE — DRAPE HIP PCH 112X137X89IN

## (undated) DEVICE — SUT MONOCRYL PLUS UD 3-0 27

## (undated) DEVICE — GLOVE SENSICARE PI ALOE 7.5

## (undated) DEVICE — TOWEL OR DISP STRL BLUE 4/PK